# Patient Record
Sex: FEMALE | Race: BLACK OR AFRICAN AMERICAN | Employment: OTHER | URBAN - METROPOLITAN AREA
[De-identification: names, ages, dates, MRNs, and addresses within clinical notes are randomized per-mention and may not be internally consistent; named-entity substitution may affect disease eponyms.]

---

## 2017-01-03 ENCOUNTER — HOSPITAL ENCOUNTER (OUTPATIENT)
Dept: MAMMOGRAPHY | Age: 77
Discharge: HOME | End: 2017-01-03
Attending: INTERNAL MEDICINE | Admitting: INTERNAL MEDICINE

## 2017-01-03 DIAGNOSIS — Z12.31 ENCOUNTER FOR SCREENING MAMMOGRAM FOR BREAST CANCER: ICD-10-CM

## 2017-01-12 ENCOUNTER — OFFICE VISIT (OUTPATIENT)
Dept: FAMILY MEDICINE CLINIC | Age: 77
End: 2017-01-12

## 2017-01-12 VITALS
DIASTOLIC BLOOD PRESSURE: 70 MMHG | BODY MASS INDEX: 25.19 KG/M2 | HEART RATE: 80 BPM | SYSTOLIC BLOOD PRESSURE: 132 MMHG | WEIGHT: 151.2 LBS | HEIGHT: 65 IN | RESPIRATION RATE: 18 BRPM

## 2017-01-12 DIAGNOSIS — E78.00 PURE HYPERCHOLESTEROLEMIA: ICD-10-CM

## 2017-01-12 DIAGNOSIS — I10 ESSENTIAL HYPERTENSION: ICD-10-CM

## 2017-01-12 DIAGNOSIS — J43.2 CENTRILOBULAR EMPHYSEMA (HCC): Primary | ICD-10-CM

## 2017-01-12 PROCEDURE — 99214 OFFICE O/P EST MOD 30 MIN: CPT | Performed by: INTERNAL MEDICINE

## 2017-01-12 ASSESSMENT — ENCOUNTER SYMPTOMS
ALLERGIC/IMMUNOLOGIC NEGATIVE: 1
RESPIRATORY NEGATIVE: 1

## 2017-01-13 ENCOUNTER — TELEPHONE (OUTPATIENT)
Dept: FAMILY MEDICINE CLINIC | Age: 77
End: 2017-01-13

## 2017-01-19 ENCOUNTER — TELEPHONE (OUTPATIENT)
Dept: FAMILY MEDICINE CLINIC | Age: 77
End: 2017-01-19

## 2017-01-19 RX ORDER — GUAIFENESIN 1200 MG/1
1200 TABLET, EXTENDED RELEASE ORAL 2 TIMES DAILY PRN
Qty: 20 TABLET | Refills: 0 | Status: SHIPPED | OUTPATIENT
Start: 2017-01-19 | End: 2017-03-17 | Stop reason: ALTCHOICE

## 2017-01-19 RX ORDER — LOSARTAN POTASSIUM 25 MG/1
TABLET ORAL
Qty: 30 TABLET | Refills: 0 | Status: SHIPPED | OUTPATIENT
Start: 2017-01-19 | End: 2017-02-16 | Stop reason: SDUPTHER

## 2017-02-14 RX ORDER — ATORVASTATIN CALCIUM 10 MG/1
TABLET, FILM COATED ORAL
Qty: 30 TABLET | Refills: 5 | Status: SHIPPED | OUTPATIENT
Start: 2017-02-14 | End: 2017-07-31 | Stop reason: SDUPTHER

## 2017-02-16 RX ORDER — LOSARTAN POTASSIUM 25 MG/1
TABLET ORAL
Qty: 30 TABLET | Refills: 5 | Status: SHIPPED | OUTPATIENT
Start: 2017-02-16 | End: 2017-07-31 | Stop reason: SDUPTHER

## 2017-03-17 ENCOUNTER — OFFICE VISIT (OUTPATIENT)
Dept: FAMILY MEDICINE CLINIC | Age: 77
End: 2017-03-17

## 2017-03-17 VITALS
RESPIRATION RATE: 16 BRPM | HEIGHT: 65 IN | SYSTOLIC BLOOD PRESSURE: 172 MMHG | BODY MASS INDEX: 25.26 KG/M2 | HEART RATE: 80 BPM | WEIGHT: 151.6 LBS | DIASTOLIC BLOOD PRESSURE: 84 MMHG

## 2017-03-17 DIAGNOSIS — R63.4 UNEXPLAINED WEIGHT LOSS: Primary | ICD-10-CM

## 2017-03-17 DIAGNOSIS — M05.79 RHEUMATOID ARTHRITIS INVOLVING MULTIPLE SITES WITH POSITIVE RHEUMATOID FACTOR (HCC): ICD-10-CM

## 2017-03-17 DIAGNOSIS — I10 ESSENTIAL HYPERTENSION: ICD-10-CM

## 2017-03-17 LAB
ALBUMIN SERPL-MCNC: 4.4 G/DL (ref 3.4–5)
ALP BLD-CCNC: 53 U/L (ref 40–129)
ALT SERPL-CCNC: 17 U/L (ref 10–40)
ANION GAP SERPL CALCULATED.3IONS-SCNC: 17 MMOL/L (ref 3–16)
AST SERPL-CCNC: 16 U/L (ref 15–37)
BASOPHILS ABSOLUTE: 0 K/UL (ref 0–0.2)
BASOPHILS RELATIVE PERCENT: 0.4 %
BILIRUB SERPL-MCNC: 0.3 MG/DL (ref 0–1)
BILIRUBIN DIRECT: <0.2 MG/DL (ref 0–0.3)
BILIRUBIN, INDIRECT: NORMAL MG/DL (ref 0–1)
BUN BLDV-MCNC: 20 MG/DL (ref 7–20)
C-REACTIVE PROTEIN: <0.3 MG/L (ref 0–5.1)
CALCIUM SERPL-MCNC: 9.8 MG/DL (ref 8.3–10.6)
CHLORIDE BLD-SCNC: 103 MMOL/L (ref 99–110)
CO2: 23 MMOL/L (ref 21–32)
CREAT SERPL-MCNC: 0.7 MG/DL (ref 0.6–1.2)
EOSINOPHILS ABSOLUTE: 0 K/UL (ref 0–0.6)
EOSINOPHILS RELATIVE PERCENT: 0 %
GFR AFRICAN AMERICAN: >60
GFR NON-AFRICAN AMERICAN: >60
GLUCOSE BLD-MCNC: 93 MG/DL (ref 70–99)
HCT VFR BLD CALC: 32.8 % (ref 36–48)
HEMOGLOBIN: 10.6 G/DL (ref 12–16)
LYMPHOCYTES ABSOLUTE: 1 K/UL (ref 1–5.1)
LYMPHOCYTES RELATIVE PERCENT: 11.6 %
MCH RBC QN AUTO: 27.3 PG (ref 26–34)
MCHC RBC AUTO-ENTMCNC: 32.2 G/DL (ref 31–36)
MCV RBC AUTO: 84.9 FL (ref 80–100)
MONOCYTES ABSOLUTE: 0.4 K/UL (ref 0–1.3)
MONOCYTES RELATIVE PERCENT: 4.9 %
NEUTROPHILS ABSOLUTE: 7 K/UL (ref 1.7–7.7)
NEUTROPHILS RELATIVE PERCENT: 83.1 %
PDW BLD-RTO: 16.4 % (ref 12.4–15.4)
PLATELET # BLD: 283 K/UL (ref 135–450)
PMV BLD AUTO: 7.7 FL (ref 5–10.5)
POTASSIUM SERPL-SCNC: 4.6 MMOL/L (ref 3.5–5.1)
RBC # BLD: 3.87 M/UL (ref 4–5.2)
SEDIMENTATION RATE, ERYTHROCYTE: 23 MM/HR (ref 0–30)
SODIUM BLD-SCNC: 143 MMOL/L (ref 136–145)
TOTAL PROTEIN: 7.2 G/DL (ref 6.4–8.2)
TSH SERPL DL<=0.05 MIU/L-ACNC: 13.31 UIU/ML (ref 0.27–4.2)
WBC # BLD: 8.4 K/UL (ref 4–11)

## 2017-03-17 PROCEDURE — 99213 OFFICE O/P EST LOW 20 MIN: CPT | Performed by: INTERNAL MEDICINE

## 2017-03-17 PROCEDURE — 36415 COLL VENOUS BLD VENIPUNCTURE: CPT | Performed by: INTERNAL MEDICINE

## 2017-03-17 RX ORDER — LABETALOL 100 MG/1
100 TABLET, FILM COATED ORAL 2 TIMES DAILY
Qty: 60 TABLET | Refills: 3 | Status: SHIPPED | OUTPATIENT
Start: 2017-03-17 | End: 2017-07-04 | Stop reason: SDUPTHER

## 2017-03-17 ASSESSMENT — ENCOUNTER SYMPTOMS
ALLERGIC/IMMUNOLOGIC NEGATIVE: 1
RESPIRATORY NEGATIVE: 1

## 2017-03-20 RX ORDER — LEVOTHYROXINE SODIUM 0.05 MG/1
50 TABLET ORAL DAILY
Qty: 30 TABLET | Refills: 1 | Status: SHIPPED | OUTPATIENT
Start: 2017-03-20 | End: 2017-05-15 | Stop reason: SDUPTHER

## 2017-04-05 ENCOUNTER — OFFICE VISIT (OUTPATIENT)
Dept: FAMILY MEDICINE CLINIC | Age: 77
End: 2017-04-05

## 2017-04-05 VITALS
OXYGEN SATURATION: 94 % | DIASTOLIC BLOOD PRESSURE: 68 MMHG | WEIGHT: 151.4 LBS | SYSTOLIC BLOOD PRESSURE: 124 MMHG | BODY MASS INDEX: 25.19 KG/M2 | HEART RATE: 86 BPM

## 2017-04-05 DIAGNOSIS — R63.4 UNEXPLAINED WEIGHT LOSS: ICD-10-CM

## 2017-04-05 DIAGNOSIS — I10 ESSENTIAL HYPERTENSION: Primary | ICD-10-CM

## 2017-04-05 PROCEDURE — 99213 OFFICE O/P EST LOW 20 MIN: CPT | Performed by: INTERNAL MEDICINE

## 2017-04-05 ASSESSMENT — ENCOUNTER SYMPTOMS
RESPIRATORY NEGATIVE: 1
ALLERGIC/IMMUNOLOGIC NEGATIVE: 1

## 2017-05-03 ENCOUNTER — OFFICE VISIT (OUTPATIENT)
Dept: FAMILY MEDICINE CLINIC | Age: 77
End: 2017-05-03

## 2017-05-03 VITALS
OXYGEN SATURATION: 94 % | SYSTOLIC BLOOD PRESSURE: 150 MMHG | TEMPERATURE: 97.4 F | BODY MASS INDEX: 24.83 KG/M2 | WEIGHT: 149 LBS | DIASTOLIC BLOOD PRESSURE: 72 MMHG | HEART RATE: 90 BPM | HEIGHT: 65 IN

## 2017-05-03 DIAGNOSIS — M54.42 ACUTE LEFT-SIDED LOW BACK PAIN WITH LEFT-SIDED SCIATICA: Primary | ICD-10-CM

## 2017-05-03 PROCEDURE — 99213 OFFICE O/P EST LOW 20 MIN: CPT | Performed by: PHYSICIAN ASSISTANT

## 2017-05-03 RX ORDER — METHYLPREDNISOLONE 4 MG/1
TABLET ORAL
Qty: 1 KIT | Refills: 0 | Status: SHIPPED | OUTPATIENT
Start: 2017-05-03 | End: 2017-05-09

## 2017-05-15 RX ORDER — LEVOTHYROXINE SODIUM 0.05 MG/1
TABLET ORAL
Qty: 30 TABLET | Refills: 1 | Status: SHIPPED | OUTPATIENT
Start: 2017-05-15 | End: 2017-07-07 | Stop reason: SDUPTHER

## 2017-05-24 ENCOUNTER — TELEPHONE (OUTPATIENT)
Dept: FAMILY MEDICINE CLINIC | Age: 77
End: 2017-05-24

## 2017-07-05 RX ORDER — LABETALOL 100 MG/1
TABLET, FILM COATED ORAL
Qty: 60 TABLET | Refills: 3 | Status: ON HOLD | OUTPATIENT
Start: 2017-07-05 | End: 2019-01-17 | Stop reason: HOSPADM

## 2017-07-06 ENCOUNTER — HOSPITAL ENCOUNTER (OUTPATIENT)
Dept: OTHER | Age: 77
Discharge: HOME | End: 2017-07-06
Attending: INTERNAL MEDICINE | Admitting: INTERNAL MEDICINE

## 2017-07-06 ENCOUNTER — TELEPHONE (OUTPATIENT)
Dept: FAMILY MEDICINE CLINIC | Age: 77
End: 2017-07-06

## 2017-07-06 ENCOUNTER — OFFICE VISIT (OUTPATIENT)
Dept: FAMILY MEDICINE CLINIC | Age: 77
End: 2017-07-06

## 2017-07-06 VITALS
WEIGHT: 151.4 LBS | BODY MASS INDEX: 25.22 KG/M2 | DIASTOLIC BLOOD PRESSURE: 66 MMHG | HEART RATE: 72 BPM | OXYGEN SATURATION: 93 % | HEIGHT: 65 IN | RESPIRATION RATE: 18 BRPM | SYSTOLIC BLOOD PRESSURE: 128 MMHG

## 2017-07-06 DIAGNOSIS — M05.79 RHEUMATOID ARTHRITIS INVOLVING MULTIPLE SITES WITH POSITIVE RHEUMATOID FACTOR (HCC): ICD-10-CM

## 2017-07-06 DIAGNOSIS — E78.00 PURE HYPERCHOLESTEROLEMIA: ICD-10-CM

## 2017-07-06 DIAGNOSIS — I10 HTN (HYPERTENSION), MALIGNANT: ICD-10-CM

## 2017-07-06 DIAGNOSIS — I10 ESSENTIAL HYPERTENSION: Primary | ICD-10-CM

## 2017-07-06 DIAGNOSIS — J43.2 CENTRILOBULAR EMPHYSEMA (HCC): ICD-10-CM

## 2017-07-06 DIAGNOSIS — E03.9 ACQUIRED HYPOTHYROIDISM: ICD-10-CM

## 2017-07-06 LAB
ANION GAP SERPL CALCULATED.3IONS-SCNC: 16 MMOL/L (ref 3–16)
BASOPHILS ABSOLUTE: 0 K/UL (ref 0–0.2)
BASOPHILS RELATIVE PERCENT: 1 %
BUN BLDV-MCNC: 18 MG/DL (ref 7–20)
CALCIUM SERPL-MCNC: 10.1 MG/DL (ref 8.3–10.6)
CHLORIDE BLD-SCNC: 103 MMOL/L (ref 99–110)
CO2: 24 MMOL/L (ref 21–32)
CREAT SERPL-MCNC: 0.9 MG/DL (ref 0.6–1.2)
EOSINOPHILS ABSOLUTE: 0.1 K/UL (ref 0–0.6)
EOSINOPHILS RELATIVE PERCENT: 2.5 %
GFR AFRICAN AMERICAN: >60
GFR NON-AFRICAN AMERICAN: >60
GLUCOSE BLD-MCNC: 81 MG/DL (ref 70–99)
HCT VFR BLD CALC: 34.2 % (ref 36–48)
HEMOGLOBIN: 11.2 G/DL (ref 12–16)
LYMPHOCYTES ABSOLUTE: 1.3 K/UL (ref 1–5.1)
LYMPHOCYTES RELATIVE PERCENT: 28.7 %
MCH RBC QN AUTO: 29 PG (ref 26–34)
MCHC RBC AUTO-ENTMCNC: 32.6 G/DL (ref 31–36)
MCV RBC AUTO: 88.8 FL (ref 80–100)
MONOCYTES ABSOLUTE: 0.5 K/UL (ref 0–1.3)
MONOCYTES RELATIVE PERCENT: 10 %
NEUTROPHILS ABSOLUTE: 2.7 K/UL (ref 1.7–7.7)
NEUTROPHILS RELATIVE PERCENT: 57.8 %
PDW BLD-RTO: 14.6 % (ref 12.4–15.4)
PLATELET # BLD: 221 K/UL (ref 135–450)
PMV BLD AUTO: 8.2 FL (ref 5–10.5)
POTASSIUM SERPL-SCNC: 4.7 MMOL/L (ref 3.5–5.1)
RBC # BLD: 3.86 M/UL (ref 4–5.2)
RHEUMATOID FACTOR: >650 IU/ML
SEDIMENTATION RATE, ERYTHROCYTE: 28 MM/HR (ref 0–30)
SODIUM BLD-SCNC: 143 MMOL/L (ref 136–145)
TSH SERPL DL<=0.05 MIU/L-ACNC: 20.43 UIU/ML (ref 0.27–4.2)
URIC ACID, SERUM: 4.1 MG/DL (ref 2.6–6)
WBC # BLD: 4.6 K/UL (ref 4–11)

## 2017-07-06 PROCEDURE — 36415 COLL VENOUS BLD VENIPUNCTURE: CPT | Performed by: INTERNAL MEDICINE

## 2017-07-06 PROCEDURE — 99214 OFFICE O/P EST MOD 30 MIN: CPT | Performed by: INTERNAL MEDICINE

## 2017-07-06 ASSESSMENT — PATIENT HEALTH QUESTIONNAIRE - PHQ9
2. FEELING DOWN, DEPRESSED OR HOPELESS: 0
SUM OF ALL RESPONSES TO PHQ9 QUESTIONS 1 & 2: 0
SUM OF ALL RESPONSES TO PHQ QUESTIONS 1-9: 0
1. LITTLE INTEREST OR PLEASURE IN DOING THINGS: 0

## 2017-07-06 ASSESSMENT — ENCOUNTER SYMPTOMS
BACK PAIN: 0
RESPIRATORY NEGATIVE: 1
ALLERGIC/IMMUNOLOGIC NEGATIVE: 1

## 2017-07-06 NOTE — TELEPHONE ENCOUNTER
MARIEI-The patient was returning a call regarding her xray results from today 7/6/17. I read over Dr. Juan Alberto notes with her and let her know that she needed to follow up with a Ortho. I gave her [phone] phone number to contact.

## 2017-07-07 RX ORDER — LEVOTHYROXINE SODIUM 0.1 MG/1
100 TABLET ORAL DAILY
Qty: 30 TABLET | Refills: 5 | Status: SHIPPED | OUTPATIENT
Start: 2017-07-07 | End: 2017-10-03 | Stop reason: SDUPTHER

## 2017-07-07 RX ORDER — PREDNISONE 10 MG/1
10 TABLET ORAL DAILY
Qty: 30 TABLET | Refills: 0 | Status: SHIPPED | OUTPATIENT
Start: 2017-07-07 | End: 2017-08-06 | Stop reason: SDUPTHER

## 2017-07-08 LAB — CCP IGG ANTIBODIES: 207 UNITS (ref 0–19)

## 2017-07-11 ENCOUNTER — TELEPHONE (OUTPATIENT)
Dept: FAMILY MEDICINE CLINIC | Age: 77
End: 2017-07-11

## 2017-07-31 RX ORDER — LOSARTAN POTASSIUM 25 MG/1
TABLET ORAL
Qty: 30 TABLET | Refills: 5 | Status: SHIPPED | OUTPATIENT
Start: 2017-07-31 | End: 2017-10-03 | Stop reason: SDUPTHER

## 2017-07-31 RX ORDER — ATORVASTATIN CALCIUM 10 MG/1
TABLET, FILM COATED ORAL
Qty: 30 TABLET | Refills: 5 | Status: SHIPPED | OUTPATIENT
Start: 2017-07-31 | End: 2017-10-03 | Stop reason: SDUPTHER

## 2017-08-07 RX ORDER — PREDNISONE 10 MG/1
TABLET ORAL
Qty: 30 TABLET | Refills: 5 | Status: SHIPPED | OUTPATIENT
Start: 2017-08-07 | End: 2017-09-26 | Stop reason: SDUPTHER

## 2017-08-28 RX ORDER — LEVOTHYROXINE SODIUM 0.05 MG/1
TABLET ORAL
Qty: 30 TABLET | Refills: 5 | Status: SHIPPED | OUTPATIENT
Start: 2017-08-28 | End: 2018-03-22 | Stop reason: DRUGHIGH

## 2017-09-26 ENCOUNTER — OFFICE VISIT (OUTPATIENT)
Dept: FAMILY MEDICINE CLINIC | Age: 77
End: 2017-09-26

## 2017-09-26 VITALS
HEART RATE: 88 BPM | SYSTOLIC BLOOD PRESSURE: 146 MMHG | RESPIRATION RATE: 20 BRPM | DIASTOLIC BLOOD PRESSURE: 88 MMHG | OXYGEN SATURATION: 90 %

## 2017-09-26 DIAGNOSIS — M50.121 CERVICAL DISC DISORDER AT C4-C5 LEVEL WITH RADICULOPATHY: ICD-10-CM

## 2017-09-26 DIAGNOSIS — M05.79 RHEUMATOID ARTHRITIS INVOLVING MULTIPLE SITES WITH POSITIVE RHEUMATOID FACTOR (HCC): ICD-10-CM

## 2017-09-26 DIAGNOSIS — Z12.11 COLON CANCER SCREENING: ICD-10-CM

## 2017-09-26 DIAGNOSIS — J43.2 CENTRILOBULAR EMPHYSEMA (HCC): Primary | ICD-10-CM

## 2017-09-26 DIAGNOSIS — E78.00 PURE HYPERCHOLESTEROLEMIA: ICD-10-CM

## 2017-09-26 DIAGNOSIS — I10 ESSENTIAL HYPERTENSION: ICD-10-CM

## 2017-09-26 LAB
CONTROL: ABNORMAL
HEMOCCULT STL QL: POSITIVE

## 2017-09-26 PROCEDURE — 82274 ASSAY TEST FOR BLOOD FECAL: CPT | Performed by: INTERNAL MEDICINE

## 2017-09-26 PROCEDURE — 99214 OFFICE O/P EST MOD 30 MIN: CPT | Performed by: INTERNAL MEDICINE

## 2017-09-26 RX ORDER — PREDNISONE 10 MG/1
10 TABLET ORAL DAILY
Qty: 30 TABLET | Refills: 0 | Status: SHIPPED | OUTPATIENT
Start: 2017-09-26 | End: 2017-10-03 | Stop reason: SDUPTHER

## 2017-09-26 ASSESSMENT — ENCOUNTER SYMPTOMS
ALLERGIC/IMMUNOLOGIC NEGATIVE: 1
CHOKING: 0
CHEST TIGHTNESS: 0
WHEEZING: 0
APNEA: 0
SHORTNESS OF BREATH: 1
BACK PAIN: 1
STRIDOR: 0
COUGH: 0

## 2017-09-27 ENCOUNTER — TELEPHONE (OUTPATIENT)
Dept: FAMILY MEDICINE CLINIC | Age: 77
End: 2017-09-27

## 2017-10-03 RX ORDER — SPIRONOLACTONE 25 MG/1
TABLET ORAL
Qty: 180 TABLET | Refills: 1 | Status: SHIPPED | OUTPATIENT
Start: 2017-10-03 | End: 2018-03-21 | Stop reason: SDUPTHER

## 2017-10-03 RX ORDER — LEVOTHYROXINE SODIUM 0.1 MG/1
100 TABLET ORAL DAILY
Qty: 90 TABLET | Refills: 1 | Status: SHIPPED | OUTPATIENT
Start: 2017-10-03 | End: 2018-03-21 | Stop reason: SDUPTHER

## 2017-10-03 RX ORDER — ALBUTEROL SULFATE 90 UG/1
AEROSOL, METERED RESPIRATORY (INHALATION)
Qty: 3 INHALER | Refills: 1 | Status: SHIPPED | OUTPATIENT
Start: 2017-10-03 | End: 2018-03-21 | Stop reason: SDUPTHER

## 2017-10-03 RX ORDER — LOSARTAN POTASSIUM 25 MG/1
25 TABLET ORAL DAILY
Qty: 90 TABLET | Refills: 1 | Status: SHIPPED | OUTPATIENT
Start: 2017-10-03 | End: 2018-11-06 | Stop reason: SDUPTHER

## 2017-10-03 RX ORDER — ATORVASTATIN CALCIUM 10 MG/1
10 TABLET, FILM COATED ORAL DAILY
Qty: 90 TABLET | Refills: 5 | Status: SHIPPED | OUTPATIENT
Start: 2017-10-03 | End: 2018-10-16 | Stop reason: SDUPTHER

## 2017-10-03 RX ORDER — PREDNISONE 10 MG/1
10 TABLET ORAL DAILY
Qty: 90 TABLET | Refills: 1 | Status: SHIPPED | OUTPATIENT
Start: 2017-10-03 | End: 2018-03-21 | Stop reason: SDUPTHER

## 2017-10-06 ENCOUNTER — TELEPHONE (OUTPATIENT)
Dept: FAMILY MEDICINE CLINIC | Age: 77
End: 2017-10-06

## 2017-10-06 NOTE — TELEPHONE ENCOUNTER
Vicenta Napier is calling and following up on the following prescription request that was faxed a few days ago:  Spironolactone, Pro Air Inhaler, Prednisone, Losartan, Levothyroxine, Atorvastatin.

## 2017-10-18 ENCOUNTER — IMMUNIZATION (OUTPATIENT)
Dept: FAMILY MEDICINE CLINIC | Age: 77
End: 2017-10-18

## 2017-10-18 DIAGNOSIS — Z23 FLU VACCINE NEED: Primary | ICD-10-CM

## 2017-10-18 PROCEDURE — 90471 IMMUNIZATION ADMIN: CPT | Performed by: INTERNAL MEDICINE

## 2017-10-18 PROCEDURE — 90662 IIV NO PRSV INCREASED AG IM: CPT | Performed by: INTERNAL MEDICINE

## 2017-10-18 NOTE — PROGRESS NOTES
Vaccine Information Sheet, \"Influenza - Inactivated\"  given to Don Cerna, or parent/legal guardian of  Don Cerna and verbalized understanding. Patient responses:    Have you ever had a reaction to a flu vaccine? No  Are you able to eat eggs without adverse effects? Yes  Do you have any current illness? No  Have you ever had Guillian Vanlue Syndrome? No    Flu vaccine given per order. Please see immunization tab.

## 2017-10-19 ENCOUNTER — OFFICE VISIT (OUTPATIENT)
Dept: FAMILY MEDICINE CLINIC | Age: 77
End: 2017-10-19

## 2017-10-19 VITALS
WEIGHT: 156 LBS | OXYGEN SATURATION: 96 % | DIASTOLIC BLOOD PRESSURE: 72 MMHG | SYSTOLIC BLOOD PRESSURE: 148 MMHG | HEIGHT: 65 IN | HEART RATE: 85 BPM | RESPIRATION RATE: 18 BRPM | BODY MASS INDEX: 25.99 KG/M2

## 2017-10-19 DIAGNOSIS — R10.13 EPIGASTRIC PAIN: Primary | ICD-10-CM

## 2017-10-19 DIAGNOSIS — I10 ESSENTIAL HYPERTENSION: ICD-10-CM

## 2017-10-19 DIAGNOSIS — E03.9 ACQUIRED HYPOTHYROIDISM: ICD-10-CM

## 2017-10-19 LAB
ALBUMIN SERPL-MCNC: 4 G/DL (ref 3.4–5)
ALP BLD-CCNC: 59 U/L (ref 40–129)
ALT SERPL-CCNC: 16 U/L (ref 10–40)
AMYLASE: 89 U/L (ref 25–115)
ANION GAP SERPL CALCULATED.3IONS-SCNC: 17 MMOL/L (ref 3–16)
AST SERPL-CCNC: 25 U/L (ref 15–37)
BILIRUB SERPL-MCNC: 0.4 MG/DL (ref 0–1)
BILIRUBIN DIRECT: <0.2 MG/DL (ref 0–0.3)
BILIRUBIN, INDIRECT: NORMAL MG/DL (ref 0–1)
BUN BLDV-MCNC: 10 MG/DL (ref 7–20)
CALCIUM SERPL-MCNC: 9.2 MG/DL (ref 8.3–10.6)
CHLORIDE BLD-SCNC: 99 MMOL/L (ref 99–110)
CO2: 23 MMOL/L (ref 21–32)
CREAT SERPL-MCNC: 0.8 MG/DL (ref 0.6–1.2)
GFR AFRICAN AMERICAN: >60
GFR NON-AFRICAN AMERICAN: >60
GLUCOSE BLD-MCNC: 97 MG/DL (ref 70–99)
POTASSIUM SERPL-SCNC: 4.2 MMOL/L (ref 3.5–5.1)
SODIUM BLD-SCNC: 139 MMOL/L (ref 136–145)
T4 FREE: 1 NG/DL (ref 0.9–1.8)
TOTAL PROTEIN: 6.5 G/DL (ref 6.4–8.2)
TSH SERPL DL<=0.05 MIU/L-ACNC: 3.71 UIU/ML (ref 0.27–4.2)

## 2017-10-19 PROCEDURE — 1036F TOBACCO NON-USER: CPT | Performed by: INTERNAL MEDICINE

## 2017-10-19 PROCEDURE — G8427 DOCREV CUR MEDS BY ELIG CLIN: HCPCS | Performed by: INTERNAL MEDICINE

## 2017-10-19 PROCEDURE — 99214 OFFICE O/P EST MOD 30 MIN: CPT | Performed by: INTERNAL MEDICINE

## 2017-10-19 PROCEDURE — 4040F PNEUMOC VAC/ADMIN/RCVD: CPT | Performed by: INTERNAL MEDICINE

## 2017-10-19 PROCEDURE — 3017F COLORECTAL CA SCREEN DOC REV: CPT | Performed by: INTERNAL MEDICINE

## 2017-10-19 PROCEDURE — G8417 CALC BMI ABV UP PARAM F/U: HCPCS | Performed by: INTERNAL MEDICINE

## 2017-10-19 PROCEDURE — G8484 FLU IMMUNIZE NO ADMIN: HCPCS | Performed by: INTERNAL MEDICINE

## 2017-10-19 PROCEDURE — G8399 PT W/DXA RESULTS DOCUMENT: HCPCS | Performed by: INTERNAL MEDICINE

## 2017-10-19 PROCEDURE — 1090F PRES/ABSN URINE INCON ASSESS: CPT | Performed by: INTERNAL MEDICINE

## 2017-10-19 PROCEDURE — 1123F ACP DISCUSS/DSCN MKR DOCD: CPT | Performed by: INTERNAL MEDICINE

## 2017-10-19 PROCEDURE — 36415 COLL VENOUS BLD VENIPUNCTURE: CPT | Performed by: INTERNAL MEDICINE

## 2017-10-19 RX ORDER — LEFLUNOMIDE 20 MG/1
20 TABLET ORAL
COMMUNITY
Start: 2017-10-06 | End: 2018-03-22 | Stop reason: SDUPTHER

## 2017-10-19 RX ORDER — IPRATROPIUM BROMIDE AND ALBUTEROL SULFATE 2.5; .5 MG/3ML; MG/3ML
3 SOLUTION RESPIRATORY (INHALATION)
COMMUNITY
Start: 2017-10-06 | End: 2018-03-22 | Stop reason: ALTCHOICE

## 2017-10-19 ASSESSMENT — ENCOUNTER SYMPTOMS
ALLERGIC/IMMUNOLOGIC NEGATIVE: 1
RESPIRATORY NEGATIVE: 1
ABDOMINAL PAIN: 1
NAUSEA: 1

## 2017-10-19 NOTE — PROGRESS NOTES
Subjective:      Patient ID: Ashlee Talley is a 78 y.o. female. HPI  Acquired hypothyroidism  Was very low in 7/2017. Increased dose to 100 mcg. Now c/o of fatigue and cold intolerance. Doesn't feel well. Fatigue. Essential hypertension  No change in meds, no c/o with meds, no chest pain, SOB, palpatations, or syncope. Home bp was good. Epigastric pain  Epigastric pain mostly at night. Feels worse in evening, diffuse discomfort, anorexia, fatigue. Early am waking. Feels ok in AM.     Past Medical History:   Diagnosis Date    Acquired hypothyroidism 7/6/2017    Anemia     Asthma     COPD (chronic obstructive pulmonary disease) ([facility] [address]     HLD (hyperlipidemia)     Hypertension     MI (myocardial infarction)     X 2    Migraine     past hx    Rheumatoid arthritis     Wears glasses      Current Outpatient Prescriptions   Medication Sig Dispense Refill    tiotropium (SPIRIVA) 18 MCG inhalation capsule Inhale 18 mcg into the lungs      ipratropium-albuterol (DUONEB) 0.5-2.5 (3) MG/3ML SOLN nebulizer solution Inhale 3 mLs into the lungs      leflunomide (ARAVA) 20 MG tablet Take 20 mg by mouth      albuterol sulfate HFA (PROAIR HFA) 108 (90 Base) MCG/ACT inhaler INHALE 2 PUFFS INTO THE LUNGS EVERY 6 HOURS AS NEEDED.  3 Inhaler 1    predniSONE (DELTASONE) 10 MG tablet Take 1 tablet by mouth daily 90 tablet 1    levothyroxine (SYNTHROID) 100 MCG tablet Take 1 tablet by mouth daily 90 tablet 1    atorvastatin (LIPITOR) 10 MG tablet Take 1 tablet by mouth daily 90 tablet 5    spironolactone (ALDACTONE) 25 MG tablet TAKE 1 TABLET BY MOUTH EVERY 12 HOURS 180 tablet 1    losartan (COZAAR) 25 MG tablet Take 1 tablet by mouth daily 90 tablet 1    levothyroxine (SYNTHROID) 50 MCG tablet TAKE 1 TABLET BY MOUTH DAILY 30 tablet 5    labetalol (NORMODYNE) 100 MG tablet TAKE 1 TABLET BY MOUTH 2 TIMES DAILY 60 tablet 3    zoledronic acid (RECLAST) 5 MG/100ML SOLN Infuse 100 mLs intravenously once 100 mL 0  ipratropium-albuterol (DUONEB) 0.5-2.5 (3) MG/3ML SOLN nebulizer solution Inhale 3 mLs into the lungs every 4 hours as needed. 360 mL 3    hydrochlorothiazide (HYDRODIURIL) 25 MG tablet Take 25 mg by mouth daily.  fluticasone-salmeterol (ADVAIR DISKUS) 250-50 MCG/DOSE AEPB Inhale 1 puff into the lungs every 12 hours. 60 each 3    nitroGLYCERIN (NITROSTAT) 0.4 MG SL tablet Place 0.4 mg under the tongue every 5 minutes as needed.  Calcium-Vitamin D (CALCIUM + D PO) Take 1 tablet by mouth daily.  leflunomide (ARAVA) 20 MG tablet Take 20 mg by mouth daily. No current facility-administered medications for this visit. Allergies   Allergen Reactions    Alendronate Sodium      Jaw pain      Humira [Adalimumab]      Rash      Penicillins      rash    Simvastatin Other (See Comments)     Made legs ache    Sulfa Antibiotics Swelling     Tongue swelled     Social History   Substance Use Topics    Smoking status: Former Smoker     Quit date: 1/22/2009    Smokeless tobacco: Never Used    Alcohol use No     Family History   Problem Relation Age of Onset    Cancer Mother     Cancer Father     Heart Disease Maternal Aunt     Cancer Sister      Review of Systems   Constitutional: Positive for fatigue. Respiratory: Negative. Cardiovascular: Negative. Gastrointestinal: Positive for abdominal pain and nausea. Endocrine: Negative. Genitourinary: Negative. Musculoskeletal: Negative. Skin: Negative. Allergic/Immunologic: Negative. Neurological: Positive for weakness. Hematological: Negative. Psychiatric/Behavioral: Positive for dysphoric mood and sleep disturbance. The patient is nervous/anxious. Vitals:    10/19/17 1522   BP: (!) 148/72   Pulse: 85   Resp: 18   SpO2: 96%   Weight: 156 lb (70.8 kg)   Height: 5' 5\" (1.651 m)     Objective:   Physical Exam   Constitutional: She is oriented to person, place, and time.  She appears well-developed and well-nourished. Non-toxic appearance. She does not have a sickly appearance. She does not appear ill. No distress. HENT:   Head: Normocephalic and atraumatic. Eyes: Conjunctivae and EOM are normal. Pupils are equal, round, and reactive to light. Neck: Trachea normal, normal range of motion and full passive range of motion without pain. Neck supple. Normal carotid pulses, no hepatojugular reflux and no JVD present. No spinous process tenderness and no muscular tenderness present. Carotid bruit is not present. No tracheal deviation, no edema, no erythema and normal range of motion present. No thyroid mass and no thyromegaly present. Cardiovascular: Normal rate, regular rhythm, S1 normal, S2 normal, normal heart sounds, intact distal pulses and normal pulses. No extrasystoles are present. PMI is not displaced. Exam reveals no gallop, no S3, no S4, no distant heart sounds and no friction rub. No murmur heard. Pulses:       Carotid pulses are 2+ on the right side, and 2+ on the left side. Radial pulses are 2+ on the right side, and 2+ on the left side. Femoral pulses are 2+ on the right side, and 2+ on the left side. Popliteal pulses are 2+ on the right side, and 2+ on the left side. Dorsalis pedis pulses are 2+ on the right side, and 2+ on the left side. Posterior tibial pulses are 2+ on the right side, and 2+ on the left side. Pulmonary/Chest: Effort normal and breath sounds normal. No accessory muscle usage. No apnea, no tachypnea and no bradypnea. No respiratory distress. She has no decreased breath sounds. She has no wheezes. She has no rhonchi. She has no rales. She exhibits no tenderness. Abdominal: Soft. Bowel sounds are normal. She exhibits no distension and no mass. There is no tenderness. There is no rebound and no guarding. Musculoskeletal: Normal range of motion. She exhibits no edema or tenderness. Lymphadenopathy:     She has no cervical adenopathy.

## 2017-10-19 NOTE — ASSESSMENT & PLAN NOTE
No change in meds, no c/o with meds, no chest pain, SOB, palpatations, or syncope. Home bp was good.

## 2017-10-19 NOTE — PATIENT INSTRUCTIONS
All above problems reviewed and the found to be unchanged except for the following :     Epigastric Pain. Will do labs. May be low thyroid. Acquired Hypothyroidism. Will check labs. If ok will need further testing. Essential Hypertension. Continue present meds. Home BP checks. Call if>140/90. Improve diet. Avoid caffeine and salt. Call if new c/o w/ meds.

## 2017-10-20 ENCOUNTER — TELEPHONE (OUTPATIENT)
Dept: FAMILY MEDICINE CLINIC | Age: 77
End: 2017-10-20

## 2017-10-20 RX ORDER — OMEPRAZOLE 40 MG/1
40 CAPSULE, DELAYED RELEASE ORAL DAILY
Qty: 90 CAPSULE | Refills: 1 | Status: SHIPPED | OUTPATIENT
Start: 2017-10-20 | End: 2018-03-21 | Stop reason: SDUPTHER

## 2017-10-20 RX ORDER — RANITIDINE 150 MG/1
150 TABLET ORAL NIGHTLY
Qty: 90 TABLET | Refills: 1 | Status: SHIPPED | OUTPATIENT
Start: 2017-10-20 | End: 2018-03-21 | Stop reason: SDUPTHER

## 2017-11-21 ENCOUNTER — OFFICE VISIT (OUTPATIENT)
Dept: FAMILY MEDICINE CLINIC | Age: 77
End: 2017-11-21

## 2017-11-21 VITALS
BODY MASS INDEX: 25.69 KG/M2 | HEIGHT: 65 IN | OXYGEN SATURATION: 91 % | SYSTOLIC BLOOD PRESSURE: 126 MMHG | RESPIRATION RATE: 18 BRPM | DIASTOLIC BLOOD PRESSURE: 60 MMHG | WEIGHT: 154.2 LBS | HEART RATE: 101 BPM

## 2017-11-21 DIAGNOSIS — M54.32 SCIATICA OF LEFT SIDE: Primary | ICD-10-CM

## 2017-11-21 PROCEDURE — 1123F ACP DISCUSS/DSCN MKR DOCD: CPT | Performed by: INTERNAL MEDICINE

## 2017-11-21 PROCEDURE — G8399 PT W/DXA RESULTS DOCUMENT: HCPCS | Performed by: INTERNAL MEDICINE

## 2017-11-21 PROCEDURE — G8417 CALC BMI ABV UP PARAM F/U: HCPCS | Performed by: INTERNAL MEDICINE

## 2017-11-21 PROCEDURE — 99213 OFFICE O/P EST LOW 20 MIN: CPT | Performed by: INTERNAL MEDICINE

## 2017-11-21 PROCEDURE — 1090F PRES/ABSN URINE INCON ASSESS: CPT | Performed by: INTERNAL MEDICINE

## 2017-11-21 PROCEDURE — G8427 DOCREV CUR MEDS BY ELIG CLIN: HCPCS | Performed by: INTERNAL MEDICINE

## 2017-11-21 PROCEDURE — 4040F PNEUMOC VAC/ADMIN/RCVD: CPT | Performed by: INTERNAL MEDICINE

## 2017-11-21 PROCEDURE — G8484 FLU IMMUNIZE NO ADMIN: HCPCS | Performed by: INTERNAL MEDICINE

## 2017-11-21 PROCEDURE — 1036F TOBACCO NON-USER: CPT | Performed by: INTERNAL MEDICINE

## 2017-11-21 PROCEDURE — 3017F COLORECTAL CA SCREEN DOC REV: CPT | Performed by: INTERNAL MEDICINE

## 2017-11-21 RX ORDER — DICLOFENAC SODIUM 75 MG/1
75 TABLET, DELAYED RELEASE ORAL 2 TIMES DAILY
Qty: 60 TABLET | Refills: 0 | Status: SHIPPED | OUTPATIENT
Start: 2017-11-21 | End: 2018-03-22 | Stop reason: ALTCHOICE

## 2017-11-21 RX ORDER — PREDNISONE 10 MG/1
TABLET ORAL
Qty: 30 TABLET | Refills: 0 | Status: SHIPPED | OUTPATIENT
Start: 2017-11-21 | End: 2018-03-02 | Stop reason: ALTCHOICE

## 2017-11-21 RX ORDER — CYCLOBENZAPRINE HCL 10 MG
10 TABLET ORAL 3 TIMES DAILY PRN
COMMUNITY
End: 2018-08-06 | Stop reason: ALTCHOICE

## 2017-11-21 ASSESSMENT — ENCOUNTER SYMPTOMS
BACK PAIN: 1
RESPIRATORY NEGATIVE: 1

## 2017-11-21 NOTE — ASSESSMENT & PLAN NOTE
Severe pain radiating from L Lower Back, to L Buttock, down L leg. Leg gives out. Pain is sharp and comes and goes w/ minimal movement.

## 2017-11-21 NOTE — PATIENT INSTRUCTIONS
All above problems reviewed and the found to be unchanged except for the following :     Sciatica of Left Side  - Ice 20 min on and off. - Prednisone 40/30/20/10  - Diclofenac 75 mg twice daily(not on blood thinners per med sheet and history)  - Flexeril 10 mg three times daily as needed. - stretching exercise twice daily. - to call if no better by Monday. Patient Education        Low Back Pain: Exercises  Your Care Instructions  Here are some examples of typical rehabilitation exercises for your condition. Start each exercise slowly. Ease off the exercise if you start to have pain. Your doctor or physical therapist will tell you when you can start these exercises and which ones will work best for you. How to do the exercises  Press-up    1. Lie on your stomach, supporting your body with your forearms. 2. Press your elbows down into the floor to raise your upper back. As you do this, relax your stomach muscles and allow your back to arch without using your back muscles. As your press up, do not let your hips or pelvis come off the floor. 3. Hold for 15 to 30 seconds, then relax. 4. Repeat 2 to 4 times. Alternate arm and leg (bird dog) exercise    Note: Do this exercise slowly. Try to keep your body straight at all times, and do not let one hip drop lower than the other. 1. Start on the floor, on your hands and knees. 2. Tighten your belly muscles. 3. Raise one leg off the floor, and hold it straight out behind you. Be careful not to let your hip drop down, because that will twist your trunk. 4. Hold for about 6 seconds, then lower your leg and switch to the other leg. 5. Repeat 8 to 12 times on each leg. 6. Over time, work up to holding for 10 to 30 seconds each time. 7. If you feel stable and secure with your leg raised, try raising the opposite arm straight out in front of you at the same time. Knee-to-chest exercise    1.  Lie on your back with your knees bent and your feet flat on ask your healthcare professional. [place] [id number] any warranty or liability for your use of this information.

## 2017-11-21 NOTE — PROGRESS NOTES
intravenously once 100 mL 0    ipratropium-albuterol (DUONEB) 0.5-2.5 (3) MG/3ML SOLN nebulizer solution Inhale 3 mLs into the lungs every 4 hours as needed. 360 mL 3    hydrochlorothiazide (HYDRODIURIL) 25 MG tablet Take 25 mg by mouth daily.  fluticasone-salmeterol (ADVAIR DISKUS) 250-50 MCG/DOSE AEPB Inhale 1 puff into the lungs every 12 hours. 60 each 3    nitroGLYCERIN (NITROSTAT) 0.4 MG SL tablet Place 0.4 mg under the tongue every 5 minutes as needed.  Calcium-Vitamin D (CALCIUM + D PO) Take 1 tablet by mouth daily.  leflunomide (ARAVA) 20 MG tablet Take 20 mg by mouth daily. No current facility-administered medications for this visit. Allergies   Allergen Reactions    Alendronate Sodium      Jaw pain      Humira [Adalimumab]      Rash      Penicillins      rash    Simvastatin Other (See Comments)     Made legs ache    Sulfa Antibiotics Swelling     Tongue swelled     Social History   Substance Use Topics    Smoking status: Former Smoker     Quit date: 1/22/2009    Smokeless tobacco: Never Used    Alcohol use No     Family History   Problem Relation Age of Onset    Cancer Mother     Cancer Father     Heart Disease Maternal Aunt     Cancer Sister        Review of Systems   Constitutional: Negative. Respiratory: Negative. Cardiovascular: Negative. Genitourinary: Negative. Musculoskeletal: Positive for back pain (L Lower back pain.) and gait problem. Negative for arthralgias, joint swelling, myalgias, neck pain and neck stiffness. Skin: Negative. Vitals:    11/21/17 1357   BP: 126/60   Pulse: 101   Resp: 18   SpO2: 91%   Weight: 154 lb 3.2 oz (69.9 kg)   Height: 5' 5\" (1.651 m)     Objective:   Physical Exam   Constitutional: She is oriented to person, place, and time. She appears well-developed and well-nourished. Non-toxic appearance. She does not have a sickly appearance. She does not appear ill. No distress.    HENT:   Head: Normocephalic and atraumatic. Eyes: Conjunctivae and EOM are normal. Pupils are equal, round, and reactive to light. Neck: Trachea normal, normal range of motion and full passive range of motion without pain. Neck supple. Normal carotid pulses, no hepatojugular reflux and no JVD present. No spinous process tenderness and no muscular tenderness present. Carotid bruit is not present. No tracheal deviation, no edema, no erythema and normal range of motion present. No thyroid mass and no thyromegaly present. Cardiovascular: Normal rate, regular rhythm, S1 normal, S2 normal, normal heart sounds, intact distal pulses and normal pulses. No extrasystoles are present. PMI is not displaced. Exam reveals no gallop, no S3, no S4, no distant heart sounds and no friction rub. No murmur heard. Pulses:       Carotid pulses are 2+ on the right side, and 2+ on the left side. Radial pulses are 2+ on the right side, and 2+ on the left side. Femoral pulses are 2+ on the right side, and 2+ on the left side. Popliteal pulses are 2+ on the right side, and 2+ on the left side. Dorsalis pedis pulses are 2+ on the right side, and 2+ on the left side. Posterior tibial pulses are 2+ on the right side, and 2+ on the left side. Pulmonary/Chest: Effort normal and breath sounds normal. No accessory muscle usage. No apnea, no tachypnea and no bradypnea. No respiratory distress. She has no decreased breath sounds. She has no wheezes. She has no rhonchi. She has no rales. She exhibits no tenderness. Musculoskeletal: Normal range of motion. She exhibits no edema or tenderness (Lower Back pain L side. ). Lymphadenopathy:     She has no cervical adenopathy. She has no axillary adenopathy. Neurological: She is alert and oriented to person, place, and time. She is not disoriented. She displays abnormal reflex. She displays no atrophy and no tremor. No cranial nerve deficit or sensory deficit.  She exhibits normal muscle tone. Coordination and gait abnormal. She displays no Babinski's sign on the right side. She displays no Babinski's sign on the left side. Reflex Scores:       Tricep reflexes are 2+ on the right side and 2+ on the left side. Bicep reflexes are 2+ on the right side and 2+ on the left side. Brachioradialis reflexes are 2+ on the right side and 2+ on the left side. Patellar reflexes are 2+ on the right side and 1+ on the left side. Achilles reflexes are 2+ on the right side and 0 on the left side. Pain radiating down L leg. Pain w/ Straight leg raise L side. Skin: Skin is warm, dry and intact. No abrasion and no rash noted. She is not diaphoretic. No cyanosis or erythema. No pallor. Nails show no clubbing. Psychiatric: She has a normal mood and affect. Her speech is normal and behavior is normal. Judgment and thought content normal. Cognition and memory are normal.       Assessment:      Problem   Sciatica of Left Side. Probably L4-5 disc. Plan:      All above problems reviewed and the found to be unchanged except for the following :     Sciatica of Left Side  - Ice 20 min on and off. - Prednisone 40/30/20/10  - Diclofenac 75 mg twice daily(not on blood thinners per med sheet and history)  - Flexeril 10 mg three times daily as needed. - stretching exercise twice daily. - to call if no better by Monday.

## 2017-11-27 ENCOUNTER — TELEPHONE (OUTPATIENT)
Dept: FAMILY MEDICINE CLINIC | Age: 77
End: 2017-11-27

## 2017-11-27 NOTE — TELEPHONE ENCOUNTER
Patient's daughter Ellis Denton is calling regarding the Prednisone prescription. The prescription was not picked up from pharmacy (11-21-17). She has only been taking one tablet by mouth daily. She would like to know how she should proceed since it was not picked up.

## 2018-01-26 ENCOUNTER — OFFICE VISIT (OUTPATIENT)
Dept: FAMILY MEDICINE CLINIC | Age: 78
End: 2018-01-26

## 2018-01-26 VITALS
HEART RATE: 104 BPM | HEIGHT: 66 IN | RESPIRATION RATE: 18 BRPM | TEMPERATURE: 97.4 F | WEIGHT: 155.6 LBS | SYSTOLIC BLOOD PRESSURE: 136 MMHG | OXYGEN SATURATION: 95 % | BODY MASS INDEX: 25.01 KG/M2 | DIASTOLIC BLOOD PRESSURE: 84 MMHG

## 2018-01-26 DIAGNOSIS — J11.1 INFLUENZA: Primary | ICD-10-CM

## 2018-01-26 PROCEDURE — G8399 PT W/DXA RESULTS DOCUMENT: HCPCS | Performed by: INTERNAL MEDICINE

## 2018-01-26 PROCEDURE — 3017F COLORECTAL CA SCREEN DOC REV: CPT | Performed by: INTERNAL MEDICINE

## 2018-01-26 PROCEDURE — 1036F TOBACCO NON-USER: CPT | Performed by: INTERNAL MEDICINE

## 2018-01-26 PROCEDURE — G8427 DOCREV CUR MEDS BY ELIG CLIN: HCPCS | Performed by: INTERNAL MEDICINE

## 2018-01-26 PROCEDURE — G8417 CALC BMI ABV UP PARAM F/U: HCPCS | Performed by: INTERNAL MEDICINE

## 2018-01-26 PROCEDURE — 4040F PNEUMOC VAC/ADMIN/RCVD: CPT | Performed by: INTERNAL MEDICINE

## 2018-01-26 PROCEDURE — 1090F PRES/ABSN URINE INCON ASSESS: CPT | Performed by: INTERNAL MEDICINE

## 2018-01-26 PROCEDURE — 1123F ACP DISCUSS/DSCN MKR DOCD: CPT | Performed by: INTERNAL MEDICINE

## 2018-01-26 PROCEDURE — 99213 OFFICE O/P EST LOW 20 MIN: CPT | Performed by: INTERNAL MEDICINE

## 2018-01-26 PROCEDURE — G8484 FLU IMMUNIZE NO ADMIN: HCPCS | Performed by: INTERNAL MEDICINE

## 2018-01-26 RX ORDER — METHYLPREDNISOLONE 4 MG/1
TABLET ORAL
Qty: 1 KIT | Refills: 0 | Status: SHIPPED | OUTPATIENT
Start: 2018-01-26 | End: 2018-02-01

## 2018-01-26 RX ORDER — AZITHROMYCIN 250 MG/1
TABLET, FILM COATED ORAL
Qty: 1 PACKET | Refills: 0 | Status: SHIPPED | OUTPATIENT
Start: 2018-01-26 | End: 2018-02-05

## 2018-01-26 ASSESSMENT — ENCOUNTER SYMPTOMS
ALLERGIC/IMMUNOLOGIC NEGATIVE: 1
COUGH: 1
SHORTNESS OF BREATH: 1

## 2018-01-26 NOTE — PROGRESS NOTES
Father     Heart Disease Maternal Aunt     Cancer Sister        Review of Systems   Constitutional: Positive for fatigue. Respiratory: Positive for cough and shortness of breath. Cardiovascular: Negative. Endocrine: Negative. Genitourinary: Negative. Musculoskeletal: Negative. Skin: Negative. Allergic/Immunologic: Negative. Neurological: Positive for weakness. Objective:   Physical Exam   Constitutional: She is oriented to person, place, and time. She appears well-developed and well-nourished. Non-toxic appearance. She does not have a sickly appearance. She does not appear ill. She appears distressed. HENT:   Head: Normocephalic and atraumatic. Eyes: Conjunctivae and EOM are normal. Pupils are equal, round, and reactive to light. Neck: Trachea normal, normal range of motion and full passive range of motion without pain. Neck supple. Normal carotid pulses, no hepatojugular reflux and no JVD present. No spinous process tenderness and no muscular tenderness present. Carotid bruit is not present. No tracheal deviation, no edema, no erythema and normal range of motion present. No thyroid mass and no thyromegaly present. Cardiovascular: Normal rate, regular rhythm, S1 normal, S2 normal, normal heart sounds, intact distal pulses and normal pulses. No extrasystoles are present. PMI is not displaced. Exam reveals no gallop, no S3, no S4, no distant heart sounds and no friction rub. No murmur heard. Pulses:       Carotid pulses are 2+ on the right side, and 2+ on the left side. Radial pulses are 2+ on the right side, and 2+ on the left side. Femoral pulses are 2+ on the right side, and 2+ on the left side. Popliteal pulses are 2+ on the right side, and 2+ on the left side. Dorsalis pedis pulses are 2+ on the right side, and 2+ on the left side. Posterior tibial pulses are 2+ on the right side, and 2+ on the left side.    Pulmonary/Chest: Effort

## 2018-03-02 ENCOUNTER — HOSPITAL ENCOUNTER (OUTPATIENT)
Dept: OTHER | Age: 78
Discharge: HOME | End: 2018-03-03
Attending: INTERNAL MEDICINE | Admitting: INTERNAL MEDICINE

## 2018-03-02 ENCOUNTER — HOSPITAL ENCOUNTER (OUTPATIENT)
Dept: GENERAL RADIOLOGY | Age: 78
Discharge: HOME | End: 2018-03-02

## 2018-03-02 ENCOUNTER — OFFICE VISIT (OUTPATIENT)
Dept: FAMILY MEDICINE CLINIC | Age: 78
End: 2018-03-02

## 2018-03-02 VITALS
SYSTOLIC BLOOD PRESSURE: 142 MMHG | RESPIRATION RATE: 16 BRPM | BODY MASS INDEX: 25.62 KG/M2 | WEIGHT: 153.8 LBS | OXYGEN SATURATION: 97 % | TEMPERATURE: 98.2 F | HEIGHT: 65 IN | HEART RATE: 108 BPM | DIASTOLIC BLOOD PRESSURE: 70 MMHG

## 2018-03-02 DIAGNOSIS — J43.2 CENTRILOBULAR EMPHYSEMA (HCC): Primary | ICD-10-CM

## 2018-03-02 DIAGNOSIS — J43.2 CENTRILOBULAR EMPHYSEMA (HCC): ICD-10-CM

## 2018-03-02 LAB
BASOPHILS ABSOLUTE: 0.2 K/UL (ref 0–0.2)
BASOPHILS RELATIVE PERCENT: 1.2 %
EOSINOPHILS ABSOLUTE: 0 K/UL (ref 0–0.6)
EOSINOPHILS RELATIVE PERCENT: 0.1 %
HCT VFR BLD CALC: 36.3 % (ref 36–48)
HEMOGLOBIN: 11.7 G/DL (ref 12–16)
LYMPHOCYTES ABSOLUTE: 1.7 K/UL (ref 1–5.1)
LYMPHOCYTES RELATIVE PERCENT: 13.4 %
MCH RBC QN AUTO: 28.1 PG (ref 26–34)
MCHC RBC AUTO-ENTMCNC: 32.1 G/DL (ref 31–36)
MCV RBC AUTO: 87.4 FL (ref 80–100)
MONOCYTES ABSOLUTE: 1 K/UL (ref 0–1.3)
MONOCYTES RELATIVE PERCENT: 7.9 %
NEUTROPHILS ABSOLUTE: 10.1 K/UL (ref 1.7–7.7)
NEUTROPHILS RELATIVE PERCENT: 77.4 %
PDW BLD-RTO: 15 % (ref 12.4–15.4)
PLATELET # BLD: 359 K/UL (ref 135–450)
PMV BLD AUTO: 7.9 FL (ref 5–10.5)
RBC # BLD: 4.16 M/UL (ref 4–5.2)
WBC # BLD: 13 K/UL (ref 4–11)

## 2018-03-02 PROCEDURE — 94640 AIRWAY INHALATION TREATMENT: CPT | Performed by: INTERNAL MEDICINE

## 2018-03-02 PROCEDURE — G8399 PT W/DXA RESULTS DOCUMENT: HCPCS | Performed by: INTERNAL MEDICINE

## 2018-03-02 PROCEDURE — 1123F ACP DISCUSS/DSCN MKR DOCD: CPT | Performed by: INTERNAL MEDICINE

## 2018-03-02 PROCEDURE — 99213 OFFICE O/P EST LOW 20 MIN: CPT | Performed by: INTERNAL MEDICINE

## 2018-03-02 PROCEDURE — 1090F PRES/ABSN URINE INCON ASSESS: CPT | Performed by: INTERNAL MEDICINE

## 2018-03-02 PROCEDURE — 36415 COLL VENOUS BLD VENIPUNCTURE: CPT | Performed by: INTERNAL MEDICINE

## 2018-03-02 PROCEDURE — 1036F TOBACCO NON-USER: CPT | Performed by: INTERNAL MEDICINE

## 2018-03-02 PROCEDURE — G8926 SPIRO NO PERF OR DOC: HCPCS | Performed by: INTERNAL MEDICINE

## 2018-03-02 PROCEDURE — 4040F PNEUMOC VAC/ADMIN/RCVD: CPT | Performed by: INTERNAL MEDICINE

## 2018-03-02 PROCEDURE — G8427 DOCREV CUR MEDS BY ELIG CLIN: HCPCS | Performed by: INTERNAL MEDICINE

## 2018-03-02 PROCEDURE — 3017F COLORECTAL CA SCREEN DOC REV: CPT | Performed by: INTERNAL MEDICINE

## 2018-03-02 PROCEDURE — G8484 FLU IMMUNIZE NO ADMIN: HCPCS | Performed by: INTERNAL MEDICINE

## 2018-03-02 PROCEDURE — 3023F SPIROM DOC REV: CPT | Performed by: INTERNAL MEDICINE

## 2018-03-02 PROCEDURE — G8417 CALC BMI ABV UP PARAM F/U: HCPCS | Performed by: INTERNAL MEDICINE

## 2018-03-02 RX ORDER — PREDNISONE 10 MG/1
TABLET ORAL
Qty: 15 TABLET | Refills: 0 | Status: SHIPPED | OUTPATIENT
Start: 2018-03-02 | End: 2018-03-22 | Stop reason: ALTCHOICE

## 2018-03-02 RX ORDER — LEVALBUTEROL INHALATION SOLUTION 1.25 MG/3ML
1.25 SOLUTION RESPIRATORY (INHALATION) ONCE
Status: COMPLETED | OUTPATIENT
Start: 2018-03-02 | End: 2018-03-02

## 2018-03-02 RX ORDER — LEVOFLOXACIN 500 MG/1
500 TABLET, FILM COATED ORAL DAILY
Qty: 7 TABLET | Refills: 0 | Status: SHIPPED | OUTPATIENT
Start: 2018-03-02 | End: 2018-03-09

## 2018-03-02 RX ADMIN — LEVALBUTEROL INHALATION SOLUTION 1.25 MG: 1.25 SOLUTION RESPIRATORY (INHALATION) at 12:52

## 2018-03-02 RX ADMIN — Medication 0.5 MG: at 12:52

## 2018-03-02 ASSESSMENT — ENCOUNTER SYMPTOMS
CHEST TIGHTNESS: 0
WHEEZING: 0
CHOKING: 0
RHINORRHEA: 1
SHORTNESS OF BREATH: 1
APNEA: 0
COUGH: 1
STRIDOR: 0

## 2018-03-02 NOTE — PROGRESS NOTES
Subjective:      Patient ID: Lyn Gorman is a 78 y.o. female. HPI  Centrilobular emphysema ([facility] [address]  Had Flu 8 weeks ago. Now last 1+ weeks, SOB, cough, fatigue, SOB. Feels weak. Past Medical History:   Diagnosis Date    Acquired hypothyroidism 7/6/2017    Anemia     Asthma     COPD (chronic obstructive pulmonary disease) ([facility] [address]     HLD (hyperlipidemia)     Hypertension     Influenza A 01/22/2018    MI (myocardial infarction)     X 2    Migraine     past hx    Rheumatoid arthritis     Wears glasses      Current Outpatient Prescriptions   Medication Sig Dispense Refill    predniSONE (DELTASONE) 10 MG tablet Take 1 tablet by mouth daily 90 tablet 1    guaiFENesin (MUCINEX) 600 MG extended release tablet Take 2 tablets by mouth 2 times daily 30 tablet 0    methotrexate (RHEUMATREX) 2.5 MG chemo tablet TAKE 3 TABLETS BY MOUTH ONCE WEEKLY 32 tablet 11    cyclobenzaprine (FLEXERIL) 10 MG tablet Take 10 mg by mouth 3 times daily as needed for Muscle spasms      diclofenac (VOLTAREN) 75 MG EC tablet Take 1 tablet by mouth 2 times daily 60 tablet 0    omeprazole (PRILOSEC) 40 MG delayed release capsule Take 1 capsule by mouth daily 90 capsule 1    ranitidine (ZANTAC) 150 MG tablet Take 1 tablet by mouth nightly 90 tablet 1    tiotropium (SPIRIVA) 18 MCG inhalation capsule Inhale 18 mcg into the lungs      ipratropium-albuterol (DUONEB) 0.5-2.5 (3) MG/3ML SOLN nebulizer solution Inhale 3 mLs into the lungs      leflunomide (ARAVA) 20 MG tablet Take 20 mg by mouth      albuterol sulfate HFA (PROAIR HFA) 108 (90 Base) MCG/ACT inhaler INHALE 2 PUFFS INTO THE LUNGS EVERY 6 HOURS AS NEEDED.  3 Inhaler 1    levothyroxine (SYNTHROID) 100 MCG tablet Take 1 tablet by mouth daily 90 tablet 1    atorvastatin (LIPITOR) 10 MG tablet Take 1 tablet by mouth daily 90 tablet 5    spironolactone (ALDACTONE) 25 MG tablet TAKE 1 TABLET BY MOUTH EVERY 12 HOURS 180 tablet 1    losartan (COZAAR) 25 MG tablet Take 1 Negative. Neurological: Positive for weakness. Negative for dizziness, tremors, seizures, syncope, facial asymmetry, speech difficulty, light-headedness, numbness and headaches. Objective:   Physical Exam   Constitutional: She is oriented to person, place, and time. She appears well-developed and well-nourished. Non-toxic appearance. She does not have a sickly appearance. She does not appear ill. No distress. HENT:   Head: Normocephalic and atraumatic. Eyes: Conjunctivae and EOM are normal. Pupils are equal, round, and reactive to light. Neck: Trachea normal, normal range of motion and full passive range of motion without pain. Neck supple. Normal carotid pulses, no hepatojugular reflux and no JVD present. No spinous process tenderness and no muscular tenderness present. Carotid bruit is not present. No tracheal deviation, no edema, no erythema and normal range of motion present. No thyroid mass and no thyromegaly present. Cardiovascular: Normal rate, regular rhythm, S1 normal, S2 normal, normal heart sounds, intact distal pulses and normal pulses. No extrasystoles are present. PMI is not displaced. Exam reveals no gallop, no S3, no S4, no distant heart sounds and no friction rub. No murmur heard. Pulses:       Carotid pulses are 2+ on the right side, and 2+ on the left side. Radial pulses are 2+ on the right side, and 2+ on the left side. Femoral pulses are 2+ on the right side, and 2+ on the left side. Popliteal pulses are 2+ on the right side, and 2+ on the left side. Dorsalis pedis pulses are 2+ on the right side, and 2+ on the left side. Posterior tibial pulses are 2+ on the right side, and 2+ on the left side. Pulmonary/Chest: Effort normal. No accessory muscle usage. No apnea, no tachypnea and no bradypnea. No respiratory distress. She has no decreased breath sounds. She has wheezes (vague bilateral). She has no rhonchi. She has no rales.  She

## 2018-03-02 NOTE — PATIENT INSTRUCTIONS
All above problems reviewed and the found to be unchanged except for the following :     Centrilobular Emphysema (Hcc)  -Duoneb w/ Nebulizer. - CBC and CXR. - Levaquin 500 mg daily for 7 days and Prednisone 20 mg for 5 days and 10 mg for 5 days.

## 2018-03-05 RX ORDER — BENZONATATE 200 MG/1
200 CAPSULE ORAL 3 TIMES DAILY PRN
Qty: 21 CAPSULE | Refills: 0 | Status: SHIPPED | OUTPATIENT
Start: 2018-03-05 | End: 2018-03-12

## 2018-03-05 NOTE — PROGRESS NOTES
Pt reports feeling better but still has cough  Cough coming in waves usually after she feels better for a while and starts doing things.  Advised to not do too much at times when she is feeling better

## 2018-03-21 RX ORDER — OMEPRAZOLE 40 MG/1
CAPSULE, DELAYED RELEASE ORAL
Qty: 90 CAPSULE | Refills: 11 | Status: SHIPPED | OUTPATIENT
Start: 2018-03-21 | End: 2019-03-22 | Stop reason: SDUPTHER

## 2018-03-21 RX ORDER — LEVOTHYROXINE SODIUM 0.1 MG/1
100 TABLET ORAL DAILY
Qty: 90 TABLET | Refills: 11 | Status: SHIPPED | OUTPATIENT
Start: 2018-03-21 | End: 2019-03-25 | Stop reason: SDUPTHER

## 2018-03-21 RX ORDER — PREDNISONE 10 MG/1
TABLET ORAL
Qty: 90 TABLET | Refills: 11 | Status: SHIPPED | OUTPATIENT
Start: 2018-03-21 | End: 2019-03-22 | Stop reason: SDUPTHER

## 2018-03-21 RX ORDER — RANITIDINE 150 MG/1
TABLET ORAL
Qty: 90 TABLET | Refills: 11 | Status: SHIPPED | OUTPATIENT
Start: 2018-03-21 | End: 2018-03-22 | Stop reason: ALTCHOICE

## 2018-03-21 RX ORDER — SPIRONOLACTONE 25 MG/1
TABLET ORAL
Qty: 180 TABLET | Refills: 11 | Status: ON HOLD | OUTPATIENT
Start: 2018-03-21 | End: 2019-01-17 | Stop reason: HOSPADM

## 2018-03-22 ENCOUNTER — HOSPITAL ENCOUNTER (OUTPATIENT)
Dept: GENERAL RADIOLOGY | Age: 78
Discharge: HOME | End: 2018-03-22

## 2018-03-22 ENCOUNTER — HOSPITAL ENCOUNTER (OUTPATIENT)
Dept: OTHER | Age: 78
Discharge: HOME | End: 2018-03-23
Attending: INTERNAL MEDICINE | Admitting: INTERNAL MEDICINE

## 2018-03-22 ENCOUNTER — OFFICE VISIT (OUTPATIENT)
Dept: FAMILY MEDICINE CLINIC | Age: 78
End: 2018-03-22

## 2018-03-22 VITALS
BODY MASS INDEX: 25.66 KG/M2 | WEIGHT: 154 LBS | HEIGHT: 65 IN | HEART RATE: 91 BPM | DIASTOLIC BLOOD PRESSURE: 84 MMHG | RESPIRATION RATE: 16 BRPM | OXYGEN SATURATION: 93 % | SYSTOLIC BLOOD PRESSURE: 138 MMHG

## 2018-03-22 DIAGNOSIS — J43.2 CENTRILOBULAR EMPHYSEMA (HCC): ICD-10-CM

## 2018-03-22 DIAGNOSIS — J13 PNEUMONIA OF BOTH LOWER LOBES DUE TO STREPTOCOCCUS PNEUMONIAE (HCC): ICD-10-CM

## 2018-03-22 DIAGNOSIS — J43.2 CENTRILOBULAR EMPHYSEMA (HCC): Primary | ICD-10-CM

## 2018-03-22 PROCEDURE — 1123F ACP DISCUSS/DSCN MKR DOCD: CPT | Performed by: INTERNAL MEDICINE

## 2018-03-22 PROCEDURE — G8399 PT W/DXA RESULTS DOCUMENT: HCPCS | Performed by: INTERNAL MEDICINE

## 2018-03-22 PROCEDURE — G8482 FLU IMMUNIZE ORDER/ADMIN: HCPCS | Performed by: INTERNAL MEDICINE

## 2018-03-22 PROCEDURE — 99213 OFFICE O/P EST LOW 20 MIN: CPT | Performed by: INTERNAL MEDICINE

## 2018-03-22 PROCEDURE — 1090F PRES/ABSN URINE INCON ASSESS: CPT | Performed by: INTERNAL MEDICINE

## 2018-03-22 PROCEDURE — 1036F TOBACCO NON-USER: CPT | Performed by: INTERNAL MEDICINE

## 2018-03-22 PROCEDURE — 3023F SPIROM DOC REV: CPT | Performed by: INTERNAL MEDICINE

## 2018-03-22 PROCEDURE — G8417 CALC BMI ABV UP PARAM F/U: HCPCS | Performed by: INTERNAL MEDICINE

## 2018-03-22 PROCEDURE — 4040F PNEUMOC VAC/ADMIN/RCVD: CPT | Performed by: INTERNAL MEDICINE

## 2018-03-22 PROCEDURE — G8926 SPIRO NO PERF OR DOC: HCPCS | Performed by: INTERNAL MEDICINE

## 2018-03-22 PROCEDURE — G8427 DOCREV CUR MEDS BY ELIG CLIN: HCPCS | Performed by: INTERNAL MEDICINE

## 2018-03-22 ASSESSMENT — ENCOUNTER SYMPTOMS
APNEA: 0
WHEEZING: 0
CHEST TIGHTNESS: 0
CHOKING: 0
COUGH: 0
STRIDOR: 0
SHORTNESS OF BREATH: 1

## 2018-03-22 NOTE — PATIENT INSTRUCTIONS
All above problems reviewed and the found to be unchanged except for the following :     Centrilobular Emphysema (Hcc). Stiolto 2.5 mcg  One puff daily. Will repeat CXR. Continue Albuterol as needed. Call if new c/o. Continue home O2 3L.

## 2018-03-22 NOTE — PROGRESS NOTES
S3, no S4, no distant heart sounds and no friction rub. No murmur heard. Pulses:       Carotid pulses are 2+ on the right side, and 2+ on the left side. Radial pulses are 2+ on the right side, and 2+ on the left side. Femoral pulses are 2+ on the right side, and 2+ on the left side. Popliteal pulses are 2+ on the right side, and 2+ on the left side. Dorsalis pedis pulses are 2+ on the right side, and 2+ on the left side. Posterior tibial pulses are 2+ on the right side, and 2+ on the left side. Pulmonary/Chest: Effort normal and breath sounds normal. No accessory muscle usage. No apnea, no tachypnea and no bradypnea. No respiratory distress. She has no decreased breath sounds. She has no wheezes. She has no rhonchi. She has no rales. She exhibits no tenderness. No wheezing   Musculoskeletal: Normal range of motion. She exhibits no edema or tenderness. Lymphadenopathy:     She has no cervical adenopathy. She has no axillary adenopathy. Neurological: She is alert and oriented to person, place, and time. She has normal strength and normal reflexes. She is not disoriented. She displays no atrophy and no tremor. No cranial nerve deficit or sensory deficit. She exhibits normal muscle tone. Coordination normal.   Skin: Skin is warm, dry and intact. No abrasion and no rash noted. She is not diaphoretic. No cyanosis or erythema. No pallor. Nails show no clubbing. Psychiatric: She has a normal mood and affect. Her speech is normal and behavior is normal. Judgment and thought content normal. Cognition and memory are normal.       Assessment:      Problem   Centrilobular Emphysema (Hcc). Bronchitis/Pneumonia improved. Plan:      All above problems reviewed and the found to be unchanged except for the following :     Centrilobular Emphysema (Hcc). Stiolto 2.5 mcg  One puff daily. Will repeat CXR. Continue Albuterol as needed. Call if new c/o. Continue home O2 3L.

## 2018-03-23 DIAGNOSIS — J43.2 CENTRILOBULAR EMPHYSEMA (HCC): ICD-10-CM

## 2018-03-23 DIAGNOSIS — R63.4 UNEXPLAINED WEIGHT LOSS: ICD-10-CM

## 2018-03-23 DIAGNOSIS — R93.89 ABNORMAL CXR: Primary | ICD-10-CM

## 2018-04-12 ENCOUNTER — TELEPHONE (OUTPATIENT)
Dept: FAMILY MEDICINE CLINIC | Age: 78
End: 2018-04-12

## 2018-04-12 NOTE — TELEPHONE ENCOUNTER
Back pain as soon as she steps down from back down hip into leg, pain x 3 days , has been getting progressively worse.   Scheduled appt for her tomorrow at 940

## 2018-04-12 NOTE — TELEPHONE ENCOUNTER
Patient is experiencing severe lower back pain that goes down the back of her legs to the knees. It has been going on for a few days, but so bad today she has to walk backwards for the pain to not be as bad.

## 2018-04-13 ENCOUNTER — HOSPITAL ENCOUNTER (OUTPATIENT)
Dept: OTHER | Age: 78
Discharge: HOME | End: 2018-04-14
Attending: INTERNAL MEDICINE | Admitting: INTERNAL MEDICINE

## 2018-04-13 ENCOUNTER — HOSPITAL ENCOUNTER (OUTPATIENT)
Dept: GENERAL RADIOLOGY | Age: 78
Discharge: HOME | End: 2018-04-13

## 2018-04-13 ENCOUNTER — OFFICE VISIT (OUTPATIENT)
Dept: FAMILY MEDICINE CLINIC | Age: 78
End: 2018-04-13

## 2018-04-13 VITALS
RESPIRATION RATE: 16 BRPM | HEART RATE: 114 BPM | OXYGEN SATURATION: 95 % | DIASTOLIC BLOOD PRESSURE: 96 MMHG | BODY MASS INDEX: 25.71 KG/M2 | HEIGHT: 66 IN | SYSTOLIC BLOOD PRESSURE: 158 MMHG | WEIGHT: 160 LBS

## 2018-04-13 DIAGNOSIS — M54.5 ACUTE LEFT-SIDED LOW BACK PAIN, WITH SCIATICA PRESENCE UNSPECIFIED: Primary | ICD-10-CM

## 2018-04-13 DIAGNOSIS — M54.32 SCIATICA OF LEFT SIDE: ICD-10-CM

## 2018-04-13 DIAGNOSIS — M54.32 SCIATICA OF LEFT SIDE: Primary | ICD-10-CM

## 2018-04-13 PROCEDURE — 1090F PRES/ABSN URINE INCON ASSESS: CPT | Performed by: INTERNAL MEDICINE

## 2018-04-13 PROCEDURE — G8417 CALC BMI ABV UP PARAM F/U: HCPCS | Performed by: INTERNAL MEDICINE

## 2018-04-13 PROCEDURE — G8399 PT W/DXA RESULTS DOCUMENT: HCPCS | Performed by: INTERNAL MEDICINE

## 2018-04-13 PROCEDURE — 99213 OFFICE O/P EST LOW 20 MIN: CPT | Performed by: INTERNAL MEDICINE

## 2018-04-13 PROCEDURE — 1036F TOBACCO NON-USER: CPT | Performed by: INTERNAL MEDICINE

## 2018-04-13 PROCEDURE — 1123F ACP DISCUSS/DSCN MKR DOCD: CPT | Performed by: INTERNAL MEDICINE

## 2018-04-13 PROCEDURE — 4040F PNEUMOC VAC/ADMIN/RCVD: CPT | Performed by: INTERNAL MEDICINE

## 2018-04-13 PROCEDURE — G8427 DOCREV CUR MEDS BY ELIG CLIN: HCPCS | Performed by: INTERNAL MEDICINE

## 2018-04-13 RX ORDER — TRAMADOL HYDROCHLORIDE 50 MG/1
50 TABLET ORAL EVERY 6 HOURS PRN
Qty: 12 TABLET | Refills: 0 | OUTPATIENT
Start: 2018-04-13 | End: 2018-04-16

## 2018-04-13 RX ORDER — CYCLOBENZAPRINE HCL 10 MG
10 TABLET ORAL 3 TIMES DAILY PRN
Qty: 21 TABLET | Refills: 0 | OUTPATIENT
Start: 2018-04-13 | End: 2018-04-23

## 2018-04-13 ASSESSMENT — ENCOUNTER SYMPTOMS
BACK PAIN: 1
ALLERGIC/IMMUNOLOGIC NEGATIVE: 1
RESPIRATORY NEGATIVE: 1

## 2018-04-23 ENCOUNTER — HOSPITAL ENCOUNTER (OUTPATIENT)
Dept: MRI IMAGING | Age: 78
Discharge: HOME | End: 2018-04-23
Attending: INTERNAL MEDICINE | Admitting: INTERNAL MEDICINE

## 2018-04-23 DIAGNOSIS — M54.50 LOW BACK PAIN: ICD-10-CM

## 2018-04-23 DIAGNOSIS — M54.5 ACUTE LEFT-SIDED LOW BACK PAIN, WITH SCIATICA PRESENCE UNSPECIFIED: ICD-10-CM

## 2018-04-24 ENCOUNTER — TELEPHONE (OUTPATIENT)
Dept: FAMILY MEDICINE CLINIC | Age: 78
End: 2018-04-24

## 2018-04-24 DIAGNOSIS — M71.38 SYNOVIAL CYST OF LUMBAR SPINE: Primary | ICD-10-CM

## 2018-05-04 ENCOUNTER — TELEPHONE (OUTPATIENT)
Dept: FAMILY MEDICINE CLINIC | Age: 78
End: 2018-05-04

## 2018-05-07 ENCOUNTER — OFFICE VISIT (OUTPATIENT)
Dept: FAMILY MEDICINE CLINIC | Age: 78
End: 2018-05-07

## 2018-05-07 VITALS
WEIGHT: 160 LBS | SYSTOLIC BLOOD PRESSURE: 126 MMHG | RESPIRATION RATE: 18 BRPM | HEART RATE: 115 BPM | BODY MASS INDEX: 25.71 KG/M2 | OXYGEN SATURATION: 90 % | DIASTOLIC BLOOD PRESSURE: 84 MMHG | HEIGHT: 66 IN

## 2018-05-07 DIAGNOSIS — E03.9 ACQUIRED HYPOTHYROIDISM: ICD-10-CM

## 2018-05-07 DIAGNOSIS — I10 ESSENTIAL HYPERTENSION: ICD-10-CM

## 2018-05-07 DIAGNOSIS — J43.2 CENTRILOBULAR EMPHYSEMA (HCC): ICD-10-CM

## 2018-05-07 DIAGNOSIS — I27.20 PULMONARY HTN (HCC): ICD-10-CM

## 2018-05-07 DIAGNOSIS — M54.32 SCIATICA OF LEFT SIDE: ICD-10-CM

## 2018-05-07 PROCEDURE — 1090F PRES/ABSN URINE INCON ASSESS: CPT | Performed by: INTERNAL MEDICINE

## 2018-05-07 PROCEDURE — G8926 SPIRO NO PERF OR DOC: HCPCS | Performed by: INTERNAL MEDICINE

## 2018-05-07 PROCEDURE — 3023F SPIROM DOC REV: CPT | Performed by: INTERNAL MEDICINE

## 2018-05-07 PROCEDURE — 1123F ACP DISCUSS/DSCN MKR DOCD: CPT | Performed by: INTERNAL MEDICINE

## 2018-05-07 PROCEDURE — 1036F TOBACCO NON-USER: CPT | Performed by: INTERNAL MEDICINE

## 2018-05-07 PROCEDURE — 99214 OFFICE O/P EST MOD 30 MIN: CPT | Performed by: INTERNAL MEDICINE

## 2018-05-07 PROCEDURE — 4040F PNEUMOC VAC/ADMIN/RCVD: CPT | Performed by: INTERNAL MEDICINE

## 2018-05-07 PROCEDURE — G8399 PT W/DXA RESULTS DOCUMENT: HCPCS | Performed by: INTERNAL MEDICINE

## 2018-05-07 PROCEDURE — G8417 CALC BMI ABV UP PARAM F/U: HCPCS | Performed by: INTERNAL MEDICINE

## 2018-05-07 PROCEDURE — G8427 DOCREV CUR MEDS BY ELIG CLIN: HCPCS | Performed by: INTERNAL MEDICINE

## 2018-05-07 ASSESSMENT — ENCOUNTER SYMPTOMS
ALLERGIC/IMMUNOLOGIC NEGATIVE: 1
GASTROINTESTINAL NEGATIVE: 1
SHORTNESS OF BREATH: 1
BACK PAIN: 1

## 2018-05-08 ENCOUNTER — TELEPHONE (OUTPATIENT)
Dept: FAMILY MEDICINE CLINIC | Age: 78
End: 2018-05-08

## 2018-05-24 ENCOUNTER — TELEPHONE (OUTPATIENT)
Dept: FAMILY MEDICINE CLINIC | Age: 78
End: 2018-05-24

## 2018-05-24 DIAGNOSIS — M54.32 SCIATICA OF LEFT SIDE: Primary | ICD-10-CM

## 2018-07-24 ENCOUNTER — TELEPHONE (OUTPATIENT)
Dept: ORTHOPEDIC SURGERY | Age: 78
End: 2018-07-24

## 2018-07-24 ENCOUNTER — OFFICE VISIT (OUTPATIENT)
Dept: ORTHOPEDIC SURGERY | Age: 78
End: 2018-07-24

## 2018-07-24 VITALS
HEART RATE: 112 BPM | SYSTOLIC BLOOD PRESSURE: 126 MMHG | BODY MASS INDEX: 25.71 KG/M2 | WEIGHT: 160 LBS | DIASTOLIC BLOOD PRESSURE: 84 MMHG | HEIGHT: 66 IN

## 2018-07-24 DIAGNOSIS — M71.38 SYNOVIAL CYST OF LUMBAR FACET JOINT: Primary | ICD-10-CM

## 2018-07-24 DIAGNOSIS — M48.061 LUMBAR FORAMINAL STENOSIS: ICD-10-CM

## 2018-07-24 DIAGNOSIS — M54.16 LUMBAR RADICULOPATHY: ICD-10-CM

## 2018-07-24 PROCEDURE — G8417 CALC BMI ABV UP PARAM F/U: HCPCS | Performed by: PHYSICAL MEDICINE & REHABILITATION

## 2018-07-24 PROCEDURE — G8427 DOCREV CUR MEDS BY ELIG CLIN: HCPCS | Performed by: PHYSICAL MEDICINE & REHABILITATION

## 2018-07-24 PROCEDURE — 1101F PT FALLS ASSESS-DOCD LE1/YR: CPT | Performed by: PHYSICAL MEDICINE & REHABILITATION

## 2018-07-24 PROCEDURE — 1090F PRES/ABSN URINE INCON ASSESS: CPT | Performed by: PHYSICAL MEDICINE & REHABILITATION

## 2018-07-24 PROCEDURE — 99244 OFF/OP CNSLTJ NEW/EST MOD 40: CPT | Performed by: PHYSICAL MEDICINE & REHABILITATION

## 2018-07-24 NOTE — PROGRESS NOTES
New Patient: SPINE    Referring Provider: Jacquie Rizvi MD    CHIEF COMPLAINT:    Chief Complaint   Patient presents with    Lower Back Pain     NP, LSP. Patient notes pain several years but has recently worsened in the past few months. No recent injury. No recent treatment but has had aquatic therapy in the past.  No h/o of lumbar spine surgery.  Leg Pain     Radiating bilateral leg pain. Pain extends into feet. Notes left leg is worse than right leg. HISTORY OF PRESENT ILLNESS:      · The patient is being sent at the request of Jacquie Rizvi MD in consultation as a new spine patient for low back pain and bilateral leg pain. The patient is a 70 y.o. female whom reports She developed low back pain over the past few years. This seems to worsen the past few months. She can identify any inciting event or any injury. She has tried aquatic therapy with no significant relief. She reports she is pain radiating to the both lower extremities but this is worse on the left side. She underwent x-rays of lumbar spine on 4/13/2018 and an MRI of lumbar spine 4/23/2018.     Pain Assessment  Location of Pain: Leg  Location Modifiers: Left, Right  Severity of Pain: 8  Quality of Pain: Sharp, Throbbing, Other (Comment) (Burning)  Duration of Pain: Persistent  Frequency of Pain: Constant  Aggravating Factors: Walking, Standing  Limiting Behavior: Yes  Relieving Factors: Rest  Result of Injury: No  Work-Related Injury: No  Are there other pain locations you wish to document?: No      Associated signs and symptoms:   Neurogenic bowel or bladder symptoms:  no   Perceived weakness:  no   Difficulty walking:  yes    Recent Imaging (within past one year)   Xrays: no   MRI or CT of spine: yes    Current/Past Treatment:   · Physical Therapy:  yes  · Chiropractic:  none  · Injection:  none  · Medications:   NSAIDS:  yes   Muscle relaxer:  yes   Steriods:  none   Neuropathic medications:  none   Opioids:

## 2018-08-06 ENCOUNTER — OFFICE VISIT (OUTPATIENT)
Dept: FAMILY MEDICINE CLINIC | Age: 78
End: 2018-08-06

## 2018-08-06 VITALS
HEART RATE: 113 BPM | SYSTOLIC BLOOD PRESSURE: 156 MMHG | WEIGHT: 158.6 LBS | BODY MASS INDEX: 25.49 KG/M2 | RESPIRATION RATE: 16 BRPM | DIASTOLIC BLOOD PRESSURE: 80 MMHG | OXYGEN SATURATION: 92 % | HEIGHT: 66 IN

## 2018-08-06 DIAGNOSIS — Z23 PNEUMOCOCCAL VACCINATION ADMINISTERED AT CURRENT VISIT: ICD-10-CM

## 2018-08-06 DIAGNOSIS — I10 ESSENTIAL HYPERTENSION: ICD-10-CM

## 2018-08-06 DIAGNOSIS — J43.2 CENTRILOBULAR EMPHYSEMA (HCC): ICD-10-CM

## 2018-08-06 DIAGNOSIS — M54.32 SCIATICA OF LEFT SIDE: ICD-10-CM

## 2018-08-06 PROCEDURE — 1123F ACP DISCUSS/DSCN MKR DOCD: CPT | Performed by: INTERNAL MEDICINE

## 2018-08-06 PROCEDURE — 90732 PPSV23 VACC 2 YRS+ SUBQ/IM: CPT | Performed by: INTERNAL MEDICINE

## 2018-08-06 PROCEDURE — G8427 DOCREV CUR MEDS BY ELIG CLIN: HCPCS | Performed by: INTERNAL MEDICINE

## 2018-08-06 PROCEDURE — 1090F PRES/ABSN URINE INCON ASSESS: CPT | Performed by: INTERNAL MEDICINE

## 2018-08-06 PROCEDURE — 1101F PT FALLS ASSESS-DOCD LE1/YR: CPT | Performed by: INTERNAL MEDICINE

## 2018-08-06 PROCEDURE — G8417 CALC BMI ABV UP PARAM F/U: HCPCS | Performed by: INTERNAL MEDICINE

## 2018-08-06 PROCEDURE — 1036F TOBACCO NON-USER: CPT | Performed by: INTERNAL MEDICINE

## 2018-08-06 PROCEDURE — G8926 SPIRO NO PERF OR DOC: HCPCS | Performed by: INTERNAL MEDICINE

## 2018-08-06 PROCEDURE — 90471 IMMUNIZATION ADMIN: CPT | Performed by: INTERNAL MEDICINE

## 2018-08-06 PROCEDURE — 99214 OFFICE O/P EST MOD 30 MIN: CPT | Performed by: INTERNAL MEDICINE

## 2018-08-06 PROCEDURE — G8399 PT W/DXA RESULTS DOCUMENT: HCPCS | Performed by: INTERNAL MEDICINE

## 2018-08-06 PROCEDURE — 4040F PNEUMOC VAC/ADMIN/RCVD: CPT | Performed by: INTERNAL MEDICINE

## 2018-08-06 PROCEDURE — 3023F SPIROM DOC REV: CPT | Performed by: INTERNAL MEDICINE

## 2018-08-06 RX ORDER — PREDNISONE 10 MG/1
10 TABLET ORAL
Status: ON HOLD | COMMUNITY
End: 2019-01-17 | Stop reason: HOSPADM

## 2018-08-06 ASSESSMENT — PATIENT HEALTH QUESTIONNAIRE - PHQ9
1. LITTLE INTEREST OR PLEASURE IN DOING THINGS: 0
2. FEELING DOWN, DEPRESSED OR HOPELESS: 0
SUM OF ALL RESPONSES TO PHQ9 QUESTIONS 1 & 2: 0
SUM OF ALL RESPONSES TO PHQ QUESTIONS 1-9: 0

## 2018-08-06 ASSESSMENT — ENCOUNTER SYMPTOMS
RESPIRATORY NEGATIVE: 1
ALLERGIC/IMMUNOLOGIC NEGATIVE: 1
GASTROINTESTINAL NEGATIVE: 1

## 2018-08-06 NOTE — ASSESSMENT & PLAN NOTE
Home O2. Continues to have issues w/  HR>100 and O2 sat of 95% when off Port able O2 for >10 min. SOB after off O2 about 10-15 min.

## 2018-08-06 NOTE — PATIENT INSTRUCTIONS
All above problems reviewed and the found to be unchanged except for the following :     Sciatica of Left Side. To Dr Balta Gandhi for aspiration of cyst.      Essential Hypertension. Continue present meds. Home BP checks. Call if>140/90. Improve diet. Avoid caffeine and salt. Call if new c/o w/ meds. Centrilobular Emphysema (Hcc). To Pulmonologist as scheduled. To walk as tolerated. Call if new c/o. contiue home O2 at present setting for now. conitnue meds.

## 2018-08-07 ENCOUNTER — TELEPHONE (OUTPATIENT)
Dept: FAMILY MEDICINE CLINIC | Age: 78
End: 2018-08-07

## 2018-08-07 NOTE — TELEPHONE ENCOUNTER
Patient was seen yesterday and given a prescription for a walker. [address], phone number [phone]. [facility] is asking the prescription be faxed to them. She did not have the fax number. BP is asking for reason of medical necessity. Patient would like a call when the order has been faxed to [facility].

## 2018-08-08 NOTE — TELEPHONE ENCOUNTER
Patient would like a call from [facility] regarding the walker and when the prescription will be faxed to [address] [place] [address].  She said she would like to get out of the house, but unable to without something to hold onto. Marquise Jack and Dr. Jeremy Grajeda are out of the office today.

## 2018-08-13 ENCOUNTER — TELEPHONE (OUTPATIENT)
Dept: ORTHOPEDIC SURGERY | Age: 78
End: 2018-08-13

## 2018-08-13 DIAGNOSIS — M54.16 LUMBAR RADICULOPATHY: ICD-10-CM

## 2018-08-13 DIAGNOSIS — M71.38 SYNOVIAL CYST OF LUMBAR FACET JOINT: Primary | ICD-10-CM

## 2018-08-13 DIAGNOSIS — M48.061 LUMBAR FORAMINAL STENOSIS: ICD-10-CM

## 2018-08-13 NOTE — TELEPHONE ENCOUNTER
Patient spoke to Belinda Murillo. She was told to have the prescription faxed to [facility] [phone] for the walker. She was also told to call Dr. Dayan Frausto office asking why is it necessary to have the Epidural.  She wanted to let you know this was being asked.

## 2018-08-15 ENCOUNTER — TELEPHONE (OUTPATIENT)
Dept: ORTHOPEDIC SURGERY | Age: 78
End: 2018-08-15

## 2018-08-15 DIAGNOSIS — M71.38 SYNOVIAL CYST OF LUMBAR SPINE: Primary | ICD-10-CM

## 2018-08-15 DIAGNOSIS — M48.061 LUMBAR FORAMINAL STENOSIS: ICD-10-CM

## 2018-08-15 DIAGNOSIS — M54.16 LUMBAR RADICULOPATHY: ICD-10-CM

## 2018-08-17 NOTE — TELEPHONE ENCOUNTER
Patient is calling to let you know [address] does not have walkers. She was given [employer] by [employer]. She is asking the prescription for the roller walker with a seat and brakes be faxed to [facility]'s fax # [phone]. Their phone number is [phone]. They are also asking for medical necessity. She says it is to help her walk to she can get out of the house, a seat to rest when too tired, brakes to stop, and the walker would also be used in case she felt down. (she would have something to help her get up)  She would like to get exercise as well.

## 2018-08-20 ENCOUNTER — TELEPHONE (OUTPATIENT)
Dept: ORTHOPEDIC SURGERY | Age: 78
End: 2018-08-20

## 2018-08-21 ENCOUNTER — HOSPITAL ENCOUNTER (OUTPATIENT)
Age: 78
Setting detail: OUTPATIENT SURGERY
Discharge: HOME | End: 2018-08-21
Attending: PHYSICAL MEDICINE & REHABILITATION | Admitting: PHYSICAL MEDICINE & REHABILITATION
Payer: COMMERCIAL

## 2018-08-21 VITALS
OXYGEN SATURATION: 100 % | RESPIRATION RATE: 16 BRPM | HEIGHT: 66 IN | DIASTOLIC BLOOD PRESSURE: 85 MMHG | BODY MASS INDEX: 24.27 KG/M2 | WEIGHT: 151 LBS | SYSTOLIC BLOOD PRESSURE: 151 MMHG | HEART RATE: 84 BPM

## 2018-08-21 PROCEDURE — 2580000003 HC RX 258

## 2018-08-21 PROCEDURE — 3600000002 HC SURGERY LEVEL 2 BASE: Performed by: PHYSICAL MEDICINE & REHABILITATION

## 2018-08-21 PROCEDURE — 2709999900 HC NON-CHARGEABLE SUPPLY: Performed by: PHYSICAL MEDICINE & REHABILITATION

## 2018-08-21 PROCEDURE — 3600000012 HC SURGERY LEVEL 2 ADDTL 15MIN: Performed by: PHYSICAL MEDICINE & REHABILITATION

## 2018-08-21 PROCEDURE — 2500000003 HC RX 250 WO HCPCS

## 2018-08-21 PROCEDURE — 7100000010 HC PHASE II RECOVERY - FIRST 15 MIN: Performed by: PHYSICAL MEDICINE & REHABILITATION

## 2018-08-21 PROCEDURE — 7100000011 HC PHASE II RECOVERY - ADDTL 15 MIN: Performed by: PHYSICAL MEDICINE & REHABILITATION

## 2018-08-21 PROCEDURE — 99152 MOD SED SAME PHYS/QHP 5/>YRS: CPT | Performed by: PHYSICAL MEDICINE & REHABILITATION

## 2018-08-21 RX ORDER — SODIUM CHLORIDE, SODIUM LACTATE, POTASSIUM CHLORIDE, CALCIUM CHLORIDE 600; 310; 30; 20 MG/100ML; MG/100ML; MG/100ML; MG/100ML
INJECTION, SOLUTION INTRAVENOUS
Status: DISCONTINUED
Start: 2018-08-21 | End: 2018-08-21 | Stop reason: HOSPADM

## 2018-08-21 RX ORDER — MIDAZOLAM HYDROCHLORIDE 1 MG/ML
INJECTION INTRAMUSCULAR; INTRAVENOUS
Status: DISCONTINUED
Start: 2018-08-21 | End: 2018-08-21 | Stop reason: HOSPADM

## 2018-08-21 RX ORDER — SODIUM CHLORIDE, SODIUM LACTATE, POTASSIUM CHLORIDE, CALCIUM CHLORIDE 600; 310; 30; 20 MG/100ML; MG/100ML; MG/100ML; MG/100ML
INJECTION, SOLUTION INTRAVENOUS CONTINUOUS
Status: DISCONTINUED | OUTPATIENT
Start: 2018-08-21 | End: 2018-08-21 | Stop reason: HOSPADM

## 2018-08-21 RX ORDER — LIDOCAINE HYDROCHLORIDE 10 MG/ML
INJECTION, SOLUTION EPIDURAL; INFILTRATION; INTRACAUDAL; PERINEURAL
Status: COMPLETED
Start: 2018-08-21 | End: 2018-08-21

## 2018-08-21 RX ORDER — SODIUM CHLORIDE, SODIUM LACTATE, POTASSIUM CHLORIDE, CALCIUM CHLORIDE 600; 310; 30; 20 MG/100ML; MG/100ML; MG/100ML; MG/100ML
INJECTION, SOLUTION INTRAVENOUS
Status: COMPLETED
Start: 2018-08-21 | End: 2018-08-21

## 2018-08-21 RX ORDER — SODIUM CHLORIDE, SODIUM LACTATE, POTASSIUM CHLORIDE, CALCIUM CHLORIDE 600; 310; 30; 20 MG/100ML; MG/100ML; MG/100ML; MG/100ML
INJECTION, SOLUTION INTRAVENOUS CONTINUOUS
Status: DISCONTINUED | OUTPATIENT
Start: 2018-08-21 | End: 2018-08-21

## 2018-08-21 RX ADMIN — LIDOCAINE HYDROCHLORIDE 0.1 ML: 10 INJECTION, SOLUTION EPIDURAL; INFILTRATION; INTRACAUDAL; PERINEURAL at 08:25

## 2018-08-21 RX ADMIN — SODIUM CHLORIDE, SODIUM LACTATE, POTASSIUM CHLORIDE, CALCIUM CHLORIDE: 600; 310; 30; 20 INJECTION, SOLUTION INTRAVENOUS at 08:26

## 2018-08-21 RX ADMIN — SODIUM CHLORIDE, POTASSIUM CHLORIDE, SODIUM LACTATE AND CALCIUM CHLORIDE: 600; 310; 30; 20 INJECTION, SOLUTION INTRAVENOUS at 08:26

## 2018-08-21 ASSESSMENT — ACTIVITIES OF DAILY LIVING (ADL): EFFECT OF PAIN ON DAILY ACTIVITIES: WALKING

## 2018-08-21 ASSESSMENT — PAIN SCALES - GENERAL: PAINLEVEL_OUTOF10: 0

## 2018-08-21 ASSESSMENT — PAIN - FUNCTIONAL ASSESSMENT: PAIN_FUNCTIONAL_ASSESSMENT: 0-10

## 2018-08-21 ASSESSMENT — PAIN DESCRIPTION - DESCRIPTORS: DESCRIPTORS: SHARP;PRESSURE

## 2018-08-21 NOTE — OP NOTE
Patient:  Nereyda Granados  YOB: 1940  Medical Record #:  [id number]   Place:  [address], [place]  Date:  8/21/2018   Physician:  Guille Jameson MD    Procedure:  Left L4/5 Lumbar Facet Cyst aspiration and facet joint injection    Pre-Procedure Diagnosis: Lumbar spondylosis without radiculopathy    Post-Procedure Diagnosis:  Lumbar spondylosis without radiculopathy    Sedation: Local with 1% Lidocaine 3 ml and 2 mg of IV Versed    EBL: None    Complications: None    The patient was seen in the office for complaints of Left sided axial back pain worse with facet loading. Review of the imaging and physical exam of the patient confirmed the pre-procedure diagnosis. After a thorough discussion of risks, benefits and alternatives informed consent was obtained. The patient was brought to the procedure suite and placed in the prone position. The skin overlying the lumbar spine was prepped with chloraprep and draped in the usual sterile fashion. Using fluoroscopic guidance, the left L4/5 facet joint was identified. Through anesthetized skin a 22 gauge 3.5 inch curved tip spinal needle was advanced to the juncture of the inferior articular process of L4 and then walked into the facet joint. .5 ml of Isovue M 300 was instilled showing an intra-articular pattern without evidence of vascular spread. The cyst was aspirated and 1.5 CC of fluid was obtained. A total of 2 ml of a solution mixed with 80 mg of depomedrol and 0.5 % Marcaine was instilled into the left L4/5 facet joint. The needles were removed and a band-aid applied. The patient was transferred to the post-operative area in stable condition.

## 2018-08-21 NOTE — POST SEDATION
Sedation Post Procedure Note    Patient Name: Darlene Thayer   YOB: 1940  Room/Bed: EG OR/NONE  Medical Record Number: [id number]  Date: 8/21/2018   Time: 9:39 AM         Physicians/Assistants:  Jeremy Frazier MD    Procedure Performed:  Lumbar facet cyst aspiration and injection    Post-Sedation Vital Signs:  Vitals:    08/21/18 0926   BP: (!) 169/74   Pulse: 82   Resp: 17   SpO2: 100%      Vital signs were reviewed and were stable after the procedure (see flow sheet for vitals)            Post-Sedation Exam: Lungs: clear           Complications: none    Electronically signed by Jeremy Frazier MD on 8/21/2018 at 9:39 AM

## 2018-08-21 NOTE — H&P
HISTORY AND PHYSICAL/PRE-SEDATION ASSESSMENT    Patient:  Tere Galvez   :  1940  Medical Record No.:  [id number]   Date:  2018  Physician:  Murphy Solis M.D. Facility: [facility]     Nursing History and Physical reviewed and agreed upon. Additional findings:    Allergies:  Alendronate sodium; Humira [adalimumab]; Penicillins; Simvastatin; and Sulfa antibiotics    Home Medications:    Prior to Admission medications    Medication Sig Start Date End Date Taking? Authorizing Provider   ipratropium-albuterol (DUONEB) 0.5-2.5 (3) MG/3ML SOLN nebulizer solution Inhale 3 mLs into the lungs every 4 hours as needed.  2/10/15  Yes Sam Quiroz MD   predniSONE (DELTASONE) 10 MG tablet Take 10 mg by mouth    Historical Provider, MD   Blood Pressure Monitoring (BLOOD PRESSURE MONITOR/M CUFF) MISC 1 Units by Does not apply route daily 18   C Ana Maria Jameson MD   levothyroxine (SYNTHROID) 100 MCG tablet TAKE 1 TABLET BY MOUTH DAILY 3/21/18   C Ana Maria Jameson MD   predniSONE (DELTASONE) 10 MG tablet TAKE ONE (1) TABLET BY MOUTH DAILY 3/21/18   C Ana Maria Jameson MD   spironolactone (ALDACTONE) 25 MG tablet TAKE ONE (1) TABLET BY MOUTH EVERY 12 HOURS 3/21/18   C Ana Maria Jameson MD   VENTOLIN  (90 Base) MCG/ACT inhaler INHALE 2 PUFFS BY MOUTH INTO THE LUNGS EVERY 6 HOURS AS NEEDED 3/21/18   C Ana Maria Jameson MD   omeprazole (PRILOSEC) 40 MG delayed release capsule TAKE 1 CAPSULE BY MOUTH ONCE DAILY 3/21/18   C Ana Maria Jameson MD   atorvastatin (LIPITOR) 10 MG tablet Take 1 tablet by mouth daily 10/3/17   C Ana Maria Jameson MD   losartan (COZAAR) 25 MG tablet Take 1 tablet by mouth daily 10/3/17   C Ana Maria Jameson MD   labetalol (NORMODYNE) 100 MG tablet TAKE 1 TABLET BY MOUTH 2 TIMES DAILY 17   C Ana Maria Jameson MD   zoledronic acid (RECLAST) 5 MG/100ML SOLN Infuse 100 mLs intravenously once 12/9/15   Sam Quiroz MD   hydrochlorothiazide (HYDRODIURIL) 25 MG tablet Take 25 mg

## 2018-08-21 NOTE — PROGRESS NOTES
Discharge instructions given to pts family and verbalized understanding, states pain is at a tolerable level, no numbness or weakness noted, pt wheeled out to car without complications

## 2018-08-24 ENCOUNTER — TELEPHONE (OUTPATIENT)
Dept: FAMILY MEDICINE CLINIC | Age: 78
End: 2018-08-24

## 2018-09-04 ENCOUNTER — TELEPHONE (OUTPATIENT)
Dept: ORTHOPEDIC SURGERY | Age: 78
End: 2018-09-04

## 2018-09-04 ENCOUNTER — OFFICE VISIT (OUTPATIENT)
Dept: ORTHOPEDIC SURGERY | Age: 78
End: 2018-09-04

## 2018-09-04 VITALS
DIASTOLIC BLOOD PRESSURE: 70 MMHG | BODY MASS INDEX: 25.88 KG/M2 | WEIGHT: 161 LBS | HEIGHT: 66 IN | SYSTOLIC BLOOD PRESSURE: 125 MMHG

## 2018-09-04 DIAGNOSIS — M71.38 SYNOVIAL CYST OF LUMBAR FACET JOINT: ICD-10-CM

## 2018-09-04 DIAGNOSIS — M54.16 LUMBAR RADICULOPATHY: ICD-10-CM

## 2018-09-04 DIAGNOSIS — M43.16 SPONDYLOLISTHESIS OF LUMBAR REGION: Primary | ICD-10-CM

## 2018-09-04 PROCEDURE — 4040F PNEUMOC VAC/ADMIN/RCVD: CPT | Performed by: PHYSICAL MEDICINE & REHABILITATION

## 2018-09-04 PROCEDURE — 1101F PT FALLS ASSESS-DOCD LE1/YR: CPT | Performed by: PHYSICAL MEDICINE & REHABILITATION

## 2018-09-04 PROCEDURE — G8399 PT W/DXA RESULTS DOCUMENT: HCPCS | Performed by: PHYSICAL MEDICINE & REHABILITATION

## 2018-09-04 PROCEDURE — G8427 DOCREV CUR MEDS BY ELIG CLIN: HCPCS | Performed by: PHYSICAL MEDICINE & REHABILITATION

## 2018-09-04 PROCEDURE — 1036F TOBACCO NON-USER: CPT | Performed by: PHYSICAL MEDICINE & REHABILITATION

## 2018-09-04 PROCEDURE — 1090F PRES/ABSN URINE INCON ASSESS: CPT | Performed by: PHYSICAL MEDICINE & REHABILITATION

## 2018-09-04 PROCEDURE — 1123F ACP DISCUSS/DSCN MKR DOCD: CPT | Performed by: PHYSICAL MEDICINE & REHABILITATION

## 2018-09-04 PROCEDURE — G8417 CALC BMI ABV UP PARAM F/U: HCPCS | Performed by: PHYSICAL MEDICINE & REHABILITATION

## 2018-09-04 PROCEDURE — 99213 OFFICE O/P EST LOW 20 MIN: CPT | Performed by: PHYSICAL MEDICINE & REHABILITATION

## 2018-09-04 NOTE — PROGRESS NOTES
Diagnosis Date    Acquired hypothyroidism 7/6/2017    Anemia     Asthma     [facility]) [facility]     HLD (hyperlipidemia)     Hypertension     Influenza A 01/22/2018    MI (myocardial infarction) ([facility]     X 2    Migraine     past hx    Rheumatoid arthritis     Wears glasses       Past Surgical History:     Past Surgical History:   Procedure Laterality Date    CATARACT REMOVAL WITH IMPLANT      CATARACT REMOVAL WITH IMPLANT  3/23/2011    FOOT SURGERY      Rt little toe    HYSTERECTOMY      total, fibroids    KNEE SURGERY      right    DC NJX DX/THER SBST INTRLMNR CRV/THRC W/IMG GDN Left 8/21/2018    LEFT LUMBAR FOUR/ LUMBAR FIVE CYST ASPIRATION SITE CONFIRMED BY FLUOROSCOPY performed by Joe Newberry MD at [place] [address]     Current Medications:     Current Outpatient Prescriptions:     predniSONE (DELTASONE) 10 MG tablet, Take 10 mg by mouth, Disp: , Rfl:     Blood Pressure Monitoring (BLOOD PRESSURE MONITOR/M CUFF) MISC, 1 Units by Does not apply route daily, Disp: 1 each, Rfl: 0    levothyroxine (SYNTHROID) 100 MCG tablet, TAKE 1 TABLET BY MOUTH DAILY, Disp: 90 tablet, Rfl: 11    predniSONE (DELTASONE) 10 MG tablet, TAKE ONE (1) TABLET BY MOUTH DAILY, Disp: 90 tablet, Rfl: 11    spironolactone (ALDACTONE) 25 MG tablet, TAKE ONE (1) TABLET BY MOUTH EVERY 12 HOURS, Disp: 180 tablet, Rfl: 11    VENTOLIN  (90 Base) MCG/ACT inhaler, INHALE 2 PUFFS BY MOUTH INTO THE LUNGS EVERY 6 HOURS AS NEEDED, Disp: 54 g, Rfl: 11    omeprazole (PRILOSEC) 40 MG delayed release capsule, TAKE 1 CAPSULE BY MOUTH ONCE DAILY, Disp: 90 capsule, Rfl: 11    atorvastatin (LIPITOR) 10 MG tablet, Take 1 tablet by mouth daily, Disp: 90 tablet, Rfl: 5    losartan (COZAAR) 25 MG tablet, Take 1 tablet by mouth daily, Disp: 90 tablet, Rfl: 1    labetalol (NORMODYNE) 100 MG tablet, TAKE 1 TABLET BY MOUTH 2 TIMES DAILY, Disp: 60 tablet, Rfl: 3  

## 2018-09-04 NOTE — TELEPHONE ENCOUNTER
[facility] LEFT MESSAGE ASKING FOR MORE CLINICALS SO I FAXED DICTATION FROM A YEAR - 2 YEARS AGO. SPOKE WITH CB IN APPEALS AND PATIENT APPEALED EPIDURAL INJECTION SCHEDULED FOR 8/21/2018. APPEAL # [id number]  AND REF #  [place] - [id number]. LEFT L5 EPIDURAL INJECTION STILL DENIED.

## 2018-10-09 ENCOUNTER — OFFICE VISIT (OUTPATIENT)
Dept: ORTHOPEDIC SURGERY | Age: 78
End: 2018-10-09
Payer: COMMERCIAL

## 2018-10-09 VITALS
SYSTOLIC BLOOD PRESSURE: 132 MMHG | HEART RATE: 92 BPM | DIASTOLIC BLOOD PRESSURE: 80 MMHG | HEIGHT: 66 IN | WEIGHT: 161 LBS | BODY MASS INDEX: 25.88 KG/M2

## 2018-10-09 DIAGNOSIS — M71.38 SYNOVIAL CYST OF LUMBAR FACET JOINT: Primary | ICD-10-CM

## 2018-10-09 DIAGNOSIS — M43.16 SPONDYLOLISTHESIS OF LUMBAR REGION: ICD-10-CM

## 2018-10-09 PROCEDURE — 1090F PRES/ABSN URINE INCON ASSESS: CPT | Performed by: PHYSICIAN ASSISTANT

## 2018-10-09 PROCEDURE — G8427 DOCREV CUR MEDS BY ELIG CLIN: HCPCS | Performed by: PHYSICIAN ASSISTANT

## 2018-10-09 PROCEDURE — G8417 CALC BMI ABV UP PARAM F/U: HCPCS | Performed by: PHYSICIAN ASSISTANT

## 2018-10-09 PROCEDURE — 1101F PT FALLS ASSESS-DOCD LE1/YR: CPT | Performed by: PHYSICIAN ASSISTANT

## 2018-10-09 PROCEDURE — G8484 FLU IMMUNIZE NO ADMIN: HCPCS | Performed by: PHYSICIAN ASSISTANT

## 2018-10-09 PROCEDURE — 99243 OFF/OP CNSLTJ NEW/EST LOW 30: CPT | Performed by: PHYSICIAN ASSISTANT

## 2018-10-09 RX ORDER — GABAPENTIN 300 MG/1
300 CAPSULE ORAL 3 TIMES DAILY
Qty: 45 CAPSULE | Refills: 0 | Status: SHIPPED | OUTPATIENT
Start: 2018-10-09 | End: 2018-10-09

## 2018-10-16 RX ORDER — ATORVASTATIN CALCIUM 10 MG/1
TABLET, FILM COATED ORAL
Qty: 90 TABLET | Refills: 5 | Status: SHIPPED | OUTPATIENT
Start: 2018-10-16 | End: 2019-10-17 | Stop reason: SDUPTHER

## 2018-11-06 ENCOUNTER — TELEPHONE (OUTPATIENT)
Dept: ORTHOPEDIC SURGERY | Age: 78
End: 2018-11-06

## 2018-11-06 ENCOUNTER — OFFICE VISIT (OUTPATIENT)
Dept: FAMILY MEDICINE CLINIC | Age: 78
End: 2018-11-06
Payer: COMMERCIAL

## 2018-11-06 ENCOUNTER — OFFICE VISIT (OUTPATIENT)
Dept: ORTHOPEDIC SURGERY | Age: 78
End: 2018-11-06
Payer: COMMERCIAL

## 2018-11-06 VITALS
HEART RATE: 96 BPM | BODY MASS INDEX: 26.36 KG/M2 | RESPIRATION RATE: 16 BRPM | WEIGHT: 164 LBS | DIASTOLIC BLOOD PRESSURE: 84 MMHG | SYSTOLIC BLOOD PRESSURE: 154 MMHG | OXYGEN SATURATION: 98 % | HEIGHT: 66 IN

## 2018-11-06 VITALS
DIASTOLIC BLOOD PRESSURE: 70 MMHG | HEIGHT: 66 IN | WEIGHT: 160.94 LBS | SYSTOLIC BLOOD PRESSURE: 125 MMHG | BODY MASS INDEX: 25.86 KG/M2

## 2018-11-06 DIAGNOSIS — M71.38 SYNOVIAL CYST OF LUMBAR SPINE: ICD-10-CM

## 2018-11-06 DIAGNOSIS — Z23 FLU VACCINE NEED: ICD-10-CM

## 2018-11-06 DIAGNOSIS — M43.16 SPONDYLOLISTHESIS OF LUMBAR REGION: Primary | ICD-10-CM

## 2018-11-06 DIAGNOSIS — M05.79 RHEUMATOID ARTHRITIS INVOLVING MULTIPLE SITES WITH POSITIVE RHEUMATOID FACTOR (HCC): ICD-10-CM

## 2018-11-06 DIAGNOSIS — I27.20 PULMONARY HTN (HCC): ICD-10-CM

## 2018-11-06 DIAGNOSIS — J43.2 CENTRILOBULAR EMPHYSEMA (HCC): Primary | ICD-10-CM

## 2018-11-06 DIAGNOSIS — E78.00 PURE HYPERCHOLESTEROLEMIA: ICD-10-CM

## 2018-11-06 DIAGNOSIS — M48.061 LUMBAR FORAMINAL STENOSIS: ICD-10-CM

## 2018-11-06 DIAGNOSIS — I10 ESSENTIAL HYPERTENSION: ICD-10-CM

## 2018-11-06 DIAGNOSIS — M54.16 LUMBAR RADICULOPATHY: ICD-10-CM

## 2018-11-06 PROCEDURE — G8427 DOCREV CUR MEDS BY ELIG CLIN: HCPCS | Performed by: INTERNAL MEDICINE

## 2018-11-06 PROCEDURE — G8926 SPIRO NO PERF OR DOC: HCPCS | Performed by: INTERNAL MEDICINE

## 2018-11-06 PROCEDURE — G8427 DOCREV CUR MEDS BY ELIG CLIN: HCPCS | Performed by: PHYSICAL MEDICINE & REHABILITATION

## 2018-11-06 PROCEDURE — 1036F TOBACCO NON-USER: CPT | Performed by: PHYSICAL MEDICINE & REHABILITATION

## 2018-11-06 PROCEDURE — G8399 PT W/DXA RESULTS DOCUMENT: HCPCS | Performed by: INTERNAL MEDICINE

## 2018-11-06 PROCEDURE — 99214 OFFICE O/P EST MOD 30 MIN: CPT | Performed by: INTERNAL MEDICINE

## 2018-11-06 PROCEDURE — 4040F PNEUMOC VAC/ADMIN/RCVD: CPT | Performed by: PHYSICAL MEDICINE & REHABILITATION

## 2018-11-06 PROCEDURE — 1036F TOBACCO NON-USER: CPT | Performed by: INTERNAL MEDICINE

## 2018-11-06 PROCEDURE — 1101F PT FALLS ASSESS-DOCD LE1/YR: CPT | Performed by: INTERNAL MEDICINE

## 2018-11-06 PROCEDURE — 4040F PNEUMOC VAC/ADMIN/RCVD: CPT | Performed by: INTERNAL MEDICINE

## 2018-11-06 PROCEDURE — G8417 CALC BMI ABV UP PARAM F/U: HCPCS | Performed by: INTERNAL MEDICINE

## 2018-11-06 PROCEDURE — 1090F PRES/ABSN URINE INCON ASSESS: CPT | Performed by: PHYSICAL MEDICINE & REHABILITATION

## 2018-11-06 PROCEDURE — 3023F SPIROM DOC REV: CPT | Performed by: INTERNAL MEDICINE

## 2018-11-06 PROCEDURE — 90662 IIV NO PRSV INCREASED AG IM: CPT | Performed by: INTERNAL MEDICINE

## 2018-11-06 PROCEDURE — 99214 OFFICE O/P EST MOD 30 MIN: CPT | Performed by: PHYSICAL MEDICINE & REHABILITATION

## 2018-11-06 PROCEDURE — 1123F ACP DISCUSS/DSCN MKR DOCD: CPT | Performed by: PHYSICAL MEDICINE & REHABILITATION

## 2018-11-06 PROCEDURE — 1123F ACP DISCUSS/DSCN MKR DOCD: CPT | Performed by: INTERNAL MEDICINE

## 2018-11-06 PROCEDURE — G8399 PT W/DXA RESULTS DOCUMENT: HCPCS | Performed by: PHYSICAL MEDICINE & REHABILITATION

## 2018-11-06 PROCEDURE — G8484 FLU IMMUNIZE NO ADMIN: HCPCS | Performed by: INTERNAL MEDICINE

## 2018-11-06 PROCEDURE — 1101F PT FALLS ASSESS-DOCD LE1/YR: CPT | Performed by: PHYSICAL MEDICINE & REHABILITATION

## 2018-11-06 PROCEDURE — G8417 CALC BMI ABV UP PARAM F/U: HCPCS | Performed by: PHYSICAL MEDICINE & REHABILITATION

## 2018-11-06 PROCEDURE — 1090F PRES/ABSN URINE INCON ASSESS: CPT | Performed by: INTERNAL MEDICINE

## 2018-11-06 PROCEDURE — G8484 FLU IMMUNIZE NO ADMIN: HCPCS | Performed by: PHYSICAL MEDICINE & REHABILITATION

## 2018-11-06 PROCEDURE — 90471 IMMUNIZATION ADMIN: CPT | Performed by: INTERNAL MEDICINE

## 2018-11-06 RX ORDER — GABAPENTIN 100 MG/1
CAPSULE ORAL
COMMUNITY
Start: 2018-09-14 | End: 2018-12-14

## 2018-11-06 RX ORDER — LOSARTAN POTASSIUM 50 MG/1
25 TABLET ORAL DAILY
Qty: 30 TABLET | Refills: 5 | Status: ON HOLD | OUTPATIENT
Start: 2018-11-06 | End: 2019-01-17 | Stop reason: HOSPADM

## 2018-11-06 ASSESSMENT — ENCOUNTER SYMPTOMS
GASTROINTESTINAL NEGATIVE: 1
RESPIRATORY NEGATIVE: 1
BACK PAIN: 1
ALLERGIC/IMMUNOLOGIC NEGATIVE: 1

## 2018-11-06 NOTE — PROGRESS NOTES
MG delayed release capsule TAKE 1 CAPSULE BY MOUTH ONCE DAILY 90 capsule 11    losartan (COZAAR) 25 MG tablet Take 1 tablet by mouth daily 90 tablet 1    labetalol (NORMODYNE) 100 MG tablet TAKE 1 TABLET BY MOUTH 2 TIMES DAILY 60 tablet 3    zoledronic acid (RECLAST) 5 MG/100ML SOLN Infuse 100 mLs intravenously once 100 mL 0    ipratropium-albuterol (DUONEB) 0.5-2.5 (3) MG/3ML SOLN nebulizer solution Inhale 3 mLs into the lungs every 4 hours as needed. 360 mL 3    hydrochlorothiazide (HYDRODIURIL) 25 MG tablet Take 25 mg by mouth daily.  nitroGLYCERIN (NITROSTAT) 0.4 MG SL tablet Place 0.4 mg under the tongue every 5 minutes as needed.  leflunomide (ARAVA) 20 MG tablet Take 20 mg by mouth daily. No current facility-administered medications for this visit. Allergies   Allergen Reactions    Alendronate Sodium      Jaw pain      Humira [Adalimumab]      Rash      Penicillins      rash    Simvastatin Other (See Comments)     Made legs ache    Sulfa Antibiotics Swelling     Tongue swelled     Social History   Substance Use Topics    Smoking status: Former Smoker     Packs/day: 3.00     Years: 50.00     Quit date: 1/22/2009    Smokeless tobacco: Never Used    Alcohol use No     Family History   Problem Relation Age of Onset    Cancer Mother     Cancer Father     Heart Disease Maternal Aunt     Cancer Sister        Review of Systems   Constitutional: Negative. Respiratory: Negative. Cardiovascular: Negative. Gastrointestinal: Negative. Endocrine: Negative. Genitourinary: Negative. Musculoskeletal: Positive for arthralgias, back pain, gait problem, joint swelling and myalgias. Skin: Negative. Allergic/Immunologic: Negative. Hematological: Negative.       Vitals:    11/06/18 1355 11/06/18 1422   BP: (!) 158/80 (!) 154/84   Pulse: 96    Resp: 16    SpO2: 98%    Weight: 164 lb (74.4 kg)    Height: 5' 6\" (1.676 m)      Objective:   Physical Exam   Constitutional: adenopathy. Neurological: She is alert and oriented to person, place, and time. She has normal strength and normal reflexes. She is not disoriented. She displays no atrophy and no tremor. No cranial nerve deficit or sensory deficit. She exhibits normal muscle tone. Coordination normal.   Skin: Skin is warm, dry and intact. No abrasion and no rash noted. She is not diaphoretic. No cyanosis or erythema. No pallor. Nails show no clubbing. Psychiatric: She has a normal mood and affect. Her speech is normal and behavior is normal. Judgment and thought content normal. Cognition and memory are normal.       Assessment:      Pulmonary Htn (Hcc). No new c/o. Essential Hypertension. High today   Centrilobular Emphysema (Hcc). Stable w/ meds. Pure Hypercholesterolemia. Good w/ meds. Rheumatoid Arthritis Involving Multiple Sites With Positive Rheumatoid Factor (Hcc). Chronic w/ recent injections. Plan:      All above problems reviewed and the found to be unchanged except for the following :     Pulmonary Htn (Hcc). Continue meds. Essential Hypertension. Continue present meds. Home BP checks. Call if>140/90. Improve diet. Avoid caffeine and salt. Call if new c/o w/ meds. Centrilobular Emphysema (Hcc). Continue meds. Pure Hypercholesterolemia. To continue meds. Improve diet. Rheumatoid Arthritis Involving Multiple Sites With Positive Rheumatoid Factor (Hcc). To get back injection. May need to have surgery on back.               Erick Faith MD

## 2018-11-06 NOTE — PROGRESS NOTES
Vaccine Information Sheet, \"Influenza - Inactivated\"  given to Juan Avilez, or parent/legal guardian of  Juan Avilez and verbalized understanding. Patient responses:    Have you ever had a reaction to a flu vaccine? No  Are you able to eat eggs without adverse effects? Yes  Do you have any current illness? No  Have you ever had [place] Syndrome? No    Flu vaccine given per order. Please see immunization tab.

## 2018-11-06 NOTE — ASSESSMENT & PLAN NOTE
Had R heart cath 5/15/2018 at [facility]. Mild Pulm HTN 31/15. SOB much worse than COPD should cause.

## 2018-11-06 NOTE — TELEPHONE ENCOUNTER
Patient needs scheduled for an injection. Pre-Procedure sheet was given to the patient. x1 (Left L4 & L5 TF)    Patient will call to schedule.

## 2018-11-06 NOTE — PROGRESS NOTES
movements are noted. · LEFT UPPER EXTREMITY: Inspection/examination of the left upper extremity does not show any tenderness, deformity or injury. Range of motion is normal and pain-free. There is no gross instability. There are no rashes, ulcerations or lesions. Strength and tone are normal. No atrophy or abnormal movements are noted. Diagnostic Testing:    No new diagnostics  Results for orders placed or performed in visit on 18   CBC Auto Differential   Result Value Ref Range    WBC 13.0 (H) 4.0 - 11.0 K/uL    RBC 4.16 4.00 - 5.20 M/uL    Hemoglobin 11.7 (L) 12.0 - 16.0 g/dL    Hematocrit 36.3 36.0 - 48.0 %    MCV 87.4 80.0 - 100.0 fL    MCH 28.1 26.0 - 34.0 pg    MCHC 32.1 31.0 - 36.0 g/dL    RDW 15.0 12.4 - 15.4 %    Platelets 998 729 - 292 K/uL    MPV 7.9 5.0 - 10.5 fL    Neutrophils % 77.4 %    Lymphocytes % 13.4 %    Monocytes % 7.9 %    Eosinophils % 0.1 %    Basophils % 1.2 %    Neutrophils # 10.1 (H) 1.7 - 7.7 K/uL    Lymphocytes # 1.7 1.0 - 5.1 K/uL    Monocytes # 1.0 0.0 - 1.3 K/uL    Eosinophils # 0.0 0.0 - 0.6 K/uL    Basophils # 0.2 0.0 - 0.2 K/uL     Impression:       1. Spondylolisthesis of lumbar region    2. Lumbar foraminal stenosis    3. Lumbar radiculopathy    4. Synovial cyst of lumbar spine        Plan:  Clinical Course: Above diagnoses are worsening    I discussed the diagnosis and the treatment options with Sumaya Derek today. In Summary:  The various treatment options were outlined and discussed with Sumaya Derek including:  Conservative care options: physical therapy, ice, medications, bracing, and activity modification. The indications for therapeutic injections. The indications for additional imaging/laboratory studies. The indications for (possible future) interventions. After considering the various options discussed, Sumaya Reed elected to pursue a course of treatment that includes the followin.  Medications:  No further recommendations for new medications. 2. PT:  Continue aquatic PT    3. Further studies:  No further studies. 4. Interventional:  We discussed pursuing a Left L4 and L5 TF epidural steroid injection to address the pain. Radiologic imaging and symptoms confirm the pain etiology. Risks, benefits and alternatives of interventional options were discussed. These include and are not limited to bleeding, infection, spinal headache, nerve injury and lack of pain relief. The patient verbalized understanding and would like to proceed. The patient will be scheduled accordingly. 5. Follow up:  4-6 weeks      Susan Gilbert was instructed to call the office if her symptoms worsen or if new symptoms appear prior to the next scheduled visit. She is specifically instructed to contact the office between now & her scheduled appointment if she has concerns related to her condition or if she needs assistance in scheduling the above tests. She is welcome to call for an appointment sooner if she has any additional concerns or questions. Rossy THOMPSON ATC, am scribing for and in the presence of Dr. Tony Gold. 11/06/18 12:13 PM Rossy Shell. I, Dr. Tony Gold, personally performed the services described in this documentation as scribed by Rossy Shah ATC in my presence and it is both accurate and complete. Kashif Diamond. Qing Dawkins MD, DIMITRI, [facility]  Board Certified in [address] [place] [address] Certified and Fellowship Trained in [facility] ([facility])             This dictation was performed with a verbal recognition program [facility]) and it was checked for errors. It is possible that there are still dictated errors within this office note. If so, please bring any errors to my attention for an addendum. All efforts were made to ensure that this office note is accurate.

## 2018-11-06 NOTE — ASSESSMENT & PLAN NOTE
R Shoulder injected recently, R knee swelling and pain some better. No recent meds other than injections.

## 2018-11-21 ENCOUNTER — TELEPHONE (OUTPATIENT)
Dept: ORTHOPEDIC SURGERY | Age: 78
End: 2018-11-21

## 2018-11-27 ENCOUNTER — TELEPHONE (OUTPATIENT)
Dept: ORTHOPEDIC SURGERY | Age: 78
End: 2018-11-27

## 2018-11-27 NOTE — TELEPHONE ENCOUNTER
L/m on v/m that approval was received from [facility] for tf isael.  Waiting for patient to call back to schedule

## 2018-11-29 ENCOUNTER — TELEPHONE (OUTPATIENT)
Dept: ORTHOPEDIC SURGERY | Age: 78
End: 2018-11-29

## 2018-11-29 ENCOUNTER — OFFICE VISIT (OUTPATIENT)
Dept: ORTHOPEDIC SURGERY | Age: 78
End: 2018-11-29
Payer: COMMERCIAL

## 2018-11-29 VITALS
SYSTOLIC BLOOD PRESSURE: 125 MMHG | DIASTOLIC BLOOD PRESSURE: 70 MMHG | BODY MASS INDEX: 26.36 KG/M2 | WEIGHT: 164.02 LBS | HEIGHT: 66 IN

## 2018-11-29 DIAGNOSIS — M71.38 SYNOVIAL CYST OF LUMBAR FACET JOINT: ICD-10-CM

## 2018-11-29 DIAGNOSIS — M47.26 OSTEOARTHRITIS OF SPINE WITH RADICULOPATHY, LUMBAR REGION: Primary | ICD-10-CM

## 2018-11-29 PROCEDURE — G8427 DOCREV CUR MEDS BY ELIG CLIN: HCPCS | Performed by: PHYSICAL MEDICINE & REHABILITATION

## 2018-11-29 PROCEDURE — 4040F PNEUMOC VAC/ADMIN/RCVD: CPT | Performed by: PHYSICAL MEDICINE & REHABILITATION

## 2018-11-29 PROCEDURE — G8399 PT W/DXA RESULTS DOCUMENT: HCPCS | Performed by: PHYSICAL MEDICINE & REHABILITATION

## 2018-11-29 PROCEDURE — 1101F PT FALLS ASSESS-DOCD LE1/YR: CPT | Performed by: PHYSICAL MEDICINE & REHABILITATION

## 2018-11-29 PROCEDURE — 1090F PRES/ABSN URINE INCON ASSESS: CPT | Performed by: PHYSICAL MEDICINE & REHABILITATION

## 2018-11-29 PROCEDURE — G8482 FLU IMMUNIZE ORDER/ADMIN: HCPCS | Performed by: PHYSICAL MEDICINE & REHABILITATION

## 2018-11-29 PROCEDURE — 1036F TOBACCO NON-USER: CPT | Performed by: PHYSICAL MEDICINE & REHABILITATION

## 2018-11-29 PROCEDURE — G8417 CALC BMI ABV UP PARAM F/U: HCPCS | Performed by: PHYSICAL MEDICINE & REHABILITATION

## 2018-11-29 PROCEDURE — 1123F ACP DISCUSS/DSCN MKR DOCD: CPT | Performed by: PHYSICAL MEDICINE & REHABILITATION

## 2018-11-29 PROCEDURE — 99213 OFFICE O/P EST LOW 20 MIN: CPT | Performed by: PHYSICAL MEDICINE & REHABILITATION

## 2018-11-29 NOTE — PROGRESS NOTES
 Acquired hypothyroidism 7/6/2017    Anemia     Asthma     COPD (chronic obstructive pulmonary disease) (HCC)     HLD (hyperlipidemia)     Hypertension     Influenza A 01/22/2018    MI (myocardial infarction) (HCC)     X 2    Migraine     past hx    Rheumatoid arthritis     Wears glasses       Past Surgical History:     Past Surgical History:   Procedure Laterality Date    CATARACT REMOVAL WITH IMPLANT      CATARACT REMOVAL WITH IMPLANT  3/23/2011    FOOT SURGERY      Rt little toe    HYSTERECTOMY      total, fibroids    KNEE SURGERY      right    ID NJX DX/THER SBST INTRLMNR CRV/THRC W/IMG GDN Left 8/21/2018    LEFT LUMBAR FOUR/ LUMBAR FIVE CYST ASPIRATION SITE CONFIRMED BY FLUOROSCOPY performed by Erick Addison MD at [address] [facility]      Lt     Current Medications:     Current Outpatient Prescriptions:     gabapentin (NEURONTIN) 100 MG capsule, Take 1 three times daily, Disp: , Rfl:     losartan (COZAAR) 50 MG tablet, Take 0.5 tablets by mouth daily, Disp: 30 tablet, Rfl: 5    atorvastatin (LIPITOR) 10 MG tablet, TAKE ONE (1) TABLET BY MOUTH DAILY, Disp: 90 tablet, Rfl: 5    predniSONE (DELTASONE) 10 MG tablet, Take 10 mg by mouth, Disp: , Rfl:     Blood Pressure Monitoring (BLOOD PRESSURE MONITOR/M CUFF) MISC, 1 Units by Does not apply route daily, Disp: 1 each, Rfl: 0    levothyroxine (SYNTHROID) 100 MCG tablet, TAKE 1 TABLET BY MOUTH DAILY, Disp: 90 tablet, Rfl: 11    predniSONE (DELTASONE) 10 MG tablet, TAKE ONE (1) TABLET BY MOUTH DAILY, Disp: 90 tablet, Rfl: 11    spironolactone (ALDACTONE) 25 MG tablet, TAKE ONE (1) TABLET BY MOUTH EVERY 12 HOURS, Disp: 180 tablet, Rfl: 11    VENTOLIN  (90 Base) MCG/ACT inhaler, INHALE 2 PUFFS BY MOUTH INTO THE LUNGS EVERY 6 HOURS AS NEEDED, Disp: 54 g, Rfl: 11    omeprazole (PRILOSEC) 40 MG delayed release capsule, TAKE 1 CAPSULE BY MOUTH ONCE DAILY, Disp: 90 capsule, Rfl: 11    labetalol (NORMODYNE) 100 MG tablet, all areas to be without enlargement or induration  · Sensation is intact without deficit in the upper and lower extremities to light touch and pinprick  · Coordination of the upper and lower extremities are normal.      LUMBAR/SACRAL EXAMINATION:  · Inspection: Local inspection shows no step-off or bruising. Lumbar alignment is normal. No instability is noted. · Palpation:   No evidence of tenderness at the midline. Lumbar paraspinal tenderness: Mild L4/5 and L5/S1 tenderness  Bursal tenderness No tenderness bilaterally  There is no paraspinal spasm. · Range of Motion: limited by 25% in all planes due to pain  · Strength:   Strength testing is 5/5 in all muscle groups tested. · Special Tests:   Straight leg raise and crossed SLR negative. Dameon's testing is negative bilaterally. FADIR's testing is negative bilaterally. · Skin: There are no rashes, ulcerations or lesions. · Reflexes: Reflexes are symmetrically 2+ at the patellar and ankle tendons. Clonus absent bilaterally at the feet. · Gait & station: antalgic on the left, in a WC today  · Additional Examinations:  · RIGHT LOWER EXTREMITY: Inspection/examination of the right lower extremity does not show any tenderness, deformity or injury. Range of motion is normal and pain-free. There is no gross instability. There are no rashes, ulcerations or lesions. Strength and tone are normal. No atrophy or abnormal movements are noted. · LEFT LOWER EXTREMITY:  Inspection/examination of the left lower extremity does not show any tenderness, deformity or injury. Range of motion is normal and pain-free. There is no gross instability. There are no rashes, ulcerations or lesions. Strength and tone are normal. No atrophy or abnormal movements are noted.       Diagnostic Testing:    MR Lumbar spine shows 4/23/18  No evidence of an acute fracture.       There is mild spinal canal stenosis at L3-4 and L4-5.       At L4-5, left facet joint synovial cyst results in

## 2018-11-29 NOTE — TELEPHONE ENCOUNTER
Patients TF DEVI was approved and scheduled for 11/28/2108. Patient has had to r/s to another day at another facility. She is now scheduled at  on 12/4/2018.   Please advise for precert

## 2018-12-03 NOTE — H&P
every 4 hours as needed. 2/10/15   Judie Bradford MD   hydrochlorothiazide (HYDRODIURIL) 25 MG tablet Take 25 mg by mouth daily. Historical Provider, MD   nitroGLYCERIN (NITROSTAT) 0.4 MG SL tablet Place 0.4 mg under the tongue every 5 minutes as needed. Historical Provider, MD   leflunomide (ARAVA) 20 MG tablet Take 20 mg by mouth daily. Historical Provider, MD       Vitals: Stable       PHYSICAL EXAM:  HENT: Airway patent and reviewed  Cardiovascular: Normal rate, regular rhythm, normal heart sounds. Pulmonary/Chest: No wheezes. No rhonchi. No rales. Abdominal: Soft. Bowel sounds are normal. No distension. ASA CLASS:         []   I. Normal, healthy adult           [x]   II.  Mild systemic disease            []   III. Severe systemic disease      Mallampati: Mallampati Class II - (soft palate, fauces & uvula are visible)      Sedation plan:   [x]  Local              []  Minimal                  []  General anesthesia    Patient's condition acceptable for planned procedure/sedation. Post Procedure Plan   Return to same level of care   ______________________     The risks and benefits as well as alternatives to the procedure have been discussed with the patient and or family. The patient and or next of kin understands and agrees to proceed.     Melody Peres M.D.

## 2018-12-04 ENCOUNTER — HOSPITAL ENCOUNTER (OUTPATIENT)
Age: 78
Setting detail: OUTPATIENT SURGERY
Discharge: HOME | End: 2018-12-04
Attending: PHYSICAL MEDICINE & REHABILITATION | Admitting: PHYSICAL MEDICINE & REHABILITATION
Payer: COMMERCIAL

## 2018-12-04 VITALS
WEIGHT: 163 LBS | HEART RATE: 85 BPM | TEMPERATURE: 97.8 F | RESPIRATION RATE: 16 BRPM | OXYGEN SATURATION: 100 % | BODY MASS INDEX: 25.58 KG/M2 | DIASTOLIC BLOOD PRESSURE: 92 MMHG | SYSTOLIC BLOOD PRESSURE: 160 MMHG | HEIGHT: 67 IN

## 2018-12-04 PROCEDURE — 2500000003 HC RX 250 WO HCPCS: Performed by: PHYSICAL MEDICINE & REHABILITATION

## 2018-12-04 PROCEDURE — 6360000002 HC RX W HCPCS: Performed by: PHYSICAL MEDICINE & REHABILITATION

## 2018-12-04 PROCEDURE — 7100000010 HC PHASE II RECOVERY - FIRST 15 MIN: Performed by: PHYSICAL MEDICINE & REHABILITATION

## 2018-12-04 PROCEDURE — 7100000011 HC PHASE II RECOVERY - ADDTL 15 MIN: Performed by: PHYSICAL MEDICINE & REHABILITATION

## 2018-12-04 PROCEDURE — 6360000004 HC RX CONTRAST MEDICATION: Performed by: PHYSICAL MEDICINE & REHABILITATION

## 2018-12-04 PROCEDURE — 3600000002 HC SURGERY LEVEL 2 BASE: Performed by: PHYSICAL MEDICINE & REHABILITATION

## 2018-12-04 PROCEDURE — 2709999900 HC NON-CHARGEABLE SUPPLY: Performed by: PHYSICAL MEDICINE & REHABILITATION

## 2018-12-04 RX ORDER — METHYLPREDNISOLONE ACETATE 80 MG/ML
INJECTION, SUSPENSION INTRA-ARTICULAR; INTRALESIONAL; INTRAMUSCULAR; SOFT TISSUE PRN
Status: DISCONTINUED | OUTPATIENT
Start: 2018-12-04 | End: 2018-12-04 | Stop reason: HOSPADM

## 2018-12-04 RX ORDER — BUPIVACAINE HYDROCHLORIDE 5 MG/ML
INJECTION, SOLUTION EPIDURAL; INTRACAUDAL PRN
Status: DISCONTINUED | OUTPATIENT
Start: 2018-12-04 | End: 2018-12-04 | Stop reason: HOSPADM

## 2018-12-04 ASSESSMENT — PAIN DESCRIPTION - DESCRIPTORS: DESCRIPTORS: SHARP

## 2018-12-04 ASSESSMENT — PAIN - FUNCTIONAL ASSESSMENT: PAIN_FUNCTIONAL_ASSESSMENT: 0-10

## 2018-12-04 NOTE — OP NOTE
Patient:  Good Bacon  YOB: 1940  Medical Record #:  [id number]   Place: [address] [place] [address]  Date:  12/4/2018   Physician:  Beto Ellison MD, DIMITRI    Procedure: 1. Transforaminal Lumbar Epidural Steroid Injection -  left L4           2. Transforaminal Lumbar Epidural Steroid Injection -  left L5     Pre-Procedure Diagnosis: Lumbar foraminal stenosis      Post-Procedure Diagnosis: Same    Sedation: Local with 1% Lidocaine 3 ml and no IV sedation    EBL: None    Complications: None    Procedure Summary:    The patient was seen in the office for complaints of low back and left leg pain. Review of the imaging and physical exam of the patient confirmed the pre-procedure diagnosis. After a thorough discussion of risks, benefits and alternatives informed consent was obtained. The patient was brought to the procedure suite and placed in the prone position. The skin overlying the lumbar spine was prepped and draped in the usual sterile fashion. Using fluoroscopic guidance, the left L4 foramen was identified. Through anesthetized skin, a 22 gauge 3.5 inch curved tip spinal needle was advanced into the foramen. Isovue M 300 was instilled showing an epidurogram/nerve root outline pattern without evidence of vascular or intrathecal spread. Following which, 40 mg of depomedrol mixed with 1 ml of 0.5% Marcaine was instilled. The needle was removed. Using fluoroscopic guidance, the left L5 foramen was identified. Through anesthetized skin, a 22 gauge 3.5 inch curved tip spinal needle was advanced into the foramen. Isovue M 300 was instilled showing an epidurogram/nerve root outline pattern without evidence of vascular or intrathecal spread. Following which, 40 mg of depomedrol mixed with 1 ml of 0.5% Marcaine was instilled. The needle was removed and band-aids were applied. The patient was transferred to the post-operative area in stable condition.

## 2018-12-19 ENCOUNTER — OFFICE VISIT (OUTPATIENT)
Dept: ORTHOPEDIC SURGERY | Age: 78
End: 2018-12-19
Payer: COMMERCIAL

## 2018-12-19 VITALS — WEIGHT: 162.92 LBS | BODY MASS INDEX: 26.18 KG/M2 | HEIGHT: 66 IN

## 2018-12-19 DIAGNOSIS — M71.38 SYNOVIAL CYST OF LUMBAR SPINE: Primary | ICD-10-CM

## 2018-12-19 DIAGNOSIS — M54.16 LUMBAR RADICULOPATHY: ICD-10-CM

## 2018-12-19 DIAGNOSIS — M47.816 SPONDYLOSIS OF LUMBAR REGION WITHOUT MYELOPATHY OR RADICULOPATHY: ICD-10-CM

## 2018-12-19 PROCEDURE — 4040F PNEUMOC VAC/ADMIN/RCVD: CPT | Performed by: PHYSICAL MEDICINE & REHABILITATION

## 2018-12-19 PROCEDURE — 1090F PRES/ABSN URINE INCON ASSESS: CPT | Performed by: PHYSICAL MEDICINE & REHABILITATION

## 2018-12-19 PROCEDURE — G8399 PT W/DXA RESULTS DOCUMENT: HCPCS | Performed by: PHYSICAL MEDICINE & REHABILITATION

## 2018-12-19 PROCEDURE — 99214 OFFICE O/P EST MOD 30 MIN: CPT | Performed by: PHYSICAL MEDICINE & REHABILITATION

## 2018-12-19 PROCEDURE — G8427 DOCREV CUR MEDS BY ELIG CLIN: HCPCS | Performed by: PHYSICAL MEDICINE & REHABILITATION

## 2018-12-19 PROCEDURE — 1123F ACP DISCUSS/DSCN MKR DOCD: CPT | Performed by: PHYSICAL MEDICINE & REHABILITATION

## 2018-12-19 PROCEDURE — 1036F TOBACCO NON-USER: CPT | Performed by: PHYSICAL MEDICINE & REHABILITATION

## 2018-12-19 PROCEDURE — G8417 CALC BMI ABV UP PARAM F/U: HCPCS | Performed by: PHYSICAL MEDICINE & REHABILITATION

## 2018-12-19 PROCEDURE — G8482 FLU IMMUNIZE ORDER/ADMIN: HCPCS | Performed by: PHYSICAL MEDICINE & REHABILITATION

## 2018-12-19 PROCEDURE — 1101F PT FALLS ASSESS-DOCD LE1/YR: CPT | Performed by: PHYSICAL MEDICINE & REHABILITATION

## 2018-12-20 ENCOUNTER — TELEPHONE (OUTPATIENT)
Dept: ORTHOPEDIC SURGERY | Age: 78
End: 2018-12-20

## 2018-12-27 ENCOUNTER — TELEPHONE (OUTPATIENT)
Dept: FAMILY MEDICINE CLINIC | Age: 78
End: 2018-12-27

## 2018-12-27 NOTE — TELEPHONE ENCOUNTER
Patient is requesting a pain medication for her lower back and leg pain. She is seeing the surgeon on 1-3-19 to discuss the removal of the cyst from her back that is causing the pain. She cannot stand the pain any longer, not sleeping, irritated. [facility] [facility].

## 2018-12-31 NOTE — TELEPHONE ENCOUNTER
Patient returned call. I read her the physician note, she understood. She has an appointment with the Surgeon tomorrow and will ask him for pain medication.

## 2019-01-03 ENCOUNTER — OFFICE VISIT (OUTPATIENT)
Dept: ORTHOPEDIC SURGERY | Age: 79
End: 2019-01-03
Payer: COMMERCIAL

## 2019-01-03 VITALS
DIASTOLIC BLOOD PRESSURE: 98 MMHG | BODY MASS INDEX: 26.18 KG/M2 | HEIGHT: 66 IN | HEART RATE: 73 BPM | SYSTOLIC BLOOD PRESSURE: 177 MMHG | WEIGHT: 162.92 LBS

## 2019-01-03 DIAGNOSIS — M71.38 SYNOVIAL CYST OF LUMBAR FACET JOINT: Primary | ICD-10-CM

## 2019-01-03 PROCEDURE — G8482 FLU IMMUNIZE ORDER/ADMIN: HCPCS | Performed by: ORTHOPAEDIC SURGERY

## 2019-01-03 PROCEDURE — G8399 PT W/DXA RESULTS DOCUMENT: HCPCS | Performed by: ORTHOPAEDIC SURGERY

## 2019-01-03 PROCEDURE — G8417 CALC BMI ABV UP PARAM F/U: HCPCS | Performed by: ORTHOPAEDIC SURGERY

## 2019-01-03 PROCEDURE — 1101F PT FALLS ASSESS-DOCD LE1/YR: CPT | Performed by: ORTHOPAEDIC SURGERY

## 2019-01-03 PROCEDURE — 1036F TOBACCO NON-USER: CPT | Performed by: ORTHOPAEDIC SURGERY

## 2019-01-03 PROCEDURE — 1123F ACP DISCUSS/DSCN MKR DOCD: CPT | Performed by: ORTHOPAEDIC SURGERY

## 2019-01-03 PROCEDURE — 4040F PNEUMOC VAC/ADMIN/RCVD: CPT | Performed by: ORTHOPAEDIC SURGERY

## 2019-01-03 PROCEDURE — 1090F PRES/ABSN URINE INCON ASSESS: CPT | Performed by: ORTHOPAEDIC SURGERY

## 2019-01-03 PROCEDURE — G8427 DOCREV CUR MEDS BY ELIG CLIN: HCPCS | Performed by: ORTHOPAEDIC SURGERY

## 2019-01-03 PROCEDURE — 99213 OFFICE O/P EST LOW 20 MIN: CPT | Performed by: ORTHOPAEDIC SURGERY

## 2019-01-03 RX ORDER — HYDROCODONE BITARTRATE AND ACETAMINOPHEN 5; 325 MG/1; MG/1
1 TABLET ORAL EVERY 8 HOURS PRN
Qty: 30 TABLET | Refills: 0 | Status: ON HOLD | OUTPATIENT
Start: 2019-01-03 | End: 2019-01-17 | Stop reason: HOSPADM

## 2019-01-04 ENCOUNTER — TELEPHONE (OUTPATIENT)
Dept: ORTHOPEDIC SURGERY | Age: 79
End: 2019-01-04

## 2019-01-14 ENCOUNTER — TELEPHONE (OUTPATIENT)
Dept: ORTHOPEDIC SURGERY | Age: 79
End: 2019-01-14

## 2019-01-15 ENCOUNTER — HOSPITAL ENCOUNTER (OUTPATIENT)
Age: 79
Setting detail: OBSERVATION
Discharge: HOME | End: 2019-01-17
Attending: EMERGENCY MEDICINE | Admitting: INTERNAL MEDICINE
Payer: COMMERCIAL

## 2019-01-15 ENCOUNTER — APPOINTMENT (OUTPATIENT)
Dept: GENERAL RADIOLOGY | Age: 79
End: 2019-01-15
Payer: COMMERCIAL

## 2019-01-15 ENCOUNTER — APPOINTMENT (OUTPATIENT)
Dept: CT IMAGING | Age: 79
End: 2019-01-15
Payer: COMMERCIAL

## 2019-01-15 DIAGNOSIS — R06.02 SHORTNESS OF BREATH: Primary | ICD-10-CM

## 2019-01-15 PROBLEM — I95.9 HYPOTENSION: Status: ACTIVE | Noted: 2019-01-15

## 2019-01-15 LAB
A/G RATIO: 1.7 (ref 1.1–2.2)
ALBUMIN SERPL-MCNC: 4 G/DL (ref 3.4–5)
ALP BLD-CCNC: 35 U/L (ref 40–129)
ALT SERPL-CCNC: 26 U/L (ref 10–40)
ANION GAP SERPL CALCULATED.3IONS-SCNC: 13 MMOL/L (ref 3–16)
AST SERPL-CCNC: 21 U/L (ref 15–37)
BASE EXCESS VENOUS: -3.4 MMOL/L (ref -3–3)
BASOPHILS ABSOLUTE: 0.1 K/UL (ref 0–0.2)
BASOPHILS RELATIVE PERCENT: 0.7 %
BILIRUB SERPL-MCNC: 0.7 MG/DL (ref 0–1)
BUN BLDV-MCNC: 31 MG/DL (ref 7–20)
CALCIUM SERPL-MCNC: 8.8 MG/DL (ref 8.3–10.6)
CARBOXYHEMOGLOBIN: 1.6 % (ref 0–1.5)
CHLORIDE BLD-SCNC: 100 MMOL/L (ref 99–110)
CO2: 23 MMOL/L (ref 21–32)
CREAT SERPL-MCNC: 1.2 MG/DL (ref 0.6–1.2)
EKG ATRIAL RATE: 79 BPM
EKG DIAGNOSIS: NORMAL
EKG P AXIS: 72 DEGREES
EKG P-R INTERVAL: 120 MS
EKG Q-T INTERVAL: 368 MS
EKG QRS DURATION: 72 MS
EKG QTC CALCULATION (BAZETT): 421 MS
EKG R AXIS: 43 DEGREES
EKG T AXIS: 67 DEGREES
EKG VENTRICULAR RATE: 79 BPM
EOSINOPHILS ABSOLUTE: 0 K/UL (ref 0–0.6)
EOSINOPHILS RELATIVE PERCENT: 0.5 %
GFR AFRICAN AMERICAN: 53
GFR NON-AFRICAN AMERICAN: 44
GLOBULIN: 2.3 G/DL
GLUCOSE BLD-MCNC: 105 MG/DL (ref 70–99)
HCO3 VENOUS: 19.9 MMOL/L (ref 23–29)
HCT VFR BLD CALC: 38.4 % (ref 36–48)
HEMOGLOBIN: 12.5 G/DL (ref 12–16)
LYMPHOCYTES ABSOLUTE: 1.1 K/UL (ref 1–5.1)
LYMPHOCYTES RELATIVE PERCENT: 13.7 %
MCH RBC QN AUTO: 28.1 PG (ref 26–34)
MCHC RBC AUTO-ENTMCNC: 32.5 G/DL (ref 31–36)
MCV RBC AUTO: 86.3 FL (ref 80–100)
METHEMOGLOBIN VENOUS: 0.2 %
MONOCYTES ABSOLUTE: 0.7 K/UL (ref 0–1.3)
MONOCYTES RELATIVE PERCENT: 8.7 %
NEUTROPHILS ABSOLUTE: 5.9 K/UL (ref 1.7–7.7)
NEUTROPHILS RELATIVE PERCENT: 76.4 %
O2 CONTENT, VEN: 17 VOL %
O2 SAT, VEN: 98 %
O2 THERAPY: ABNORMAL
PCO2, VEN: 30.5 MMHG (ref 40–50)
PDW BLD-RTO: 17.1 % (ref 12.4–15.4)
PH VENOUS: 7.43 (ref 7.35–7.45)
PLATELET # BLD: 245 K/UL (ref 135–450)
PMV BLD AUTO: 7.1 FL (ref 5–10.5)
PO2, VEN: 113.2 MMHG (ref 25–40)
POTASSIUM SERPL-SCNC: 3.7 MMOL/L (ref 3.5–5.1)
RBC # BLD: 4.45 M/UL (ref 4–5.2)
SODIUM BLD-SCNC: 136 MMOL/L (ref 136–145)
TCO2 CALC VENOUS: 21 MMOL/L
TOTAL PROTEIN: 6.3 G/DL (ref 6.4–8.2)
TROPONIN: <0.01 NG/ML
WBC # BLD: 7.8 K/UL (ref 4–11)

## 2019-01-15 PROCEDURE — 71046 X-RAY EXAM CHEST 2 VIEWS: CPT

## 2019-01-15 PROCEDURE — 82803 BLOOD GASES ANY COMBINATION: CPT

## 2019-01-15 PROCEDURE — 84484 ASSAY OF TROPONIN QUANT: CPT

## 2019-01-15 PROCEDURE — 6360000004 HC RX CONTRAST MEDICATION: Performed by: EMERGENCY MEDICINE

## 2019-01-15 PROCEDURE — 80053 COMPREHEN METABOLIC PANEL: CPT

## 2019-01-15 PROCEDURE — 99285 EMERGENCY DEPT VISIT HI MDM: CPT

## 2019-01-15 PROCEDURE — 85025 COMPLETE CBC W/AUTO DIFF WBC: CPT

## 2019-01-15 PROCEDURE — G0378 HOSPITAL OBSERVATION PER HR: HCPCS

## 2019-01-15 PROCEDURE — 2580000003 HC RX 258: Performed by: EMERGENCY MEDICINE

## 2019-01-15 PROCEDURE — 93005 ELECTROCARDIOGRAM TRACING: CPT | Performed by: EMERGENCY MEDICINE

## 2019-01-15 PROCEDURE — 6370000000 HC RX 637 (ALT 250 FOR IP): Performed by: NURSE PRACTITIONER

## 2019-01-15 PROCEDURE — 71260 CT THORAX DX C+: CPT

## 2019-01-15 PROCEDURE — 93010 ELECTROCARDIOGRAM REPORT: CPT | Performed by: INTERNAL MEDICINE

## 2019-01-15 RX ORDER — LORAZEPAM 1 MG/1
1 TABLET ORAL ONCE
Status: COMPLETED | OUTPATIENT
Start: 2019-01-15 | End: 2019-01-15

## 2019-01-15 RX ORDER — SODIUM CHLORIDE 9 MG/ML
1000 INJECTION, SOLUTION INTRAVENOUS CONTINUOUS
Status: DISCONTINUED | OUTPATIENT
Start: 2019-01-15 | End: 2019-01-17 | Stop reason: HOSPADM

## 2019-01-15 RX ADMIN — IOPAMIDOL 75 ML: 755 INJECTION, SOLUTION INTRAVENOUS at 19:47

## 2019-01-15 RX ADMIN — LORAZEPAM 1 MG: 1 TABLET ORAL at 17:16

## 2019-01-15 RX ADMIN — SODIUM CHLORIDE 1000 ML: 9 INJECTION, SOLUTION INTRAVENOUS at 22:05

## 2019-01-16 LAB
TROPONIN: <0.01 NG/ML

## 2019-01-16 PROCEDURE — 97530 THERAPEUTIC ACTIVITIES: CPT

## 2019-01-16 PROCEDURE — 97165 OT EVAL LOW COMPLEX 30 MIN: CPT

## 2019-01-16 PROCEDURE — 36415 COLL VENOUS BLD VENIPUNCTURE: CPT

## 2019-01-16 PROCEDURE — 96372 THER/PROPH/DIAG INJ SC/IM: CPT

## 2019-01-16 PROCEDURE — 97535 SELF CARE MNGMENT TRAINING: CPT

## 2019-01-16 PROCEDURE — 6360000002 HC RX W HCPCS: Performed by: INTERNAL MEDICINE

## 2019-01-16 PROCEDURE — G0378 HOSPITAL OBSERVATION PER HR: HCPCS

## 2019-01-16 PROCEDURE — 2580000003 HC RX 258: Performed by: INTERNAL MEDICINE

## 2019-01-16 PROCEDURE — 6370000000 HC RX 637 (ALT 250 FOR IP): Performed by: INTERNAL MEDICINE

## 2019-01-16 PROCEDURE — 94761 N-INVAS EAR/PLS OXIMETRY MLT: CPT

## 2019-01-16 PROCEDURE — 2700000000 HC OXYGEN THERAPY PER DAY

## 2019-01-16 PROCEDURE — 84484 ASSAY OF TROPONIN QUANT: CPT

## 2019-01-16 PROCEDURE — 97161 PT EVAL LOW COMPLEX 20 MIN: CPT

## 2019-01-16 PROCEDURE — 99244 OFF/OP CNSLTJ NEW/EST MOD 40: CPT | Performed by: INTERNAL MEDICINE

## 2019-01-16 PROCEDURE — 97116 GAIT TRAINING THERAPY: CPT

## 2019-01-16 RX ORDER — SODIUM CHLORIDE 0.9 % (FLUSH) 0.9 %
10 SYRINGE (ML) INJECTION PRN
Status: DISCONTINUED | OUTPATIENT
Start: 2019-01-16 | End: 2019-01-17 | Stop reason: HOSPADM

## 2019-01-16 RX ORDER — LEFLUNOMIDE 10 MG/1
20 TABLET ORAL DAILY
Status: DISCONTINUED | OUTPATIENT
Start: 2019-01-16 | End: 2019-01-17 | Stop reason: HOSPADM

## 2019-01-16 RX ORDER — ONDANSETRON 2 MG/ML
4 INJECTION INTRAMUSCULAR; INTRAVENOUS EVERY 6 HOURS PRN
Status: DISCONTINUED | OUTPATIENT
Start: 2019-01-16 | End: 2019-01-17 | Stop reason: HOSPADM

## 2019-01-16 RX ORDER — LEVOTHYROXINE SODIUM 0.1 MG/1
100 TABLET ORAL DAILY
Status: DISCONTINUED | OUTPATIENT
Start: 2019-01-16 | End: 2019-01-17 | Stop reason: HOSPADM

## 2019-01-16 RX ORDER — LOSARTAN POTASSIUM 25 MG/1
50 TABLET ORAL DAILY
Status: DISCONTINUED | OUTPATIENT
Start: 2019-01-16 | End: 2019-01-17 | Stop reason: HOSPADM

## 2019-01-16 RX ORDER — PREDNISONE 10 MG/1
10 TABLET ORAL DAILY
Status: DISCONTINUED | OUTPATIENT
Start: 2019-01-16 | End: 2019-01-17 | Stop reason: HOSPADM

## 2019-01-16 RX ORDER — LABETALOL 100 MG/1
100 TABLET, FILM COATED ORAL EVERY 12 HOURS SCHEDULED
Status: DISCONTINUED | OUTPATIENT
Start: 2019-01-17 | End: 2019-01-17 | Stop reason: HOSPADM

## 2019-01-16 RX ORDER — SODIUM CHLORIDE 0.9 % (FLUSH) 0.9 %
10 SYRINGE (ML) INJECTION EVERY 12 HOURS SCHEDULED
Status: DISCONTINUED | OUTPATIENT
Start: 2019-01-16 | End: 2019-01-17 | Stop reason: HOSPADM

## 2019-01-16 RX ADMIN — LEFLUNOMIDE 20 MG: 10 TABLET ORAL at 09:00

## 2019-01-16 RX ADMIN — PREDNISONE 10 MG: 10 TABLET ORAL at 09:00

## 2019-01-16 RX ADMIN — Medication 10 ML: at 23:24

## 2019-01-16 RX ADMIN — ENOXAPARIN SODIUM 40 MG: 40 INJECTION SUBCUTANEOUS at 09:00

## 2019-01-16 RX ADMIN — Medication 10 ML: at 09:02

## 2019-01-16 RX ADMIN — LOSARTAN POTASSIUM 50 MG: 25 TABLET, FILM COATED ORAL at 17:01

## 2019-01-16 RX ADMIN — LEVOTHYROXINE SODIUM 100 MCG: 100 TABLET ORAL at 05:17

## 2019-01-16 ASSESSMENT — PAIN SCALES - GENERAL
PAINLEVEL_OUTOF10: 0

## 2019-01-17 ENCOUNTER — APPOINTMENT (OUTPATIENT)
Dept: NUCLEAR MEDICINE | Age: 79
End: 2019-01-17
Payer: COMMERCIAL

## 2019-01-17 VITALS
HEIGHT: 67 IN | OXYGEN SATURATION: 95 % | WEIGHT: 163.1 LBS | BODY MASS INDEX: 25.6 KG/M2 | SYSTOLIC BLOOD PRESSURE: 145 MMHG | TEMPERATURE: 98.2 F | RESPIRATION RATE: 16 BRPM | HEART RATE: 82 BPM | DIASTOLIC BLOOD PRESSURE: 82 MMHG

## 2019-01-17 LAB
ANION GAP SERPL CALCULATED.3IONS-SCNC: 10 MMOL/L (ref 3–16)
BUN BLDV-MCNC: 27 MG/DL (ref 7–20)
CALCIUM SERPL-MCNC: 8.8 MG/DL (ref 8.3–10.6)
CHLORIDE BLD-SCNC: 108 MMOL/L (ref 99–110)
CO2: 24 MMOL/L (ref 21–32)
CREAT SERPL-MCNC: 0.8 MG/DL (ref 0.6–1.2)
GFR AFRICAN AMERICAN: >60
GFR NON-AFRICAN AMERICAN: >60
GLUCOSE BLD-MCNC: 88 MG/DL (ref 70–99)
LV EF: 67 %
LVEF MODALITY: NORMAL
POTASSIUM SERPL-SCNC: 3.7 MMOL/L (ref 3.5–5.1)
SODIUM BLD-SCNC: 142 MMOL/L (ref 136–145)

## 2019-01-17 PROCEDURE — A9502 TC99M TETROFOSMIN: HCPCS | Performed by: INTERNAL MEDICINE

## 2019-01-17 PROCEDURE — 3430000000 HC RX DIAGNOSTIC RADIOPHARMACEUTICAL: Performed by: INTERNAL MEDICINE

## 2019-01-17 PROCEDURE — 78452 HT MUSCLE IMAGE SPECT MULT: CPT

## 2019-01-17 PROCEDURE — 80048 BASIC METABOLIC PNL TOTAL CA: CPT

## 2019-01-17 PROCEDURE — G0378 HOSPITAL OBSERVATION PER HR: HCPCS

## 2019-01-17 PROCEDURE — 96374 THER/PROPH/DIAG INJ IV PUSH: CPT

## 2019-01-17 PROCEDURE — 36415 COLL VENOUS BLD VENIPUNCTURE: CPT

## 2019-01-17 PROCEDURE — 93017 CV STRESS TEST TRACING ONLY: CPT

## 2019-01-17 PROCEDURE — 6370000000 HC RX 637 (ALT 250 FOR IP): Performed by: INTERNAL MEDICINE

## 2019-01-17 PROCEDURE — 6360000002 HC RX W HCPCS: Performed by: INTERNAL MEDICINE

## 2019-01-17 RX ORDER — LOSARTAN POTASSIUM 50 MG/1
50 TABLET ORAL DAILY
Qty: 30 TABLET | Refills: 3 | Status: SHIPPED | OUTPATIENT
Start: 2019-01-17 | End: 2020-07-31

## 2019-01-17 RX ORDER — LABETALOL 100 MG/1
100 TABLET, FILM COATED ORAL EVERY 12 HOURS SCHEDULED
Qty: 60 TABLET | Refills: 3 | Status: SHIPPED | OUTPATIENT
Start: 2019-01-17 | End: 2021-04-05 | Stop reason: SDUPTHER

## 2019-01-17 RX ORDER — AMINOPHYLLINE DIHYDRATE 25 MG/ML
25 INJECTION, SOLUTION INTRAVENOUS ONCE
Status: COMPLETED | OUTPATIENT
Start: 2019-01-17 | End: 2019-01-17

## 2019-01-17 RX ADMIN — LEVOTHYROXINE SODIUM 100 MCG: 100 TABLET ORAL at 05:59

## 2019-01-17 RX ADMIN — AMINOPHYLLINE 50 MG: 25 INJECTION, SOLUTION INTRAVENOUS at 09:53

## 2019-01-17 RX ADMIN — LOSARTAN POTASSIUM 50 MG: 25 TABLET, FILM COATED ORAL at 12:39

## 2019-01-17 RX ADMIN — LABETALOL HYDROCHLORIDE 100 MG: 100 TABLET, FILM COATED ORAL at 12:39

## 2019-01-17 RX ADMIN — PREDNISONE 10 MG: 10 TABLET ORAL at 12:40

## 2019-01-17 RX ADMIN — TETROFOSMIN 10.1 MILLICURIE: 0.23 INJECTION, POWDER, LYOPHILIZED, FOR SOLUTION INTRAVENOUS at 08:37

## 2019-01-17 RX ADMIN — LEFLUNOMIDE 20 MG: 10 TABLET ORAL at 12:40

## 2019-01-17 RX ADMIN — REGADENOSON 0.4 MG: 0.08 INJECTION, SOLUTION INTRAVENOUS at 10:00

## 2019-01-17 RX ADMIN — TETROFOSMIN 30.3 MILLICURIE: 0.23 INJECTION, POWDER, LYOPHILIZED, FOR SOLUTION INTRAVENOUS at 10:00

## 2019-01-17 ASSESSMENT — PAIN SCALES - GENERAL
PAINLEVEL_OUTOF10: 0
PAINLEVEL_OUTOF10: 0

## 2019-01-18 ENCOUNTER — TELEPHONE (OUTPATIENT)
Dept: ORTHOPEDIC SURGERY | Age: 79
End: 2019-01-18

## 2019-01-24 ENCOUNTER — OFFICE VISIT (OUTPATIENT)
Dept: FAMILY MEDICINE CLINIC | Age: 79
End: 2019-01-24
Payer: COMMERCIAL

## 2019-01-24 VITALS
DIASTOLIC BLOOD PRESSURE: 70 MMHG | TEMPERATURE: 97.2 F | WEIGHT: 173.6 LBS | HEART RATE: 84 BPM | HEIGHT: 67 IN | BODY MASS INDEX: 27.25 KG/M2 | SYSTOLIC BLOOD PRESSURE: 132 MMHG | RESPIRATION RATE: 18 BRPM | OXYGEN SATURATION: 92 %

## 2019-01-24 DIAGNOSIS — Z91.81 AT HIGH RISK FOR FALLS: Primary | ICD-10-CM

## 2019-01-24 DIAGNOSIS — J43.2 CENTRILOBULAR EMPHYSEMA (HCC): ICD-10-CM

## 2019-01-24 DIAGNOSIS — M05.79 RHEUMATOID ARTHRITIS INVOLVING MULTIPLE SITES WITH POSITIVE RHEUMATOID FACTOR (HCC): ICD-10-CM

## 2019-01-24 DIAGNOSIS — I27.20 PULMONARY HTN (HCC): ICD-10-CM

## 2019-01-24 PROCEDURE — 99214 OFFICE O/P EST MOD 30 MIN: CPT | Performed by: PHYSICIAN ASSISTANT

## 2019-02-06 ENCOUNTER — HOSPITAL ENCOUNTER (OUTPATIENT)
Dept: MRI IMAGING | Age: 79
Discharge: HOME | End: 2019-02-06
Payer: COMMERCIAL

## 2019-02-06 DIAGNOSIS — M71.38 SYNOVIAL CYST OF LUMBAR FACET JOINT: ICD-10-CM

## 2019-02-06 PROCEDURE — 72148 MRI LUMBAR SPINE W/O DYE: CPT

## 2019-02-07 ENCOUNTER — OFFICE VISIT (OUTPATIENT)
Dept: FAMILY MEDICINE CLINIC | Age: 79
End: 2019-02-07
Payer: COMMERCIAL

## 2019-02-07 VITALS
HEART RATE: 109 BPM | BODY MASS INDEX: 26.65 KG/M2 | HEIGHT: 67 IN | RESPIRATION RATE: 18 BRPM | OXYGEN SATURATION: 92 % | DIASTOLIC BLOOD PRESSURE: 72 MMHG | SYSTOLIC BLOOD PRESSURE: 116 MMHG | WEIGHT: 169.8 LBS

## 2019-02-07 DIAGNOSIS — J43.2 CENTRILOBULAR EMPHYSEMA (HCC): ICD-10-CM

## 2019-02-07 DIAGNOSIS — E78.00 PURE HYPERCHOLESTEROLEMIA: ICD-10-CM

## 2019-02-07 DIAGNOSIS — I10 ESSENTIAL HYPERTENSION: ICD-10-CM

## 2019-02-07 PROCEDURE — G8399 PT W/DXA RESULTS DOCUMENT: HCPCS | Performed by: INTERNAL MEDICINE

## 2019-02-07 PROCEDURE — 1090F PRES/ABSN URINE INCON ASSESS: CPT | Performed by: INTERNAL MEDICINE

## 2019-02-07 PROCEDURE — 3023F SPIROM DOC REV: CPT | Performed by: INTERNAL MEDICINE

## 2019-02-07 PROCEDURE — 4040F PNEUMOC VAC/ADMIN/RCVD: CPT | Performed by: INTERNAL MEDICINE

## 2019-02-07 PROCEDURE — G8482 FLU IMMUNIZE ORDER/ADMIN: HCPCS | Performed by: INTERNAL MEDICINE

## 2019-02-07 PROCEDURE — 1036F TOBACCO NON-USER: CPT | Performed by: INTERNAL MEDICINE

## 2019-02-07 PROCEDURE — G8926 SPIRO NO PERF OR DOC: HCPCS | Performed by: INTERNAL MEDICINE

## 2019-02-07 PROCEDURE — G8417 CALC BMI ABV UP PARAM F/U: HCPCS | Performed by: INTERNAL MEDICINE

## 2019-02-07 PROCEDURE — G8427 DOCREV CUR MEDS BY ELIG CLIN: HCPCS | Performed by: INTERNAL MEDICINE

## 2019-02-07 PROCEDURE — 1101F PT FALLS ASSESS-DOCD LE1/YR: CPT | Performed by: INTERNAL MEDICINE

## 2019-02-07 PROCEDURE — 99214 OFFICE O/P EST MOD 30 MIN: CPT | Performed by: INTERNAL MEDICINE

## 2019-02-07 PROCEDURE — 1123F ACP DISCUSS/DSCN MKR DOCD: CPT | Performed by: INTERNAL MEDICINE

## 2019-02-07 RX ORDER — HYDROCHLOROTHIAZIDE 25 MG/1
1 TABLET ORAL DAILY
Refills: 3 | COMMUNITY
Start: 2019-01-25 | End: 2021-07-29 | Stop reason: ALTCHOICE

## 2019-02-07 ASSESSMENT — PATIENT HEALTH QUESTIONNAIRE - PHQ9
1. LITTLE INTEREST OR PLEASURE IN DOING THINGS: 0
2. FEELING DOWN, DEPRESSED OR HOPELESS: 0
SUM OF ALL RESPONSES TO PHQ QUESTIONS 1-9: 0
SUM OF ALL RESPONSES TO PHQ9 QUESTIONS 1 & 2: 0
SUM OF ALL RESPONSES TO PHQ QUESTIONS 1-9: 0

## 2019-02-07 ASSESSMENT — ENCOUNTER SYMPTOMS
RHINORRHEA: 1
ALLERGIC/IMMUNOLOGIC NEGATIVE: 1
COUGH: 1
GASTROINTESTINAL NEGATIVE: 1

## 2019-02-12 ENCOUNTER — TELEPHONE (OUTPATIENT)
Dept: ORTHOPEDIC SURGERY | Age: 79
End: 2019-02-12

## 2019-03-22 RX ORDER — PREDNISONE 10 MG/1
TABLET ORAL
Qty: 90 TABLET | Refills: 0 | Status: SHIPPED | OUTPATIENT
Start: 2019-03-22 | End: 2019-09-12 | Stop reason: SDUPTHER

## 2019-03-22 RX ORDER — OMEPRAZOLE 40 MG/1
CAPSULE, DELAYED RELEASE ORAL
Qty: 90 CAPSULE | Refills: 1 | Status: SHIPPED | OUTPATIENT
Start: 2019-03-22 | End: 2019-03-26 | Stop reason: ALTCHOICE

## 2019-03-22 RX ORDER — RANITIDINE 150 MG/1
TABLET ORAL
Qty: 90 TABLET | Refills: 0 | Status: SHIPPED | OUTPATIENT
Start: 2019-03-22 | End: 2019-04-12 | Stop reason: ALTCHOICE

## 2019-03-25 RX ORDER — LEVOTHYROXINE SODIUM 0.1 MG/1
TABLET ORAL
Qty: 90 TABLET | Refills: 10 | Status: SHIPPED | OUTPATIENT
Start: 2019-03-25 | End: 2021-12-23 | Stop reason: SDUPTHER

## 2019-03-25 RX ORDER — SPIRONOLACTONE 25 MG/1
TABLET ORAL
Qty: 180 TABLET | Refills: 10 | Status: SHIPPED | OUTPATIENT
Start: 2019-03-25 | End: 2019-04-12 | Stop reason: ALTCHOICE

## 2019-03-25 RX ORDER — ALBUTEROL SULFATE 90 UG/1
AEROSOL, METERED RESPIRATORY (INHALATION)
Qty: 54 G | Refills: 10 | Status: SHIPPED | OUTPATIENT
Start: 2019-03-25 | End: 2020-07-08

## 2019-03-25 RX ORDER — SPIRONOLACTONE 25 MG/1
TABLET ORAL
Qty: 180 TABLET | Refills: 11 | Status: ON HOLD | OUTPATIENT
Start: 2019-03-25 | End: 2019-04-13

## 2019-03-25 RX ORDER — LEVOTHYROXINE SODIUM 0.1 MG/1
TABLET ORAL
Qty: 90 TABLET | Refills: 11 | Status: CANCELLED | OUTPATIENT
Start: 2019-03-25

## 2019-03-26 ENCOUNTER — APPOINTMENT (OUTPATIENT)
Dept: CT IMAGING | Age: 79
DRG: 121 | End: 2019-03-26
Payer: COMMERCIAL

## 2019-03-26 ENCOUNTER — APPOINTMENT (OUTPATIENT)
Dept: GENERAL RADIOLOGY | Age: 79
DRG: 121 | End: 2019-03-26
Payer: COMMERCIAL

## 2019-03-26 ENCOUNTER — HOSPITAL ENCOUNTER (INPATIENT)
Age: 79
LOS: 7 days | Discharge: HOME | DRG: 121 | End: 2019-04-03
Attending: EMERGENCY MEDICINE | Admitting: INTERNAL MEDICINE
Payer: COMMERCIAL

## 2019-03-26 DIAGNOSIS — J96.00 ACUTE RESPIRATORY FAILURE, UNSPECIFIED WHETHER WITH HYPOXIA OR HYPERCAPNIA (HCC): Primary | ICD-10-CM

## 2019-03-26 DIAGNOSIS — J69.0 ASPIRATION PNEUMONIA, UNSPECIFIED ASPIRATION PNEUMONIA TYPE, UNSPECIFIED LATERALITY, UNSPECIFIED PART OF LUNG (HCC): ICD-10-CM

## 2019-03-26 LAB
A/G RATIO: 1.7 (ref 1.1–2.2)
ALBUMIN SERPL-MCNC: 4.3 G/DL (ref 3.4–5)
ALP BLD-CCNC: 43 U/L (ref 40–129)
ALT SERPL-CCNC: 35 U/L (ref 10–40)
ANION GAP SERPL CALCULATED.3IONS-SCNC: 11 MMOL/L (ref 3–16)
AST SERPL-CCNC: 41 U/L (ref 15–37)
BILIRUB SERPL-MCNC: 0.5 MG/DL (ref 0–1)
BUN BLDV-MCNC: 12 MG/DL (ref 7–20)
CALCIUM SERPL-MCNC: 9.8 MG/DL (ref 8.3–10.6)
CHLORIDE BLD-SCNC: 98 MMOL/L (ref 99–110)
CO2: 25 MMOL/L (ref 21–32)
CREAT SERPL-MCNC: 0.9 MG/DL (ref 0.6–1.2)
GFR AFRICAN AMERICAN: >60
GFR NON-AFRICAN AMERICAN: >60
GLOBULIN: 2.5 G/DL
GLUCOSE BLD-MCNC: 97 MG/DL (ref 70–99)
HCT VFR BLD CALC: 37.4 % (ref 36–48)
HEMOGLOBIN: 12.4 G/DL (ref 12–16)
MCH RBC QN AUTO: 28.9 PG (ref 26–34)
MCHC RBC AUTO-ENTMCNC: 33.2 G/DL (ref 31–36)
MCV RBC AUTO: 87 FL (ref 80–100)
PDW BLD-RTO: 14 % (ref 12.4–15.4)
PLATELET # BLD: 201 K/UL (ref 135–450)
PMV BLD AUTO: 7.7 FL (ref 5–10.5)
POTASSIUM SERPL-SCNC: 3.4 MMOL/L (ref 3.5–5.1)
PRO-BNP: 65 PG/ML (ref 0–449)
RBC # BLD: 4.3 M/UL (ref 4–5.2)
SODIUM BLD-SCNC: 134 MMOL/L (ref 136–145)
TOTAL PROTEIN: 6.8 G/DL (ref 6.4–8.2)
TROPONIN: <0.01 NG/ML
WBC # BLD: 5.3 K/UL (ref 4–11)

## 2019-03-26 PROCEDURE — 80053 COMPREHEN METABOLIC PANEL: CPT

## 2019-03-26 PROCEDURE — 93005 ELECTROCARDIOGRAM TRACING: CPT | Performed by: PHYSICIAN ASSISTANT

## 2019-03-26 PROCEDURE — 71260 CT THORAX DX C+: CPT

## 2019-03-26 PROCEDURE — 4500000026 HC ED CRITICAL CARE PROCEDURE

## 2019-03-26 PROCEDURE — 99292 CRITICAL CARE ADDL 30 MIN: CPT

## 2019-03-26 PROCEDURE — 0DJ08ZZ INSPECTION OF UPPER INTESTINAL TRACT, VIA NATURAL OR ARTIFICIAL OPENING ENDOSCOPIC: ICD-10-PCS | Performed by: INTERNAL MEDICINE

## 2019-03-26 PROCEDURE — 2500000003 HC RX 250 WO HCPCS: Performed by: EMERGENCY MEDICINE

## 2019-03-26 PROCEDURE — 83880 ASSAY OF NATRIURETIC PEPTIDE: CPT

## 2019-03-26 PROCEDURE — 99152 MOD SED SAME PHYS/QHP 5/>YRS: CPT | Performed by: INTERNAL MEDICINE

## 2019-03-26 PROCEDURE — 6360000002 HC RX W HCPCS

## 2019-03-26 PROCEDURE — 96372 THER/PROPH/DIAG INJ SC/IM: CPT

## 2019-03-26 PROCEDURE — 51702 INSERT TEMP BLADDER CATH: CPT

## 2019-03-26 PROCEDURE — 3609012800 HC EGD DIAGNOSTIC ONLY: Performed by: INTERNAL MEDICINE

## 2019-03-26 PROCEDURE — 6360000004 HC RX CONTRAST MEDICATION: Performed by: EMERGENCY MEDICINE

## 2019-03-26 PROCEDURE — 94640 AIRWAY INHALATION TREATMENT: CPT

## 2019-03-26 PROCEDURE — 6360000002 HC RX W HCPCS: Performed by: EMERGENCY MEDICINE

## 2019-03-26 PROCEDURE — 2580000003 HC RX 258: Performed by: PHYSICIAN ASSISTANT

## 2019-03-26 PROCEDURE — 70360 X-RAY EXAM OF NECK: CPT

## 2019-03-26 PROCEDURE — 96375 TX/PRO/DX INJ NEW DRUG ADDON: CPT

## 2019-03-26 PROCEDURE — 6370000000 HC RX 637 (ALT 250 FOR IP): Performed by: PHYSICIAN ASSISTANT

## 2019-03-26 PROCEDURE — 85027 COMPLETE CBC AUTOMATED: CPT

## 2019-03-26 PROCEDURE — 94002 VENT MGMT INPAT INIT DAY: CPT

## 2019-03-26 PROCEDURE — 71045 X-RAY EXAM CHEST 1 VIEW: CPT

## 2019-03-26 PROCEDURE — 5A1945Z RESPIRATORY VENTILATION, 24-96 CONSECUTIVE HOURS: ICD-10-PCS | Performed by: INTERNAL MEDICINE

## 2019-03-26 PROCEDURE — 0BH17EZ INSERTION OF ENDOTRACHEAL AIRWAY INTO TRACHEA, VIA NATURAL OR ARTIFICIAL OPENING: ICD-10-PCS | Performed by: EMERGENCY MEDICINE

## 2019-03-26 PROCEDURE — 6360000002 HC RX W HCPCS: Performed by: PHYSICIAN ASSISTANT

## 2019-03-26 PROCEDURE — 31500 INSERT EMERGENCY AIRWAY: CPT

## 2019-03-26 PROCEDURE — 84484 ASSAY OF TROPONIN QUANT: CPT

## 2019-03-26 PROCEDURE — 2709999900 HC NON-CHARGEABLE SUPPLY: Performed by: INTERNAL MEDICINE

## 2019-03-26 PROCEDURE — 99291 CRITICAL CARE FIRST HOUR: CPT

## 2019-03-26 RX ORDER — PROPOFOL 10 MG/ML
10 INJECTION, EMULSION INTRAVENOUS ONCE
Status: COMPLETED | OUTPATIENT
Start: 2019-03-26 | End: 2019-03-26

## 2019-03-26 RX ORDER — EPINEPHRINE 1 MG/ML
0.3 INJECTION, SOLUTION, CONCENTRATE INTRAVENOUS ONCE
Status: COMPLETED | OUTPATIENT
Start: 2019-03-26 | End: 2019-03-26

## 2019-03-26 RX ORDER — MIDAZOLAM HYDROCHLORIDE 1 MG/ML
5 INJECTION INTRAMUSCULAR; INTRAVENOUS ONCE
Status: COMPLETED | OUTPATIENT
Start: 2019-03-26 | End: 2019-03-26

## 2019-03-26 RX ORDER — 0.9 % SODIUM CHLORIDE 0.9 %
1000 INTRAVENOUS SOLUTION INTRAVENOUS ONCE
Status: COMPLETED | OUTPATIENT
Start: 2019-03-26 | End: 2019-03-26

## 2019-03-26 RX ORDER — FENTANYL CITRATE 50 UG/ML
50 INJECTION, SOLUTION INTRAMUSCULAR; INTRAVENOUS ONCE
Status: COMPLETED | OUTPATIENT
Start: 2019-03-26 | End: 2019-03-26

## 2019-03-26 RX ORDER — IPRATROPIUM BROMIDE AND ALBUTEROL SULFATE 2.5; .5 MG/3ML; MG/3ML
1 SOLUTION RESPIRATORY (INHALATION) ONCE
Status: DISCONTINUED | OUTPATIENT
Start: 2019-03-26 | End: 2019-03-26

## 2019-03-26 RX ORDER — SODIUM CHLORIDE FOR INHALATION 0.9 %
3 VIAL, NEBULIZER (ML) INHALATION EVERY 4 HOURS PRN
Status: DISCONTINUED | OUTPATIENT
Start: 2019-03-26 | End: 2019-03-27

## 2019-03-26 RX ORDER — ETOMIDATE 2 MG/ML
20 INJECTION INTRAVENOUS ONCE
Status: COMPLETED | OUTPATIENT
Start: 2019-03-26 | End: 2019-03-26

## 2019-03-26 RX ORDER — LORAZEPAM 2 MG/ML
2 INJECTION INTRAMUSCULAR ONCE
Status: COMPLETED | OUTPATIENT
Start: 2019-03-26 | End: 2019-03-26

## 2019-03-26 RX ORDER — MIDAZOLAM HYDROCHLORIDE 5 MG/ML
INJECTION INTRAMUSCULAR; INTRAVENOUS
Status: COMPLETED
Start: 2019-03-26 | End: 2019-03-26

## 2019-03-26 RX ORDER — METHYLPREDNISOLONE SODIUM SUCCINATE 125 MG/2ML
125 INJECTION, POWDER, LYOPHILIZED, FOR SOLUTION INTRAMUSCULAR; INTRAVENOUS ONCE
Status: DISCONTINUED | OUTPATIENT
Start: 2019-03-26 | End: 2019-03-27

## 2019-03-26 RX ORDER — LORAZEPAM 2 MG/ML
INJECTION INTRAMUSCULAR
Status: COMPLETED
Start: 2019-03-26 | End: 2019-03-26

## 2019-03-26 RX ORDER — EPINEPHRINE 1 MG/ML
INJECTION, SOLUTION, CONCENTRATE INTRAVENOUS
Status: COMPLETED
Start: 2019-03-26 | End: 2019-03-26

## 2019-03-26 RX ORDER — ONDANSETRON 2 MG/ML
4 INJECTION INTRAMUSCULAR; INTRAVENOUS ONCE
Status: COMPLETED | OUTPATIENT
Start: 2019-03-26 | End: 2019-03-26

## 2019-03-26 RX ORDER — SUCCINYLCHOLINE CHLORIDE 20 MG/ML
100 INJECTION INTRAMUSCULAR; INTRAVENOUS ONCE
Status: COMPLETED | OUTPATIENT
Start: 2019-03-26 | End: 2019-03-26

## 2019-03-26 RX ORDER — MORPHINE SULFATE 4 MG/ML
4 INJECTION, SOLUTION INTRAMUSCULAR; INTRAVENOUS ONCE
Status: COMPLETED | OUTPATIENT
Start: 2019-03-26 | End: 2019-03-26

## 2019-03-26 RX ADMIN — SUCCINYLCHOLINE CHLORIDE 100 MG: 20 INJECTION, SOLUTION INTRAMUSCULAR; INTRAVENOUS at 21:14

## 2019-03-26 RX ADMIN — ETOMIDATE 20 MG: 2 INJECTION INTRAVENOUS at 21:14

## 2019-03-26 RX ADMIN — RACEPINEPHRINE HYDROCHLORIDE 11.25 MG: 11.25 SOLUTION RESPIRATORY (INHALATION) at 20:55

## 2019-03-26 RX ADMIN — IOPAMIDOL 75 ML: 755 INJECTION, SOLUTION INTRAVENOUS at 23:42

## 2019-03-26 RX ADMIN — FENTANYL CITRATE 50 MCG: 50 INJECTION, SOLUTION INTRAMUSCULAR; INTRAVENOUS at 21:31

## 2019-03-26 RX ADMIN — EPINEPHRINE 0.3 MG: 1 INJECTION, SOLUTION, CONCENTRATE INTRAVENOUS at 20:50

## 2019-03-26 RX ADMIN — MIDAZOLAM HYDROCHLORIDE 5 MG/HR: 5 INJECTION, SOLUTION INTRAMUSCULAR; INTRAVENOUS at 23:03

## 2019-03-26 RX ADMIN — MORPHINE SULFATE 4 MG: 4 INJECTION INTRAVENOUS at 19:32

## 2019-03-26 RX ADMIN — ISODIUM CHLORIDE 3 ML: 0.03 SOLUTION RESPIRATORY (INHALATION) at 20:54

## 2019-03-26 RX ADMIN — ONDANSETRON 4 MG: 2 INJECTION INTRAMUSCULAR; INTRAVENOUS at 19:32

## 2019-03-26 RX ADMIN — SODIUM CHLORIDE 1000 ML: 9 INJECTION, SOLUTION INTRAVENOUS at 19:31

## 2019-03-26 RX ADMIN — MIDAZOLAM HYDROCHLORIDE 5 MG: 2 INJECTION, SOLUTION INTRAMUSCULAR; INTRAVENOUS at 21:32

## 2019-03-26 RX ADMIN — PROPOFOL 30 MCG/KG/MIN: 10 INJECTION, EMULSION INTRAVENOUS at 21:32

## 2019-03-26 RX ADMIN — LORAZEPAM 2 MG: 2 INJECTION, SOLUTION INTRAMUSCULAR; INTRAVENOUS at 22:45

## 2019-03-26 RX ADMIN — LORAZEPAM 2 MG: 2 INJECTION INTRAMUSCULAR at 22:45

## 2019-03-26 RX ADMIN — FENTANYL CITRATE 50 MCG: 50 INJECTION, SOLUTION INTRAMUSCULAR; INTRAVENOUS at 20:03

## 2019-03-26 RX ADMIN — NITROGLYCERIN 0.5 INCH: 20 OINTMENT TOPICAL at 20:02

## 2019-03-26 RX ADMIN — FENTANYL CITRATE 25 MCG/HR: 50 INJECTION INTRAVENOUS at 21:40

## 2019-03-26 ASSESSMENT — PULMONARY FUNCTION TESTS
PIF_VALUE: 35
PIF_VALUE: 17

## 2019-03-26 ASSESSMENT — PAIN SCALES - GENERAL
PAINLEVEL_OUTOF10: 6
PAINLEVEL_OUTOF10: 4
PAINLEVEL_OUTOF10: 5

## 2019-03-26 ASSESSMENT — PAIN DESCRIPTION - LOCATION: LOCATION: BACK

## 2019-03-26 NOTE — ED PROVIDER NOTES
[facility] Emergency Department    CHIEF COMPLAINT  Shortness of Breath (since this am .. pt wears 1-3 litters PRN  at home. . pt feels like her throat is closing off. ..is following up with gi to have endoscopy) and Back Pain (since this am)      HISTORY OF PRESENT ILLNESS  Ramesh King is a 70 y.o. female who presents to the ED complaining of several hour history of worsening chest pain shortness of breath. Patient observed sitting on edge of bed. Appears in mild distress. History of COPD. Does have oxygen at home which she is not wearing constantly. Reports that she was fine this morning although several hours ago began to develop some shortness of breath. Reports that she feels as if her airway is being restricted with pain radiating into the neck. As also got pain straight through to the back and substernal chest pain. Aggravated by activity. Has normal read alleviating factors. Does appear to wax and wane. Currently 8 out of 10. She reports chronic cough unchanged. No hemoptysis. Reports no headache, lightheadedness, dizziness or confusion. No fevers chills. States that she has been nauseous and has been belching. No vomiting. No diarrhea or constipation. No urinary symptoms. His is no blood thinners. No leg pain or swelling. No recent travel, trauma, or surgery. No other complaints, modifying factors or associated symptoms. Nursing notes reviewed.    Past Medical History:   Diagnosis Date    Acquired hypothyroidism 7/6/2017    Anemia     Asthma     COPD (chronic obstructive pulmonary disease) ([address]     HLD (hyperlipidemia)     Hypertension     Influenza A 01/22/2018    MI (myocardial infarction) ([address]     X 2    Migraine     past hx    Rheumatoid arthritis     Wears glasses      Past Surgical History:   Procedure Laterality Date    CATARACT REMOVAL WITH IMPLANT      CATARACT REMOVAL WITH IMPLANT  3/23/2011    EPIDURAL STEROID Not on file     Physically abused: Not on file     Forced sexual activity: Not on file   Other Topics Concern    Not on file   Social History Narrative    Not on file     Current Facility-Administered Medications   Medication Dose Route Frequency Provider Last Rate Last Dose    morphine injection 4 mg  4 mg Intravenous Once [place], [place]        [facility]dansetron [facility]) injection 4 mg  4 mg Intravenous Once [place], [place]        0.9 % sodium chloride bolus  1,000 mL Intravenous Once [place], [place]         Current Outpatient Medications   Medication Sig Dispense Refill    spironolactone (ALDACTONE) 25 MG tablet TAKE 1 TABLET BY MOUTH EVERY 12 HOURS 180 tablet 10    levothyroxine (SYNTHROID) 100 MCG tablet TAKE 1 TABLET BY MOUTH ONCE DAILY 90 tablet 10    albuterol sulfate  (90 Base) MCG/ACT inhaler INHALE TWO (2) PUFFS BY MOUTH EVERY 6 HOURS AS NEEDED 54 g 10    spironolactone (ALDACTONE) 25 MG tablet TAKE 1 TABLET BY MOUTH EVERY 12 HOURS 180 tablet 11    predniSONE (DELTASONE) 10 MG tablet TAKE1/2 TABLET BY MOUTH DAILY 90 tablet 0    ranitidine (ZANTAC) 150 MG tablet TAKE (1) TABLET BY MOUTH NIGHTLY 90 tablet 0    hydrochlorothiazide (HYDRODIURIL) 25 MG tablet Take 1 tablet by mouth daily  3    losartan (COZAAR) 50 MG tablet Take 1 tablet by mouth daily 30 tablet 3    labetalol (NORMODYNE) 100 MG tablet Take 1 tablet by mouth every 12 hours 60 tablet 3    atorvastatin (LIPITOR) 10 MG tablet TAKE ONE (1) TABLET BY MOUTH DAILY 90 tablet 5    Blood Pressure Monitoring (BLOOD PRESSURE MONITOR/M CUFF) MISC 1 Units by Does not apply route daily 1 each 0    ipratropium-albuterol (DUONEB) 0.5-2.5 (3) MG/3ML SOLN nebulizer solution Inhale 3 mLs into the lungs every 4 hours as needed. 360 mL 3    nitroGLYCERIN (NITROSTAT) 0.4 MG SL tablet Place 0.4 mg under the tongue every 5 minutes as needed.  leflunomide (ARAVA) 20 MG tablet Take 20 mg by mouth daily.        Allergies   Allergen Reactions    Alendronate Sodium      Jaw pain      Humira [Adalimumab]      Rash      Penicillins      rash    Simvastatin Other (See Comments)     Made legs ache    Sulfa Antibiotics Swelling     Tongue swelled       REVIEW OF SYSTEMS  10 systems reviewed, pertinent positives per HPI otherwise noted to be negative    PHYSICAL EXAM  BP (!) 137/103   Pulse 97   Temp 98 °F (36.7 °C) (Oral)   Resp 16   Ht 5' 6\" (1.676 m)   Wt 157 lb (71.2 kg)   SpO2 98%   BMI 25.34 kg/m²   GENERAL APPEARANCE: Awake and alert. Cooperative. Mild distress. HEAD: Normocephalic. Atraumatic. EYES: PERRL. EOM's grossly intact. ENT: Mucous membranes are moist.   NECK: Supple. No JVD. No tracheal tenderness or deviation. No crepitus. HEART: RRR. No murmurs. Oropharynx patent. Patient controlling secretions. No lip or tongue swelling. LUNGS: Respirations unlabored. CTAB. Good air exchange. Speaking comfortably in full sentences. No obvious wheezing, rhonchi, rales. ABDOMEN: Soft. Non-distended. Non-tender. No guarding or rebound. No midline pulsatile mass. There is no unilateral calf pain, redness, or swelling. EXTREMITIES: No peripheral edema. Moves all extremities equally. All extremities neurovascularly intact. SKIN: Warm and dry. No acute rashes. NEUROLOGICAL: Alert and oriented. CN's 2-12 intact. No gross facial drooping. Strength 5/5, sensation intact. PSYCHIATRIC: Normal mood and affect.     RADIOLOGY  Xr Neck Soft Tissue    Result Date: 3/26/2019  EXAMINATION: TWO XRAY VIEWS OF THE NECK SOFT TISSUES 3/26/2019 9:02 pm COMPARISON: Chest x-ray performed today, 01/22/2018 soft tissue neck radiographs HISTORY: ORDERING SYSTEM PROVIDED HISTORY: cannot swallow TECHNOLOGIST PROVIDED HISTORY: Reason for exam:->cannot swallow Ordering Physician Provided Reason for Exam: cannot swallow FINDINGS: Bibasilar airspace disease, greater on the left, is again identified, better evaluated by chest radiograph performed today.  The epiglottis and aryepiglottic folds are within normal limits. The visualized airways patent. No prevertebral soft tissue swelling is identified. Degenerative changes of the visualized bones are evident. Negative study. Ct Chest W Contrast    Result Date: 3/27/2019  EXAMINATION: CT OF THE CHEST WITH CONTRAST 3/26/2019 11:24 pm TECHNIQUE: CT of the chest was performed with the administration of intravenous contrast. Multiplanar reformatted images are provided for review. Dose modulation, iterative reconstruction, and/or weight based adjustment of the mA/kV was utilized to reduce the radiation dose to as low as reasonably achievable. COMPARISON: 01/15/2019 HISTORY: ORDERING SYSTEM PROVIDED HISTORY: cp/sob TECHNOLOGIST PROVIDED HISTORY: Ordering Physician Provided Reason for Exam: chest pain; SOB; since this am .. pt wears 1-3 litters PRN at home. . pt feels like her throat is closing off. ..is following up with gi to have endoscopy; back pain since this am; possible FB/aspiration Acuity: Acute Type of Exam: Initial Relevant Medical/Surgical History: SEE EPIC FINDINGS: Mediastinum: The heart size is mildly enlarged. There is no significant pericardial effusion or mediastinal hematoma. Coronary artery calcifications are again seen. There is no aortic aneurysm or dissection. The main pulmonary artery is dilated. An enteric tube traverses the esophagus. A tracheostomy tube terminates approximately 3 cm above the mariola. Lungs/pleura: There is no pneumothorax or pleural effusion. Scattered airway secretions are identified bilaterally and in the trachea. Severe emphysema is again identified with bilateral scarring. There is bilateral lower lobe consolidation, left greater than right. There is chronic middle lobe atelectasis. Upper Abdomen: Limited imaging of the upper abdomen discloses no significant abnormality. Soft Tissues/Bones: No acute findings.      1. Bilateral lower lobe atelectasis and/or pneumonia. 2. Severe emphysema. 3. Coronary artery disease. 4. Pulmonary hypertension. Xr Chest Portable    Result Date: 3/26/2019  EXAMINATION: SINGLE XRAY VIEW OF THE CHEST 3/26/2019 9:19 pm COMPARISON: March 26, 2019. HISTORY: ORDERING SYSTEM PROVIDED HISTORY: ET placement TECHNOLOGIST PROVIDED HISTORY: Reason for exam:->ET placement Ordering Physician Provided Reason for Exam: ett placement Acuity: Acute Type of Exam: Initial FINDINGS: Tip of the endotracheal tube is 1.8 cm above the mariola. This should be retracted by about 3 cm. Cardiac and mediastinal contours are unchanged. Interval improvement in bibasilar pulmonary opacities. There is no evidence of new pulmonary opacity. No evidence of pneumothorax. No evidence of enlarging pleural effusions. No evidence of new osseous abnormalities. Tip of the endotracheal tube is 1.8 cm above the mariola. Consider retraction by about 3 cm and repeat chest radiograph. Interval improvement in bibasilar pulmonary opacities with residual pulmonary opacity within left lung base. Recommend radiographic follow-up to complete resolution. Xr Chest Portable    Result Date: 3/26/2019  EXAMINATION: SINGLE XRAY VIEW OF THE CHEST 3/26/2019 9:01 pm COMPARISON: None. HISTORY: ORDERING SYSTEM PROVIDED HISTORY: cp/sob TECHNOLOGIST PROVIDED HISTORY: Reason for exam:->cp/sob Ordering Physician Provided Reason for Exam: cp/sob Prior studies including most recent 01/15/2019 FINDINGS: No acute bony abnormality. The heart size and visualized mediastinal contours are stable. There is patchy bilateral lower lobe airspace disease, greater on the left. No large pleural effusion is evident. Bilateral lower lobe airspace disease, greater on left, likely representing pneumonia. Follow-up to resolution is recommended.      Procedure Note:  Orotracheal Intubation  Indication: respiratory failure  Consent: Mark Marino or their surrogate had an opportunity to ask questions, and the risks, benefits, and alternatives were discussed. Procedure: The patient was preoxygenated with 100% oxygen and had O2 saturations what were in the % range. Following preoxygenation, the patient was sedated with Dr. Margaret Ken performing sedation. A  glidescope blade was inserted into the patients mouth fasciculations were noted. An 7.3 ET tube was then visualized passing between the cords and was secured at 24 cm at the lip. The patient had condensation in the ET tube, CO2 capnogroaphic color change and had bilateral breath sounds appreciated. Post intubation CXR ordered and interpreted by myself showed radiology showed successful intubation with tip of the ET tube 1.8 cm proximal to the mariola recommending retraction by approximately 3 cm. ED COURSE   I have evaluated this patient in collaboration with Dr. Margaret Ken. Patient received morphine and Zofran IV for pain and nausea, with good relief. Triage vital stable. Initiated on a 1 L IV fluid bolus. CBC  Is without leukocytosis or anemia. CMP unremarkable outside of mild dehydration with decreased sodium and chloride levels. BNP less than 100 and troponin is less than 0.01. Lactic acid negative. Blood cultures ×2 pending. Urinalysis without evidence for significant dehydration or infectious process. While pending CT of chest and abdomen  Patient started having some episodes of what appeared to be in pending doom and panic associated with respiratory arrest and subsequent hypoxia. Would last several minutes before slowly resolving. Becoming more frequent. Upon further interview uveal symptoms appear to occur while patient was eating chipotle. States that she has  Not really been able to keep anything down and reports vomiting even just water. No longer have concern for aortic dissection and concern at this time is for impacted esophageal foreign body. Soft tissue neck negative. initial chest x-ray bilateral lower lobe airspace disease Calcium 9.8 8.3 - 10.6 mg/dL    Total Protein 6.8 6.4 - 8.2 g/dL    Alb 4.3 3.4 - 5.0 g/dL    Albumin/Globulin Ratio 1.7 1.1 - 2.2    Total Bilirubin 0.5 0.0 - 1.0 mg/dL    Alkaline Phosphatase 43 40 - 129 U/L    ALT 35 10 - 40 U/L    AST 41 (H) 15 - 37 U/L    Globulin 2.5 g/dL   Troponin   Result Value Ref Range    Troponin <0.01 <0.01 ng/mL   Brain Natriuretic Peptide   Result Value Ref Range    Pro-BNP 65 0 - 449 pg/mL   Lactic acid, plasma   Result Value Ref Range    Lactic Acid 1.1 0.4 - 2.0 mmol/L   Blood gas, venous   Result Value Ref Range    pH, [place] 7.257 (L) 7.350 - 7.450    pCO2, [place] 55.6 (H) 40.0 - 50.0 mmHg    pO2, [place] 66.3 (H) 25.0 - 40.0 mmHg    HCO3, Venous 24.2 23.0 - 29.0 mmol/L    Base Excess, [place] -3.3 (L) -3.0 - 3.0 mmol/L    O2 Sat, [place] 88 Not Established %    Carboxyhemoglobin 1.4 0.0 - 1.5 %    MetHgb, [place] 0.1 <1.5 %    TC02 (Calc), [place] 26 Not Established mmol/L    O2 Content, [place] 13 Not Established VOL %    O2 Therapy Unknown    Urinalysis   Result Value Ref Range    Color, UA Yellow Straw/Yellow    Clarity, UA Clear Clear    Glucose, Ur Negative Negative mg/dL    Bilirubin Urine Negative Negative    Ketones, Urine Negative Negative mg/dL    Specific Gravity, UA >=1.030 1.005 - 1.030    Blood, Urine Negative Negative    pH, UA 5.5 5.0 - 8.0    Protein, UA Negative Negative mg/dL    Urobilinogen, Urine 0.2 <2.0 E.U./dL    Nitrite, Urine Negative Negative    Leukocyte Esterase, Urine Negative Negative    Microscopic Examination Not Indicated     Urine Type Not Specified    EKG 12 Lead   Result Value Ref Range    Ventricular Rate 76 BPM    Atrial Rate 76 BPM    P-R Interval 126 ms    QRS Duration 84 ms    Q-T Interval 396 ms    QTc Calculation (Bazett) 445 ms    P Axis 58 degrees    R Axis 42 degrees    T Axis 60 degrees    Diagnosis       Normal sinus rhythmNormal ECGNo previous ECGs available     I spoke with Bebe Gill, and Brittney.  We thoroughly discussed the history, physical exam, laboratory and imaging studies, as well as, emergency department course. Based upon that discussion, we've decided to admit Joby Walker to the hospital for further observation, evaluation, and treatment. Final Impression  1. Acute respiratory failure, unspecified whether with hypoxia or hypercapnia ([address]    2. Aspiration pneumonia, unspecified aspiration pneumonia type, unspecified laterality, unspecified part of lung (HCC)      Blood pressure (!) 87/55, pulse 75, temperature 98 °F (36.7 °C), temperature source Oral, resp. rate 12, height 5' 6\" (1.676 m), weight 157 lb (71.2 kg), SpO2 95 %, not currently breastfeeding. DISPOSITION  Patient was admitted to the ICU in fair condition.           Ac Valladares  03/27/19 [id number]

## 2019-03-26 NOTE — ED PROVIDER NOTES
I independently performed a history and physical on Jennifer Costa. All diagnostic, treatment, and disposition decisions were made by myself in conjunction with the advanced practice provider. I have participated in the medical decision making and directed the treatment plan and disposition of the patient. For further details of [facility]ne[facility] emergency department encounter, please see the advanced practice provider's documentation. CHIEF COMPLAINT  Chief Complaint   Patient presents with    Shortness of Breath     since this am .. pt wears 1-3 litters PRN  at home. . pt feels like her throat is closing off. ..is following up with gi to have endoscopy    Back Pain     since this am     Briefly, Jennifer Costa is a 70 y.o. female  who presents to the ED complaining of feeling a foreign body sensation in the middle of her chest going up and down through the throat and down to the upper abdomen. Later we found out that she had [place] meat from [employer] for lunch and has worsened since then. She does feel short of breath. Multiple times were called into the room because of gagging, belching and feeling of her airway was compromised and closing off. FOCUSED PHYSICAL EXAMINATION  /73   Pulse 98   Temp 98 °F (36.7 °C) (Oral)   Resp 20   Ht 5' 6\" (1.676 m)   Wt 157 lb (71.2 kg)   SpO2 93%   BMI 25.34 kg/m²    Focused physical examination notable for no acute distress, well-appearing, well-nourished, normal speech and mentation without obvious facial droop, no obvious rash. No obvious cranial nerve deficits on my initial exam.  Time she had significant gagging and choking episodes where she required oxygen. She felt like something was choking her in the chest and throat. There was no stridor and the trachea was midline. The oropharynx is clear. Regular rate and rhythm. Clear to auscultation bilaterally.     The 12 lead EKG was interpreted by me as follows:  Rate: normal with a rate of 76  Rhythm: sinus  Axis: normal  Intervals: normal TN, narrow QRS, normal QTc  ST segments: no ST elevations or depressions  T waves: no abnormal inversions  Non-specific T wave changes: not present  Prior EKG comparison: EKG dated 1/15/19 is not significantly different    MDM:  ED course was notable for initial concern for possible aortopathy/dissection however as she began to have choking episodes it became more clear that she was having what appeared to be a food impaction near the level of the glottis likely causing her to have persistent episodes of potential airway compromise. She was initially also given an EpiPen and racemic epinephrine nebulizer but however she did not tolerate this well or show any improvement. GI was emergently consulted and came to the bedside for endoscopy. Her airway was tenuous however and therefore the decision was made to intubate her preprocedure. I was at bedside throughout the procedure done by ERMA for continuous procedural supervision. Intubation went well, see his note for details of procedure. Soft tissue neck XR showed no findings. CXR showed aspiration pneumonia. Merrem ordered. I was at bedside throughout EGD with Dr. Andrew Hernandez doing the procedure. No food impaction noted and no food in the stomach either. Concern for aspiration of the impaction given that it appeared clinically to be in the glottis. Will keep her intubated overnight and may need bronchoscopy as inpatient. Retracting ETT per CXR radiology recommendations and will order CT chest, admit to ICU.     During the patient's ED course, the patient was given:  Medications   methylPREDNISolone sodium (SOLU-MEDROL) injection 125 mg (has no administration in time range)   racepinephrine HCl (VAPONEFPRIN) 2.25 % nebulizer solution NEBU 11.25 mg (11.25 mg Nebulization Given 3/26/19 2055)   sodium chloride nebulizer 0.9 % solution 3 mL (3 mLs Nebulization Given 3/26/19 2054)   fentaNYL (SUBLIMAZE) 1,000 mcg in sodium chloride 0.9 % 100 mL infusion (has no administration in time range)   meropenem (MERREM) 1 g in sodium chloride 0.9 % 100 mL IVPB (mini-bag) (has no administration in time range)   morphine injection 4 mg (4 mg Intravenous Given 3/26/19 1932)   ondansetron (ZOFRAN) injection 4 mg (4 mg Intravenous Given 3/26/19 1932)   0.9 % sodium chloride bolus (0 mLs Intravenous Stopped 3/26/19 2051)   fentaNYL (SUBLIMAZE) injection 50 mcg (50 mcg Intravenous Given 3/26/19 2003)   nitroglycerin (NITRO-BID) 2 % ointment 0.5 inch (0.5 inches Topical Given 3/26/19 2002)   EPINEPHrine PF 1 MG/ML injection 0.3 mg (0.3 mg Intramuscular Given 3/26/19 2050)   etomidate (AMIDATE) injection 20 mg (20 mg Intravenous Given 3/26/19 2114)   succinylcholine (ANECTINE) injection 100 mg (100 mg Intravenous Given by Other 3/26/19 2114)   propofol 1000 MG/100ML injection (30 mcg/kg/min × 71.2 kg Intravenous New Bag 3/26/19 2132)   midazolam (VERSED) injection 5 mg (5 mg Intravenous Given 3/26/19 2132)   fentaNYL (SUBLIMAZE) injection 50 mcg (50 mcg Intravenous Given 3/26/19 2131)   midazolam (VERSED) 5 MG/ML injection (  Return to [facility] 3/26/19 2132)        CLINICAL IMPRESSION  1. Acute respiratory failure, unspecified whether with hypoxia or hypercapnia ([address]    2. Aspiration pneumonia, unspecified aspiration pneumonia type, unspecified laterality, unspecified part of lung ([address]        Marta Zelaya was admitted in fair condition. I have discussed the findings of today's workup with the patient and addressed the patient's questions and concerns. The plan is to admit to the hospital at this time under the ICU service. The advanced practice provider spoke with hospitalist who accepted the patient and will take over the patient's care. The patient is agreeable with this plan. The total critical care time spent while evaluating and treating this patient was at least 90 minutes.   This excludes time spent doing separately billable procedures. This includes time at the bedside, data interpretation, medication management, obtaining critical history from collateral sources if the patient is unable to provide it directly, and physician consultation. Specifics of interventions taken and potentially life-threatening diagnostic considerations are listed above in the medical decision making. This chart was created using Dragon dictation software. Efforts were made by me to ensure accuracy, however some errors may be present due to limitations of this technology.             Rohan Kat MD  03/26/19 7705

## 2019-03-27 PROBLEM — G47.33 OSA ON CPAP: Status: ACTIVE | Noted: 2019-03-27

## 2019-03-27 PROBLEM — J96.01 ACUTE RESPIRATORY FAILURE WITH HYPOXIA (HCC): Status: ACTIVE | Noted: 2019-03-27

## 2019-03-27 PROBLEM — R91.8 PULMONARY INFILTRATES: Status: ACTIVE | Noted: 2019-03-27

## 2019-03-27 PROBLEM — Z99.89 OSA ON CPAP: Status: ACTIVE | Noted: 2019-03-27

## 2019-03-27 PROBLEM — D64.9 ANEMIA: Status: ACTIVE | Noted: 2019-03-27

## 2019-03-27 PROBLEM — J96.00 ACUTE RESPIRATORY FAILURE (HCC): Status: ACTIVE | Noted: 2019-03-27

## 2019-03-27 PROBLEM — Q21.10 ASD (ATRIAL SEPTAL DEFECT): Status: ACTIVE | Noted: 2019-03-27

## 2019-03-27 PROBLEM — E87.20 NORMAL ANION GAP METABOLIC ACIDOSIS: Status: ACTIVE | Noted: 2019-03-27

## 2019-03-27 PROBLEM — J69.0 ASPIRATION PNEUMONIA (HCC): Status: ACTIVE | Noted: 2019-03-27

## 2019-03-27 PROBLEM — J98.11 ATELECTASIS: Status: ACTIVE | Noted: 2019-03-27

## 2019-03-27 PROBLEM — E87.6 HYPOKALEMIA: Status: ACTIVE | Noted: 2019-03-27

## 2019-03-27 LAB
ANION GAP SERPL CALCULATED.3IONS-SCNC: 11 MMOL/L (ref 3–16)
BASE EXCESS ARTERIAL: -3.8 MMOL/L (ref -3–3)
BASE EXCESS VENOUS: -3.3 MMOL/L (ref -3–3)
BILIRUBIN URINE: NEGATIVE
BLOOD, URINE: NEGATIVE
BUN BLDV-MCNC: 11 MG/DL (ref 7–20)
CALCIUM SERPL-MCNC: 8.2 MG/DL (ref 8.3–10.6)
CARBOXYHEMOGLOBIN ARTERIAL: 0.9 % (ref 0–1.5)
CARBOXYHEMOGLOBIN: 1.4 % (ref 0–1.5)
CHLORIDE BLD-SCNC: 107 MMOL/L (ref 99–110)
CLARITY: CLEAR
CO2: 23 MMOL/L (ref 21–32)
COLOR: YELLOW
CREAT SERPL-MCNC: 0.9 MG/DL (ref 0.6–1.2)
EKG ATRIAL RATE: 76 BPM
EKG DIAGNOSIS: NORMAL
EKG P AXIS: 58 DEGREES
EKG P-R INTERVAL: 126 MS
EKG Q-T INTERVAL: 396 MS
EKG QRS DURATION: 84 MS
EKG QTC CALCULATION (BAZETT): 445 MS
EKG R AXIS: 42 DEGREES
EKG T AXIS: 60 DEGREES
EKG VENTRICULAR RATE: 76 BPM
GFR AFRICAN AMERICAN: >60
GFR NON-AFRICAN AMERICAN: >60
GLUCOSE BLD-MCNC: 93 MG/DL (ref 70–99)
GLUCOSE URINE: NEGATIVE MG/DL
HCO3 ARTERIAL: 23.9 MMOL/L (ref 21–29)
HCO3 VENOUS: 24.2 MMOL/L (ref 23–29)
HCT VFR BLD CALC: 38.1 % (ref 36–48)
HEMOGLOBIN, ART, EXTENDED: 11.2 G/DL (ref 12–16)
HEMOGLOBIN: 11.9 G/DL (ref 12–16)
KETONES, URINE: NEGATIVE MG/DL
LACTIC ACID: 1.1 MMOL/L (ref 0.4–2)
LEUKOCYTE ESTERASE, URINE: NEGATIVE
MAGNESIUM: 1.6 MG/DL (ref 1.8–2.4)
MCH RBC QN AUTO: 28.6 PG (ref 26–34)
MCHC RBC AUTO-ENTMCNC: 31.2 G/DL (ref 31–36)
MCV RBC AUTO: 91.8 FL (ref 80–100)
METHEMOGLOBIN ARTERIAL: 0.2 %
METHEMOGLOBIN VENOUS: 0.1 %
MICROSCOPIC EXAMINATION: NORMAL
NITRITE, URINE: NEGATIVE
O2 CONTENT ARTERIAL: 15 ML/DL
O2 CONTENT, VEN: 13 VOL %
O2 SAT, ARTERIAL: 95.2 %
O2 SAT, VEN: 88 %
O2 THERAPY: ABNORMAL
O2 THERAPY: ABNORMAL
PCO2 ARTERIAL: 55.4 MMHG (ref 35–45)
PCO2, VEN: 55.6 MMHG (ref 40–50)
PDW BLD-RTO: 15.1 % (ref 12.4–15.4)
PH ARTERIAL: 7.25 (ref 7.35–7.45)
PH UA: 5.5 (ref 5–8)
PH VENOUS: 7.26 (ref 7.35–7.45)
PLATELET # BLD: 166 K/UL (ref 135–450)
PMV BLD AUTO: 8.6 FL (ref 5–10.5)
PO2 ARTERIAL: 90.7 MMHG (ref 75–108)
PO2, VEN: 66.3 MMHG (ref 25–40)
POTASSIUM REFLEX MAGNESIUM: 3.4 MMOL/L (ref 3.5–5.1)
PROTEIN UA: NEGATIVE MG/DL
RBC # BLD: 4.15 M/UL (ref 4–5.2)
SODIUM BLD-SCNC: 141 MMOL/L (ref 136–145)
SPECIFIC GRAVITY UA: >=1.03 (ref 1–1.03)
TCO2 ARTERIAL: 25.6 MMOL/L
TCO2 CALC VENOUS: 26 MMOL/L
URINE TYPE: NORMAL
UROBILINOGEN, URINE: 0.2 E.U./DL
WBC # BLD: 4.9 K/UL (ref 4–11)

## 2019-03-27 PROCEDURE — 6370000000 HC RX 637 (ALT 250 FOR IP): Performed by: INTERNAL MEDICINE

## 2019-03-27 PROCEDURE — 96365 THER/PROPH/DIAG IV INF INIT: CPT

## 2019-03-27 PROCEDURE — 2709999900 HC NON-CHARGEABLE SUPPLY: Performed by: INTERNAL MEDICINE

## 2019-03-27 PROCEDURE — 0BCF8ZZ EXTIRPATION OF MATTER FROM RIGHT LOWER LUNG LOBE, VIA NATURAL OR ARTIFICIAL OPENING ENDOSCOPIC: ICD-10-PCS | Performed by: INTERNAL MEDICINE

## 2019-03-27 PROCEDURE — 31624 DX BRONCHOSCOPE/LAVAGE: CPT | Performed by: INTERNAL MEDICINE

## 2019-03-27 PROCEDURE — 94761 N-INVAS EAR/PLS OXIMETRY MLT: CPT

## 2019-03-27 PROCEDURE — 2700000000 HC OXYGEN THERAPY PER DAY

## 2019-03-27 PROCEDURE — 2580000003 HC RX 258: Performed by: INTERNAL MEDICINE

## 2019-03-27 PROCEDURE — 81003 URINALYSIS AUTO W/O SCOPE: CPT

## 2019-03-27 PROCEDURE — 82803 BLOOD GASES ANY COMBINATION: CPT

## 2019-03-27 PROCEDURE — 99291 CRITICAL CARE FIRST HOUR: CPT | Performed by: INTERNAL MEDICINE

## 2019-03-27 PROCEDURE — 94640 AIRWAY INHALATION TREATMENT: CPT

## 2019-03-27 PROCEDURE — 2000000000 HC ICU R&B

## 2019-03-27 PROCEDURE — 6360000002 HC RX W HCPCS: Performed by: INTERNAL MEDICINE

## 2019-03-27 PROCEDURE — 94003 VENT MGMT INPAT SUBQ DAY: CPT

## 2019-03-27 PROCEDURE — C9113 INJ PANTOPRAZOLE SODIUM, VIA: HCPCS | Performed by: INTERNAL MEDICINE

## 2019-03-27 PROCEDURE — 0B9F8ZX DRAINAGE OF RIGHT LOWER LUNG LOBE, VIA NATURAL OR ARTIFICIAL OPENING ENDOSCOPIC, DIAGNOSTIC: ICD-10-PCS | Performed by: INTERNAL MEDICINE

## 2019-03-27 PROCEDURE — 3609010800 HC BRONCHOSCOPY ALVEOLAR LAVAGE: Performed by: INTERNAL MEDICINE

## 2019-03-27 PROCEDURE — 0BCJ8ZZ EXTIRPATION OF MATTER FROM LEFT LOWER LUNG LOBE, VIA NATURAL OR ARTIFICIAL OPENING ENDOSCOPIC: ICD-10-PCS | Performed by: INTERNAL MEDICINE

## 2019-03-27 PROCEDURE — 93010 ELECTROCARDIOGRAM REPORT: CPT | Performed by: INTERNAL MEDICINE

## 2019-03-27 PROCEDURE — 80048 BASIC METABOLIC PNL TOTAL CA: CPT

## 2019-03-27 PROCEDURE — 2500000003 HC RX 250 WO HCPCS: Performed by: INTERNAL MEDICINE

## 2019-03-27 PROCEDURE — 83605 ASSAY OF LACTIC ACID: CPT

## 2019-03-27 PROCEDURE — 85027 COMPLETE CBC AUTOMATED: CPT

## 2019-03-27 PROCEDURE — 6360000002 HC RX W HCPCS: Performed by: EMERGENCY MEDICINE

## 2019-03-27 PROCEDURE — 87205 SMEAR GRAM STAIN: CPT

## 2019-03-27 PROCEDURE — 3609010900 HC BRONCHOSCOPY THERAPUTIC ASPIRATION INITIAL: Performed by: INTERNAL MEDICINE

## 2019-03-27 PROCEDURE — 36415 COLL VENOUS BLD VENIPUNCTURE: CPT

## 2019-03-27 PROCEDURE — 99152 MOD SED SAME PHYS/QHP 5/>YRS: CPT | Performed by: INTERNAL MEDICINE

## 2019-03-27 PROCEDURE — 94770 HC ETCO2 MONITOR DAILY: CPT

## 2019-03-27 PROCEDURE — 87040 BLOOD CULTURE FOR BACTERIA: CPT

## 2019-03-27 PROCEDURE — 2580000003 HC RX 258

## 2019-03-27 PROCEDURE — 2580000003 HC RX 258: Performed by: EMERGENCY MEDICINE

## 2019-03-27 PROCEDURE — 83735 ASSAY OF MAGNESIUM: CPT

## 2019-03-27 PROCEDURE — 94750 HC PULMONARY COMPLIANCE STUDY: CPT

## 2019-03-27 PROCEDURE — 87070 CULTURE OTHR SPECIMN AEROBIC: CPT

## 2019-03-27 RX ORDER — PREDNISONE 20 MG/1
40 TABLET ORAL DAILY
Status: DISCONTINUED | OUTPATIENT
Start: 2019-03-29 | End: 2019-04-02

## 2019-03-27 RX ORDER — IPRATROPIUM BROMIDE AND ALBUTEROL SULFATE 2.5; .5 MG/3ML; MG/3ML
1 SOLUTION RESPIRATORY (INHALATION) EVERY 4 HOURS
Status: DISCONTINUED | OUTPATIENT
Start: 2019-03-27 | End: 2019-03-30

## 2019-03-27 RX ORDER — CHLORHEXIDINE GLUCONATE 0.12 MG/ML
15 RINSE ORAL 2 TIMES DAILY
Status: DISCONTINUED | OUTPATIENT
Start: 2019-03-27 | End: 2019-03-28

## 2019-03-27 RX ORDER — MAGNESIUM HYDROXIDE 1200 MG/15ML
LIQUID ORAL CONTINUOUS PRN
Status: COMPLETED | OUTPATIENT
Start: 2019-03-27 | End: 2019-03-27

## 2019-03-27 RX ORDER — PROPOFOL 10 MG/ML
10 INJECTION, EMULSION INTRAVENOUS
Status: DISCONTINUED | OUTPATIENT
Start: 2019-03-27 | End: 2019-03-28

## 2019-03-27 RX ORDER — ATORVASTATIN CALCIUM 10 MG/1
10 TABLET, FILM COATED ORAL DAILY
Status: DISCONTINUED | OUTPATIENT
Start: 2019-03-27 | End: 2019-04-03 | Stop reason: HOSPADM

## 2019-03-27 RX ORDER — ACETAMINOPHEN 325 MG/1
650 TABLET ORAL EVERY 4 HOURS PRN
Status: DISCONTINUED | OUTPATIENT
Start: 2019-03-27 | End: 2019-04-03 | Stop reason: HOSPADM

## 2019-03-27 RX ORDER — CARBOXYMETHYLCELLULOSE SODIUM 10 MG/ML
1 GEL OPHTHALMIC 3 TIMES DAILY
Status: DISCONTINUED | OUTPATIENT
Start: 2019-03-27 | End: 2019-03-28

## 2019-03-27 RX ORDER — LOSARTAN POTASSIUM 25 MG/1
50 TABLET ORAL DAILY
Status: DISCONTINUED | OUTPATIENT
Start: 2019-03-27 | End: 2019-04-03 | Stop reason: HOSPADM

## 2019-03-27 RX ORDER — IPRATROPIUM BROMIDE AND ALBUTEROL SULFATE 2.5; .5 MG/3ML; MG/3ML
1 SOLUTION RESPIRATORY (INHALATION) EVERY 4 HOURS
Status: DISCONTINUED | OUTPATIENT
Start: 2019-03-27 | End: 2019-03-27

## 2019-03-27 RX ORDER — LEVOFLOXACIN 5 MG/ML
750 INJECTION, SOLUTION INTRAVENOUS EVERY 24 HOURS
Status: COMPLETED | OUTPATIENT
Start: 2019-03-27 | End: 2019-03-31

## 2019-03-27 RX ORDER — SODIUM CHLORIDE 0.9 % (FLUSH) 0.9 %
10 SYRINGE (ML) INJECTION PRN
Status: DISCONTINUED | OUTPATIENT
Start: 2019-03-27 | End: 2019-04-03 | Stop reason: HOSPADM

## 2019-03-27 RX ORDER — ONDANSETRON 2 MG/ML
4 INJECTION INTRAMUSCULAR; INTRAVENOUS EVERY 6 HOURS PRN
Status: DISCONTINUED | OUTPATIENT
Start: 2019-03-27 | End: 2019-04-03 | Stop reason: HOSPADM

## 2019-03-27 RX ORDER — LIDOCAINE HYDROCHLORIDE AND EPINEPHRINE BITARTRATE 20; .01 MG/ML; MG/ML
INJECTION, SOLUTION SUBCUTANEOUS PRN
Status: DISCONTINUED | OUTPATIENT
Start: 2019-03-27 | End: 2019-03-27 | Stop reason: ALTCHOICE

## 2019-03-27 RX ORDER — SPIRONOLACTONE 25 MG/1
25 TABLET ORAL 2 TIMES DAILY
Status: DISCONTINUED | OUTPATIENT
Start: 2019-03-27 | End: 2019-03-29

## 2019-03-27 RX ORDER — SODIUM CHLORIDE 9 MG/ML
INJECTION, SOLUTION INTRAVENOUS CONTINUOUS
Status: DISCONTINUED | OUTPATIENT
Start: 2019-03-27 | End: 2019-03-27

## 2019-03-27 RX ORDER — METHYLPREDNISOLONE SODIUM SUCCINATE 40 MG/ML
40 INJECTION, POWDER, LYOPHILIZED, FOR SOLUTION INTRAMUSCULAR; INTRAVENOUS EVERY 6 HOURS
Status: COMPLETED | OUTPATIENT
Start: 2019-03-27 | End: 2019-03-29

## 2019-03-27 RX ORDER — PANTOPRAZOLE SODIUM 40 MG/10ML
40 INJECTION, POWDER, LYOPHILIZED, FOR SOLUTION INTRAVENOUS DAILY
Status: DISCONTINUED | OUTPATIENT
Start: 2019-03-27 | End: 2019-04-03 | Stop reason: HOSPADM

## 2019-03-27 RX ORDER — SODIUM CHLORIDE 0.9 % (FLUSH) 0.9 %
10 SYRINGE (ML) INJECTION EVERY 12 HOURS SCHEDULED
Status: DISCONTINUED | OUTPATIENT
Start: 2019-03-27 | End: 2019-04-03 | Stop reason: HOSPADM

## 2019-03-27 RX ORDER — LEVOTHYROXINE SODIUM 0.1 MG/1
100 TABLET ORAL DAILY
Status: DISCONTINUED | OUTPATIENT
Start: 2019-03-27 | End: 2019-04-03 | Stop reason: HOSPADM

## 2019-03-27 RX ORDER — SODIUM CHLORIDE 9 MG/ML
INJECTION, SOLUTION INTRAVENOUS
Status: COMPLETED
Start: 2019-03-27 | End: 2019-03-27

## 2019-03-27 RX ORDER — LEFLUNOMIDE 10 MG/1
20 TABLET ORAL DAILY
Status: DISCONTINUED | OUTPATIENT
Start: 2019-03-27 | End: 2019-03-27

## 2019-03-27 RX ORDER — LABETALOL 100 MG/1
100 TABLET, FILM COATED ORAL EVERY 12 HOURS SCHEDULED
Status: DISCONTINUED | OUTPATIENT
Start: 2019-03-27 | End: 2019-04-03 | Stop reason: HOSPADM

## 2019-03-27 RX ADMIN — ATORVASTATIN CALCIUM 10 MG: 10 TABLET, FILM COATED ORAL at 08:38

## 2019-03-27 RX ADMIN — ENOXAPARIN SODIUM 40 MG: 40 INJECTION SUBCUTANEOUS at 08:38

## 2019-03-27 RX ADMIN — SODIUM CHLORIDE: 9 INJECTION, SOLUTION INTRAVENOUS at 01:00

## 2019-03-27 RX ADMIN — LEVOFLOXACIN 750 MG: 5 INJECTION, SOLUTION INTRAVENOUS at 09:32

## 2019-03-27 RX ADMIN — MUPIROCIN: 20 OINTMENT TOPICAL at 09:06

## 2019-03-27 RX ADMIN — PROPOFOL 25 MCG/KG/MIN: 10 INJECTION, EMULSION INTRAVENOUS at 21:02

## 2019-03-27 RX ADMIN — IPRATROPIUM BROMIDE AND ALBUTEROL SULFATE 1 AMPULE: .5; 3 SOLUTION RESPIRATORY (INHALATION) at 20:14

## 2019-03-27 RX ADMIN — FENTANYL CITRATE 125 MCG/HR: 50 INJECTION INTRAVENOUS at 09:20

## 2019-03-27 RX ADMIN — SODIUM CHLORIDE: 9 INJECTION, SOLUTION INTRAVENOUS at 03:10

## 2019-03-27 RX ADMIN — PROPOFOL 10 MCG/KG/MIN: 10 INJECTION, EMULSION INTRAVENOUS at 07:06

## 2019-03-27 RX ADMIN — Medication 10 ML: at 20:10

## 2019-03-27 RX ADMIN — SODIUM CHLORIDE: 9 INJECTION, SOLUTION INTRAVENOUS at 17:18

## 2019-03-27 RX ADMIN — Medication 15 ML: at 08:40

## 2019-03-27 RX ADMIN — IPRATROPIUM BROMIDE AND ALBUTEROL SULFATE 1 AMPULE: .5; 3 SOLUTION RESPIRATORY (INHALATION) at 12:03

## 2019-03-27 RX ADMIN — CARBOXYMETHYLCELLULOSE SODIUM 1 DROP: 10 GEL OPHTHALMIC at 20:10

## 2019-03-27 RX ADMIN — Medication 10 ML: at 08:39

## 2019-03-27 RX ADMIN — IPRATROPIUM BROMIDE AND ALBUTEROL SULFATE 1 AMPULE: .5; 3 SOLUTION RESPIRATORY (INHALATION) at 09:05

## 2019-03-27 RX ADMIN — IPRATROPIUM BROMIDE AND ALBUTEROL SULFATE 1 AMPULE: .5; 3 SOLUTION RESPIRATORY (INHALATION) at 03:59

## 2019-03-27 RX ADMIN — MUPIROCIN: 20 OINTMENT TOPICAL at 20:10

## 2019-03-27 RX ADMIN — CARBOXYMETHYLCELLULOSE SODIUM 1 DROP: 10 GEL OPHTHALMIC at 13:43

## 2019-03-27 RX ADMIN — LEVOTHYROXINE SODIUM 100 MCG: 100 TABLET ORAL at 07:07

## 2019-03-27 RX ADMIN — Medication 15 ML: at 20:09

## 2019-03-27 RX ADMIN — FENTANYL CITRATE 200 MCG/HR: 50 INJECTION INTRAVENOUS at 02:57

## 2019-03-27 RX ADMIN — METHYLPREDNISOLONE SODIUM SUCCINATE 40 MG: 40 INJECTION, POWDER, FOR SOLUTION INTRAMUSCULAR; INTRAVENOUS at 09:06

## 2019-03-27 RX ADMIN — FENTANYL CITRATE 100 MCG/HR: 50 INJECTION INTRAVENOUS at 22:12

## 2019-03-27 RX ADMIN — LOSARTAN POTASSIUM 50 MG: 25 TABLET, FILM COATED ORAL at 10:15

## 2019-03-27 RX ADMIN — METHYLPREDNISOLONE SODIUM SUCCINATE 40 MG: 40 INJECTION, POWDER, FOR SOLUTION INTRAMUSCULAR; INTRAVENOUS at 20:10

## 2019-03-27 RX ADMIN — SPIRONOLACTONE 25 MG: 25 TABLET ORAL at 09:18

## 2019-03-27 RX ADMIN — LEFLUNOMIDE 20 MG: 10 TABLET ORAL at 09:06

## 2019-03-27 RX ADMIN — PROPOFOL 10 MCG/KG/MIN: 10 INJECTION, EMULSION INTRAVENOUS at 03:07

## 2019-03-27 RX ADMIN — MEROPENEM 1 G: 1 INJECTION, POWDER, FOR SOLUTION INTRAVENOUS at 00:01

## 2019-03-27 RX ADMIN — METHYLPREDNISOLONE SODIUM SUCCINATE 40 MG: 40 INJECTION, POWDER, FOR SOLUTION INTRAMUSCULAR; INTRAVENOUS at 16:13

## 2019-03-27 RX ADMIN — CARBOXYMETHYLCELLULOSE SODIUM 1 DROP: 10 GEL OPHTHALMIC at 09:06

## 2019-03-27 RX ADMIN — PANTOPRAZOLE SODIUM 40 MG: 40 INJECTION, POWDER, FOR SOLUTION INTRAVENOUS at 08:38

## 2019-03-27 RX ADMIN — IPRATROPIUM BROMIDE AND ALBUTEROL SULFATE 1 AMPULE: .5; 3 SOLUTION RESPIRATORY (INHALATION) at 16:07

## 2019-03-27 RX ADMIN — SPIRONOLACTONE 25 MG: 25 TABLET ORAL at 17:19

## 2019-03-27 ASSESSMENT — PAIN SCALES - GENERAL
PAINLEVEL_OUTOF10: 0

## 2019-03-27 ASSESSMENT — PULMONARY FUNCTION TESTS
PIF_VALUE: 23
PIF_VALUE: 15
PIF_VALUE: 24
PIF_VALUE: 23
PIF_VALUE: 23
PIF_VALUE: 17

## 2019-03-27 NOTE — PROGRESS NOTES
03/27/19 0405   Vent Information   Vent Type 840   Vent Mode AC/VC   Rate Set 12 bmp   Peak Flow 40 L/min   FiO2  50 %   Sensitivity 3   PEEP/CPAP 5   Cuff Pressure (cm H2O) 30 cm H2O   Humidification Source HME   Vent Patient Data   Peak Inspiratory Pressure 23 cmH2O   Mean Airway Pressure 11 cmH20   Rate Measured 12 br/min   Vt Exhaled 475 mL   Minute Volume 6.2 Liters   I:E Ratio 1:3.5   Spontaneous Breathing Trial (SBT) RT Doc   Pulse 73   SpO2 100 %   Breath Sounds   Right Upper Lobe Diminished   Right Middle Lobe Diminished   Right Lower Lobe Diminished   Left Upper Lobe Diminished   Left Lower Lobe Diminished   Additional Respiratory  Assessments   Resp 12   End Tidal CO2 36 (%)   Alarm Settings   High Pressure Alarm 45 cmH2O   Low Minute Volume Alarm 2 L/min   High Respiratory Rate 45 br/min   Low Exhaled Vt  200 mL   Non-Surgical Airway Endo Tracheal Tube   Placement Date/Time: 03/26/19 2113   Timeout: Patient;Procedure;Site/Side  Mask Ventilation: Difficult mask ventilation (3)  Placed By: In ED  Inserted by: Koki Mckee   Insertion attempts: 1  Airway Device: Endo Tracheal Tube  Brand: LMA  Size: 7  Placeme. ..    Secured at 22 cm   Measured From Lips   Secured Location Left   Secured By Commercial tube villasenor   Site Condition Dry

## 2019-03-27 NOTE — PROGRESS NOTES
03/27/19 1607   Vent Information   Vent Type 840   Vent Mode CPAP   Pressure Support 10 cmH20   FiO2  40 %   Sensitivity 3   PEEP/CPAP 5   Cuff Pressure (cm H2O) 30 cm H2O   Humidification Source HME   Vent Patient Data   Peak Inspiratory Pressure 15 cmH2O   Mean Airway Pressure 8.5 cmH20   Rate Measured 16 br/min   Vt Exhaled 519 mL   Minute Volume 9.53 Liters   Plateau Pressure 17 EGR27   Static Compliance 43.2 mL/cmH2O   Dynamic Compliance 51.9 mL/cmH2O   Cough/Sputum   Sputum How Obtained Endotracheal;Oral   Cough Productive;Strong   Sputum Amount Small   Sputum Color Clear; Tan   Tenacity Thin   Spontaneous Breathing Trial (SBT) RT Doc   Pulse 81   SpO2 100 %   Additional Respiratory  Assessments   Resp 11   End Tidal CO2 30 (%)   Alarm Settings   High Pressure Alarm 40 cmH2O   Low Minute Volume Alarm 3 L/min   Apnea (secs) 20 secs   High Respiratory Rate 40 br/min   Low Exhaled Vt  200 mL   Non-Surgical Airway Endo Tracheal Tube   Placement Date/Time: 03/26/19 2115   Timeout: Patient;Procedure;Site/Side  Mask Ventilation: Difficult mask ventilation (3)  Placed By: In ED  Inserted by: Anju Bravo   Insertion attempts: 1  Airway Device: Endo Tracheal Tube  Brand: LMA  Size: 7  Placeme. ..    Secured at 23 cm   Measured From Lips   Secured Location Right   Secured By Commercial tube villasenor   Cuff Pressure 30 cm H2O     ambu  Bag/mask @ bedside

## 2019-03-27 NOTE — CONSULTS
 Influenza 01/26/2018    Sciatica of left side 11/21/2017    Epigastric pain 10/19/2017    Cervical disc disorder at C4-C5 level with radiculopathy 09/26/2017    Acquired hypothyroidism 07/06/2017    Unexplained weight loss 03/17/2017    Osteoporosis 12/09/2015    Dizziness 08/14/2013    Essential hypertension 07/17/2012    Centrilobular emphysema ([facility] 10/18/2011    Pure hypercholesterolemia 10/18/2011    Rheumatoid arthritis involving multiple sites with positive rheumatoid factor ([facility] 10/18/2011       Past Medical History:   Diagnosis Date    Acquired hypothyroidism 7/6/2017    Anemia     Asthma     COPD (chronic obstructive pulmonary disease) (HCC)     HLD (hyperlipidemia)     Hypertension     Influenza A 01/22/2018    MI (myocardial infarction) ([facility]     X 2    Migraine     past hx    Rheumatoid arthritis     Wears glasses         Past Surgical History:   Procedure Laterality Date    CATARACT REMOVAL WITH IMPLANT      CATARACT REMOVAL WITH IMPLANT  3/23/2011    EPIDURAL STEROID INJECTION Left 12/4/2018    LEFT LUMBAR FOUR, LUMBAR FIVE TRANSFORAMINAL EPIDURAL STEROID INJECTION SITE CONFIRMED BY FLUOROSCOPY performed by Amanda Springer MD at [address] [place] [address]    HYSTERECTOMY      total, fibroids    KNEE SURGERY      right    [place] NJX DX/THER SBST INTRLMNR CRV/THRC W/IMG GDN Left 8/21/2018    LEFT LUMBAR FOUR/ LUMBAR FIVE CYST ASPIRATION SITE CONFIRMED BY FLUOROSCOPY performed by Amanda Springer MD at [place] [address]        Family History   Problem Relation Age of Onset    Cancer Mother     Cancer Father     Heart Disease Maternal Aunt     Cancer Sister         Social History     Tobacco Use    Smoking status: Former Smoker     Packs/day: 3.00     Years: 50.00     Pack years: 150.00     Last attempt to quit: 1/22/2009     Years since quitting: 10.1    Smokeless tobacco: Never Used   Substance Use Topics    Alcohol use: No        Allergies   Allergen Reactions    Alendronate Sodium      Jaw pain      Humira [Adalimumab]      Rash      Penicillins      rash    Simvastatin Other (See Comments)     Made legs ache    Sulfa Antibiotics Swelling     Tongue swelled         Vital Signs:  Blood pressure 108/67, pulse 75, temperature 97 °F (36.1 °C), temperature source Core, resp. rate 8, height 5' 6\" (1.676 m), weight 163 lb 9.3 oz (74.2 kg), SpO2 100 %, not currently breastfeeding.' on RA  Body mass index is 26.4 kg/m². CVP:      Intake/Output Summary (Last 24 hours) at 3/27/2019 1821  Last data filed at 3/27/2019 1800  Gross per 24 hour   Intake 2641 ml   Output 760 ml   Net 1881 ml         PHYSICAL EXAM:  General:  Well nourished, well developed, sedated/intubated. Eyes:  No scleral icterus or periorbital edema. Head: Atraumatic, normocephalic. ENMT: #   ETT, 22 cm at incisor level, OG/NG intact. No bleeding of lips or gums. Mucosa pink and moist.   Neck: No JVD. No tracheal deviation. No S/Q emphysema. No stiff neck. CV: S1, S2, no murmurs, gallops, clicks or rubs. Pulses palpable and symmetrical.  Respiratory: CTAB in anterior auscultation fields. Chest: Equal rise and fall of chest.   GI: Abdomen soft. No organomegaly. Bowel sounds present. : Aragon intact. Skin: Color, texture, turgor normal.  Musculoskeletal: Supple, no contractures or muscle atrophy. No clubbing or cyanosis of nailbeds. No joint swelling, redness, or edema. Neuro: Sedated, intubated, not performed.     Results:  CBC:   Recent Labs     03/26/19 1920 03/27/19 0429   WBC 5.3 4.9   HGB 12.4 11.9*   HCT 37.4 38.1   MCV 87.0 91.8    166     BMP:   Recent Labs     03/26/19 1920 03/27/19 0429   * 141   K 3.4* 3.4*   CL 98* 107   CO2 25 23   BUN 12 11   CREATININE 0.9 0.9     LIVER PROFILE:   Recent Labs     03/26/19 1920   AST 41*   ALT 35   BILITOT 0.5   ALKPHOS 43     PT/INR: No results for input(s): PROTIME, INR in the last 72 hours.  APTT: No results for input(s): APTT in the last 72 hours. UA:  Recent Labs     03/27/19  0018   COLORU Yellow   PHUR 5.5   CLARITYU Clear   SPECGRAV >=1.030   LEUKOCYTESUR Negative   UROBILINOGEN 0.2   BILIRUBINUR Negative   BLOODU Negative   GLUCOSEU Negative       Imaging:  I have reviewed radiology images personally. CT CHEST W CONTRAST   Final Result   1. Bilateral lower lobe atelectasis and/or pneumonia. 2. Severe emphysema. 3. Coronary artery disease. 4. Pulmonary hypertension. XR CHEST PORTABLE   Final Result   1. Tip of the endotracheal tube is 1.8 cm above the mariola. Consider   retraction by about 3 cm and repeat chest radiograph. 2. Interval improvement in bibasilar pulmonary opacities with residual   pulmonary opacity within left lung base. RECOMMENDATION:   Recommend radiographic follow-up to complete resolution. XR CHEST PORTABLE   Final Result   Bilateral lower lobe airspace disease, greater on left, likely representing   pneumonia. Follow-up to resolution is recommended. XR Neck Soft Tissue   Final Result   Negative study. Xr Neck Soft Tissue    Result Date: 3/26/2019  EXAMINATION: TWO XRAY VIEWS OF THE NECK SOFT TISSUES 3/26/2019 9:02 pm COMPARISON: Chest x-ray performed today, 01/22/2018 soft tissue neck radiographs HISTORY: ORDERING SYSTEM PROVIDED HISTORY: cannot swallow TECHNOLOGIST PROVIDED HISTORY: Reason for exam:->cannot swallow Ordering Physician Provided Reason for Exam: cannot swallow FINDINGS: Bibasilar airspace disease, greater on the left, is again identified, better evaluated by chest radiograph performed today. The epiglottis and aryepiglottic folds are within normal limits. The visualized airways patent. No prevertebral soft tissue swelling is identified. Degenerative changes of the visualized bones are evident. Negative study.      Ct Chest W Contrast    Result Date: 3/27/2019  EXAMINATION: CT OF THE CHEST WITH on CPAP    ASD (atrial septal defect)  Resolved Problems:    * No resolved hospital problems. *    Acute Respiratory Failure with hypercapnea and hypoxia:  2/2 COPD exacerbation vs aspiration pneumonia, but afebrile and no leukocytosis. Concerns patient is aspirating.    -Mechanical ventilation to keep sats between 90-94% only. -Duonebs   -Protonix for GI prophylaxis while intubated. -Versed, propofol, Fentanyl with RASS -1 to -2 while sedated   -Daily SBT with extubation when RSBI favorable. -Diagnostic and therapeutic bronchoscopy with cultures. -Abx pending respiratory cultures. Hypokalemia:   -Monitor and replace PRN    Hyponatremia:   -Resolved    Anemia: likely 2/2 hemodilution   -Monitor    Non Anion Gap Metabolic Acidosis: Patient intubated and sedated, unsure if she had diarrhea prior to admission.   -Monitor    Electronically signed by:  Ashley Aponte DO    PULMONARY/CCM ATTENDING NOTE    Patient seen and evaluated with resident physician  Plan of care discussed in detail  Agree with above assessment and plan     In addition -         Presenting HPI:Patient is a 70 y.o. female, with a h/o COPD only requiring PRN supplemental O2 at home, who was admitted to the hospital d/t dysphagia, shortness of breath, and chest pain that radiated to her back. She was worked up for aortic dissection which was negative. GI was consulted for emergent EGD as patient was suspected of having food caught near the level of the glottis. She had been given epinephrine and nebulized racemic epi d/t suspected anaphylaxis with deteriorating lung function and apparent choking, but patient did not show improvement. Patient developed acute respiratory failure with hypoxia, requiring increasing supplemental O2 and eventually intubation while waiting for EGD. EGD found there was no impaction of food at the glottis and the stomach was empty. CT showed a bilateral lower lobe pneumonia.   Patient was started on Meropenem 3/23/2011    EPIDURAL STEROID INJECTION Left 12/4/2018    LEFT LUMBAR FOUR, LUMBAR FIVE TRANSFORAMINAL EPIDURAL STEROID INJECTION SITE CONFIRMED BY FLUOROSCOPY performed by Allyson Lawson MD at [address] [place] [address] little toe    HYSTERECTOMY      total, fibroids    KNEE SURGERY      right    [place] NJX DX/THER SBST INTRLMNR CRV/THRC W/IMG GDN Left 8/21/2018    LEFT LUMBAR FOUR/ LUMBAR FIVE CYST ASPIRATION SITE CONFIRMED BY FLUOROSCOPY performed by Allyson Lawson MD at [place] [address]        Family History   Problem Relation Age of Onset    Cancer Mother     Cancer Father     Heart Disease Maternal Aunt     Cancer Sister         Social History     Tobacco Use    Smoking status: Former Smoker     Packs/day: 3.00     Years: 50.00     Pack years: 150.00     Last attempt to quit: 1/22/2009     Years since quitting: 10.1    Smokeless tobacco: Never Used   Substance Use Topics    Alcohol use: No        Allergies   Allergen Reactions    Alendronate Sodium      Jaw pain      Humira [Adalimumab]      Rash      Penicillins      rash    Simvastatin Other (See Comments)     Made legs ache    Sulfa Antibiotics Swelling     Tongue swelled               Physical Exam:  Blood pressure 108/67, pulse 75, temperature 97 °F (36.1 °C), temperature source Core, resp. rate 8, height 5' 6\" (1.676 m), weight 163 lb 9.3 oz (74.2 kg), SpO2 100 %, not currently breastfeeding.'   Constitutional:  No acute distress. on mechanical ventilatory support  HENT:  Oropharynx is clear and moist. No thyromegaly. Eyes:  Conjunctivae are normal. Pupils equal, round, and reactive to light. No scleral icterus. Neck: . No tracheal deviation present. No obvious thyroid mass. Cardiovascular: Normal rate, regular rhythm, normal heart sounds. No right ventricular heave. No lower extremity edema. Pulmonary/Chest: No wheezes. No rales. Chest wall is not dull to percussion.   No accessory muscle the trachea. Severe emphysema is again identified with bilateral scarring. There is bilateral lower lobe consolidation, left greater than right. There is chronic middle lobe atelectasis. Upper Abdomen: Limited imaging of the upper abdomen discloses no significant abnormality. Soft Tissues/Bones: No acute findings. 1. Bilateral lower lobe atelectasis and/or pneumonia. 2. Severe emphysema. 3. Coronary artery disease. 4. Pulmonary hypertension. Xr Chest Portable    Result Date: 3/27/2019  EXAMINATION: SINGLE XRAY VIEW OF THE CHEST 3/26/2019 9:19 pm COMPARISON: March 26, 2019. HISTORY: ORDERING SYSTEM PROVIDED HISTORY: ET placement TECHNOLOGIST PROVIDED HISTORY: Reason for exam:->ET placement Ordering Physician Provided Reason for Exam: ett placement Acuity: Acute Type of Exam: Initial FINDINGS: Tip of the endotracheal tube is 1.8 cm above the mariola. This should be retracted by about 3 cm. Cardiac and mediastinal contours are unchanged. Interval improvement in bibasilar pulmonary opacities. There is no evidence of new pulmonary opacity. No evidence of pneumothorax. No evidence of enlarging pleural effusions. No evidence of new osseous abnormalities. 1. Tip of the endotracheal tube is 1.8 cm above the mariola. Consider retraction by about 3 cm and repeat chest radiograph. 2. Interval improvement in bibasilar pulmonary opacities with residual pulmonary opacity within left lung base. RECOMMENDATION: Recommend radiographic follow-up to complete resolution. Xr Chest Portable    Result Date: 3/26/2019  EXAMINATION: SINGLE XRAY VIEW OF THE CHEST 3/26/2019 9:01 pm COMPARISON: None. HISTORY: ORDERING SYSTEM PROVIDED HISTORY: cp/sob TECHNOLOGIST PROVIDED HISTORY: Reason for exam:->cp/sob Ordering Physician Provided Reason for Exam: cp/sob Prior studies including most recent 01/15/2019 FINDINGS: No acute bony abnormality. The heart size and visualized mediastinal contours are stable.  There is patchy bilateral lower lobe airspace disease, greater on the left. No large pleural effusion is evident. Bilateral lower lobe airspace disease, greater on left, likely representing pneumonia. Follow-up to resolution is recommended. Results for Marietta Meyers" (MRN [id number]) as of 3/27/2019 15:58   Ref. Range 1/15/2019 16:40 1/17/2019 05:48 3/26/2019 19:20 3/27/2019 00:06 3/27/2019 04:29   Sodium Latest Ref Range: 136 - 145 mmol/L 136 142 134 (L)  141   Potassium Latest Ref Range: 3.5 - 5.1 mmol/L 3.7 3.7 3.4 (L)  3.4 (L)   Chloride Latest Ref Range: 99 - 110 mmol/L 100 108 98 (L)  107   CO2 Latest Ref Range: 21 - 32 mmol/L 23 24 25  23   BUN Latest Ref Range: 7 - 20 mg/dL 31 (H) 27 (H) 12  11   Creatinine Latest Ref Range: 0.6 - 1.2 mg/dL 1.2 0.8 0.9  0.9   Anion Gap Latest Ref Range: 3 - 16  13 10 11  11   GFR Non- Latest Ref Range: >60  44 (A) >60 >60  >60   GFR  Latest Ref Range: >60  53 (A) >60 >60  >60   Magnesium Latest Ref Range: 1.80 - 2.40 mg/dL     1.60 (L)   Lactic Acid Latest Ref Range: 0.4 - 2.0 mmol/L    1.1    Glucose Latest Ref Range: 70 - 99 mg/dL 105 (H) 88 97  93   Calcium Latest Ref Range: 8.3 - 10.6 mg/dL 8.8 8.8 9.8  8.2 (L)   Total Protein Latest Ref Range: 6.4 - 8.2 g/dL 6.3 (L)  6.8       Results for Marietta Meyers" (MRN [id number]) as of 3/27/2019 15:58   Ref.  Range 1/15/2019 16:40 3/26/2019 19:20 3/27/2019 04:29   WBC Latest Ref Range: 4.0 - 11.0 K/uL 7.8 5.3 4.9   RBC Latest Ref Range: 4.00 - 5.20 M/uL 4.45 4.30 4.15   Hemoglobin Quant Latest Ref Range: 12.0 - 16.0 g/dL 12.5 12.4 11.9 (L)   Hematocrit Latest Ref Range: 36.0 - 48.0 % 38.4 37.4 38.1   MCV Latest Ref Range: 80.0 - 100.0 fL 86.3 87.0 91.8   MCH Latest Ref Range: 26.0 - 34.0 pg 28.1 28.9 28.6   MCHC Latest Ref Range: 31.0 - 36.0 g/dL 32.5 33.2 31.2   MPV Latest Ref Range: 5.0 - 10.5 fL 7.1 7.7 8.6   RDW Latest Ref Range: 12.4 - 15.4 % 17.1 (H) 14.0 15.1   Platelet Count Latest Ref Range: <1.5 %   0.2   Carboxyhgb, Arterial Latest Ref Range: 0.0 - 1.5 %   0.9   pH, [place] Latest Ref Range: 7.350 - 7.450  7.432 7.257 (L)    pCO2, [place] Latest Ref Range: 40.0 - 50.0 mmHg 30.5 (L) 55.6 (H)    pO2, [place] Latest Ref Range: 25.0 - 40.0 mmHg 113.2 (H) 66.3 (H)    HCO3, Venous Latest Ref Range: 23.0 - 29.0 mmol/L 19.9 (L) 24.2    TC02 (Calc), [place] Latest Ref Range: Not Established mmol/L 21 26    Base Excess, [place] Latest Ref Range: -3.0 - 3.0 mmol/L -3.4 (L) -3.3 (L)    O2 Content, [place] Latest Ref Range: Not Established VOL % 17 13    MetHgb, [place] Latest Ref Range: <1.5 % 0.2 0.1      CT chest -  CT OF THE CHEST WITH CONTRAST 3/26/2019 11:24 pm       TECHNIQUE:   CT of the chest was performed with the administration of intravenous   contrast. Multiplanar reformatted images are provided for review. Dose   modulation, iterative reconstruction, and/or weight based adjustment of the   mA/kV was utilized to reduce the radiation dose to as low as reasonably   achievable.       COMPARISON:   01/15/2019       HISTORY:   ORDERING SYSTEM PROVIDED HISTORY: cp/sob   TECHNOLOGIST PROVIDED HISTORY:   Ordering Physician Provided Reason for Exam: chest pain; SOB; since this am   .. pt wears 1-3 litters PRN at home. . pt feels like her throat is closing   off. ..is following up with gi to have endoscopy; back pain since this am;   possible FB/aspiration   Acuity: Acute   Type of Exam: Initial   Relevant Medical/Surgical History: SEE EPIC       FINDINGS:   Mediastinum: The heart size is mildly enlarged.  There is no significant   pericardial effusion or mediastinal hematoma.  Coronary artery calcifications   are again seen. Reta Danilo is no aortic aneurysm or dissection.  The main   pulmonary artery is dilated.  An enteric tube traverses the esophagus.  A   tracheostomy tube terminates approximately 3 cm above the mariola.       Lungs/pleura:  There is no pneumothorax or pleural effusion.  Scattered airway   secretions are identified bilaterally and in the trachea.  Severe emphysema   is again identified with bilateral scarring.  There is bilateral lower lobe   consolidation, left greater than right. Orelia Kurt is chronic middle lobe   atelectasis.       Upper Abdomen: Limited imaging of the upper abdomen discloses no significant   abnormality.       Soft Tissues/Bones: No acute findings.           Impression   1. Bilateral lower lobe atelectasis and/or pneumonia. 2. Severe emphysema. 3. Coronary artery disease. 4. Pulmonary hypertension.         Sleep test last year-  Impression:   1. Obstructive sleep apnea - mild  2. Nocturnal Hypoxemia - present  3. Snoring - present   4. Periodic Limb movement - insignificant    Procedures performed:  1. Right Cardiac Catheterization    Impression[de-identified]   1. Normal filling pressures as evidenced by a right atrial   pressure of 5 and a mean pulmonary capillary wedge pressure of   10.   2. Mild pulmonary hypertension with a PA pressure of 31/15   (mean:20) and a PVR of 2.7 (thermodilution)-3.2 (chris) Woods   Units. 3. Borderline normal cardiac output and index: Chris: 3 and 1.8. Thermodilution: 3.6 and 2.3.   4. No oxygenation step up between the right atrium and the   pulmonary artery: RA: 54.4% and PA: 54.6%    Echocardiogram: In 2017-Study Conclusions    - Left ventricle: Systolic function was normal. The calculated    ejection fraction was in the range of 59% to 63%. - Atrial septum: Agitated saline contrast study showed a    right-to-left atrial level shunt, following an increase in RA    pressure induced by the Valsalva maneuver. PFT:In 2017-Spirometry  Spirometry shows moderate obstructive defect. .    Bronchodilator  There is no response to bronchodilator demonstrated. Flow-Volume Loop  The flow-volume loop is compatible with obstructive lung defect. Lung Volumes  Lung volumes are normal except for elevated RV. Lung Volume Comments  There is air trapping present.     Diffusing Capacity  Single breath diffusing capacity is severely decreased. A reduced DLCO can be seen in diseases causing destruction of   alveolar-capillary interface (ILD, CHF, COPD, Pneumonia, etc.)   obliteration of pulmonary vascular bed (PE, PAH), tobacco usage,   carbon monoxide poisoning. Airway Resistance  Airway resistance is moderately elevated. Comparison  In comparison to the test of 8/18/16 there is mild deterioration   in spirometry and DLCO    PFT is compatible with moderate Obstructive Lung Disease  -   Possible etiologies include: asthma, COPD, bronchiectasis,   bronchitis. Correlate clinically. Assessment:  Principal Problem:    Acute respiratory failure with hypoxia (HCC)  Active Problems:    Centrilobular emphysema (HCC)    Rheumatoid arthritis involving multiple sites with positive rheumatoid factor (HCC)    Essential hypertension    Acquired hypothyroidism    Pulmonary HTN (HCC)    Acute respiratory failure (HCC)    Hypokalemia    Normal anion gap metabolic acidosis    Anemia    Pulmonary infiltrates    Atelectasis    HEBERT on CPAP    ASD (atrial septal defect)  Resolved Problems:    * No resolved hospital problems.  *          Plan:   · Ventilatory support to keep Sao2 between 90-94% ONLY  · Ventilator waveforms and settings reviewed  · Ventilator settings changed  · IV sedation to maintain patient ventilator syncrony -IV benzodiapine infusion being d/haseeb -tiitration as per RASS  · Pulmonary toilet  · Will benefit from bronchoscopy for diagnostic and therapeutic purposes;pros and cons discussed -family agreeable -will arrange for it today   · Patient got one dose of Meropenem and now of IV levaquin -needs to be titrated as per clinical status and c/s  · Bronchodilators  · IV solumedrol to continue  · Will hold Arava for now  · Will d/c IV fluids  · Monitor I/O and BMP  · Correct electrolytes on PRN basis  · Enteral feeds as per metabolic support  · PUD and DVT prophylaxis     Case d/w family and ICU team    Critical Care time spent -40 minutes(exclusive of any procedures)        Electronically signed by:  Fern Bauer MD    3/27/2019    6:21 PM.

## 2019-03-27 NOTE — PROGRESS NOTES
03/26/19 2344   Vent Information   Vent Type 840   Vent Mode AC/VC   Rate Set 12 bmp   Peak Flow 55 L/min   FiO2  50 %   Sensitivity 3   PEEP/CPAP 5   Cuff Pressure (cm H2O) 30 cm H2O   Humidification Source HME   Vent Patient Data   Peak Inspiratory Pressure 17 cmH2O   Mean Airway Pressure 7.5 cmH20   Vt Exhaled 470 mL   Minute Volume 5.49 Liters   I:E Ratio 1:2.8   Cough/Sputum   Sputum How Obtained Endotracheal;Suctioned   Cough Productive   Sputum Amount Small   Sputum Color Cloudy   Tenacity Thick   Spontaneous Breathing Trial (SBT) RT Doc   Pulse 78   SpO2 99 %   Breath Sounds   Right Upper Lobe Diminished   Right Middle Lobe Diminished   Right Lower Lobe Diminished   Left Upper Lobe Diminished   Left Lower Lobe Diminished   Additional Respiratory  Assessments   Position Semi-Fuller's   Alarm Settings   High Pressure Alarm 45 cmH2O   Low Minute Volume Alarm 2 L/min   High Respiratory Rate 45 br/min   Low Exhaled Vt  200 mL   Non-Surgical Airway Endo Tracheal Tube   Placement Date/Time: 03/26/19 2115   Timeout: Patient;Procedure;Site/Side  Mask Ventilation: Difficult mask ventilation (3)  Placed By: In ED  Inserted by: Anju Bravo   Insertion attempts: 1  Airway Device: Endo Tracheal Tube  Brand: LMA  Size: 7  Placeme. ..    Secured at 21 cm   Measured From Lips   Secured Location Right   Secured By Commercial tube villasenor   Site Condition Dry

## 2019-03-27 NOTE — ED NOTES
Patient daughter to bedside. Updated on plan of care.  Patient to CT via stretcher with RN and RT.     Yanna Ly RN  03/26/19 1601

## 2019-03-27 NOTE — H&P
Hospital Medicine History & Physical      PCP: Bj Henry MD    Date of Admission: 3/26/2019    Date of Service: Pt seen/examined on 03/27/19 and Admitted to Inpatient with expected LOS greater than two midnights due to medical therapy. Chief Complaint   Patient presents with    Shortness of Breath     since this am .. pt wears 1-3 litters PRN  at home. . pt feels like her throat is closing off. ..is following up with gi to have endoscopy    Back Pain     since this am     History Of Present Illness: The patient currently intubated and under sedation and unable to provide any history. No family at bedside . Entire HPI obtained based on chart review and ED documentation. 70 y.o. female with PMH of Advanced COPD ( Does have oxygen at home BUT  not wearing constantly) and multiple other medical problems as detailed below presented to [facility] ED overnight with complaints of shortness of breath started since yesterday morning. Patient reported to the ED that she felt like her throat is closing off. She also reported that she is supposed to follow up with GI to have endoscopy . She also reported substernal chest/back pain, which was waxing and waning. Reported chronic cough which was unchanged. But denied any hemoptysis. ED course : Patient was noted to be hypoxic upon arrival to ED and was requiring 3 L/m oxygen by nasal cannula. Her ED course, complicated with worsening respiratory status requiring  intubation and mechanical ventilation . She subsequently admitted to ICU for further evaluation and management. Her chest x-ray was suggestive of bilateral pneumonia left more than right . CT chest suggestive of terminal emphysematous changes . Patient underwent EGD in ED and noted no FB . Upon evaluation by me this morning, patient on ventilator and on sedation with propofol/Versed/Fentanyl . She is hypotensive.   Reviewed labs- mostly unremarkable except for hypokalemia K3.4 , which is being replaced. Past Medical History:      Diagnosis Date    Acquired hypothyroidism 7/6/2017    Anemia     Asthma     [facility]     HLD (hyperlipidemia)     Hypertension     Influenza A 01/22/2018    MI (myocardial infarction) ([facility] [facility]     X 2    Migraine     past hx    Rheumatoid arthritis     Wears glasses        Past Surgical History:        Procedure Laterality Date    CATARACT REMOVAL WITH IMPLANT      CATARACT REMOVAL WITH IMPLANT  3/23/2011    EPIDURAL STEROID INJECTION Left 12/4/2018    LEFT LUMBAR FOUR, LUMBAR FIVE TRANSFORAMINAL EPIDURAL STEROID INJECTION SITE CONFIRMED BY FLUOROSCOPY performed by Adis Devries MD at [address] [place] [address] little toe    HYSTERECTOMY      total, fibroids    KNEE SURGERY      right    TX NJX DX/THER SBST INTRLMNR CRV/THRC W/IMG GDN Left 8/21/2018    LEFT LUMBAR FOUR/ LUMBAR FIVE CYST ASPIRATION SITE CONFIRMED BY FLUOROSCOPY performed by Adis Devries MD at [place] [address]       Medications Prior to Admission:    Prior to Admission medications    Medication Sig Start Date End Date Taking?  Authorizing Provider   spironolactone (ALDACTONE) 25 MG tablet TAKE 1 TABLET BY MOUTH EVERY 12 HOURS 3/25/19   C Jm Mccurdy MD   levothyroxine (SYNTHROID) 100 MCG tablet TAKE 1 TABLET BY MOUTH ONCE DAILY 3/25/19   C Jm Mccurdy MD   albuterol sulfate  (90 Base) MCG/ACT inhaler INHALE TWO (2) PUFFS BY MOUTH EVERY 6 HOURS AS NEEDED 3/25/19   C Jm Mccurdy MD   spironolactone (ALDACTONE) 25 MG tablet TAKE 1 TABLET BY MOUTH EVERY 12 HOURS 3/25/19   C Jm Mccurdy MD   predniSONE (DELTASONE) 10 MG tablet TAKE1/2 TABLET BY MOUTH DAILY 3/22/19   C Jm Mccurdy MD   ranitidine (ZANTAC) 150 MG tablet TAKE (1) TABLET BY MOUTH NIGHTLY 3/22/19   C Jm Mccurdy MD   hydrochlorothiazide (HYDRODIURIL) 25 MG tablet Take 1 tablet by mouth daily 1/25/19   Historical Provider, MD   losartan (COZAAR) 50 MG tablet Take 1 tablet by mouth daily 1/17/19   Roscoe Corrigan MD   labetalol (NORMODYNE) 100 MG tablet Take 1 tablet by mouth every 12 hours 1/17/19   Roscoe Corrigan MD   atorvastatin (LIPITOR) 10 MG tablet TAKE ONE (1) TABLET BY MOUTH DAILY 10/16/18   Bj Henry MD   Blood Pressure Monitoring (BLOOD PRESSURE MONITOR/M CUFF) MISC 1 Units by Does not apply route daily 8/6/18   Bj Henry MD   ipratropium-albuterol (DUONEB) 0.5-2.5 (3) MG/3ML SOLN nebulizer solution Inhale 3 mLs into the lungs every 4 hours as needed. 2/10/15   Brock Ibarra MD   nitroGLYCERIN (NITROSTAT) 0.4 MG SL tablet Place 0.4 mg under the tongue every 5 minutes as needed. Historical Provider, MD   leflunomide (ARAVA) 20 MG tablet Take 20 mg by mouth daily. Historical Provider, MD       Allergies:  Alendronate sodium; Humira [adalimumab]; Penicillins; Simvastatin; and Sulfa antibiotics    Social History:    The patient currently lives at home and is independent of IADLs (as per RN Report)     TOBACCO:   reports that she quit smoking about 10 years ago. She has a 150.00 pack-year smoking history. She has never used smokeless tobacco.  ETOH:   reports that she does not drink alcohol. Family History:     Reviewed in detail and negative for DM, CAD, Cancer, CVA. Positive as follows:        Problem Relation Age of Onset    Cancer Mother     Cancer Father     Heart Disease Maternal Aunt     Cancer Sister        REVIEW OF SYSTEMS:   Pertinent positives as noted in the HPI. All other systems reviewed and negative. PHYSICAL EXAM PERFORMED:  BP 93/63   Pulse 74   Temp 97.7 °F (36.5 °C) (Core)   Resp 12   Ht 5' 6\" (1.676 m)   Wt 163 lb 9.3 oz (74.2 kg)   SpO2 100%   BMI 26.40 kg/m²     General appearance:  Currently Intubated and Sedated   HEENT:  Normal cephalic, atraumatic without obvious deformity. Pupils equal, round, and reactive to light. Extra ocular muscles intact. Conjunctivae/corneas clear. Neck: Supple, with full range of motion. No jugular venous distention. Trachea midline. Respiratory:  Normal respiratory effort. Clear to auscultation, bilaterally without Rales/Wheezes/Rhonchi. Diminished breath sounds at bases  Cardiovascular:  Regular rate and rhythm with normal S1/S2 without murmurs, rubs or gallops. Abdomen: Soft, non-tender, non-distended with normal bowel sounds. Musculoskeletal:  No clubbing, cyanosis or edema bilaterally. Full range of motion without deformity. Skin: Skin color, texture, turgor normal.  No rashes or lesions. Neurologic: Currently Intubated and Sedated   Psychiatric:  Currently Intubated and Sedated   Capillary Refill: Brisk,< 3 seconds   Peripheral Pulses: +2 palpable, equal bilaterally       Labs:   Recent Labs     03/26/19 1920   WBC 5.3   HGB 12.4   HCT 37.4        Recent Labs     03/26/19 1920 03/27/19  0429   * 141   K 3.4* 3.4*   CL 98* 107   CO2 25 23   BUN 12 11   CREATININE 0.9 0.9   CALCIUM 9.8 8.2*     Recent Labs     03/26/19 1920   AST 41*   ALT 35   BILITOT 0.5   ALKPHOS 43     No results for input(s): INR in the last 72 hours. Recent Labs     03/26/19 1920   TROPONINI <0.01       Urinalysis:    Lab Results   Component Value Date    NITRU Negative 03/27/2019    BLOODU Negative 03/27/2019    SPECGRAV >=1.030 03/27/2019    GLUCOSEU Negative 03/27/2019       EKG:  I have reviewed the EKG with the following interpretation: Normal sinus rhythm; no significant ST-T wave changes; No previous ECGs available     Radiology:    I have reviewed the Imaging  with the following interpretation:   CT CHEST W CONTRAST   Final Result   1. Bilateral lower lobe atelectasis and/or pneumonia. 2. Severe emphysema. 3. Coronary artery disease. 4. Pulmonary hypertension. XR CHEST PORTABLE   Final Result   1. Tip of the endotracheal tube is 1.8 cm above the mariola.   Consider   retraction by about 3 cm and repeat chest radiograph. 2. Interval improvement in bibasilar pulmonary opacities with residual   pulmonary opacity within left lung base. RECOMMENDATION:   Recommend radiographic follow-up to complete resolution. XR CHEST PORTABLE   Final Result   Bilateral lower lobe airspace disease, greater on left, likely representing   pneumonia. Follow-up to resolution is recommended. XR Neck Soft Tissue   Final Result   Negative study. Active Hospital Problems    Diagnosis Date Noted    Acute respiratory failure with hypoxia ([address] [J96.01] 03/27/2019    Acute respiratory failure ([address] [J96.00] 03/27/2019    Pulmonary HTN ([address] [I27.20] 05/07/2018    Acquired hypothyroidism [E03.9] 07/06/2017    Essential hypertension [I10] 07/17/2012    Centrilobular emphysema ([address] [J43.2] 10/18/2011     ASSESSMENT/PLAN:  1. Acute respiratory failure with combined hypoxemia/hypercapnia in the setting of COPD exacerbation requiring intubation in ED  - Pulmonology consulted to assist with management management; continue ICU care  - Continue IV Solu-Medrol 40 every 6 hours; HHN   - Defer possible need for Bronchoscopy to Pulm/CC     2. LLL Pneumonia - Possible bacterial but Aspiration could not be entirely ruled Out   - Patient empirically started on Levaquin in ED blood and respiratory cultures   Will follow; continue current IV antibiotics. 3. HTN - hold home blood pressure medications as patient is currently hypotensive. Parameters placed     4. Dysphagia  (POA) S/p EGD in ED - No FB noted     5. HypoThyroidism - restarted synthroid     6. GERD - Continue PPI     7. Chronic RA - On Leflunomide + Prednisone - Continue     DVT Prophylaxis: Lovenox   Diet: Diet NPO Effective Now  Code Status: Full Code    PT/OT Eval Status:  Will consult once acute issues     Dispo - Anticipate > 2 MN stay in the hospital      Fady Richmond MD    The note was completed using Dragon -speech recognition software &

## 2019-03-27 NOTE — FLOWSHEET NOTE
Attempted to meet with Pt for Advance Directives consult. Pt intubated and unable to complete ADs at this time. Please re-consult Spiritual Care should Pt be extubated, became alert and oriented, and wish to go over Advance Directives again.     Era Lee   Associate       03/27/19 7200   Encounter Summary   Services provided to:   (RN)   Support System Family members   Continue Visiting   (3/27 Pt intubated and unable to do ADs)   Advance Directives (For Healthcare)   Healthcare Directive No, patient does not have an advance directive for healthcare treatment   Advance Directives Unable to complete

## 2019-03-27 NOTE — PROCEDURES
Bronchoscopy Note     Patient with acute respiratory failure, pulmonary infiltrates, atelectasis, with history of COPD pulmonary hypertension and HEBERT and given the patient's clinical status and radiology it was decided to do a bronchoscopy for diagnostic and therapeutic purposes and for that reason after informed consent, the endoscopy team was requested to come to the bedside, patient was not given any extra sedation for the procedure, the bronchoscopy was introduced through the endotracheal tube using a T piece adapter after timeout, patient was found to have normal mariola, patient's endotracheal tube was in place, patient was found to have thick mucous plugs in both the lung bases, the bronchoscope was wedged into the right lower lobe bronchus and BAL was done from that area which was sent for culture, patient tolerated the procedure well and did not have any apparent complications; patient was not found to have any food particles in the airways or any endobronchial lesions  Further management depending on clinical status and the bronchoscopy results    Gregor Chan MD

## 2019-03-27 NOTE — H&P
History and Physical / Pre-Sedation Assessment    Patient:  Valetta Mcburney   :   1940     Intended Procedure: EGD     HPI: FB esophagus    Nurses notes reviewed and agreed.   Current Facility-Administered Medications   Medication Dose Route Frequency Provider Last Rate Last Dose    methylPREDNISolone sodium (SOLU-MEDROL) injection 125 mg  125 mg Intravenous Once Rachael Peabody, [place]        racepinephrine HCl (VAPONEFPRIN) 2.25 % nebulizer solution NEBU 11.25 mg  11.25 mg Nebulization Q4H PRN Kailee Peabody, PA   11.25 mg at 19    sodium chloride nebulizer 0.9 % solution 3 mL  3 mL Nebulization Q4H PRN Kailee Peabody, PA   3 mL at 19    fentaNYL (SUBLIMAZE) 1,000 mcg in sodium chloride 0.9 % 100 mL infusion  25 mcg/hr Intravenous Continuous Kailee Peabody, PA        meropenem (MERREM) 1 g in sodium chloride 0.9 % 100 mL IVPB (mini-bag)  1 g Intravenous Once Shane Pelletier MD         Current Outpatient Medications   Medication Sig Dispense Refill    spironolactone (ALDACTONE) 25 MG tablet TAKE 1 TABLET BY MOUTH EVERY 12 HOURS 180 tablet 10    levothyroxine (SYNTHROID) 100 MCG tablet TAKE 1 TABLET BY MOUTH ONCE DAILY 90 tablet 10    albuterol sulfate  (90 Base) MCG/ACT inhaler INHALE TWO (2) PUFFS BY MOUTH EVERY 6 HOURS AS NEEDED 54 g 10    spironolactone (ALDACTONE) 25 MG tablet TAKE 1 TABLET BY MOUTH EVERY 12 HOURS 180 tablet 11    predniSONE (DELTASONE) 10 MG tablet TAKE1/2 TABLET BY MOUTH DAILY 90 tablet 0    ranitidine (ZANTAC) 150 MG tablet TAKE (1) TABLET BY MOUTH NIGHTLY 90 tablet 0    hydrochlorothiazide (HYDRODIURIL) 25 MG tablet Take 1 tablet by mouth daily  3    losartan (COZAAR) 50 MG tablet Take 1 tablet by mouth daily 30 tablet 3    labetalol (NORMODYNE) 100 MG tablet Take 1 tablet by mouth every 12 hours 60 tablet 3    atorvastatin (LIPITOR) 10 MG tablet TAKE ONE (1) TABLET BY MOUTH DAILY 90 tablet 5    Blood Pressure Monitoring (BLOOD PRESSURE MONITOR/M CUFF) MISC 1 Units by Does not apply route daily 1 each 0    ipratropium-albuterol (DUONEB) 0.5-2.5 (3) MG/3ML SOLN nebulizer solution Inhale 3 mLs into the lungs every 4 hours as needed. 360 mL 3    nitroGLYCERIN (NITROSTAT) 0.4 MG SL tablet Place 0.4 mg under the tongue every 5 minutes as needed.  leflunomide (ARAVA) 20 MG tablet Take 20 mg by mouth daily. Past Medical History:   Diagnosis Date    Acquired hypothyroidism 7/6/2017    Anemia     Asthma     [facility]) [facility]     HLD (hyperlipidemia)     Hypertension     Influenza A 01/22/2018    MI (myocardial infarction) ([facility] [facility]     X 2    Migraine     past hx    Rheumatoid arthritis     Wears glasses        Allergies: Allergies   Allergen Reactions    Alendronate Sodium      Jaw pain      Humira [Adalimumab]      Rash      Penicillins      rash    Simvastatin Other (See Comments)     Made legs ache    Sulfa Antibiotics Swelling     Tongue swelled       Physical Exam:  Vital Signs: BP (!) 160/99   Pulse 108   Temp 98 °F (36.7 °C) (Oral)   Resp 21   Ht 5' 6\" (1.676 m)   Wt 157 lb (71.2 kg)   SpO2 99%   BMI 25.34 kg/m²  Body mass index is 25.34 kg/m². Airway:intubated  Pulmonary:Normal  Cardiac:Normal  Abdomen:Normal    Pre-Procedure Assessment / Plan:  ASA 2 - Patient with mild systemic disease with no functional limitations  Level of Sedation Plan: Moderate  sedation   Mallampati 2  Post Procedure plan: Return to same level of care    I assessed the patient and find that the patient is in satisfactory condition to proceed with the planned procedure and sedation plan. I have explained the risk, benefits, and alternatives to the procedure. The patient understands and agrees to proceed.   Yes    Edmund Brewer  9:41 PM 3/26/2019

## 2019-03-27 NOTE — PROGRESS NOTES
03/27/19 1212   Vent Information   Vent Type 840   Vent Mode AC/VC   Vt Ordered 450 mL   Rate Set 15 bmp   Peak Flow 40 L/min   FiO2  40 %   Sensitivity 3   PEEP/CPAP 5   Cuff Pressure (cm H2O) 30 cm H2O   Humidification Source HME   Vent Patient Data   Peak Inspiratory Pressure 24 cmH2O   Mean Airway Pressure 11 cmH20   Rate Measured 15 br/min   Vt Exhaled 959 mL   Minute Volume 8.22 Liters   I:E Ratio 1:2.3   Cough/Sputum   Sputum How Obtained Endotracheal   Cough Non-productive;None   Sputum Amount None   Sputum Color None   Tenacity None   Spontaneous Breathing Trial (SBT) RT Doc   Pulse 103   SpO2 98 %   Breath Sounds   Right Upper Lobe Diminished   Right Middle Lobe Diminished   Right Lower Lobe Diminished   Left Upper Lobe Diminished   Left Lower Lobe Diminished   Additional Respiratory  Assessments   Resp 16   End Tidal CO2 33 (%)   Alarm Settings   High Pressure Alarm 45 cmH2O   Low Minute Volume Alarm 2 L/min   High Respiratory Rate 45 br/min   Low Exhaled Vt  200 mL   Patient Observation   Observations   (ambu @ bedside)   Non-Surgical Airway Endo Tracheal Tube   Placement Date/Time: 03/26/19 2115   Timeout: Patient;Procedure;Site/Side  Mask Ventilation: Difficult mask ventilation (3)  Placed By: In ED  Inserted by: Nayana Tineo   Insertion attempts: 1  Airway Device: Endo Tracheal Tube  Brand: LMA  Size: 7  Placeme. ..    Secured at 22 cm   Measured From [address] By [employer] tube villasenor   Site Condition Dry   Cuff Pressure 30 cm H2O

## 2019-03-27 NOTE — ED PROVIDER NOTES
I personally interviewed and examined this patient, discussed the findings, diagnostic studies, interventions and treatment plan with ERMA. My exam on the patient concurs with the ERMA exam. I reviewed the clinical notes and test results. I personally supervised all pertinent procedures performed on the patient by the ERMA throughout the course and was available to manage complications as they arose, if applicable. I agree with the assessment, management and disposition as presented by the ERMA with exceptions/corrections as documented. For further details of this emergency department encounter, please see the ERMA's documentation.       ED Triage Vitals [03/26/19 1913]   BP Temp Temp Source Pulse Resp SpO2 Height Weight   (!) 137/103 98 °F (36.7 °C) Oral 97 16 98 % 5' 6\" (1.676 m) 157 lb (71.2 kg)        Results for orders placed or performed during the hospital encounter of 03/26/19   CBC   Result Value Ref Range    WBC 5.3 4.0 - 11.0 K/uL    RBC 4.30 4.00 - 5.20 M/uL    Hemoglobin 12.4 12.0 - 16.0 g/dL    Hematocrit 37.4 36.0 - 48.0 %    MCV 87.0 80.0 - 100.0 fL    MCH 28.9 26.0 - 34.0 pg    MCHC 33.2 31.0 - 36.0 g/dL    RDW 14.0 12.4 - 15.4 %    Platelets 488 810 - 006 K/uL    MPV 7.7 5.0 - 10.5 fL   Comprehensive metabolic panel   Result Value Ref Range    Sodium 134 (L) 136 - 145 mmol/L    Potassium 3.4 (L) 3.5 - 5.1 mmol/L    Chloride 98 (L) 99 - 110 mmol/L    CO2 25 21 - 32 mmol/L    Anion Gap 11 3 - 16    Glucose 97 70 - 99 mg/dL    BUN 12 7 - 20 mg/dL    CREATININE 0.9 0.6 - 1.2 mg/dL    GFR Non-African American >60 >60    GFR African American >60 >60    Calcium 9.8 8.3 - 10.6 mg/dL    Total Protein 6.8 6.4 - 8.2 g/dL    Alb 4.3 3.4 - 5.0 g/dL    Albumin/Globulin Ratio 1.7 1.1 - 2.2    Total Bilirubin 0.5 0.0 - 1.0 mg/dL    Alkaline Phosphatase 43 40 - 129 U/L    ALT 35 10 - 40 U/L    AST 41 (H) 15 - 37 U/L    Globulin 2.5 g/dL   Troponin   Result Value Ref Range    Troponin <0.01 <0.01 ng/mL   Brain Natriuretic Peptide   Result Value Ref Range    Pro-BNP 65 0 - 449 pg/mL   Blood gas, venous   Result Value Ref Range    O2 Therapy Unknown    Urinalysis   Result Value Ref Range    Color, UA Yellow Straw/Yellow    Clarity, UA Clear Clear    Glucose, Ur Negative Negative mg/dL    Bilirubin Urine Negative Negative    Ketones, Urine Negative Negative mg/dL    Specific Gravity, UA >=1.030 1.005 - 1.030    Blood, Urine Negative Negative    pH, UA 5.5 5.0 - 8.0    Protein, UA Negative Negative mg/dL    Urobilinogen, Urine 0.2 <2.0 E.U./dL    Nitrite, Urine Negative Negative    Leukocyte Esterase, Urine Negative Negative    Microscopic Examination Not Indicated     Urine Type Not Specified    EKG 12 Lead   Result Value Ref Range    Ventricular Rate 76 BPM    Atrial Rate 76 BPM    P-R Interval 126 ms    QRS Duration 84 ms    Q-T Interval 396 ms    QTc Calculation (Bazett) 445 ms    P Axis 58 degrees    R Axis 42 degrees    T Axis 60 degrees    Diagnosis       Normal sinus rhythmNormal ECGNo previous ECGs available       CT CHEST W CONTRAST   Final Result   1. Bilateral lower lobe atelectasis and/or pneumonia. 2. Severe emphysema. 3. Coronary artery disease. 4. Pulmonary hypertension. XR CHEST PORTABLE   Preliminary Result   Tip of the endotracheal tube is 1.8 cm above the mariola. Consider retraction   by about 3 cm and repeat chest radiograph. Interval improvement in bibasilar pulmonary opacities with residual pulmonary   opacity within left lung base. Recommend radiographic follow-up to complete   resolution. XR CHEST PORTABLE   Final Result   Bilateral lower lobe airspace disease, greater on left, likely representing   pneumonia. Follow-up to resolution is recommended. XR Neck Soft Tissue   Final Result   Negative study. CLINICAL IMPRESSION  1. Acute respiratory failure, unspecified whether with hypoxia or hypercapnia ([address]    2.  Aspiration pneumonia, unspecified aspiration pneumonia type, unspecified laterality, unspecified part of lung (HCC)        Blood pressure (!) 90/53, pulse 79, temperature 98 °F (36.7 °C), temperature source Oral, resp. rate 14, height 5' 6\" (1.676 m), weight 157 lb (71.2 kg), SpO2 95 %, not currently breastfeeding. Marely Clemens was admitted in stable condition. This chart was generated in part by using Dragon Dictation system and may contain errors related to that system including errors in grammar, punctuation, and spelling, as well as words and phrases that may be inappropriate. If there are any questions or concerns please feel free to contact the dictating provider for clarification.      Farhat Deluca DO  ATTENDING, [address] [place] [address],   03/27/19 3130

## 2019-03-27 NOTE — ED NOTES
Patient started to have same episode. Additional orders placed and medication administered. See MAR Will continue to monitor.       Bhupendra Watt RN  03/26/19 5464

## 2019-03-27 NOTE — PROGRESS NOTES
RESPIRATORY THERAPY ASSESSMENT    Name:  Micah Zacarias  Record Number:  [id number]  Age: 70 y.o. Gender: female  : 1940  Today's Date:  3/27/2019  Room:  [facility]0233-01    Assessment     Is the patient being admitted for a COPD or Asthma exacerbation? No   (If yes the patient will be seen every 4 hours for the first 24 hours and then reassessed)    Patient Admission Diagnosis      Allergies  Allergies   Allergen Reactions    Alendronate Sodium      Jaw pain      Humira [Adalimumab]      Rash      Penicillins      rash    Simvastatin Other (See Comments)     Made legs ache    Sulfa Antibiotics Swelling     Tongue swelled       Minimum Predicted Vital Capacity:     NA          Actual Vital Capacity:      NA- on VENT              Pulmonary History:COPD  Home Oxygen Therapy:  1-3 liters/min via nasal cannula  Home Respiratory Therapy:Albuterol/Ipratropium Bromide HHN PRN  Current Respiratory Therapy:  Duoneb Q4WA  Treatment Type: HHN  Medications: Racemic Epinephrine, Sodium Chloride    Respiratory Severity Index(RSI)   Patients with orders for inhalation medications, oxygen, or any therapeutic treatment modality will be placed on Respiratory Protocol. They will be assessed with the first treatment and at least every 72 hours thereafter. The following severity scale will be used to determine frequency of treatment intervention.     Smoking History: Mild Exacerbation = 3    Social History  Social History     Tobacco Use    Smoking status: Former Smoker     Packs/day: 3.00     Years: 50.00     Pack years: 150.00     Last attempt to quit: 2009     Years since quitting: 10.1    Smokeless tobacco: Never Used   Substance Use Topics    Alcohol use: No    Drug use: No       Recent Surgical History: None = 0  Past Surgical History  Past Surgical History:   Procedure Laterality Date    CATARACT REMOVAL WITH IMPLANT      CATARACT REMOVAL WITH IMPLANT  3/23/2011    EPIDURAL STEROID INJECTION Guidelines     1. Assessment and treatment by Respiratory Therapy will be initiated for medication and therapeutic interventions upon initiation of aerosolized medication. 2. Physician will be contacted for respiratory rate (RR) greater than 35 breaths per minute. Therapy will be held for heart rate (HR) greater than 140 beats per minute, pending direction from physician. 3. Bronchodilators will be administered via Metered Dose Inhaler (MDI) with spacer when the following criteria are met:  a. Alert and cooperative     b. HR < 140 bpm  c. RR < 30 bpm                d. Can demonstrate a 2-3 second inspiratory hold  4. Bronchodilators will be administered via Hand Held Nebulizer [facility]) to patients when ANY of the following criteria are met  a. Incognizant or uncooperative          b. Patients treated with HHN at Home        c. Unable to demonstrate proper use of MDI with spacer     d. RR > 30 bpm   5. Bronchodilators will be delivered via Metered Dose Inhaler (MDI), HHN, Aerogen to intubated patients on mechanical ventilation. 6. Inhalation medication orders will be delivered and/or substituted as outlined below. Aerosolized Medications Ordering and Administration Guidelines:    1. All Medications will be ordered by a physician, and their frequency and/or modality will be adjusted as defined by the patients Respiratory Severity Index (RSI) score. 2. If the patient does not have documented COPD, consider discontinuing anticholinergics when RSI is less than 9.  3. If the bronchospasm worsens (increased RSI), then the bronchodilator frequency can be increased to a maximum of every 4 hours. If greater than every 4 hours is required, the physician will be contacted. 4. If the bronchospasm improves, the frequency of the bronchodilator can be decreased, based on the patient's RSI, but not less than home treatment regimen frequency.   5. Bronchodilator(s) will be discontinued if patient has a RSI less than 9 and has received no scheduled or as needed treatment for 72  Hrs. Patients Ordered on a Mucolytic Agent:    1. Must always be administered with a bronchodilator. 2. Discontinue if patient experiences worsened bronchospasm, or secretions have lessened to the point that the patient is able to clear them with a cough. Anti-inflammatory and Combination Medications:    1. If the patient lacks prior history of lung disease, is not using inhaled anti-inflammatory medication at home, and lacks wheezing by examination or by history for at least 24 hours, contact physician for possible discontinuation.

## 2019-03-27 NOTE — CARE COORDINATION
Chart review completed. Patient a 70year old female, admitted for acute respiratory failure. Currently in ICU level of care on Vent. Per chart review patient appears independent and came in for increasing SOB. Intubation and EGD done in ED prior to transition to ICU care. CM following and will assess patient when more appropriate.   Vipin Valera RN

## 2019-03-27 NOTE — PROGRESS NOTES
Call to THE [facility] GI @ 2100  Re: esophageal fb per CHRISSY Stephens @ [phone]

## 2019-03-27 NOTE — PROGRESS NOTES
4 Eyes Skin Assessment     The patient is being assess for   Shift Handoff    I agree that 2 RN's have performed a thorough Head to Toe Skin Assessment on the patient. ALL assessment sites listed below have been assessed. Areas assessed by both nurses:   [x]   Head, Face, and Ears   [x]   Shoulders, Back, and Chest, Abdomen  [x]   Arms, Elbows, and Hands   [x]   Coccyx, Sacrum, and Ischium  [x]   Legs, Feet, and Heels          **SHARE this note so that the co-signing nurse is able to place an eSignature**    Co-signer eSignature: Electronically signed by Flaco Morgan RN on 3/28/19 at 2:56 AM    Does the Patient have Skin Breakdown?   Yes LDA WOUND CARE was Initiated documentation include the Emma-wound, Wound Assessment, Measurements, Dressing Treatment, Drainage, and Color\",          Tashi Prevention initiated:  Yes   Wound Care Orders initiated:  NA      WOC nurse consulted for Pressure Injury (Stage 3,4, Unstageable, DTI, NWPT, Complex wounds)and New or Established Ostomies:  NA      Primary Nurse eSignature: Electronically signed by Audrey James RN on 3/27/19 at 7:39 PM

## 2019-03-27 NOTE — PROGRESS NOTES
03/26/19 2130   Vent Information   $Ventilation $Initial Day   Ventilator Started Yes   Vent Type 840   Vent Mode AC/VC   Rate Set 12 bmp   Peak Flow 55 L/min   FiO2  50 %   Sensitivity 3   PEEP/CPAP 5   Cuff Pressure (cm H2O) 30 cm H2O   Humidification Source HME   Vent Patient Data   Peak Inspiratory Pressure 35 cmH2O   Mean Airway Pressure 8.8 cmH20   Rate Measured 17 br/min   Vt Exhaled 418 mL   Minute Volume 7.46 Liters   I:E Ratio 1:2.3   Cough/Sputum   Sputum How Obtained Endotracheal;Suctioned   Cough Productive   Sputum Amount Small   Sputum Color Cloudy;Clear   Tenacity Thick   Spontaneous Breathing Trial (SBT) RT Doc   Pulse 103   SpO2 100 %   Breath Sounds   Right Upper Lobe Diminished   Right Middle Lobe Diminished   Right Lower Lobe Diminished   Left Upper Lobe Diminished   Left Lower Lobe Diminished   Additional Respiratory  Assessments   Resp 25   End Tidal CO2 48 (%)   Alarm Settings   High Pressure Alarm 45 cmH2O   Low Minute Volume Alarm 2 L/min   High Respiratory Rate 50 br/min   Low Exhaled Vt  200 mL   Non-Surgical Airway Endo Tracheal Tube   Placement Date/Time: 03/26/19 2115   Timeout: Patient;Procedure;Site/Side  Mask Ventilation: Difficult mask ventilation (3)  Placed By: In ED  Inserted by: Jesús Khan   Insertion attempts: 1  Airway Device: Endo Tracheal Tube  Brand: LMA  Size: 7  Placeme. ..   $ Intubation emergent  $ Yes   Secured at 24 cm   Measured From [address] By [employer] tube villasenor   Site Condition Dry   Cuff Pressure 30 cm H2O

## 2019-03-27 NOTE — PROGRESS NOTES
03/27/19 0927   Vent Information   Vent Type 840   Vent Mode AC/VC   Vt Ordered 450 mL   Rate Set 15 bmp   FiO2  40 %   Sensitivity 3   PEEP/CPAP 5   Cuff Pressure (cm H2O) 30 cm H2O   Spontaneous Breathing Trial (SBT) RT Doc   Pulse 76   SpO2 99 %   Additional Respiratory  Assessments   Resp 16   End Tidal CO2 31 (%)

## 2019-03-27 NOTE — PROGRESS NOTES
03/27/19 0916   Vent Information   $Ventilation $Subsequent Day   Vent Type 840   Vent Mode AC/VC   Vt Ordered 400 mL   Rate Set 12 bmp   FiO2  40 %   Sensitivity 3   PEEP/CPAP 5   Cuff Pressure (cm H2O) 30 cm H2O   Humidification Source HME   Vent Patient Data   Peak Inspiratory Pressure 23 cmH2O   Mean Airway Pressure 9 cmH20   Rate Measured 17 br/min   Vt Exhaled 847 mL   Minute Volume 9 Liters   I:E Ratio 1:2.8   Plateau Pressure 19 KWT75   Static Compliance 60 mL/cmH2O   Dynamic Compliance 45 mL/cmH2O   Cough/Sputum   Sputum How Obtained Endotracheal   Cough Non-productive;None;Dry   Sputum Amount None   Sputum Color None   Tenacity None   Spontaneous Breathing Trial (SBT) RT Doc   Pulse 82   SpO2 100 %   Breath Sounds   Right Upper Lobe Diminished   Right Middle Lobe Diminished   Right Lower Lobe Diminished   Left Upper Lobe Diminished   Left Lower Lobe Diminished   Additional Respiratory  Assessments   Resp 14   End Tidal CO2 35 (%)   Non-Surgical Airway Endo Tracheal Tube   Placement Date/Time: 03/26/19 2115   Timeout: Patient;Procedure;Site/Side  Mask Ventilation: Difficult mask ventilation (3)  Placed By: In ED  Inserted by: Beryl Zepeda   Insertion attempts: 1  Airway Device: Endo Tracheal Tube  Brand: LMA  Size: 7  Placeme. ..    Secured at 22 cm   Measured From [address] [facility] By [employer] tube villasenor   Site Condition Dry   Cuff Pressure 30 cm H2O

## 2019-03-27 NOTE — ED NOTES
Patient requested to use restroom, refused bed pan at this time. Bedside commode brought to bedside. Upon sitting patient up she began to have a \"choking-like\" episode. MD and additional staff to bedside. Orders for racemic epi nebulizer placed. Episode lasted approx 5 minutes. Patient resting comfortably at this time. Vitals within stable limits. Will continue to monitor.       Claude Garcia RN  03/26/19 5712

## 2019-03-27 NOTE — PROGRESS NOTES
PROGRESS NOTE  S:79 yrs Patient  admitted on 3/26/2019 with Acute respiratory failure ([facility] [address] [J96.00]  Acute respiratory failure ([facility] [address] [J96.00] .   She is intubated and sedated    Exam:   Vitals:    03/27/19 1700   BP: 114/69   Pulse: 81   Resp: 10   Temp:    SpO2: 100%      General appearance: Sedated  HEENT: NG tube and ET tube in place  Neck: no adenopathy, no carotid bruit, no JVD, supple, symmetrical, trachea midline and thyroid not enlarged, symmetric, no tenderness/mass/nodules  Lungs:Decrease breath sounds with scattered rhonchi  Heart: regular rate and rhythm, S1, S2 normal, no murmur, click, rub or gallop  Abdomen: soft, non-tender; bowel sounds normal; no masses,  no organomegaly  Extremities: extremities normal, atraumatic, no cyanosis or edema     Medications:   Current Facility-Administered Medications   Medication Dose Route Frequency Provider Last Rate Last Dose    atorvastatin (LIPITOR) tablet 10 mg  10 mg Oral Daily Ezio Ambriz MD   10 mg at 03/27/19 1126    labetalol (NORMODYNE) tablet 100 mg  100 mg Oral 2 times per day Melinda Morrison MD        leflunomide (ARAVA) tablet 20 mg  20 mg Oral Daily Ezio Ambriz MD   20 mg at 03/27/19 5947    levothyroxine (SYNTHROID) tablet 100 mcg  100 mcg Oral Daily Ezio Ambriz MD   100 mcg at 03/27/19 0707    losartan (COZAAR) tablet 50 mg  50 mg Oral Daily Melinda Morrison MD   50 mg at 03/27/19 1015    spironolactone (ALDACTONE) tablet 25 mg  25 mg Oral BID Ezio Ambriz MD   25 mg at 03/27/19 1719    pantoprazole (PROTONIX) injection 40 mg  40 mg Intravenous Daily Ezio Ambriz MD   40 mg at 03/27/19 4952    sodium chloride flush 0.9 % injection 10 mL  10 mL Intravenous 2 times per day Ezio Ambriz MD   10 mL at 03/27/19 0839    sodium chloride flush 0.9 % injection 10 mL  10 mL Intravenous PRN Ezio Ambriz MD        magnesium hydroxide (MILK OF MAGNESIA) 400 MG/5ML suspension 30 mL  30 mL Oral Daily PRN Tiara Vargas MD        ondansetron [facility]) injection 4 mg  4 mg Intravenous Q6H PRN Tiara MD Alicia        enoxaparin (LOVENOX) injection 40 mg  40 mg Subcutaneous Daily Tiara Vargas MD   40 mg at 03/27/19 0838    0.9 % sodium chloride infusion   Intravenous Continuous Tiara MD Alicia 75 mL/hr at 03/27/19 1718      methylPREDNISolone sodium (SOLU-MEDROL) injection 40 mg  40 mg Intravenous Q6H Tiara Vargas MD   40 mg at 03/27/19 1613    Followed by   Radha Chairez ON 3/29/2019] predniSONE (DELTASONE) tablet 40 mg  40 mg Oral Daily Tiara MD Alicia        acetaminophen (TYLENOL) tablet 650 mg  650 mg Oral Q4H PRN Tiara MD Alicia        levofloxacin (LEVAQUIN) 750 MG/150ML infusion 750 mg  750 mg Intravenous Q24H Tiara Vargas MD   Stopped at 03/27/19 1102    propofol 1000 MG/100ML injection  10 mcg/kg/min Intravenous Titrated Tiara Vargas MD 4.3 mL/hr at 03/27/19 0706 10 mcg/kg/min at 03/27/19 0706    ipratropium-albuterol (DUONEB) nebulizer solution 1 ampule  1 ampule Inhalation Q4H Walter Reveles MD   1 ampule at 03/27/19 1607    mupirocin (BACTROBAN) 2 % ointment   Nasal BID Humberto Sosa MD        carboxymethylcellulose PF (THERATEARS) 1 % ophthalmic gel 1 drop  1 drop Both Eyes TID Humberto Sosa MD   1 drop at 03/27/19 1343    chlorhexidine (PERIDEX) 0.12 % solution 15 mL  15 mL Mouth/Throat BID Humberto Sosa MD   15 mL at 03/27/19 0840    fentaNYL (SUBLIMAZE) 1,000 mcg in sodium chloride 0.9 % 100 mL infusion  25 mcg/hr Intravenous Continuous Tiara Vargas MD 7.5 mL/hr at 03/27/19 1400 75 mcg/hr at 03/27/19 1400    midazolam (VERSED) 100 mg in dextrose 5 % 100 mL infusion  5 mg/hr Intravenous Continuous Tiara Vargas MD   Stopped at 03/27/19 0900           Labs:  CBC:   Recent Labs     03/26/19  1920 03/27/19  0429   WBC 5.3 4.9   HGB 12.4 11.9*   HCT 37.4 38.1   MCV 87.0 91.8    166     BMP:   Recent Labs     03/26/19 1920 03/27/19  0429   * 141   K 3.4* 3.4*   CL 98* 107   CO2 25 23   BUN 12 11   CREATININE 0.9 0.9     LIVER PROFILE:   Recent Labs     03/26/19 1920   AST 41*   ALT 35   PROT 6.8   BILITOT 0.5   ALKPHOS 43     PT/INR: No results for input(s): INR in the last 72 hours. Invalid input(s): PT    IMAGING:      Impression:70year-old female with history of rheumatoid arthritis, hypertension, COPD, asthma presented to the emergency room with apparent foreign body impaction and respiratory distress. Of note she was seen in Dr. Mckenzie Hernandze office yesterday for dysphagia symptoms and was scheduled for outpatient EGD. She was intubated in the ER. Emergent EGD did not show a foreign body in the esophagus or any definite stricture. The esophagus did appear somewhat dilated suggesting possible Motility disturbance    Recommendation:  1. Continue supportive care  2.  Once stabilized schedule barium swallow and formal swallow evaluation      Noel Bhagat MD  6:00 PM 3/27/2019

## 2019-03-28 LAB
ANION GAP SERPL CALCULATED.3IONS-SCNC: 13 MMOL/L (ref 3–16)
BASE EXCESS ARTERIAL: -0.4 MMOL/L (ref -3–3)
BASE EXCESS ARTERIAL: -2.5 MMOL/L (ref -3–3)
BUN BLDV-MCNC: 13 MG/DL (ref 7–20)
CALCIUM SERPL-MCNC: 8.5 MG/DL (ref 8.3–10.6)
CARBOXYHEMOGLOBIN ARTERIAL: 0.1 % (ref 0–1.5)
CARBOXYHEMOGLOBIN ARTERIAL: 0.3 % (ref 0–1.5)
CHLORIDE BLD-SCNC: 104 MMOL/L (ref 99–110)
CO2: 23 MMOL/L (ref 21–32)
CREAT SERPL-MCNC: 0.8 MG/DL (ref 0.6–1.2)
GFR AFRICAN AMERICAN: >60
GFR NON-AFRICAN AMERICAN: >60
GLUCOSE BLD-MCNC: 136 MG/DL (ref 70–99)
HCO3 ARTERIAL: 21.6 MMOL/L (ref 21–29)
HCO3 ARTERIAL: 22.6 MMOL/L (ref 21–29)
HCT VFR BLD CALC: 38.7 % (ref 36–48)
HEMOGLOBIN, ART, EXTENDED: 11 G/DL (ref 12–16)
HEMOGLOBIN, ART, EXTENDED: 11.9 G/DL (ref 12–16)
HEMOGLOBIN: 12.3 G/DL (ref 12–16)
MAGNESIUM: 1.6 MG/DL (ref 1.8–2.4)
MCH RBC QN AUTO: 28.6 PG (ref 26–34)
MCHC RBC AUTO-ENTMCNC: 31.9 G/DL (ref 31–36)
MCV RBC AUTO: 89.7 FL (ref 80–100)
METHEMOGLOBIN ARTERIAL: 0.1 %
METHEMOGLOBIN ARTERIAL: 0.1 %
O2 CONTENT ARTERIAL: 15 ML/DL
O2 CONTENT ARTERIAL: 16 ML/DL
O2 SAT, ARTERIAL: 96.1 %
O2 SAT, ARTERIAL: 97.4 %
O2 THERAPY: ABNORMAL
O2 THERAPY: ABNORMAL
PCO2 ARTERIAL: 31.8 MMHG (ref 35–45)
PCO2 ARTERIAL: 34.8 MMHG (ref 35–45)
PDW BLD-RTO: 14.9 % (ref 12.4–15.4)
PH ARTERIAL: 7.41 (ref 7.35–7.45)
PH ARTERIAL: 7.47 (ref 7.35–7.45)
PLATELET # BLD: 168 K/UL (ref 135–450)
PMV BLD AUTO: 7.5 FL (ref 5–10.5)
PO2 ARTERIAL: 106.7 MMHG (ref 75–108)
PO2 ARTERIAL: 82.7 MMHG (ref 75–108)
POTASSIUM SERPL-SCNC: 4.3 MMOL/L (ref 3.5–5.1)
RBC # BLD: 4.31 M/UL (ref 4–5.2)
SODIUM BLD-SCNC: 140 MMOL/L (ref 136–145)
TCO2 ARTERIAL: 22.6 MMOL/L
TCO2 ARTERIAL: 23.6 MMOL/L
WBC # BLD: 6.5 K/UL (ref 4–11)

## 2019-03-28 PROCEDURE — 82803 BLOOD GASES ANY COMBINATION: CPT

## 2019-03-28 PROCEDURE — 92526 ORAL FUNCTION THERAPY: CPT

## 2019-03-28 PROCEDURE — 6370000000 HC RX 637 (ALT 250 FOR IP): Performed by: INTERNAL MEDICINE

## 2019-03-28 PROCEDURE — 85027 COMPLETE CBC AUTOMATED: CPT

## 2019-03-28 PROCEDURE — 92610 EVALUATE SWALLOWING FUNCTION: CPT

## 2019-03-28 PROCEDURE — 36415 COLL VENOUS BLD VENIPUNCTURE: CPT

## 2019-03-28 PROCEDURE — 2000000000 HC ICU R&B

## 2019-03-28 PROCEDURE — 2700000000 HC OXYGEN THERAPY PER DAY

## 2019-03-28 PROCEDURE — 6360000002 HC RX W HCPCS: Performed by: INTERNAL MEDICINE

## 2019-03-28 PROCEDURE — 94770 HC ETCO2 MONITOR DAILY: CPT

## 2019-03-28 PROCEDURE — 94761 N-INVAS EAR/PLS OXIMETRY MLT: CPT

## 2019-03-28 PROCEDURE — 2580000003 HC RX 258: Performed by: INTERNAL MEDICINE

## 2019-03-28 PROCEDURE — 6370000000 HC RX 637 (ALT 250 FOR IP)

## 2019-03-28 PROCEDURE — 80048 BASIC METABOLIC PNL TOTAL CA: CPT

## 2019-03-28 PROCEDURE — 99291 CRITICAL CARE FIRST HOUR: CPT | Performed by: INTERNAL MEDICINE

## 2019-03-28 PROCEDURE — 6360000002 HC RX W HCPCS: Performed by: NURSE PRACTITIONER

## 2019-03-28 PROCEDURE — 94003 VENT MGMT INPAT SUBQ DAY: CPT

## 2019-03-28 PROCEDURE — C9113 INJ PANTOPRAZOLE SODIUM, VIA: HCPCS | Performed by: INTERNAL MEDICINE

## 2019-03-28 PROCEDURE — 83735 ASSAY OF MAGNESIUM: CPT

## 2019-03-28 PROCEDURE — 94640 AIRWAY INHALATION TREATMENT: CPT

## 2019-03-28 RX ORDER — MAGNESIUM SULFATE IN WATER 40 MG/ML
2 INJECTION, SOLUTION INTRAVENOUS ONCE
Status: COMPLETED | OUTPATIENT
Start: 2019-03-28 | End: 2019-03-28

## 2019-03-28 RX ORDER — FUROSEMIDE 10 MG/ML
40 INJECTION INTRAMUSCULAR; INTRAVENOUS ONCE
Status: COMPLETED | OUTPATIENT
Start: 2019-03-28 | End: 2019-03-28

## 2019-03-28 RX ADMIN — PANTOPRAZOLE SODIUM 40 MG: 40 INJECTION, POWDER, FOR SOLUTION INTRAVENOUS at 10:08

## 2019-03-28 RX ADMIN — IPRATROPIUM BROMIDE AND ALBUTEROL SULFATE 1 AMPULE: .5; 3 SOLUTION RESPIRATORY (INHALATION) at 08:34

## 2019-03-28 RX ADMIN — MUPIROCIN: 20 OINTMENT TOPICAL at 10:00

## 2019-03-28 RX ADMIN — PROPOFOL 25 MCG/KG/MIN: 10 INJECTION, EMULSION INTRAVENOUS at 05:51

## 2019-03-28 RX ADMIN — Medication 15 ML: at 10:00

## 2019-03-28 RX ADMIN — METHYLPREDNISOLONE SODIUM SUCCINATE 40 MG: 40 INJECTION, POWDER, FOR SOLUTION INTRAMUSCULAR; INTRAVENOUS at 15:55

## 2019-03-28 RX ADMIN — METHYLPREDNISOLONE SODIUM SUCCINATE 40 MG: 40 INJECTION, POWDER, FOR SOLUTION INTRAMUSCULAR; INTRAVENOUS at 10:09

## 2019-03-28 RX ADMIN — SPIRONOLACTONE 25 MG: 25 TABLET ORAL at 10:07

## 2019-03-28 RX ADMIN — LABETALOL HYDROCHLORIDE 100 MG: 200 TABLET, FILM COATED ORAL at 10:07

## 2019-03-28 RX ADMIN — Medication 10 ML: at 10:08

## 2019-03-28 RX ADMIN — IPRATROPIUM BROMIDE AND ALBUTEROL SULFATE 1 AMPULE: .5; 3 SOLUTION RESPIRATORY (INHALATION) at 23:53

## 2019-03-28 RX ADMIN — FUROSEMIDE 40 MG: 10 INJECTION, SOLUTION INTRAMUSCULAR; INTRAVENOUS at 11:51

## 2019-03-28 RX ADMIN — ATORVASTATIN CALCIUM 10 MG: 10 TABLET, FILM COATED ORAL at 10:08

## 2019-03-28 RX ADMIN — MAGNESIUM SULFATE HEPTAHYDRATE 2 G: 40 INJECTION, SOLUTION INTRAVENOUS at 11:51

## 2019-03-28 RX ADMIN — LEVOTHYROXINE SODIUM 100 MCG: 100 TABLET ORAL at 05:51

## 2019-03-28 RX ADMIN — IPRATROPIUM BROMIDE AND ALBUTEROL SULFATE 1 AMPULE: .5; 3 SOLUTION RESPIRATORY (INHALATION) at 12:27

## 2019-03-28 RX ADMIN — IPRATROPIUM BROMIDE AND ALBUTEROL SULFATE 1 AMPULE: .5; 3 SOLUTION RESPIRATORY (INHALATION) at 00:24

## 2019-03-28 RX ADMIN — ENOXAPARIN SODIUM 40 MG: 40 INJECTION SUBCUTANEOUS at 10:07

## 2019-03-28 RX ADMIN — Medication: at 15:55

## 2019-03-28 RX ADMIN — IPRATROPIUM BROMIDE AND ALBUTEROL SULFATE 1 AMPULE: .5; 3 SOLUTION RESPIRATORY (INHALATION) at 04:02

## 2019-03-28 RX ADMIN — IPRATROPIUM BROMIDE AND ALBUTEROL SULFATE 1 AMPULE: .5; 3 SOLUTION RESPIRATORY (INHALATION) at 20:26

## 2019-03-28 RX ADMIN — METHYLPREDNISOLONE SODIUM SUCCINATE 40 MG: 40 INJECTION, POWDER, FOR SOLUTION INTRAMUSCULAR; INTRAVENOUS at 20:59

## 2019-03-28 RX ADMIN — LOSARTAN POTASSIUM 50 MG: 25 TABLET, FILM COATED ORAL at 10:07

## 2019-03-28 RX ADMIN — IPRATROPIUM BROMIDE AND ALBUTEROL SULFATE 1 AMPULE: .5; 3 SOLUTION RESPIRATORY (INHALATION) at 15:57

## 2019-03-28 RX ADMIN — METHYLPREDNISOLONE SODIUM SUCCINATE 40 MG: 40 INJECTION, POWDER, FOR SOLUTION INTRAMUSCULAR; INTRAVENOUS at 03:50

## 2019-03-28 RX ADMIN — MUPIROCIN: 20 OINTMENT TOPICAL at 21:00

## 2019-03-28 RX ADMIN — LEVOFLOXACIN 750 MG: 5 INJECTION, SOLUTION INTRAVENOUS at 10:09

## 2019-03-28 RX ADMIN — Medication 10 ML: at 21:00

## 2019-03-28 ASSESSMENT — PULMONARY FUNCTION TESTS
PIF_VALUE: 18
PIF_VALUE: 19
PIF_VALUE: 13
PIF_VALUE: 17

## 2019-03-28 NOTE — PROGRESS NOTES
03/28/19 0405   Vent Information   Vent Type 840   Vent Mode AC/VC   Vt Ordered 450 mL   Rate Set 14 bmp   Peak Flow 40 L/min   FiO2  40 %   Sensitivity 3   PEEP/CPAP 5   Cuff Pressure (cm H2O) 30 cm H2O   Humidification Source HME   Vent Patient Data   Peak Inspiratory Pressure 17 cmH2O   Mean Airway Pressure 6 cmH20   Rate Measured 19 br/min   Vt Exhaled 590 mL   Minute Volume 8.2 Liters   I:E Ratio 1:2.4   Spontaneous Breathing Trial (SBT) RT Doc   Pulse 106   SpO2 100 %   Breath Sounds   Right Upper Lobe Diminished   Right Middle Lobe Diminished   Right Lower Lobe Diminished   Left Upper Lobe Diminished   Left Lower Lobe Diminished   Additional Respiratory  Assessments   Resp 16   End Tidal CO2 30 (%)   Alarm Settings   High Pressure Alarm 40 cmH2O   Low Minute Volume Alarm 3 L/min   Apnea (secs) 20 secs   High Respiratory Rate 40 br/min   Low Exhaled Vt  200 mL   Non-Surgical Airway Endo Tracheal Tube   Placement Date/Time: 03/26/19 4526   Timeout: Patient;Procedure;Site/Side  Mask Ventilation: Difficult mask ventilation (3)  Placed By: In ED  Inserted by: Ria Maier   Insertion attempts: 1  Airway Device: Endo Tracheal Tube  Brand: LMA  Size: 7  Placeme. ..    Secured at 23 cm   Measured From [address] [facility] By [employer] tube villasenor   Site Condition Dry

## 2019-03-28 NOTE — PROGRESS NOTES
gallops. Abdomen: Soft, non-tender, non-distended with normal bowel sounds. Musculoskeletal:  No clubbing, cyanosis or edema bilaterally. Full range of motion without deformity. Skin: Skin color, texture, turgor normal.  No rashes or lesions. Neurologic: Currently Intubated and Sedation being weaned off    Psychiatric:  Currently Intubated and Sedation being weaned off    Capillary Refill: Brisk,< 3 seconds   Peripheral Pulses: +2 palpable, equal bilaterally           Labs:   Recent Labs     03/26/19 1920 03/27/19 0429 03/28/19 0423   WBC 5.3 4.9 6.5   HGB 12.4 11.9* 12.3   HCT 37.4 38.1 38.7    166 168     Recent Labs     03/26/19 1920 03/27/19 0429 03/28/19 0422   * 141 140   K 3.4* 3.4* 4.3   CL 98* 107 104   CO2 25 23 23   BUN 12 11 13   CREATININE 0.9 0.9 0.8   CALCIUM 9.8 8.2* 8.5     Recent Labs     03/26/19 1920   AST 41*   ALT 35   BILITOT 0.5   ALKPHOS 43     No results for input(s): INR in the last 72 hours. Recent Labs     03/26/19 1920   TROPONINI <0.01       Urinalysis:      Lab Results   Component Value Date    NITRU Negative 03/27/2019    BLOODU Negative 03/27/2019    SPECGRAV >=1.030 03/27/2019    GLUCOSEU Negative 03/27/2019       Radiology:  CT CHEST W CONTRAST   Final Result   1. Bilateral lower lobe atelectasis and/or pneumonia. 2. Severe emphysema. 3. Coronary artery disease. 4. Pulmonary hypertension. XR CHEST PORTABLE   Final Result   1. Tip of the endotracheal tube is 1.8 cm above the mariola. Consider   retraction by about 3 cm and repeat chest radiograph. 2. Interval improvement in bibasilar pulmonary opacities with residual   pulmonary opacity within left lung base. RECOMMENDATION:   Recommend radiographic follow-up to complete resolution. XR CHEST PORTABLE   Final Result   Bilateral lower lobe airspace disease, greater on left, likely representing   pneumonia. Follow-up to resolution is recommended.          XR Neck Soft Tissue next 2 days  pending clinical improvement       The note was completed using Dragon -speech recognition software & EMR  . Every effort was made to ensure accuracy; however, inadvertent computerized transcription errors may be present.     Farrah Leyva MD

## 2019-03-28 NOTE — PROGRESS NOTES
03/28/19 0026   Vent Information   Vent Type 840   Vent Mode AC/VC   Vt Ordered 450 mL   Rate Set 14 bmp   Peak Flow 40 L/min   FiO2  40 %   Sensitivity 3   PEEP/CPAP 5   Cuff Pressure (cm H2O) 28 cm H2O   Humidification Source HME   Vent Patient Data   Peak Inspiratory Pressure 19 cmH2O   Mean Airway Pressure 5.8 cmH20   Rate Measured 15 br/min   Vt Exhaled 443 mL   Minute Volume 7.22 Liters   I:E Ratio 1:2.3   Spontaneous Breathing Trial (SBT) RT Doc   Pulse 77   SpO2 99 %   Breath Sounds   Right Upper Lobe Clear   Right Middle Lobe Diminished   Right Lower Lobe Diminished   Left Upper Lobe Clear   Left Lower Lobe Diminished   Additional Respiratory  Assessments   Resp 14   End Tidal CO2 30 (%)   Alarm Settings   High Pressure Alarm 40 cmH2O   Low Minute Volume Alarm 3 L/min   Apnea (secs) 20 secs   High Respiratory Rate 40 br/min   Low Exhaled Vt  200 mL   Non-Surgical Airway Endo Tracheal Tube   Placement Date/Time: 03/26/19 2118   Timeout: Patient;Procedure;Site/Side  Mask Ventilation: Difficult mask ventilation (3)  Placed By: In ED  Inserted by: Anju Bravo   Insertion attempts: 1  Airway Device: Endo Tracheal Tube  Brand: LMA  Size: 7  Placeme. ..    Secured at 23 cm   Measured From Lips   Secured Location Right   Secured By Commercial tube villasenor   Site Condition Dry

## 2019-03-28 NOTE — PROGRESS NOTES
03/27/19 2014   Vent Information   Vent Type 840   Vent Mode AC/VC   Rate Set 14 bmp   Peak Flow 40 L/min   Sensitivity 3   PEEP/CPAP 5   Cuff Pressure (cm H2O) 30 cm H2O   Humidification Source HME   Vent Patient Data   Peak Inspiratory Pressure 17 cmH2O   Mean Airway Pressure 9.4 cmH20   Rate Measured 14 br/min   Vt Exhaled 476 mL   Minute Volume 6.6 Liters   Plateau Pressure 15 DAQ47   Static Compliance 47.6 mL/cmH2O   Dynamic Compliance 39.6 mL/cmH2O   Cough/Sputum   Cough Non-productive;Weak   Sputum Amount Small   Sputum Color Clear;Cloudy   Tenacity Thin   Spontaneous Breathing Trial (SBT) RT Doc   Pulse 73   SpO2 100 %   Breath Sounds   Right Upper Lobe Clear;Diminished   Right Middle Lobe Clear;Diminished   Right Lower Lobe Diminished   Left Upper Lobe Clear   Left Lower Lobe Clear   Additional Respiratory  Assessments   Resp 14   End Tidal CO2 31 (%)   Alarm Settings   High Pressure Alarm 40 cmH2O   Low Minute Volume Alarm 3 L/min   High Respiratory Rate 40 br/min   Low Exhaled Vt  200 mL   Non-Surgical Airway Endo Tracheal Tube   Placement Date/Time: 03/26/19 2115   Timeout: Patient;Procedure;Site/Side  Mask Ventilation: Difficult mask ventilation (3)  Placed By: In ED  Inserted by: Carlos Galo   Insertion attempts: 1  Airway Device: Endo Tracheal Tube  Brand: LMA  Size: 7  Placeme. ..    Secured at 23 cm   Measured From [address] By [employer] tube villasenor   Cuff Pressure 30 cm H2O   ambu bag at bedside

## 2019-03-28 NOTE — PROGRESS NOTES
symptoms:  as evidenced by NPO status due to medical condition    Objective Information:  · Nutrition-Focused Physical Findings: No edema per flowsheets. Intubated/sedated. · Wound Type: None  · Current Nutrition Therapies:  · Oral Diet Orders: NPO   · Oral Diet intake: NPO  · Oral Nutrition Supplement (ONS) Orders: None  · ONS intake: NPO  · Tube Feeding (TF) Orders:   · Feeding Route: Nasogastric  · Formula: Semi-elemental  · Duration: Continuous  · Goal TF & Flush Orders Provides: Recommend Vital AF semi-elemental formula at 65 mL/hr x 20 hrs to provide 1300 mL TV, 1560 kcals, 98 gm protein, and 1054 mL free fluid. Recommend free water flushes per MD.   · Additional Calories: propofol calories as noted above  · Anthropometric Measures:  · Ht: 5' 6\" (167.6 cm)   · Current Body Wt: 163 lb (73.9 kg)  · Admission Body Wt: 157 lb (71.2 kg)  · Usual Body Wt: 160 lb (72.6 kg)  · Ideal Body Wt: 130 lb (59 kg)   · BMI Classification: BMI 25.0 - 29.9 Overweight    Nutrition Interventions:   (Initiate diet after MBS if extubated, VS. Start nutrition support if remains intubated. )  Continued Inpatient Monitoring, Swallow Evaluation    Nutrition Evaluation:   · Evaluation: Goals set   · Goals: Tolerate most appropriate form of nutrition therapy this admission   · Monitoring: Nutrition Progression, Meal Intake, Supplement Intake, Diet Tolerance, Weight, Pertinent Labs      Electronically signed by Zenaida Ott.  Chai Greer RD, LD on 3/28/19 at 9:45 AM    Contact Number: [phone]

## 2019-03-28 NOTE — PROGRESS NOTES
Results for Shahid King" (MRN [id number]) as of 3/28/2019 11:03   Ref.  Range 3/28/2019 10:46   pH, Arterial Latest Ref Range: 7.350 - 7.450  7.410   pCO2, Arterial Latest Ref Range: 35.0 - 45.0 mmHg 34.8 (L)   pO2, Arterial Latest Ref Range: 75.0 - 108.0 mmHg 106.7   HCO3, Arterial Latest Ref Range: 21.0 - 29.0 mmol/L 21.6

## 2019-03-28 NOTE — PROGRESS NOTES
INPATIENT PULMONARY CRITICAL CARE PROGRESS NOTE      Reason for visit     acute respiratory failure with hypoxia 2/2 pneumonia. SUBJECTIVE:  Patient continues to be critically ill on mechanical vent support, patient underwent a bronchoscopy and some mucous plugs were removed yesterday, patient does not have any increasing respiratory secretions in the endotracheal tube, patient has a T-max of 100°F, patient has sinus Tachycardia  on the monitor, patient was on 40 was oxygen when seen, patient was on IV sedation to maintain patient ventilator synchrony, patient had borderline urine output with cumulative fluid balance of +1.7 L, patient's glycemic control was acceptable , no other pertinent review of system couldn't be obtained          Physical Exam:  Blood pressure (!) 146/89, pulse 115, temperature 100 °F (37.8 °C), temperature source Axillary, resp. rate 26, height 5' 6\" (1.676 m), weight 163 lb 9.3 oz (74.2 kg), SpO2 100 %, not currently breastfeeding.'     Constitutional:  No acute distress. on mechanical ventilatory support  HENT:  Oropharynx is clear and moist. No thyromegaly. Eyes:  Conjunctivae are normal. Pupils equal, round, and reactive to light. No scleral icterus. Neck: . No tracheal deviation present. No obvious thyroid mass. Cardiovascular: Normal rate, regular rhythm, normal heart sounds. No right ventricular heave. No lower extremity edema. Pulmonary/Chest: No wheezes. No rales. Chest wall is not dull to percussion. No accessory muscle usage or stridor. decreased BSI   Abdominal: Soft. Bowel sounds present. No distension or hernia. No tenderness. Musculoskeletal: No cyanosis. No clubbing. No obvious joint deformity. Lymphadenopathy: No cervical or supraclavicular adenopathy. Skin: Skin is warm and dry. No rash or nodules on the exposed extremities.   Neurologic: Intubated and sedated             Results:  CBC:   Recent Labs     03/26/19  1920 03/27/19  0429 03/28/19  0423 WBC 5.3 4.9 6.5   HGB 12.4 11.9* 12.3   HCT 37.4 38.1 38.7   MCV 87.0 91.8 89.7    166 168     BMP:   Recent Labs     03/26/19 1920 03/27/19 0429 03/28/19 0422   * 141 140   K 3.4* 3.4* 4.3   CL 98* 107 104   CO2 25 23 23   BUN 12 11 13   CREATININE 0.9 0.9 0.8     LIVER PROFILE:   Recent Labs     03/26/19 1920   AST 41*   ALT 35   BILITOT 0.5   ALKPHOS 43     PT/INR: No results for input(s): PROTIME, INR in the last 72 hours. APTT: No results for input(s): APTT in the last 72 hours. UA:  Recent Labs     03/27/19  0018   COLORU Yellow   PHUR 5.5   CLARITYU Clear   SPECGRAV >=1.030   LEUKOCYTESUR Negative   UROBILINOGEN 0.2   BILIRUBINUR Negative   BLOODU Negative   GLUCOSEU Negative       Imaging:  I have reviewed radiology images personally. CT CHEST W CONTRAST   Final Result   1. Bilateral lower lobe atelectasis and/or pneumonia. 2. Severe emphysema. 3. Coronary artery disease. 4. Pulmonary hypertension. XR CHEST PORTABLE   Final Result   1. Tip of the endotracheal tube is 1.8 cm above the mariola. Consider   retraction by about 3 cm and repeat chest radiograph. 2. Interval improvement in bibasilar pulmonary opacities with residual   pulmonary opacity within left lung base. RECOMMENDATION:   Recommend radiographic follow-up to complete resolution. XR CHEST PORTABLE   Final Result   Bilateral lower lobe airspace disease, greater on left, likely representing   pneumonia. Follow-up to resolution is recommended. XR Neck Soft Tissue   Final Result   Negative study.            Xr Neck Soft Tissue    Result Date: 3/26/2019  EXAMINATION: TWO XRAY VIEWS OF THE NECK SOFT TISSUES 3/26/2019 9:02 pm COMPARISON: Chest x-ray performed today, 01/22/2018 soft tissue neck radiographs HISTORY: ORDERING SYSTEM PROVIDED HISTORY: cannot swallow TECHNOLOGIST PROVIDED HISTORY: Reason for exam:->cannot swallow Ordering Physician Provided Reason for Exam: cannot swallow FINDINGS: Bibasilar airspace disease, greater on the left, is again identified, better evaluated by chest radiograph performed today. The epiglottis and aryepiglottic folds are within normal limits. The visualized airways patent. No prevertebral soft tissue swelling is identified. Degenerative changes of the visualized bones are evident. Negative study. Ct Chest W Contrast    Result Date: 3/27/2019  EXAMINATION: CT OF THE CHEST WITH CONTRAST 3/26/2019 11:24 pm TECHNIQUE: CT of the chest was performed with the administration of intravenous contrast. Multiplanar reformatted images are provided for review. Dose modulation, iterative reconstruction, and/or weight based adjustment of the mA/kV was utilized to reduce the radiation dose to as low as reasonably achievable. COMPARISON: 01/15/2019 HISTORY: ORDERING SYSTEM PROVIDED HISTORY: cp/sob TECHNOLOGIST PROVIDED HISTORY: Ordering Physician Provided Reason for Exam: chest pain; SOB; since this am .. pt wears 1-3 litters PRN at home. . pt feels like her throat is closing off. ..is following up with gi to have endoscopy; back pain since this am; possible FB/aspiration Acuity: Acute Type of Exam: Initial Relevant Medical/Surgical History: SEE EPIC FINDINGS: Mediastinum: The heart size is mildly enlarged. There is no significant pericardial effusion or mediastinal hematoma. Coronary artery calcifications are again seen. There is no aortic aneurysm or dissection. The main pulmonary artery is dilated. An enteric tube traverses the esophagus. A tracheostomy tube terminates approximately 3 cm above the mariola. Lungs/pleura: There is no pneumothorax or pleural effusion. Scattered airway secretions are identified bilaterally and in the trachea. Severe emphysema is again identified with bilateral scarring. There is bilateral lower lobe consolidation, left greater than right. There is chronic middle lobe atelectasis.  Upper Abdomen: Limited imaging of the upper Results for Marcela Salomon" (MRN [id number]) as of 3/28/2019 11:18   Ref. Range 3/27/2019 04:29 3/27/2019 13:35 3/28/2019 04:22 3/28/2019 04:23   Sodium Latest Ref Range: 136 - 145 mmol/L 141  140    Potassium Latest Ref Range: 3.5 - 5.1 mmol/L 3.4 (L)  4.3    Chloride Latest Ref Range: 99 - 110 mmol/L 107  104    CO2 Latest Ref Range: 21 - 32 mmol/L 23  23    BUN Latest Ref Range: 7 - 20 mg/dL 11  13    Creatinine Latest Ref Range: 0.6 - 1.2 mg/dL 0.9  0.8    Anion Gap Latest Ref Range: 3 - 16  11  13    GFR Non- Latest Ref Range: >60  >60  >60    GFR  Latest Ref Range: >60  >60  >60    Magnesium Latest Ref Range: 1.80 - 2.40 mg/dL 1.60 (L)  1.60 (L)    Glucose Latest Ref Range: 70 - 99 mg/dL 93  136 (H)    Calcium Latest Ref Range: 8.3 - 10.6 mg/dL 8.2 (L)  8.5    WBC Latest Ref Range: 4.0 - 11.0 K/uL 4.9   6.5   RBC Latest Ref Range: 4.00 - 5.20 M/uL 4.15   4.31   Hemoglobin Quant Latest Ref Range: 12.0 - 16.0 g/dL 11.9 (L)   12.3   Hematocrit Latest Ref Range: 36.0 - 48.0 % 38.1   38.7   MCV Latest Ref Range: 80.0 - 100.0 fL 91.8   89.7   MCH Latest Ref Range: 26.0 - 34.0 pg 28.6   28.6   MCHC Latest Ref Range: 31.0 - 36.0 g/dL 31.2   31.9   MPV Latest Ref Range: 5.0 - 10.5 fL 8.6   7.5   RDW Latest Ref Range: 12.4 - 15.4 % 15.1   14.9   Platelet Count Latest Ref Range: 135 - 450 K/uL 166   168     Results for Brook Peoples \"BARON\" (MRN [id number]) as of 3/28/2019 11:18   Ref. Range 1/22/2018 11:07 3/27/2019 00:08 3/27/2019 13:35   Culture, Blood 2 Unknown  No Growth to date. .. Gram Stain Result Unknown   3+ WBC's (Polymor. .. CULTURE, RESPIRATORY Unknown   Further report to. ..    Rapid Influenza A Ag Latest Ref Range: Negative  POSITIVE (A)     Rapid Influenza B Ag Latest Ref Range: Negative  Negative     RAPID INFLUENZA A/B ANTIGENS Unknown Rpt (A)           Assessment:  Principal Problem:    Acute respiratory failure with hypoxia (HCC)  Active Problems: Centrilobular emphysema (HCC)    Rheumatoid arthritis involving multiple sites with positive rheumatoid factor (HCC)    Essential hypertension    Acquired hypothyroidism    Pulmonary HTN (HCC)    Acute respiratory failure (HCC)    Hypokalemia    Normal anion gap metabolic acidosis    Anemia    Pulmonary infiltrates    Atelectasis    HEBERT on CPAP    ASD (atrial septal defect)    Aspiration pneumonia (HCC)  Resolved Problems:    * No resolved hospital problems.  *          Plan:   · Ventilatory support to keep Sao2 between 90-94% ONLY  · Ventilator waveforms and settings reviewed  · Ventilator settings changed  · IV sedation to maintain patient ventilator syncrony   · Pulmonary toilet  · Status post bronchoscopy  · Patient got one dose of Meropenem and now of IV levaquin -needs to be titrated as per clinical status and c/s  · Bronchodilators  · IV solumedrol to continue  · Will hold Arava for now  ·  IV fluids were d/haseeb  · Will give one dose of lasix and reassess  · Monitor I/O and BMP  · Correct electrolytes on PRN basis  · Enteral feeds as per metabolic support  · PUD and DVT prophylaxis      Case d/w family and ICU team        Electronically signed by:  Kt Mott MD    3/28/2019    11:17 AM.

## 2019-03-28 NOTE — DISCHARGE INSTR - COC
Immunization History:   Immunization History   Administered Date(s) Administered    Influenza, High Dose (Fluzone 65 yrs and older) 10/18/2011, 08/29/2012, 11/22/2013, 08/27/2014, 11/19/2015, 10/18/2017, 11/06/2018    PPD Test 07/20/2016    Pneumococcal 13-valent Conjugate ([id number]) 11/19/2015    Pneumococcal Polysaccharide ([id number]) 08/06/2018       Active Problems:  Patient Active Problem List   Diagnosis Code    Centrilobular emphysema ([facility] J43.2    Pure hypercholesterolemia E78.00    Rheumatoid arthritis involving multiple sites with positive rheumatoid factor ([facility] M05.79    Essential hypertension I10    Dizziness R42    Osteoporosis M81.0    Unexplained weight loss R63.4    Acquired hypothyroidism E03.9    Cervical disc disorder at C4-C5 level with radiculopathy M50.121    Epigastric pain R10.13    Sciatica of left side M54.32    Influenza J11.1    Pulmonary HTN (HCC) I27.20    Hypotension I95.9    Acute respiratory failure with hypoxia (HCC) J96.01    Acute respiratory failure (HCC) J96.00    Hypokalemia E87.6    Normal anion gap metabolic acidosis H38.3    Anemia D64.9    Pulmonary infiltrates R91.8    Atelectasis J98.11    HEBERT on CPAP G47.33, Z99.89    ASD (atrial septal defect) Q21.1    Aspiration pneumonia (HCC) J69.0       Isolation/Infection:   Isolation          No Isolation            Nurse Assessment:  Last Vital Signs: BP (!) 146/89   Pulse 112   Temp 100 °F (37.8 °C) (Axillary)   Resp 13   Ht 5' 6\" (1.676 m)   Wt 163 lb 9.3 oz (74.2 kg)   SpO2 100%   BMI 26.40 kg/m²     Last documented pain score (0-10 scale): Pain Level: 0  Last Weight:   Wt Readings from Last 1 Encounters:   03/27/19 163 lb 9.3 oz (74.2 kg)     Mental Status:  {IP PT MENTAL STATUS:20030}    IV Access:  {[facility] IV ACCESS:572824474}    Nursing Mobility/ADLs:  Walking   {[facility] DME RNNX:492700340}  Transfer  {[facility] DME XFKR:346565231}  Bathing  {[facility] DME QOFA:951671963}  Dressing  {LAUREN WALSH CBWN:084865957}  Toileting  {[facility] DME ZFPM:637983202}  Feeding  {[facility] DME YVJN:401393818}  Med Admin  {[facility] DME UMWL:764772523}  Med Delivery   { [place] MED Delivery:994559465}    Wound Care Documentation and Therapy:        Elimination:  Continence:   · Bowel: {YES / JANELLE:51956}  · Bladder: {YES / ZU:96634}  Urinary Catheter: {Urinary Catheter:891428139}   Colostomy/Ileostomy/Ileal Conduit: {YES / ZQ:24068}       Date of Last BM: ***    Intake/Output Summary (Last 24 hours) at 3/28/2019 1509  Last data filed at 3/28/2019 1109  Gross per 24 hour   Intake 1469 ml   Output 935 ml   Net 534 ml     I/O last 3 completed shifts:   In: 1469 [I.V.:1469]  Out: 935 [Urine:535; Emesis/NG output:400]    Safety Concerns:     [address] [employer] Safety Concerns:241423892}    Impairments/Disabilities:      [address] [employer] Impairments/Disabilities:827118963}    Nutrition Therapy:  Current Nutrition Therapy:   [address] [employer] Diet List:329442184}    Routes of Feeding: {[facility] DME Other Feedings:343019908}  Liquids: {Slp liquid thickness:05684}  Daily Fluid Restriction: {[facility] DME Yes amt example:599456153}  Last Modified Barium Swallow with Video (Video Swallowing Test): {Done Not Done SEAB:184364209}    Treatments at the Time of Hospital Discharge:   Respiratory Treatments: ***  Oxygen Therapy:  {Therapy; copd oxygen:96454}  Ventilator:    { CC Vent GHEW:544131938}    Rehab Therapies: {THERAPEUTIC INTERVENTION:7748889157}  Weight Bearing Status/Restrictions: [employer]  Weight Bearin}  Other Medical Equipment (for information only, NOT a DME order):  {EQUIPMENT:509667742}  Other Treatments: ***    Patient's personal belongings (please select all that are sent with patient):  {[facility] DME Belongings:941366900}    RN SIGNATURE:  {Esignature:803542445}    CASE MANAGEMENT/SOCIAL WORK SECTION    Inpatient Status Date: IPA 3/27/19    Readmission Risk Assessment Score:  Readmission Risk              Risk of Unplanned Readmission:        17           Discharging to Facility/ Agency   · Name:   · Address:  · Phone:  · Fax:    Dialysis Facility (if applicable)   · Name:  · Address:  · Dialysis Schedule:  · Phone:  · Fax:    / signature: {Esignature:186066551}    PHYSICIAN SECTION    Prognosis: Good    Condition at Discharge: Stable    Rehab Potential (if transferring to Rehab): Good    · Recommended Labs or Other Treatments After Discharge: Tube feed: Jevity 1.5 (1.5 calorie with fiber formula) at  goal of 60 mL/hr via PEG. Recommend 100 mL H20 q 3 hours      Physician Certification: I certify the above information and transfer of Michael White  is necessary for the continuing treatment of the diagnosis listed and that she requires Home Care for greater 30 days.      Update Admission H&P: No change in H&P    PHYSICIAN SIGNATURE:  Electronically signed by Cinthia Duong MD on 4/3/19 at 12:08 PM

## 2019-03-28 NOTE — PROGRESS NOTES
Swallow evaluation placed with speech; nurse attempted bedside swallow with followed coughing; burping; and patient complaints of feeling something was caught in throat.       Katya Poole RN

## 2019-03-28 NOTE — PROGRESS NOTES
Results for Surekha Garrido" (MRN [id number]) as of 3/28/2019 18:49   Ref.  Range 3/28/2019 10:46 3/28/2019 16:09   pH, Arterial Latest Ref Range: 7.350 - 7.450  7.410 7.470 (H)   pCO2, Arterial Latest Ref Range: 35.0 - 45.0 mmHg 34.8 (L) 31.8 (L)   pO2, Arterial Latest Ref Range: 75.0 - 108.0 mmHg 106.7 82.7   HCO3, Arterial Latest Ref Range: 21.0 - 29.0 mmol/L 21.6 22.6       Alin Salmeron RN

## 2019-03-28 NOTE — PROGRESS NOTES
Speech Language Pathology  Facility/Department: [facility] C2 CARD TELEMETRY   BEDSIDE SWALLOW EVALUATION    NAME: Vivian Duvall  : 1940  MRN: [id number]    ADMISSION DATE: 3/26/2019  ADMITTING DIAGNOSIS: has Centrilobular emphysema ([address] Pure hypercholesterolemia; Rheumatoid arthritis involving multiple sites with positive rheumatoid factor ([address] Essential hypertension; Dizziness; Osteoporosis; Unexplained weight loss; Acquired hypothyroidism; Cervical disc disorder at C4-C5 level with radiculopathy; Epigastric pain; Sciatica of left side; Influenza; Pulmonary HTN ([address] Hypotension; Acute respiratory failure with hypoxia ([address] Acute respiratory failure ([address] Hypokalemia; Normal anion gap metabolic acidosis; Anemia; Pulmonary infiltrates; Atelectasis; HEBERT on CPAP; ASD (atrial septal defect); and Aspiration pneumonia ([address] on their problem list.  ONSET DATE: admit date 19    Recent CT of Chest: (Date 19 )   1. Bilateral lower lobe atelectasis and/or pneumonia. 2. Severe emphysema. 3. Coronary artery disease. 4. Pulmonary hypertension. Date of Eval: 3/28/2019  Evaluating Therapist: Curt Goncalves    Current Diet level:  Current Diet : Regular  Current Liquid Diet : Thin      Primary Complaint  Patient Complaint: \"They're trying to starve me. \"    Pain:  Patient Currently in Pain: Denies    Reason for Referral  Vivian Duvall was referred for a bedside swallow evaluation to assess the efficiency of her swallow function, identify signs and symptoms of aspiration and make recommendations regarding safe dietary consistencies, effective compensatory strategies, and safe eating environment. Impression  Dysphagia Diagnosis: Swallow function appears grossly intact  Dysphagia Outcome Severity Scale: Level 1: Severe dysphagia- NPO.  Unable to tolerate any PO safely     Treatment Plan  Requires SLP Intervention: Yes  Duration/Frequency of Treatment: 3x/wk, 1 week following Barium swallow study     Referral To: GI(Barium swallow study, as per Dr. Carolyn Reno recommendation)    Recommended Diet and Intervention   NPO (until traditional Barium swallow study can be completed)      Once pt is cleared for po intake, would recommend soft food textures with thin liquids       Compensatory Swallowing Strategies   n/a    Treatment/Goals  Short-term Goals  Timeframe for Short-term Goals: 1 week, 3x/wk  1. The patient will tolerate recommended diet without observed clinical signs of aspiration    Long-term Goals  Timeframe for Long-term Goals: 1-2 weeks  1. Pt will tolerate recommended diet without observed s/s of aspiration. General  Chart Reviewed: Yes  Behavior/Cognition: Alert; Cooperative;Pleasant mood  Respiratory Status: O2 via nasual cannula  O2 Device: Nasal cannula  Liters of Oxygen: 1 L  Communication Observation: Functional  Follows Directions: Simple  Dentition: Edentulous  Patient Positioning: Upright in bed  Baseline Vocal Quality: Normal  Volitional Cough: Strong  Prior Dysphagia History: Pt has known Hx of esophageal dysphagia. Pt was at [address] on 03/26/19, and had sudden severe esophageal globus sensation. Pt states that she felt like she was choking, and developed respiratory distress. Pt was subsequently brought to ER. Pt was intubated due to respiratory distress on 03/26/19, and extubated today. Pt had EGD on 03/26/19 revealing no foreign body, but dilation to esophagus indicating possible dysmotility. Bronchoscopy on   Consistencies Administered: Puree; Thin - teaspoon; Thin - cup; Ice Chips    Vision/Hearing  Vision: Within Functional Limits  Hearing: Within functional limits    Oral Motor Deficits  Within functional limits    Oral Phase Dysfunction  Oral phase of swallow grossly appears WNL. No oral phase deficits noted during evaluation. Indicators of Pharyngeal Phase Dysfunction  Pharyngeal phase of swallow appears grossly WNL.  No pharyngeal deficits noted during evaluation. Laryngeal elevation appears WFL based upon palpation of anterior neck during swallow. Vocal quality dry before and after po trials. RR 24/min prior to and following po trials. Pt on 1lpm throughout assessment. O2 sat 96% before and after po trials. Of note, pt develops extreme belching and retching post swallow. Belching is continuous. Pt does not appear to be regurgitating po trials, but suctions sputum from oral cavity x2 during retching episode. Prognosis  Prognosis for safe diet advancement: fair  Consulted and agree with results and recommendations: Patient; Family member;RN  Family member consulted: granddaughter    Education  Patient Education: Dysphagia Tx: Reviewed results of BSE, aspiration precautions, and recommendation for Barium swallow study with pt and granddaughter in room. RN present. Patient Education Response: Verbalizes understanding;Needs reinforcement         Therapy Time  SLP Individual Minutes  Time In: 2107  Time Out: [address]  Minutes: [facility].  [address]  Speech-Language Pathologist  (desk #): [phone]    3/28/2019 5:20 PM

## 2019-03-28 NOTE — OP NOTE
[address], [place] [address]                                OPERATIVE REPORT    PATIENT NAME: Gilbert Lozano                 :        1940  MED REC NO:   [id number]                          ROOM:       8035  ACCOUNT NO:   [de-identified]                           ADMIT DATE: 2019  PROVIDER:     Erick Key MD    DATE OF PROCEDURE:  2019    PREPROCEDURE DIAGNOSIS:  Possible foreign body of the esophagus. POSTPROCEDURE DIAGNOSIS:  Normal EGD. SURGEON:  Erick Key MD    INDICATIONS:  The patient is a 70-year-old female presented to the ER  while eating [employer]_____. Follows that she had a foreign body impaction, did  have some respiratory distress and was intubated by the ER. Of note,  she was seen earlier today in our office by Dr. Nabil Parker nurse  practitioner for similar symptoms of dysphagia and was scheduled to have  an outpatient EGD performed. EGD is being done emergently in the ER. The patient is on a Propofol drip. Consent was obtained from her  granddaughter. DETAILS OF THE PROCEDURE:  The Olympus video endoscope was passed into  the esophagus. Esophagus was a little dilated, somewhat tortuous, but I  did not appreciate any foreign body impaction or any definite stricture. Stomach was visualized both on antegrade and retrograde views, was  normal.  Pylorus was patent. Duodenal bulb was normal.  She tolerated  the procedure well. IMPRESSION:  No evidence of foreign body. It is possible this could  have passed or she may have even aspirated the foreign body. Somewhat  dilated esophagus suggests this might be a motility disturbance. Hopefully, when she is recovered, I would recommend a Barium swallow and  a formal swallow evaluation.   If not extubated would begin tube feeds        Sang Nixon MD    D: 2019 18:45:32       T: 2019 3:45:37     CHRISTIAN/JUAN [email or web address]  Job#: [id number]     Doc#: [id number]    CC:  MD Chino Mata MD

## 2019-03-28 NOTE — PROGRESS NOTES
Extubation completed. Patient tolerated well. Placed on 3 L NC.  NG removed. Patient speaking coherently.     Corinne Urbano RN

## 2019-03-28 NOTE — PROGRESS NOTES
03/28/19 0841   Vent Information   Vent Type 840   Vent Mode CPAP   Pressure Support 8 cmH20   FiO2  40 %   Sensitivity 3   PEEP/CPAP 5   Humidification Source HME   Vent Patient Data   Peak Inspiratory Pressure 13 cmH2O   Mean Airway Pressure 9 cmH20   Rate Measured 29 br/min   Vt Exhaled 552 mL   Minute Volume 16 Liters   Spontaneous Breathing Trial (SBT) RT Doc   Pulse 114   SpO2 99 %   Additional Respiratory  Assessments   Resp 27   End Tidal CO2 20 (%)   Position Semi-Fuller's   Patient on SBT

## 2019-03-28 NOTE — PROGRESS NOTES
03/28/19 0742   Vent Information   $Ventilation $Subsequent Day   Vent Type 840   Vent Mode AC/VC   Vt Ordered 450 mL   Rate Set 14 bmp   Peak Flow 40 L/min   FiO2  40 %   Sensitivity 3   PEEP/CPAP 5   Cuff Pressure (cm H2O) 30 cm H2O   Humidification Source HME   Vent Patient Data   Peak Inspiratory Pressure 18 cmH2O   Mean Airway Pressure 6 cmH20   Rate Measured 25 br/min   Vt Exhaled 580 mL   Minute Volume 10 Liters   I:E Ratio 1;2   Plateau Pressure 17 MKF50   Static Compliance 40 mL/cmH2O   Spontaneous Breathing Trial (SBT) RT Doc   Contraindications to SBT? None   Pulse 101   SpO2 100 %   Breath Sounds   Right Upper Lobe Clear   Right Middle Lobe Diminished   Right Lower Lobe Diminished   Left Upper Lobe Clear   Left Lower Lobe Diminished   Additional Respiratory  Assessments   Resp 24   End Tidal CO2 24 (%)   Position Semi-Fuller's   Alarm Settings   Low Minute Volume Alarm 3 L/min   Apnea (secs) 20 secs   High Respiratory Rate 40 br/min   Low Exhaled Vt  200 mL   Non-Surgical Airway Endo Tracheal Tube   Placement Date/Time: 03/26/19 2115   Timeout: Patient;Procedure;Site/Side  Mask Ventilation: Difficult mask ventilation (3)  Placed By: In ED  Inserted by: Alfredo Fine   Insertion attempts: 1  Airway Device: Endo Tracheal Tube  Brand: LMA  Size: 7  Placeme. ..    Secured at 23 cm   Measured From [address] [employer] tube vill[address]   Site Condition Dry   ambu/sx hob

## 2019-03-28 NOTE — OP NOTE
[address] [address]                                OPERATIVE REPORT    PATIENT NAME: Lizzy Carlos                 :        1940  MED REC NO:   [id number]                          ROOM:       4510  ACCOUNT NO:   [de-identified]                           ADMIT DATE: 2019  PROVIDER:     Chan Massey MD    DATE OF PROCEDURE:  2019    PREPROCEDURE DIAGNOSIS:  Possible foreign body of the esophagus. POSTPROCEDURE DIAGNOSIS:  Normal EGD. SURGEON:  Chan Massey MD    INDICATIONS:  The patient is a 72-year-old female presented to the ER  while eating _____. Follows that she had a foreign body impaction, did  have some respiratory distress and was intubated by the ER. Of note,  she was seen earlier today in our office by Dr. Rosalinda La nurse  practitioner for similar symptoms of dysphagia and was scheduled to have  an outpatient EGD performed. EGD is being done emergently in the ER. The patient is on a Propofol drip. Consent was obtained from her  granddaughter. DETAILS OF THE PROCEDURE:  The Olympus video endoscope was passed into  the esophagus. Esophagus was a little dilated, somewhat tortuous, but I  did not appreciate any foreign body impaction or any definite stricture. Stomach was visualized both on antegrade and retrograde views, was  normal.  Pylorus was patent. Duodenal bulb was normal.  She tolerated  the procedure well. IMPRESSION:  No evidence of foreign body. It is possible this could  have passed or she may have even aspirated the foreign body. Somewhat  dilated esophagus suggests this might be a motility disturbance. Hopefully, when she is recovered, I would recommend a Barium swallow and  a formal swallow evaluation.         April Vickers MD    D: 2019 18:45:32       T: 2019 3:45:37     [id number]  Job#: [id number]     Doc#: [id number]    CC:  Jacoby sEcobar MD

## 2019-03-28 NOTE — CARE COORDINATION
CASE MANAGEMENT INITIAL ASSESSMENT      Reviewed chart and met with patient today, re: d/c needs  Explained Case Management role/services. Yes    Family present: No  Primary contact information: Francesco Daniel, ph:  [phone]    Admit date/status: 3/27/19  Diagnosis: Acute Resp Failure  Extubated @ 11:38 this morning    Insurance: [address] required for SNF - Yes        3 night stay required - No    Living arrangements, Adls, care needs, prior to admission: Pt lives with her granddauKimberly. Pt states she is normally able to manage most Adls, uses 4[employer] to ambulate. Pt states she only drives occasionally and most likely no longer. Pt states her granddtr works some evenings. Transportation: States her granddau can provide transport    [facility] at home: 4WWalker_x_Cane__RTS_x_ BSC__Shower Bench_x_  02_x  thru [address]  ? CPAP__  BiPap__  Hospital Bed__ W/C___ Other__________    Services in the home and/or outpatient, prior to admission: [facility] for home 02. No home care active     Dialysis Facility (if applicable)   N/A  · Name:  · Address:  · Dialysis Schedule:  · Phone:  · Fax:    PT/OT recs: Not ordered as yet, left sticky note for MD    Hospital Exemption Notification (HEN): Would need for SNF, pt plans to return home    Barriers to discharge: None    Plan/comments: Pt states she plans to return home when stable. She states she is reluctant to agree to home care because she isn't sure she wants strangers in her home. She states she will \"think it over\". CM will cont to follow pt's progress to determine needs.     ECOC on chart for MD signature - yes

## 2019-03-28 NOTE — PROGRESS NOTES
[phone] Hospital or Facility: [facility] From: [facility] RE: Cristino Daksha 1940 RM: 233 extubation completed; patient tolerated well; speaking; swallow evaluation ordered; patient asking to eat Need Callback: NEEDS CALLBACK CCU      Marquis Gallegos RN

## 2019-03-29 ENCOUNTER — APPOINTMENT (OUTPATIENT)
Dept: GENERAL RADIOLOGY | Age: 79
DRG: 121 | End: 2019-03-29
Payer: COMMERCIAL

## 2019-03-29 PROBLEM — N17.9 AKI (ACUTE KIDNEY INJURY) (HCC): Status: ACTIVE | Noted: 2019-03-29

## 2019-03-29 LAB
ANION GAP SERPL CALCULATED.3IONS-SCNC: 11 MMOL/L (ref 3–16)
BUN BLDV-MCNC: 31 MG/DL (ref 7–20)
CALCIUM SERPL-MCNC: 8.7 MG/DL (ref 8.3–10.6)
CHLORIDE BLD-SCNC: 102 MMOL/L (ref 99–110)
CO2: 26 MMOL/L (ref 21–32)
CREAT SERPL-MCNC: 1.3 MG/DL (ref 0.6–1.2)
CULTURE, RESPIRATORY: NORMAL
GFR AFRICAN AMERICAN: 48
GFR NON-AFRICAN AMERICAN: 40
GLUCOSE BLD-MCNC: 108 MG/DL (ref 70–99)
GLUCOSE BLD-MCNC: 130 MG/DL (ref 70–99)
GRAM STAIN RESULT: NORMAL
HCT VFR BLD CALC: 35.2 % (ref 36–48)
HEMOGLOBIN: 11.7 G/DL (ref 12–16)
MAGNESIUM: 2.2 MG/DL (ref 1.8–2.4)
MCH RBC QN AUTO: 28.6 PG (ref 26–34)
MCHC RBC AUTO-ENTMCNC: 33.1 G/DL (ref 31–36)
MCV RBC AUTO: 86.4 FL (ref 80–100)
PDW BLD-RTO: 13.9 % (ref 12.4–15.4)
PERFORMED ON: ABNORMAL
PLATELET # BLD: 178 K/UL (ref 135–450)
PMV BLD AUTO: 7.7 FL (ref 5–10.5)
POTASSIUM REFLEX MAGNESIUM: 3.3 MMOL/L (ref 3.5–5.1)
RBC # BLD: 4.08 M/UL (ref 4–5.2)
SODIUM BLD-SCNC: 139 MMOL/L (ref 136–145)
WBC # BLD: 13.2 K/UL (ref 4–11)

## 2019-03-29 PROCEDURE — 74018 RADEX ABDOMEN 1 VIEW: CPT

## 2019-03-29 PROCEDURE — 97166 OT EVAL MOD COMPLEX 45 MIN: CPT

## 2019-03-29 PROCEDURE — 6360000002 HC RX W HCPCS: Performed by: INTERNAL MEDICINE

## 2019-03-29 PROCEDURE — 2060000000 HC ICU INTERMEDIATE R&B

## 2019-03-29 PROCEDURE — 6370000000 HC RX 637 (ALT 250 FOR IP)

## 2019-03-29 PROCEDURE — 2580000003 HC RX 258: Performed by: INTERNAL MEDICINE

## 2019-03-29 PROCEDURE — 2580000003 HC RX 258

## 2019-03-29 PROCEDURE — C9113 INJ PANTOPRAZOLE SODIUM, VIA: HCPCS | Performed by: INTERNAL MEDICINE

## 2019-03-29 PROCEDURE — 97530 THERAPEUTIC ACTIVITIES: CPT

## 2019-03-29 PROCEDURE — 99233 SBSQ HOSP IP/OBS HIGH 50: CPT | Performed by: INTERNAL MEDICINE

## 2019-03-29 PROCEDURE — 94640 AIRWAY INHALATION TREATMENT: CPT

## 2019-03-29 PROCEDURE — 6370000000 HC RX 637 (ALT 250 FOR IP): Performed by: INTERNAL MEDICINE

## 2019-03-29 PROCEDURE — 83735 ASSAY OF MAGNESIUM: CPT

## 2019-03-29 PROCEDURE — 80048 BASIC METABOLIC PNL TOTAL CA: CPT

## 2019-03-29 PROCEDURE — 36415 COLL VENOUS BLD VENIPUNCTURE: CPT

## 2019-03-29 PROCEDURE — 74220 X-RAY XM ESOPHAGUS 1CNTRST: CPT

## 2019-03-29 PROCEDURE — 97116 GAIT TRAINING THERAPY: CPT

## 2019-03-29 PROCEDURE — 94150 VITAL CAPACITY TEST: CPT

## 2019-03-29 PROCEDURE — 85027 COMPLETE CBC AUTOMATED: CPT

## 2019-03-29 PROCEDURE — 92526 ORAL FUNCTION THERAPY: CPT

## 2019-03-29 PROCEDURE — 2700000000 HC OXYGEN THERAPY PER DAY

## 2019-03-29 PROCEDURE — 74230 X-RAY XM SWLNG FUNCJ C+: CPT

## 2019-03-29 PROCEDURE — 94761 N-INVAS EAR/PLS OXIMETRY MLT: CPT

## 2019-03-29 PROCEDURE — 97162 PT EVAL MOD COMPLEX 30 MIN: CPT

## 2019-03-29 PROCEDURE — 92611 MOTION FLUOROSCOPY/SWALLOW: CPT

## 2019-03-29 RX ORDER — POTASSIUM CHLORIDE 7.45 MG/ML
10 INJECTION INTRAVENOUS
Status: COMPLETED | OUTPATIENT
Start: 2019-03-29 | End: 2019-03-29

## 2019-03-29 RX ORDER — SODIUM CHLORIDE 9 MG/ML
INJECTION, SOLUTION INTRAVENOUS
Status: COMPLETED
Start: 2019-03-29 | End: 2019-03-29

## 2019-03-29 RX ADMIN — METHYLPREDNISOLONE SODIUM SUCCINATE 40 MG: 40 INJECTION, POWDER, FOR SOLUTION INTRAMUSCULAR; INTRAVENOUS at 03:34

## 2019-03-29 RX ADMIN — IPRATROPIUM BROMIDE AND ALBUTEROL SULFATE 1 AMPULE: .5; 3 SOLUTION RESPIRATORY (INHALATION) at 03:49

## 2019-03-29 RX ADMIN — ONDANSETRON 4 MG: 2 INJECTION INTRAMUSCULAR; INTRAVENOUS at 13:16

## 2019-03-29 RX ADMIN — IPRATROPIUM BROMIDE AND ALBUTEROL SULFATE 1 AMPULE: .5; 3 SOLUTION RESPIRATORY (INHALATION) at 19:25

## 2019-03-29 RX ADMIN — Medication: at 16:05

## 2019-03-29 RX ADMIN — LABETALOL HYDROCHLORIDE 100 MG: 200 TABLET, FILM COATED ORAL at 21:51

## 2019-03-29 RX ADMIN — IPRATROPIUM BROMIDE AND ALBUTEROL SULFATE 1 AMPULE: .5; 3 SOLUTION RESPIRATORY (INHALATION) at 15:30

## 2019-03-29 RX ADMIN — SODIUM CHLORIDE 250 ML: 9 INJECTION, SOLUTION INTRAVENOUS at 14:05

## 2019-03-29 RX ADMIN — SPIRONOLACTONE 25 MG: 25 TABLET ORAL at 18:12

## 2019-03-29 RX ADMIN — Medication 10 ML: at 21:51

## 2019-03-29 RX ADMIN — POTASSIUM CHLORIDE 10 MEQ: 7.46 INJECTION, SOLUTION INTRAVENOUS at 14:04

## 2019-03-29 RX ADMIN — Medication 10 ML: at 09:50

## 2019-03-29 RX ADMIN — ENOXAPARIN SODIUM 40 MG: 40 INJECTION SUBCUTANEOUS at 09:49

## 2019-03-29 RX ADMIN — LEVOFLOXACIN 750 MG: 5 INJECTION, SOLUTION INTRAVENOUS at 09:49

## 2019-03-29 RX ADMIN — POTASSIUM CHLORIDE 10 MEQ: 7.46 INJECTION, SOLUTION INTRAVENOUS at 17:10

## 2019-03-29 RX ADMIN — IPRATROPIUM BROMIDE AND ALBUTEROL SULFATE 1 AMPULE: .5; 3 SOLUTION RESPIRATORY (INHALATION) at 08:31

## 2019-03-29 RX ADMIN — PANTOPRAZOLE SODIUM 40 MG: 40 INJECTION, POWDER, FOR SOLUTION INTRAVENOUS at 09:49

## 2019-03-29 RX ADMIN — MUPIROCIN: 20 OINTMENT TOPICAL at 09:49

## 2019-03-29 ASSESSMENT — PAIN SCALES - GENERAL: PAINLEVEL_OUTOF10: 0

## 2019-03-29 NOTE — PROGRESS NOTES
GI Progress Note      SUBJECTIVE:  Denies nausea, emesis. Breathing easier.     OBJECTIVE      Medications    Current Facility-Administered Medications: potassium chloride 10 mEq/100 mL IVPB (Peripheral Line), 10 mEq, Intravenous, Q2H  LIP BALM ointment, , Topical, PRN  atorvastatin (LIPITOR) tablet 10 mg, 10 mg, Oral, Daily  labetalol (NORMODYNE) tablet 100 mg, 100 mg, Oral, 2 times per day  levothyroxine (SYNTHROID) tablet 100 mcg, 100 mcg, Oral, Daily  losartan (COZAAR) tablet 50 mg, 50 mg, Oral, Daily  spironolactone (ALDACTONE) tablet 25 mg, 25 mg, Oral, BID  pantoprazole (PROTONIX) injection 40 mg, 40 mg, Intravenous, Daily  sodium chloride flush 0.9 % injection 10 mL, 10 mL, Intravenous, 2 times per day  sodium chloride flush 0.9 % injection 10 mL, 10 mL, Intravenous, PRN  magnesium hydroxide (MILK OF MAGNESIA) 400 MG/5ML suspension 30 mL, 30 mL, Oral, Daily PRN  ondansetron (ZOFRAN) injection 4 mg, 4 mg, Intravenous, Q6H PRN  enoxaparin (LOVENOX) injection 40 mg, 40 mg, Subcutaneous, Daily  [COMPLETED] methylPREDNISolone sodium (SOLU-MEDROL) injection 40 mg, 40 mg, Intravenous, Q6H **FOLLOWED BY** predniSONE (DELTASONE) tablet 40 mg, 40 mg, Oral, Daily  acetaminophen (TYLENOL) tablet 650 mg, 650 mg, Oral, Q4H PRN  levofloxacin (LEVAQUIN) 750 MG/150ML infusion 750 mg, 750 mg, Intravenous, Q24H  ipratropium-albuterol (DUONEB) nebulizer solution 1 ampule, 1 ampule, Inhalation, Q4H  mupirocin (BACTROBAN) 2 % ointment, , Nasal, BID  Physical    VITALS:  BP (!) 155/81   Pulse 98   Temp 98.3 °F (36.8 °C) (Oral)   Resp 13   Ht 5' 6\" (1.676 m)   Wt 163 lb 2.3 oz (74 kg)   SpO2 92%   BMI 26.33 kg/m²   ABD: soft, NT, ND, NABs  Data    CBC with Differential:    Lab Results   Component Value Date    WBC 13.2 03/29/2019    RBC 4.08 03/29/2019    HGB 11.7 03/29/2019    HCT 35.2 03/29/2019     03/29/2019    MCV 86.4 03/29/2019    MCH 28.6 03/29/2019    MCHC 33.1 03/29/2019    RDW 13.9 03/29/2019    SEGSPCT

## 2019-03-29 NOTE — PROGRESS NOTES
Received bedside report from off going RN. Pts skin was assessed. Turned and repositioned. Aragon in place. Orders verified. Pt A&O able to follow commands. Call light in reach, bed in lowest position, will continue to monitor.

## 2019-03-29 NOTE — PROCEDURES
Wears glasses at all times  Hearing: Within functional limits    Impressions:  Treatment Dx and ICD 10: Dysphagia   Patient Position: Lateral and Patient Degrees: Upright in chair  Consistencies Administered: Reg solid;Puree; Thin cup; Thin teaspoon     Oral Phase: Pt's oral phase of swallow is grossly functional.     Pharyngeal: Pt presents with a grossly functional pharyngeal phase of swallow with noted self-clearing, flash penetration after the swallow with consecutive straw sips of thin liquids from residuals left over in the pyriform sinuses. Upper Esophageal Screen: Unremarkable- Esophagram to be completed for motility of mid-lower esophagus. Dysphagia Outcome Severity Scale: Level 6: Within functional limits/Modified independence(Oropharyngeal mechanism is grossly functional for PO intake. Defer to GI for esophageal dysphagia dx and diet recommendations. )  Penetration-Aspiration Scale (PAS): 2 - Material enters the airway, remains above the vocal folds, and is ejected from the airway    Recommended Diet:  Solid consistency: Regular  Liquid consistency: Thin  Liquid administration via: Straw;Cup    Medication administration: PO    Safe Swallow Protocol:  Supervision: Independent  Compensatory Swallowing Strategies: Upright as possible for all oral intake    Recommendations/Treatment  Requires SLP Intervention: No        D/C Recommendations: To be determined  Postural Changes and/or Swallow Maneuvers: Upright      Recommended Exercises:    Therapeutic Interventions: Patient/Family education    Referral To: GI    Education: Images and recommendations were reviewed with pt and RN following this exam.   Patient Education: Pt educated on results of MBS and recommendations for Regular textures and Thin liquids. No further speech therapy indicated. Pt's swallow is grossly functional. Esophagram showing marked dysmotility and poor peristalsis. Defer to GI for diet recommendations.     Patient Education Response: Verbalizes understanding    Prognosis  Prognosis for safe diet advancement: fair  Barriers to reach goals: other (comment)(Esophageal dysphagia)  Duration/Frequency of Treatment  Duration/Frequency of Treatment: No further treatment indicated. Oroapharyngeal phase is grossly functional.   Safety Devices  Safety Devices in place: Yes  Type of devices: Other (comment)(Left with transport)      Goals:    Long Term:   Timeframe for Long-term Goals: 1-2 weeks  Goal 1: Pt will tolerate recommended diet without observed s/s of aspiration. (GOAL MET; No aspiration noted throughout study)      Oral Preparation / Oral Phase  Oral Phase: WNL  Impressions: Labial seal was functional with no anterior bolus loss. Velopharyngeal closure was adequate with no reflux noted into the nose. Mastication and bolus formation are timely and affective. Mild premature bolus loss to the valleculae prior to swallow initiation is noted. There is a trace column of barium noted on the base of tongue post swallow. Pharyngeal Phase  Mildly Impaired  Major Contributing Deficits  Delayed Swallow Initiation  Premature Spillage to Valleculae  Shallow Penetration After- Self Clearing  Impressions: Pt's swallow is mildly delayed which is likely age-appropriate. Swallow was triggered when thin liquid bolus made contact with the valleculae. Laryngeal elevation/excursion are functional. Pharyngeal squeeze appears affective with good pharyngeal peristalsis. Trace residuals are noted in the pyriform sinuses post swallow. After the swallow and between consecutive swallows of thin liquids, these trace residuals from the pyriform sinuses shallowly penetrate the laryngeal vestibule and are cleared on all occassions. No aspiration was noted. Puree and cracker trials revealed no penetration or aspiration. Pt with consistent coughing prior to and throughout exam which did not appear to be related to any penetration or aspiration.      Esophageal Phase  Esophageal Screen: WNL- Esophagram completed following MBS with the following results:  Impression   Marked esophageal dysmotility with poor primary peristalsis and visualization   of tertiary contractions as described above.         SLP recommends Regular /Thin from an oropharyngeal standpoint, but SLP will defer to GI for diet recommendations due to esophageal dysphagia.      Pain   Patient Currently in Pain: Denies  Pain Level: 0  Pain Location: Back      Therapy Time:   Individual Concurrent Group Co-treatment   Time In 1202         Time Out 1225         Minutes [address] [facility]-SLP #[id number]  Speech Language Pathologist     3/29/2019, 2:16 PM

## 2019-03-29 NOTE — PROGRESS NOTES
4 Eyes Skin Assessment     The patient is being assess for  Shift Handoff    I agree that 2 RN's have performed a thorough Head to Toe Skin Assessment on the patient. ALL assessment sites listed below have been assessed. Areas assessed by both nurses: Sacral heart in place  [x]   Head, Face, and Ears   [x]   Shoulders, Back, and Chest  [x]   Arms, Elbows, and Hands   [x]   Coccyx, Sacrum, and Ischum  [x]   Legs, Feet, and Heels        Does the Patient have Skin Breakdown?   No         Tashi Prevention initiated:  Yes   Wound Care Orders initiated:  NA      [facility] nurse consulted for Pressure Injury (Stage 3,4, Unstageable, DTI, NWPT, and Complex wounds):  NA      Nurse 1 eSignature: Electronically signed by Lilly Ortiz RN on 3/29/19 at 7:22 AM    **SHARE this note so that the co-signing nurse is able to place an eSignature**    Nurse 2 eSignature: Electronically signed by Rina Londono RN on 3/29/19 at 10:20 AM

## 2019-03-29 NOTE — PROGRESS NOTES
Physical Therapy    Facility/Department: [facility] C2 CARD TELEMETRY  Initial Assessment    NAME: Harshil Morales  : 1940  MRN: [id number]    Date of Service: 3/29/2019    Discharge Recommendations:  Subacute/Skilled Nursing Facility   PT Equipment Recommendations  Equipment Needed: No    Assessment   Body structures, Functions, Activity limitations: Decreased functional mobility ; Decreased endurance;Decreased strength;Decreased balance;Decreased safe awareness  Assessment: Pt is 69 yo male who presents with diagnosis of acute respiratory failure. Pt grossly min A for mobility. States recent functional decline due to SOB and difficulty negotiating stairs to bedroom at home. Pt also noted to have tremors with mobility and seated at rest which pt states started recently but prior to hospitalization. Pt would benefit from continued skilled therapy to address mentioned deficits. Recommend SNF at d/c. Treatment Diagnosis: decreased (I) with mobility  Specific instructions for Next Treatment: progress mobility as tolerated  Prognosis: Good  Decision Making: Medium Complexity  Patient Education: Role of PT, safety with mobility, POC, d/c recs; pt  verbalized understanding  Barriers to Learning: none  REQUIRES PT FOLLOW UP: Yes  Activity Tolerance  Activity Tolerance: Patient Tolerated treatment well;Patient limited by endurance  Activity Tolerance: SpO2 94% on 3L post gait.  with ambulation. Increased to 140 with coughing seated in chair. HR decreased to 115 with rest.       Patient Diagnosis(es): The primary encounter diagnosis was Acute respiratory failure, unspecified whether with hypoxia or hypercapnia ([address] A diagnosis of Aspiration pneumonia, unspecified aspiration pneumonia type, unspecified laterality, unspecified part of lung ([address] was also pertinent to this visit.      has a past medical history of Acquired hypothyroidism, Anemia, Asthma, COPD (chronic obstructive pulmonary disease) ([address] HLD chair  Home Equipment: 4 wheeled walker, Oxygen(CPAP at night; 2-3L prn during the day at home. Wears all the time when leaves home)  ADL Assistance: Independent  Homemaking Responsibilities: Yes(granddaughter mainly completes but pt does help)  Ambulation Assistance: Independent(with 2QD)  Transfer Assistance: Independent  Active : Yes    Objective    RLE AROM: WFL  LLE AROM : WFL  Strength RLE  Comment: Grossly 4/5 throughout  Strength LLE  Comment: Grossly 4/5 throughout        Bed mobility  Supine to Sit: Stand by assistance(HOB elevated, use of bed rails, cues)  Sit to Supine: Unable to assess(pt up in chair at end of session)  Scooting: Stand by assistance(forward to EOB)     Transfers  Sit to Stand: Minimal Assistance(From EOB to RW with cues for hand placement)  Stand to sit: Minimal Assistance     Ambulation  Ambulation?: Yes  Ambulation 1  Surface: level tile  Device: Rolling Walker  Assistance: Minimal assistance  Quality of Gait: Tremors noted with mobility, decreased lenore and bilateral step length. Cues for sequencing  Distance: 4 ft     Balance  Sitting - Static: Good  Sitting - Dynamic: Good  Standing - Static: Fair;+  [facility] - [facility]: [facility]  Times per week: 3-5x/wk  Times per day: Daily  Specific instructions for Next Treatment: progress mobility as tolerated  Current Treatment Recommendations: Strengthening, Gait Training, Stair training, Balance Training, Neuromuscular Re-education, Functional Mobility Training, Endurance Training, Transfer Training, Safety Education & Training  Safety Devices  Type of devices:  All fall risk precautions in place, Call light within reach, Gait belt, Patient at risk for falls, Nurse notified, Left in chair              AM-PAC Score  AM-PAC Inpatient Mobility Raw Score : 17  AM-PAC Inpatient T-Scale Score : 42.13  Mobility Inpatient CMS 0-100% Score: 50.57  Mobility Inpatient CMS G-Code Modifier : CK          Goals  Short term goals  Time Frame for Short term goals: 1 week (4/5)  Short term goal 1: Pt will be supervision for bed mobility. Short term goal 2: Pt will be SBA for bed<>chair transfers. Short term goal 3: Pt will ambulate 100 ft with SBA and RW. Short term goal 4: 4/1: Pt will participate in 12-15 reps of BLE exercises to promote strength and activity tolerance.   Patient Goals   Patient goals : \"to go home\"       Therapy Time   Individual Concurrent Group Co-treatment   Time In 1028         Time Out 1052         Minutes 24         Timed Code Treatment Minutes: [address] [place] [address], [facility], DPT #[id number]

## 2019-03-29 NOTE — PROGRESS NOTES
rubs or gallops. Abdomen: Soft, non-tender, non-distended with normal bowel sounds. Musculoskeletal:  No clubbing, cyanosis or edema bilaterally. Full range of motion without deformity. Skin: Skin color, texture, turgor normal.  No rashes or lesions. Neurologic: AAO ×3; no focal neurological deficits   Capillary Refill: Brisk,< 3 seconds   Peripheral Pulses: +2 palpable, equal bilaterally     Labs:   Recent Labs     03/27/19 0429 03/28/19 0423 03/29/19  0852   WBC 4.9 6.5 13.2*   HGB 11.9* 12.3 11.7*   HCT 38.1 38.7 35.2*    168 178     Recent Labs     03/27/19 0429 03/28/19  0422 03/29/19  0852    140 139   K 3.4* 4.3 3.3*    104 102   CO2 23 23 26   BUN 11 13 31*   CREATININE 0.9 0.8 1.3*   CALCIUM 8.2* 8.5 8.7     Recent Labs     03/26/19 1920   AST 41*   ALT 35   BILITOT 0.5   ALKPHOS 43     Recent Labs     03/26/19 1920   TROPONINI <0.01     Urinalysis:    Lab Results   Component Value Date    NITRU Negative 03/27/2019    BLOODU Negative 03/27/2019    SPECGRAV >=1.030 03/27/2019    GLUCOSEU Negative 03/27/2019       Radiology:  CT CHEST W CONTRAST   Final Result   1. Bilateral lower lobe atelectasis and/or pneumonia. 2. Severe emphysema. 3. Coronary artery disease. 4. Pulmonary hypertension. XR CHEST PORTABLE   Final Result   1. Tip of the endotracheal tube is 1.8 cm above the mariola. Consider   retraction by about 3 cm and repeat chest radiograph. 2. Interval improvement in bibasilar pulmonary opacities with residual   pulmonary opacity within left lung base. RECOMMENDATION:   Recommend radiographic follow-up to complete resolution. XR CHEST PORTABLE   Final Result   Bilateral lower lobe airspace disease, greater on left, likely representing   pneumonia. Follow-up to resolution is recommended. XR Neck Soft Tissue   Final Result   Negative study.          FL MODIFIED BARIUM SWALLOW W VIDEO    (Results Pending)   FL ESOPHAGRAM    (Results Pending)     Active Hospital Problems    Diagnosis Date Noted    Acute respiratory failure with hypoxia (HCC) [J96.01] 03/27/2019    Acute respiratory failure (HCC) [J96.00] 03/27/2019    Hypokalemia [E87.6] 03/27/2019    Normal anion gap metabolic acidosis [Q79.9] 03/27/2019    Anemia [D64.9] 03/27/2019    Pulmonary infiltrates [R91.8] 03/27/2019    Atelectasis [J98.11] 03/27/2019    HEBERT on CPAP [G47.33, Z99.89] 03/27/2019    ASD (atrial septal defect) [Q21.1] 03/27/2019    Aspiration pneumonia ([facility] [J69.0]     Pulmonary HTN ([facility] [I27.20] 05/07/2018    Acquired hypothyroidism [E03.9] 07/06/2017    Essential hypertension [I10] 07/17/2012    Centrilobular emphysema ([facility] [J43.2] 10/18/2011    Rheumatoid arthritis involving multiple sites with positive rheumatoid factor ([facility] [M05.79] 10/18/2011     Assessment/Plan:  1. Acute respiratory failure with combined hypoxemia/hypercapnia in the setting of COPD exacerbation requiring intubation in ED - Clinically improving   - Pulmonology consulted and assisted with management ; Extubated on 3/28/19    -Transitioned  IV Solu-Medrol 40 every 6 hours to PO steroids ; HHN   - S/p  Bronchoscopy on 3/27/19 - patient was not found to have any food particles in the airways or any endobronchial lesions     2. LLL Pneumonia - Possible bacterial but Aspiration could not be entirely ruled Out   - Patient empirically started on Levaquin in ED blood and respiratory cultures /BAL - NGTD -   Will follow final results ; continue current IV antibiotics.      3. HTN - hold home blood pressure medications as patient is currently hypotensive. Parameters placed      4. Dysphagia  (POA) S/p EGD in ED - No FB noted   - Currently being followed by SLP and was recommended for MBS      5. HypoThyroidism - restarted synthroid      6. GERD - Continue PPI      7.  Chronic RA - On Leflunomide + Prednisone - Continue     DVT Prophylaxis: Lovenox   Diet: DIET CARDIAC;  Code Status: Full Code    PT/OT Eval Status: Consulted    Dispo - okay to transfer out of ICU ; likely d/c  in the next 2 days  pending clinical improvement       The note was completed using Dragon -speech recognition software & EMR  . Every effort was made to ensure accuracy; however, inadvertent computerized transcription errors may be present.     Pacheco Durham MD

## 2019-03-29 NOTE — PROGRESS NOTES
Pt in bed alert and able to voice needs. Pt noted complaints of SOB and cant stop burping and coughing. Pt granddaughter at bedside. Oxygen sats 90% and  breath sounds diminished throughout. Oxygen increased to 2.5 liters for comfort. HOB elevated. Call light in reach.

## 2019-03-29 NOTE — PROGRESS NOTES
Occupational Therapy   Occupational Therapy Initial Assessment and Treatment Note  Date: 3/29/2019   Patient Name: Yen Jefferson  MRN: [id number]     : 1940    Date of Service: 3/29/2019    Discharge Recommendations:  [address]  OT Equipment Recommendations  Equipment Needed: No  Other: defer to next level of care    Assessment   Performance deficits / Impairments: Decreased functional mobility ; Decreased safe awareness;Decreased balance;Decreased ADL status; Decreased endurance;Decreased high-level IADLs;Decreased strength  Assessment: Pt reports typically independent with ADL/IADL, functional mobility/transfers, presenting grossly at Min-Max A in these areas at time of eval. Believe pt would benefit from further therapy at d/c to increase endurance, safety/independence in these areas prior to returning home. Continue OT tx. Prognosis: Good  Decision Making: Medium Complexity  Patient Education: role of OT, safety  REQUIRES OT FOLLOW UP: Yes  Activity Tolerance  Activity Tolerance: Patient limited by fatigue  Activity Tolerance: pt vitals stable throughout session  Safety Devices  Safety Devices in place: Yes  Type of devices: Left in chair;Chair alarm in place;Call light within reach;Gait belt;Nurse notified           Patient Diagnosis(es): The primary encounter diagnosis was Acute respiratory failure, unspecified whether with hypoxia or hypercapnia ([address] A diagnosis of Aspiration pneumonia, unspecified aspiration pneumonia type, unspecified laterality, unspecified part of lung ([address] was also pertinent to this visit. has a past medical history of Acquired hypothyroidism, Anemia, Asthma, COPD (chronic obstructive pulmonary disease) ([address] HLD (hyperlipidemia), Hypertension, Influenza A, MI (myocardial infarction) ([address] Migraine, Rheumatoid arthritis, and Wears glasses. has a past surgical history that includes Hysterectomy; knee surgery; Foot surgery;  Wrist surgery; Cataract removal with implant; Cataract removal with implant (3/23/2011); pr njx dx/ther sbst intrlmnr crv/thrc w/img gdn (Left, 8/21/2018); epidural steroid injection (Left, 12/4/2018); bronchoscopy (N/A, 3/27/2019); and bronchoscopy (3/27/2019). Restrictions  Restrictions/Precautions  Restrictions/Precautions: General Precautions, Fall Risk    Subjective   General  Chart Reviewed: Yes  Patient assessed for rehabilitation services?: Yes  Family / Caregiver Present: No  Referring Practitioner: JOSE Saenz CNP  Diagnosis: Acute respiratory failure with combined hypoxemia/hypercapnia, LLL Pneumonia, extubated 3/28  Subjective  Subjective: Pt in bed on arrival, agreeable to eval and treat, requesting to get OOB  General Comment  Comments: Per RN okay to treat and for OOB activity   Pain Assessment  Pain Assessment: 0-10  Pain Level: 0    Social/Functional History  Social/Functional History  Lives With: Family(lives with granddaughter who works in evenings)  Type of Home: House  Home Layout: Two level(states full bath is on first floor but bedroom is on second floor. States recently has been staying on first floor sleeping on air mattress due to difficulty negotiating stairs due to arthritis and SOB)  Home Access: Stairs to enter with rails  Entrance Stairs - Number of Steps: 6-8 stairs; front door has bilateral rails but pt states rotten; states back door has bilateral rails that are sturdy  Bathroom Shower/Tub: Tub/Shower unit  Bathroom Toilet: Standard  Bathroom Equipment: Shower chair  Home Equipment: 4 wheeled walker, Oxygen(CPAP at night; 2-3L prn during the day at home.  Wears all the time when leaves home)  ADL Assistance: Independent  Homemaking Responsibilities: Yes(granddaughter mainly completes but pt does help)  Ambulation Assistance: Independent(with 7BS)  Transfer Assistance: Independent  Active : Yes       Objective        Orientation  Overall Orientation Status: Within Functional Limits     Balance  Sitting Balance: Supervision  Standing Balance: Minimal assistance  Standing Balance  Time: 2-3 minutes  Activity: mobility   Sit to stand: Minimal assistance  Stand to sit: Minimal assistance  Functional Mobility  Functional - Mobility Device: Rolling Walker  Activity: Other(bed <> chair)  Assist Level: Minimal assistance  ADL  LE Dressing: Maximum assistance(to don socks seated EOB)  Toileting: Dependent/Total(vincent)  Tone RUE  RUE Tone: Normotonic  Tone LUE  LUE Tone: Normotonic     Bed mobility  Supine to Sit: Stand by assistance(HOB elevated, use of bedrail)  Sit to Supine: Unable to assess(pt left up in chair)  Scooting: Stand by assistance(to EOB)  Transfers  Sit to stand: Minimal assistance  Stand to sit: Minimal assistance     Cognition  Overall Cognitive Status: Exceptions  Arousal/Alertness: Delayed responses to stimuli  Following Commands: Follows one step commands with increased time; Follows one step commands with repetition  Attention Span: Attends with cues to redirect  Safety Judgement: Decreased awareness of need for assistance  Problem Solving: Assistance required to generate solutions;Assistance required to implement solutions  Initiation: Requires cues for some  Sequencing: Requires cues for some      LUE AROM (degrees)  LUE AROM : WFL  Left Hand AROM (degrees)  Left Hand AROM: WFL  RUE AROM (degrees)  RUE AROM : WFL  Right Hand AROM (degrees)  Right Hand AROM: WFL  LUE Strength  Gross LUE Strength: WFL  RUE Strength  Gross RUE Strength: WFL        Plan   Plan  Times per week: 3-5x/week  Current Treatment Recommendations: Strengthening, Balance Training, Functional Mobility Training, Endurance Training, Patient/Caregiver Education & Training, Equipment Evaluation, Education, & procurement, Self-Care / ADL, Home Management Training, Safety Education & Training      AM-PAC Score  AM-PAC Inpatient Daily Activity Raw Score: 14  AM-PAC Inpatient ADL T-Scale Score : 33.39  ADL Inpatient CMS 0-100% Score: 59.67  ADL Inpatient CMS G-Code Modifier : CK    Goals  Short term goals  Time Frame for Short term goals: By 4/5/19  Short term goal 1: Pt will complete functional transfers with SBA   Short term goal 2: Pt will complete LE dressing with Min A  Short term goal 3: Pt will perform 3-5 minutes dynamic standing activity with CGA by 4/2  Patient Goals   Patient goals : \"to go home\"       Therapy Time   Individual Concurrent Group Co-treatment   Time In 1029(10 minutes for eval)         Time Out 1052         Minutes 23         Timed Code Treatment Minutes: 13 Minutes     This note to serve as d/c summary should pt d/c prior to next session.     Rafal Solorio OTR/L

## 2019-03-29 NOTE — PROGRESS NOTES
INPATIENT PULMONARY CRITICAL CARE PROGRESS NOTE      Reason for visit     acute respiratory failure with hypoxia 2/2 pneumonia. SUBJECTIVE:  Patient's sedation was tapered to d/c and patient was given trial of CPAP with PSV which he tolerated well and patient was generating good tidal volume and had maintained acid-base balance and hence was extubated and put on nasal cannula oxygenation ;patient when seen this morning was tolerating extubation well with no increased shortness of breath/increased work of breathing ;, patient is afebrile this morning there was a question of dysphagia and MBS and esophagogram ordered as per speech recommendations , patient has sinus rhythm on the monitor, patient was on 2 lts/min of nasal cannula oxygenation with Sao2 of 92%; patient had borderline urine output with cumulative fluid balance of +1.3 L, patient's glycemic control was acceptable , no other pertinent review of system couldn't be obtained          Physical Exam:  Blood pressure 114/72, pulse 90, temperature 97.6 °F (36.4 °C), temperature source Oral, resp. rate 16, height 5' 6\" (1.676 m), weight 163 lb 2.3 oz (74 kg), SpO2 94 %, not currently breastfeeding.'     Constitutional:  No acute distress. On nasal cannula oxygenation   HENT:  Oropharynx is clear and moist. No thyromegaly. Eyes:  Conjunctivae are normal. Pupils equal, round, and reactive to light. No scleral icterus. Neck: . No tracheal deviation present. No obvious thyroid mass. Cardiovascular: Normal rate, regular rhythm, normal heart sounds. No right ventricular heave. No lower extremity edema. Pulmonary/Chest: No wheezes. No rales. Chest wall is not dull to percussion. No accessory muscle usage or stridor. decreased BSI   Abdominal: Soft. Bowel sounds present. No distension or hernia. No tenderness. Musculoskeletal: No cyanosis. No clubbing. No obvious joint deformity. Lymphadenopathy: No cervical or supraclavicular adenopathy.    Skin: Skin is warm and dry. No rash or nodules on the exposed extremities. Neurologic: alert and communicative when seen with no focal cranial N deficits             Results:  CBC:   Recent Labs     03/27/19 0429 03/28/19 0423 03/29/19  0852   WBC 4.9 6.5 13.2*   HGB 11.9* 12.3 11.7*   HCT 38.1 38.7 35.2*   MCV 91.8 89.7 86.4    168 178     BMP:   Recent Labs     03/27/19 0429 03/28/19  0422 03/29/19  0852    140 139   K 3.4* 4.3 3.3*    104 102   CO2 23 23 26   BUN 11 13 31*   CREATININE 0.9 0.8 1.3*     LIVER PROFILE:   Recent Labs     03/26/19 1920   AST 41*   ALT 35   BILITOT 0.5   ALKPHOS 43     PT/INR: No results for input(s): PROTIME, INR in the last 72 hours. APTT: No results for input(s): APTT in the last 72 hours. UA:  Recent Labs     03/27/19  0018   COLORU Yellow   PHUR 5.5   CLARITYU Clear   SPECGRAV >=1.030   LEUKOCYTESUR Negative   UROBILINOGEN 0.2   BILIRUBINUR Negative   BLOODU Negative   GLUCOSEU Negative       Imaging:  I have reviewed radiology images personally. XR ABDOMEN (KUB) (SINGLE AP VIEW)   Final Result   Nasogastric tube tip overlies the proximal stomach with side hole just beyond   the GE junction. FL ESOPHAGRAM   Preliminary Result   Marked esophageal dysmotility with poor primary peristalsis and visualization   of tertiary contractions as described above. FL MODIFIED BARIUM SWALLOW W VIDEO   Preliminary Result   With exception of a single episode of very minimal penetration of the larynx   with ingestion of thin liquid barium via a straw, the examination is   otherwise unremarkable with no evidence of significant penetration of the   larynx or aspiration as described above. Please see separate speech pathology report for full discussion of findings   and recommendations. CT CHEST W CONTRAST   Final Result   1. Bilateral lower lobe atelectasis and/or pneumonia. 2. Severe emphysema. 3. Coronary artery disease.    4. Pulmonary hypertension. XR CHEST PORTABLE   Final Result   1. Tip of the endotracheal tube is 1.8 cm above the mariola. Consider   retraction by about 3 cm and repeat chest radiograph. 2. Interval improvement in bibasilar pulmonary opacities with residual   pulmonary opacity within left lung base. RECOMMENDATION:   Recommend radiographic follow-up to complete resolution. XR CHEST PORTABLE   Final Result   Bilateral lower lobe airspace disease, greater on left, likely representing   pneumonia. Follow-up to resolution is recommended. XR Neck Soft Tissue   Final Result   Negative study. Results for Joshua Perez" (MRN [id number]) as of 3/29/2019 18:58   Ref. Range 3/27/2019 04:29 3/28/2019 04:22 3/29/2019 08:52   Sodium Latest Ref Range: 136 - 145 mmol/L 141 140 139   Potassium Latest Ref Range: 3.5 - 5.1 mmol/L 3.4 (L) 4.3 3.3 (L)   Chloride Latest Ref Range: 99 - 110 mmol/L 107 104 102   CO2 Latest Ref Range: 21 - 32 mmol/L 23 23 26   BUN Latest Ref Range: 7 - 20 mg/dL 11 13 31 (H)   Creatinine Latest Ref Range: 0.6 - 1.2 mg/dL 0.9 0.8 1.3 (H)   Anion Gap Latest Ref Range: 3 - 16  11 13 11   GFR Non- Latest Ref Range: >60  >60 >60 40 (A)   GFR  Latest Ref Range: >60  >60 >60 48 (A)   Magnesium Latest Ref Range: 1.80 - 2.40 mg/dL 1.60 (L) 1.60 (L) 2.20   Glucose Latest Ref Range: 70 - 99 mg/dL 93 136 (H) 130 (H)   Calcium Latest Ref Range: 8.3 - 10.6 mg/dL 8.2 (L) 8.5 8.7       Results for Joshua Perez" (MRN [id number]) as of 3/29/2019 18:58   Ref.  Range 3/26/2019 19:20 3/27/2019 04:29 3/28/2019 04:23 3/29/2019 08:52   WBC Latest Ref Range: 4.0 - 11.0 K/uL 5.3 4.9 6.5 13.2 (H)   RBC Latest Ref Range: 4.00 - 5.20 M/uL 4.30 4.15 4.31 4.08   Hemoglobin Quant Latest Ref Range: 12.0 - 16.0 g/dL 12.4 11.9 (L) 12.3 11.7 (L)   Hematocrit Latest Ref Range: 36.0 - 48.0 % 37.4 38.1 38.7 35.2 (L)   MCV Latest Ref Range: 80.0 - 100.0 fL 87.0 91.8 89.7 86.4   MCH Latest Ref Range: 26.0 - 34.0 pg 28.9 28.6 28.6 28.6   MCHC Latest Ref Range: 31.0 - 36.0 g/dL 33.2 31.2 31.9 33.1   MPV Latest Ref Range: 5.0 - 10.5 fL 7.7 8.6 7.5 7.7   RDW Latest Ref Range: 12.4 - 15.4 % 14.0 15.1 14.9 13.9   Platelet Count Latest Ref Range: 135 - 450 K/uL 201 166 168 178     Results for Titi Cool" (MRN [id number]) as of 3/29/2019 18:58   Ref. Range 3/27/2019 00:08 3/27/2019 13:35   CULTURE BLOOD #2 Unknown Rpt    RESPIRATORY CULTURE Unknown  Rpt   Culture, Blood 2 Unknown No Growth to date. .. Gram Stain Result Unknown  3+ WBC's (Polymor. .. CULTURE, RESPIRATORY Unknown  No growth 36-48 h. .. Results for Titi Cool" (MRN [id number]) as of 3/29/2019 18:58   Ref.  Range 3/27/2019 00:18 3/27/2019 03:18 3/28/2019 10:46 3/28/2019 16:09   Hemoglobin, Art, Extended Latest Ref Range: 12.0 - 16.0 g/dL  11.2 (L) 11.0 (L) 11.9 (L)   pH, Arterial Latest Ref Range: 7.350 - 7.450   7.252 (L) 7.410 7.470 (H)   pCO2, Arterial Latest Ref Range: 35.0 - 45.0 mmHg  55.4 (H) 34.8 (L) 31.8 (L)   pO2, Arterial Latest Ref Range: 75.0 - 108.0 mmHg  90.7 106.7 82.7   HCO3, Arterial Latest Ref Range: 21.0 - 29.0 mmol/L  23.9 21.6 22.6   TCO2 (calc), Art Latest Ref Range: Not Established mmol/L  25.6 22.6 23.6   Base Excess, Arterial Latest Ref Range: -3.0 - 3.0 mmol/L  -3.8 (L) -2.5 -0.4   O2 Sat, Arterial Latest Ref Range: >92 %  95.2 97.4 96.1   O2 Content, Arterial Latest Ref Range: Not Established mL/dL  15 15 16   Methemoglobin, Arterial Latest Ref Range: <1.5 %  0.2 0.1 0.1   Carboxyhgb, Arterial Latest Ref Range: 0.0 - 1.5 %  0.9 0.3 0.1   pH, [place] Latest Ref Range: 7.350 - 7.450  7.257 (L)          Assessment:  Principal Problem:    Acute respiratory failure with hypoxia (HCC)  Active Problems:    Centrilobular emphysema (HCC)    Rheumatoid arthritis involving multiple sites with positive rheumatoid factor (HCC)    Essential hypertension    Acquired hypothyroidism    Pulmonary HTN (HCC)    Acute respiratory failure (HCC)    Hypokalemia    Normal anion gap metabolic acidosis    Anemia    Pulmonary infiltrates    Atelectasis    HEBERT on CPAP    ASD (atrial septal defect)    Aspiration pneumonia (HCC)  Resolved Problems:    * No resolved hospital problems.  *          Plan:   · O2 supplementation  keep Sao2 between 90-94% ONLY  · Pulmonary toilet  · Status post bronchoscopy-results are pending   · Patient got one dose of Meropenem and now of IV levaquin -needs to be titrated as per clinical status and c/s  · Family told to get patient's home BIPAP  · Bronchodilators  · IV solumedrol being changed to PO prednsione   · MBS and esophagogram today  · Will hold Arava for now  · Aldactone being d/haseeb for now as the renal function is worsening   · IM to decide upon continuation of cozaar  · Monitor I/O and BMP  · Correct electrolytes on PRN basis  · PUD and DVT prophylaxis  · PT/OT consults requested     Case d/w family and ICU team and family    Patient can be transferred from the ICU to progressive care unit from [facility] stand point        Electronically signed by:  Swapna Ruiz MD    3/29/2019    6:51 PM.

## 2019-03-30 PROBLEM — K22.4 ESOPHAGEAL MOTILITY DISORDER: Status: ACTIVE | Noted: 2019-03-30

## 2019-03-30 LAB
ANION GAP SERPL CALCULATED.3IONS-SCNC: 10 MMOL/L (ref 3–16)
BUN BLDV-MCNC: 34 MG/DL (ref 7–20)
CALCIUM SERPL-MCNC: 8.5 MG/DL (ref 8.3–10.6)
CHLORIDE BLD-SCNC: 106 MMOL/L (ref 99–110)
CO2: 25 MMOL/L (ref 21–32)
CREAT SERPL-MCNC: 1.1 MG/DL (ref 0.6–1.2)
GFR AFRICAN AMERICAN: 58
GFR NON-AFRICAN AMERICAN: 48
GLUCOSE BLD-MCNC: 102 MG/DL (ref 70–99)
GLUCOSE BLD-MCNC: 102 MG/DL (ref 70–99)
GLUCOSE BLD-MCNC: 106 MG/DL (ref 70–99)
GLUCOSE BLD-MCNC: 109 MG/DL (ref 70–99)
GLUCOSE BLD-MCNC: 116 MG/DL (ref 70–99)
GLUCOSE BLD-MCNC: 116 MG/DL (ref 70–99)
GLUCOSE BLD-MCNC: 134 MG/DL (ref 70–99)
HCT VFR BLD CALC: 34.3 % (ref 36–48)
HEMOGLOBIN: 11.2 G/DL (ref 12–16)
MCH RBC QN AUTO: 28.5 PG (ref 26–34)
MCHC RBC AUTO-ENTMCNC: 32.6 G/DL (ref 31–36)
MCV RBC AUTO: 87.5 FL (ref 80–100)
PDW BLD-RTO: 14.2 % (ref 12.4–15.4)
PERFORMED ON: ABNORMAL
PLATELET # BLD: 162 K/UL (ref 135–450)
PMV BLD AUTO: 8.7 FL (ref 5–10.5)
POTASSIUM SERPL-SCNC: 3.9 MMOL/L (ref 3.5–5.1)
RBC # BLD: 3.92 M/UL (ref 4–5.2)
SODIUM BLD-SCNC: 141 MMOL/L (ref 136–145)
WBC # BLD: 12.8 K/UL (ref 4–11)

## 2019-03-30 PROCEDURE — 2060000000 HC ICU INTERMEDIATE R&B

## 2019-03-30 PROCEDURE — 80048 BASIC METABOLIC PNL TOTAL CA: CPT

## 2019-03-30 PROCEDURE — 2700000000 HC OXYGEN THERAPY PER DAY

## 2019-03-30 PROCEDURE — 6370000000 HC RX 637 (ALT 250 FOR IP): Performed by: INTERNAL MEDICINE

## 2019-03-30 PROCEDURE — 36415 COLL VENOUS BLD VENIPUNCTURE: CPT

## 2019-03-30 PROCEDURE — 6360000002 HC RX W HCPCS: Performed by: INTERNAL MEDICINE

## 2019-03-30 PROCEDURE — 85027 COMPLETE CBC AUTOMATED: CPT

## 2019-03-30 PROCEDURE — 99233 SBSQ HOSP IP/OBS HIGH 50: CPT | Performed by: INTERNAL MEDICINE

## 2019-03-30 PROCEDURE — C9113 INJ PANTOPRAZOLE SODIUM, VIA: HCPCS | Performed by: INTERNAL MEDICINE

## 2019-03-30 PROCEDURE — 94640 AIRWAY INHALATION TREATMENT: CPT

## 2019-03-30 PROCEDURE — 2580000003 HC RX 258: Performed by: INTERNAL MEDICINE

## 2019-03-30 PROCEDURE — 94761 N-INVAS EAR/PLS OXIMETRY MLT: CPT

## 2019-03-30 RX ORDER — IPRATROPIUM BROMIDE AND ALBUTEROL SULFATE 2.5; .5 MG/3ML; MG/3ML
1 SOLUTION RESPIRATORY (INHALATION)
Status: DISCONTINUED | OUTPATIENT
Start: 2019-03-30 | End: 2019-04-03 | Stop reason: HOSPADM

## 2019-03-30 RX ADMIN — IPRATROPIUM BROMIDE AND ALBUTEROL SULFATE 1 AMPULE: .5; 3 SOLUTION RESPIRATORY (INHALATION) at 08:27

## 2019-03-30 RX ADMIN — LABETALOL HYDROCHLORIDE 100 MG: 200 TABLET, FILM COATED ORAL at 09:24

## 2019-03-30 RX ADMIN — Medication 10 ML: at 21:09

## 2019-03-30 RX ADMIN — Medication 10 ML: at 21:08

## 2019-03-30 RX ADMIN — LABETALOL HYDROCHLORIDE 100 MG: 200 TABLET, FILM COATED ORAL at 21:03

## 2019-03-30 RX ADMIN — LEVOFLOXACIN 750 MG: 5 INJECTION, SOLUTION INTRAVENOUS at 09:25

## 2019-03-30 RX ADMIN — IPRATROPIUM BROMIDE AND ALBUTEROL SULFATE 1 AMPULE: .5; 3 SOLUTION RESPIRATORY (INHALATION) at 15:22

## 2019-03-30 RX ADMIN — ENOXAPARIN SODIUM 40 MG: 40 INJECTION SUBCUTANEOUS at 09:25

## 2019-03-30 RX ADMIN — LOSARTAN POTASSIUM 50 MG: 25 TABLET, FILM COATED ORAL at 09:24

## 2019-03-30 RX ADMIN — PANTOPRAZOLE SODIUM 40 MG: 40 INJECTION, POWDER, FOR SOLUTION INTRAVENOUS at 09:24

## 2019-03-30 RX ADMIN — Medication 10 ML: at 09:25

## 2019-03-30 RX ADMIN — LEVOTHYROXINE SODIUM 100 MCG: 100 TABLET ORAL at 05:26

## 2019-03-30 RX ADMIN — IPRATROPIUM BROMIDE AND ALBUTEROL SULFATE 1 AMPULE: .5; 3 SOLUTION RESPIRATORY (INHALATION) at 19:48

## 2019-03-30 RX ADMIN — ATORVASTATIN CALCIUM 10 MG: 10 TABLET, FILM COATED ORAL at 09:24

## 2019-03-30 RX ADMIN — PREDNISONE 40 MG: 20 TABLET ORAL at 09:24

## 2019-03-30 NOTE — FLOWSHEET NOTE
03/30/19 0825   Vital Signs   Temp 97.2 °F (36.2 °C)   Temp Source Oral   Pulse 81   Heart Rate Source Monitor   Resp 18   BP (!) 152/83   BP Location Right upper arm   MAP (mmHg) 106   Patient Position Semi fowlers   Level of Consciousness 0   MEWS Score 1   Patient Currently in Pain Denies   Oxygen Therapy   SpO2 98 %   O2 Device Nasal cannula   O2 Flow Rate (L/min) 2 L/min   Patient resting in bed. Tolerating NG tube feed well. No s/s of distress, VSS, denies chest pain and SOB.  WCM

## 2019-03-30 NOTE — PROGRESS NOTES
Pt Alert and oriented x 4, No c/o voiced. Cont. On TF now @ 30 ml/hr. Tolerating well. Pt is on 2 L per NC. No c/o SOB. Will continue to monitor.  MARY

## 2019-03-30 NOTE — PROGRESS NOTES
Pt resting quietly in bed, TF started by RN Oralejandra Robert) per MD order, Pt kept comfortable . Requested for Ice chips, and was given by RN, No choking/coughing episode noted. Kept comfortable. Safety/fall prevention maintained. Bed locked, alarm on, Personal belongings and call light within reach. Will continue to monitor.  MARY

## 2019-03-30 NOTE — PROGRESS NOTES
GI Progress Note      SUBJECTIVE:  Denies nausea, emesis. Breathing easier.     OBJECTIVE      Medications    Current Facility-Administered Medications: ipratropium-albuterol (DUONEB) nebulizer solution 1 ampule, 1 ampule, Inhalation, Q4H WA  LIP BALM ointment, , Topical, PRN  atorvastatin (LIPITOR) tablet 10 mg, 10 mg, Oral, Daily  labetalol (NORMODYNE) tablet 100 mg, 100 mg, Oral, 2 times per day  levothyroxine (SYNTHROID) tablet 100 mcg, 100 mcg, Oral, Daily  losartan (COZAAR) tablet 50 mg, 50 mg, Oral, Daily  pantoprazole (PROTONIX) injection 40 mg, 40 mg, Intravenous, Daily  sodium chloride flush 0.9 % injection 10 mL, 10 mL, Intravenous, 2 times per day  sodium chloride flush 0.9 % injection 10 mL, 10 mL, Intravenous, PRN  magnesium hydroxide (MILK OF MAGNESIA) 400 MG/5ML suspension 30 mL, 30 mL, Oral, Daily PRN  ondansetron (ZOFRAN) injection 4 mg, 4 mg, Intravenous, Q6H PRN  enoxaparin (LOVENOX) injection 40 mg, 40 mg, Subcutaneous, Daily  [COMPLETED] methylPREDNISolone sodium (SOLU-MEDROL) injection 40 mg, 40 mg, Intravenous, Q6H **FOLLOWED BY** predniSONE (DELTASONE) tablet 40 mg, 40 mg, Oral, Daily  acetaminophen (TYLENOL) tablet 650 mg, 650 mg, Oral, Q4H PRN  levofloxacin (LEVAQUIN) 750 MG/150ML infusion 750 mg, 750 mg, Intravenous, Q24H  Physical    VITALS:  BP (!) 152/83   Pulse 81   Temp 97.2 °F (36.2 °C) (Oral)   Resp 18   Ht 5' 6\" (1.676 m)   Wt 164 lb 3.2 oz (74.5 kg)   SpO2 96%   BMI 26.50 kg/m²   ABD: soft, NT, ND, NABs  Data    CBC with Differential:    Lab Results   Component Value Date    WBC 12.8 03/30/2019    RBC 3.92 03/30/2019    HGB 11.2 03/30/2019    HCT 34.3 03/30/2019     03/30/2019    MCV 87.5 03/30/2019    MCH 28.5 03/30/2019    MCHC 32.6 03/30/2019    RDW 14.2 03/30/2019    SEGSPCT 45.4 04/05/2012    LYMPHOPCT 13.7 01/15/2019    MONOPCT 8.7 01/15/2019    EOSPCT 0.8 04/05/2012    BASOPCT 0.7 01/15/2019    MONOSABS 0.7 01/15/2019    LYMPHSABS 1.1 01/15/2019    EOSABS 0.0

## 2019-03-30 NOTE — PROGRESS NOTES
INPATIENT PULMONARY CRITICAL CARE PROGRESS NOTE      Reason for visit     acute respiratory failure with hypoxia 2/2 pneumonia. SUBJECTIVE:  Patient when seen was  tolerating extubation well with no increased shortness of breath/increased work of breathing ;, patient is afebrile this morning and is hemodynamically maintained;patient underwent MBS and esophagogram and was thought to have esophageal motility issues andwith significant delayed emptying from the esophagus. Presbyesophagus ;patient has NGT in place now;  , patient has sinus rhythm on the monitor, patient was on 2 lts/min of nasal cannula oxygenation with Sao2 of 93%; patient had borderline urine output with cumulative fluid balance of +985 ml , patient's glycemic control was acceptable , no other pertinent review of system couldn't be obtained          Physical Exam:  Blood pressure (!) 143/73, pulse 84, temperature 97.8 °F (36.6 °C), temperature source Oral, resp. rate 18, height 5' 6\" (1.676 m), weight 164 lb 3.2 oz (74.5 kg), SpO2 93 %, not currently breastfeeding.'     Constitutional:  No acute distress. On nasal cannula oxygenation   HENT:  Oropharynx is clear and moist. No thyromegaly. Eyes:  Conjunctivae are normal. Pupils equal, round, and reactive to light. No scleral icterus. Neck: . No tracheal deviation present. No obvious thyroid mass. Cardiovascular: Normal rate, regular rhythm, normal heart sounds. No right ventricular heave. No lower extremity edema. Pulmonary/Chest: No wheezes. No rales. Chest wall is not dull to percussion. No accessory muscle usage or stridor. decreased BSI   Abdominal: Soft. Bowel sounds present. No distension or hernia. No tenderness. Musculoskeletal: No cyanosis. No clubbing. No obvious joint deformity. Lymphadenopathy: No cervical or supraclavicular adenopathy. Skin: Skin is warm and dry. No rash or nodules on the exposed extremities.   Neurologic: sleeping when seen Results:  CBC:   Recent Labs     03/28/19  0423 03/29/19  0852 03/30/19  0514   WBC 6.5 13.2* 12.8*   HGB 12.3 11.7* 11.2*   HCT 38.7 35.2* 34.3*   MCV 89.7 86.4 87.5    178 162     BMP:   Recent Labs     03/28/19  0422 03/29/19  0852 03/30/19  0514    139 141   K 4.3 3.3* 3.9    102 106   CO2 23 26 25   BUN 13 31* 34*   CREATININE 0.8 1.3* 1.1       Imaging:  I have reviewed radiology images personally. XR ABDOMEN (KUB) (SINGLE AP VIEW)   Final Result   Nasogastric tube tip overlies the proximal stomach with side hole just beyond   the GE junction. FL ESOPHAGRAM   Preliminary Result   Marked esophageal dysmotility with poor primary peristalsis and visualization   of tertiary contractions as described above. FL MODIFIED BARIUM SWALLOW W VIDEO   Preliminary Result   With exception of a single episode of very minimal penetration of the larynx   with ingestion of thin liquid barium via a straw, the examination is   otherwise unremarkable with no evidence of significant penetration of the   larynx or aspiration as described above. Please see separate speech pathology report for full discussion of findings   and recommendations. CT CHEST W CONTRAST   Final Result   1. Bilateral lower lobe atelectasis and/or pneumonia. 2. Severe emphysema. 3. Coronary artery disease. 4. Pulmonary hypertension. XR CHEST PORTABLE   Final Result   1. Tip of the endotracheal tube is 1.8 cm above the mariola. Consider   retraction by about 3 cm and repeat chest radiograph. 2. Interval improvement in bibasilar pulmonary opacities with residual   pulmonary opacity within left lung base. RECOMMENDATION:   Recommend radiographic follow-up to complete resolution. XR CHEST PORTABLE   Final Result   Bilateral lower lobe airspace disease, greater on left, likely representing   pneumonia. Follow-up to resolution is recommended.          XR Neck Soft Tissue Final Result   Negative study. Results for Shahid King" (MRN [id number]) as of 3/30/2019 16:26   Ref. Range 3/28/2019 04:22 3/29/2019 08:52 3/29/2019 20:22 3/30/2019 01:17 3/30/2019 05:14   Sodium Latest Ref Range: 136 - 145 mmol/L 140 139   141   Potassium Latest Ref Range: 3.5 - 5.1 mmol/L 4.3 3.3 (L)   3.9   Chloride Latest Ref Range: 99 - 110 mmol/L 104 102   106   CO2 Latest Ref Range: 21 - 32 mmol/L 23 26   25   BUN Latest Ref Range: 7 - 20 mg/dL 13 31 (H)   34 (H)   Creatinine Latest Ref Range: 0.6 - 1.2 mg/dL 0.8 1.3 (H)   1.1   Anion Gap Latest Ref Range: 3 - 16  13 11   10   GFR Non- Latest Ref Range: >60  >60 40 (A)   48 (A)   GFR  Latest Ref Range: >60  >60 48 (A)   58 (A)   Magnesium Latest Ref Range: 1.80 - 2.40 mg/dL 1.60 (L) 2.20      Glucose Latest Ref Range: 70 - 99 mg/dL 136 (H) 130 (H)   106 (H)   POC Glucose Latest Ref Range: 70 - 99 mg/dl   108 (H) 116 (H)    Calcium Latest Ref Range: 8.3 - 10.6 mg/dL 8.5 8.7   8.5     Results for Shahid King" (MRN [id number]) as of 3/30/2019 16:26   Ref.  Range 1/15/2019 16:40 3/26/2019 19:20 3/27/2019 04:29 3/28/2019 04:23 3/29/2019 08:52 3/30/2019 05:14   WBC Latest Ref Range: 4.0 - 11.0 K/uL 7.8 5.3 4.9 6.5 13.2 (H) 12.8 (H)   RBC Latest Ref Range: 4.00 - 5.20 M/uL 4.45 4.30 4.15 4.31 4.08 3.92 (L)   Hemoglobin Quant Latest Ref Range: 12.0 - 16.0 g/dL 12.5 12.4 11.9 (L) 12.3 11.7 (L) 11.2 (L)   Hematocrit Latest Ref Range: 36.0 - 48.0 % 38.4 37.4 38.1 38.7 35.2 (L) 34.3 (L)   MCV Latest Ref Range: 80.0 - 100.0 fL 86.3 87.0 91.8 89.7 86.4 87.5   MCH Latest Ref Range: 26.0 - 34.0 pg 28.1 28.9 28.6 28.6 28.6 28.5   MCHC Latest Ref Range: 31.0 - 36.0 g/dL 32.5 33.2 31.2 31.9 33.1 32.6   MPV Latest Ref Range: 5.0 - 10.5 fL 7.1 7.7 8.6 7.5 7.7 8.7   RDW Latest Ref Range: 12.4 - 15.4 % 17.1 (H) 14.0 15.1 14.9 13.9 14.2   Platelet Count Latest Ref Range: 135 - 450 K/uL 245 201 166 168 178 162

## 2019-03-30 NOTE — PROGRESS NOTES
Pt assisted to [facility] with 2 staff @ bedside, per her  Request,attempted to have BM with no success, assisted back in  Bed had coughing spells and coughed out thick clear phlegm( Mod. Amount). Oral care rendered. Pt kept comfortable, verbalized relief after. Will continue to monitor.  MARY

## 2019-03-30 NOTE — PROGRESS NOTES
Hospitalist Progress Note      PCP: Fabian Burgess MD    Date of Admission: 3/26/2019    Chief Complaint: SOB     Subjective: Patient seen and examined this AM . Grand daughter at beside . Had esophagogram on 3/29/19 suggestive of esophageal dysmotility. GI evaluated discussed options family to decide to proceed with feeding tube-scheduled for Monday, 4/1/19. Currently on NG tube feeds started yesterday and working towards goal rate . - Patient feels better and offers no new complaints. RN advised to wean oxygen as able   - Patient Offers no new complaints at this time. Medications:  Reviewed  Scheduled Medications    ipratropium-albuterol  1 ampule Inhalation Q4H WA    atorvastatin  10 mg Oral Daily    labetalol  100 mg Oral 2 times per day    levothyroxine  100 mcg Oral Daily    losartan  50 mg Oral Daily    pantoprazole  40 mg Intravenous Daily    sodium chloride flush  10 mL Intravenous 2 times per day    enoxaparin  40 mg Subcutaneous Daily    predniSONE  40 mg Oral Daily    levofloxacin  750 mg Intravenous Q24H     PRN Meds: LIP BALM, sodium chloride flush, magnesium hydroxide, ondansetron, acetaminophen      Intake/Output Summary (Last 24 hours) at 3/30/2019 1507  Last data filed at 3/30/2019 0925  Gross per 24 hour   Intake 418 ml   Output 825 ml   Net -407 ml       Physical Exam Performed:  /71   Pulse 79   Temp 98 °F (36.7 °C) (Oral)   Resp 18   Ht 5' 6\" (1.676 m)   Wt 164 lb 3.2 oz (74.5 kg)   SpO2 93%   BMI 26.50 kg/m²     General appearance:   in no apparent distress; on oxygen by nasal cannula 2 L/m  HEENT:  Normal cephalic, atraumatic without obvious deformity. Pupils equal, round, and reactive to light. Extra ocular muscles intact. Conjunctivae/corneas clear. NG tube in situ  Neck: Supple, with full range of motion. No jugular venous distention. Trachea midline. Respiratory:  Normal respiratory effort.  Clear to auscultation, bilaterally without emphysema. 3. Coronary artery disease. 4. Pulmonary hypertension. XR CHEST PORTABLE   Final Result   1. Tip of the endotracheal tube is 1.8 cm above the mariola. Consider   retraction by about 3 cm and repeat chest radiograph. 2. Interval improvement in bibasilar pulmonary opacities with residual   pulmonary opacity within left lung base. RECOMMENDATION:   Recommend radiographic follow-up to complete resolution. XR CHEST PORTABLE   Final Result   Bilateral lower lobe airspace disease, greater on left, likely representing   pneumonia. Follow-up to resolution is recommended. XR Neck Soft Tissue   Final Result   Negative study. Active Hospital Problems    Diagnosis Date Noted    DANY (acute kidney injury) ([facility] [N17.9] 03/29/2019    Acute respiratory failure with hypoxia (HCC) [J96.01] 03/27/2019    Acute respiratory failure (HCC) [J96.00] 03/27/2019    Hypokalemia [E87.6] 03/27/2019    Normal anion gap metabolic acidosis [S71.3] 03/27/2019    Anemia [D64.9] 03/27/2019    Pulmonary infiltrates [R91.8] 03/27/2019    Atelectasis [J98.11] 03/27/2019    HEBERT on CPAP [G47.33, Z99.89] 03/27/2019    ASD (atrial septal defect) [Q21.1] 03/27/2019    Aspiration pneumonia ([address] [J69.0]     Pulmonary HTN ([address] [I27.20] 05/07/2018    Acquired hypothyroidism [E03.9] 07/06/2017    Essential hypertension [I10] 07/17/2012    Centrilobular emphysema ([address] [J43.2] 10/18/2011    Rheumatoid arthritis involving multiple sites with positive rheumatoid factor ([facility] [address] [M05.79] 10/18/2011     Assessment/Plan:  1. Acute respiratory failure with combined hypoxemia/hypercapnia in the setting of COPD exacerbation requiring intubation in ED - Clinically improved  - Pulmonology consulted and assisted with management ;  Extubated on 3/28/19    -Transitioned  IV Solu-Medrol to PO steroids ; HHN   - S/p  Bronchoscopy on 3/27/19 - patient was not found to have any food particles in the airways or any endobronchial lesions     2. LLL Pneumonia - Possible bacterial but Aspiration could not be entirely ruled Out   - Patient empirically started on Levaquin in ED blood and respiratory cultures /BAL - NGTD -   Will follow final results ; continue current IV antibiotics.      3. HTN - held home blood pressure medications since admission as BP optimal .  Resume BP meds with  Parameters       4. Dysphagia  secondary to  esophageal dysmotility disorder (POA) S/p EGD in ED - No FB noted   - Evaluated  by SLP and was recommended for MBS - Showed esophageal dysmotility  - GI recommended alternative feeds through PEG tube. Family to decide. Currently on NG tube feeds   - Currently patient and family agreeable to proceed. Tentatively scheduled for PEG tube placement on Monday, 4/1/19.     5. HypoThyroidism - restarted synthroid      6. GERD - Continue PPI      7. Chronic RA - On Leflunomide + Prednisone - Continue     DVT Prophylaxis: Lovenox   Diet: DIET TUBE FEED CONTINUOUS/CYCLIC NPO; Semi-elemental (Vital ); Nasogastric; Continuous; 10; 65  Code Status: Full Code    PT/OT Eval Status: Consulted    Dispo -  likely d/c  in the next 2 -3 days  pending to placement on Monday, 4/1/19. The note was completed using Dragon -speech recognition software & EMR  . Every effort was made to ensure accuracy; however, inadvertent computerized transcription errors may be present.     Melissa Mendez MD

## 2019-03-30 NOTE — PROGRESS NOTES
3/27/2019    BRONCHOSCOPY THERAPUTIC ASPIRATION INITIAL performed by Sudhir Padlila MD at [address] IMPLANT      CATARACT REMOVAL WITH IMPLANT  3/23/2011    EPIDURAL STEROID INJECTION Left 12/4/2018    LEFT LUMBAR FOUR, LUMBAR FIVE TRANSFORAMINAL EPIDURAL STEROID INJECTION SITE CONFIRMED BY FLUOROSCOPY performed by Adis Devries MD at [address] [place] [address] little toe    HYSTERECTOMY      total, fibroids    KNEE SURGERY      right    LA NJX DX/THER SBST INTRLMNR CRV/THRC W/IMG GDN Left 8/21/2018    LEFT LUMBAR FOUR/ LUMBAR FIVE CYST ASPIRATION SITE CONFIRMED BY FLUOROSCOPY performed by Adis Devries MD at [place] [address]       Level of Consciousness: Alert, Oriented, and Cooperative = 0    Level of Activity: Walking with assistance = 1    Respiratory Pattern: Regular Pattern; RR 8-20 = 0    Breath Sounds: Diminshed bilaterally and/or crackles = 2    Sputum  Sputum Color: None, Tenacity: None, Sputum How Obtained: Cough on request  Cough: Strong, productive = 1    Vital Signs   /80   Pulse 83   Temp 97.7 °F (36.5 °C) (Oral)   Resp 19   Ht 5' 6\" (1.676 m)   Wt 163 lb 2.3 oz (74 kg)   SpO2 94%   BMI 26.33 kg/m²   SPO2 (COPD values may differ): 90-91% on room air or greater than 92% on FiO2 24- 28% = 1    Peak Flow (asthma only): not applicable = 0    RSI: 7-8 = BID and Q4HPRN (every four hours as needed) for dyspnea        Plan       Goals: medication delivery, mobilize retained secretions, volume expansion and improve oxygenation    Patient/caregiver was educated on the proper method of use for Respiratory Care Devices:  Yes      Level of patient/caregiver understanding able to:   ? Verbalize understanding   ? Demonstrate understanding       ? Teach back        ? Needs reinforcement       ? No available caregiver               ?   Other:     Response to education:  Good     Is patient being placed on Home Treatment Regimen? Yes     Does the patient have everything they need prior to discharge? NA     Comments: pt seen on rounds, re eval at bedside and charts reviewed    Plan of Care: Cory Dials Q4 while awake     Electronically signed by Alexey Gomez RCP on 3/30/2019 at 12:46 AM    Respiratory Protocol Guidelines     1. Assessment and treatment by Respiratory Therapy will be initiated for medication and therapeutic interventions upon initiation of aerosolized medication. 2. Physician will be contacted for respiratory rate (RR) greater than 35 breaths per minute. Therapy will be held for heart rate (HR) greater than 140 beats per minute, pending direction from physician. 3. Bronchodilators will be administered via Metered Dose Inhaler (MDI) with spacer when the following criteria are met:  a. Alert and cooperative     b. HR < 140 bpm  c. RR < 30 bpm                d. Can demonstrate a 2-3 second inspiratory hold  4. Bronchodilators will be administered via Hand Held Nebulizer [facility]3]) to patients when ANY of the following criteria are met  a. Incognizant or uncooperative          b. Patients treated with HHN at Home        c. Unable to demonstrate proper use of MDI with spacer     d. RR > 30 bpm   5. Bronchodilators will be delivered via Metered Dose Inhaler (MDI), HHN, Aerogen to intubated patients on mechanical ventilation. 6. Inhalation medication orders will be delivered and/or substituted as outlined below. Aerosolized Medications Ordering and Administration Guidelines:    1. All Medications will be ordered by a physician, and their frequency and/or modality will be adjusted as defined by the patients Respiratory Severity Index (RSI) score. 2. If the patient does not have documented COPD, consider discontinuing anticholinergics when RSI is less than 9.  3. If the bronchospasm worsens (increased RSI), then the bronchodilator frequency can be increased to a maximum of every 4 hours.   If greater than every 4 hours is required, the physician will be contacted. 4. If the bronchospasm improves, the frequency of the bronchodilator can be decreased, based on the patient's RSI, but not less than home treatment regimen frequency. 5. Bronchodilator(s) will be discontinued if patient has a RSI less than 9 and has received no scheduled or as needed treatment for 72  Hrs. Patients Ordered on a Mucolytic Agent:    1. Must always be administered with a bronchodilator. 2. Discontinue if patient experiences worsened bronchospasm, or secretions have lessened to the point that the patient is able to clear them with a cough. Anti-inflammatory and Combination Medications:    1. If the patient lacks prior history of lung disease, is not using inhaled anti-inflammatory medication at home, and lacks wheezing by examination or by history for at least 24 hours, contact physician for possible discontinuation.

## 2019-03-31 LAB
ANION GAP SERPL CALCULATED.3IONS-SCNC: 9 MMOL/L (ref 3–16)
BUN BLDV-MCNC: 33 MG/DL (ref 7–20)
CALCIUM SERPL-MCNC: 9.3 MG/DL (ref 8.3–10.6)
CHLORIDE BLD-SCNC: 106 MMOL/L (ref 99–110)
CO2: 27 MMOL/L (ref 21–32)
CREAT SERPL-MCNC: 0.9 MG/DL (ref 0.6–1.2)
GFR AFRICAN AMERICAN: >60
GFR NON-AFRICAN AMERICAN: >60
GLUCOSE BLD-MCNC: 100 MG/DL (ref 70–99)
GLUCOSE BLD-MCNC: 112 MG/DL (ref 70–99)
GLUCOSE BLD-MCNC: 120 MG/DL (ref 70–99)
GLUCOSE BLD-MCNC: 129 MG/DL (ref 70–99)
GLUCOSE BLD-MCNC: 131 MG/DL (ref 70–99)
GLUCOSE BLD-MCNC: 131 MG/DL (ref 70–99)
GLUCOSE BLD-MCNC: 98 MG/DL (ref 70–99)
PERFORMED ON: ABNORMAL
PERFORMED ON: NORMAL
POTASSIUM SERPL-SCNC: 3.8 MMOL/L (ref 3.5–5.1)
SODIUM BLD-SCNC: 142 MMOL/L (ref 136–145)

## 2019-03-31 PROCEDURE — 36415 COLL VENOUS BLD VENIPUNCTURE: CPT

## 2019-03-31 PROCEDURE — 2700000000 HC OXYGEN THERAPY PER DAY

## 2019-03-31 PROCEDURE — 6370000000 HC RX 637 (ALT 250 FOR IP): Performed by: INTERNAL MEDICINE

## 2019-03-31 PROCEDURE — 2580000003 HC RX 258: Performed by: INTERNAL MEDICINE

## 2019-03-31 PROCEDURE — 99232 SBSQ HOSP IP/OBS MODERATE 35: CPT | Performed by: INTERNAL MEDICINE

## 2019-03-31 PROCEDURE — 2060000000 HC ICU INTERMEDIATE R&B

## 2019-03-31 PROCEDURE — 94640 AIRWAY INHALATION TREATMENT: CPT

## 2019-03-31 PROCEDURE — 6360000002 HC RX W HCPCS: Performed by: INTERNAL MEDICINE

## 2019-03-31 PROCEDURE — 80048 BASIC METABOLIC PNL TOTAL CA: CPT

## 2019-03-31 PROCEDURE — C9113 INJ PANTOPRAZOLE SODIUM, VIA: HCPCS | Performed by: INTERNAL MEDICINE

## 2019-03-31 PROCEDURE — 94761 N-INVAS EAR/PLS OXIMETRY MLT: CPT

## 2019-03-31 RX ORDER — CALCIUM CARBONATE 200(500)MG
500 TABLET,CHEWABLE ORAL 3 TIMES DAILY PRN
Status: DISCONTINUED | OUTPATIENT
Start: 2019-03-31 | End: 2019-04-03 | Stop reason: HOSPADM

## 2019-03-31 RX ORDER — SODIUM CHLORIDE 9 MG/ML
INJECTION, SOLUTION INTRAVENOUS CONTINUOUS
Status: DISCONTINUED | OUTPATIENT
Start: 2019-03-31 | End: 2019-04-03 | Stop reason: HOSPADM

## 2019-03-31 RX ORDER — SODIUM CHLORIDE 9 MG/ML
INJECTION, SOLUTION INTRAVENOUS CONTINUOUS
Status: DISCONTINUED | OUTPATIENT
Start: 2019-03-31 | End: 2019-03-31

## 2019-03-31 RX ADMIN — PANTOPRAZOLE SODIUM 40 MG: 40 INJECTION, POWDER, FOR SOLUTION INTRAVENOUS at 09:36

## 2019-03-31 RX ADMIN — ATORVASTATIN CALCIUM 10 MG: 10 TABLET, FILM COATED ORAL at 09:35

## 2019-03-31 RX ADMIN — PREDNISONE 40 MG: 20 TABLET ORAL at 09:36

## 2019-03-31 RX ADMIN — Medication 10 ML: at 09:36

## 2019-03-31 RX ADMIN — Medication 10 ML: at 00:50

## 2019-03-31 RX ADMIN — LABETALOL HYDROCHLORIDE 100 MG: 200 TABLET, FILM COATED ORAL at 09:36

## 2019-03-31 RX ADMIN — LEVOTHYROXINE SODIUM 100 MCG: 100 TABLET ORAL at 06:28

## 2019-03-31 RX ADMIN — ONDANSETRON 4 MG: 2 INJECTION INTRAMUSCULAR; INTRAVENOUS at 00:50

## 2019-03-31 RX ADMIN — LOSARTAN POTASSIUM 50 MG: 25 TABLET, FILM COATED ORAL at 09:35

## 2019-03-31 RX ADMIN — Medication 10 ML: at 20:49

## 2019-03-31 RX ADMIN — ENOXAPARIN SODIUM 40 MG: 40 INJECTION SUBCUTANEOUS at 09:35

## 2019-03-31 RX ADMIN — IPRATROPIUM BROMIDE AND ALBUTEROL SULFATE 1 AMPULE: .5; 3 SOLUTION RESPIRATORY (INHALATION) at 20:25

## 2019-03-31 RX ADMIN — SODIUM CHLORIDE: 9 INJECTION, SOLUTION INTRAVENOUS at 18:37

## 2019-03-31 RX ADMIN — IPRATROPIUM BROMIDE AND ALBUTEROL SULFATE 1 AMPULE: .5; 3 SOLUTION RESPIRATORY (INHALATION) at 16:14

## 2019-03-31 RX ADMIN — IPRATROPIUM BROMIDE AND ALBUTEROL SULFATE 1 AMPULE: .5; 3 SOLUTION RESPIRATORY (INHALATION) at 12:06

## 2019-03-31 RX ADMIN — IPRATROPIUM BROMIDE AND ALBUTEROL SULFATE 1 AMPULE: .5; 3 SOLUTION RESPIRATORY (INHALATION) at 07:58

## 2019-03-31 RX ADMIN — LEVOFLOXACIN 750 MG: 5 INJECTION, SOLUTION INTRAVENOUS at 09:36

## 2019-03-31 NOTE — PROGRESS NOTES
Patient was coughing and sneezing in the bathroom while washing her face. Patients NG tube came out. Val Carlson MD. Dr. Paula Muñoz stated that it is ok not to place back d/t patient getting a PEG tube place tomorrow and ok to start IV fluids and to notify GI. Writer left message with susie FIELDS on call. Waiting on call back. 1800 Dr. Amanda Galloway called back and stated that NG tube does not need to be replaced and to leave it out. ..

## 2019-03-31 NOTE — PROGRESS NOTES
Hospitalist Progress Note      PCP: Domingo Edmond MD    Date of Admission: 3/26/2019    Chief Complaint: SOB     Brief Hospital Course : 70 y.o. female with PMH of Advanced COPD ( Does have oxygen at home BUT  not wearing constantly) and multiple other medical problems as detailed below presented to [facility] ED overnight with complaints of shortness of breath started since yesterday morning. Patient reported to the ED that she felt like her throat is closing off. She also reported that she is supposed to follow up with GI to have endoscopy . She also reported substernal chest/back pain, which was waxing and waning. Reported chronic cough which was unchanged. But denied any hemoptysis. ED course : Patient was noted to be hypoxic upon arrival to ED and was requiring 3 L/m oxygen by nasal cannula. Her ED course, complicated with worsening respiratory status requiring  intubation and mechanical ventilation . She subsequently admitted to ICU for further evaluation and management. Her chest x-ray was suggestive of bilateral pneumonia left more than right . CT chest suggestive of terminal emphysematous changes . Patient underwent EGD in ED and noted no FB . Subjective: Patient seen and examined this AM . Grand daughter at beside . Had esophagogram on 3/29/19 suggestive of esophageal dysmotility. GI evaluated and recommended feeding tube . patient agreeable and scheduled for Monday, 4/1/19. Currently on NG tube feeds started yesterday and working towards goal rate . - Patient feels better and offers no new complaints.   RN advised to wean oxygen as able     Medications:  Reviewed  Scheduled Medications    ipratropium-albuterol  1 ampule Inhalation Q4H WA    atorvastatin  10 mg Oral Daily    labetalol  100 mg Oral 2 times per day    levothyroxine  100 mcg Oral Daily    losartan  50 mg Oral Daily    pantoprazole  40 mg Intravenous Daily    sodium chloride flush  10 mL Intravenous 2 times per day    enoxaparin  40 mg Subcutaneous Daily    predniSONE  40 mg Oral Daily    levofloxacin  750 mg Intravenous Q24H     PRN Meds: calcium carbonate, LIP BALM, sodium chloride flush, magnesium hydroxide, ondansetron, acetaminophen      Intake/Output Summary (Last 24 hours) at 3/31/2019 1033  Last data filed at 3/31/2019 8927  Gross per 24 hour   Intake 1096 ml   Output 200 ml   Net 896 ml       Physical Exam Performed:  BP (!) 142/81   Pulse 87   Temp 97.8 °F (36.6 °C) (Oral)   Resp 18   Ht 5' 6\" (1.676 m)   Wt 163 lb 3.2 oz (74 kg)   SpO2 94%   BMI 26.34 kg/m²     General appearance:   in no apparent distress; on oxygen by nasal cannula 2 L/m  HEENT:  Normal cephalic, atraumatic without obvious deformity. Pupils equal, round, and reactive to light. Extra ocular muscles intact. Conjunctivae/corneas clear. NG tube in situ  Neck: Supple, with full range of motion. No jugular venous distention. Trachea midline. Respiratory:  Normal respiratory effort. Clear to auscultation, bilaterally without Rales/Wheezes/Rhonchi. Diminished breath sounds at bases  Cardiovascular:  Regular rate and rhythm with normal S1/S2 without murmurs, rubs or gallops. Abdomen: Soft, non-tender, non-distended with normal bowel sounds. Musculoskeletal:  No clubbing, cyanosis or edema bilaterally. Full range of motion without deformity. Skin: Skin color, texture, turgor normal.  No rashes or lesions.    Neurologic: AAO ×3; no focal neurological deficits   Capillary Refill: Brisk,< 3 seconds   Peripheral Pulses: +2 palpable, equal bilaterally     Labs:   Recent Labs     03/29/19  0852 03/30/19 0514   WBC 13.2* 12.8*   HGB 11.7* 11.2*   HCT 35.2* 34.3*    162     Recent Labs     03/29/19  0852 03/30/19  0514 03/31/19  0451    141 142   K 3.3* 3.9 3.8    106 106   CO2 26 25 27   BUN 31* 34* 33*   CREATININE 1.3* 1.1 0.9   CALCIUM 8.7 8.5 9.3     Urinalysis:    Lab Results   Component Value Date    NITRU  Hypokalemia [E87.6] 03/27/2019    Normal anion gap metabolic acidosis [Z38.2] 03/27/2019    Anemia [D64.9] 03/27/2019    Pulmonary infiltrates [R91.8] 03/27/2019    Atelectasis [J98.11] 03/27/2019    HEBERT on CPAP [G47.33, Z99.89] 03/27/2019    ASD (atrial septal defect) [Q21.1] 03/27/2019    Aspiration pneumonia ([address] [J69.0]     Pulmonary HTN ([address] [I27.20] 05/07/2018    Acquired hypothyroidism [E03.9] 07/06/2017    Essential hypertension [I10] 07/17/2012    Centrilobular emphysema ([address] [J43.2] 10/18/2011    Rheumatoid arthritis involving multiple sites with positive rheumatoid factor ([facility] [address] [M05.79] 10/18/2011     Assessment/Plan:  1. Acute respiratory failure with combined hypoxemia/hypercapnia in the setting of COPD exacerbation requiring intubation in ED - Clinically improved  - Pulmonology consulted and assisted with management ; Extubated on 3/28/19    -Transitioned  IV Solu-Medrol to PO steroids ; HHN   - S/p  Bronchoscopy on 3/27/19 - patient was not found to have any food particles in the airways or any endobronchial lesions     2. LLL Pneumonia - Possible bacterial but Aspiration could not be entirely ruled Out   - Patient empirically started on Levaquin in ED blood and respiratory cultures /BAL - NGTD -   Will follow final results ; continue current IV antibiotics.      3. HTN - held home blood pressure medications since admission as BP optimal .  Resume BP meds with  Parameters       4. Dysphagia  secondary to  esophageal dysmotility disorder (POA) S/p EGD in ED - No FB noted   - Evaluated  by SLP and was recommended for MBS - Showed esophageal dysmotility  - GI recommended alternative feeds through PEG tube. Family to decide. Currently on NG tube feeds   - Currently patient and family agreeable to proceed. Tentatively scheduled for PEG tube placement on Monday, 4/1/19.     5. HypoThyroidism - restarted synthroid      6. GERD - Continue PPI      7.  Chronic RA - On Leflunomide + Prednisone - Continue     DVT Prophylaxis: Lovenox   Diet: DIET TUBE FEED CONTINUOUS/CYCLIC NPO; Semi-elemental (Vital ); Nasogastric; Continuous; 10; 65  Code Status: Full Code    PT/OT Eval Status: Consulted    Dispo -  likely d/c  in the next 2 -3 days  pending to placement on Monday, 4/1/19. The note was completed using Dragon -speech recognition software & EMR  . Every effort was made to ensure accuracy; however, inadvertent computerized transcription errors may be present.     Umer Brar MD

## 2019-03-31 NOTE — PROGRESS NOTES
Pt moaning pointing @ Epigastric area  \" hurting and burning\" rated 10/10 TF stopped Pt has no PRN med. CNP Paged. - Pt admitted for Ac. Resp. Failure, Pt c/o \" Epigastric pain\" 10/10 burning Pt is on TF @ 65 ml/hr now held. requested for Medication. Please advise. Thanks-BILLIE Hamilton

## 2019-03-31 NOTE — PROGRESS NOTES
Pt stated \" feeling a lot better after passing Gas\" No further c/o pain/discomfort. Will continue to monitor.  MKRN

## 2019-03-31 NOTE — PROGRESS NOTES
GI Progress Note      SUBJECTIVE:  Denies nausea, emesis. Breathing easier.     OBJECTIVE      Medications    Current Facility-Administered Medications: calcium carbonate (TUMS) chewable tablet 500 mg, 500 mg, Oral, TID PRN  0.9 % sodium chloride infusion, , Intravenous, Continuous  ipratropium-albuterol (DUONEB) nebulizer solution 1 ampule, 1 ampule, Inhalation, Q4H WA  LIP BALM ointment, , Topical, PRN  atorvastatin (LIPITOR) tablet 10 mg, 10 mg, Oral, Daily  labetalol (NORMODYNE) tablet 100 mg, 100 mg, Oral, 2 times per day  levothyroxine (SYNTHROID) tablet 100 mcg, 100 mcg, Oral, Daily  losartan (COZAAR) tablet 50 mg, 50 mg, Oral, Daily  pantoprazole (PROTONIX) injection 40 mg, 40 mg, Intravenous, Daily  sodium chloride flush 0.9 % injection 10 mL, 10 mL, Intravenous, 2 times per day  sodium chloride flush 0.9 % injection 10 mL, 10 mL, Intravenous, PRN  magnesium hydroxide (MILK OF MAGNESIA) 400 MG/5ML suspension 30 mL, 30 mL, Oral, Daily PRN  ondansetron (ZOFRAN) injection 4 mg, 4 mg, Intravenous, Q6H PRN  enoxaparin (LOVENOX) injection 40 mg, 40 mg, Subcutaneous, Daily  [COMPLETED] methylPREDNISolone sodium (SOLU-MEDROL) injection 40 mg, 40 mg, Intravenous, Q6H **FOLLOWED BY** predniSONE (DELTASONE) tablet 40 mg, 40 mg, Oral, Daily  acetaminophen (TYLENOL) tablet 650 mg, 650 mg, Oral, Q4H PRN  Physical    VITALS:  /66   Pulse 82   Temp 98 °F (36.7 °C) (Oral)   Resp 18   Ht 5' 6\" (1.676 m)   Wt 163 lb 3.2 oz (74 kg)   SpO2 94%   BMI 26.34 kg/m²   ABD: soft, NT, ND, NABs  Data    CBC with Differential:    Lab Results   Component Value Date    WBC 12.8 03/30/2019    RBC 3.92 03/30/2019    HGB 11.2 03/30/2019    HCT 34.3 03/30/2019     03/30/2019    MCV 87.5 03/30/2019    MCH 28.5 03/30/2019    MCHC 32.6 03/30/2019    RDW 14.2 03/30/2019    SEGSPCT 45.4 04/05/2012    LYMPHOPCT 13.7 01/15/2019    MONOPCT 8.7 01/15/2019    EOSPCT 0.8 04/05/2012    BASOPCT 0.7 01/15/2019    MONOSABS 0.7 01/15/2019 LYMPHSABS 1.1 01/15/2019    EOSABS 0.0 01/15/2019    BASOSABS 0.1 01/15/2019    DIFFTYPE Auto-K 04/05/2012     CMP:    Lab Results   Component Value Date     03/31/2019    K 3.8 03/31/2019    K 3.3 03/29/2019     03/31/2019    CO2 27 03/31/2019    BUN 33 03/31/2019    CREATININE 0.9 03/31/2019    GFRAA >60 03/31/2019    GFRAA >60 04/05/2012    AGRATIO 1.7 03/26/2019    LABGLOM >60 03/31/2019    GLUCOSE 100 03/31/2019    PROT 6.8 03/26/2019    PROT 7.6 03/14/2012    LABALBU 4.3 03/26/2019    CALCIUM 9.3 03/31/2019    BILITOT 0.5 03/26/2019    ALKPHOS 43 03/26/2019    AST 41 03/26/2019    ALT 35 03/26/2019       ASSESSMENT AND PLAN  68yo F admitted with resp distress in the setting of aspiration. EGD in the ED did not demonstrate an esophageal foreign body. She has bee extubated. MBS did not demonstrate OP deficits. Ba Swa indicates marked esophageal dysmotility with significant delayed emptying from the esophagus. Presbyesophagus is suspected. Given the severity of dysphagia with resultant aspiration I do not think it safe to resume oral intake. I discussed further with the family and they would like to proceed with PEG tube placement.   Schedule PEG for tomorrow with Dr. Gabrielle Enriquez

## 2019-03-31 NOTE — FLOWSHEET NOTE
03/31/19 0830   Vital Signs   Temp 97.8 °F (36.6 °C)   Temp Source Oral   Pulse 87   Heart Rate Source Monitor   Resp 18   BP (!) 142/81   BP Location Right upper arm   Patient Position Semi fowlers   Level of Consciousness 0   MEWS Score 1   Patient Currently in Pain No   Oxygen Therapy   SpO2 94 %   O2 Device Nasal cannula   O2 Flow Rate (L/min) 2 L/min   No s/s of distress, VSS, denies chest pain and SOB.  WCM

## 2019-03-31 NOTE — PROGRESS NOTES
Pt back in bed on high Fowlers position, Pleasant. No c/o pain/discomfort. Resumed TF @ 65 ml/hr. Will continue to monitor.  ROWDYRN

## 2019-03-31 NOTE — PROGRESS NOTES
Pt pleasant, TF @ 60 ml/hr, Pt tolerating well,No nausea/vomiting. Rate adjusted @ 65 ml/hr. Will continue to monitor.  ROWDYRN

## 2019-03-31 NOTE — PROGRESS NOTES
Pt was given Zofran  ( refer to [facility]) \" feeling nauseated\" NG residual checked 5 ml aspirated. Will hold TF for now untill Pt's symptoms improved.  SHAUNAN

## 2019-03-31 NOTE — FLOWSHEET NOTE
03/30/19 2005   Assessment   Charting Type Shift assessment   Neurological   Neuro (WDL) WDL   Level of Consciousness 0   Orientation Level Oriented X4   Cognition Appropriate judgement; Appropriate safety awareness; Appropriate attention/concentration; Appropriate for developmental age   Language Clear; Appropriate for developmental age   Size R Pupil (mm) 3   R Pupil Shape Round   R Pupil Reaction Brisk   Size L Pupil (mm) 3   L Pupil Shape Round   L Pupil Reaction Brisk   R Hand  Moderate   L Hand  Moderate   R Foot Dorsiflexion Moderate   L Foot Dorsiflexion Moderate   R Foot Plantar Flexion Moderate   L Foot Plantar Flexion Moderate   RUE Motor Response Responds to command;Normal extension;Normal flexion   Sensation RUE Full sensation   LUE Motor Response Responds to command;Normal extension;Normal flexion   Sensation LUE Full sensation   RLE Motor Response Responds to command;Normal extension;Normal flexion   Sensation RLE Full sensation   LLE Motor Response Responds to command;Normal extension;Normal flexion   Sensation LLE Full sensation   Gag Present   Tongue Deviation None   Viktoria Coma Scale   Eye Opening 4   Best Verbal Response 5   Best Motor Response 6   [place] Coma Scale Score 15   Sensory   Sensation Generalized Normal   HEENT   Right Eye Intact   Left Eye Intact   Nose Intact  (NGT @ Rt nostril)   Teeth Missing teeth;Edentulous   Respiratory   Respiratory Pattern Regular   Respiratory Depth Normal   Respiratory Quality/Effort Unlabored   Chest Assessment Chest expansion symmetrical   Breath Sounds   Right Upper Lobe Clear   Right Middle Lobe Clear   Right Lower Lobe Diminished   Left Upper Lobe Clear   Left Lower Lobe Clear   Cough/Sputum   Cough Moist;Productive   Frequency Infrequent   Cough Description   Sputum Amount Small   Sputum Color Cloudy   Tenacity Thick   Sputum How Obtained Spontaneous cough   Cardiac   Cardiac Regularity Regular   Heart Sounds S1, S2   Cardiac Rhythm NSR  (On tele monitor)   Cardiac Monitor   Telemetry Monitor On Yes   Telemetry Box Number [id number]   Gastrointestinal   Abdomen Inspection Rounded; Soft   Peripheral Vascular   Peripheral Vascular (WDL) WDL   Edema None   Skin Color/Condition   Skin Color Appropriate for ethnicity   Skin Condition/Temp Dry; Warm   Skin Integrity   Skin Integrity (WDL) WDL   Skin Integrity Intact   Preventative Dressing Yes   Musculoskeletal   RUE Full movement   LUE Full movement   RL Extremity Full movement   LL Extremity Full movement   Genitourinary   Genitourinary (WDL) WDL   Flank Tenderness No   Suprapubic Tenderness No   Dysuria No   Anus/Rectum   Anus/Rectum (WDL) WDL   Psychosocial   Patient Behaviors Calm; Cooperative

## 2019-03-31 NOTE — PROGRESS NOTES
INPATIENT PULMONARY CRITICAL CARE PROGRESS NOTE      Reason for visit     acute respiratory failure with hypoxia 2/2 pneumonia. SUBJECTIVE:  Patient when seen was having  no increased shortness of breath/increased work of breathing ;, patient is afebrile this morning and is hemodynamically maintained; ;patient has NGT in place now and going for PEG placement in AM ;  , patient has sinus rhythm on the monitor, patient was on 2 lts/min of nasal cannula oxygenation with Sao2 of 94%;  patient's glycemic control was acceptable ,patient having increased heartburn  no other pertinent review of system of concern           Physical Exam:  Blood pressure (!) 142/81, pulse 87, temperature 97.8 °F (36.6 °C), temperature source Oral, resp. rate 18, height 5' 6\" (1.676 m), weight 163 lb 3.2 oz (74 kg), SpO2 94 %, not currently breastfeeding.'     Constitutional:  No acute distress. On nasal cannula oxygenation   HENT:  Oropharynx is clear and moist. No thyromegaly. Eyes:  Conjunctivae are normal. Pupils equal, round, and reactive to light. No scleral icterus. Neck: . No tracheal deviation present. No obvious thyroid mass. Cardiovascular: Normal rate, regular rhythm, normal heart sounds. No right ventricular heave. No lower extremity edema. Pulmonary/Chest: No wheezes. No rales. Chest wall is not dull to percussion. No accessory muscle usage or stridor. decreased BSI   Abdominal: Soft. Bowel sounds present. No distension or hernia. No tenderness. Musculoskeletal: No cyanosis. No clubbing. No obvious joint deformity. Lymphadenopathy: No cervical or supraclavicular adenopathy. Skin: Skin is warm and dry. No rash or nodules on the exposed extremities.   Neurologic: alert and communicative with no focal deficits  when seen              Results:  CBC:   Recent Labs     03/29/19  0852 03/30/19  0514   WBC 13.2* 12.8*   HGB 11.7* 11.2*   HCT 35.2* 34.3*   MCV 86.4 87.5    162     BMP:   Recent Labs 03/29/19  0852 03/30/19  0514 03/31/19  0451    141 142   K 3.3* 3.9 3.8    106 106   CO2 26 25 27   BUN 31* 34* 33*   CREATININE 1.3* 1.1 0.9       Imaging:  I have reviewed radiology images personally. XR ABDOMEN (KUB) (SINGLE AP VIEW)   Final Result   Nasogastric tube tip overlies the proximal stomach with side hole just beyond   the GE junction. FL ESOPHAGRAM   Final Result   Marked esophageal dysmotility with poor primary peristalsis and visualization   of tertiary contractions as described above. FL MODIFIED BARIUM SWALLOW W VIDEO   Final Result   With exception of a single episode of very minimal penetration of the larynx   with ingestion of thin liquid barium via a straw, the examination is   otherwise unremarkable with no evidence of significant penetration of the   larynx or aspiration as described above. Please see separate speech pathology report for full discussion of findings   and recommendations. CT CHEST W CONTRAST   Final Result   1. Bilateral lower lobe atelectasis and/or pneumonia. 2. Severe emphysema. 3. Coronary artery disease. 4. Pulmonary hypertension. XR CHEST PORTABLE   Final Result   1. Tip of the endotracheal tube is 1.8 cm above the mariola. Consider   retraction by about 3 cm and repeat chest radiograph. 2. Interval improvement in bibasilar pulmonary opacities with residual   pulmonary opacity within left lung base. RECOMMENDATION:   Recommend radiographic follow-up to complete resolution. XR CHEST PORTABLE   Final Result   Bilateral lower lobe airspace disease, greater on left, likely representing   pneumonia. Follow-up to resolution is recommended. XR Neck Soft Tissue   Final Result   Negative study. Results for Governor Jacques" (MRN [id number]) as of 3/31/2019 10:10   Ref. Range 3/27/2019 00:08 3/27/2019 13:35   Culture, Blood 2 Unknown No Growth to date. ..     Gram Stain Result Unknown  3+ WBC's (Polymor. .. CULTURE, RESPIRATORY Unknown  No growth 36-48 h. .. Assessment:  Principal Problem:    Acute respiratory failure with hypoxia (HCC)  Active Problems:    Centrilobular emphysema (HCC)    Rheumatoid arthritis involving multiple sites with positive rheumatoid factor (HCC)    Essential hypertension    Acquired hypothyroidism    Pulmonary HTN (HCC)    Acute respiratory failure (HCC)    Hypokalemia    Normal anion gap metabolic acidosis    Anemia    Pulmonary infiltrates    Atelectasis    HEBERT on CPAP    ASD (atrial septal defect)    Aspiration pneumonia (HCC)    DANY (acute kidney injury) ([facility] [address]    Esophageal motility disorder  Resolved Problems:    * No resolved hospital problems.  *          Plan:   · O2 supplementation  keep Sao2 between 90-94% ONLY  · Pulmonary toilet  · Status post bronchoscopy-results are pending   · Patient got one dose of Meropenem and now of IV levaquin -needs to be titrated as per clinical status and c/s-consider de-escalation   · Home BIPAP can be used -patient is not compliant about it   · Bronchodilators  · Enteral prednsione   · PEG placement on Monday   · Will hold [address] for now-can be restarted at the time of discharge  · Monitor renal function and IM to decide when to restart the aldactone  · IM to decide upon continuation of cozaar  · Monitor I/O and BMP  · Correct electrolytes on PRN basis  · PUD and DVT prophylaxis  · PT/OT consults     Case d/w patient and family    No further recommendations from Pulm/CCM stand point -will sign off -please call on PRN basis      Electronically signed by:  Cornelio Brower MD    3/31/2019    10:08 AM.

## 2019-04-01 LAB
ANION GAP SERPL CALCULATED.3IONS-SCNC: 9 MMOL/L (ref 3–16)
BLOOD CULTURE, ROUTINE: NORMAL
BUN BLDV-MCNC: 26 MG/DL (ref 7–20)
CALCIUM SERPL-MCNC: 8.6 MG/DL (ref 8.3–10.6)
CHLORIDE BLD-SCNC: 107 MMOL/L (ref 99–110)
CO2: 26 MMOL/L (ref 21–32)
CREAT SERPL-MCNC: 0.7 MG/DL (ref 0.6–1.2)
CULTURE, BLOOD 2: NORMAL
GFR AFRICAN AMERICAN: >60
GFR NON-AFRICAN AMERICAN: >60
GLUCOSE BLD-MCNC: 101 MG/DL (ref 70–99)
GLUCOSE BLD-MCNC: 109 MG/DL (ref 70–99)
GLUCOSE BLD-MCNC: 80 MG/DL (ref 70–99)
GLUCOSE BLD-MCNC: 82 MG/DL (ref 70–99)
GLUCOSE BLD-MCNC: 91 MG/DL (ref 70–99)
GLUCOSE BLD-MCNC: 94 MG/DL (ref 70–99)
HCT VFR BLD CALC: 33.1 % (ref 36–48)
HEMOGLOBIN: 11.1 G/DL (ref 12–16)
MAGNESIUM: 1.9 MG/DL (ref 1.8–2.4)
MCH RBC QN AUTO: 29 PG (ref 26–34)
MCHC RBC AUTO-ENTMCNC: 33.4 G/DL (ref 31–36)
MCV RBC AUTO: 86.8 FL (ref 80–100)
PDW BLD-RTO: 14.2 % (ref 12.4–15.4)
PERFORMED ON: ABNORMAL
PERFORMED ON: ABNORMAL
PERFORMED ON: NORMAL
PLATELET # BLD: 148 K/UL (ref 135–450)
PMV BLD AUTO: 7.7 FL (ref 5–10.5)
POTASSIUM REFLEX MAGNESIUM: 3.3 MMOL/L (ref 3.5–5.1)
RBC # BLD: 3.81 M/UL (ref 4–5.2)
SODIUM BLD-SCNC: 142 MMOL/L (ref 136–145)
WBC # BLD: 6.1 K/UL (ref 4–11)

## 2019-04-01 PROCEDURE — 85027 COMPLETE CBC AUTOMATED: CPT

## 2019-04-01 PROCEDURE — 6370000000 HC RX 637 (ALT 250 FOR IP): Performed by: INTERNAL MEDICINE

## 2019-04-01 PROCEDURE — 3E0G76Z INTRODUCTION OF NUTRITIONAL SUBSTANCE INTO UPPER GI, VIA NATURAL OR ARTIFICIAL OPENING: ICD-10-PCS | Performed by: INTERNAL MEDICINE

## 2019-04-01 PROCEDURE — 7100000010 HC PHASE II RECOVERY - FIRST 15 MIN: Performed by: INTERNAL MEDICINE

## 2019-04-01 PROCEDURE — 94761 N-INVAS EAR/PLS OXIMETRY MLT: CPT

## 2019-04-01 PROCEDURE — 0DB28ZX EXCISION OF MIDDLE ESOPHAGUS, VIA NATURAL OR ARTIFICIAL OPENING ENDOSCOPIC, DIAGNOSTIC: ICD-10-PCS | Performed by: INTERNAL MEDICINE

## 2019-04-01 PROCEDURE — 7100000011 HC PHASE II RECOVERY - ADDTL 15 MIN: Performed by: INTERNAL MEDICINE

## 2019-04-01 PROCEDURE — 2709999900 HC NON-CHARGEABLE SUPPLY: Performed by: INTERNAL MEDICINE

## 2019-04-01 PROCEDURE — 2580000003 HC RX 258: Performed by: INTERNAL MEDICINE

## 2019-04-01 PROCEDURE — 2060000000 HC ICU INTERMEDIATE R&B

## 2019-04-01 PROCEDURE — 80048 BASIC METABOLIC PNL TOTAL CA: CPT

## 2019-04-01 PROCEDURE — 94640 AIRWAY INHALATION TREATMENT: CPT

## 2019-04-01 PROCEDURE — 3609013300 HC EGD TUBE PLACEMENT: Performed by: INTERNAL MEDICINE

## 2019-04-01 PROCEDURE — 36415 COLL VENOUS BLD VENIPUNCTURE: CPT

## 2019-04-01 PROCEDURE — 99153 MOD SED SAME PHYS/QHP EA: CPT | Performed by: INTERNAL MEDICINE

## 2019-04-01 PROCEDURE — 6360000002 HC RX W HCPCS: Performed by: INTERNAL MEDICINE

## 2019-04-01 PROCEDURE — 2700000000 HC OXYGEN THERAPY PER DAY

## 2019-04-01 PROCEDURE — 2500000003 HC RX 250 WO HCPCS: Performed by: INTERNAL MEDICINE

## 2019-04-01 PROCEDURE — 97535 SELF CARE MNGMENT TRAINING: CPT

## 2019-04-01 PROCEDURE — 83735 ASSAY OF MAGNESIUM: CPT

## 2019-04-01 PROCEDURE — 0DH63UZ INSERTION OF FEEDING DEVICE INTO STOMACH, PERCUTANEOUS APPROACH: ICD-10-PCS | Performed by: INTERNAL MEDICINE

## 2019-04-01 PROCEDURE — 88305 TISSUE EXAM BY PATHOLOGIST: CPT

## 2019-04-01 PROCEDURE — 6370000000 HC RX 637 (ALT 250 FOR IP): Performed by: NURSE PRACTITIONER

## 2019-04-01 PROCEDURE — 88312 SPECIAL STAINS GROUP 1: CPT

## 2019-04-01 PROCEDURE — 99152 MOD SED SAME PHYS/QHP 5/>YRS: CPT | Performed by: INTERNAL MEDICINE

## 2019-04-01 PROCEDURE — C9113 INJ PANTOPRAZOLE SODIUM, VIA: HCPCS | Performed by: INTERNAL MEDICINE

## 2019-04-01 RX ORDER — ONDANSETRON 2 MG/ML
4 INJECTION INTRAMUSCULAR; INTRAVENOUS EVERY 6 HOURS PRN
Status: DISCONTINUED | OUTPATIENT
Start: 2019-04-01 | End: 2019-04-03 | Stop reason: HOSPADM

## 2019-04-01 RX ORDER — MIDAZOLAM HYDROCHLORIDE 5 MG/ML
INJECTION INTRAMUSCULAR; INTRAVENOUS PRN
Status: DISCONTINUED | OUTPATIENT
Start: 2019-04-01 | End: 2019-04-01 | Stop reason: ALTCHOICE

## 2019-04-01 RX ORDER — SODIUM CHLORIDE 0.9 % (FLUSH) 0.9 %
10 SYRINGE (ML) INJECTION PRN
Status: DISCONTINUED | OUTPATIENT
Start: 2019-04-01 | End: 2019-04-03 | Stop reason: HOSPADM

## 2019-04-01 RX ORDER — SODIUM CHLORIDE 0.9 % (FLUSH) 0.9 %
10 SYRINGE (ML) INJECTION EVERY 12 HOURS SCHEDULED
Status: DISCONTINUED | OUTPATIENT
Start: 2019-04-01 | End: 2019-04-03 | Stop reason: HOSPADM

## 2019-04-01 RX ORDER — FENTANYL CITRATE 50 UG/ML
INJECTION, SOLUTION INTRAMUSCULAR; INTRAVENOUS PRN
Status: DISCONTINUED | OUTPATIENT
Start: 2019-04-01 | End: 2019-04-01 | Stop reason: ALTCHOICE

## 2019-04-01 RX ORDER — SPIRONOLACTONE 25 MG/1
25 TABLET ORAL DAILY
Status: DISCONTINUED | OUTPATIENT
Start: 2019-04-01 | End: 2019-04-03 | Stop reason: HOSPADM

## 2019-04-01 RX ADMIN — PREDNISONE 40 MG: 20 TABLET ORAL at 11:03

## 2019-04-01 RX ADMIN — SPIRONOLACTONE 25 MG: 25 TABLET ORAL at 17:54

## 2019-04-01 RX ADMIN — ATORVASTATIN CALCIUM 10 MG: 10 TABLET, FILM COATED ORAL at 11:03

## 2019-04-01 RX ADMIN — ANTACID TABLETS 500 MG: 500 TABLET, CHEWABLE ORAL at 17:54

## 2019-04-01 RX ADMIN — LEVOTHYROXINE SODIUM 100 MCG: 100 TABLET ORAL at 11:03

## 2019-04-01 RX ADMIN — LABETALOL HYDROCHLORIDE 100 MG: 200 TABLET, FILM COATED ORAL at 11:03

## 2019-04-01 RX ADMIN — LOSARTAN POTASSIUM 50 MG: 25 TABLET, FILM COATED ORAL at 11:03

## 2019-04-01 RX ADMIN — SODIUM CHLORIDE: 9 INJECTION, SOLUTION INTRAVENOUS at 20:30

## 2019-04-01 RX ADMIN — IPRATROPIUM BROMIDE AND ALBUTEROL SULFATE 1 AMPULE: .5; 3 SOLUTION RESPIRATORY (INHALATION) at 16:44

## 2019-04-01 RX ADMIN — IPRATROPIUM BROMIDE AND ALBUTEROL SULFATE 1 AMPULE: .5; 3 SOLUTION RESPIRATORY (INHALATION) at 12:35

## 2019-04-01 RX ADMIN — LABETALOL HYDROCHLORIDE 100 MG: 200 TABLET, FILM COATED ORAL at 20:31

## 2019-04-01 RX ADMIN — ACETAMINOPHEN 650 MG: 325 TABLET ORAL at 17:54

## 2019-04-01 RX ADMIN — Medication 900 MG: at 07:35

## 2019-04-01 RX ADMIN — IPRATROPIUM BROMIDE AND ALBUTEROL SULFATE 1 AMPULE: .5; 3 SOLUTION RESPIRATORY (INHALATION) at 20:42

## 2019-04-01 RX ADMIN — SODIUM CHLORIDE, PRESERVATIVE FREE 10 ML: 5 INJECTION INTRAVENOUS at 15:06

## 2019-04-01 RX ADMIN — PANTOPRAZOLE SODIUM 40 MG: 40 INJECTION, POWDER, FOR SOLUTION INTRAVENOUS at 11:04

## 2019-04-01 RX ADMIN — ENOXAPARIN SODIUM 40 MG: 40 INJECTION SUBCUTANEOUS at 11:04

## 2019-04-01 RX ADMIN — SODIUM CHLORIDE: 9 INJECTION, SOLUTION INTRAVENOUS at 06:34

## 2019-04-01 ASSESSMENT — PAIN DESCRIPTION - LOCATION: LOCATION: WRIST

## 2019-04-01 ASSESSMENT — PAIN SCALES - GENERAL
PAINLEVEL_OUTOF10: 0
PAINLEVEL_OUTOF10: 6
PAINLEVEL_OUTOF10: 7

## 2019-04-01 ASSESSMENT — PAIN - FUNCTIONAL ASSESSMENT: PAIN_FUNCTIONAL_ASSESSMENT: 0-10

## 2019-04-01 ASSESSMENT — PAIN SCALES - WONG BAKER: WONGBAKER_NUMERICALRESPONSE: 4

## 2019-04-01 ASSESSMENT — PAIN DESCRIPTION - DESCRIPTORS: DESCRIPTORS: ACHING

## 2019-04-01 NOTE — PROGRESS NOTES
Nutrition Assessment (Enteral Nutrition)    Type and Reason for Visit: Reassess    Nutrition Recommendations:   · Continue NPO and initiate TF via PEG when medically feasible, per MD  Recommend initiate Jevity 1.5 (1.5 calorie with fiber formula) at 25 mL/hr and as tolerated, increase by 10 mL/hr q 4 hours until goal of 60 mL/hr is met via PEG  Recommend 100 mL H20 q 3 hours. Recommend reevaluate IV fluids at this time. Increase flush if Na increases greater than 145 mEq/L. Monitor closely and correct lytes (K, Phos, Mg) before progressing TF to goal   Monitor nutrition adequacy, weights, pertinent labs, BMs and clinical progress    Nutrition Assessment: Follow up: Pt nutrition status continues to be at risk. Pt extubated on 3/28, was receiving Vital TF at 60 mL/hr via NG. MBS showed severe dysphagia, PEG to be placed this AM and per GI note, plan to start nutrition tomorrow. Will provide TF recommendations. Will continue to monitor nutrition status. Malnutrition Assessment:  · Malnutrition Status: No malnutrition    Nutrition Risk Level: High    Nutrition Needs:  · Estimated Daily Total Kcal: 0697-5842  · Estimated Daily Protein (g): 71-89  · Estimated Daily Fluid (ml/day): 1 mL/kcal    Nutrition Diagnosis:   · Problem: Inadequate oral intake  · Etiology: related to Insufficient energy/nutrient consumption     Signs and symptoms:  as evidenced by NPO status due to medical condition    Objective Information:  · Nutrition-Focused Physical Findings: No edema. No BM noted. New PEG. · Wound Type: None  · Current Nutrition Therapies:  · Oral Diet Orders: NPO   · Oral Diet intake: NPO  · Oral Nutrition Supplement (ONS) Orders: None  · Tube Feeding (TF) Orders:   · Goal TF & Flush Orders Provides: Recommend Jevity 1.5 ( 1.5 with fiber formula) at 25 mL/hr and increase as tolerated by 10 mL/hr q 4 hr to goal of 60 mL/hr x 20 hours. Provides 1800 kcal, 1200 mL TV, 77 gm protein, and 912 mL free water. Recommend 100 mL free H20 flush q 3 hours if NO IVF and Na WNL. ,  · Anthropometric Measures:  · Ht: 5' 6\" (167.6 cm)   · Current Body Wt: 169 lb (76.7 kg)  · Admission Body Wt: 157 lb (71.2 kg)  · Usual Body Wt: 160 lb (72.6 kg)  · Ideal Body Wt: 130 lb (59 kg)   · BMI Classification: BMI 25.0 - 29.9 Overweight    Nutrition Interventions:   Start Tube Feeding, Continue NPO  Continued Inpatient Monitoring    Nutrition Evaluation:   · Evaluation: Progressing toward goals   · Goals:  Tolerate most appropriate form of nutrition therapy this admission   · Monitoring: TF Intake, TF Tolerance, Weight, Pertinent Labs, Chewing/Swallowing      Electronically signed by Jason Bahena RD, LD on 4/1/19 at 11:28 AM    Contact Number: Office: [phone]; [address]: [id number]

## 2019-04-01 NOTE — PROGRESS NOTES
Hospitalist Progress Note      PCP: Eileen Baez MD    Date of Admission: 3/26/2019    Chief Complaint: SOB     Brief Hospital Course : 70 y.o. female with PMH of Advanced COPD ( Does have oxygen at home BUT  not wearing constantly) and multiple other medical problems as detailed below presented to [facility] ED overnight with complaints of shortness of breath started since yesterday morning. Patient reported to the ED that she felt like her throat is closing off. She also reported that she is supposed to follow up with GI to have endoscopy . She also reported substernal chest/back pain, which was waxing and waning. Reported chronic cough which was unchanged. But denied any hemoptysis. ED course : Patient was noted to be hypoxic upon arrival to ED and was requiring 3 L/m oxygen by nasal cannula. Her ED course, complicated with worsening respiratory status requiring  intubation and mechanical ventilation . She subsequently admitted to ICU for further evaluation and management. Her chest x-ray was suggestive of bilateral pneumonia left more than right . CT chest suggestive of terminal emphysematous changes . Patient underwent EGD in ED and noted no FB . Subjective: Patient seen and examined this AM . Grand daughter at beside . PEG placed today. - Patient feels better and offers no new complaints.   RN advised to wean oxygen as able     Medications:  Reviewed  Scheduled Medications    sodium chloride flush  10 mL Intravenous 2 times per day    enoxaparin  40 mg Subcutaneous Daily    spironolactone  25 mg Oral Daily    ipratropium-albuterol  1 ampule Inhalation Q4H WA    atorvastatin  10 mg Oral Daily    labetalol  100 mg Oral 2 times per day    levothyroxine  100 mcg Oral Daily    losartan  50 mg Oral Daily    pantoprazole  40 mg Intravenous Daily    sodium chloride flush  10 mL Intravenous 2 times per day    predniSONE  40 mg Oral Daily     PRN Meds: sodium chloride flush, ondansetron, calcium carbonate, LIP BALM, sodium chloride flush, magnesium hydroxide, ondansetron, acetaminophen      Intake/Output Summary (Last 24 hours) at 4/1/2019 1616  Last data filed at 4/1/2019 0618  Gross per 24 hour   Intake 895.25 ml   Output 100 ml   Net 795.25 ml       Physical Exam Performed:  BP (!) 163/82   Pulse 74   Temp 97.4 °F (36.3 °C) (Oral)   Resp 18   Ht 5' 6\" (1.676 m)   Wt 169 lb (76.7 kg)   SpO2 91%   BMI 27.28 kg/m²     General appearance:   in no apparent distress; on oxygen by nasal cannula 2 L/m  HEENT:  Normal cephalic, atraumatic without obvious deformity. Pupils equal, round, and reactive to light. Extra ocular muscles intact. Conjunctivae/corneas clear. NG tube in situ  Neck: Supple, with full range of motion. No jugular venous distention. Trachea midline. Respiratory:  Normal respiratory effort. Clear to auscultation, bilaterally without Rales/Wheezes/Rhonchi. Diminished breath sounds at bases  Cardiovascular:  Regular rate and rhythm with normal S1/S2 without murmurs, rubs or gallops. Abdomen: Soft, non-tender, non-distended with normal bowel sounds. Musculoskeletal:  No clubbing, cyanosis or edema bilaterally. Full range of motion without deformity. Skin: Skin color, texture, turgor normal.  No rashes or lesions.    Neurologic: AAO ×3; no focal neurological deficits   Capillary Refill: Brisk,< 3 seconds   Peripheral Pulses: +2 palpable, equal bilaterally     Labs:   Recent Labs     03/30/19  0514 04/01/19  1142   WBC 12.8* 6.1   HGB 11.2* 11.1*   HCT 34.3* 33.1*    148     Recent Labs     03/30/19  0514 03/31/19  0451 04/01/19  1142    142 142   K 3.9 3.8 3.3*    106 107   CO2 25 27 26   BUN 34* 33* 26*   CREATININE 1.1 0.9 0.7   CALCIUM 8.5 9.3 8.6     Urinalysis:    Lab Results   Component Value Date    NITRU Negative 03/27/2019    BLOODU Negative 03/27/2019    SPECGRAV >=1.030 03/27/2019    GLUCOSEU Negative 03/27/2019       Radiology:  XR ABDOMEN (KUB) (SINGLE AP VIEW)   Final Result   Nasogastric tube tip overlies the proximal stomach with side hole just beyond   the GE junction. FL ESOPHAGRAM   Final Result   Marked esophageal dysmotility with poor primary peristalsis and visualization   of tertiary contractions as described above. FL MODIFIED BARIUM SWALLOW W VIDEO   Final Result   With exception of a single episode of very minimal penetration of the larynx   with ingestion of thin liquid barium via a straw, the examination is   otherwise unremarkable with no evidence of significant penetration of the   larynx or aspiration as described above. Please see separate speech pathology report for full discussion of findings   and recommendations. CT CHEST W CONTRAST   Final Result   1. Bilateral lower lobe atelectasis and/or pneumonia. 2. Severe emphysema. 3. Coronary artery disease. 4. Pulmonary hypertension. XR CHEST PORTABLE   Final Result   1. Tip of the endotracheal tube is 1.8 cm above the mariola. Consider   retraction by about 3 cm and repeat chest radiograph. 2. Interval improvement in bibasilar pulmonary opacities with residual   pulmonary opacity within left lung base. RECOMMENDATION:   Recommend radiographic follow-up to complete resolution. XR CHEST PORTABLE   Final Result   Bilateral lower lobe airspace disease, greater on left, likely representing   pneumonia. Follow-up to resolution is recommended. XR Neck Soft Tissue   Final Result   Negative study.            Active Hospital Problems    Diagnosis Date Noted    Esophageal motility disorder [K22.4] 03/30/2019    DANY (acute kidney injury) ([facility] [address] [N17.9] 03/29/2019    Acute respiratory failure with hypoxia (HCC) [J96.01] 03/27/2019    Acute respiratory failure (HCC) [J96.00] 03/27/2019    Hypokalemia [E87.6] 03/27/2019    Normal anion gap metabolic acidosis [T06.7] 03/27/2019    Anemia [D64.9] 03/27/2019    able to use PEG per GI. Patient refusing SNF and HHC.     Isa Mercedes MD

## 2019-04-01 NOTE — FLOWSHEET NOTE
03/31/19 2047   Assessment   Charting Type Shift assessment   Neurological   Neuro (WDL) WDL   Level of Consciousness 0   Orientation Level Oriented X4   Cognition Appropriate judgement; Appropriate safety awareness; Appropriate attention/concentration; Appropriate for developmental age   Language Clear; Appropriate for developmental age   Size R Pupil (mm) 3   R Pupil Shape Round   R Pupil Reaction Brisk   Size L Pupil (mm) 3   L Pupil Shape Round   L Pupil Reaction Brisk   R Hand  Moderate   L Hand  Moderate   R Foot Dorsiflexion Moderate   L Foot Dorsiflexion Moderate   R Foot Plantar Flexion Moderate   L Foot Plantar Flexion Moderate   RUE Motor Response Responds to command;Normal extension;Normal flexion   Sensation RUE Full sensation   LUE Motor Response Responds to command;Normal extension;Normal flexion   Sensation LUE Full sensation   RLE Motor Response Responds to command;Normal extension;Normal flexion   Sensation RLE Full sensation   LLE Motor Response Responds to command;Normal extension;Normal flexion   Sensation LLE Full sensation   Gag Present   Tongue Deviation None   Viktoria Coma Scale   Eye Opening 4   Best Verbal Response 5   Best Motor Response 6   [place] Coma Scale Score 15   Sensory   Sensation Generalized Normal   HEENT   Right Eye Intact   Left Eye Intact   Nose Intact   Teeth Missing teeth   Respiratory   Respiratory Pattern Regular   Respiratory Depth Normal   Respiratory Quality/Effort Unlabored   Chest Assessment Chest expansion symmetrical   Breath Sounds   Right Upper Lobe Clear   Right Middle Lobe Diminished   Right Lower Lobe Diminished   Left Upper Lobe Clear   Left Lower Lobe Diminished   Cough/Sputum   Cough Moist;Productive   Frequency Infrequent   Cough Description   Sputum Amount Small   Sputum Color Cloudy   Tenacity Thick   Sputum How Obtained Spontaneous cough   Cardiac   Cardiac Regularity Regular   Heart Sounds S1, S2   Cardiac Rhythm NSR   Rhythm Interpretation Pulse 82   Cardiac Monitor   Telemetry Monitor On Yes   Telemetry Box Number 112   Gastrointestinal   Abdomen Inspection Rounded; Soft   Peripheral Vascular   Peripheral Vascular (WDL) WDL   Edema None   Skin Color/Condition   Skin Color Appropriate for ethnicity   Skin Condition/Temp Dry; Warm   Skin Integrity   Skin Integrity Intact   Preventative Dressing Yes   Dressing Site Sacrum   Date Applied 03/29/19   Assessed this shift? Yes   Musculoskeletal   RUE Full movement   LUE Full movement   RL Extremity Full movement   LL Extremity Full movement   Genitourinary   Genitourinary (WDL) WDL   Flank Tenderness No   Suprapubic Tenderness No   Dysuria No   Anus/Rectum   Anus/Rectum (WDL) WDL   Psychosocial   Patient Behaviors Calm; Cooperative

## 2019-04-01 NOTE — H&P
Pre-sedation Assessment    History and Physical / Pre-Sedation Assessment  Patient:  Curtis Hinton   :   1940     Intended Procedure: EGD w PEG      HPI: dysphagia  Nurses notes reviewed and agreed. Past Medical History:   Diagnosis Date    Acquired hypothyroidism 2017    Anemia     Asthma     [facility]) [facility] [address] [facility]     HLD (hyperlipidemia)     Hypertension     Influenza A 2018    MI (myocardial infarction) ([facility] [address]     X 2    Migraine     past hx    Rheumatoid arthritis     Wears glasses      No current facility-administered medications on file prior to encounter. Current Outpatient Medications on File Prior to Encounter   Medication Sig Dispense Refill    spironolactone (ALDACTONE) 25 MG tablet TAKE 1 TABLET BY MOUTH EVERY 12 HOURS 180 tablet 10    levothyroxine (SYNTHROID) 100 MCG tablet TAKE 1 TABLET BY MOUTH ONCE DAILY 90 tablet 10    albuterol sulfate  (90 Base) MCG/ACT inhaler INHALE TWO (2) PUFFS BY MOUTH EVERY 6 HOURS AS NEEDED 54 g 10    spironolactone (ALDACTONE) 25 MG tablet TAKE 1 TABLET BY MOUTH EVERY 12 HOURS 180 tablet 11    predniSONE (DELTASONE) 10 MG tablet TAKE1/2 TABLET BY MOUTH DAILY 90 tablet 0    ranitidine (ZANTAC) 150 MG tablet TAKE (1) TABLET BY MOUTH NIGHTLY 90 tablet 0    hydrochlorothiazide (HYDRODIURIL) 25 MG tablet Take 1 tablet by mouth daily  3    losartan (COZAAR) 50 MG tablet Take 1 tablet by mouth daily 30 tablet 3    labetalol (NORMODYNE) 100 MG tablet Take 1 tablet by mouth every 12 hours 60 tablet 3    atorvastatin (LIPITOR) 10 MG tablet TAKE ONE (1) TABLET BY MOUTH DAILY 90 tablet 5    Blood Pressure Monitoring (BLOOD PRESSURE MONITOR/M CUFF) MISC 1 Units by Does not apply route daily 1 each 0    ipratropium-albuterol (DUONEB) 0.5-2.5 (3) MG/3ML SOLN nebulizer solution Inhale 3 mLs into the lungs every 4 hours as needed.  360 mL 3    nitroGLYCERIN (NITROSTAT) 0.4 MG SL tablet Place 0.4 mg under the tongue every 5 minutes as needed.  leflunomide (ARAVA) 20 MG tablet Take 20 mg by mouth daily. Medications reviewed  Allergies: Allergies   Allergen Reactions    Alendronate Sodium      Jaw pain      Humira [Adalimumab]      Rash      Penicillins      rash    Simvastatin Other (See Comments)     Made legs ache    Sulfa Antibiotics Swelling     Tongue swelled           Physical Exam:  Vital Signs: BP (!) 162/84   Pulse 83   Temp 97.9 °F (36.6 °C) (Oral)   Resp 19   Ht 5' 6\" (1.676 m)   Wt 169 lb 8.5 oz (76.9 kg)   SpO2 96%   BMI 27.36 kg/m²  Body mass index is 27.36 kg/m². Airway:Normal  Cardiac:Normal  Pulmonary:Normal  Abdomen:Normal  Specific to procedure: Mallampati 3      Pre-Procedure Assessment/Plan:  ASA 3 - Patient with moderate systemic disease with functional limitations    Level of Sedation Plan: Moderate sedation    Post Procedure plan: Return to same level of care    I assessed the patient and find that the patient is in satisfactory condition to proceed with the planned procedure and sedation plan. I have explained the risk, benefits, and alternatives to the procedure. The patient understands and agrees to proceed.   Yes    Sanaz Caller  8:00 AM 4/1/2019

## 2019-04-01 NOTE — PROGRESS NOTES
Arrived from inpatient hospital room via stretcher accompanied by transport staff  Pt alert and oriented x4  Calm/appropriate  VSS  Iv site assessed without evidence of infiltration on arrival  Respiration  easy unlabored - auscultated -clear bilaterally anterior and posterior fields

## 2019-04-01 NOTE — PROGRESS NOTES
Physical Therapy  Attempted to see patient in AM for therapy, pt off the floor for PEG tube placement. Attempted tx in PM, pt working with OT. Will follow up tomorrow.   Nrada Raymond,   [facility]

## 2019-04-01 NOTE — CARE COORDINATION
4/1/19 f/u with the pt and her granddaughter about PT/OT recs for SNF. Pt and her granddaughter both refuse SNF despite recs. I offered them home care and they also refused that stating they don't like strangers. Pt's granddaughter states she will provide all needed care. Pt has new PEG tube referral made to [address] for tube feeds and education at home.

## 2019-04-01 NOTE — PROGRESS NOTES
Occupational Therapy  Facility/Department: [facility] C4 PCU  Daily Treatment Note    This note to serve as d/c summary should pt d/c prior to next session. NAME: Rukhsana Dawson  : 1940  MRN: [id number]    Date of Service: 2019    Discharge Recommendations:  Subacute/Skilled Nursing Facility  OT Equipment Recommendations  Equipment Needed: No  Other: defer to next level of care    Assessment   Performance deficits / Impairments: Decreased functional mobility ; Decreased safe awareness;Decreased balance;Decreased ADL status; Decreased endurance;Decreased high-level IADLs;Decreased strength  Assessment: Pt tolerated session well. Pt completes functional mobility and transfers with CGA and use of RW. Pt completes LB dressing with Min A and SBA/CGA in stance. Pt completes grooming at sink with SBA. Pt continues to make good progress towards set goals. Continue with POC. Prognosis: Good  Patient Education: role of OT, safety  REQUIRES OT FOLLOW UP: Yes  Activity Tolerance  Activity Tolerance: Patient Tolerated treatment well  Activity Tolerance: Pt on RA during session; after to/from bathroom pt SpO2 87% and >90% with ~3 minutes  Safety Devices  Safety Devices in place: Yes  Type of devices: Left in chair;Chair alarm in place;Call light within reach;Gait belt;Nurse notified         Patient Diagnosis(es): The primary encounter diagnosis was Acute respiratory failure, unspecified whether with hypoxia or hypercapnia ([address] A diagnosis of Aspiration pneumonia, unspecified aspiration pneumonia type, unspecified laterality, unspecified part of lung ([address] was also pertinent to this visit. has a past medical history of Acquired hypothyroidism, Anemia, Asthma, COPD (chronic obstructive pulmonary disease) ([address] HLD (hyperlipidemia), Hypertension, Influenza A, MI (myocardial infarction) ([address] Migraine, Rheumatoid arthritis, and Wears glasses.    has a past surgical history that includes Hysterectomy; knee surgery; Foot surgery; Wrist surgery; Cataract removal with implant; Cataract removal with implant (3/23/2011); pr njx dx/ther sbst intrlmnr crv/thrc w/img gdn (Left, 8/21/2018); epidural steroid injection (Left, 12/4/2018); bronchoscopy (N/A, 3/27/2019); bronchoscopy (3/27/2019); and Upper gastrointestinal endoscopy (N/A, 3/27/2019). Restrictions  Restrictions/Precautions  Restrictions/Precautions: General Precautions, Fall Risk  Subjective   General  Chart Reviewed: Yes  Patient assessed for rehabilitation services?: Yes  Family / Caregiver Present: Yes(granddaughter)  Referring Practitioner: JOSE Marie CNP  Diagnosis: Acute respiratory failure with combined hypoxemia/hypercapnia, LLL Pneumonia, extubated 3/28  Subjective  Subjective: Pt supine in bed and agreeable to OT services. General Comment  Comments: okay for therapy per RN. Pain Assessment  Pain Assessment: Faces  Fernandez-Baker Pain Rating: Hurts little more  Pain Location: Wrist  Pain Descriptors: Aching  Non-Pharmaceutical Pain Intervention(s): Cold applied; Emotional support  Response to Pain Intervention: Patient Satisfied  Vital Signs  Patient Currently in Pain: Yes   Orientation  Orientation  Overall Orientation Status: Within Functional Limits  Objective    ADL  Grooming: Stand by assistance(in stance at sink)  LE Dressing: Minimal assistance(to don hospital pants; SBA for balance; assist to tie pants in stance)        Balance  Sitting Balance: Supervision  Standing Balance: Contact guard assistance  Standing Balance  Sit to stand: Contact guard assistance  Stand to sit: Contact guard assistance  Functional Mobility  Functional - Mobility Device: Rolling Walker  Activity: To/from bathroom  Assist Level: Contact guard assistance  Functional Mobility Comments: no LOB(on RA)  Toilet Transfers  Toilet - Technique: Ambulating  Equipment Used: Grab bars  Toilet Transfer: Contact guard assistance  Bed mobility  Supine to Sit: Contact guard assistance(HOB eleated; handrail)  Sit to Supine: Unable to assess  Scooting: Stand by assistance  Transfers  Sit to stand: Contact guard assistance  Stand to sit: Contact guard assistance                       Cognition  Overall Cognitive Status: Exceptions  Arousal/Alertness: Delayed responses to stimuli  Following Commands: Follows one step commands with increased time; Follows one step commands with repetition  Attention Span: Attends with cues to redirect  Safety Judgement: Decreased awareness of need for assistance  Problem Solving: Assistance required to generate solutions;Assistance required to implement solutions  Initiation: Requires cues for some  Sequencing: Requires cues for some           Plan   Plan  Times per week: 3-5x/week  Current Treatment Recommendations: Strengthening, Balance Training, Functional Mobility Training, Endurance Training, Patient/Caregiver Education & Training, Equipment Evaluation, Education, & procurement, Self-Care / ADL, Home Management Training, Safety Education & Training    AM-PAC Score        AM-PAC Inpatient Daily Activity Raw Score: 18  AM-PAC Inpatient ADL T-Scale Score : 38.66  ADL Inpatient CMS 0-100% Score: 46.65  ADL Inpatient CMS G-Code Modifier : CK    Goals  Short term goals  Time Frame for Short term goals: By 4/5/19  Short term goal 1: Pt will complete functional transfers with SBA   Short term goal 2: Pt will complete LE dressing with Min A- goal met 4/01  Short term goal 3: Pt will perform 3-5 minutes dynamic standing activity with CGA by 4/2  Patient Goals   Patient goals : \"to go home\"       Therapy Time   Individual Concurrent Group Co-treatment   Time In 6288         Time Out 1430         Minutes 31         Timed Code Treatment Minutes: [address], OTR/L

## 2019-04-02 ENCOUNTER — APPOINTMENT (OUTPATIENT)
Dept: GENERAL RADIOLOGY | Age: 79
DRG: 121 | End: 2019-04-02
Payer: COMMERCIAL

## 2019-04-02 LAB
GLUCOSE BLD-MCNC: 101 MG/DL (ref 70–99)
GLUCOSE BLD-MCNC: 75 MG/DL (ref 70–99)
GLUCOSE BLD-MCNC: 76 MG/DL (ref 70–99)
GLUCOSE BLD-MCNC: 81 MG/DL (ref 70–99)
GLUCOSE BLD-MCNC: 84 MG/DL (ref 70–99)
GLUCOSE BLD-MCNC: 93 MG/DL (ref 70–99)
PERFORMED ON: ABNORMAL
PERFORMED ON: NORMAL

## 2019-04-02 PROCEDURE — 6370000000 HC RX 637 (ALT 250 FOR IP): Performed by: INTERNAL MEDICINE

## 2019-04-02 PROCEDURE — 6360000002 HC RX W HCPCS: Performed by: INTERNAL MEDICINE

## 2019-04-02 PROCEDURE — 97116 GAIT TRAINING THERAPY: CPT

## 2019-04-02 PROCEDURE — 6370000000 HC RX 637 (ALT 250 FOR IP): Performed by: NURSE PRACTITIONER

## 2019-04-02 PROCEDURE — 74022 RADEX COMPL AQT ABD SERIES: CPT

## 2019-04-02 PROCEDURE — 97530 THERAPEUTIC ACTIVITIES: CPT

## 2019-04-02 PROCEDURE — 2580000003 HC RX 258: Performed by: INTERNAL MEDICINE

## 2019-04-02 PROCEDURE — 2060000000 HC ICU INTERMEDIATE R&B

## 2019-04-02 PROCEDURE — C9113 INJ PANTOPRAZOLE SODIUM, VIA: HCPCS | Performed by: INTERNAL MEDICINE

## 2019-04-02 PROCEDURE — 94640 AIRWAY INHALATION TREATMENT: CPT

## 2019-04-02 RX ORDER — DEXTROSE MONOHYDRATE 25 G/50ML
12.5 INJECTION, SOLUTION INTRAVENOUS PRN
Status: DISCONTINUED | OUTPATIENT
Start: 2019-04-02 | End: 2019-04-03 | Stop reason: HOSPADM

## 2019-04-02 RX ORDER — DEXTROSE MONOHYDRATE 50 MG/ML
100 INJECTION, SOLUTION INTRAVENOUS PRN
Status: DISCONTINUED | OUTPATIENT
Start: 2019-04-02 | End: 2019-04-03 | Stop reason: HOSPADM

## 2019-04-02 RX ORDER — NICOTINE POLACRILEX 4 MG
15 LOZENGE BUCCAL PRN
Status: DISCONTINUED | OUTPATIENT
Start: 2019-04-02 | End: 2019-04-03 | Stop reason: HOSPADM

## 2019-04-02 RX ADMIN — SODIUM CHLORIDE: 9 INJECTION, SOLUTION INTRAVENOUS at 10:33

## 2019-04-02 RX ADMIN — ACETAMINOPHEN 650 MG: 325 TABLET ORAL at 12:48

## 2019-04-02 RX ADMIN — PANTOPRAZOLE SODIUM 40 MG: 40 INJECTION, POWDER, FOR SOLUTION INTRAVENOUS at 10:00

## 2019-04-02 RX ADMIN — SODIUM CHLORIDE: 9 INJECTION, SOLUTION INTRAVENOUS at 22:38

## 2019-04-02 RX ADMIN — SODIUM CHLORIDE, PRESERVATIVE FREE 10 ML: 5 INJECTION INTRAVENOUS at 10:01

## 2019-04-02 RX ADMIN — LEVOTHYROXINE SODIUM 100 MCG: 100 TABLET ORAL at 10:04

## 2019-04-02 RX ADMIN — IPRATROPIUM BROMIDE AND ALBUTEROL SULFATE 1 AMPULE: .5; 3 SOLUTION RESPIRATORY (INHALATION) at 17:06

## 2019-04-02 RX ADMIN — LABETALOL HYDROCHLORIDE 100 MG: 200 TABLET, FILM COATED ORAL at 10:00

## 2019-04-02 RX ADMIN — IPRATROPIUM BROMIDE AND ALBUTEROL SULFATE 1 AMPULE: .5; 3 SOLUTION RESPIRATORY (INHALATION) at 08:38

## 2019-04-02 RX ADMIN — ONDANSETRON 4 MG: 2 INJECTION INTRAMUSCULAR; INTRAVENOUS at 12:48

## 2019-04-02 RX ADMIN — ENOXAPARIN SODIUM 40 MG: 40 INJECTION SUBCUTANEOUS at 10:01

## 2019-04-02 RX ADMIN — IPRATROPIUM BROMIDE AND ALBUTEROL SULFATE 1 AMPULE: .5; 3 SOLUTION RESPIRATORY (INHALATION) at 20:06

## 2019-04-02 RX ADMIN — LABETALOL HYDROCHLORIDE 100 MG: 200 TABLET, FILM COATED ORAL at 21:23

## 2019-04-02 RX ADMIN — Medication 10 ML: at 10:01

## 2019-04-02 RX ADMIN — SPIRONOLACTONE 25 MG: 25 TABLET ORAL at 10:00

## 2019-04-02 RX ADMIN — PREDNISONE 40 MG: 20 TABLET ORAL at 10:00

## 2019-04-02 RX ADMIN — ANTACID TABLETS 500 MG: 500 TABLET, CHEWABLE ORAL at 12:48

## 2019-04-02 RX ADMIN — ATORVASTATIN CALCIUM 10 MG: 10 TABLET, FILM COATED ORAL at 10:00

## 2019-04-02 RX ADMIN — LOSARTAN POTASSIUM 50 MG: 25 TABLET, FILM COATED ORAL at 10:00

## 2019-04-02 ASSESSMENT — PAIN SCALES - GENERAL: PAINLEVEL_OUTOF10: 5

## 2019-04-02 NOTE — FLOWSHEET NOTE
04/02/19 0139   Assessment   Charting Type Shift assessment   Neurological   Neuro (WDL) WDL   Level of Consciousness 0   Orientation Level Oriented X4   Cognition Appropriate judgement; Appropriate safety awareness; Appropriate attention/concentration   Language Clear; Appropriate for developmental age   Size R Pupil (mm) 3   R Pupil Shape Round   R Pupil Reaction Brisk   Size L Pupil (mm) 3   L Pupil Shape Round   L Pupil Reaction Brisk   R Hand  Moderate   L Hand  Moderate   R Foot Dorsiflexion Moderate   L Foot Dorsiflexion Moderate   R Foot Plantar Flexion Moderate   L Foot Plantar Flexion Moderate   RUE Motor Response Responds to command;Normal extension;Normal flexion   Sensation RUE Full sensation   LUE Motor Response Responds to command;Normal extension;Normal flexion   Sensation LUE Full sensation   RLE Motor Response Responds to command;Normal extension;Normal flexion   Sensation RLE Full sensation   LLE Motor Response Responds to command;Normal extension;Normal flexion   Sensation LLE Full sensation   [place] Coma Scale   Eye Opening 4   Best Verbal Response 5   Best Motor Response 6   [place] Coma Scale Score 15   HEENT   HEENT (WDL) X   Right Eye Intact   Left Eye Intact   Teeth Missing teeth   Respiratory   Respiratory (WDL) X   Respiratory Pattern Regular   Respiratory Depth Normal   Respiratory Quality/Effort Unlabored   Chest Assessment Chest expansion symmetrical   L Breath Sounds Diminished;Clear   R Breath Sounds Diminished;Clear   Breath Sounds   Right Upper Lobe Clear   Right Middle Lobe Diminished   Right Lower Lobe Diminished   Left Upper Lobe Clear   Left Lower Lobe Diminished   Cardiac   Cardiac (WDL) WDL   Cardiac Regularity Regular   Heart Sounds S1, S2   Cardiac Rhythm NSR   Cardiac Monitor   Telemetry Monitor On Yes   Telemetry Audible Yes   Telemetry Alarms Set Yes   Gastrointestinal   Abdominal (WDL) X  (new g-tube with binder)   Abdomen Inspection Ostomy tube;Rounded; Soft

## 2019-04-02 NOTE — PROGRESS NOTES
Hospitalist Progress Note      PCP: Eileen Baez MD    Date of Admission: 3/26/2019    Chief Complaint: SOB     Brief Hospital Course : 70 y.o. female with PMH of Advanced COPD ( Does have oxygen at home BUT  not wearing constantly) and multiple other medical problems as detailed below presented to [facility] ED overnight with complaints of shortness of breath started since yesterday morning. Patient reported to the ED that she felt like her throat is closing off. She also reported that she is supposed to follow up with GI to have endoscopy . She also reported substernal chest/back pain, which was waxing and waning. Reported chronic cough which was unchanged. But denied any hemoptysis. ED course : Patient was noted to be hypoxic upon arrival to ED and was requiring 3 L/m oxygen by nasal cannula. Her ED course, complicated with worsening respiratory status requiring  intubation and mechanical ventilation . She subsequently admitted to ICU for further evaluation and management. Her chest x-ray was suggestive of bilateral pneumonia left more than right . CT chest suggestive of terminal emphysematous changes . Patient underwent EGD in ED and noted no FB . Subjective: Patient seen and examined this AM . Grand daughter at beside . PEG placed today. - Patient feels better and offers no new complaints.   RN advised to wean oxygen as able     Medications:  Reviewed  Scheduled Medications    sodium chloride flush  10 mL Intravenous 2 times per day    enoxaparin  40 mg Subcutaneous Daily    spironolactone  25 mg Oral Daily    ipratropium-albuterol  1 ampule Inhalation Q4H WA    atorvastatin  10 mg Oral Daily    labetalol  100 mg Oral 2 times per day    levothyroxine  100 mcg Oral Daily    losartan  50 mg Oral Daily    pantoprazole  40 mg Intravenous Daily    sodium chloride flush  10 mL Intravenous 2 times per day    predniSONE  40 mg Oral Daily     PRN Meds: sodium chloride flush, ondansetron, calcium carbonate, LIP BALM, sodium chloride flush, magnesium hydroxide, ondansetron, acetaminophen      Intake/Output Summary (Last 24 hours) at 4/2/2019 1608  Last data filed at 4/2/2019 0546  Gross per 24 hour   Intake 1744.5 ml   Output 400 ml   Net 1344.5 ml       Physical Exam Performed:  BP (!) 164/83   Pulse 75   Temp 97.5 °F (36.4 °C)   Resp 16   Ht 5' 6\" (1.676 m)   Wt 169 lb 15.6 oz (77.1 kg)   SpO2 91%   BMI 27.43 kg/m²     General appearance:   in no apparent distress; on oxygen by nasal cannula 2 L/m  HEENT:  Normal cephalic, atraumatic without obvious deformity. Pupils equal, round, and reactive to light. Extra ocular muscles intact. Conjunctivae/corneas clear. NG tube in situ  Neck: Supple, with full range of motion. No jugular venous distention. Trachea midline. Respiratory:  Normal respiratory effort. Clear to auscultation, bilaterally without Rales/Wheezes/Rhonchi. Diminished breath sounds at bases  Cardiovascular:  Regular rate and rhythm with normal S1/S2 without murmurs, rubs or gallops. Abdomen: Soft, non-tender, non-distended with normal bowel sounds. Musculoskeletal:  No clubbing, cyanosis or edema bilaterally. Full range of motion without deformity. Skin: Skin color, texture, turgor normal.  No rashes or lesions.    Neurologic: AAO ×3; no focal neurological deficits   Capillary Refill: Brisk,< 3 seconds   Peripheral Pulses: +2 palpable, equal bilaterally     Labs:   Recent Labs     04/01/19  1142   WBC 6.1   HGB 11.1*   HCT 33.1*        Recent Labs     03/31/19  0451 04/01/19  1142    142   K 3.8 3.3*    107   CO2 27 26   BUN 33* 26*   CREATININE 0.9 0.7   CALCIUM 9.3 8.6     Urinalysis:    Lab Results   Component Value Date    NITRU Negative 03/27/2019    BLOODU Negative 03/27/2019    SPECGRAV >=1.030 03/27/2019    GLUCOSEU Negative 03/27/2019       Radiology:  XR Acute Abd Series Chest 1 VW   Final Result   Peg tube noted within the gastric body.      Left basilar airspace disease retrocardiac, most likely atelectasis. Pneumonia also considered. XR ABDOMEN (KUB) (SINGLE AP VIEW)   Final Result   Nasogastric tube tip overlies the proximal stomach with side hole just beyond   the GE junction. FL ESOPHAGRAM   Final Result   Marked esophageal dysmotility with poor primary peristalsis and visualization   of tertiary contractions as described above. FL MODIFIED BARIUM SWALLOW W VIDEO   Final Result   With exception of a single episode of very minimal penetration of the larynx   with ingestion of thin liquid barium via a straw, the examination is   otherwise unremarkable with no evidence of significant penetration of the   larynx or aspiration as described above. Please see separate speech pathology report for full discussion of findings   and recommendations. CT CHEST W CONTRAST   Final Result   1. Bilateral lower lobe atelectasis and/or pneumonia. 2. Severe emphysema. 3. Coronary artery disease. 4. Pulmonary hypertension. XR CHEST PORTABLE   Final Result   1. Tip of the endotracheal tube is 1.8 cm above the mariola. Consider   retraction by about 3 cm and repeat chest radiograph. 2. Interval improvement in bibasilar pulmonary opacities with residual   pulmonary opacity within left lung base. RECOMMENDATION:   Recommend radiographic follow-up to complete resolution. XR CHEST PORTABLE   Final Result   Bilateral lower lobe airspace disease, greater on left, likely representing   pneumonia. Follow-up to resolution is recommended. XR Neck Soft Tissue   Final Result   Negative study.            Active Hospital Problems    Diagnosis Date Noted    Esophageal motility disorder [K22.4] 03/30/2019    DANY (acute kidney injury) ([facility] [address] [address] [N17.9] 03/29/2019    Acute respiratory failure with hypoxia ([address] [J96.01] 03/27/2019    Acute respiratory failure (HCC) [J96.00] 03/27/2019    Hypokalemia [E87.6] 03/27/2019    Normal anion gap metabolic acidosis [X06.6] 03/27/2019    Anemia [D64.9] 03/27/2019    Pulmonary infiltrates [R91.8] 03/27/2019    Atelectasis [J98.11] 03/27/2019    HEBERT on CPAP [G47.33, Z99.89] 03/27/2019    ASD (atrial septal defect) [Q21.1] 03/27/2019    Aspiration pneumonia (HCC) [J69.0]     Pulmonary HTN ([facility] [address] [I27.20] 05/07/2018    Acquired hypothyroidism [E03.9] 07/06/2017    Essential hypertension [I10] 07/17/2012    Centrilobular emphysema ([address] [J43.2] 10/18/2011    Rheumatoid arthritis involving multiple sites with positive rheumatoid factor ([facility] [address] [M05.79] 10/18/2011     Assessment/Plan:  Acute respiratory failure with combined hypoxemia/hypercapnia in the setting of COPD exacerbation requiring intubation in ED   - Clinically improved  - Pulmonology consulted and assisted with management ; Extubated on 3/28/19    -Transitioned  IV Solu-Medrol to PO steroids ; HHN. Completed PO steroid course  - S/p  Bronchoscopy on 3/27/19 - patient was not found to have any food particles in the airways or any endobronchial lesions     LLL Pneumonia   - Possible bacterial but Aspiration could not be entirely ruled Out   - Patient empirically started on Levaquin in ED blood and respiratory cultures /BAL - NGTD  - completed course of abx.     HTN   - held home blood pressure medications since admission as BP optimal  - Resumed BP meds with  Parameters       Dysphagia  secondary to  esophageal dysmotility disorder (POA) S/p EGD in ED   - No FB noted   - Evaluated  by SLP and was recommended for MBS - Showed esophageal dysmotility  - GI recommended alternative feeds through PEG tube. Family to decide. Currently on NG tube feeds   - S/P PEG placement 4/1. Started on TF on 4/2, advancing towards goal.     5. HypoThyroidism - restarted synthroid      6. GERD - Continue PPI      7.  Chronic RA - On Leflunomide + Prednisone - Continue     DVT Prophylaxis: Lovenox   Diet: DIET TUBE FEED CONTINUOUS/CYCLIC NPO; 1.5 Calorie with Fiber (Vital ); Nasogastric; Continuous; 25; 65  Code Status: Full Code    PT/OT Eval Status: SNF    Dispo - Likely DC home tomorrow. Patient refusing SNF and HHC.     Kelli Meigs, MD

## 2019-04-02 NOTE — PROGRESS NOTES
GI Progress Note      SUBJECTIVE:  Denies abdominal pain, vomiting, melena or fevers.     OBJECTIVE      Medications    Current Facility-Administered Medications: sodium chloride flush 0.9 % injection 10 mL, 10 mL, Intravenous, 2 times per day  sodium chloride flush 0.9 % injection 10 mL, 10 mL, Intravenous, PRN  ondansetron (ZOFRAN) injection 4 mg, 4 mg, Intravenous, Q6H PRN  enoxaparin (LOVENOX) injection 40 mg, 40 mg, Subcutaneous, Daily  spironolactone (ALDACTONE) tablet 25 mg, 25 mg, Oral, Daily  calcium carbonate (TUMS) chewable tablet 500 mg, 500 mg, Oral, TID PRN  0.9 % sodium chloride infusion, , Intravenous, Continuous  ipratropium-albuterol (DUONEB) nebulizer solution 1 ampule, 1 ampule, Inhalation, Q4H WA  LIP BALM ointment, , Topical, PRN  atorvastatin (LIPITOR) tablet 10 mg, 10 mg, Oral, Daily  labetalol (NORMODYNE) tablet 100 mg, 100 mg, Oral, 2 times per day  levothyroxine (SYNTHROID) tablet 100 mcg, 100 mcg, Oral, Daily  losartan (COZAAR) tablet 50 mg, 50 mg, Oral, Daily  pantoprazole (PROTONIX) injection 40 mg, 40 mg, Intravenous, Daily  sodium chloride flush 0.9 % injection 10 mL, 10 mL, Intravenous, 2 times per day  sodium chloride flush 0.9 % injection 10 mL, 10 mL, Intravenous, PRN  magnesium hydroxide (MILK OF MAGNESIA) 400 MG/5ML suspension 30 mL, 30 mL, Oral, Daily PRN  ondansetron (ZOFRAN) injection 4 mg, 4 mg, Intravenous, Q6H PRN  [COMPLETED] methylPREDNISolone sodium (SOLU-MEDROL) injection 40 mg, 40 mg, Intravenous, Q6H **FOLLOWED BY** predniSONE (DELTASONE) tablet 40 mg, 40 mg, Oral, Daily  acetaminophen (TYLENOL) tablet 650 mg, 650 mg, Oral, Q4H PRN  Physical    VITALS:  BP (!) 161/82   Pulse 75   Temp 98 °F (36.7 °C) (Oral)   Resp 16   Ht 5' 6\" (1.676 m)   Wt 169 lb 15.6 oz (77.1 kg)   SpO2 93%   BMI 27.43 kg/m²   ABD: soft, ND, NT, NABs, PEG site C/D/I  Data    pending    ASSESSMENT AND PLAN  S/p PEG placement w/o signs of complications  - OK for full use of the PEG including fluids, meds and nutrition  - She should remain NPO  - I will arrange for office follow up with my NP in 1-2 weeks at which time we will assess safety of oral liquids  - Signing off

## 2019-04-02 NOTE — PROGRESS NOTES
assistance(performed from EOB x2 reps with cues for hand placement)  Stand to sit: Contact guard assistance  Ambulation  Ambulation?: Yes  Ambulation 1  Surface: level tile  Device: Rolling Walker  Assistance: Contact guard assistance  Quality of Gait: partial step through gait pattern, decreased lenore, steady with turns  Distance: [de-identified]'  Comments: SpO2 91% after ambulation  Exercises  Knee Long Arc Quad: x15 BLE  Ankle Pumps: x15 BLE      Assessment   Body structures, Functions, Activity limitations: Decreased functional mobility ; Decreased endurance;Decreased strength;Decreased balance;Decreased safe awareness  Assessment: Pt demonstrating improvement with gait distance and transfers. Pt fatigues quickly and requires rest breaks. Anticipate patient will be safe to return home with 24hr supervision and home PT. Treatment Diagnosis: decreased (I) with mobility  Prognosis: Good  Patient Education: Educated on safety with transfers and ambulation  REQUIRES PT FOLLOW UP: Yes  Activity Tolerance  Activity Tolerance: Patient Tolerated treatment well     AM-PAC Score  AM-PAC Inpatient Mobility Raw Score : 20  AM-PAC Inpatient T-Scale Score : 47.67  Mobility Inpatient CMS 0-100% Score: 35.83  Mobility Inpatient CMS G-Code Modifier : CJ        Goals  Short term goals  Time Frame for Short term goals: 1 week (4/5)  Short term goal 1: Pt will be supervision for bed mobility. Short term goal 2: Pt will be SBA for bed<>chair transfers. Short term goal 3: Pt will ambulate 100 ft with SBA and RW. Short term goal 4: 4/1: Pt will participate in 12-15 reps of BLE exercises to promote strength and activity tolerance.   Patient Goals   Patient goals : \"to go home\"    Plan    Plan  Times per week: 3-5x/wk  Times per day: Daily  Specific instructions for Next Treatment: progress mobility as tolerated  Current Treatment Recommendations: Strengthening, Gait Training, Stair training, Balance Training, Neuromuscular Re-education, Functional Mobility Training, Endurance Training, Transfer Training, Safety Education & Training  Safety Devices  Type of devices:  All fall risk precautions in place, Call light within reach, Gait belt, Patient at risk for falls, Nurse notified, Left in chair, Chair alarm in place     Therapy Time   Individual Concurrent Group Co-treatment   Time In 1033         Time Out 1057         Minutes 24         Timed Code Treatment Minutes: [address] [place], [id number]  [facility]

## 2019-04-02 NOTE — CARE COORDINATION
Writer spoke to [place] at [facility]. She is getting with their Clinician to see when they will be available for education. CM updated her that patient will be ready for discharge tomorrow and prefer to have this done prior to discharge. [place] to call writer back with this information.   Bhupendra Jacobs RN

## 2019-04-02 NOTE — FLOWSHEET NOTE
04/02/19 0804   Assessment   Charting Type Shift assessment   Neurological   Neuro (WDL) WDL   Level of Consciousness 0   Orientation Level Oriented X4   Cognition Appropriate judgement; Appropriate safety awareness; Appropriate attention/concentration   Language Clear; Appropriate for developmental age   Size R Pupil (mm) 3   R Pupil Shape Round   R Pupil Reaction Brisk   Size L Pupil (mm) 3   L Pupil Shape Round   L Pupil Reaction Brisk   R Hand  Moderate   L Hand  Moderate   R Foot Dorsiflexion Moderate   L Foot Dorsiflexion Moderate   R Foot Plantar Flexion Moderate   L Foot Plantar Flexion Moderate   RUE Motor Response Responds to command;Normal extension;Normal flexion   Sensation RUE Full sensation   LUE Motor Response Responds to command;Normal extension;Normal flexion   Sensation LUE Full sensation   RLE Motor Response Responds to command;Normal extension;Normal flexion   Sensation RLE Full sensation   LLE Motor Response Responds to command;Normal extension;Normal flexion   Sensation LLE Full sensation   [place] Coma Scale   Eye Opening 4   Best Verbal Response 5   Best Motor Response 6   [place] Coma Scale Score 15   HEENT   HEENT (WDL) X   Right Eye Intact   Left Eye Intact   Teeth Missing teeth   Respiratory   Respiratory (WDL) X   Respiratory Pattern Regular   Respiratory Depth Normal   Respiratory Quality/Effort Unlabored   Chest Assessment Chest expansion symmetrical   L Breath Sounds Diminished;Clear   R Breath Sounds Diminished;Clear   Breath Sounds   Right Upper Lobe Clear   Right Middle Lobe Diminished   Right Lower Lobe Diminished   Left Upper Lobe Clear   Left Lower Lobe Diminished   Cardiac   Cardiac (WDL) WDL   Cardiac Regularity Regular   Heart Sounds S1, S2   Cardiac Rhythm NSR   Rhythm Interpretation   Pulse 79   Cardiac Monitor   Telemetry Monitor On Yes   Telemetry Audible Yes   Telemetry Alarms Set Yes   Gastrointestinal   Abdominal (WDL) X  (new g-tube with binder)   Abdomen

## 2019-04-03 VITALS
BODY MASS INDEX: 26.97 KG/M2 | DIASTOLIC BLOOD PRESSURE: 73 MMHG | HEIGHT: 66 IN | WEIGHT: 167.8 LBS | OXYGEN SATURATION: 96 % | TEMPERATURE: 98.4 F | HEART RATE: 82 BPM | RESPIRATION RATE: 18 BRPM | SYSTOLIC BLOOD PRESSURE: 128 MMHG

## 2019-04-03 LAB
ANION GAP SERPL CALCULATED.3IONS-SCNC: 10 MMOL/L (ref 3–16)
BUN BLDV-MCNC: 25 MG/DL (ref 7–20)
CALCIUM SERPL-MCNC: 8.4 MG/DL (ref 8.3–10.6)
CHLORIDE BLD-SCNC: 111 MMOL/L (ref 99–110)
CO2: 24 MMOL/L (ref 21–32)
CREAT SERPL-MCNC: 0.8 MG/DL (ref 0.6–1.2)
GFR AFRICAN AMERICAN: >60
GFR NON-AFRICAN AMERICAN: >60
GLUCOSE BLD-MCNC: 106 MG/DL (ref 70–99)
GLUCOSE BLD-MCNC: 110 MG/DL (ref 70–99)
GLUCOSE BLD-MCNC: 118 MG/DL (ref 70–99)
GLUCOSE BLD-MCNC: 118 MG/DL (ref 70–99)
GLUCOSE BLD-MCNC: 133 MG/DL (ref 70–99)
HCT VFR BLD CALC: 33.2 % (ref 36–48)
HEMOGLOBIN: 10.8 G/DL (ref 12–16)
MAGNESIUM: 1.9 MG/DL (ref 1.8–2.4)
MCH RBC QN AUTO: 28.6 PG (ref 26–34)
MCHC RBC AUTO-ENTMCNC: 32.6 G/DL (ref 31–36)
MCV RBC AUTO: 87.7 FL (ref 80–100)
PDW BLD-RTO: 14.3 % (ref 12.4–15.4)
PERFORMED ON: ABNORMAL
PLATELET # BLD: 149 K/UL (ref 135–450)
PMV BLD AUTO: 7.4 FL (ref 5–10.5)
POTASSIUM REFLEX MAGNESIUM: 3.5 MMOL/L (ref 3.5–5.1)
RBC # BLD: 3.78 M/UL (ref 4–5.2)
SODIUM BLD-SCNC: 145 MMOL/L (ref 136–145)
WBC # BLD: 7.4 K/UL (ref 4–11)

## 2019-04-03 PROCEDURE — 80048 BASIC METABOLIC PNL TOTAL CA: CPT

## 2019-04-03 PROCEDURE — 83036 HEMOGLOBIN GLYCOSYLATED A1C: CPT

## 2019-04-03 PROCEDURE — 6370000000 HC RX 637 (ALT 250 FOR IP): Performed by: INTERNAL MEDICINE

## 2019-04-03 PROCEDURE — 2580000003 HC RX 258: Performed by: INTERNAL MEDICINE

## 2019-04-03 PROCEDURE — 94640 AIRWAY INHALATION TREATMENT: CPT

## 2019-04-03 PROCEDURE — C9113 INJ PANTOPRAZOLE SODIUM, VIA: HCPCS | Performed by: INTERNAL MEDICINE

## 2019-04-03 PROCEDURE — 6360000002 HC RX W HCPCS: Performed by: INTERNAL MEDICINE

## 2019-04-03 PROCEDURE — 83735 ASSAY OF MAGNESIUM: CPT

## 2019-04-03 PROCEDURE — 85027 COMPLETE CBC AUTOMATED: CPT

## 2019-04-03 PROCEDURE — 36415 COLL VENOUS BLD VENIPUNCTURE: CPT

## 2019-04-03 RX ORDER — POLYETHYLENE GLYCOL 3350 17 G/17G
17 POWDER, FOR SOLUTION ORAL DAILY
Status: DISCONTINUED | OUTPATIENT
Start: 2019-04-03 | End: 2019-04-03 | Stop reason: HOSPADM

## 2019-04-03 RX ORDER — POLYETHYLENE GLYCOL 3350 17 G/17G
17 POWDER, FOR SOLUTION ORAL DAILY
Qty: 527 G | Refills: 1 | Status: SHIPPED | OUTPATIENT
Start: 2019-04-04 | End: 2019-05-04

## 2019-04-03 RX ADMIN — SODIUM CHLORIDE, PRESERVATIVE FREE 10 ML: 5 INJECTION INTRAVENOUS at 09:57

## 2019-04-03 RX ADMIN — SODIUM CHLORIDE: 9 INJECTION, SOLUTION INTRAVENOUS at 11:08

## 2019-04-03 RX ADMIN — ATORVASTATIN CALCIUM 10 MG: 10 TABLET, FILM COATED ORAL at 09:56

## 2019-04-03 RX ADMIN — SPIRONOLACTONE 25 MG: 25 TABLET ORAL at 09:57

## 2019-04-03 RX ADMIN — LABETALOL HYDROCHLORIDE 100 MG: 200 TABLET, FILM COATED ORAL at 09:56

## 2019-04-03 RX ADMIN — LEVOTHYROXINE SODIUM 100 MCG: 100 TABLET ORAL at 05:05

## 2019-04-03 RX ADMIN — LOSARTAN POTASSIUM 50 MG: 25 TABLET, FILM COATED ORAL at 09:57

## 2019-04-03 RX ADMIN — IPRATROPIUM BROMIDE AND ALBUTEROL SULFATE 1 AMPULE: .5; 3 SOLUTION RESPIRATORY (INHALATION) at 12:12

## 2019-04-03 RX ADMIN — POLYETHYLENE GLYCOL 3350 17 G: 17 POWDER, FOR SOLUTION ORAL at 11:12

## 2019-04-03 RX ADMIN — Medication 10 ML: at 09:58

## 2019-04-03 RX ADMIN — IPRATROPIUM BROMIDE AND ALBUTEROL SULFATE 1 AMPULE: .5; 3 SOLUTION RESPIRATORY (INHALATION) at 08:28

## 2019-04-03 RX ADMIN — ENOXAPARIN SODIUM 40 MG: 40 INJECTION SUBCUTANEOUS at 09:57

## 2019-04-03 RX ADMIN — PANTOPRAZOLE SODIUM 40 MG: 40 INJECTION, POWDER, FOR SOLUTION INTRAVENOUS at 09:56

## 2019-04-03 ASSESSMENT — PAIN SCALES - GENERAL
PAINLEVEL_OUTOF10: 0
PAINLEVEL_OUTOF10: 0

## 2019-04-03 NOTE — DISCHARGE INSTR - DIET
 Good nutrition is important when healing from an illness, injury, or surgery. Follow any nutrition recommendations given to you during your hospital stay.  If you were given an oral nutrition supplement while in the hospital, continue to take this supplement at home. You can take it with meals, in-between meals, and/or before bedtime. These supplements can be purchased at most local grocery stores, pharmacies, and chain super-stores.  If you have any questions about your diet or nutrition, call the hospital and ask for the dietitian. Nothing by Mouth. Follow up with GI in 2 weeks to discuss safety of oral liquids.

## 2019-04-03 NOTE — PROGRESS NOTES
Patient discharge completed. Discharge information included information on diagnosis including signs and symptoms, complications and when to seek medical attention. Information on new medications also provided included use for the medication, side effects and when to call the doctor. Patient verbalized understanding of all discharge information. Patient escorted out by staff with all documented belongings. Home with family. Discharge instructions provided by Pete Pizarro RN to pt.

## 2019-04-03 NOTE — DISCHARGE SUMMARY
Hospital Medicine Discharge Summary    Patient ID: Tarun Snyder      Patient's PCP: Rosie Jean-Baptiste MD    Admit Date: 3/26/2019     Discharge Date: 4/3/2019      Admitting Physician: Bhupendra Encarnacion MD     Discharge Physician: Yi Castillo MD     Discharge Diagnoses: Active Hospital Problems    Diagnosis Date Noted    Esophageal motility disorder [K22.4] 03/30/2019    DANY (acute kidney injury) ([address] [N17.9] 03/29/2019    Acute respiratory failure with hypoxia (HCC) [J96.01] 03/27/2019    Acute respiratory failure (HCC) [J96.00] 03/27/2019    Hypokalemia [E87.6] 03/27/2019    Normal anion gap metabolic acidosis [Z72.0] 03/27/2019    Anemia [D64.9] 03/27/2019    Pulmonary infiltrates [R91.8] 03/27/2019    Atelectasis [J98.11] 03/27/2019    HEBERT on CPAP [G47.33, Z99.89] 03/27/2019    ASD (atrial septal defect) [Q21.1] 03/27/2019    Aspiration pneumonia ([address] [J69.0]     Pulmonary HTN ([address] [I27.20] 05/07/2018    Acquired hypothyroidism [E03.9] 07/06/2017    Essential hypertension [I10] 07/17/2012    Centrilobular emphysema ([address] [J43.2] 10/18/2011    Rheumatoid arthritis involving multiple sites with positive rheumatoid factor ([address] [M05.79] 10/18/2011       The patient was seen and examined on day of discharge and this discharge summary is in conjunction with any daily progress note from day of discharge. Hospital Course:   70 y. o. female with PMH of Advanced COPD ( Does have oxygen at home BUT  not wearing constantly) and multiple other medical problems as detailed below presented to [employer] of shortness of breath started since yesterday morning.  Patient reported to the ED that she felt like her throat is closing off.  She also reported that she is supposed to follow up with GI to have endoscopy .  She also reported substernal chest/back pain, which was waxing and waning.  Reported chronic cough which was unchanged.  But denied any hemoptysis.      ED course : Patient was noted to be hypoxic upon arrival to ED and was requiring 3 L/m oxygen by nasal cannula.  Her ED course, complicated with worsening respiratory status requiring  intubation and mechanical ventilation .  She subsequently admitted to ICU for further evaluation and management. Cyndie Tirado chest x-ray was suggestive of bilateral pneumonia left more than right .  CT chest suggestive of terminal emphysematous changes .  Patient underwent EGD in ED and noted no FB . Acute respiratory failure with combined hypoxemia/hypercapnia in the setting of COPD exacerbation requiring intubation in ED   - Clinically improved  - Pulmonology consulted and assisted with management ; Extubated on 3/28/19    -Transitioned  IV Solu-Medrol to PO steroids ; HHN. Completed PO steroid course  - S/p  Bronchoscopy on 3/27/19 - patient was not found to have any food particles in the airways or any endobronchial lesions     LLL Pneumonia   - Possible bacterial but Aspiration could not be entirely ruled Out   - Patient empirically started on Levaquin in ED blood and respiratory cultures /BAL - NGTD  - completed course of abx.     HTN   - held home blood pressure medications since admission as BP optimal  - Resumed BP meds with  Parameters       Dysphagia  secondary to  esophageal dysmotility disorder (POA) S/p EGD in ED   - No FB noted   - Evaluated  by SLP and was recommended for MBS - Showed esophageal dysmotility  - GI recommended alternative feeds through PEG tube. Family to decide. Currently on NG tube feeds   - S/P PEG placement 4/1. Started on TF on 4/2, advanced to goals  - outpatient TF set up  - Will need to follow up with GI for further evaluation.  To remain NPO until that eval completed.     Hypothyroidism  - restarted synthroid      GERD   - Continue PPI      Chronic RA   - On Leflunomide + Prednisone - Continued    Physical Exam Performed:     /73   Pulse 82   Temp 98.4 °F (36.9 °C) (Oral) Resp 18   Ht 5' 6\" (1.676 m)   Wt 167 lb 12.8 oz (76.1 kg)   SpO2 96%   BMI 27.08 kg/m²       General appearance:   in no apparent distress; on oxygen by nasal cannula 2 L/m  HEENT:  Normal cephalic, atraumatic without obvious deformity. Pupils equal, round, and reactive to light.  Extra ocular muscles intact. Conjunctivae/corneas clear. NG tube in situ  Neck: Supple, with full range of motion. No jugular venous distention. Trachea midline. Respiratory:  Normal respiratory effort. Clear to auscultation, bilaterally without Rales/Wheezes/Rhonchi.  Diminished breath sounds at bases  Cardiovascular:  Regular rate and rhythm with normal S1/S2 without murmurs, rubs or gallops. Abdomen: Soft, non-tender, non-distended with normal bowel sounds. PEG in place. Musculoskeletal:  No clubbing, cyanosis or edema bilaterally.  Full range of motion without deformity. Skin: Skin color, texture, turgor normal.  No rashes or lesions. Neurologic: AAO ×3; no focal neurological deficits   Capillary Refill: Brisk,< 3 seconds   Peripheral Pulses: +2 palpable, equal bilaterally       Labs: For convenience and continuity at follow-up the following most recent labs are provided:      CBC:    Lab Results   Component Value Date    WBC 7.4 04/03/2019    HGB 10.8 04/03/2019    HCT 33.2 04/03/2019     04/03/2019       Renal:    Lab Results   Component Value Date     04/03/2019    K 3.5 04/03/2019     04/03/2019    CO2 24 04/03/2019    BUN 25 04/03/2019    CREATININE 0.8 04/03/2019    CALCIUM 8.4 04/03/2019         Significant Diagnostic Studies    Radiology:   XR Acute Abd Series Chest 1 VW   Final Result   Peg tube noted within the gastric body. Left basilar airspace disease retrocardiac, most likely atelectasis. Pneumonia also considered. XR ABDOMEN (KUB) (SINGLE AP VIEW)   Final Result   Nasogastric tube tip overlies the proximal stomach with side hole just beyond   the GE junction.          FL ESOPHAGRAM   Final Result   Marked esophageal dysmotility with poor primary peristalsis and visualization   of tertiary contractions as described above. FL MODIFIED BARIUM SWALLOW W VIDEO   Final Result   With exception of a single episode of very minimal penetration of the larynx   with ingestion of thin liquid barium via a straw, the examination is   otherwise unremarkable with no evidence of significant penetration of the   larynx or aspiration as described above. Please see separate speech pathology report for full discussion of findings   and recommendations. CT CHEST W CONTRAST   Final Result   1. Bilateral lower lobe atelectasis and/or pneumonia. 2. Severe emphysema. 3. Coronary artery disease. 4. Pulmonary hypertension. XR CHEST PORTABLE   Final Result   1. Tip of the endotracheal tube is 1.8 cm above the mariola. Consider   retraction by about 3 cm and repeat chest radiograph. 2. Interval improvement in bibasilar pulmonary opacities with residual   pulmonary opacity within left lung base. RECOMMENDATION:   Recommend radiographic follow-up to complete resolution. XR CHEST PORTABLE   Final Result   Bilateral lower lobe airspace disease, greater on left, likely representing   pneumonia. Follow-up to resolution is recommended. XR Neck Soft Tissue   Final Result   Negative study.                 Consults:     IP CONSULT TO HOSPITALIST  IP CONSULT TO SPIRITUAL SERVICES  IP CONSULT TO PULMONOLOGY    Disposition:  Home (declined SNF and home health)     Condition at Discharge: Stable    Discharge Instructions/Follow-up:  Follow up with PCP, GI within 1-2 weeks    Code Status:  Full Code     Activity: activity as tolerated    Diet: NPO      Discharge Medications:     Discharge Medication List as of 4/3/2019  2:34 PM           Details   polyethylene glycol (GLYCOLAX) packet Take 17 g by mouth daily, Disp-527 g, R-1Normal              Details   !! spironolactone (ALDACTONE) 25 MG tablet TAKE 1 TABLET BY MOUTH EVERY 12 HOURS, Disp-180 tablet, R-10Normal      levothyroxine (SYNTHROID) 100 MCG tablet TAKE 1 TABLET BY MOUTH ONCE DAILY, Disp-90 tablet, R-10Normal      albuterol sulfate  (90 Base) MCG/ACT inhaler INHALE TWO (2) PUFFS BY MOUTH EVERY 6 HOURS AS NEEDED, Disp-54 g, R-10Normal      !! spironolactone (ALDACTONE) 25 MG tablet TAKE 1 TABLET BY MOUTH EVERY 12 HOURS, Disp-180 tablet, R-11Normal      predniSONE (DELTASONE) 10 MG tablet TAKE1/2 TABLET BY MOUTH DAILY, Disp-90 tablet, R-0Normal      ranitidine (ZANTAC) 150 MG tablet TAKE (1) TABLET BY MOUTH NIGHTLY, Disp-90 tablet, R-0Normal      hydrochlorothiazide (HYDRODIURIL) 25 MG tablet Take 1 tablet by mouth daily, R-3Historical Med      losartan (COZAAR) 50 MG tablet Take 1 tablet by mouth daily, Disp-30 tablet, R-3Normal      labetalol (NORMODYNE) 100 MG tablet Take 1 tablet by mouth every 12 hours, Disp-60 tablet, R-3Normal      atorvastatin (LIPITOR) 10 MG tablet TAKE ONE (1) TABLET BY MOUTH DAILY, Disp-90 tablet, R-5Normal      Blood Pressure Monitoring (BLOOD PRESSURE MONITOR/M CUFF) MISC DAILY Starting Mon 8/6/2018, Disp-1 each, R-0, Print      ipratropium-albuterol (DUONEB) 0.5-2.5 (3) MG/3ML SOLN nebulizer solution Inhale 3 mLs into the lungs every 4 hours as needed. , Disp-360 mL, R-3      nitroGLYCERIN (NITROSTAT) 0.4 MG SL tablet Place 0.4 mg under the tongue every 5 minutes as needed. leflunomide (ARAVA) 20 MG tablet Take 20 mg by mouth daily. !! - Potential duplicate medications found. Please discuss with provider. Time Spent on discharge is more than 30 minutes in the examination, evaluation, counseling and review of medications and discharge plan. Signed:    Zay Torres MD   4/3/2019      Thank you Corrie Galdamez MD for the opportunity to be involved in this patient's care.  If you have any questions or concerns please feel free to contact me at [phone].

## 2019-04-03 NOTE — PROGRESS NOTES
Discharge instructions and paperwork provided to pt by [facility] DOE RN C4.

## 2019-04-03 NOTE — PLAN OF CARE
Nutrition Problem: Inadequate oral intake  Intervention: Food and/or Nutrient Delivery: Start Tube Feeding, Continue NPO  Nutritional Goals:  Tolerate most appropriate form of nutrition therapy this admission
Problem: Falls - Risk of:  Goal: Will remain free from falls  Description  Will remain free from falls  Outcome: Met This Shift  Goal: Absence of physical injury  Description  Absence of physical injury  Outcome: Met This Shift     Problem: Risk for Impaired Skin Integrity  Goal: Tissue integrity - skin and mucous membranes  Description  Structural intactness and normal physiological function of skin and  mucous membranes. Outcome: Met This Shift     Problem: Discharge Planning:  Goal: Ability to perform activities of daily living will improve  Description  Ability to perform activities of daily living will improve  Outcome: Met This Shift  Goal: Participates in care planning  Description  Participates in care planning  Outcome: Met This Shift     Problem: Injury - Risk of, Physical Injury:  Goal: Will remain free from falls  Description  Will remain free from falls  Outcome: Met This Shift  Goal: Absence of physical injury  Description  Absence of physical injury  Outcome: Met This Shift     Problem: Mood - Altered:  Goal: Mood stable  Description  Mood stable  Outcome: Met This Shift     Problem: Psychomotor Activity - Altered:  Goal: Absence of psychomotor disturbance signs and symptoms  Description  Absence of psychomotor disturbance signs and symptoms  Outcome: Met This Shift     Problem: Sensory Perception - Impaired:  Goal: Demonstrations of improved sensory functioning will increase  Description  Demonstrations of improved sensory functioning will increase  Outcome: Met This Shift  Goal: Decrease in sensory misperception frequency  Description  Decrease in sensory misperception frequency  Outcome: Met This Shift  Goal: Able to refrain from responding to false sensory perceptions  Description  Able to refrain from responding to false sensory perceptions  Outcome: Met This Shift  Goal: Demonstrates accurate environmental perceptions  Description  Demonstrates accurate environmental perceptions  Outcome:  Met
Problem: Falls - Risk of:  Goal: Will remain free from falls  Description  Will remain free from falls  Outcome: Met This Shift  Note:   Safety/fall prevention maintained. Bed locked on lowest position, side rails up 2 x 4, bed alarm on,attended to needs promptly. Personal belongings and call light within reach. Pt uses call light for assistance. Pt free from falls. Will continue to monitor. MKRN     Problem: Risk for Impaired Skin Integrity  Goal: Tissue integrity - skin and mucous membranes  Description  Structural intactness and normal physiological function of skin and  mucous membranes. Outcome: Met This Shift  Note:   Pt assisted in turning while in bed, skin kept clean and dry. Mepilex dressing CDI to sacral area applied for preventative measure. Skin integrity maintained. No new open/pressure areas noted to skin. Will continue to monitor. MKRN     Problem: Injury - Risk of, Physical Injury:  Goal: Will remain free from falls  Description  Will remain free from falls  Outcome: Met This Shift  Note:   Safety/fall prevention maintained. Bed locked on lowest position, side rails up 2 x 4, bed alarm on,attended to needs promptly. Personal belongings and call light within reach. Pt uses call light for assistance. Pt free from falls. Will continue to monitor.  MKRN
Problem: Falls - Risk of:  Goal: Will remain free from falls  Description  Will remain free from falls  Outcome: Met This Shift  Note:   Safety/fall prevention maintained. Bed locked on lowest position, side rails up 2 x 4, non skid socks placed when OOB, bed alarm on, attended to needs promptly. Personal belongings and call light within reach. Pt free from falls. Will continue to monitor. MKRN     Problem: Risk for Impaired Skin Integrity  Goal: Tissue integrity - skin and mucous membranes  Description  Structural intactness and normal physiological function of skin and  mucous membranes. Outcome: Met This Shift  Note:   Pt turned self from side to sides while in bed with pillow support. Mepilex dressing intact to sacral area for preventative measure. Skin integrity maintained. No new open areas noted. Will continue to monitor. MKRN     Problem: Injury - Risk of, Physical Injury:  Goal: Will remain free from falls  Description  Will remain free from falls  Outcome: Met This Shift  Note:   Safety/fall prevention maintained. Bed locked on lowest position, side rails up 2 x 4, non skid socks placed when OOB, bed alarm on, attended to needs promptly. Personal belongings and call light within reach. Pt free from falls. Will continue to monitor.  MKRN
Problem: Falls - Risk of:  Goal: Will remain free from falls  Description  Will remain free from falls  Outcome: Ongoing     Problem: Restraint Use - Nonviolent/Non-Self-Destructive Behavior:  Goal: Absence of restraint indications  Description  Absence of restraint indications  Outcome: Ongoing     Problem: Injury - Risk of, Physical Injury:  Goal: Will remain free from falls  Description  Will remain free from falls  Outcome: Ongoing     Problem: Urinary Elimination:  Goal: Complications related to the disease process, condition or treatment will be avoided or minimized  Description  Complications related to the disease process, condition or treatment will be avoided or minimized  Outcome: Ongoing
Problem: Falls - Risk of:  Goal: Will remain free from falls  Description  Will remain free from falls  Outcome: Ongoing     Problem: Risk for Impaired Skin Integrity  Goal: Tissue integrity - skin and mucous membranes  Description  Structural intactness and normal physiological function of skin and  mucous membranes.   Outcome: Ongoing     Problem: Confusion - Acute:  Goal: Absence of continued neurological deterioration signs and symptoms  Description  Absence of continued neurological deterioration signs and symptoms  Outcome: Ongoing     Problem: Discharge Planning:  Goal: Ability to perform activities of daily living will improve  Description  Ability to perform activities of daily living will improve  Outcome: Ongoing     Problem: Injury - Risk of, Physical Injury:  Goal: Will remain free from falls  Description  Will remain free from falls  Outcome: Ongoing     Problem: Injury - Risk of, Physical Injury:  Goal: Absence of physical injury  Description  Absence of physical injury  Outcome: Ongoing     Problem: Mood - Altered:  Goal: Mood stable  Description  Mood stable  Outcome: Ongoing
Problem: Falls - Risk of:  Goal: Will remain free from falls  Description  Will remain free from falls  Outcome: Ongoing  Note:   Safety/fall prevention maintained. Bed locked on lowest position, side rails up 2 x 4, alarm on Pt verbalized understanding on indication, Personal belongings and call light within reach. Attended to needs promptly. Pt has no attempts to get OOB unassisted. Pt free from falls. MKRN     Problem: Risk for Impaired Skin Integrity  Goal: Tissue integrity - skin and mucous membranes  Description  Structural intactness and normal physiological function of skin and  mucous membranes. Outcome: Ongoing  Intervention: SKIN ASSESSMENT  Note:   Pt assisted in turning to sides while in bed with wedge pillow, Skin kept clean and dry. Mepilex dressing CDI to sacral area for preventative measure. Skin integrity maintained. No new open/pressure areas noted to skin. Will continue to monitor. MKRN     Problem: Discharge Planning:  Goal: Ability to perform activities of daily living will improve  Description  Ability to perform activities of daily living will improve  Outcome: Ongoing     Problem: Injury - Risk of, Physical Injury:  Goal: Will remain free from falls  Description  Will remain free from falls  Outcome: Ongoing  Note:   Safety/fall prevention maintained. Bed locked on lowest position, side rails up 2 x 4, alarm on Pt verbalized understanding on indication, Personal belongings and call light within reach. Attended to needs promptly. Pt has no attempts to get OOB unassisted. Pt free from falls.  MKRN
Problem: Nutrition  Goal: Optimal nutrition therapy  Outcome: Ongoing   Nutrition Problem: Inadequate oral intake  Intervention: Food and/or Nutrient Delivery: (Initiate diet after MBS if extubated, VS. Start nutrition support if remains intubated. )  Nutritional Goals:  Tolerate most appropriate form of nutrition therapy this admission
get OOB unassisted. Pt free from falls.  MKRN  Goal: Absence of physical injury  Description  Absence of physical injury  Outcome: Ongoing

## 2019-04-04 LAB
ESTIMATED AVERAGE GLUCOSE: 134.1 MG/DL
HBA1C MFR BLD: 6.3 %

## 2019-04-12 ENCOUNTER — APPOINTMENT (OUTPATIENT)
Dept: GENERAL RADIOLOGY | Age: 79
End: 2019-04-12
Payer: COMMERCIAL

## 2019-04-12 ENCOUNTER — HOSPITAL ENCOUNTER (OUTPATIENT)
Age: 79
Setting detail: OBSERVATION
Discharge: HOME HEALTH | End: 2019-04-14
Attending: EMERGENCY MEDICINE | Admitting: INTERNAL MEDICINE
Payer: COMMERCIAL

## 2019-04-12 DIAGNOSIS — R07.9 CHEST PAIN, UNSPECIFIED TYPE: ICD-10-CM

## 2019-04-12 DIAGNOSIS — R06.02 SHORTNESS OF BREATH: Primary | ICD-10-CM

## 2019-04-12 LAB
A/G RATIO: 2 (ref 1.1–2.2)
ALBUMIN SERPL-MCNC: 4.3 G/DL (ref 3.4–5)
ALP BLD-CCNC: 35 U/L (ref 40–129)
ALT SERPL-CCNC: 25 U/L (ref 10–40)
ANION GAP SERPL CALCULATED.3IONS-SCNC: 14 MMOL/L (ref 3–16)
AST SERPL-CCNC: 26 U/L (ref 15–37)
BASOPHILS ABSOLUTE: 0.1 K/UL (ref 0–0.2)
BASOPHILS RELATIVE PERCENT: 1.1 %
BILIRUB SERPL-MCNC: 1.5 MG/DL (ref 0–1)
BUN BLDV-MCNC: 18 MG/DL (ref 7–20)
CALCIUM SERPL-MCNC: 9.5 MG/DL (ref 8.3–10.6)
CHLORIDE BLD-SCNC: 103 MMOL/L (ref 99–110)
CO2: 24 MMOL/L (ref 21–32)
CREAT SERPL-MCNC: 0.9 MG/DL (ref 0.6–1.2)
EKG ATRIAL RATE: 73 BPM
EKG DIAGNOSIS: NORMAL
EKG P AXIS: 84 DEGREES
EKG P-R INTERVAL: 132 MS
EKG Q-T INTERVAL: 390 MS
EKG QRS DURATION: 84 MS
EKG QTC CALCULATION (BAZETT): 429 MS
EKG R AXIS: 48 DEGREES
EKG T AXIS: 63 DEGREES
EKG VENTRICULAR RATE: 73 BPM
EOSINOPHILS ABSOLUTE: 0.3 K/UL (ref 0–0.6)
EOSINOPHILS RELATIVE PERCENT: 5.4 %
GFR AFRICAN AMERICAN: >60
GFR NON-AFRICAN AMERICAN: >60
GLOBULIN: 2.2 G/DL
GLUCOSE BLD-MCNC: 124 MG/DL (ref 70–99)
HCT VFR BLD CALC: 34.6 % (ref 36–48)
HEMOGLOBIN: 11.5 G/DL (ref 12–16)
LACTIC ACID: 1.6 MMOL/L (ref 0.4–2)
LYMPHOCYTES ABSOLUTE: 0.9 K/UL (ref 1–5.1)
LYMPHOCYTES RELATIVE PERCENT: 17.1 %
MCH RBC QN AUTO: 29.2 PG (ref 26–34)
MCHC RBC AUTO-ENTMCNC: 33.2 G/DL (ref 31–36)
MCV RBC AUTO: 88.1 FL (ref 80–100)
MONOCYTES ABSOLUTE: 0.5 K/UL (ref 0–1.3)
MONOCYTES RELATIVE PERCENT: 8.8 %
NEUTROPHILS ABSOLUTE: 3.5 K/UL (ref 1.7–7.7)
NEUTROPHILS RELATIVE PERCENT: 67.6 %
PDW BLD-RTO: 14.3 % (ref 12.4–15.4)
PLATELET # BLD: 183 K/UL (ref 135–450)
PMV BLD AUTO: 7.5 FL (ref 5–10.5)
POTASSIUM REFLEX MAGNESIUM: 3.6 MMOL/L (ref 3.5–5.1)
PRO-BNP: 46 PG/ML (ref 0–449)
RBC # BLD: 3.93 M/UL (ref 4–5.2)
SODIUM BLD-SCNC: 141 MMOL/L (ref 136–145)
TOTAL PROTEIN: 6.5 G/DL (ref 6.4–8.2)
TROPONIN: 0.02 NG/ML
TROPONIN: <0.01 NG/ML
TROPONIN: <0.01 NG/ML
WBC # BLD: 5.2 K/UL (ref 4–11)

## 2019-04-12 PROCEDURE — G0378 HOSPITAL OBSERVATION PER HR: HCPCS

## 2019-04-12 PROCEDURE — 94640 AIRWAY INHALATION TREATMENT: CPT

## 2019-04-12 PROCEDURE — 93005 ELECTROCARDIOGRAM TRACING: CPT | Performed by: PHYSICIAN ASSISTANT

## 2019-04-12 PROCEDURE — 2580000003 HC RX 258: Performed by: INTERNAL MEDICINE

## 2019-04-12 PROCEDURE — 80053 COMPREHEN METABOLIC PANEL: CPT

## 2019-04-12 PROCEDURE — 84484 ASSAY OF TROPONIN QUANT: CPT

## 2019-04-12 PROCEDURE — 94761 N-INVAS EAR/PLS OXIMETRY MLT: CPT

## 2019-04-12 PROCEDURE — 83880 ASSAY OF NATRIURETIC PEPTIDE: CPT

## 2019-04-12 PROCEDURE — 71046 X-RAY EXAM CHEST 2 VIEWS: CPT

## 2019-04-12 PROCEDURE — 6370000000 HC RX 637 (ALT 250 FOR IP): Performed by: INTERNAL MEDICINE

## 2019-04-12 PROCEDURE — 99291 CRITICAL CARE FIRST HOUR: CPT

## 2019-04-12 PROCEDURE — 93010 ELECTROCARDIOGRAM REPORT: CPT | Performed by: INTERNAL MEDICINE

## 2019-04-12 PROCEDURE — 83605 ASSAY OF LACTIC ACID: CPT

## 2019-04-12 PROCEDURE — 36415 COLL VENOUS BLD VENIPUNCTURE: CPT

## 2019-04-12 PROCEDURE — 85025 COMPLETE CBC W/AUTO DIFF WBC: CPT

## 2019-04-12 PROCEDURE — 2700000000 HC OXYGEN THERAPY PER DAY

## 2019-04-12 RX ORDER — LOSARTAN POTASSIUM 25 MG/1
50 TABLET ORAL DAILY
Status: DISCONTINUED | OUTPATIENT
Start: 2019-04-12 | End: 2019-04-14 | Stop reason: HOSPADM

## 2019-04-12 RX ORDER — SPIRONOLACTONE 25 MG/1
25 TABLET ORAL DAILY
Status: DISCONTINUED | OUTPATIENT
Start: 2019-04-12 | End: 2019-04-14 | Stop reason: HOSPADM

## 2019-04-12 RX ORDER — NITROGLYCERIN 0.4 MG/1
0.4 TABLET SUBLINGUAL EVERY 5 MIN PRN
Status: DISCONTINUED | OUTPATIENT
Start: 2019-04-12 | End: 2019-04-14 | Stop reason: HOSPADM

## 2019-04-12 RX ORDER — ATORVASTATIN CALCIUM 40 MG/1
40 TABLET, FILM COATED ORAL NIGHTLY
Status: DISCONTINUED | OUTPATIENT
Start: 2019-04-12 | End: 2019-04-14 | Stop reason: HOSPADM

## 2019-04-12 RX ORDER — PANTOPRAZOLE SODIUM 40 MG/1
40 TABLET, DELAYED RELEASE ORAL
Status: DISCONTINUED | OUTPATIENT
Start: 2019-04-13 | End: 2019-04-14 | Stop reason: HOSPADM

## 2019-04-12 RX ORDER — PREDNISONE 1 MG/1
5 TABLET ORAL DAILY
Status: DISCONTINUED | OUTPATIENT
Start: 2019-04-12 | End: 2019-04-14 | Stop reason: HOSPADM

## 2019-04-12 RX ORDER — IPRATROPIUM BROMIDE AND ALBUTEROL SULFATE 2.5; .5 MG/3ML; MG/3ML
3 SOLUTION RESPIRATORY (INHALATION)
Status: DISCONTINUED | OUTPATIENT
Start: 2019-04-12 | End: 2019-04-14 | Stop reason: HOSPADM

## 2019-04-12 RX ORDER — LEVOTHYROXINE SODIUM 0.1 MG/1
100 TABLET ORAL DAILY
Status: DISCONTINUED | OUTPATIENT
Start: 2019-04-12 | End: 2019-04-14 | Stop reason: HOSPADM

## 2019-04-12 RX ORDER — ONDANSETRON 2 MG/ML
4 INJECTION INTRAMUSCULAR; INTRAVENOUS EVERY 6 HOURS PRN
Status: DISCONTINUED | OUTPATIENT
Start: 2019-04-12 | End: 2019-04-14 | Stop reason: HOSPADM

## 2019-04-12 RX ORDER — HYDROCHLOROTHIAZIDE 25 MG/1
25 TABLET ORAL DAILY
Status: DISCONTINUED | OUTPATIENT
Start: 2019-04-12 | End: 2019-04-14 | Stop reason: HOSPADM

## 2019-04-12 RX ORDER — SODIUM CHLORIDE 0.9 % (FLUSH) 0.9 %
10 SYRINGE (ML) INJECTION EVERY 12 HOURS SCHEDULED
Status: DISCONTINUED | OUTPATIENT
Start: 2019-04-12 | End: 2019-04-14 | Stop reason: HOSPADM

## 2019-04-12 RX ORDER — LEFLUNOMIDE 10 MG/1
20 TABLET ORAL DAILY
Status: DISCONTINUED | OUTPATIENT
Start: 2019-04-12 | End: 2019-04-14 | Stop reason: HOSPADM

## 2019-04-12 RX ORDER — OMEPRAZOLE 40 MG/1
40 CAPSULE, DELAYED RELEASE ORAL DAILY
Status: ON HOLD | COMMUNITY
End: 2019-04-14 | Stop reason: HOSPADM

## 2019-04-12 RX ORDER — POLYETHYLENE GLYCOL 3350 17 G/17G
17 POWDER, FOR SOLUTION ORAL DAILY
Status: DISCONTINUED | OUTPATIENT
Start: 2019-04-12 | End: 2019-04-14 | Stop reason: HOSPADM

## 2019-04-12 RX ORDER — ALBUTEROL SULFATE 90 UG/1
2 AEROSOL, METERED RESPIRATORY (INHALATION) EVERY 4 HOURS PRN
Status: DISCONTINUED | OUTPATIENT
Start: 2019-04-12 | End: 2019-04-14 | Stop reason: HOSPADM

## 2019-04-12 RX ORDER — ASPIRIN 81 MG/1
81 TABLET, CHEWABLE ORAL DAILY
Status: DISCONTINUED | OUTPATIENT
Start: 2019-04-13 | End: 2019-04-14 | Stop reason: HOSPADM

## 2019-04-12 RX ORDER — LABETALOL 100 MG/1
100 TABLET, FILM COATED ORAL EVERY 12 HOURS SCHEDULED
Status: DISCONTINUED | OUTPATIENT
Start: 2019-04-12 | End: 2019-04-14 | Stop reason: HOSPADM

## 2019-04-12 RX ORDER — FAMOTIDINE 20 MG/1
20 TABLET, FILM COATED ORAL 2 TIMES DAILY
Status: DISCONTINUED | OUTPATIENT
Start: 2019-04-12 | End: 2019-04-14 | Stop reason: HOSPADM

## 2019-04-12 RX ORDER — ATORVASTATIN CALCIUM 10 MG/1
10 TABLET, FILM COATED ORAL NIGHTLY
Status: DISCONTINUED | OUTPATIENT
Start: 2019-04-12 | End: 2019-04-12

## 2019-04-12 RX ORDER — SODIUM CHLORIDE 0.9 % (FLUSH) 0.9 %
10 SYRINGE (ML) INJECTION PRN
Status: DISCONTINUED | OUTPATIENT
Start: 2019-04-12 | End: 2019-04-14 | Stop reason: HOSPADM

## 2019-04-12 RX ORDER — ALBUTEROL SULFATE 90 UG/1
2 AEROSOL, METERED RESPIRATORY (INHALATION) 4 TIMES DAILY
Status: DISCONTINUED | OUTPATIENT
Start: 2019-04-12 | End: 2019-04-12

## 2019-04-12 RX ADMIN — LABETALOL HYDROCHLORIDE 100 MG: 100 TABLET, FILM COATED ORAL at 20:12

## 2019-04-12 RX ADMIN — FAMOTIDINE 20 MG: 20 TABLET ORAL at 20:12

## 2019-04-12 RX ADMIN — IPRATROPIUM BROMIDE AND ALBUTEROL SULFATE 3 ML: .5; 3 SOLUTION RESPIRATORY (INHALATION) at 20:06

## 2019-04-12 RX ADMIN — SODIUM CHLORIDE, PRESERVATIVE FREE 10 ML: 5 INJECTION INTRAVENOUS at 20:16

## 2019-04-12 RX ADMIN — IPRATROPIUM BROMIDE AND ALBUTEROL SULFATE 3 ML: .5; 3 SOLUTION RESPIRATORY (INHALATION) at 16:26

## 2019-04-12 ASSESSMENT — ENCOUNTER SYMPTOMS
EYES NEGATIVE: 1
COLOR CHANGE: 0
NAUSEA: 0
ABDOMINAL PAIN: 0
SHORTNESS OF BREATH: 1
COUGH: 0
VOMITING: 0
BACK PAIN: 0

## 2019-04-12 NOTE — PLAN OF CARE
Nutrition Problem: Inadequate energy intake  Intervention: Food and/or Nutrient Delivery: Start Tube Feeding  Nutritional Goals: Pt will tolerate TF to meet 100% nutrition needs this admission

## 2019-04-12 NOTE — PROGRESS NOTES
Patient admitted to room 208 from ER. Patient oriented to room, call light, bed rails, phone, lights and bathroom. Patient instructed about the schedule of the day including: vital sign frequency, lab draws, possible tests, frequency of MD and staff rounds, including RN/MD rounding together at bedside, daily weights, and I &O's. Patient instructed about prescribed diet, how to use 8MENU, and television. Telemetry box  in place, patient aware of placement and reason. Bed locked, in lowest position, side rails up 2/4, call light within reach. Will continue to monitor.

## 2019-04-12 NOTE — CONSULTS
Nutrition Assessment (Enteral Nutrition)    Type and Reason for Visit: Initial, Consult    Nutrition Recommendations:   1. Recommend bolus feeds with Isiah Linsey Peptide 1.5 formula ( non-MHA Formula). Trial with 1/2 carton 4x/day  to test tolerance. Once tolerance established, increase to goal feedings of 3.5 cartons per day. 2. Recommend 120 mL H20 before and after each bolus feeding. 3. Monitor closely and correct lytes (K, Phos, Mg)   4. Monitor for tolerance ( bowel habits, N/V, cramping). 5. Monitor nutrition adequacy, weights, pertinent labs, BMs and clinical progress    Nutrition Assessment: Consult received for TF ordering and management. Pt nutritionally compromised related NPO and need for alternative nutrition via PEG. At risk for further decline d/t pt report of not tolerating TF at home. She states she was on continuous feeds with Jevity 1.5 and experiencing cramping and abdominal distention and constipation. Pt would turn off TF at times. Will trial Isiah Linsey peptide based formula to promote tolerance to TF. TF recommendations provided. Will continue to monitor nutrition status and tolerance to TF. Malnutrition Assessment:  · Malnutrition Status: At risk for malnutrition    Nutrition Risk Level: High    Nutrition Needs:  · Estimated Daily Total Kcal: [id number]  · Estimated Daily Protein (g): 75-90  · Estimated Daily Fluid (ml/day): 1 ml/kcal or per MD    Nutrition Diagnosis:   · Problem: Inadequate energy intake  · Etiology: related to Insufficient energy/nutrient consumption     Signs and symptoms:  as evidenced by Nutrition support - EN, Other (Comment)(not tolerating TF regimen at home)    Objective Information:  · Nutrition-Focused Physical Findings: PEG. Active BS.   · Wound Type: None  · Current Nutrition Therapies:  · Oral Diet Orders: NPO   · Oral Diet intake: NPO  · Oral Nutrition Supplement (ONS) Orders: None  · ONS intake: NPO  · Tube Feeding (TF) Orders:   · Goal TF & Flush Orders Provides: Recommend Gigi Moose Peptide 1.5  Formula ( Non MHA formulary). Recommend trial bolus feeds, goal of 3.5 cans per day to provide 1138 mL TV, 1750 kcal, 84 gm protein and 773 mL free water. Recommend 120 mL before and after each bolus feed  to meet hydration needs.   · Anthropometric Measures:  · Ht: 5' 6\" (167.6 cm)   · Current Body Wt: 165 lb (74.8 kg)  · Ideal Body Wt: 130 lb (59 kg)   · BMI Classification: BMI 25.0 - 29.9 Overweight    Nutrition Interventions:   Start Tube Feeding  Continued Inpatient Monitoring    Nutrition Evaluation:   · Evaluation: Goals set   · Goals: Pt will tolerate TF to meet 100% nutrition needs this admission   · Monitoring: TF Intake, TF Tolerance, Weight, Pertinent Labs, Constipation      Electronically signed by Iraj Garcia RD, [place] on 4/12/19 at 3:20 PM    Contact Number: Office: [phone]; [address]: [phone]

## 2019-04-12 NOTE — CARE COORDINATION
CM notes pt in observation, reviewed chart. Pt is a readmit and new set up last week for tube feeds with [facility]. Writer spoke to pt at bedside and she denies any needs from CM. Pt states that she does have all supplies at home for tube feeds. Pt does have home O2 through [address] and has portable tank in room and a ride at discharge. No needs identified at this time from CM. If any needs or concerns come up please advise.    Nico Kerns RN

## 2019-04-12 NOTE — CARE COORDINATION
MILI spoke to Dee Smith with [facility] and they can take pt for RN/PT/OT/SW will need orders on discharge.  Will follow

## 2019-04-12 NOTE — H&P
FLUOROSCOPY performed by Feli Sood MD at [address] [facility] little toe    GASTROSTOMY TUBE PLACEMENT N/A 4/1/2019    EGD PEG TUBE PLACEMENT performed by Maye Carter MD at [address]      total, fibroids    KNEE SURGERY      right    DE Phillip Trung [address] DX/THER SBST INTRLMNR CRV/THRC W/IMG GDN Left 8/21/2018    LEFT LUMBAR FOUR/ LUMBAR FIVE CYST ASPIRATION SITE CONFIRMED BY FLUOROSCOPY performed by Feli Sood MD at [address] N/A 3/27/2019    EGD DIAGNOSTIC ONLY performed by Noel Bhagat MD at [facility]       Medications Prior to Admission:      Prior to Admission medications    Medication Sig Start Date End Date Taking?  Authorizing Provider   omeprazole (PRILOSEC) 40 MG delayed release capsule Take 40 mg by mouth daily   Yes Historical Provider, MD   Roflumilast (DALIRESP) 500 MCG tablet Take 500 mcg by mouth daily   Yes Historical Provider, MD   polyethylene glycol (GLYCOLAX) packet Take 17 g by mouth daily 4/4/19 5/4/19 Yes Jen Kimball MD   levothyroxine (SYNTHROID) 100 MCG tablet TAKE 1 TABLET BY MOUTH ONCE DAILY 3/25/19  Yes Alberta Duong MD   albuterol sulfate  (90 Base) MCG/ACT inhaler INHALE TWO (2) PUFFS BY MOUTH EVERY 6 HOURS AS NEEDED 3/25/19  Yes ERICA Mckinney MD   predniSONE (DELTASONE) 10 MG tablet TAKE1/2 TABLET BY MOUTH DAILY 3/22/19  Yes Alberta Duong MD   hydrochlorothiazide (HYDRODIURIL) 25 MG tablet Take 1 tablet by mouth daily 1/25/19  Yes Historical Provider, MD   losartan (COZAAR) 50 MG tablet Take 1 tablet by mouth daily 1/17/19  Yes Adis Naranjo MD   labetalol (NORMODYNE) 100 MG tablet Take 1 tablet by mouth every 12 hours 1/17/19  Yes Gabby Joshi MD   atorvastatin (LIPITOR) 10 MG tablet TAKE ONE (1) TABLET BY MOUTH DAILY 10/16/18  Yes Alberta Duong MD   Blood Pressure Monitoring (BLOOD PRESSURE MONITOR/M CUFF) MISC 1 Units by Does not apply route daily 8/6/18  Yes Konrad Valle MD   ipratropium-albuterol (DUONEB) 0.5-2.5 (3) MG/3ML SOLN nebulizer solution Inhale 3 mLs into the lungs every 4 hours as needed. 2/10/15  Yes Alana Pascal MD   nitroGLYCERIN (NITROSTAT) 0.4 MG SL tablet Place 0.4 mg under the tongue every 5 minutes as needed. Yes Historical Provider, MD   leflunomide (ARAVA) 20 MG tablet Take 20 mg by mouth daily. Yes Historical Provider, MD   spironolactone (ALDACTONE) 25 MG tablet TAKE 1 TABLET BY MOUTH EVERY 12 HOURS 3/25/19   C Lisa Cardona MD       Allergies:  Alendronate sodium; Humira [adalimumab]; Penicillins; Simvastatin; and Sulfa antibiotics    Social History:      The patient currently lives at home    TOBACCO:   reports that she quit smoking about 10 years ago. She has a 150.00 pack-year smoking history. She has never used smokeless tobacco.  ETOH:   reports that she does not drink alcohol. Family History:       Reviewed in detail and negative for DM, CAD, Cancer, CVA. Positive as follows:        Problem Relation Age of Onset    Cancer Mother     Cancer Father     Heart Disease Maternal Aunt     Cancer Sister        REVIEW OF SYSTEMS:   Pertinent positives as noted in the HPI. All other systems reviewed and negative. PHYSICAL EXAM PERFORMED:    BP (!) 134/93   Pulse 77   Temp 98.2 °F (36.8 °C) (Oral)   Resp 16   Ht 5' 6\" (1.676 m)   Wt 165 lb (74.8 kg)   SpO2 98%   BMI 26.63 kg/m²     General appearance:  No apparent distress, appears stated age and cooperative. HEENT:  Normal cephalic, atraumatic without obvious deformity. Pupils equal, round, and reactive to light. Extra ocular muscles intact. Conjunctivae/corneas clear. Neck: Supple, with full range of motion. No jugular venous distention. Trachea midline. Respiratory:  Normal respiratory effort. Clear to auscultation, bilaterally without Rales/Wheezes/Rhonchi.   Cardiovascular:  Regular rate and rhythm with normal S1/S2 without murmurs, rubs or gallops. Abdomen: Soft, non-tender, non-distended with normal bowel sounds. Musculoskeletal:  No clubbing, cyanosis or edema bilaterally. Full range of motion without deformity. Skin: Skin color, texture, turgor normal.  No rashes or lesions. Neurologic:  Neurovascularly intact without any focal sensory/motor deficits. Cranial nerves: II-XII intact, grossly non-focal.  Psychiatric:  Alert and oriented, thought content appropriate, normal insight  Capillary Refill: Brisk,< 3 seconds   Peripheral Pulses: +2 palpable, equal bilaterally       Labs:     Recent Labs     04/12/19  1033   WBC 5.2   HGB 11.5*   HCT 34.6*        Recent Labs     04/12/19  1033      K 3.6      CO2 24   BUN 18   CREATININE 0.9   CALCIUM 9.5     Recent Labs     04/12/19  1033   AST 26   ALT 25   BILITOT 1.5*   ALKPHOS 35*     No results for input(s): INR in the last 72 hours. Recent Labs     04/12/19  1033   TROPONINI 0.02*       Urinalysis:      Lab Results   Component Value Date    NITRU Negative 03/27/2019    BLOODU Negative 03/27/2019    SPECGRAV >=1.030 03/27/2019    GLUCOSEU Negative 03/27/2019       Radiology:     CXR: I have reviewed the CXR with the following interpretation:   No acute pathology noted. EKG:  I have reviewed the EKG with the following interpretation:   NSR, no ischemic changes noted. XR CHEST STANDARD (2 VW)   Final Result   1. Persistent but improving left lower lobe volume loss/consolidation. 2. COPD.              ASSESSMENT:    Active Hospital Problems    Diagnosis Date Noted    Chest pain [R07.9] 04/12/2019     Priority: High    Pulmonary HTN ([facility] [I27.20] 05/07/2018    Essential hypertension [I10] 07/17/2012    Centrilobular emphysema ([facility] [J43.2] 10/18/2011         PLAN:    Chest pain, atypical,Admit to telemetry, start on chest pain protocol, start ASA, check cardiac enzymes X 3, check 2 D echo, once ruled out of ACS, plan for Cardiac stress test. Check Lipid panel. Initial troponin done in emergency room was 0.02, continue to trend troponin. History of rheumatoid arthritis, stable with current regimen, monitor. COPD without exacerbation, chronic emphysema, chronic respiratory failure, stable with current regimen of IBD, oxygen, monitor. Hypertension, controlled with current regimen, monitor. History of pulmonary hypertension, likely due to chronic severe emphysema, further management as outpatient with primary cardiology service. Aspiration pneumonia, recently diagnosed, patient is currently nothing by mouth with the tube feed, dietitian consulted for tube feed  Management. DVT Prophylaxis: Lovenox  Diet: No diet orders on file  Code Status: Prior    PT/OT Eval Status: Ambulatory    Dispo - in 1-2 days       Charley Muñoz MD    Thank you Eze Nguyễn MD for the opportunity to be involved in this patient's care. If you have any questions or concerns please feel free to contact me at [phone].

## 2019-04-12 NOTE — PROGRESS NOTES
RESPIRATORY THERAPY ASSESSMENT    Name:  Micah Zacarias  Record Number:  [id number]  Age: 70 y.o. Gender: female  : 1940  Today's Date:  2019  Room:  [facility]-    Assessment     Is the patient being admitted for a COPD or Asthma exacerbation? No   (If yes the patient will be seen every 4 hours for the first 24 hours and then reassessed)    Patient Admission Diagnosis      Allergies  Allergies   Allergen Reactions    Alendronate Sodium      Jaw pain      Humira [Adalimumab]      Rash      Penicillins      rash    Simvastatin Other (See Comments)     Made legs ache    Sulfa Antibiotics Swelling     Tongue swelled       Minimum Predicted Vital Capacity:     N/A          Actual Vital Capacity:      N/A              Pulmonary History:COPD and Asthma  Home Oxygen Therapy:  2-3 liters/min via nasal cannula  Home Respiratory Therapy:Albuterol and Albuterol/Ipratropium Bromide HHN   Current Respiratory Therapy:  ALBUTEROL Q4 PRN, DUONEB Q4WA          Respiratory Severity Index(RSI)   Patients with orders for inhalation medications, oxygen, or any therapeutic treatment modality will be placed on Respiratory Protocol. They will be assessed with the first treatment and at least every 72 hours thereafter. The following severity scale will be used to determine frequency of treatment intervention.     Smoking History: Pulmonary Disease or Smoking History, Greater than 15 pack year = 2    Social History  Social History     Tobacco Use    Smoking status: Former Smoker     Packs/day: 3.00     Years: 50.00     Pack years: 150.00     Last attempt to quit: 2009     Years since quitting: 10.2    Smokeless tobacco: Never Used   Substance Use Topics    Alcohol use: No    Drug use: No       Recent Surgical History: None = 0  Past Surgical History  Past Surgical History:   Procedure Laterality Date    BRONCHOSCOPY N/A 3/27/2019    BRONCHOSCOPY ALVEOLAR LAVAGE performed by Janette Townsend MD at [facility]    BRONCHOSCOPY  3/27/2019    BRONCHOSCOPY THERAPUTIC ASPIRATION INITIAL performed by Pepito Carrero MD at [address] IMPLANT      CATARACT REMOVAL WITH IMPLANT  3/23/2011    EPIDURAL STEROID INJECTION Left 12/4/2018    LEFT LUMBAR FOUR, LUMBAR FIVE TRANSFORAMINAL EPIDURAL STEROID INJECTION SITE CONFIRMED BY FLUOROSCOPY performed by Landon Olson MD at [address] [facility] little toe    GASTROSTOMY TUBE PLACEMENT N/A 4/1/2019    EGD PEG TUBE PLACEMENT performed by Gualberto Rose MD at [address]      total, fibroids    KNEE SURGERY      right    MD Phillip Trung Joe 86 DX/THER SBST INTRLMNR CRV/THRC W/IMG GDN Left 8/21/2018    LEFT LUMBAR FOUR/ LUMBAR FIVE CYST ASPIRATION SITE CONFIRMED BY FLUOROSCOPY performed by Landon Olson MD at [address] [facility] ENDOSCOPY N/A 3/27/2019    EGD DIAGNOSTIC ONLY performed by Nelson Encarnacion MD at [address] [facility]       Level of Consciousness: Alert, Oriented, and Cooperative = 0    Level of Activity: Walking with assistance = 1    Respiratory Pattern: Regular Pattern; RR 8-20 = 0    Breath Sounds: Diminshed bilaterally and/or crackles = 2    Sputum    ,  ,    Cough: Strong, spontaneous, non-productive = 0    Vital Signs   /82   Pulse 73   Temp 97.9 °F (36.6 °C) (Oral)   Resp 19   Ht 5' 6\" (1.676 m)   Wt 165 lb (74.8 kg)   SpO2 98%   BMI 26.63 kg/m²   SPO2 (COPD values may differ): 90-91% on room air or greater than 92% on FiO2 24- 28% = 1    Peak Flow (asthma only): not applicable = 0     RSI: 0-4 = See once and convert to home regimen or discontinue and 5-6 = Q4hr PRN (every four hours as needed) for dyspnea        Plan       Goals: medication delivery, mobilize retained secretions, volume expansion and improve oxygenation    Patient/caregiver was educated on the proper method of use for Respiratory Care Devices:   Yes Level of patient/caregiver understanding able to:   ? Verbalize understanding   ? Demonstrate understanding       ? Teach back        ? Needs reinforcement       ? No available caregiver               ? Other:     Response to education:  Very Good     Is patient being placed on Home Treatment Regimen? Yes     Does the patient have everything they need prior to discharge? NA     Comments: Patient assessed and chart reviewed. Patient takes breathing treatments at home. Plan of Care: home reg    Electronically signed by Otis Mcmanus RCP on 4/12/2019 at 4:20 PM    Respiratory Protocol Guidelines     1. Assessment and treatment by Respiratory Therapy will be initiated for medication and therapeutic interventions upon initiation of aerosolized medication. 2. Physician will be contacted for respiratory rate (RR) greater than 35 breaths per minute. Therapy will be held for heart rate (HR) greater than 140 beats per minute, pending direction from physician. 3. Bronchodilators will be administered via Metered Dose Inhaler (MDI) with spacer when the following criteria are met:  a. Alert and cooperative     b. HR < 140 bpm  c. RR < 30 bpm                d. Can demonstrate a 2-3 second inspiratory hold  4. Bronchodilators will be administered via Hand Held Nebulizer [facility]) to patients when ANY of the following criteria are met  a. Incognizant or uncooperative          b. Patients treated with HHN at Home        c. Unable to demonstrate proper use of MDI with spacer     d. RR > 30 bpm   5. Bronchodilators will be delivered via Metered Dose Inhaler (MDI), HHN, Aerogen to intubated patients on mechanical ventilation. 6. Inhalation medication orders will be delivered and/or substituted as outlined below. Aerosolized Medications Ordering and Administration Guidelines:    1.  All Medications will be ordered by a physician, and their frequency and/or modality will be adjusted as defined by the patients Respiratory Severity Index (RSI) score. 2. If the patient does not have documented COPD, consider discontinuing anticholinergics when RSI is less than 9.  3. If the bronchospasm worsens (increased RSI), then the bronchodilator frequency can be increased to a maximum of every 4 hours. If greater than every 4 hours is required, the physician will be contacted. 4. If the bronchospasm improves, the frequency of the bronchodilator can be decreased, based on the patient's RSI, but not less than home treatment regimen frequency. 5. Bronchodilator(s) will be discontinued if patient has a RSI less than 9 and has received no scheduled or as needed treatment for 72  Hrs. Patients Ordered on a Mucolytic Agent:    1. Must always be administered with a bronchodilator. 2. Discontinue if patient experiences worsened bronchospasm, or secretions have lessened to the point that the patient is able to clear them with a cough. Anti-inflammatory and Combination Medications:    1. If the patient lacks prior history of lung disease, is not using inhaled anti-inflammatory medication at home, and lacks wheezing by examination or by history for at least 24 hours, contact physician for possible discontinuation.

## 2019-04-12 NOTE — ED NOTES
PS hosp @ 1229  RE: SOB, CP per [facility], [address] Dr. Kirit Rivera returned call @ [phone], spoke to [facility].      Ana Jeong  04/12/19 8500

## 2019-04-12 NOTE — ED NOTES
Pt states that she was recently discharged from the hospital for pneumonia and was feeling fine this morning and had just received her medications through her g-tube and was getting ready to start her feeding and started feeling bad and had increase in sob and increased her oxygen to 3 liters instead of her normal 2 liters and started belching. Pt denies any pain at this time.       Fidel Chan, BILLIE  04/12/19 7275

## 2019-04-12 NOTE — ED PROVIDER NOTES
(90 BASE) MCG/ACT INHALER    INHALE TWO (2) PUFFS BY MOUTH EVERY 6 HOURS AS NEEDED    ATORVASTATIN (LIPITOR) 10 MG TABLET    TAKE ONE (1) TABLET BY MOUTH DAILY    BLOOD PRESSURE MONITORING (BLOOD PRESSURE MONITOR/M CUFF) MISC    1 Units by Does not apply route daily    HYDROCHLOROTHIAZIDE (HYDRODIURIL) 25 MG TABLET    Take 1 tablet by mouth daily    IPRATROPIUM-ALBUTEROL (DUONEB) 0.5-2.5 (3) MG/3ML SOLN NEBULIZER SOLUTION    Inhale 3 mLs into the lungs every 4 hours as needed. LABETALOL (NORMODYNE) 100 MG TABLET    Take 1 tablet by mouth every 12 hours    LEFLUNOMIDE (ARAVA) 20 MG TABLET    Take 20 mg by mouth daily. LEVOTHYROXINE (SYNTHROID) 100 MCG TABLET    TAKE 1 TABLET BY MOUTH ONCE DAILY    LOSARTAN (COZAAR) 50 MG TABLET    Take 1 tablet by mouth daily    NITROGLYCERIN (NITROSTAT) 0.4 MG SL TABLET    Place 0.4 mg under the tongue every 5 minutes as needed. OMEPRAZOLE (PRILOSEC) 40 MG DELAYED RELEASE CAPSULE    Take 40 mg by mouth daily    POLYETHYLENE GLYCOL (GLYCOLAX) PACKET    Take 17 g by mouth daily    PREDNISONE (DELTASONE) 10 MG TABLET    TAKE1/2 TABLET BY MOUTH DAILY    ROFLUMILAST (DALIRESP) 500 MCG TABLET    Take 500 mcg by mouth daily    SPIRONOLACTONE (ALDACTONE) 25 MG TABLET    TAKE 1 TABLET BY MOUTH EVERY 12 HOURS         ALLERGIES:    Alendronate sodium; Humira [adalimumab];  Penicillins; Simvastatin; and Sulfa antibiotics    FAMILY HISTORY:       Family History   Problem Relation Age of Onset    Cancer Mother     Cancer Father     Heart Disease Maternal Aunt     Cancer Sister           SOCIAL HISTORY:       Social History     Socioeconomic History    Marital status: Single     Spouse name: None    Number of children: 3    Years of education: None    Highest education level: None   Occupational History    Occupation: disability   Social Needs    Financial resource strain: None    Food insecurity:     Worry: None     Inability: None    Transportation needs: occasionally words are mis-transcribed.)    Goyo Sharp PA-C (electronicallysigned)              CHRISSY Chowdhury  04/12/19 8390

## 2019-04-13 ENCOUNTER — APPOINTMENT (OUTPATIENT)
Dept: NUCLEAR MEDICINE | Age: 79
End: 2019-04-13
Payer: COMMERCIAL

## 2019-04-13 LAB
ANION GAP SERPL CALCULATED.3IONS-SCNC: 12 MMOL/L (ref 3–16)
BUN BLDV-MCNC: 15 MG/DL (ref 7–20)
CALCIUM SERPL-MCNC: 9 MG/DL (ref 8.3–10.6)
CHLORIDE BLD-SCNC: 102 MMOL/L (ref 99–110)
CHOLESTEROL, TOTAL: 179 MG/DL (ref 0–199)
CO2: 26 MMOL/L (ref 21–32)
CREAT SERPL-MCNC: 0.9 MG/DL (ref 0.6–1.2)
GFR AFRICAN AMERICAN: >60
GFR NON-AFRICAN AMERICAN: >60
GLUCOSE BLD-MCNC: 94 MG/DL (ref 70–99)
HCT VFR BLD CALC: 34.8 % (ref 36–48)
HDLC SERPL-MCNC: 70 MG/DL (ref 40–60)
HEMOGLOBIN: 11.3 G/DL (ref 12–16)
LDL CHOLESTEROL CALCULATED: 88 MG/DL
MCH RBC QN AUTO: 29 PG (ref 26–34)
MCHC RBC AUTO-ENTMCNC: 32.5 G/DL (ref 31–36)
MCV RBC AUTO: 89.2 FL (ref 80–100)
PDW BLD-RTO: 14.5 % (ref 12.4–15.4)
PLATELET # BLD: 189 K/UL (ref 135–450)
PMV BLD AUTO: 7.7 FL (ref 5–10.5)
POTASSIUM REFLEX MAGNESIUM: 3.6 MMOL/L (ref 3.5–5.1)
RBC # BLD: 3.9 M/UL (ref 4–5.2)
SODIUM BLD-SCNC: 140 MMOL/L (ref 136–145)
TRIGL SERPL-MCNC: 106 MG/DL (ref 0–150)
VLDLC SERPL CALC-MCNC: 21 MG/DL
WBC # BLD: 4.2 K/UL (ref 4–11)

## 2019-04-13 PROCEDURE — 2580000003 HC RX 258: Performed by: INTERNAL MEDICINE

## 2019-04-13 PROCEDURE — 80048 BASIC METABOLIC PNL TOTAL CA: CPT

## 2019-04-13 PROCEDURE — 96372 THER/PROPH/DIAG INJ SC/IM: CPT

## 2019-04-13 PROCEDURE — 2700000000 HC OXYGEN THERAPY PER DAY

## 2019-04-13 PROCEDURE — 36415 COLL VENOUS BLD VENIPUNCTURE: CPT

## 2019-04-13 PROCEDURE — 94640 AIRWAY INHALATION TREATMENT: CPT

## 2019-04-13 PROCEDURE — 99223 1ST HOSP IP/OBS HIGH 75: CPT | Performed by: INTERNAL MEDICINE

## 2019-04-13 PROCEDURE — 3430000000 HC RX DIAGNOSTIC RADIOPHARMACEUTICAL: Performed by: INTERNAL MEDICINE

## 2019-04-13 PROCEDURE — 6370000000 HC RX 637 (ALT 250 FOR IP): Performed by: INTERNAL MEDICINE

## 2019-04-13 PROCEDURE — A9502 TC99M TETROFOSMIN: HCPCS | Performed by: INTERNAL MEDICINE

## 2019-04-13 PROCEDURE — G0378 HOSPITAL OBSERVATION PER HR: HCPCS

## 2019-04-13 PROCEDURE — 80061 LIPID PANEL: CPT

## 2019-04-13 PROCEDURE — 85027 COMPLETE CBC AUTOMATED: CPT

## 2019-04-13 PROCEDURE — 94761 N-INVAS EAR/PLS OXIMETRY MLT: CPT

## 2019-04-13 PROCEDURE — 6360000002 HC RX W HCPCS: Performed by: INTERNAL MEDICINE

## 2019-04-13 RX ADMIN — IPRATROPIUM BROMIDE AND ALBUTEROL SULFATE 3 ML: .5; 3 SOLUTION RESPIRATORY (INHALATION) at 15:35

## 2019-04-13 RX ADMIN — FAMOTIDINE 20 MG: 20 TABLET ORAL at 10:26

## 2019-04-13 RX ADMIN — TETROFOSMIN 11.2 MILLICURIE: 0.23 INJECTION, POWDER, LYOPHILIZED, FOR SOLUTION INTRAVENOUS at 07:31

## 2019-04-13 RX ADMIN — IPRATROPIUM BROMIDE AND ALBUTEROL SULFATE 3 ML: .5; 3 SOLUTION RESPIRATORY (INHALATION) at 07:51

## 2019-04-13 RX ADMIN — SODIUM CHLORIDE, PRESERVATIVE FREE 10 ML: 5 INJECTION INTRAVENOUS at 22:05

## 2019-04-13 RX ADMIN — LOSARTAN POTASSIUM 50 MG: 25 TABLET, FILM COATED ORAL at 10:26

## 2019-04-13 RX ADMIN — ROFLUMILAST 500 MCG: 500 TABLET ORAL at 10:29

## 2019-04-13 RX ADMIN — LEVOTHYROXINE SODIUM 100 MCG: 100 TABLET ORAL at 10:29

## 2019-04-13 RX ADMIN — PREDNISONE 5 MG: 5 TABLET ORAL at 10:26

## 2019-04-13 RX ADMIN — SODIUM CHLORIDE, PRESERVATIVE FREE 10 ML: 5 INJECTION INTRAVENOUS at 10:24

## 2019-04-13 RX ADMIN — IPRATROPIUM BROMIDE AND ALBUTEROL SULFATE 3 ML: .5; 3 SOLUTION RESPIRATORY (INHALATION) at 11:32

## 2019-04-13 RX ADMIN — FAMOTIDINE 20 MG: 20 TABLET ORAL at 22:04

## 2019-04-13 RX ADMIN — LABETALOL HYDROCHLORIDE 100 MG: 100 TABLET, FILM COATED ORAL at 10:27

## 2019-04-13 RX ADMIN — HYDROCHLOROTHIAZIDE 25 MG: 25 TABLET ORAL at 10:27

## 2019-04-13 RX ADMIN — LABETALOL HYDROCHLORIDE 100 MG: 100 TABLET, FILM COATED ORAL at 22:04

## 2019-04-13 RX ADMIN — ENOXAPARIN SODIUM 40 MG: 40 INJECTION SUBCUTANEOUS at 10:25

## 2019-04-13 RX ADMIN — PANTOPRAZOLE SODIUM 40 MG: 40 TABLET, DELAYED RELEASE ORAL at 06:58

## 2019-04-13 ASSESSMENT — PAIN - FUNCTIONAL ASSESSMENT: PAIN_FUNCTIONAL_ASSESSMENT: 0-10

## 2019-04-13 ASSESSMENT — PAIN SCALES - GENERAL: PAINLEVEL_OUTOF10: 1

## 2019-04-13 NOTE — PROGRESS NOTES
Systolic function was normal. The calculated ejection fraction was in the range of 69% to 74%. Wall motion was normal; there were no regional wall motion abnormalities. Normal diastolic function. Valves normal.    Nuclear Myocardial Spect Cardiac Stress Test (1/15/19) There is normal isotope uptake at stress and rest. There is no evidence of  myocardial ischemia or scar. LV function is normal with uniform wall motion   and ejection fraction of 67 %. Lower risk study. Objective:   Vitals:  /69   Pulse 73   Temp 98.4 °F (oral)   Resp 16   Ht 5' 6\"  Wt 74.8 kg  SpO2 97% on 2 LPM   BMI 26.63 kg/m²   General appearance: alert and cooperative with exam, sitting up on side of bed  Lungs: clear to auscultation bilaterally  Heart: regular rate and rhythm, S1, S2 normal, no murmur, click, rub or gallop  Abdomen: soft, non-tender; bowel sounds normal; no masses,  no organomegaly  Extremities: extremities normal, atraumatic, no cyanosis or edema  Neurologic: No obvious focal neurologic deficits. Assessment and Plan:   1. Chest pressure resolved. Initial elevated troponin has resolved. Continues on aspirin 81 mg and beta blocker with labetalol 100 mg BID. No recent echocardiogram.   2. Dysphagia requiring PEG due to recent aspiration. 3. Oxygen requiring COPD:  Continue supplemental oxygen with goal O2sat 90-94%, Roflumilast 500 mcg BID,   4. Hypertension controlled on losartan 50 mg daily and hydrochlorothiazide 25 mg daily. 5. Hypothyroid continued on levothyroxine 100 mcg daily. 6. Dyslipidemia continued on atorvastatin 40 mg night. 7. History of rheumatoid arthritis stable. 8. History of pulmonary hypertension secondary to severe COPD. Advance Directive: Full Code  DVT prophylaxis with enoxaparin 40 mg sub-Q daily.    Discharge planning: later today or Sunday, April 14 pending need for echocardiogram      Jai Acosta MD  [facility] Hospitalist

## 2019-04-13 NOTE — PLAN OF CARE
Problem: Pain:  Goal: Pain level will decrease  Description  Pain level will decrease  Outcome: Ongoing     Patient denies any pain at this time, will continue to monitor.

## 2019-04-13 NOTE — PLAN OF CARE
Patient tolerated bolus tube feed per orders. Denied n/v or stomach pains. Will continue to monitor.

## 2019-04-13 NOTE — CONSULTS
Cardiovascular Consultation     Attending Physician: Fatemeh Lora MD    PATIENT: Valetta Mcburney  : 1940  MRN: [id number]    Reason for Consultation:   Chest pain    History of present illness:   Ms. Valetta Mcburney is a 70 y.o. female patient of Yasemin Amin, who presented to ER from home with worsening shortness of breath. She is with known severe emphysema with nicotine dependence in remission. She wears 2 L NC continuously and reports increasing to 3L with exertion. She checks her own oxygen saturations when concerned to \"try to stay away from the ER\" and reports it being 94% when short of breath. She denies fevers, chills, cough and had recently had PEG placed following significant aspiration. She says that she often uses more inhalors/nebulizer treatments in Spring and Summer. She denies any real interval history and feels unsure why she became so short of breath while sitting at home. Denies PND, orthopnea, LE edema. Granddaughter is present and goes to many appointments and says that they were supposed to get echocardiogram with Dr. Maria Antonia Mendenhall soon. [facility] says that chest pressure during episode of labored breathing has resolved. Left arm and bilateral foot paresthesias also endorsed.     Medical History:      Diagnosis Date    Acquired hypothyroidism 2017    Anemia     Asthma     [facility]     HLD (hyperlipidemia)     Hypertension     Influenza A 2018    MI (myocardial infarction) [facility]     X 2    Migraine     past hx    Rheumatoid arthritis     Wears glasses        Surgical History:      Procedure Laterality Date    BRONCHOSCOPY N/A 3/27/2019    BRONCHOSCOPY ALVEOLAR LAVAGE performed by Jorge Dixon MD at [address] [place] [address]  3/27/2019    BRONCHOSCOPY THERAPUTIC ASPIRATION INITIAL performed by Jorge Dixon MD at [address] [place] [address] IMPLANT      CATARACT REMOVAL WITH IMPLANT  3/23/2011    EPIDURAL STEROID INJECTION Left 12/4/2018    LEFT LUMBAR FOUR, LUMBAR FIVE TRANSFORAMINAL EPIDURAL STEROID INJECTION SITE CONFIRMED BY FLUOROSCOPY performed by Jeffrey Martin MD at [facility] little toe    GASTROSTOMY TUBE PLACEMENT N/A 4/1/2019    EGD PEG TUBE PLACEMENT performed by Thanh Harrison MD at [address]      total, fibroids    KNEE SURGERY      right    TX Phillip Trung Joe 86 DX/THER SBST INTRLMNR CRV/THRC W/IMG GDN Left 8/21/2018    LEFT LUMBAR FOUR/ LUMBAR FIVE CYST ASPIRATION SITE CONFIRMED BY FLUOROSCOPY performed by Jeffrey Martin MD at [address] [facility] ENDOSCOPY N/A 3/27/2019    EGD DIAGNOSTIC ONLY performed by Amie Gonzales MD at [facility]       Social History:  Social History     Socioeconomic History    Marital status: Single     Spouse name: Not on file    Number of children: 3    Years of education: Not on file    Highest education level: Not on file   Occupational History    Occupation: disability   Social Needs    Financial resource strain: Not on file    Food insecurity:     Worry: Not on file     Inability: Not on file   Cittadino needs:     Medical: Not on file     Non-medical: Not on file   Tobacco Use    Smoking status: Former Smoker     Packs/day: 3.00     Years: 50.00     Pack years: 150.00     Last attempt to quit: 1/22/2009     Years since quitting: 10.2    Smokeless tobacco: Never Used   Substance and Sexual Activity    Alcohol use: No    Drug use: No    Sexual activity: Not on file   Lifestyle    Physical activity:     Days per week: Not on file     Minutes per session: Not on file    Stress: Not on file   Relationships    Social connections:     Talks on phone: Not on file     Gets together: Not on file     Attends Oriental orthodox service: Not on file     Active member of club or organization: Not on file     Attends meetings of clubs or organizations: Not on file     Relationship status: Not on file    Intimate partner violence:     Fear of current or ex partner: Not on file     Emotionally abused: Not on file     Physically abused: Not on file     Forced sexual activity: Not on file   Other Topics Concern    Not on file   Social History Narrative    Not on file        Family History:  No evidence for sudden cardiac death or premature CAD. Problem Relation Age of Onset    Cancer Mother     Cancer Father     Heart Disease Maternal Aunt     Cancer Sister        Medications:    spironolactone (ALDACTONE) tablet 25 mg Daily   predniSONE (DELTASONE) tablet 5 mg Daily   Roflumilast (DALIRESP) tablet 500 mcg Daily   pantoprazole (PROTONIX) tablet 40 mg QAM AC   polyethylene glycol (GLYCOLAX) packet 17 g Daily   losartan (COZAAR) tablet 50 mg Daily   levothyroxine (SYNTHROID) tablet 100 mcg Daily   leflunomide (ARAVA) tablet 20 mg Daily   labetalol (NORMODYNE) tablet 100 mg 2 times per day   ipratropium-albuterol (DUONEB) nebulizer solution 3 mL Q4H WA   hydrochlorothiazide (HYDRODIURIL) tablet 25 mg Daily   sodium chloride flush 0.9 % injection 10 mL 2 times per day   sodium chloride flush 0.9 % injection 10 mL PRN   magnesium hydroxide (MILK OF MAGNESIA) 400 MG/5ML suspension 30 mL Daily PRN   ondansetron (ZOFRAN) injection 4 mg Q6H PRN   famotidine (PEPCID) tablet 20 mg BID   atorvastatin (LIPITOR) tablet 40 mg Nightly   aspirin chewable tablet 81 mg Daily   regadenoson (LEXISCAN) injection 0.4 mg ONCE PRN   enoxaparin (LOVENOX) injection 40 mg Daily   nitroGLYCERIN (NITROSTAT) SL tablet 0.4 mg Q5 Min PRN   albuterol sulfate  (90 Base) MCG/ACT inhaler 2 puff Q4H PRN       Allergies:  Alendronate sodium; Humira [adalimumab];  Penicillins; Simvastatin; and Sulfa antibiotics     Review of Systems:   [x]Full ROS obtained and negative except as mentioned in HPI    Physical Examination:    BP 120/77   Pulse 73   Temp 98.4 °F (36.9 °C) (Oral)   Resp 16   Ht 5' 6\" (1.676 m)   Wt 165 lb (74.8 kg)   SpO2 97%   BMI 26.63 kg/m²   Wt Readings from Last 3 Encounters:   19 165 lb (74.8 kg)   19 167 lb 12.8 oz (76.1 kg)   19 169 lb 12.8 oz (77 kg)       GENERAL: Well developed, well nourished, no acute distress  NEUROLOGICAL: Alert and oriented x3  PSYCH: Normal mood and affect   SKIN: Warm and dry, without lesions  HEENT: Normocephalic, atraumatic, Sclera non-icteric, mucous membranes moist  NECK: supple, JVP normal, thyroid not enlarged   CAROTID: Normal upstroke, no bruits  CARDIAC: Normal PMI, regular rate and rhythm, normal S1S2, no murmur, rub  RESPIRATORY: Normal respiratory effort, clear to auscultation bilaterally  EXTREMITIES: No cyanosis, clubbing or edema, palpable pulses bilaterally   MUSCULOSKELETAL: No joint swelling or tenderness, no chest wall tenderness  GASTROINTESTINAL:  soft, non-tender, no bruit    Labs:  Lab Review   Lab Results   Component Value Date     2019    K 3.6 2019     2019    CO2 26 2019    BUN 15 2019    CREATININE 0.9 2019    GLUCOSE 94 2019    CALCIUM 9.0 2019     Lab Results   Component Value Date    TROPONINI <0.01 2019     Lab Results   Component Value Date    WBC 4.2 2019    HGB 11.3 2019    HCT 34.8 2019    MCV 89.2 2019     2019     Lab Results   Component Value Date    CHOL 203 2014    TRIG 118 2014    HDL 54 2014    HDL 61 2012     Imaging:  I have reviewed the below testing personally:  EK/12/19  Normal sinus rhythm  Possible Left atrial enlargement    ECHO:   1/10/14  Study Conclusions    - Left ventricle: The cavity size was normal. Wall thickness    was mildly increased. Systolic function was normal. The    estimated ejection fraction was in the range of 55% to    60%.  Wall motion was normal; there were no regional wall    motion abnormalities. Findings consistent with left    ventricular diastolic dysfunction. STRESS TEST:  1/17/19       Summary   Randi Dine is normal isotope uptake at stress and rest. There is no evidence of    myocardial ischemia or scar. LV function is normal with uniform wall motion    and ejection fraction of 67 %. Lower risk study. CAD by previous noncardiac CT     4/12/19 CXR  1. Persistent but improving left lower lobe volume loss/consolidation. 2. COPD. Pro-BNP 46   Troponin negative x 2  Tele SR Rare PVCs    Impression/Recommendations    Ms. Ramesh King is a 70 y.o. female patient of Collis Diener Dr. Lindi Severs:     Chest pressure, atypical, resolved   Chronic hypoxic respiratory failure   Severe COPD/Nicotine dependence in remission   PH  Dysphagia Hx. Recent Aspiration pneumonia, with PEG   Hypertension, well controlled on BB, ARB, and [place]  Hyperlipidemia  Hypothyroidism   HEBERT  RA  Chronic steroid therapy       January 2019 SPECT MPI was lower risk and objective data since that time have remained without any suggestion of myocardial ischemia. Continue ASA, statin therapies. Blood pressure had been more labile earlier this year. Since ER arrival, she has ranged from 116/75 to 134/93. Losartan decreased to 50 mg and Labetalol decreased to 100 mg bid with HCTZ 25 mg added most recently in February 2019. Would recommend clarifying the Spironolactone that is listed as she is not noted to be on two diuretics by her primary cardiologist and would not recommend this. Further recs pending updating 2014 TTE and clinical course. Thank you for allowing me to participate in the care of your patient. Please do not hesitate to call. Augustus Encinas D.O., [facility]  Interventional Cardiology     o: [phone]  [address] [place] [address] [address], [address]        NOTE:  This report was transcribed using voice recognition software.   Every effort was made to ensure accuracy; however, inadvertent computerized transcription errors may be present.

## 2019-04-13 NOTE — PROGRESS NOTES
Paged Dr. Fiorella Chavez, \"cardiac stress lab needs a consult for cardiology for stress test. patient was suppose to get one this morning, but they realized she had one 01/17/19. Can you please put a cardio consult in? Thank you. oncoming nurse is Vincent. \" waiting for response.

## 2019-04-14 VITALS
WEIGHT: 165 LBS | HEIGHT: 66 IN | RESPIRATION RATE: 16 BRPM | OXYGEN SATURATION: 97 % | BODY MASS INDEX: 26.52 KG/M2 | DIASTOLIC BLOOD PRESSURE: 74 MMHG | SYSTOLIC BLOOD PRESSURE: 110 MMHG | HEART RATE: 78 BPM | TEMPERATURE: 98.4 F

## 2019-04-14 PROBLEM — R07.9 CHEST PAIN: Status: RESOLVED | Noted: 2019-04-12 | Resolved: 2019-04-14

## 2019-04-14 LAB
ANION GAP SERPL CALCULATED.3IONS-SCNC: 12 MMOL/L (ref 3–16)
BUN BLDV-MCNC: 14 MG/DL (ref 7–20)
CALCIUM SERPL-MCNC: 8.9 MG/DL (ref 8.3–10.6)
CHLORIDE BLD-SCNC: 100 MMOL/L (ref 99–110)
CO2: 26 MMOL/L (ref 21–32)
CREAT SERPL-MCNC: 0.9 MG/DL (ref 0.6–1.2)
GFR AFRICAN AMERICAN: >60
GFR NON-AFRICAN AMERICAN: >60
GLUCOSE BLD-MCNC: 97 MG/DL (ref 70–99)
MAGNESIUM: 1.9 MG/DL (ref 1.8–2.4)
POTASSIUM REFLEX MAGNESIUM: 3.2 MMOL/L (ref 3.5–5.1)
SODIUM BLD-SCNC: 138 MMOL/L (ref 136–145)

## 2019-04-14 PROCEDURE — 6360000002 HC RX W HCPCS: Performed by: INTERNAL MEDICINE

## 2019-04-14 PROCEDURE — 2580000003 HC RX 258: Performed by: INTERNAL MEDICINE

## 2019-04-14 PROCEDURE — 6370000000 HC RX 637 (ALT 250 FOR IP): Performed by: INTERNAL MEDICINE

## 2019-04-14 PROCEDURE — 96372 THER/PROPH/DIAG INJ SC/IM: CPT

## 2019-04-14 PROCEDURE — 80048 BASIC METABOLIC PNL TOTAL CA: CPT

## 2019-04-14 PROCEDURE — G0378 HOSPITAL OBSERVATION PER HR: HCPCS

## 2019-04-14 PROCEDURE — 94761 N-INVAS EAR/PLS OXIMETRY MLT: CPT

## 2019-04-14 PROCEDURE — 36415 COLL VENOUS BLD VENIPUNCTURE: CPT

## 2019-04-14 PROCEDURE — 2700000000 HC OXYGEN THERAPY PER DAY

## 2019-04-14 PROCEDURE — 96374 THER/PROPH/DIAG INJ IV PUSH: CPT

## 2019-04-14 PROCEDURE — 94640 AIRWAY INHALATION TREATMENT: CPT

## 2019-04-14 PROCEDURE — 83735 ASSAY OF MAGNESIUM: CPT

## 2019-04-14 RX ORDER — PANTOPRAZOLE SODIUM 40 MG/1
40 TABLET, DELAYED RELEASE ORAL
Qty: 30 TABLET | Refills: 3 | Status: SHIPPED | OUTPATIENT
Start: 2019-04-15 | End: 2019-06-11

## 2019-04-14 RX ORDER — LEFLUNOMIDE 10 MG/1
10 TABLET ORAL DAILY
Qty: 30 TABLET | Refills: 0
Start: 2019-04-14 | End: 2019-09-26 | Stop reason: ALTCHOICE

## 2019-04-14 RX ADMIN — LABETALOL HYDROCHLORIDE 100 MG: 100 TABLET, FILM COATED ORAL at 10:17

## 2019-04-14 RX ADMIN — ENOXAPARIN SODIUM 40 MG: 40 INJECTION SUBCUTANEOUS at 09:00

## 2019-04-14 RX ADMIN — SODIUM CHLORIDE, PRESERVATIVE FREE 10 ML: 5 INJECTION INTRAVENOUS at 09:00

## 2019-04-14 RX ADMIN — PREDNISONE 5 MG: 5 TABLET ORAL at 10:16

## 2019-04-14 RX ADMIN — ONDANSETRON 4 MG: 2 INJECTION INTRAMUSCULAR; INTRAVENOUS at 06:08

## 2019-04-14 RX ADMIN — FAMOTIDINE 20 MG: 20 TABLET ORAL at 10:12

## 2019-04-14 RX ADMIN — LEVOTHYROXINE SODIUM 100 MCG: 100 TABLET ORAL at 09:00

## 2019-04-14 RX ADMIN — IPRATROPIUM BROMIDE AND ALBUTEROL SULFATE 3 ML: .5; 3 SOLUTION RESPIRATORY (INHALATION) at 07:34

## 2019-04-14 ASSESSMENT — PAIN SCALES - GENERAL
PAINLEVEL_OUTOF10: 0

## 2019-04-14 NOTE — PROGRESS NOTES
Pt discharged post follow up with case management regarding G tube feeds. Educated pt and granddaughter on need to follow up with [address] tomorrow regarding coverage and supplies. Pt and granddaughter verbalized understanding of teaching.

## 2019-04-14 NOTE — DISCHARGE INSTR - COC
Continuity of Care Form     Patient Name: Joby Walker   :  1940                       MRN:  [id number]     Admit date:  2019                      Discharge date:  ***     Code Status Order: Full Code   Advance Directives:              Advance Care Flowsheet Documentation      Date/Time Healthcare Directive Type of Healthcare Directive Copy in [address] Agent's Name Healthcare Agent's Phone Number     19 1423  No, patient does not have an advance directive for healthcare treatment -- -- -- -- --             Admitting Physician:  Flory Piedra MD  PCP: Christina Rodarte MD     Discharging Nurse: [facility] Unit/Room#: 0208/0208-01  Discharging Unit Phone Number: ***     Emergency Contact:   Extended Emergency Contact Information  Primary Emergency Contact: [address] Phone: [phone]  Mobile Phone: [phone]  Relation: Grandchild     Past Surgical History:        Past Surgical History:   Procedure Laterality Date    BRONCHOSCOPY N/A 3/27/2019     BRONCHOSCOPY ALVEOLAR LAVAGE performed by Ibrahima Brizuela MD at [address]   3/27/2019     BRONCHOSCOPY THERAPUTIC ASPIRATION INITIAL performed by Ibrahima Brizuela MD at [address] [facility]   3/23/2011    EPIDURAL STEROID INJECTION Left 2018     LEFT LUMBAR FOUR, LUMBAR FIVE TRANSFORAMINAL EPIDURAL STEROID INJECTION SITE CONFIRMED BY FLUOROSCOPY performed by Leanna Hinton MD at [facility]    GASTROSTOMY TUBE PLACEMENT N/A 2019     EGD PEG TUBE PLACEMENT performed by Vicky Bass MD at [address]         total, fibroids    KNEE SURGERY         right    WI NJX DX/THER SBST INTRLMNR CRV/THRC W/IMG GDN Left 2018     LEFT LUMBAR FOUR/ LUMBAR FIVE CYST ASPIRATION SITE CONFIRMED BY FLUOROSCOPY performed by Ramana Wilson MD at [address] N/A 3/27/2019     EGD DIAGNOSTIC ONLY performed by Fredy Grant MD at [address] [facility]         Immunization History:        Immunization History   Administered Date(s) Administered    Influenza, High Dose (Fluzone 65 yrs and older) 10/18/2011, 08/29/2012, 11/22/2013, 08/27/2014, 11/19/2015, 10/18/2017, 11/06/2018    PPD Test 07/20/2016    Pneumococcal 13-valent Conjugate (Wxyosmb55) 11/19/2015    Pneumococcal Polysaccharide (Uegdzgoqe71) 08/06/2018         Active Problems:       Patient Active Problem List   Diagnosis Code    Centrilobular emphysema ([facility] J43.2    Pure hypercholesterolemia E78.00    Rheumatoid arthritis involving multiple sites with positive rheumatoid factor ([facility] M05.79    Essential hypertension I10    Dizziness R42    Osteoporosis M81.0    Unexplained weight loss R63.4    Acquired hypothyroidism E03.9    Cervical disc disorder at C4-C5 level with radiculopathy M50.121    Epigastric pain R10.13    Sciatica of left side M54.32    Influenza J11.1    Pulmonary HTN (HCC) I27.20    Hypotension I95.9    Acute respiratory failure with hypoxia (HCC) J96.01    Acute respiratory failure (HCC) J96.00    Hypokalemia E87.6    Normal anion gap metabolic acidosis K33.3    Anemia D64.9    Pulmonary infiltrates R91.8    Atelectasis J98.11    HEBERT on CPAP G47.33, Z99.89    ASD (atrial septal defect) Q21.1    Aspiration pneumonia (HCC) J69.0    DANY (acute kidney injury) ([facility] N17.9    Esophageal motility disorder K22. 4         Isolation/Infection:       Isolation            No Isolation             Nurse Assessment:  Last Vital Signs: /74   Pulse 78   Temp 98.4 °F (36.9 °C) (Oral)   Resp 16   Ht 5' 6\" (1.676 m)   Wt 165 lb (74.8 kg)   SpO2 97%   BMI 26.63 kg/m²     Last documented pain score (0-10 scale): Pain Level: 0  Last Weight:       Wt Readings from Last 1 Encounters:   19 165 lb (74.8 kg)      Mental Status:  {IP PT MENTAL STATUS:}     IV Access:  { [place] IV ACCESS:272782678}     Nursing Mobility/ADLs:  Walking            {CHP DME GXCZ:811837708}  Transfer            {CHP DME RXMU:270965167}  Bathing             {CHP DME WEHC:623277756}  Dressing           {CHP DME LIWK:790932402}  Toileting           {CHP DME QJZU:300802108}  Feeding            {CHP DME DPPP:373933810}  Med Admin       {CHP DME QGJF:359665431}  Med Delivery   { [place] MED Delivery:577636936}     Wound Care Documentation and Therapy:     Elimination:  Continence:   · Bowel: {YES / MU:70910}  · Bladder: {YES / AS:80971}  Urinary Catheter: {Urinary Catheter:439982195}   Colostomy/Ileostomy/Ileal Conduit: {YES / ZQ:05994}     Date of Last BM: ***     Intake/Output Summary (Last 24 hours) at 2019 1154  Last data filed at 2019 1022      Gross per 24 hour   Intake 702.5 ml   Output 200 ml   Net 502.5 ml      I/O last 3 completed shifts:   In: 852.5 [I.V.:10; NG/GT:842.5]  Out: 200 [Urine:200]     Safety Concerns:     [employer] Safety Concerns:016271241}     Impairments/Disabilities:      [employer] Impairments/Disabilities:461299381}     Nutrition Therapy:  Current Nutrition Therapy:   [employer] Diet List:096413889}     Routes of Feeding: {CHP DME Other Feedings:681070124}  Liquids: {Slp liquid thickness:78782}  Daily Fluid Restriction: {CHP DME Yes amt example:957268448}  Last Modified Barium Swallow with Video (Video Swallowing Test): {Done Not Done FWVJ:847757979}     Treatments at the Time of Hospital Discharge:   Respiratory Treatments: ***  Oxygen Therapy:  {Therapy; copd oxygen:83354}  Ventilator:    { CC Vent SQEE:679216743}     Rehab Therapies: {THERAPEUTIC INTERVENTION:6037243294}  Weight Bearing Status/Restrictions: [address] [employer] PONCE Weight Bearin}  Other Medical Equipment (for information only, NOT a DME order):  {EQUIPMENT:462869762}  Other

## 2019-04-14 NOTE — DISCHARGE SUMMARY
Hospital Medicine Discharge Summary    Michael White  :  1940  MRN:  [id number]    Admit date:  2019  Discharge date:  2019    Admitting Physician:  Jasen Mendes MD  Primary Care Physician:  Virgie Ponce MD  Code Status:   Full Code  Status: Observation [104]  Discharged Condition: Stable  Activity: activity as tolerated  Diet:  DIET TUBE FEEDING BOLUS NPO; Other Tube Feeding (must specify product in comment) [employer] Peptide 1.5); Gastrostomy      Discharge Diagnoses:      Chest pain (resolved)    Oxygen requiring COPD     Dysphagia requiring PEG     Essential hypertension    Pulmonary Hypertension secondary to COPD    Hypothyroid    Dyslipidemia      Hospital Course:   70 y.o. female admitted with chest pain with elevated troponin and progressive dyspnea and hypoxia in the setting of severe oxygen requiring COPD and nicotine dependence in remission. Usual 2 LPM oxygen increased to 3 LPM.  Recent PEG placed three weeks prior to admission following significant aspiration. History of HEBERT using CPAP 7 cm but difficult to use mask. She gets medications through PEG, changed omeprazole to pantoprazole at discharge to facilitate crushing the medication. Initial elevated troponin resolved with two subsequent negative values. Oxygen requirement unchanged from home oxygen of 2-3 LPM.  She wears 2 L NC continuously and reports increasing to 3L with exertion    1. Chest pressure resolved. Initial elevated troponin has resolved. Continues on aspirin 81 mg and beta blocker with labetalol 100 mg BID. No recent echocardiogram.   2. Dysphagia requiring PEG due to recent aspiration. 3. Oxygen requiring COPD:  Continue supplemental oxygen with goal O2sat 90-94%, Roflumilast 500 mcg BID,   4. Hypertension controlled on losartan 50 mg daily and hydrochlorothiazide 25 mg daily. 5. Hypothyroid continued on levothyroxine 100 mcg daily. 6. Dyslipidemia continued on atorvastatin 40 mg night. 7. History of rheumatoid arthritis stable. 8. History of pulmonary hypertension secondary to severe COPD. Discharge Medications:  New script in [place]  albuterol sulfate  (90 Base) MCG/ACT inhaler  INHALE TWO (2) PUFFS BY MOUTH EVERY 6 HOURS AS NEEDED     atorvastatin (LIPITOR) 10 MG tablet  TAKE ONE (1) TABLET BY MOUTH DAILY     hydrochlorothiazide (HYDRODIURIL) 25 MG tablet  Take 1 tablet by mouth daily     ipratropium-albuterol (DUONEB) 0.5-2.5 (3) MG/3ML SOLN nebulizer solution  Inhale 3 mLs into the lungs every 4 hours as needed. labetalol (NORMODYNE) 100 MG tablet  Take 1 tablet by mouth every 12 hours     leflunomide (ARAVA) 10 MG tablet  Take 1 tablet by mouth daily     levothyroxine (SYNTHROID) 100 MCG tablet  TAKE 1 TABLET BY MOUTH ONCE DAILY     losartan (COZAAR) 50 MG tablet  Take 1 tablet by mouth daily     nitroGLYCERIN (NITROSTAT) 0.4 MG SL tablet  Place 0.4 mg under the tongue every 5 minutes as needed. pantoprazole (PROTONIX) 40 MG tablet  Take 1 tablet by mouth every morning (new script, #30, RF-3) [facility]     polyethylene glycol (GLYCOLAX) packet  Take 17 g by mouth daily     predniSONE (DELTASONE) 10 MG tablet  TAKE1/2 TABLET BY MOUTH DAILY     Roflumilast (DALIRESP) 500 MCG tablet  Take 500 mcg by mouth daily         Consults:   Cardiology (Dr. Jareth Perdomo)    Significant Diagnostic Studies:    Pro-BNP:  46 pg/mL  Hemoglobin A1c 6.3%     Polysomnogram (9/21/18) Obstructive sleep apnea corrected with CPAP at 7 cm of H20. Nocturnal Hypoxemia corrected with CPAP without supplemental oxygen. Periodic Limb movement - insignificant     Echocardiogram (11/21/17) Left ventricle: The cavity size was normal. Wall thickness was normal. Systolic function was normal. The calculated ejection fraction was in the range of 69% to 74%. Wall motion was normal; there were no regional wall motion abnormalities. Normal diastolic function.   Valves normal.     Nuclear Myocardial Spect Cardiac

## 2019-04-14 NOTE — PROGRESS NOTES
Morning medications spread out throughout the morning per patient and granddaughter's request. Will continue to monitor.

## 2019-04-14 NOTE — FLOWSHEET NOTE
04/14/19 1327   Encounter Summary   Services provided to: Patient and family together   Referral/Consult From: Nurse;Patient; Family   Support System Family members  ([address] daughter -  [place])   Continue Visiting   (4/14 Initial visit/prayer. Gave/explained/completed POA.)   Complexity of Encounter High   Length of Encounter 1 hour   Spiritual/Quaker   Type Spiritual support   Assessment Calm; Approachable; Hopeful;Coping   Intervention Active listening;Explored feelings, thoughts, concerns;Explored coping resources;Nurtured hope;Prayer;Discussed relationship with God;Discussed belief system/Orthodoxy practices/evita;Discussed illness/injury and it's impact   Outcome Comfort;Expressed gratitude;Engaged in conversation; Shared life review;Expressed feelings/needs/concerns;Coping;Encouraged; Hopeful;Receptive   Advance Directives (For Healthcare)   Advance Directives Documents given;Documents explained; Healthcare power of attornery completed; Copy in paper lite chart
Patient/Caregiver provided printed discharge information.

## 2019-04-14 NOTE — CARE COORDINATION
[employer] rep returned call, reviewed details of case. She will have local [facility] rep call writer for best discharge plan.

## 2019-04-14 NOTE — CARE COORDINATION
Odette, local liason for [employer], returned call. She states that new formula is specialty and likely will need to order for pt, but if hospital can send pt home with 2-3 days supply, she believes they can deliver by then. [facility] has [employer] and will pull clinicals for new orders tomorrow from Epic. CASE MANAGEMENT DISCHARGE SUMMARY      Discharge to: Home with [facility] and [facility] for tube feeds    Transportation:    Family/car: yes       Note: Discharging nurse to complete [place], reconcile AVS, and place final copy with patient's discharge packet. RN to ensure that written prescriptions for  Level II medications are sent with patient to the facility as per protocol.

## 2019-04-14 NOTE — PROGRESS NOTES
VSS - afebrile. Pt is alert and oriented x 4 with history of falls. Assessment completed as charted. Bed is in lowest position with 2/4 bed rails raised, wheels locked and call light within reach - patient wearing non-skid socks and verbalizes understanding to call out for assistance. Granddaughter at bedside overnight. No further requests at this time. Will continue to monitor.      Vitals:    04/13/19 1844   BP: 105/69   Pulse: 82   Resp: 15   Temp: 98 °F (36.7 °C)   SpO2: 97%

## 2019-06-07 ENCOUNTER — OFFICE VISIT (OUTPATIENT)
Dept: FAMILY MEDICINE CLINIC | Age: 79
End: 2019-06-07
Payer: COMMERCIAL

## 2019-06-07 VITALS
HEIGHT: 66 IN | BODY MASS INDEX: 22.88 KG/M2 | RESPIRATION RATE: 16 BRPM | DIASTOLIC BLOOD PRESSURE: 60 MMHG | SYSTOLIC BLOOD PRESSURE: 122 MMHG | HEART RATE: 88 BPM | OXYGEN SATURATION: 98 % | WEIGHT: 142.4 LBS

## 2019-06-07 DIAGNOSIS — J69.0: ICD-10-CM

## 2019-06-07 DIAGNOSIS — E78.00 PURE HYPERCHOLESTEROLEMIA: ICD-10-CM

## 2019-06-07 DIAGNOSIS — I10 ESSENTIAL HYPERTENSION: ICD-10-CM

## 2019-06-07 DIAGNOSIS — M05.79 RHEUMATOID ARTHRITIS INVOLVING MULTIPLE SITES WITH POSITIVE RHEUMATOID FACTOR (HCC): ICD-10-CM

## 2019-06-07 DIAGNOSIS — J43.2 CENTRILOBULAR EMPHYSEMA (HCC): ICD-10-CM

## 2019-06-07 LAB
BASOPHILS ABSOLUTE: 0 K/UL (ref 0–0.2)
BASOPHILS RELATIVE PERCENT: 0.5 %
EOSINOPHILS ABSOLUTE: 0.1 K/UL (ref 0–0.6)
EOSINOPHILS RELATIVE PERCENT: 0.8 %
HCT VFR BLD CALC: 34.4 % (ref 36–48)
HEMOGLOBIN: 11.4 G/DL (ref 12–16)
LYMPHOCYTES ABSOLUTE: 1.4 K/UL (ref 1–5.1)
LYMPHOCYTES RELATIVE PERCENT: 21 %
MCH RBC QN AUTO: 29.2 PG (ref 26–34)
MCHC RBC AUTO-ENTMCNC: 33.1 G/DL (ref 31–36)
MCV RBC AUTO: 88.3 FL (ref 80–100)
MONOCYTES ABSOLUTE: 0.7 K/UL (ref 0–1.3)
MONOCYTES RELATIVE PERCENT: 10.8 %
NEUTROPHILS ABSOLUTE: 4.6 K/UL (ref 1.7–7.7)
NEUTROPHILS RELATIVE PERCENT: 66.9 %
PDW BLD-RTO: 14.4 % (ref 12.4–15.4)
PLATELET # BLD: 200 K/UL (ref 135–450)
PMV BLD AUTO: 8.6 FL (ref 5–10.5)
RBC # BLD: 3.89 M/UL (ref 4–5.2)
WBC # BLD: 6.9 K/UL (ref 4–11)

## 2019-06-07 PROCEDURE — G8399 PT W/DXA RESULTS DOCUMENT: HCPCS | Performed by: INTERNAL MEDICINE

## 2019-06-07 PROCEDURE — 1090F PRES/ABSN URINE INCON ASSESS: CPT | Performed by: INTERNAL MEDICINE

## 2019-06-07 PROCEDURE — 3023F SPIROM DOC REV: CPT | Performed by: INTERNAL MEDICINE

## 2019-06-07 PROCEDURE — 4040F PNEUMOC VAC/ADMIN/RCVD: CPT | Performed by: INTERNAL MEDICINE

## 2019-06-07 PROCEDURE — 1123F ACP DISCUSS/DSCN MKR DOCD: CPT | Performed by: INTERNAL MEDICINE

## 2019-06-07 PROCEDURE — G8926 SPIRO NO PERF OR DOC: HCPCS | Performed by: INTERNAL MEDICINE

## 2019-06-07 PROCEDURE — 1036F TOBACCO NON-USER: CPT | Performed by: INTERNAL MEDICINE

## 2019-06-07 PROCEDURE — G8420 CALC BMI NORM PARAMETERS: HCPCS | Performed by: INTERNAL MEDICINE

## 2019-06-07 PROCEDURE — G8427 DOCREV CUR MEDS BY ELIG CLIN: HCPCS | Performed by: INTERNAL MEDICINE

## 2019-06-07 PROCEDURE — 99214 OFFICE O/P EST MOD 30 MIN: CPT | Performed by: INTERNAL MEDICINE

## 2019-06-07 PROCEDURE — 36415 COLL VENOUS BLD VENIPUNCTURE: CPT | Performed by: INTERNAL MEDICINE

## 2019-06-07 ASSESSMENT — ENCOUNTER SYMPTOMS
GASTROINTESTINAL NEGATIVE: 1
ALLERGIC/IMMUNOLOGIC NEGATIVE: 1
RESPIRATORY NEGATIVE: 1

## 2019-06-07 NOTE — ASSESSMENT & PLAN NOTE
Home O2 at 2 L. stable since Hospitalization  and O2 sat of 95% when off Port able O2 for >10 min. SOB after off O2 about 10-15 min. Now sleeping w/ Bi-pap but only using 1-2 hrs/night. .  Some c/o of URI. Trelegy inhaler not much help. Uses Albuterol on/off.

## 2019-06-07 NOTE — PATIENT INSTRUCTIONS
All above problems reviewed and the found to be unchanged except for the following :     Aspiration Pneumonia of Both Upper Lobes (Hcc). Continue to use G-tube. F/u w/ Pulmonary and GI. Essential Hypertension. Continue present meds. Home BP checks. Call if>140/90. Improve diet. Avoid caffeine and salt. Call if new c/o w/ meds. Centrilobular Emphysema (Hcc). Continue to improve diet and meds. F/u w/ Pulmonary as scheduled. Pure Hypercholesterolemia. To continue meds for now. Has lost a lot of wt since began Tube feeding. Rheumatoid Arthritis Involving Multiple Sites With Positive Rheumatoid Factor (Hcc). Continue meds. F/u w/ Rheumatology.

## 2019-06-07 NOTE — PROGRESS NOTES
Subjective:      Patient ID: Karan Larios is a 70 y.o. female. HPI  Aspiration pneumonia of both upper lobes [facility])  Hospitalized, now has G-tube. Not eating at all. Lost 20+ lbs since started tube feeding only. Saw Pulmonary yesterday. Centrilobular emphysema ([facility]  Home O2 at 2 L. stable since Hospitalization  and O2 sat of 95% when off Port able O2 for >10 min. SOB after off O2 about 10-15 min. Now sleeping w/ Bi-pap but only using 1-2 hrs/night. .  Some c/o of URI. Trelegy inhaler not much help. Uses Albuterol on/off. Rheumatoid arthritis involving multiple sites with positive rheumatoid factor (HCC)  R Shoulder injected recently, R knee swelling and pain some better. No recent meds other than injections. Essential hypertension  No change in meds, no c/o with meds, no chest pain, SOB, palpatations, or syncope. Home bp was 110-120s/70-80s. Pure hypercholesterolemia  Stable diet and wt. No c/o w/ meds.      Past Medical History:   Diagnosis Date    Acquired hypothyroidism 7/6/2017    Anemia     Asthma     COPD (chronic obstructive pulmonary disease) ([facility]     HLD (hyperlipidemia)     Hypertension     Influenza A 01/22/2018    MI (myocardial infarction) (HCC)     X 2    Migraine     past hx    Rheumatoid arthritis     Wears glasses      Current Outpatient Medications   Medication Sig Dispense Refill    levothyroxine (SYNTHROID) 100 MCG tablet TAKE 1 TABLET BY MOUTH ONCE DAILY 90 tablet 10    hydrochlorothiazide (HYDRODIURIL) 25 MG tablet Take 1 tablet by mouth daily  3    losartan (COZAAR) 50 MG tablet Take 1 tablet by mouth daily 30 tablet 3    labetalol (NORMODYNE) 100 MG tablet Take 1 tablet by mouth every 12 hours 60 tablet 3    atorvastatin (LIPITOR) 10 MG tablet TAKE ONE (1) TABLET BY MOUTH DAILY 90 tablet 5    leflunomide (ARAVA) 10 MG tablet Take 1 tablet by mouth daily 30 tablet 0    pantoprazole (PROTONIX) 40 MG tablet Take 1 tablet by mouth every morning (before breakfast) 30 tablet 3    Roflumilast (DALIRESP) 500 MCG tablet Take 500 mcg by mouth daily      albuterol sulfate  (90 Base) MCG/ACT inhaler INHALE TWO (2) PUFFS BY MOUTH EVERY 6 HOURS AS NEEDED 54 g 10    predniSONE (DELTASONE) 10 MG tablet TAKE1/2 TABLET BY MOUTH DAILY 90 tablet 0    Blood Pressure Monitoring (BLOOD PRESSURE MONITOR/M CUFF) MISC 1 Units by Does not apply route daily 1 each 0    ipratropium-albuterol (DUONEB) 0.5-2.5 (3) MG/3ML SOLN nebulizer solution Inhale 3 mLs into the lungs every 4 hours as needed. 360 mL 3    nitroGLYCERIN (NITROSTAT) 0.4 MG SL tablet Place 0.4 mg under the tongue every 5 minutes as needed. No current facility-administered medications for this visit. Allergies   Allergen Reactions    Alendronate Sodium      Jaw pain      Humira [Adalimumab]      Rash      Penicillins      rash    Simvastatin Other (See Comments)     Made legs ache    Sulfa Antibiotics Swelling     Tongue swelled     Social History     Tobacco Use    Smoking status: Former Smoker     Packs/day: 3.00     Years: 50.00     Pack years: 150.00     Last attempt to quit: 1/22/2009     Years since quitting: 10.3    Smokeless tobacco: Never Used   Substance Use Topics    Alcohol use: No     Family History   Problem Relation Age of Onset    Cancer Mother     Cancer Father     Heart Disease Maternal Aunt     Cancer Sister        Review of Systems   Constitutional: Negative. Respiratory: Negative. Cardiovascular: Negative. Gastrointestinal: Negative. Endocrine: Negative. Genitourinary: Negative. Musculoskeletal: Negative. Skin: Negative. Allergic/Immunologic: Negative. Neurological: Negative. Hematological: Negative. Objective:   Physical Exam   Constitutional: She is oriented to person, place, and time. She appears well-developed and well-nourished. Non-toxic appearance. She does not have a sickly appearance. She does not appear ill.  No distress. HENT:   Head: Normocephalic and atraumatic. Eyes: Pupils are equal, round, and reactive to light. Conjunctivae and EOM are normal.   Neck: Trachea normal, normal range of motion and full passive range of motion without pain. Neck supple. Normal carotid pulses, no hepatojugular reflux and no JVD present. No spinous process tenderness and no muscular tenderness present. Carotid bruit is not present. No tracheal deviation, no edema, no erythema and normal range of motion present. No thyroid mass and no thyromegaly present. Cardiovascular: Normal rate, regular rhythm, S1 normal, S2 normal, normal heart sounds, intact distal pulses and normal pulses. No extrasystoles are present. PMI is not displaced. Exam reveals no gallop, no S3, no S4, no distant heart sounds, no friction rub and no decreased pulses. No murmur heard. Pulses:       Carotid pulses are 2+ on the right side, and 2+ on the left side. Radial pulses are 2+ on the right side, and 2+ on the left side. Femoral pulses are 2+ on the right side, and 2+ on the left side. Popliteal pulses are 2+ on the right side, and 2+ on the left side. Dorsalis pedis pulses are 2+ on the right side, and 2+ on the left side. Posterior tibial pulses are 2+ on the right side, and 2+ on the left side. Pulmonary/Chest: Effort normal and breath sounds normal. No accessory muscle usage. No apnea, no tachypnea and no bradypnea. No respiratory distress. She has no decreased breath sounds. She has no wheezes. She has no rhonchi. She has no rales. She exhibits no tenderness. Abdominal: Soft. Bowel sounds are normal. She exhibits no distension and no mass. There is no tenderness. There is no rebound and no guarding. No hernia. G- tube in place. Musculoskeletal: Normal range of motion. She exhibits no edema or tenderness. Lymphadenopathy:     She has no cervical adenopathy. She has no axillary adenopathy.    Neurological: She is alert and oriented to person, place, and time. She has normal strength and normal reflexes. She is not disoriented. She displays no atrophy and no tremor. No cranial nerve deficit or sensory deficit. She exhibits normal muscle tone. Coordination normal.   Skin: Skin is warm, dry and intact. No abrasion and no rash noted. She is not diaphoretic. No cyanosis or erythema. No pallor. Nails show no clubbing. Psychiatric: She has a normal mood and affect. Her speech is normal and behavior is normal. Judgment and thought content normal. Cognition and memory are normal.       Assessment:      Problem   Aspiration Pneumonia of Both Upper Lobes (Hcc). No new c/o. Essential Hypertension. Good w/ meds. Centrilobular Emphysema (Hcc). Stable w/ meds. Pure Hypercholesterolemia. Good w/ med.s   Rheumatoid Arthritis Involving Multiple Sites With Positive Rheumatoid Factor (Hcc). Chronic. Prednisone down to 5 mg         Plan:      All above problems reviewed and the found to be unchanged except for the following :     Aspiration Pneumonia of Both Upper Lobes (Hcc). Continue to use G-tube. F/u w/ Pulmonary and GI. Essential Hypertension. Continue present meds. Home BP checks. Call if>140/90. Improve diet. Avoid caffeine and salt. Call if new c/o w/ meds. Centrilobular Emphysema (Hcc). Continue to improve diet and meds. F/u w/ Pulmonary as scheduled. Pure Hypercholesterolemia. To continue meds for now. Has lost a lot of wt since began Tube feeding. Rheumatoid Arthritis Involving Multiple Sites With Positive Rheumatoid Factor (Hcc). Continue meds. F/u w/ Rheumatology.               Diomedes Leslie MD

## 2019-06-07 NOTE — ASSESSMENT & PLAN NOTE
Hospitalized, now has G-tube. Not eating at all. Lost 20+ lbs since started tube feeding only. Saw Pulmonary yesterday.

## 2019-06-07 NOTE — ASSESSMENT & PLAN NOTE
No change in meds, no c/o with meds, no chest pain, SOB, palpatations, or syncope. Home bp was 110-120s/70-80s.

## 2019-06-08 LAB
ALBUMIN SERPL-MCNC: 4.4 G/DL (ref 3.4–5)
ALP BLD-CCNC: 39 U/L (ref 40–129)
ALT SERPL-CCNC: 14 U/L (ref 10–40)
ANION GAP SERPL CALCULATED.3IONS-SCNC: 13 MMOL/L (ref 3–16)
AST SERPL-CCNC: 18 U/L (ref 15–37)
BILIRUB SERPL-MCNC: 0.6 MG/DL (ref 0–1)
BILIRUBIN DIRECT: <0.2 MG/DL (ref 0–0.3)
BILIRUBIN, INDIRECT: ABNORMAL MG/DL (ref 0–1)
BUN BLDV-MCNC: 37 MG/DL (ref 7–20)
CALCIUM SERPL-MCNC: 10.8 MG/DL (ref 8.3–10.6)
CHLORIDE BLD-SCNC: 101 MMOL/L (ref 99–110)
CHOLESTEROL, TOTAL: 156 MG/DL (ref 0–199)
CO2: 29 MMOL/L (ref 21–32)
CREAT SERPL-MCNC: 1.1 MG/DL (ref 0.6–1.2)
GFR AFRICAN AMERICAN: 58
GFR NON-AFRICAN AMERICAN: 48
GLUCOSE BLD-MCNC: 95 MG/DL (ref 70–99)
HDLC SERPL-MCNC: 58 MG/DL (ref 40–60)
LDL CHOLESTEROL CALCULATED: 66 MG/DL
POTASSIUM SERPL-SCNC: 3.5 MMOL/L (ref 3.5–5.1)
SODIUM BLD-SCNC: 143 MMOL/L (ref 136–145)
TOTAL PROTEIN: 6.9 G/DL (ref 6.4–8.2)
TRIGL SERPL-MCNC: 160 MG/DL (ref 0–150)
VLDLC SERPL CALC-MCNC: 32 MG/DL

## 2019-06-10 ENCOUNTER — TELEPHONE (OUTPATIENT)
Dept: FAMILY MEDICINE CLINIC | Age: 79
End: 2019-06-10

## 2019-06-10 DIAGNOSIS — E83.52 HYPERCALCEMIA: Primary | ICD-10-CM

## 2019-06-11 ENCOUNTER — ANESTHESIA EVENT (OUTPATIENT)
Dept: ENDOSCOPY | Age: 79
End: 2019-06-11
Payer: COMMERCIAL

## 2019-06-11 RX ORDER — LANSOPRAZOLE 30 MG/1
30 CAPSULE, DELAYED RELEASE ORAL DAILY
COMMUNITY
End: 2019-09-26 | Stop reason: ALTCHOICE

## 2019-06-12 ENCOUNTER — ANESTHESIA (OUTPATIENT)
Dept: ENDOSCOPY | Age: 79
End: 2019-06-12
Payer: COMMERCIAL

## 2019-06-12 ENCOUNTER — HOSPITAL ENCOUNTER (OUTPATIENT)
Age: 79
Setting detail: OUTPATIENT SURGERY
Discharge: HOME | End: 2019-06-12
Attending: INTERNAL MEDICINE | Admitting: INTERNAL MEDICINE
Payer: COMMERCIAL

## 2019-06-12 VITALS
BODY MASS INDEX: 22.34 KG/M2 | HEART RATE: 80 BPM | DIASTOLIC BLOOD PRESSURE: 85 MMHG | RESPIRATION RATE: 18 BRPM | SYSTOLIC BLOOD PRESSURE: 126 MMHG | TEMPERATURE: 99.1 F | OXYGEN SATURATION: 99 % | WEIGHT: 139 LBS | HEIGHT: 66 IN

## 2019-06-12 VITALS — OXYGEN SATURATION: 100 % | DIASTOLIC BLOOD PRESSURE: 45 MMHG | SYSTOLIC BLOOD PRESSURE: 85 MMHG

## 2019-06-12 PROCEDURE — 3700000000 HC ANESTHESIA ATTENDED CARE: Performed by: INTERNAL MEDICINE

## 2019-06-12 PROCEDURE — 2500000003 HC RX 250 WO HCPCS: Performed by: ANESTHESIOLOGY

## 2019-06-12 PROCEDURE — 7100000010 HC PHASE II RECOVERY - FIRST 15 MIN: Performed by: INTERNAL MEDICINE

## 2019-06-12 PROCEDURE — 2500000003 HC RX 250 WO HCPCS: Performed by: NURSE ANESTHETIST, CERTIFIED REGISTERED

## 2019-06-12 PROCEDURE — 2580000003 HC RX 258: Performed by: INTERNAL MEDICINE

## 2019-06-12 PROCEDURE — 7100000011 HC PHASE II RECOVERY - ADDTL 15 MIN: Performed by: INTERNAL MEDICINE

## 2019-06-12 PROCEDURE — 3700000001 HC ADD 15 MINUTES (ANESTHESIA): Performed by: INTERNAL MEDICINE

## 2019-06-12 PROCEDURE — 3609027000 HC COLONOSCOPY: Performed by: INTERNAL MEDICINE

## 2019-06-12 PROCEDURE — 6360000002 HC RX W HCPCS: Performed by: NURSE ANESTHETIST, CERTIFIED REGISTERED

## 2019-06-12 PROCEDURE — 2709999900 HC NON-CHARGEABLE SUPPLY: Performed by: INTERNAL MEDICINE

## 2019-06-12 PROCEDURE — 88305 TISSUE EXAM BY PATHOLOGIST: CPT

## 2019-06-12 PROCEDURE — 2580000003 HC RX 258: Performed by: NURSE ANESTHETIST, CERTIFIED REGISTERED

## 2019-06-12 RX ORDER — FENTANYL CITRATE 50 UG/ML
25 INJECTION, SOLUTION INTRAMUSCULAR; INTRAVENOUS EVERY 5 MIN PRN
Status: DISCONTINUED | OUTPATIENT
Start: 2019-06-12 | End: 2019-06-12 | Stop reason: HOSPADM

## 2019-06-12 RX ORDER — MORPHINE SULFATE 2 MG/ML
1 INJECTION, SOLUTION INTRAMUSCULAR; INTRAVENOUS EVERY 5 MIN PRN
Status: DISCONTINUED | OUTPATIENT
Start: 2019-06-12 | End: 2019-06-12 | Stop reason: HOSPADM

## 2019-06-12 RX ORDER — SODIUM CHLORIDE 9 MG/ML
INJECTION, SOLUTION INTRAVENOUS CONTINUOUS
Status: DISCONTINUED | OUTPATIENT
Start: 2019-06-12 | End: 2019-06-12 | Stop reason: HOSPADM

## 2019-06-12 RX ORDER — ONDANSETRON 2 MG/ML
4 INJECTION INTRAMUSCULAR; INTRAVENOUS
Status: DISCONTINUED | OUTPATIENT
Start: 2019-06-12 | End: 2019-06-12 | Stop reason: HOSPADM

## 2019-06-12 RX ORDER — MORPHINE SULFATE 2 MG/ML
2 INJECTION, SOLUTION INTRAMUSCULAR; INTRAVENOUS EVERY 5 MIN PRN
Status: DISCONTINUED | OUTPATIENT
Start: 2019-06-12 | End: 2019-06-12 | Stop reason: HOSPADM

## 2019-06-12 RX ORDER — PROPOFOL 10 MG/ML
INJECTION, EMULSION INTRAVENOUS PRN
Status: DISCONTINUED | OUTPATIENT
Start: 2019-06-12 | End: 2019-06-12 | Stop reason: SDUPTHER

## 2019-06-12 RX ORDER — SODIUM CHLORIDE, SODIUM LACTATE, POTASSIUM CHLORIDE, CALCIUM CHLORIDE 600; 310; 30; 20 MG/100ML; MG/100ML; MG/100ML; MG/100ML
INJECTION, SOLUTION INTRAVENOUS CONTINUOUS PRN
Status: DISCONTINUED | OUTPATIENT
Start: 2019-06-12 | End: 2019-06-12 | Stop reason: SDUPTHER

## 2019-06-12 RX ORDER — OXYCODONE HYDROCHLORIDE AND ACETAMINOPHEN 5; 325 MG/1; MG/1
2 TABLET ORAL PRN
Status: DISCONTINUED | OUTPATIENT
Start: 2019-06-12 | End: 2019-06-12 | Stop reason: HOSPADM

## 2019-06-12 RX ORDER — SODIUM CHLORIDE 0.9 % (FLUSH) 0.9 %
10 SYRINGE (ML) INJECTION EVERY 12 HOURS SCHEDULED
Status: DISCONTINUED | OUTPATIENT
Start: 2019-06-12 | End: 2019-06-12 | Stop reason: HOSPADM

## 2019-06-12 RX ORDER — LIDOCAINE HYDROCHLORIDE 10 MG/ML
0.1 INJECTION, SOLUTION EPIDURAL; INFILTRATION; INTRACAUDAL; PERINEURAL
Status: DISCONTINUED | OUTPATIENT
Start: 2019-06-12 | End: 2019-06-12 | Stop reason: HOSPADM

## 2019-06-12 RX ORDER — SODIUM CHLORIDE, SODIUM LACTATE, POTASSIUM CHLORIDE, CALCIUM CHLORIDE 600; 310; 30; 20 MG/100ML; MG/100ML; MG/100ML; MG/100ML
INJECTION, SOLUTION INTRAVENOUS ONCE
Status: COMPLETED | OUTPATIENT
Start: 2019-06-12 | End: 2019-06-12

## 2019-06-12 RX ORDER — SODIUM CHLORIDE 0.9 % (FLUSH) 0.9 %
10 SYRINGE (ML) INJECTION PRN
Status: DISCONTINUED | OUTPATIENT
Start: 2019-06-12 | End: 2019-06-12 | Stop reason: HOSPADM

## 2019-06-12 RX ORDER — OXYCODONE HYDROCHLORIDE AND ACETAMINOPHEN 5; 325 MG/1; MG/1
1 TABLET ORAL PRN
Status: DISCONTINUED | OUTPATIENT
Start: 2019-06-12 | End: 2019-06-12 | Stop reason: HOSPADM

## 2019-06-12 RX ORDER — MEPERIDINE HYDROCHLORIDE 50 MG/ML
12.5 INJECTION INTRAMUSCULAR; INTRAVENOUS; SUBCUTANEOUS EVERY 5 MIN PRN
Status: DISCONTINUED | OUTPATIENT
Start: 2019-06-12 | End: 2019-06-12 | Stop reason: HOSPADM

## 2019-06-12 RX ORDER — LIDOCAINE HYDROCHLORIDE 20 MG/ML
INJECTION, SOLUTION INFILTRATION; PERINEURAL PRN
Status: DISCONTINUED | OUTPATIENT
Start: 2019-06-12 | End: 2019-06-12 | Stop reason: SDUPTHER

## 2019-06-12 RX ORDER — FENTANYL CITRATE 50 UG/ML
50 INJECTION, SOLUTION INTRAMUSCULAR; INTRAVENOUS EVERY 5 MIN PRN
Status: DISCONTINUED | OUTPATIENT
Start: 2019-06-12 | End: 2019-06-12 | Stop reason: HOSPADM

## 2019-06-12 RX ADMIN — PROPOFOL 300 MG: 10 INJECTION, EMULSION INTRAVENOUS at 14:15

## 2019-06-12 RX ADMIN — LIDOCAINE HYDROCHLORIDE 40 MG: 20 INJECTION, SOLUTION INFILTRATION; PERINEURAL at 14:15

## 2019-06-12 RX ADMIN — SODIUM CHLORIDE, POTASSIUM CHLORIDE, SODIUM LACTATE AND CALCIUM CHLORIDE: 600; 310; 30; 20 INJECTION, SOLUTION INTRAVENOUS at 14:10

## 2019-06-12 RX ADMIN — FAMOTIDINE 20 MG: 10 INJECTION, SOLUTION INTRAVENOUS at 13:43

## 2019-06-12 RX ADMIN — SODIUM CHLORIDE, POTASSIUM CHLORIDE, SODIUM LACTATE AND CALCIUM CHLORIDE: 600; 310; 30; 20 INJECTION, SOLUTION INTRAVENOUS at 13:40

## 2019-06-12 ASSESSMENT — PAIN SCALES - GENERAL
PAINLEVEL_OUTOF10: 0

## 2019-06-12 ASSESSMENT — PAIN - FUNCTIONAL ASSESSMENT: PAIN_FUNCTIONAL_ASSESSMENT: 0-10

## 2019-06-12 ASSESSMENT — ENCOUNTER SYMPTOMS: SHORTNESS OF BREATH: 1

## 2019-06-12 ASSESSMENT — LIFESTYLE VARIABLES: SMOKING_STATUS: 0

## 2019-06-12 NOTE — H&P
Pre-sedation Assessment    History and Physical / Pre-Sedation Assessment  Patient:  Michael White   :   1940     Intended Procedure: CLS      HPI: 68yo F with anemia, hemoccult positive stools. She has never had a colonoscopy. Nurses notes reviewed and agreed. Past Medical History:   Diagnosis Date    Acid reflux     Acquired hypothyroidism 2017    Anemia     Asthma     CHF (congestive heart failure) (HCC)     [facility]) [facility])     HLD (hyperlipidemia)     Hypertension     Influenza A 2018    MI (myocardial infarction) ([facility] [address]     X 2    Migraine     past hx    Rheumatoid arthritis     Wears glasses      No current facility-administered medications on file prior to encounter. Current Outpatient Medications on File Prior to Encounter   Medication Sig Dispense Refill    OXYGEN Inhale 2 L/min into the lungs daily as needed      lansoprazole (PREVACID) 30 MG delayed release capsule Take 30 mg by mouth daily      levothyroxine (SYNTHROID) 100 MCG tablet TAKE 1 TABLET BY MOUTH ONCE DAILY 90 tablet 10    albuterol sulfate  (90 Base) MCG/ACT inhaler INHALE TWO (2) PUFFS BY MOUTH EVERY 6 HOURS AS NEEDED 54 g 10    ipratropium-albuterol (DUONEB) 0.5-2.5 (3) MG/3ML SOLN nebulizer solution Inhale 3 mLs into the lungs every 4 hours as needed.  360 mL 3    leflunomide (ARAVA) 10 MG tablet Take 1 tablet by mouth daily 30 tablet 0    Roflumilast (DALIRESP) 500 MCG tablet Take 500 mcg by mouth daily      predniSONE (DELTASONE) 10 MG tablet TAKE1/2 TABLET BY MOUTH DAILY 90 tablet 0    hydrochlorothiazide (HYDRODIURIL) 25 MG tablet Take 1 tablet by mouth daily  3    losartan (COZAAR) 50 MG tablet Take 1 tablet by mouth daily 30 tablet 3    labetalol (NORMODYNE) 100 MG tablet Take 1 tablet by mouth every 12 hours 60 tablet 3    atorvastatin (LIPITOR) 10 MG tablet TAKE ONE (1) TABLET BY MOUTH DAILY 90 tablet 5    Blood Pressure Monitoring (BLOOD PRESSURE MONITOR/M CUFF) MISC 1 Units by Does not apply route daily 1 each 0    nitroGLYCERIN (NITROSTAT) 0.4 MG SL tablet Place 0.4 mg under the tongue every 5 minutes as needed. Medications reviewed  Allergies: Allergies   Allergen Reactions    Alendronate Sodium      Jaw pain      Humira [Adalimumab]      Rash      Penicillins      rash    Simvastatin Other (See Comments)     Made legs ache    Sulfa Antibiotics Swelling     Tongue swelled           Physical Exam:  Vital Signs: BP (!) 143/90   Pulse 92   Temp 99.1 °F (37.3 °C) (Temporal)   Resp 18   Ht 5' 6\" (1.676 m)   Wt 139 lb (63 kg)   SpO2 93%   BMI 22.44 kg/m²  Body mass index is 22.44 kg/m². Airway:Normal  Cardiac:Normal  Pulmonary:Normal  Abdomen:Normal  Specific to procedure: mallampati 3      Pre-Procedure Assessment/Plan:  ASA 3 - Patient with moderate systemic disease with functional limitations    Level of Sedation Plan:Deep sedation    Post Procedure plan: Return to same level of care    I assessed the patient and find that the patient is in satisfactory condition to proceed with the planned procedure and sedation plan. I have explained the risk, benefits, and alternatives to the procedure. The patient understands and agrees to proceed.   Yes    [place]  2:07 PM 6/12/2019

## 2019-06-12 NOTE — ANESTHESIA POSTPROCEDURE EVALUATION
Department of Anesthesiology  Postprocedure Note    Patient: Veronica Stephen  MRN: [id number]  YOB: 1940  Date of evaluation: 6/12/2019  Time:  3:19 PM     Procedure Summary     Date:  06/12/19 Room / Location:  [address] [place] [address] ENDOSCOPY    Anesthesia Start:  7202 Anesthesia Stop:  3648    Procedure:  COLONOSCOPY WITH ANESTHESIA -SLEEP APNEA- (N/A ) Diagnosis:       Anemia, unspecified type      (ANEMIA)    Surgeon:  Giulia Sauceda MD Responsible Provider:  Lesly Templeton MD    Anesthesia Type:  TIVA ASA Status:  3          Anesthesia Type: TIVA    Minh Phase I: Minh Score: 10    Minh Phase II: Minh Score: 10    Last vitals: Reviewed and per EMR flowsheets.    Vitals:    06/12/19 1337 06/12/19 1442 06/12/19 1445 06/12/19 1500   BP: (!) 143/90 (!) 108/54 117/70 126/85   Pulse: 92 85 82 80   Resp: 18 18 18 18   Temp: 99.1 °F (37.3 °C)      TempSrc: Temporal      SpO2: 93% 98% 98% 99%   Weight: 139 lb (63 kg)      Height: 5' 6\" (1.676 m)          Anesthesia Post Evaluation    Patient location during evaluation: bedside  Patient participation: complete - patient participated  Level of consciousness: awake and alert  Airway patency: patent  Nausea & Vomiting: no nausea  Complications: no  Cardiovascular status: hemodynamically stable  Respiratory status: acceptable  Hydration status: euvolemic

## 2019-06-12 NOTE — ANESTHESIA PRE PROCEDURE
Department of Anesthesiology  Preprocedure Note       Name:  Michael White   Age:  70 y.o.  :  1940                                          MRN:  [id number]         Date:  2019      Surgeon: Senait Dueñas):  Thanh Harrison MD    Procedure: COLONOSCOPY WITH ANESTHESIA -SLEEP APNEA- (N/A )    Medications prior to admission:   Prior to Admission medications    Medication Sig Start Date End Date Taking? Authorizing Provider   lansoprazole (PREVACID) 30 MG delayed release capsule Take 30 mg by mouth daily   Yes Historical Provider, MD   leflunomide (ARAVA) 10 MG tablet Take 1 tablet by mouth daily 19   Kal Nunes MD   Roflumilast (DALIRESP) 500 MCG tablet Take 500 mcg by mouth daily    Historical Provider, MD   levothyroxine (SYNTHROID) 100 MCG tablet TAKE 1 TABLET BY MOUTH ONCE DAILY 3/25/19   C iGlbert Chairez MD   albuterol sulfate  (90 Base) MCG/ACT inhaler INHALE TWO (2) PUFFS BY MOUTH EVERY 6 HOURS AS NEEDED 3/25/19   C Gilbert Chairez MD   predniSONE (DELTASONE) 10 MG tablet TAKE1/2 TABLET BY MOUTH DAILY 3/22/19   C Gilbert Chairez MD   hydrochlorothiazide (HYDRODIURIL) 25 MG tablet Take 1 tablet by mouth daily 19   Historical Provider, MD   losartan (COZAAR) 50 MG tablet Take 1 tablet by mouth daily 19   Jeancarlos Diallo MD   labetalol (NORMODYNE) 100 MG tablet Take 1 tablet by mouth every 12 hours 19   Jeancarlos Diallo MD   atorvastatin (LIPITOR) 10 MG tablet TAKE ONE (1) TABLET BY MOUTH DAILY 10/16/18   Virgie Ponce MD   Blood Pressure Monitoring (BLOOD PRESSURE MONITOR/M CUFF) MISC 1 Units by Does not apply route daily 18   Virgie Ponce MD   ipratropium-albuterol (DUONEB) 0.5-2.5 (3) MG/3ML SOLN nebulizer solution Inhale 3 mLs into the lungs every 4 hours as needed. 2/10/15   Norma Segal MD   nitroGLYCERIN (NITROSTAT) 0.4 MG SL tablet Place 0.4 mg under the tongue every 5 minutes as needed.       Historical Provider, MD       Current medications: Current Facility-Administered Medications   Medication Dose Route Frequency Provider Last Rate Last Dose    lactated ringers infusion   Intravenous Once Franklin Menard MD        lidocaine PF 1 % injection 0.1 mL  0.1 mL Intradermal Once PRN Franklin Menard MD        0.9 % sodium chloride infusion   Intravenous Continuous Wendyda MD Pilo        sodium chloride flush 0.9 % injection 10 mL  10 mL Intravenous 2 times per day Emily Daigle MD        sodium chloride flush 0.9 % injection 10 mL  10 mL Intravenous PRN Oneda MD Pilo        famotidine (PEPCID) injection 20 mg  20 mg Intravenous Once Oneda MD Pilo           Allergies:     Allergies   Allergen Reactions    Alendronate Sodium      Jaw pain      Humira [Adalimumab]      Rash      Penicillins      rash    Simvastatin Other (See Comments)     Made legs ache    Sulfa Antibiotics Swelling     Tongue swelled       Problem List:    Patient Active Problem List   Diagnosis Code    Centrilobular emphysema ([facility] [address] J43.2    Pure hypercholesterolemia E78.00    Rheumatoid arthritis involving multiple sites with positive rheumatoid factor (HCC) M05.79    Essential hypertension I10    Dizziness R42    Osteoporosis M81.0    Unexplained weight loss R63.4    Acquired hypothyroidism E03.9    Cervical disc disorder at C4-C5 level with radiculopathy M50.121    Epigastric pain R10.13    Sciatica of left side M54.32    Influenza J11.1    Pulmonary HTN (HCC) I27.20    Hypotension I95.9    Acute respiratory failure with hypoxia (HCC) J96.01    Acute respiratory failure (HCC) J96.00    Hypokalemia E87.6    Normal anion gap metabolic acidosis O22.3    Anemia D64.9    Pulmonary infiltrates R91.8    Atelectasis J98.11    HEBERT on CPAP G47.33, Z99.89    ASD (atrial septal defect) Q21.1    Aspiration pneumonia of both upper lobes ([facility] J69.0    DANY (acute kidney injury) ([facility] [address] N17.9    Esophageal FB   Neck ROM: limited  Mouth opening: > = 3 FB Dental:    (+) edentulous      Pulmonary: breath sounds clear to auscultation  (+) COPD (2-3L O2 cont.):  shortness of breath:  sleep apnea: on CPAP,  asthma:     (-) not a current smoker          Patient did not smoke on day of surgery. Cardiovascular:    (+) hypertension:, past MI: > 6 months, CHF:, ARZATE:,     (-) pacemaker    ECG reviewed  Rhythm: regular  Rate: normal    Stress test reviewed       Beta Blocker:  Dose within 24 Hrs         Neuro/Psych:   (+) neuromuscular disease (L sciatica):, headaches: migraine headaches,    (-) seizures, TIA and CVA           GI/Hepatic/Renal:   (+) GERD (dysmotility):, bowel prep,      (-) no morbid obesity       Endo/Other:    (+) hypothyroidism, blood dyscrasia: anemia, arthritis: rheumatoid. , no malignancy/cancer. (-) diabetes mellitus, no malignancy/cancer               Abdominal:           Vascular: negative vascular ROS. Anesthesia Plan      TIVA     ASA 3       Induction: intravenous. MIPS: Prophylactic antiemetics administered. Anesthetic plan and risks discussed with patient. Plan discussed with CRNA. This pre-anesthesia assessment may be used as a history and physical.    DOS STAFF ADDENDUM:    Pt seen and examined, chart reviewed (including anesthesia, drug and allergy history). No interval changes to history and physical examination. Anesthetic plan, risks, benefits, alternatives, and personnel involved discussed with patient. Patient verbalized an understanding and agrees to proceed.       Karen Bush MD  June 12, 2019  1:36 PM      Karen Bush MD   6/12/2019

## 2019-06-12 NOTE — OP NOTE
Colonoscopy Note    Patient:   Margoth Rucker    YOB: 1940    Facility:   [address] [place] [address] [Outpatient]  Referring/PCP: Agustina Zamora MD  Procedure:   Colonoscopy with polypectomy (cold snare)  Date:     6/12/2019  Endoscopist:  Monserrat Colón MD    Preoperative Diagnosis: 68yo F with anemia, hemoccult positive stools. She has never had a colonoscopy. Postoperative Diagnosis:  Diverticulosis, polyps    Anesthesia: per anesthesia    Estimated blood loss: < 5 ml    Complications:  None    Description of Procedure:  Informed consent was obtained from the patient after explanation of the procedure including indications, description of the procedure,  benefits and possible risks and complications of the procedure, and alternatives. Questions were answered. The patient's history was reviewed and a directed physical examination was performed prior to the procedure. Patient was monitored throughout the procedure with pulse oximetry and periodic assessment of vital signs. Patient was sedated as noted above. The Nursing staff and I performed a time out. With the patient initially in the left lateral decubitus position, a digital rectal examination was performed and revealed negative without mass, lesions or tenderness, internal hemorrhoids noted. The Olympus video colonoscope was placed in the patient's rectum and advanced without difficulty  to the cecum, which was identified by the ileocecal valve and appendiceal orifice as well the terminal ileum was visualized. Photographs were taken. The prep was adequate. Examination of the mucosa was performed during both introduction and withdrawal of the colonoscope. Retroflexed view of the rectum was performed. Withdrawal time was 6 minutes. Findings:     1.  Diverticula were noted in the ascending, descending and sigmoid  2. 2 5mm sessile polyps were removed with cold snare from the transverse and sigmoid Recommendations:  - await pathology results  - resume outpatient medications as well tube feeds  - follow up in the office in a few weeks    Pepito Cook MD

## 2019-06-14 ENCOUNTER — HOSPITAL ENCOUNTER (OUTPATIENT)
Dept: SPEECH THERAPY | Age: 79
Setting detail: THERAPIES SERIES
Discharge: HOME | End: 2019-06-14
Payer: COMMERCIAL

## 2019-06-14 DIAGNOSIS — R13.10 DYSPHAGIA, UNSPECIFIED TYPE: Primary | ICD-10-CM

## 2019-06-14 PROCEDURE — 92526 ORAL FUNCTION THERAPY: CPT

## 2019-06-14 PROCEDURE — 92610 EVALUATE SWALLOWING FUNCTION: CPT

## 2019-06-14 NOTE — PLAN OF CARE
Outpatient Speech Therapy  Phone: [phone] Fax: [phone]     To: Delroy Cash CNP      Patient: Debra Doran  : 1940  MRN: [id number]  Evaluation Date: 2019      Diagnosis Information:  Visit Diagnoses        Codes     Dysphagia, unspecified type    -  Primary R13.10        Speech Therapy Certification Form    Plan of Care/Treatment to date:  [] Speech-Language Evaluation/Treatment    [x] Dysphagia Evaluation/Treatment        [x] Dysphagia Treatment via Neuromuscular Electrical Stimulation (NMES)   [x] Modified Barium Swallowing Study   [] Fiberoptic Endoscopic Evaluation of Swallowing (FEES)  [] Cognitive-Linguistic Skills Development  [] Voice evaluation and Treatment      [] Evaluation, modification, and Training of Voice Prosthetic     [] Evaluation for Speech-Generating Augmentative and Alternative Communication Device   [] Therapeutic Services for the use of Speech-Generating Device. [] Other:          Frequency/Duration:  # Days per week: [] 1 day # Weeks: [] 1 week [] 5 weeks      [x] 2 days? [] 2 weeks [] 6 weeks     [x] 3 days   [] 3 weeks [] 7 weeks     [] 4 days   [x] 4 weeks [] 8 weeks  Pending MBS and Esophagram results      Rehab Potential: [] Excellent [x] Gaurded [] Fair  [] Poor       Electronically signed by: FRANC Dyer  Phone: [phone]  Fax: [phone]    If you have any questions or concerns, please don't hesitate to call.   Thank you for your referral.      Physician Signature:________________________________Date:__________________  By signing above, therapists plan is approved by physician      Irwin Goldstein, [address] [place] [id number]  Speech-Language Pathologist  Phone #: [phone]  Fax #: [phone]

## 2019-06-14 NOTE — PROGRESS NOTES
[facility]      Speech Language Pathology  Facility/Department: [facility] EG SPEECH THERAPY   CLINICAL BEDSIDE SWALLOW EVALUATION    NAME: Toya Thomas  : 1940  MRN: [id number]    ADMISSION DATE: 2019    Visit Diagnoses       Codes    Dysphagia, unspecified type    -  Primary R13.10          Onset Date: 19    Past Medical History:  has a past medical history of Acid reflux, Acquired hypothyroidism, Anemia, Asthma, CHF (congestive heart failure) ([facility] [address] COPD (chronic obstructive pulmonary disease) ([facility] [address] HLD (hyperlipidemia), Hypertension, Influenza A, MI (myocardial infarction) ([facility] [address] Migraine, Rheumatoid arthritis, and Wears glasses. Past Surgical History:  has a past surgical history that includes Hysterectomy; knee surgery; Foot surgery; Wrist surgery; Cataract removal with implant (Bilateral, 2011); pr njx dx/ther sbst intrlmnr crv/thrc w/img gdn (Left, 2018); epidural steroid injection (Left, 2018); bronchoscopy (N/A, 3/27/2019); bronchoscopy (3/27/2019); Upper gastrointestinal endoscopy (N/A, 3/27/2019); Gastrostomy tube placement (N/A, 2019); and Colonoscopy (N/A, 2019). Recent Chest Xray/CT of Chest: (19): Impression   1. Persistent but improving left lower lobe volume loss/consolidation. 2. COPD. Plan of Care Submitted: (y/n): Faxed    Date of Eval: 2019  Evaluating Therapist: Jerri Pope    Current Diet level:  Current Diet : NPO  Current Liquid Diet : NPO    Primary Complaint:   Patient Complaint: She reports PO sticking in her throat and thin later coming back up from her esophagus.      Pain:   Denied    Reason for Referral  Toya Thomas was referred for a bedside swallow evaluation to assess the efficiency of her swallow function, identify signs and symptoms of aspiration, and make recommendations regarding safe dietary consistencies, effective compensatory strategies, and safe eating environment. Impression  Dysphagia Diagnosis: Suspected needs further assessment  The pt was seen today for a clinical bedside swallowing evaluation. Per chart review the pt has a history of esophageal dysphagia including \"Marked esophageal dysmotility with poor primary peristalsis and visualization of tertiary contractions\"   reported per  3/29/19 Esophagram report. Per chart review that was also reported concern for aspiration pneumonia per MD notes. A PEG tube was reportedly placed. The pt had an MBS on 3/29/19 with only flash penetration after the swallow seen with consecutive straw sips of thins liquids from residuals left from the pyriforms. A regular diet and thin liquids was recommended at that time. Currently there is an MBS and Esophagram scheduled for 6/18/19. Today's assessment revealed significant dysphagia with only x 2 limited sips of thin water given via spoon. Noted immediate throat clearing followed both trials with noted changes in vocal quality. After the 2nd sip the pt demonstrated significant effortful esophageal belching with some retching. No actual emesis occurred but a basin was given to the pt 2/2 concern for emesis. This belching and retching lasted several minutes post-swallow. No further PO trials were given. Laryngeal elevation appeared to be partially reduced but functional. Swallow initiation was grossly timely. At this time the pt is at an elevated risk for aspiration. At this time any dysphagia treatment is on hold pending MBS and Esophagram results. The pt will also need to follow-up with GI. Will continue to follow as indicated.        Treatment Plan  Requires SLP Intervention: Yes(Pending MBS and Esophagram results)  Referral To: GI  Duration/Frequency of Treatment: 2-3 x week for 4 weeks pending results of MBS and Esophagram that is scheduled for 6/18/19  D/C Recommendations: Outpatient    Recommended Diet and Intervention  Solid: Diet Solids Recommendation: NPO  Liquid: Liquid Consistency Recommendation: NPO  Medication:Recommended Form of Meds: Via alternative means of nutrition     Compensatory Swallowing Strategies Attempted  Compensatory Swallowing Strategies: Other (comments)(Good oral care at least 3 x day; Keep head of bed elevated)    Treatment/Goals  Short-term Goals  Goal 1: Further Goals pending MBS and Esophagram results  Long-term Goals  Goal 1: Further Goals pending MBS and Esophagram results    General  Chart Reviewed: Yes  Visit Information  Onset Date: 03/26/19  SLP Insurance Information: [employer]  Subjective: The pt arrived 17 minutes late to the appointment, stating that she had difficulty finding the location. The pt stated that she wants to know why she needs a feeding tube and cannot eat. Later her granddaughter agreed to come into the room during the evaluaiton. She stated that the pt has been confused at times. Respiratory Status: Room air  O2 Device: None (Room air)  Communication Observation: Functional  Follows Directions: Simple  Dentition: Edentulous  Patient Positioning: Upright in chair  Baseline Vocal Quality: Normal  Prior Dysphagia History: Yes; See the impression section of this report for more details. Consistencies Administered:  Thin - teaspoon(x 2 small sips via spoon)    Vision/Hearing  Vision  Vision: Impaired  Vision Exceptions: Wears glasses at all times  Hearing  Hearing: Exceptions to [facility]  Hearing Exceptions: Hard of hearing/hearing concerns    Oral Motor Deficits  Oral/Motor  Oral Motor: Exceptions to [facility]  Labial ROM: Reduced left(Minimally reduced)  Labial Symmetry: Abnormal symmetry left(Minimally reduced on the left)  Lingual ROM: Reduced left(Minimally reduced)    Oral Phase Dysfunction  Oral Phase  Oral Phase: Exceptions  Oral Phase  Oral Phase - Comment: No overt dysphagia with the only PO trials given (x 2 small sips of thin water via spoon)     Indicators of Pharyngeal Phase Dysfunction   Pharyngeal Phase  Pharyngeal Phase: Exceptions  Indicators of Pharyngeal Phase Dysfunction  Throat Clearing - Immediate: Thin - teaspoon  Cough - Delayed: Thin - teaspoon  Pharyngeal Phase   Pharyngeal: Extended significant period of esophageal belching post-swallow    Prognosis  Prognosis  Prognosis for safe diet advancement: guarded  Barriers to reach goals: severity of dysphagia;cognitive deficits  Individuals consulted  Consulted and agree with results and recommendations: Patient; Family member  Family member consulted: pt's granddaughter    Education  Patient Education: Education was given re: results and recommendations. The following exercises were trained and completed 5-10 reps of each: Jutting jaw forward and holidng for count of 5; Pressing tongue to roof of mouth for count of 5;  Falsetto \"EE\"  Patient Education Response: Demonstrated understanding    Therapy Time  SLP Individual Minutes  Time In: 9774  Time Out: 7462  Minutes: Maribell Alva[place]ga [place]  DB#[id number]  Speech-Language Pathologist  Phone: [phone]  Fax: [phone]   6/14/2019 12:59 PM

## 2019-06-17 RX ORDER — RANITIDINE 150 MG/1
TABLET ORAL
Qty: 90 TABLET | Refills: 10 | Status: SHIPPED | OUTPATIENT
Start: 2019-06-17 | End: 2019-09-26 | Stop reason: ALTCHOICE

## 2019-06-18 ENCOUNTER — HOSPITAL ENCOUNTER (OUTPATIENT)
Dept: GENERAL RADIOLOGY | Age: 79
Discharge: HOME | End: 2019-06-18
Payer: COMMERCIAL

## 2019-06-18 DIAGNOSIS — R13.10 DYSPHAGIA, UNSPECIFIED TYPE: ICD-10-CM

## 2019-06-18 PROCEDURE — 74230 X-RAY XM SWLNG FUNCJ C+: CPT

## 2019-06-18 PROCEDURE — 74220 X-RAY XM ESOPHAGUS 1CNTRST: CPT

## 2019-07-23 ENCOUNTER — HOSPITAL ENCOUNTER (OUTPATIENT)
Age: 79
Setting detail: OUTPATIENT SURGERY
Discharge: HOME | End: 2019-07-23
Attending: INTERNAL MEDICINE | Admitting: INTERNAL MEDICINE
Payer: COMMERCIAL

## 2019-07-23 VITALS
TEMPERATURE: 97.6 F | SYSTOLIC BLOOD PRESSURE: 151 MMHG | DIASTOLIC BLOOD PRESSURE: 86 MMHG | HEIGHT: 66 IN | WEIGHT: 132.06 LBS | OXYGEN SATURATION: 96 % | HEART RATE: 93 BPM | RESPIRATION RATE: 19 BRPM | BODY MASS INDEX: 21.22 KG/M2

## 2019-07-23 PROCEDURE — 2709999900 HC NON-CHARGEABLE SUPPLY: Performed by: INTERNAL MEDICINE

## 2019-07-23 PROCEDURE — 3609015500 HC GASTRIC/DUODENAL MOTILITY &/OR MANOMETRY STUDY: Performed by: INTERNAL MEDICINE

## 2019-07-23 PROCEDURE — 6370000000 HC RX 637 (ALT 250 FOR IP): Performed by: INTERNAL MEDICINE

## 2019-07-23 RX ORDER — LIDOCAINE HYDROCHLORIDE 20 MG/ML
JELLY TOPICAL
Status: COMPLETED | OUTPATIENT
Start: 2019-07-23 | End: 2019-07-23

## 2019-07-23 ASSESSMENT — PAIN - FUNCTIONAL ASSESSMENT: PAIN_FUNCTIONAL_ASSESSMENT: 0-10

## 2019-09-13 RX ORDER — PREDNISONE 10 MG/1
TABLET ORAL
Qty: 90 TABLET | Refills: 10 | Status: SHIPPED | OUTPATIENT
Start: 2019-09-13 | End: 2020-10-05

## 2019-09-26 ENCOUNTER — OFFICE VISIT (OUTPATIENT)
Dept: FAMILY MEDICINE CLINIC | Age: 79
End: 2019-09-26
Payer: COMMERCIAL

## 2019-09-26 VITALS
WEIGHT: 147.8 LBS | SYSTOLIC BLOOD PRESSURE: 124 MMHG | DIASTOLIC BLOOD PRESSURE: 72 MMHG | BODY MASS INDEX: 23.75 KG/M2 | HEART RATE: 73 BPM | RESPIRATION RATE: 16 BRPM | OXYGEN SATURATION: 99 % | HEIGHT: 66 IN

## 2019-09-26 DIAGNOSIS — E03.9 ACQUIRED HYPOTHYROIDISM: ICD-10-CM

## 2019-09-26 DIAGNOSIS — M05.79 RHEUMATOID ARTHRITIS INVOLVING MULTIPLE SITES WITH POSITIVE RHEUMATOID FACTOR (HCC): ICD-10-CM

## 2019-09-26 DIAGNOSIS — E78.00 PURE HYPERCHOLESTEROLEMIA: ICD-10-CM

## 2019-09-26 DIAGNOSIS — I10 ESSENTIAL HYPERTENSION: ICD-10-CM

## 2019-09-26 DIAGNOSIS — J43.2 CENTRILOBULAR EMPHYSEMA (HCC): ICD-10-CM

## 2019-09-26 PROCEDURE — 1036F TOBACCO NON-USER: CPT | Performed by: INTERNAL MEDICINE

## 2019-09-26 PROCEDURE — 1123F ACP DISCUSS/DSCN MKR DOCD: CPT | Performed by: INTERNAL MEDICINE

## 2019-09-26 PROCEDURE — 4040F PNEUMOC VAC/ADMIN/RCVD: CPT | Performed by: INTERNAL MEDICINE

## 2019-09-26 PROCEDURE — G8926 SPIRO NO PERF OR DOC: HCPCS | Performed by: INTERNAL MEDICINE

## 2019-09-26 PROCEDURE — G8399 PT W/DXA RESULTS DOCUMENT: HCPCS | Performed by: INTERNAL MEDICINE

## 2019-09-26 PROCEDURE — G8427 DOCREV CUR MEDS BY ELIG CLIN: HCPCS | Performed by: INTERNAL MEDICINE

## 2019-09-26 PROCEDURE — 3023F SPIROM DOC REV: CPT | Performed by: INTERNAL MEDICINE

## 2019-09-26 PROCEDURE — 1090F PRES/ABSN URINE INCON ASSESS: CPT | Performed by: INTERNAL MEDICINE

## 2019-09-26 PROCEDURE — G8420 CALC BMI NORM PARAMETERS: HCPCS | Performed by: INTERNAL MEDICINE

## 2019-09-26 PROCEDURE — 99214 OFFICE O/P EST MOD 30 MIN: CPT | Performed by: INTERNAL MEDICINE

## 2019-09-26 RX ORDER — SUCRALFATE ORAL 1 G/10ML
1 SUSPENSION ORAL 4 TIMES DAILY
COMMUNITY
End: 2020-07-31 | Stop reason: ALTCHOICE

## 2019-09-26 ASSESSMENT — ENCOUNTER SYMPTOMS
BACK PAIN: 1
SHORTNESS OF BREATH: 1
GASTROINTESTINAL NEGATIVE: 1
WHEEZING: 1

## 2019-09-26 NOTE — PATIENT INSTRUCTIONS
ll above problems reviewed and the found to be unchanged except for the following :     Acquired Hypothyroidism. Continue meds and call if new c/o. Essential Hypertension. Continue present meds. Home BP checks. Call if>140/90. Improve diet. Avoid caffeine and salt. Call if new c/o w/ meds. Centrilobular Emphysema (Hcc). Continue meds. Call if new c/o. Swallow test soon. Pure Hypercholesterolemia. Continue to improve diet and meds . Rheumatoid Arthritis Involving Multiple Sites With Positive Rheumatoid Factor (Hcc). To Rheumatology as scheduled. Call if new c/o.

## 2019-09-26 NOTE — PROGRESS NOTES
HOURS AS NEEDED 54 g 10    hydrochlorothiazide (HYDRODIURIL) 25 MG tablet Take 1 tablet by mouth daily  3    losartan (COZAAR) 50 MG tablet Take 1 tablet by mouth daily 30 tablet 3    labetalol (NORMODYNE) 100 MG tablet Take 1 tablet by mouth every 12 hours 60 tablet 3    atorvastatin (LIPITOR) 10 MG tablet TAKE ONE (1) TABLET BY MOUTH DAILY 90 tablet 5    ipratropium-albuterol (DUONEB) 0.5-2.5 (3) MG/3ML SOLN nebulizer solution Inhale 3 mLs into the lungs every 4 hours as needed. 360 mL 3    OXYGEN Inhale 2 L/min into the lungs daily as needed      Blood Pressure Monitoring (BLOOD PRESSURE MONITOR/M CUFF) MISC 1 Units by Does not apply route daily 1 each 0    nitroGLYCERIN (NITROSTAT) 0.4 MG SL tablet Place 0.4 mg under the tongue every 5 minutes as needed. No current facility-administered medications for this visit. Allergies   Allergen Reactions    Alendronate Sodium      Jaw pain      Humira [Adalimumab]      Rash      Penicillins      rash    Simvastatin Other (See Comments)     Made legs ache    Sulfa Antibiotics Swelling     Tongue swelled     Social History     Tobacco Use    Smoking status: Former Smoker     Packs/day: 3.00     Years: 50.00     Pack years: 150.00     Last attempt to quit: 1/22/2009     Years since quitting: 10.6    Smokeless tobacco: Never Used   Substance Use Topics    Alcohol use: No     Family History   Problem Relation Age of Onset    Cancer Mother     Cancer Father     Heart Disease Maternal Aunt     Cancer Sister        Review of Systems   Constitutional: Positive for fatigue. Respiratory: Positive for shortness of breath and wheezing. Cardiovascular: Negative. Gastrointestinal: Negative. Genitourinary: Negative. Musculoskeletal: Positive for arthralgias, back pain, gait problem, joint swelling and myalgias. Skin: Negative. Allergic/Immunologic: Positive for environmental allergies. Neurological: Positive for weakness. Hematological: Negative. Vitals:    09/26/19 0814 09/26/19 0851   BP: 122/60 124/72   Pulse: 73    Resp: 16    SpO2: 99%    Weight: 147 lb 12.8 oz (67 kg)    Height: 5' 6\" (1.676 m)      Objective:   Physical Exam   Constitutional: She is oriented to person, place, and time. She appears well-developed and well-nourished. Non-toxic appearance. She does not have a sickly appearance. She does not appear ill. No distress. HENT:   Head: Normocephalic and atraumatic. Eyes: Pupils are equal, round, and reactive to light. Conjunctivae and EOM are normal.   Neck: Trachea normal, normal range of motion and full passive range of motion without pain. Neck supple. Normal carotid pulses, no hepatojugular reflux and no JVD present. No spinous process tenderness and no muscular tenderness present. Carotid bruit is not present. No tracheal deviation, no edema, no erythema and normal range of motion present. No thyroid mass and no thyromegaly present. Cardiovascular: Normal rate, regular rhythm, S1 normal, S2 normal, normal heart sounds, intact distal pulses and normal pulses. No extrasystoles are present. PMI is not displaced. Exam reveals no gallop, no S3, no S4, no distant heart sounds, no friction rub and no decreased pulses. No murmur heard. Pulses:       Carotid pulses are 2+ on the right side, and 2+ on the left side. Radial pulses are 2+ on the right side, and 2+ on the left side. Femoral pulses are 2+ on the right side, and 2+ on the left side. Popliteal pulses are 2+ on the right side, and 2+ on the left side. Dorsalis pedis pulses are 2+ on the right side, and 2+ on the left side. Posterior tibial pulses are 2+ on the right side, and 2+ on the left side. Pulmonary/Chest: Effort normal and breath sounds normal. No accessory muscle usage. No apnea, no tachypnea and no bradypnea. No respiratory distress. She has no decreased breath sounds. She has no wheezes.  She has no

## 2019-10-17 RX ORDER — ATORVASTATIN CALCIUM 10 MG/1
TABLET, FILM COATED ORAL
Qty: 90 TABLET | Refills: 1 | Status: SHIPPED | OUTPATIENT
Start: 2019-10-17 | End: 2020-07-08

## 2019-10-28 ENCOUNTER — TELEPHONE (OUTPATIENT)
Dept: FAMILY MEDICINE CLINIC | Age: 79
End: 2019-10-28

## 2019-10-28 DIAGNOSIS — E03.9 ACQUIRED HYPOTHYROIDISM: Primary | ICD-10-CM

## 2019-11-06 ENCOUNTER — NURSE ONLY (OUTPATIENT)
Dept: FAMILY MEDICINE CLINIC | Age: 79
End: 2019-11-06
Payer: COMMERCIAL

## 2019-11-06 ENCOUNTER — HOSPITAL ENCOUNTER (OUTPATIENT)
Dept: WOMENS IMAGING | Age: 79
Discharge: HOME | End: 2019-11-06
Payer: COMMERCIAL

## 2019-11-06 DIAGNOSIS — E83.52 HYPERCALCEMIA: ICD-10-CM

## 2019-11-06 DIAGNOSIS — Z12.31 ENCOUNTER FOR SCREENING MAMMOGRAM FOR BREAST CANCER: ICD-10-CM

## 2019-11-06 DIAGNOSIS — E03.9 ACQUIRED HYPOTHYROIDISM: ICD-10-CM

## 2019-11-06 LAB
CALCIUM SERPL-MCNC: 9.9 MG/DL (ref 8.3–10.6)
TSH SERPL DL<=0.05 MIU/L-ACNC: 3.22 UIU/ML (ref 0.27–4.2)

## 2019-11-06 PROCEDURE — 77067 SCR MAMMO BI INCL CAD: CPT

## 2019-11-06 PROCEDURE — 36415 COLL VENOUS BLD VENIPUNCTURE: CPT | Performed by: INTERNAL MEDICINE

## 2019-12-13 ENCOUNTER — HOSPITAL ENCOUNTER (EMERGENCY)
Age: 79
Discharge: HOME | End: 2019-12-13
Attending: EMERGENCY MEDICINE
Payer: COMMERCIAL

## 2019-12-13 VITALS
BODY MASS INDEX: 23.63 KG/M2 | TEMPERATURE: 98 F | OXYGEN SATURATION: 100 % | DIASTOLIC BLOOD PRESSURE: 88 MMHG | SYSTOLIC BLOOD PRESSURE: 179 MMHG | RESPIRATION RATE: 16 BRPM | WEIGHT: 147 LBS | HEIGHT: 66 IN | HEART RATE: 68 BPM

## 2019-12-13 DIAGNOSIS — H65.00 ACUTE SEROUS OTITIS MEDIA, RECURRENCE NOT SPECIFIED, UNSPECIFIED LATERALITY: Primary | ICD-10-CM

## 2019-12-13 LAB
ANION GAP SERPL CALCULATED.3IONS-SCNC: 9 MMOL/L (ref 3–16)
APTT: 28.8 SEC (ref 24.2–36.2)
BASOPHILS ABSOLUTE: 0 K/UL (ref 0–0.2)
BASOPHILS RELATIVE PERCENT: 0.9 %
BUN BLDV-MCNC: 11 MG/DL (ref 7–20)
CALCIUM SERPL-MCNC: 9.7 MG/DL (ref 8.3–10.6)
CHLORIDE BLD-SCNC: 103 MMOL/L (ref 99–110)
CO2: 29 MMOL/L (ref 21–32)
CREAT SERPL-MCNC: 0.9 MG/DL (ref 0.6–1.2)
EOSINOPHILS ABSOLUTE: 0.1 K/UL (ref 0–0.6)
EOSINOPHILS RELATIVE PERCENT: 2.8 %
GFR AFRICAN AMERICAN: >60
GFR NON-AFRICAN AMERICAN: >60
GLUCOSE BLD-MCNC: 89 MG/DL (ref 70–99)
HCT VFR BLD CALC: 36.6 % (ref 36–48)
HEMOGLOBIN: 12.1 G/DL (ref 12–16)
INR BLD: 1 (ref 0.86–1.14)
LYMPHOCYTES ABSOLUTE: 1.8 K/UL (ref 1–5.1)
LYMPHOCYTES RELATIVE PERCENT: 34.5 %
MCH RBC QN AUTO: 29.2 PG (ref 26–34)
MCHC RBC AUTO-ENTMCNC: 33.1 G/DL (ref 31–36)
MCV RBC AUTO: 88.1 FL (ref 80–100)
MONOCYTES ABSOLUTE: 0.6 K/UL (ref 0–1.3)
MONOCYTES RELATIVE PERCENT: 11.4 %
NEUTROPHILS ABSOLUTE: 2.6 K/UL (ref 1.7–7.7)
NEUTROPHILS RELATIVE PERCENT: 50.4 %
PDW BLD-RTO: 14.5 % (ref 12.4–15.4)
PLATELET # BLD: 192 K/UL (ref 135–450)
PMV BLD AUTO: 7.4 FL (ref 5–10.5)
POTASSIUM REFLEX MAGNESIUM: 3.8 MMOL/L (ref 3.5–5.1)
PROTHROMBIN TIME: 11.6 SEC (ref 10–13.2)
RBC # BLD: 4.15 M/UL (ref 4–5.2)
SODIUM BLD-SCNC: 141 MMOL/L (ref 136–145)
WBC # BLD: 5.1 K/UL (ref 4–11)

## 2019-12-13 PROCEDURE — 80048 BASIC METABOLIC PNL TOTAL CA: CPT

## 2019-12-13 PROCEDURE — 85730 THROMBOPLASTIN TIME PARTIAL: CPT

## 2019-12-13 PROCEDURE — 99283 EMERGENCY DEPT VISIT LOW MDM: CPT

## 2019-12-13 PROCEDURE — 85610 PROTHROMBIN TIME: CPT

## 2019-12-13 PROCEDURE — 6370000000 HC RX 637 (ALT 250 FOR IP): Performed by: EMERGENCY MEDICINE

## 2019-12-13 PROCEDURE — 85025 COMPLETE CBC W/AUTO DIFF WBC: CPT

## 2019-12-13 RX ORDER — CEFDINIR 300 MG/1
300 CAPSULE ORAL 3 TIMES DAILY
Qty: 30 CAPSULE | Refills: 0 | Status: SHIPPED | OUTPATIENT
Start: 2019-12-13 | End: 2019-12-23

## 2019-12-13 RX ORDER — CEFDINIR 300 MG/1
600 CAPSULE ORAL ONCE
Status: COMPLETED | OUTPATIENT
Start: 2019-12-13 | End: 2019-12-13

## 2019-12-13 RX ADMIN — CEFDINIR 600 MG: 300 CAPSULE ORAL at 15:05

## 2019-12-13 ASSESSMENT — ENCOUNTER SYMPTOMS
VOMITING: 0
SHORTNESS OF BREATH: 0
RHINORRHEA: 0
FACIAL SWELLING: 0
ABDOMINAL DISTENTION: 0
NAUSEA: 0
PHOTOPHOBIA: 0
BACK PAIN: 0
DIARRHEA: 0
COUGH: 0
WHEEZING: 0
SORE THROAT: 0

## 2019-12-13 ASSESSMENT — PAIN SCALES - GENERAL: PAINLEVEL_OUTOF10: 3

## 2019-12-13 ASSESSMENT — PAIN DESCRIPTION - PAIN TYPE: TYPE: ACUTE PAIN

## 2020-05-11 RX ORDER — ALBUTEROL SULFATE 90 UG/1
AEROSOL, METERED RESPIRATORY (INHALATION)
Qty: 54 G | Refills: 10 | OUTPATIENT
Start: 2020-05-11

## 2020-05-11 RX ORDER — ATORVASTATIN CALCIUM 10 MG/1
TABLET, FILM COATED ORAL
Qty: 90 TABLET | Refills: 10 | OUTPATIENT
Start: 2020-05-11

## 2020-06-01 RX ORDER — ATORVASTATIN CALCIUM 10 MG/1
TABLET, FILM COATED ORAL
Qty: 90 TABLET | Refills: 1 | Status: CANCELLED | OUTPATIENT
Start: 2020-06-01

## 2020-06-08 RX ORDER — ATORVASTATIN CALCIUM 10 MG/1
TABLET, FILM COATED ORAL
Qty: 90 TABLET | Refills: 10 | OUTPATIENT
Start: 2020-06-08

## 2020-06-08 RX ORDER — ALBUTEROL SULFATE 90 UG/1
AEROSOL, METERED RESPIRATORY (INHALATION)
Qty: 54 G | Refills: 10 | OUTPATIENT
Start: 2020-06-08

## 2020-07-08 RX ORDER — ATORVASTATIN CALCIUM 10 MG/1
TABLET, FILM COATED ORAL
Qty: 30 TABLET | Refills: 0 | Status: SHIPPED | OUTPATIENT
Start: 2020-07-08 | End: 2020-08-05

## 2020-07-08 RX ORDER — ALBUTEROL SULFATE 90 UG/1
AEROSOL, METERED RESPIRATORY (INHALATION)
Qty: 54 G | Refills: 10 | Status: SHIPPED | OUTPATIENT
Start: 2020-07-08 | End: 2021-06-24

## 2020-07-08 NOTE — TELEPHONE ENCOUNTER
Review of last visit show I spent more than 30 min w/ patient and explained each of problems discussed. The pt acknowledged understanding and complete summary of visit was given. I'm not really sure what else she wants. I can't spend an hr w/ each pt or other pt get mad because we are so late. She may be better off finding another MD if she doesn't think I explain things to her or don't want to talk to her which is obviously not true.

## 2020-07-08 NOTE — TELEPHONE ENCOUNTER
Refill Request ATORVASTATIN 10 MG TABLET 10 TAB    Last Seen: 9/26/2019    Last Written: 10/17/19 90 tablets with 1 refill    Next Appointment:   Future Appointments   Date Time Provider Bossman Nevarez   7/31/2020 11:40 AM ERICA Hilliard MD [facility] KIKE MORENO             Requested Prescriptions     Pending Prescriptions Disp Refills    atorvastatin (LIPITOR) 10 MG tablet [Pharmacy Med Name: ATORVASTATIN 10 MG TABLET 10 TAB] 30 tablet 0     Sig: TAKE 1 TABLET BY MOUTH EVERY DAY

## 2020-07-31 ENCOUNTER — OFFICE VISIT (OUTPATIENT)
Dept: FAMILY MEDICINE CLINIC | Age: 80
End: 2020-07-31
Payer: COMMERCIAL

## 2020-07-31 ENCOUNTER — TELEPHONE (OUTPATIENT)
Dept: FAMILY MEDICINE CLINIC | Age: 80
End: 2020-07-31

## 2020-07-31 VITALS
SYSTOLIC BLOOD PRESSURE: 142 MMHG | RESPIRATION RATE: 18 BRPM | HEIGHT: 66 IN | WEIGHT: 184.8 LBS | HEART RATE: 76 BPM | OXYGEN SATURATION: 96 % | DIASTOLIC BLOOD PRESSURE: 78 MMHG | BODY MASS INDEX: 29.7 KG/M2

## 2020-07-31 PROCEDURE — 99214 OFFICE O/P EST MOD 30 MIN: CPT | Performed by: INTERNAL MEDICINE

## 2020-07-31 PROCEDURE — G8428 CUR MEDS NOT DOCUMENT: HCPCS | Performed by: INTERNAL MEDICINE

## 2020-07-31 PROCEDURE — G8417 CALC BMI ABV UP PARAM F/U: HCPCS | Performed by: INTERNAL MEDICINE

## 2020-07-31 PROCEDURE — 3023F SPIROM DOC REV: CPT | Performed by: INTERNAL MEDICINE

## 2020-07-31 PROCEDURE — G8926 SPIRO NO PERF OR DOC: HCPCS | Performed by: INTERNAL MEDICINE

## 2020-07-31 PROCEDURE — 1090F PRES/ABSN URINE INCON ASSESS: CPT | Performed by: INTERNAL MEDICINE

## 2020-07-31 PROCEDURE — 1123F ACP DISCUSS/DSCN MKR DOCD: CPT | Performed by: INTERNAL MEDICINE

## 2020-07-31 PROCEDURE — G8399 PT W/DXA RESULTS DOCUMENT: HCPCS | Performed by: INTERNAL MEDICINE

## 2020-07-31 PROCEDURE — 1036F TOBACCO NON-USER: CPT | Performed by: INTERNAL MEDICINE

## 2020-07-31 PROCEDURE — 4040F PNEUMOC VAC/ADMIN/RCVD: CPT | Performed by: INTERNAL MEDICINE

## 2020-07-31 RX ORDER — LOSARTAN POTASSIUM 100 MG/1
100 TABLET ORAL DAILY
Qty: 30 TABLET | Refills: 11 | Status: SHIPPED | OUTPATIENT
Start: 2020-07-31 | End: 2021-05-12

## 2020-07-31 ASSESSMENT — ENCOUNTER SYMPTOMS
BACK PAIN: 1
ALLERGIC/IMMUNOLOGIC NEGATIVE: 1
GASTROINTESTINAL NEGATIVE: 1
RESPIRATORY NEGATIVE: 1

## 2020-07-31 ASSESSMENT — PATIENT HEALTH QUESTIONNAIRE - PHQ9
SUM OF ALL RESPONSES TO PHQ QUESTIONS 1-9: 0
SUM OF ALL RESPONSES TO PHQ QUESTIONS 1-9: 0
1. LITTLE INTEREST OR PLEASURE IN DOING THINGS: 0
2. FEELING DOWN, DEPRESSED OR HOPELESS: 0
SUM OF ALL RESPONSES TO PHQ9 QUESTIONS 1 & 2: 0

## 2020-07-31 NOTE — ASSESSMENT & PLAN NOTE
No change in meds, no c/o with meds, no chest pain, SOB, palpatations, or syncope. Home bp was 120-140s/70-80s.

## 2020-07-31 NOTE — ASSESSMENT & PLAN NOTE
Home O2 at 2 L. stable since Hospitalization  and O2 sat of 95% when off Port able O2 for >10 min. SOB after off O2 about 10-15 min. Now sleeping w/ Bi-pap but only using 2-4 hrs/night. .  Some c/o of URI. Trelegy inhaler not much help. Uses Albuterol on/off.

## 2020-07-31 NOTE — ASSESSMENT & PLAN NOTE
Was very low in 7/2017. Increased dose to 100 mcg. Now c/o of much less fatigue and cold intolerance. Is feeling better w/ less Fatigue.

## 2020-07-31 NOTE — ASSESSMENT & PLAN NOTE
R Shoulder injected recently, R knee swelling and pain some better. No recent meds other than injections and Prednisone 5 mg. Report called to recovery RN.

## 2020-07-31 NOTE — PROGRESS NOTES
(DELTASONE) 10 MG tablet TAKE 1/2 TABLET BY MOUTH EVERY DAY 90 tablet 10    Roflumilast (DALIRESP) 500 MCG tablet Take 500 mcg by mouth daily      levothyroxine (SYNTHROID) 100 MCG tablet TAKE 1 TABLET BY MOUTH ONCE DAILY 90 tablet 10    hydrochlorothiazide (HYDRODIURIL) 25 MG tablet Take 1 tablet by mouth daily  3    labetalol (NORMODYNE) 100 MG tablet Take 1 tablet by mouth every 12 hours 60 tablet 3    ipratropium-albuterol (DUONEB) 0.5-2.5 (3) MG/3ML SOLN nebulizer solution Inhale 3 mLs into the lungs every 4 hours as needed. 360 mL 3    nitroGLYCERIN (NITROSTAT) 0.4 MG SL tablet Place 0.4 mg under the tongue every 5 minutes as needed.  OXYGEN Inhale 2 L/min into the lungs daily as needed      Blood Pressure Monitoring (BLOOD PRESSURE MONITOR/M CUFF) MISC 1 Units by Does not apply route daily 1 each 0     No current facility-administered medications for this visit. Allergies   Allergen Reactions    Alendronate Sodium      Jaw pain      Humira [Adalimumab]      Rash      Penicillins      rash    Simvastatin Other (See Comments)     Made legs ache    Sulfa Antibiotics Swelling     Tongue swelled     Social History     Tobacco Use    Smoking status: Former Smoker     Packs/day: 3.00     Years: 50.00     Pack years: 150.00     Last attempt to quit: 2009     Years since quittin.5    Smokeless tobacco: Never Used   Substance Use Topics    Alcohol use: No     Family History   Problem Relation Age of Onset    Cancer Mother     Cancer Father     Heart Disease Maternal Aunt     Cancer Sister        Review of Systems   Constitutional: Negative. Respiratory: Negative. Cardiovascular: Negative. Gastrointestinal: Negative. Endocrine: Negative. Genitourinary: Negative. Musculoskeletal: Positive for arthralgias, back pain and myalgias. Skin: Negative. Allergic/Immunologic: Negative. Neurological: Negative. Hematological: Negative.     Psychiatric/Behavioral: Continue med. Watch diet and be as active as tolerated. Rheumatoid Arthritis Involving Multiple Sites With Positive Rheumatoid Factor (Hcc). To Rheumatology as scheduled. Call if new c/o.               Eloisa Lee MD

## 2020-07-31 NOTE — ASSESSMENT & PLAN NOTE
Hospitalized 3/2019 had G-tube. Was not eating at all. Lost 20+ lbs. Has since gained back. G-tube out 6/2019. Still on Home O2 but seems to be doing better at present.

## 2020-08-05 RX ORDER — ATORVASTATIN CALCIUM 10 MG/1
TABLET, FILM COATED ORAL
Qty: 30 TABLET | Refills: 10 | Status: SHIPPED | OUTPATIENT
Start: 2020-08-05 | End: 2021-06-24

## 2020-08-06 RX ORDER — RANITIDINE 150 MG/1
TABLET ORAL
Qty: 90 TABLET | Refills: 1 | Status: SHIPPED | OUTPATIENT
Start: 2020-08-06 | End: 2020-09-10 | Stop reason: ALTCHOICE

## 2020-08-13 ENCOUNTER — TELEPHONE (OUTPATIENT)
Dept: FAMILY MEDICINE CLINIC | Age: 80
End: 2020-08-13

## 2020-08-13 NOTE — TELEPHONE ENCOUNTER
Brittanie Ortega with [address] [phone] calling to say that the medication for Ranitidine is on back order indefinitely and need Dr Shama Phillips to prescribe a substitution for it.

## 2020-08-14 RX ORDER — FAMOTIDINE 20 MG/1
20 TABLET, FILM COATED ORAL 2 TIMES DAILY
Qty: 60 TABLET | Refills: 3 | Status: SHIPPED | OUTPATIENT
Start: 2020-08-14 | End: 2021-03-01

## 2020-08-31 ENCOUNTER — TELEPHONE (OUTPATIENT)
Dept: FAMILY MEDICINE CLINIC | Age: 80
End: 2020-08-31

## 2020-08-31 NOTE — TELEPHONE ENCOUNTER
Patient spoke to her insurance company regarding getting a wheel chair. She was advised to call PCP and request a prescription and they will cover it. Please send prescription to [address]. Please let patient know when prescription has been sent.

## 2020-09-03 RX ORDER — WHEELCHAIR
1 EACH MISCELLANEOUS DAILY PRN
Qty: 1 EACH | Refills: 0 | Status: SHIPPED | OUTPATIENT
Start: 2020-09-03 | End: 2022-01-03 | Stop reason: SDUPTHER

## 2020-09-06 ENCOUNTER — APPOINTMENT (OUTPATIENT)
Dept: GENERAL RADIOLOGY | Age: 80
End: 2020-09-06
Payer: COMMERCIAL

## 2020-09-06 ENCOUNTER — APPOINTMENT (OUTPATIENT)
Dept: CT IMAGING | Age: 80
End: 2020-09-06
Payer: COMMERCIAL

## 2020-09-06 ENCOUNTER — HOSPITAL ENCOUNTER (EMERGENCY)
Age: 80
Discharge: HOME | End: 2020-09-06
Attending: EMERGENCY MEDICINE
Payer: COMMERCIAL

## 2020-09-06 VITALS
HEART RATE: 78 BPM | DIASTOLIC BLOOD PRESSURE: 80 MMHG | RESPIRATION RATE: 18 BRPM | SYSTOLIC BLOOD PRESSURE: 123 MMHG | BODY MASS INDEX: 29.7 KG/M2 | TEMPERATURE: 98.6 F | WEIGHT: 184 LBS | OXYGEN SATURATION: 98 %

## 2020-09-06 LAB
A/G RATIO: 1.8 (ref 1.1–2.2)
ALBUMIN SERPL-MCNC: 4.4 G/DL (ref 3.4–5)
ALP BLD-CCNC: 30 U/L (ref 40–129)
ALT SERPL-CCNC: 17 U/L (ref 10–40)
ANION GAP SERPL CALCULATED.3IONS-SCNC: 13 MMOL/L (ref 3–16)
AST SERPL-CCNC: 22 U/L (ref 15–37)
BASOPHILS ABSOLUTE: 0.1 K/UL (ref 0–0.2)
BASOPHILS RELATIVE PERCENT: 0.9 %
BILIRUB SERPL-MCNC: 1 MG/DL (ref 0–1)
BUN BLDV-MCNC: 15 MG/DL (ref 7–20)
CALCIUM SERPL-MCNC: 9.8 MG/DL (ref 8.3–10.6)
CHLORIDE BLD-SCNC: 102 MMOL/L (ref 99–110)
CO2: 26 MMOL/L (ref 21–32)
CREAT SERPL-MCNC: 1.1 MG/DL (ref 0.6–1.2)
EOSINOPHILS ABSOLUTE: 0.2 K/UL (ref 0–0.6)
EOSINOPHILS RELATIVE PERCENT: 2.6 %
GFR AFRICAN AMERICAN: 58
GFR NON-AFRICAN AMERICAN: 48
GLOBULIN: 2.4 G/DL
GLUCOSE BLD-MCNC: 96 MG/DL (ref 70–99)
HCT VFR BLD CALC: 37.2 % (ref 36–48)
HEMOGLOBIN: 12.2 G/DL (ref 12–16)
LYMPHOCYTES ABSOLUTE: 2.1 K/UL (ref 1–5.1)
LYMPHOCYTES RELATIVE PERCENT: 32.4 %
MCH RBC QN AUTO: 29.4 PG (ref 26–34)
MCHC RBC AUTO-ENTMCNC: 32.9 G/DL (ref 31–36)
MCV RBC AUTO: 89.5 FL (ref 80–100)
MONOCYTES ABSOLUTE: 0.8 K/UL (ref 0–1.3)
MONOCYTES RELATIVE PERCENT: 12 %
NEUTROPHILS ABSOLUTE: 3.4 K/UL (ref 1.7–7.7)
NEUTROPHILS RELATIVE PERCENT: 52.1 %
PDW BLD-RTO: 14 % (ref 12.4–15.4)
PLATELET # BLD: 201 K/UL (ref 135–450)
PMV BLD AUTO: 7.4 FL (ref 5–10.5)
POTASSIUM REFLEX MAGNESIUM: 4.1 MMOL/L (ref 3.5–5.1)
RBC # BLD: 4.15 M/UL (ref 4–5.2)
SODIUM BLD-SCNC: 141 MMOL/L (ref 136–145)
TOTAL PROTEIN: 6.8 G/DL (ref 6.4–8.2)
WBC # BLD: 6.6 K/UL (ref 4–11)

## 2020-09-06 PROCEDURE — 96374 THER/PROPH/DIAG INJ IV PUSH: CPT

## 2020-09-06 PROCEDURE — 2709999900 HC NON-CHARGEABLE SUPPLY: Performed by: INTERNAL MEDICINE

## 2020-09-06 PROCEDURE — 70491 CT SOFT TISSUE NECK W/DYE: CPT

## 2020-09-06 PROCEDURE — 6360000004 HC RX CONTRAST MEDICATION: Performed by: NURSE PRACTITIONER

## 2020-09-06 PROCEDURE — 85025 COMPLETE CBC W/AUTO DIFF WBC: CPT

## 2020-09-06 PROCEDURE — 71045 X-RAY EXAM CHEST 1 VIEW: CPT

## 2020-09-06 PROCEDURE — 99284 EMERGENCY DEPT VISIT MOD MDM: CPT

## 2020-09-06 PROCEDURE — 2580000003 HC RX 258: Performed by: NURSE PRACTITIONER

## 2020-09-06 PROCEDURE — 80053 COMPREHEN METABOLIC PANEL: CPT

## 2020-09-06 PROCEDURE — 6360000002 HC RX W HCPCS: Performed by: INTERNAL MEDICINE

## 2020-09-06 PROCEDURE — 71250 CT THORAX DX C-: CPT

## 2020-09-06 PROCEDURE — 2500000003 HC RX 250 WO HCPCS: Performed by: NURSE PRACTITIONER

## 2020-09-06 PROCEDURE — 70360 X-RAY EXAM OF NECK: CPT

## 2020-09-06 PROCEDURE — 3609017100 HC EGD: Performed by: INTERNAL MEDICINE

## 2020-09-06 PROCEDURE — 99152 MOD SED SAME PHYS/QHP 5/>YRS: CPT | Performed by: INTERNAL MEDICINE

## 2020-09-06 RX ORDER — MIDAZOLAM HYDROCHLORIDE 5 MG/ML
INJECTION INTRAMUSCULAR; INTRAVENOUS PRN
Status: DISCONTINUED | OUTPATIENT
Start: 2020-09-06 | End: 2020-09-06 | Stop reason: ALTCHOICE

## 2020-09-06 RX ORDER — FENTANYL CITRATE 50 UG/ML
INJECTION, SOLUTION INTRAMUSCULAR; INTRAVENOUS PRN
Status: DISCONTINUED | OUTPATIENT
Start: 2020-09-06 | End: 2020-09-06 | Stop reason: ALTCHOICE

## 2020-09-06 RX ORDER — 0.9 % SODIUM CHLORIDE 0.9 %
1000 INTRAVENOUS SOLUTION INTRAVENOUS ONCE
Status: COMPLETED | OUTPATIENT
Start: 2020-09-06 | End: 2020-09-06

## 2020-09-06 RX ADMIN — IOPAMIDOL 75 ML: 755 INJECTION, SOLUTION INTRAVENOUS at 17:53

## 2020-09-06 RX ADMIN — GLUCAGON HYDROCHLORIDE 1 MG: KIT at 16:32

## 2020-09-06 RX ADMIN — SODIUM CHLORIDE 1000 ML: 9 INJECTION, SOLUTION INTRAVENOUS at 17:28

## 2020-09-06 NOTE — ED NOTES

## 2020-09-06 NOTE — ED NOTES
[facility]  Per Lucero GARCIA-CNP  RE  possible esophageal food bolus     @[id number]       Izzy Archer  09/06/20 0087

## 2020-09-06 NOTE — ED PROVIDER NOTES
Evaluated by Advanced Practice Provider    [facility]  ED    CHIEF COMPLAINT  Foreign Body (feels like chicken stuck in throat)    HISTORY OF PRESENT ILLNESS  Hossein Fortune is a 68 y.o. female who presents to the ED complaining of feeling like there is something stuck in her throat. Was eating chicken nuggets and started coughing. Now feels like there is something stuck in the left side of her throat, this is causing difficulty breathing and trouble swallowing. She has not tried to eat or drink anything since this happened. She has a history of COPD and SOB but reports this SOB is different. She also has a history of esophageal strictures and stretching in the past, follows with Dr. Radha Ng. The patient is currently rating their pain as 0/10. Treatments tried prior to arrival in the ED include: none. The patient denies other complaints, modifying factors or associated symptoms. The patient arrived to the ED via private car.     PAST MEDICAL HISTORY    Past Medical History:   Diagnosis Date    Acid reflux     Acquired hypothyroidism 7/6/2017    Anemia     Asthma     CHF (congestive heart failure) (HCC)     COPD (chronic obstructive pulmonary disease) ([address]     HLD (hyperlipidemia)     Hypertension     Influenza A 01/22/2018    MI (myocardial infarction) ([facility] [address] [facility]     X 2    Migraine     past hx    Rheumatoid arthritis     Wears glasses        SURGICAL HISTORY    Past Surgical History:   Procedure Laterality Date    BRONCHOSCOPY N/A 3/27/2019    BRONCHOSCOPY ALVEOLAR LAVAGE performed by Gabbie King MD [facility] [address] [facility]  3/27/2019    BRONCHOSCOPY THERAPUTIC ASPIRATION INITIAL performed by Gabbie King MD at [address] [facility] Bilateral 03/23/2011    COLONOSCOPY N/A 6/12/2019    COLONOSCOPY WITH ANESTHESIA -SLEEP APNEA- performed by Donya Bates MD at [facility] 12/4/2018    LEFT LUMBAR FOUR, LUMBAR FIVE TRANSFORAMINAL EPIDURAL STEROID INJECTION SITE CONFIRMED BY FLUOROSCOPY performed by Kem Balderrama MD at [facility] N/A 7/23/2019    ESOPHAGEAL MOTILITY/MANOMETRY STUDY performed by Oneida Cosme MD at [address] little toe    GASTROSTOMY TUBE PLACEMENT N/A 4/1/2019    EGD PEG TUBE PLACEMENT performed by Umu Sprague MD at [address]      total, fibroids    KNEE SURGERY      right    [place] [address] DX/THER SBST INTRLMNR CRV/THRC W/IMG GDN Left 8/21/2018    LEFT LUMBAR FOUR/ LUMBAR FIVE CYST ASPIRATION SITE CONFIRMED BY FLUOROSCOPY performed by Kem Balderrama MD at [address] [place] N/A 3/27/2019    EGD DIAGNOSTIC ONLY performed by Liam Lopez MD at [facility]    Current Outpatient Rx   Medication Sig Dispense Refill   Geno 1960.  Devices [facility]) MISC 1 each by Does not apply route daily as needed (weakness) 1 each 0    famotidine (PEPCID) 20 MG tablet Take 1 tablet by mouth 2 times daily 60 tablet 3    raNITIdine (ZANTAC) 150 MG tablet TAKE 1 TABLET BY MOUTH NIGHTLY 90 tablet 1    atorvastatin (LIPITOR) 10 MG tablet TAKE 1 TABLET BY MOUTH ONCE DAILY *EMERGENCY REFILL* 30 tablet 10    losartan (COZAAR) 100 MG tablet Take 1 tablet by mouth daily 30 tablet 11    albuterol sulfate  (90 Base) MCG/ACT inhaler INHALE TWO (2) PUFFS BY MOUTH EVERY 6 HOURS AS NEEDED 54 g 10    predniSONE (DELTASONE) 10 MG tablet TAKE 1/2 TABLET BY MOUTH EVERY DAY 90 tablet 10    OXYGEN Inhale 2 L/min into the lungs daily as needed      Roflumilast (DALIRESP) 500 MCG tablet Take 500 mcg by mouth daily      levothyroxine (SYNTHROID) 100 MCG tablet TAKE 1 TABLET BY MOUTH ONCE DAILY 90 tablet 10    hydrochlorothiazide (HYDRODIURIL) 25 MG tablet Take 1 tablet by mouth daily  3    labetalol (NORMODYNE) 100 MG tablet Take 1 tablet by mouth every 12 hours 60 tablet 3    Blood Pressure Monitoring (BLOOD PRESSURE MONITOR/M CUFF) MISC 1 Units by Does not apply route daily 1 each 0    ipratropium-albuterol (DUONEB) 0.5-2.5 (3) MG/3ML SOLN nebulizer solution Inhale 3 mLs into the lungs every 4 hours as needed. 360 mL 3    nitroGLYCERIN (NITROSTAT) 0.4 MG SL tablet Place 0.4 mg under the tongue every 5 minutes as needed.            ALLERGIES    Allergies   Allergen Reactions    Alendronate Sodium      Jaw pain      Humira [Adalimumab]      Rash      Penicillins      rash    Simvastatin Other (See Comments)     Made legs ache    Sulfa Antibiotics Swelling     Tongue swelled       FAMILY HISTORY    Family History   Problem Relation Age of Onset    Cancer Mother     Cancer Father     Heart Disease Maternal Aunt     Cancer Sister        SOCIAL HISTORY    Social History     Socioeconomic History    Marital status: Single     Spouse name: Not on file    Number of children: 3    Years of education: Not on file    Highest education level: Not on file   Occupational History    Occupation: disability   Social Needs    Financial resource strain: Not on file    Food insecurity     Worry: Not on file     Inability: Not on file   [employer] needs     Medical: Not on file     Non-medical: Not on file   Tobacco Use    Smoking status: Former Smoker     Packs/day: 3.00     Years: 50.00     Pack years: 150.00     Last attempt to quit: 2009     Years since quittin.6    Smokeless tobacco: Never Used   Substance and Sexual Activity    Alcohol use: No    Drug use: No    Sexual activity: Not on file   Lifestyle    Physical activity     Days per week: Not on file     Minutes per session: Not on file    Stress: Not on file   Relationships    Social connections     Talks on phone: Not on file     Gets together: Not on file     Attends Yazidism service: Not on file     Active member of club or organization: Not on file     Attends meetings of clubs or organizations: Not on file     Relationship status: Not on file    Intimate partner violence     Fear of current or ex partner: Not on file     Emotionally abused: Not on file     Physically abused: Not on file     Forced sexual activity: Not on file   Other Topics Concern    Not on file   Social History Narrative    Not on file       REVIEW OF SYSTEMS    10 systems reviewed, pertinent positives per HPI otherwise noted to be negative    PHYSICAL EXAM  Vitals:    09/06/20 1725   BP: (!) 120/57   Pulse: 70   Resp: 21   Temp:    SpO2: 100%       GENERAL: Patient is well-developed, well-nourished. Awake and alert. Cooperative. Resting in bed. Mildly distressed due to her cough and SOB. Not toxic in appearance. HEENT:  Normocephalic, atraumatic. Conjunctiva appear normal. Sclera is non-icteric. External ears are normal.    NECK: Supple with normal ROM. Trachea midline. LUNGS: Equal and symmetric chest rise. Breathing is unlabored. Speaking comfortably in full sentences. Lungs are clear bilaterally to auscultation. Without wheezing, rales, or rhonchi. CADIOVASCULAR:  Regular rate and rhythm. Normal S1-S2 sounds. No murmurs, rubs, or gallops. Capillary refill is brisk in all 4 extremities. Bilateral lower extremities are equal in size, there is no swelling observed. There is no tenderness to palpation. There is no erythema observed or warmth palpated. GI: Soft, nontender, nondistended with positive bowel sounds. No rebound tenderness, guarding or any peritoneal signs. No masses or hepatosplenomegaly    MUSCULOSKELETAL:  No gross deformities or trauma noted. Moving all extremities equally and appropriately. Normal ROM. SKIN: Warm/dry. Skin is intact. No rashes/lesions noted. PSYCHIATRIC: Mood and affect appropriate. Speech is clear and articulate. NEUROLOGIC: Alert and oriented. No focal motor or sensory deficits.     LABS  I have reviewed all labs for this visit.    Results for orders placed or performed during the hospital encounter of 09/06/20   CBC Auto Differential   Result Value Ref Range    WBC 6.6 4.0 - 11.0 K/uL    RBC 4.15 4.00 - 5.20 M/uL    Hemoglobin 12.2 12.0 - 16.0 g/dL    Hematocrit 37.2 36.0 - 48.0 %    MCV 89.5 80.0 - 100.0 fL    MCH 29.4 26.0 - 34.0 pg    MCHC 32.9 31.0 - 36.0 g/dL    RDW 14.0 12.4 - 15.4 %    Platelets 333 711 - 687 K/uL    MPV 7.4 5.0 - 10.5 fL    Neutrophils % 52.1 %    Lymphocytes % 32.4 %    Monocytes % 12.0 %    Eosinophils % 2.6 %    Basophils % 0.9 %    Neutrophils Absolute 3.4 1.7 - 7.7 K/uL    Lymphocytes Absolute 2.1 1.0 - 5.1 K/uL    Monocytes Absolute 0.8 0.0 - 1.3 K/uL    Eosinophils Absolute 0.2 0.0 - 0.6 K/uL    Basophils Absolute 0.1 0.0 - 0.2 K/uL   Comprehensive Metabolic Panel w/ Reflex to MG   Result Value Ref Range    Sodium 141 136 - 145 mmol/L    Potassium reflex Magnesium 4.1 3.5 - 5.1 mmol/L    Chloride 102 99 - 110 mmol/L    CO2 26 21 - 32 mmol/L    Anion Gap 13 3 - 16    Glucose 96 70 - 99 mg/dL    BUN 15 7 - 20 mg/dL    CREATININE 1.1 0.6 - 1.2 mg/dL    GFR Non- 48 (A) >60    GFR  58 (A) >60    Calcium 9.8 8.3 - 10.6 mg/dL    Total Protein 6.8 6.4 - 8.2 g/dL    Alb 4.4 3.4 - 5.0 g/dL    Albumin/Globulin Ratio 1.8 1.1 - 2.2    Total Bilirubin 1.0 0.0 - 1.0 mg/dL    Alkaline Phosphatase 30 (L) 40 - 129 U/L    ALT 17 10 - 40 U/L    AST 22 15 - 37 U/L    Globulin 2.4 g/dL       RADIOLOGY    Xr Neck Soft Tissue    Result Date: 9/6/2020  EXAMINATION: TWO XRAY VIEWS OF THE NECK SOFT TISSUES 9/6/2020 4:35 pm COMPARISON: 03/26/2019 HISTORY: ORDERING SYSTEM PROVIDED HISTORY: feels like something is in her throat, cuting off her breathing TECHNOLOGIST PROVIDED HISTORY: Reason for exam:->feels like something is in her throat, cuting off her breathing Reason for Exam: feels like something is in her throat, cutting off her breathing Acuity: Acute Type of Exam: Initial focal consolidation is seen within the visualized lung apices. No superior mediastinal lymphadenopathy or mass. The visualized portion of the trachea appears unremarkable. Centrilobular paraseptal emphysema. Calcified subcarinal lymph node. BONES:  No aggressive appearing lytic or blastic bony lesion. Possible GE reflux or distal esophageal obstruction. Clinical correlation required. CT of the chest with IV and oral contrast may be helpful if clinically warranted. Otherwise no acute abnormality of the soft tissue structures of the neck. Xr Chest Portable    Result Date: 9/6/2020  EXAMINATION: ONE XRAY VIEW OF THE CHEST 9/6/2020 4:35 pm COMPARISON: Prior studies including most recent chest x-ray 04/12/2019, CT 03/26/2019 HISTORY: ORDERING SYSTEM PROVIDED HISTORY: trouble breathing TECHNOLOGIST PROVIDED HISTORY: Reason for exam:->trouble breathing Reason for Exam: trouble breathing Acuity: Acute Type of Exam: Initial History of myocardial infarction, asthma, obstructive lung disease, rheumatoid arthritis, hypertension, hyperlipidemia, congestive heart failure, acid reflux FINDINGS: No acute bony abnormality is identified. The heart size and mediastinal contours are stable. There is hyperlucency and attenuation of pulmonary vasculature in the upper lungs compatible with moderate to severe emphysema, also seen on CT. Mild bibasilar airspace disease is present. No effusion or overt edema. Mild bibasilar airspace disease, atelectasis and/or pneumonia. ED COURSE/MDM  Patient seen and evaluated. Old records reviewed. Diagnostic testing reviewed and results discussed. I have seen and evaluated this patient with supervising physician: Nino Mckenzie MD. We thoroughly discussed the history, physical exam, diagnostic testing, emergency department course, plan and disposition. Charito Viera presented to the ED today with above noted complaints.  Physical exam does not reveal adventitious breath sounds. She is currently afebrile and hemodynamically stable and well saturated on her usual home oxygen. Patient is coughing, she is managing her own secretions. She was given a dose of glucagon as well as soda, she has been able to take some sips of the soda and is not vomiting or spitting it back up. I am concerned that she could have either a food bolus in her esophagus or possible foreign body in her airway so I did obtain a chest x-ray and an x-ray of the soft tissue of her neck. Chest x-ray showed mild bibasilar airspace disease, atelectasis versus pneumonia. I feel this is most likely atelectasis. X-ray of the soft tissue of the neck showed increased soft tissue density in the region of the larynx. A CT of the neck or direct visualization would be helpful. CT of the soft tissue of the neck was obtained and shows possible GE reflux or distal esophageal obstruction. Clinical correlation is required. I did consult GI and spoke with Dr. Uvaldo Baltazar, he was advised of the above and stated that they will be endoscoped the patient. Patient currently has IV fluids running. Blood work is without evidence of systemic infection. No anemia. There is no electrolyte abnormality. No evidence of acute kidney injury or transaminitis. Pt was given the following medications or treatments in the ED:   Medications   glucagon (rDNA) injection 1 mg (1 mg Intravenous Given 9/6/20 1632)   0.9 % sodium chloride bolus (0 mLs Intravenous Stopped 9/6/20 2250)   iopamidol (ISOVUE-370) 76 % injection 75 mL (75 mLs Intravenous Given 9/6/20 8353)     Dr. Uvaldo Baltazar to ED for bedside scope. This did not find any food bolus. He did visualize the larynx and advised there was no foreign body. He was concerned about the patient's cough and sonorous breath sounds however this is apparently patient's normal. He did raise concern for possible foreign body in the lung.  We did get a CT of the chest which showed no radiopaque foreign body, emphysema, pulmonary hypertension. Upon further reevaluation the patient reported she was feeling much better, the sensation of something being in her throat had resolved and her shortness of breath had also resolved. At this point I do feel discharge is reasonable and she was given strict ED return precautions and advised to follow-up with primary care provider. At this point I do not feel the patient requires further work up and it is reasonable to discharge the patient. Please refer to AVS for further details regarding discharge instructions. A discussion was had with the patient regarding diagnosis, diagnostic testing results, treatment/ plan of care, and follow up. All questions were answered. Patient will follow up as directed for further evaluation/treatment. The patient was given strict return precautions as we discussed symptoms that would necessitate return to the ED. Patient will return to ED for new/worsening symptoms. The patient verbalized their understanding and agreement with the above plan. I estimate there is LOW risk for ACUTE CORONARY SYNDROME, INTRACRANIAL HEMORRHAGE, MALIGNANT DYSRHYTHMIA or HYPERTENSION, PULMONARY EMBOLISM, SEPSIS, SUBARACHNOID HEMORRHAGE, SUBDURAL HEMATOMA, STROKE, or THORACIC AORTIC DISSECTION, thus I consider the discharge disposition reasonable. Aidee Gao and I have discussed the diagnosis and risks, and we agree with discharging home to follow-up with their primary doctor. We also discussed returning to the Emergency Department immediately if new or worsening symptoms occur. We have discussed the symptoms which are most concerning (e.g., bloody sputum, fever, worsening pain or shortness of breath, vomiting, weakness) that necessitate immediate return. Patient was sent home with a prescription for below medication/s. I did [place] patient on appropriate use of these medication.   Discharge Medication List as of 9/6/2020 10:10 PM CLINICAL IMPRESSION    1. Globus sensation    2. Shortness of breath           Discharge Vitals:  Blood pressure 123/80, pulse 78, temperature 98.6 °F (37 °C), temperature source Oral, resp. rate 18, weight 184 lb (83.5 kg), SpO2 98 %, not currently breastfeeding. [facility], [place]  [address]  [phone]    Call in 1 day      [address] [place] [address]  [phone]      As needed      DISPOSITION  Patient was discharged to home in good condition. Comment: Please note this report has been produced using speech recognition software and may contain errors related to that system including errors in grammar, punctuation, and spelling, as well as words and phrases that may be inappropriate. If there are any questions or concerns please feel free to contact the dictating provider for clarification.        JOSE Sellers - CNP  09/07/20 7485

## 2020-09-06 NOTE — PROGRESS NOTES
Pt tolerated EGD fair with moderate amt of agitation and gagging. Oral suction and oxygen maintained throughout. No food impaction was seen. Report to RN.

## 2020-09-06 NOTE — H&P
Gastroenterology Preop Assessment    Patient:   Aidee Gao   :    1940   Facility:   [facility]  Referring/PCP: Yamileth Townsend MD  Date:     2020    Subjective:   Procedure:egd    HPI/Reason for procedure:  69 yo female with possible food impaction. Has history of esophageal dysmotility on barium swallow. States was eating chicken nuggets and then felt it take her breath away. Chest xray and soft tissue neck obtained. Saturating well.     Past Medical History:   Diagnosis Date    Acid reflux     Acquired hypothyroidism 2017    Anemia     Asthma     CHF (congestive heart failure) (HCC)     [facility] ([facility]) [facility] [address]     HLD (hyperlipidemia)     Hypertension     Influenza A 2018    MI (myocardial infarction) ([facility] [address]     X 2    Migraine     past hx    Rheumatoid arthritis     Wears glasses      Past Surgical History:   Procedure Laterality Date    BRONCHOSCOPY N/A 3/27/2019    BRONCHOSCOPY ALVEOLAR LAVAGE performed by Ajay Severino MD at [facility]  3/27/2019    BRONCHOSCOPY THERAPUTIC ASPIRATION INITIAL performed by Ajay Severino MD at [address] [facility] Bilateral 2011    COLONOSCOPY N/A 2019    COLONOSCOPY WITH ANESTHESIA -SLEEP APNEA- performed by Adriana Mcneil MD at [facility] Left 2018    LEFT LUMBAR FOUR, LUMBAR FIVE TRANSFORAMINAL EPIDURAL STEROID INJECTION SITE CONFIRMED BY FLUOROSCOPY performed by Evita Duran MD at [facility] N/A 2019    ESOPHAGEAL MOTILITY/MANOMETRY STUDY performed by Liss Cazares MD at [address] little toe    GASTROSTOMY TUBE PLACEMENT N/A 2019    EGD PEG TUBE PLACEMENT performed by Adriana Mcneil MD at [address]      total, fibroids    KNEE SURGERY      right    [place] [address] DX/THER SBST INTRLMNR CRV/THRC W/IMG GDN Left 2018    LEFT LUMBAR FOUR/ LUMBAR FIVE CYST ASPIRATION SITE CONFIRMED BY FLUOROSCOPY performed by Mario Cardenas MD at [facility] ENDOSCOPY N/A 3/27/2019    EGD DIAGNOSTIC ONLY performed by L[facility] Canales [facility] [address] [facility]       Social:   Social History     Tobacco Use    Smoking status: Former Smoker     Packs/day: 3.00     Years: 50.00     Pack years: 150.00     Last attempt to quit: 2009     Years since quittin.6    Smokeless tobacco: Never Used   Substance Use Topics    Alcohol use: No     Family:   Family History   Problem Relation Age of Onset    Cancer Mother     Cancer Father     Heart Disease Maternal Aunt     Cancer Sister        Scheduled Medications:     Current Medications:    Prior to Admission medications    Medication Sig Start Date End Date Taking? Authorizing Provider   Misc.  Devices [facility]) MISC 1 each by Does not apply route daily as needed (weakness) 9/3/20   C Alma Rush MD   famotidine (PEPCID) 20 MG tablet Take 1 tablet by mouth 2 times daily 20   C Alma Rush MD   raNITIdine (ZANTAC) 150 MG tablet TAKE 1 TABLET BY MOUTH NIGHTLY 20   C Alma Rush MD   atorvastatin (LIPITOR) 10 MG tablet TAKE 1 TABLET BY MOUTH ONCE DAILY *EMERGENCY REFILL* 20   C Alma Rush MD   losartan (COZAAR) 100 MG tablet Take 1 tablet by mouth daily 20   Eloisa Lee MD   albuterol sulfate  (90 Base) MCG/ACT inhaler INHALE TWO (2) PUFFS BY MOUTH EVERY 6 HOURS AS NEEDED 20   C Alma Rush MD   predniSONE (DELTASONE) 10 MG tablet TAKE 1/2 TABLET BY MOUTH EVERY DAY 19   C Alma Rush MD   OXYGEN Inhale 2 L/min into the lungs daily as needed    Historical Provider, MD   Roflumilast (DALIRESP) 500 MCG tablet Take 500 mcg by mouth daily    Historical Provider, MD   levothyroxine (SYNTHROID) 100 MCG tablet TAKE 1 TABLET BY MOUTH ONCE DAILY 3/25/19   C Carine Garcia MD   hydrochlorothiazide (HYDRODIURIL) 25 MG tablet Take 1 tablet by mouth daily 1/25/19   Historical Provider, MD   labetalol (NORMODYNE) 100 MG tablet Take 1 tablet by mouth every 12 hours 1/17/19   Yu Farris MD   Blood Pressure Monitoring (BLOOD PRESSURE MONITOR/M CUFF) MISC 1 Units by Does not apply route daily 8/6/18   C Carine Garcia MD   ipratropium-albuterol (DUONEB) 0.5-2.5 (3) MG/3ML SOLN nebulizer solution Inhale 3 mLs into the lungs every 4 hours as needed. 2/10/15   Namrata Puckett MD   nitroGLYCERIN (NITROSTAT) 0.4 MG SL tablet Place 0.4 mg under the tongue every 5 minutes as needed. Historical Provider, MD       No current facility-administered medications for this encounter. Current Outpatient Medications:     Misc.  Devices [facility]) MISC, 1 each by Does not apply route daily as needed (weakness), Disp: 1 each, Rfl: 0    famotidine (PEPCID) 20 MG tablet, Take 1 tablet by mouth 2 times daily, Disp: 60 tablet, Rfl: 3    raNITIdine (ZANTAC) 150 MG tablet, TAKE 1 TABLET BY MOUTH NIGHTLY, Disp: 90 tablet, Rfl: 1    atorvastatin (LIPITOR) 10 MG tablet, TAKE 1 TABLET BY MOUTH ONCE DAILY *EMERGENCY REFILL*, Disp: 30 tablet, Rfl: 10    losartan (COZAAR) 100 MG tablet, Take 1 tablet by mouth daily, Disp: 30 tablet, Rfl: 11    albuterol sulfate  (90 Base) MCG/ACT inhaler, INHALE TWO (2) PUFFS BY MOUTH EVERY 6 HOURS AS NEEDED, Disp: 54 g, Rfl: 10    predniSONE (DELTASONE) 10 MG tablet, TAKE 1/2 TABLET BY MOUTH EVERY DAY, Disp: 90 tablet, Rfl: 10    OXYGEN, Inhale 2 L/min into the lungs daily as needed, Disp: , Rfl:     Roflumilast (DALIRESP) 500 MCG tablet, Take 500 mcg by mouth daily, Disp: , Rfl:     levothyroxine (SYNTHROID) 100 MCG tablet, TAKE 1 TABLET BY MOUTH ONCE DAILY, Disp: 90 tablet, Rfl: 10    hydrochlorothiazide (HYDRODIURIL) 25 MG tablet, Take 1 tablet by mouth daily, Disp: , Rfl: 3    labetalol (NORMODYNE) 100 MG tablet, Take

## 2020-09-07 NOTE — OP NOTE
Esophagogastroduodenoscopy Note    Patient:   Derrick Tavares    YOB: 1940    Facility:     [address] [address]   [Inpatient]   Referring/PCP: Susannah Hunt MD    Procedure:   Esophagogastroduodenoscopy  Date:     9/6/2020   Endoscopist:  Melva Vidal     Preoperative Diagnosis:   Removal of foreign body    Postoperative Diagnosis:  normal    Anesthesia:  Versed 2 mg IV, fentanyl 25 mcg IV    Estimated blood loss: Minimal    Complications: None    Description of Procedure:  Informed consent was obtained from the patient after explanation of the procedure including indications, description of the procedure,  benefits and possible risks and complications of the procedure, and alternatives. Questions were answered. The patient's history was reviewed and a directed physical examination was performed prior to the procedure. Patient was monitored throughout the procedure with pulse oximetry and periodic assessment of vital signs. Patient was sedated as noted above. The Nursing staff and I performed a time out. With the patient in the left lateral decubitus position, the Olympus videoendoscope was placed in the patient's mouth and under direct visualization passed into the esophagus. The scope was ultimately passed to the second portion of the duodenum. Visualization was performed during both introduction and withdrawal of the endoscope and retroflexed view of the proximal stomach was obtained. Findings[de-identified]   Esophagus: normal. The findings do not support a diagnosis of Webber's Esophagus.   Stomach: normal  Duodenum: normal    Recommendations:  No evidence of foreign body in visualized oropharynx or esophagus  Consider CT scan chest for further evaluation given respiratory status  Follow up with Dr. Nel Adhikari, DO    [address] [facility]     Phone: [phone]     Fax: [phone]

## 2020-09-07 NOTE — ED PROVIDER NOTES
I independently performed a history and physical on Jennifer Costa. All diagnostic, treatment, and disposition decisions were made by myself in conjunction with the advanced practice provider. -Jennifer Costa is a 68 y.o. female with history of hyperlipidemia, hypertension who presents to ED for sensation to her throat. Dates that she was eating a chicken when she felt to get stuck and was choking, with shortness of breath.    -On arrival patient was tolerating oral secretions, was able to take small sips of water became full sentences. -PE: well appearing, nontoxic, not in acute distress. RRR, CTAB/l, no w/r/r, abdomen soft, ND, NTTP, + BS x 4, no rigidity, rebound, guarding  -lab workup significant for: Unremarkable  -X-ray of soft tissue neck shows increased soft tissue density in the region of the larynx. A CT of the neck or direct visualization would be helpful for further evaluation  -C x-ray shows mild bibasilar airspace disease, atelectasis and pneumonia  -CT tissue neck shows possible GE reflux or distal esophageal obstruction. Clinical correlation required. CT of the chest with IV and oral contrast may be helpful if clinically warranted. Otherwise no acute abnormality of the soft tissue structures of the neck  -Insulted GI, spoke with Dr. Oneil Ennis who states that he will come in to scope patient. Upon update, no food bolus was seen.  -Upon reassessment patient reports she is feeling much improved, but sensation has resolved and states that her shortness of breath has also resolved. -CT chest: No radiopaque foreign body. Emphysema. Pulmonary hypertension  -No acute pathology was noted and plan for discharge home with close follow up with PCP was discussed with patient and family. Strict ED return precautions given for new/worsending symptoms. Patient and family in agreement with plan, verbally confirm understanding and have no further questions/concerns.        For further details of Miya Cardona emergency department encounter, please see MICK Payan documentation.         Andreas Medrano MD  09/06/20 [id number]

## 2020-09-10 ENCOUNTER — OFFICE VISIT (OUTPATIENT)
Dept: FAMILY MEDICINE CLINIC | Age: 80
End: 2020-09-10
Payer: COMMERCIAL

## 2020-09-10 VITALS
RESPIRATION RATE: 16 BRPM | OXYGEN SATURATION: 99 % | BODY MASS INDEX: 30.63 KG/M2 | TEMPERATURE: 98 F | WEIGHT: 190.6 LBS | DIASTOLIC BLOOD PRESSURE: 80 MMHG | HEIGHT: 66 IN | HEART RATE: 78 BPM | SYSTOLIC BLOOD PRESSURE: 142 MMHG

## 2020-09-10 PROCEDURE — 4040F PNEUMOC VAC/ADMIN/RCVD: CPT | Performed by: INTERNAL MEDICINE

## 2020-09-10 PROCEDURE — 1090F PRES/ABSN URINE INCON ASSESS: CPT | Performed by: INTERNAL MEDICINE

## 2020-09-10 PROCEDURE — G8926 SPIRO NO PERF OR DOC: HCPCS | Performed by: INTERNAL MEDICINE

## 2020-09-10 PROCEDURE — 99214 OFFICE O/P EST MOD 30 MIN: CPT | Performed by: INTERNAL MEDICINE

## 2020-09-10 PROCEDURE — G8417 CALC BMI ABV UP PARAM F/U: HCPCS | Performed by: INTERNAL MEDICINE

## 2020-09-10 PROCEDURE — 1036F TOBACCO NON-USER: CPT | Performed by: INTERNAL MEDICINE

## 2020-09-10 PROCEDURE — G8399 PT W/DXA RESULTS DOCUMENT: HCPCS | Performed by: INTERNAL MEDICINE

## 2020-09-10 PROCEDURE — 1123F ACP DISCUSS/DSCN MKR DOCD: CPT | Performed by: INTERNAL MEDICINE

## 2020-09-10 PROCEDURE — G8427 DOCREV CUR MEDS BY ELIG CLIN: HCPCS | Performed by: INTERNAL MEDICINE

## 2020-09-10 PROCEDURE — 3023F SPIROM DOC REV: CPT | Performed by: INTERNAL MEDICINE

## 2020-09-10 ASSESSMENT — ENCOUNTER SYMPTOMS
RESPIRATORY NEGATIVE: 1
ALLERGIC/IMMUNOLOGIC NEGATIVE: 1
GASTROINTESTINAL NEGATIVE: 1

## 2020-09-10 NOTE — PROGRESS NOTES
9/10/2020    Bridgette Rhoades (:  1940) is a 68 y.o. female, here for evaluation of the following medical concerns:    HPI  Pulmonary HTN ([address]  Seen on CT scan recently. Still present. No recent change in meds. Saw Dr Dann Lu in 2020. Aspiration pneumonia of both upper lobes ([address]  Severe in past. Had G tube for a while. Recently went to ER for chicken getting stuck. No reoccurrence. W/u showed reflux. On Pepcid. Centrilobular emphysema (HCC)  Seen on CTA. No recent change in meds. Review of Systems   Constitutional: Negative. Respiratory: Negative. Cardiovascular: Negative. Gastrointestinal: Negative. Endocrine: Negative. Genitourinary: Negative. Musculoskeletal: Negative. Skin: Negative. Allergic/Immunologic: Negative. Neurological: Negative. Hematological: Negative. Prior to Visit Medications    Medication Sig Taking? Authorizing Provider   Misc.  Devices [facility]) MISC 1 each by Does not apply route daily as needed (weakness)  Darcie Lucia MD   famotidine (PEPCID) 20 MG tablet Take 1 tablet by mouth 2 times daily  Darcie Lucia MD   atorvastatin (LIPITOR) 10 MG tablet TAKE 1 TABLET BY MOUTH ONCE DAILY *EMERGENCY REFILL*  ERICA Dawson MD   losartan (COZAAR) 100 MG tablet Take 1 tablet by mouth daily  ERICA Dawson MD   albuterol sulfate  (90 Base) MCG/ACT inhaler INHALE TWO (2) PUFFS BY MOUTH EVERY 6 HOURS AS NEEDED  ERICA Dawsno MD   predniSONE (DELTASONE) 10 MG tablet TAKE 1/2 TABLET BY MOUTH EVERY DAY  ERICA Dawson MD   OXYGEN Inhale 2 L/min into the lungs daily as needed  Historical Provider, MD   Roflumilast (DALIRESP) 500 MCG tablet Take 500 mcg by mouth daily  Historical Provider, MD   levothyroxine (SYNTHROID) 100 MCG tablet TAKE 1 TABLET BY MOUTH ONCE DAILY  ERICA Dawson MD   hydrochlorothiazide (HYDRODIURIL) 25 MG tablet Take 1 tablet by mouth daily  Historical Provider, MD   labetalol (NORMODYNE) 100 MG tablet Take 1 tablet by mouth every 12 hours  Hiwot Archer MD   Blood Pressure Monitoring (BLOOD PRESSURE MONITOR/M CUFF) MISC 1 Units by Does not apply route daily  ERICA Montejo MD   ipratropium-albuterol (DUONEB) 0.5-2.5 (3) MG/3ML SOLN nebulizer solution Inhale 3 mLs into the lungs every 4 hours as needed. Shun Mayberry MD   nitroGLYCERIN (NITROSTAT) 0.4 MG SL tablet Place 0.4 mg under the tongue every 5 minutes as needed.     Historical Provider, MD        Allergies   Allergen Reactions    Alendronate Sodium      Jaw pain      Humira [Adalimumab]      Rash      Penicillins      rash    Simvastatin Other (See Comments)     Made legs ache    Sulfa Antibiotics Swelling     Tongue swelled    Tape Lisette Ends Tape]        Past Medical History:   Diagnosis Date    Acid reflux     Acquired hypothyroidism 7/6/2017    Anemia     Asthma     CHF (congestive heart failure) (HCC)     [facility]     HLD (hyperlipidemia)     Hypertension     Influenza A 01/22/2018    MI (myocardial infarction) ([facility]     X 2    Migraine     past hx    Rheumatoid arthritis     Wears glasses        Past Surgical History:   Procedure Laterality Date    BRONCHOSCOPY N/A 3/27/2019    BRONCHOSCOPY ALVEOLAR LAVAGE performed by Ajay Severino MD at [facility]  3/27/2019    BRONCHOSCOPY THERAPUTIC ASPIRATION INITIAL performed by Ajay Severino MD at [facility] Bilateral 03/23/2011    COLONOSCOPY N/A 6/12/2019    COLONOSCOPY WITH ANESTHESIA -SLEEP APNEA- performed by Adriana Mcneil MD at [facility] Left 12/4/2018    LEFT LUMBAR FOUR, LUMBAR FIVE TRANSFORAMINAL EPIDURAL STEROID INJECTION SITE CONFIRMED BY FLUOROSCOPY performed by Evita Duran MD at [address] [facility] N/A 7/23/2019    ESOPHAGEAL MOTILITY/MANOMETRY STUDY performed by Liss Cazares MD at [facility]    FOOT SURGERY      Rt little toe    GASTROSTOMY TUBE PLACEMENT N/A 2019    EGD PEG TUBE PLACEMENT performed by Ewelina Gonzalez MD at [address]      total, fibroids    KNEE SURGERY      right    ME Phillip Trung [address] DX/THER SBST INTRLMNR CRV/THRC W/IMG GDN Left 2018    LEFT LUMBAR FOUR/ LUMBAR FIVE CYST ASPIRATION SITE CONFIRMED BY FLUOROSCOPY performed by Dwain Rey MD at [facility] ENDOSCOPY N/A 3/27/2019    EGD DIAGNOSTIC ONLY performed by Jacoby Muro MD at [facility] [facility] ENDOSCOPY N/A 2020    EGD DIAGNOSTIC ONLY performed by Martha Ervin DO at [address] [facility]       Social History     Socioeconomic History    Marital status: Single     Spouse name: Not on file    Number of children: 3    Years of education: Not on file    Highest education level: Not on file   Occupational History    Occupation: disability   Social Needs    Financial resource strain: Not on file    Food insecurity     Worry: Not on file     Inability: Not on file   [employer] needs     Medical: Not on file     Non-medical: Not on file   Tobacco Use    Smoking status: Former Smoker     Packs/day: 3.00     Years: 50.00     Pack years: 150.00     Last attempt to quit: 2009     Years since quittin.6    Smokeless tobacco: Never Used   Substance and Sexual Activity    Alcohol use: No    Drug use: No    Sexual activity: Not on file   Lifestyle    Physical activity     Days per week: Not on file     Minutes per session: Not on file    Stress: Not on file   Relationships    Social connections     Talks on phone: Not on file     Gets together: Not on file     Attends Judaism service: Not on file     Active member of club or organization: Not on file     Attends meetings of clubs or organizations: Not on file     Relationship status: Not on file    Intimate partner violence     Fear of current or ex partner: Not on file     Emotionally abused: Not on file     Physically abused: Not on file     Forced sexual activity: Not on file   Other Topics Concern    Not on file   Social History Narrative    Not on file        Family History   Problem Relation Age of Onset    Cancer Mother     Cancer Father     Heart Disease Maternal Aunt     Cancer Sister        Vitals:    09/10/20 1357   BP: (!) 142/80   Pulse: 78   Resp: 16   Temp: 98 °F (36.7 °C)   SpO2: 99%   Weight: 190 lb 9.6 oz (86.5 kg)   Height: 5' 6\" (1.676 m)     Estimated body mass index is 30.76 kg/m² as calculated from the following:    Height as of this encounter: 5' 6\" (1.676 m). Weight as of this encounter: 190 lb 9.6 oz (86.5 kg). Physical Exam  Constitutional:       General: She is not in acute distress. Appearance: She is well-developed. She is not ill-appearing, toxic-appearing or diaphoretic. HENT:      Head: Normocephalic and atraumatic. Eyes:      Conjunctiva/sclera: Conjunctivae normal.      Pupils: Pupils are equal, round, and reactive to light. Neck:      Musculoskeletal: Full passive range of motion without pain, normal range of motion and neck supple. Normal range of motion. No edema, erythema, spinous process tenderness or muscular tenderness. Thyroid: No thyroid mass or thyromegaly. Vascular: Normal carotid pulses. No carotid bruit, hepatojugular reflux or JVD. Trachea: Trachea normal. No tracheal deviation. Cardiovascular:      Rate and Rhythm: Normal rate and regular rhythm. No extrasystoles are present. Chest Wall: PMI is not displaced. Pulses: Normal pulses. No decreased pulses. Carotid pulses are 2+ on the right side and 2+ on the left side. Radial pulses are 2+ on the right side and 2+ on the left side. Femoral pulses are 2+ on the right side and 2+ on the left side.        Popliteal pulses are 2+ on the right side and 2+ on the left side. Dorsalis pedis pulses are 2+ on the right side and 2+ on the left side. Posterior tibial pulses are 2+ on the right side and 2+ on the left side. Heart sounds: Normal heart sounds, S1 normal and S2 normal. Heart sounds not distant. No murmur. No friction rub. No gallop. No S3 or S4 sounds. Pulmonary:      Effort: Pulmonary effort is normal. No tachypnea, bradypnea, accessory muscle usage or respiratory distress. Breath sounds: Normal breath sounds. No decreased breath sounds, wheezing, rhonchi or rales. Comments: On Home O2. Chest:      Chest wall: No tenderness. Abdominal:      General: There is no distension. Palpations: There is no mass. Tenderness: There is abdominal tenderness (pain where G tube was before being removed. ). There is no right CVA tenderness, left CVA tenderness, guarding or rebound. Hernia: No hernia is present. Musculoskeletal: Normal range of motion. General: Tenderness (OA knees and back.) present. Lymphadenopathy:      Cervical: No cervical adenopathy. Skin:     General: Skin is warm and dry. Coloration: Skin is not jaundiced or pale. Findings: No abrasion, bruising, erythema, lesion or rash. Nails: There is no clubbing. Neurological:      Mental Status: She is alert and oriented to person, place, and time. She is not disoriented. Cranial Nerves: No cranial nerve deficit. Sensory: No sensory deficit. Motor: Weakness present. No tremor, atrophy or abnormal muscle tone. Coordination: Coordination normal.      Gait: Gait abnormal (poor ambulation w/ walker. has to stop multiple times. ). Deep Tendon Reflexes: Reflexes are normal and symmetric. Psychiatric:         Speech: Speech normal.         Behavior: Behavior normal.         Thought Content: Thought content normal.         Judgment: Judgment normal.         ASSESSMENT/PLAN:  1. Pulmonary HTN (HCC)  Stable w/ present meds. F/u / Dr Frankie Austin as scheduled. 2. Aspiration pneumonia of both upper lobes due to regurgitated food ([address]  No reoccurrence w/ last ER for dysphagia from GERD. To GI soon. Discussed Hospital bed for head elevation. 3. Centrilobular emphysema ([address]  Continue home O2 and meds. Call if new c/o. Discussed Wheel chair for mobility. Severe OA and COPD. Unable to get around house well enough at present. Had to get out to office visits. RTSM in 4 mo. An  electronic signature was used to authenticate this note.     --Dorinda Betancourt MD on 9/10/2020 at 2:22 PM

## 2020-09-14 ENCOUNTER — TELEPHONE (OUTPATIENT)
Dept: FAMILY MEDICINE CLINIC | Age: 80
End: 2020-09-14

## 2020-09-17 ENCOUNTER — HOSPITAL ENCOUNTER (OUTPATIENT)
Dept: GENERAL RADIOLOGY | Age: 80
Discharge: HOME | End: 2020-09-17
Payer: COMMERCIAL

## 2020-09-17 PROCEDURE — 74220 X-RAY XM ESOPHAGUS 1CNTRST: CPT

## 2020-09-18 ENCOUNTER — TELEPHONE (OUTPATIENT)
Dept: FAMILY MEDICINE CLINIC | Age: 80
End: 2020-09-18

## 2020-09-18 NOTE — TELEPHONE ENCOUNTER
----- Message from Eryn Frost MD sent at 9/17/2020  5:13 PM EDT -----  Abnormal esophageal motility. To GI for further evaluation.

## 2020-09-25 ENCOUNTER — HOSPITAL ENCOUNTER (OUTPATIENT)
Dept: SPEECH THERAPY | Age: 80
Setting detail: THERAPIES SERIES
Discharge: HOME | End: 2020-09-25
Payer: COMMERCIAL

## 2020-09-25 PROCEDURE — 92610 EVALUATE SWALLOWING FUNCTION: CPT

## 2020-09-25 PROCEDURE — 92526 ORAL FUNCTION THERAPY: CPT

## 2020-09-25 NOTE — PLAN OF CARE
Outpatient Speech Therapy  Phone: [phone] Fax: [phone]     To: Thais Sousa MD      Patient: Serena Jeffery  : 1940  MRN: [id number]  Evaluation Date: 2020      Diagnosis Information: Dysphagia (R13.10)      Speech Therapy Certification Form    Plan of Care/Treatment to date:  [] Speech-Language Evaluation/Treatment    [x] Dysphagia Evaluation/Treatment        [x] Dysphagia Treatment via Neuromuscular Electrical Stimulation (NMES) if later indicate d  [x] Modified Barium Swallowing Study if later indicated  [] Fiberoptic Endoscopic Evaluation of Swallowing (FEES)  [] Cognitive-Linguistic Skills Development  [] Voice evaluation and Treatment      [] Evaluation, modification, and Training of Voice Prosthetic     [] Evaluation for Speech-Generating Augmentative and Alternative Communication Device   [] Therapeutic Services for the use of Speech-Generating Device. [] Other:          Frequency/Duration:  # Days per week: [x] 1 day # Weeks: [] 1 week [] 5 weeks      [] 2 days   [] 2 weeks [x] 6 weeks     [] 3 days   [] 3 weeks [] 7 weeks     [] 4 days   [] 4 weeks [] 8 weeks    Rehab Potential: [] Excellent [x] Good [] Fair  [] Poor       Electronically signed by: FRANC Chaidez  Phone: [phone]  Fax: [phone]    If you have any questions or concerns, please don't hesitate to call.   Thank you for your referral.      Physician Signature:________________________________Date:__________________  By signing above, therapists plan is approved by physician

## 2020-09-25 NOTE — PROGRESS NOTES
Speech Language Pathology  Facility/Department: [facility] SPEECH THERAPY   CLINICAL BEDSIDE SWALLOW EVALUATION    NAME: Pauly Black  : 1940  MRN: [id number]    ADMISSION DATE: 2020    Visit Diagnoses       Codes    Dysphagia, unspecified type    -  Primary R13.10        Onset Date: 20    Past Medical History:  has a past medical history of Acid reflux, Acquired hypothyroidism, Anemia, Asthma, CHF (congestive heart failure) ([facility] COPD (chronic obstructive pulmonary disease) ([facility] HLD (hyperlipidemia), Hypertension, Influenza A, MI (myocardial infarction) ([facility] Migraine, Rheumatoid arthritis, and Wears glasses. Past Surgical History:  has a past surgical history that includes Hysterectomy; knee surgery; Foot surgery; Wrist surgery; Cataract removal with implant (Bilateral, 2011); pr njx dx/ther sbst intrlmnr crv/thrc w/img gdn (Left, 2018); epidural steroid injection (Left, 2018); bronchoscopy (N/A, 3/27/2019); bronchoscopy (3/27/2019); Upper gastrointestinal endoscopy (N/A, 3/27/2019); Gastrostomy tube placement (N/A, 2019); Colonoscopy (N/A, 2019); esophageal motility study (N/A, 2019); and Upper gastrointestinal endoscopy (N/A, 2020). Recent Chest Xray/CT of Chest: (20): Impression    1. No radiopaque foreign body. 2. Emphysema. 3. Pulmonary hypertension. Plan of Care Submitted: (y/n): Yes    Date of Eval: 2020  Evaluating Therapist: Dennise Lee    Current Diet level:  Current Diet : Dysphagia Minced and Moist (Dysphagia II)  Current Liquid Diet : Thin    Primary Complaint:   Patient Complaint: Sensation of food at times \"sticking\" in the lower pharynx and sternum area. Recent instances of choking with meats.     Pain: The pt denies pain     Reason for Referral  Pauly Black was referred for a bedside swallow evaluation to assess the efficiency of her swallow function, identify signs and symptoms of aspiration, and make recommendations regarding safe dietary consistencies, effective compensatory strategies, and safe eating environment. Impression  Dysphagia Diagnosis  Dysphagia Diagnosis: Mild to moderate oral stage dysphagia;Mild pharyngeal stage dysphagia  Dysphagia Outcome Severity Scale: Level 4: Mild moderate dysphagia- Intermittent supervision/cueing. One - two diet consistencies restricted  The pt is known by Speech Therapy, seen previously on 6/14/19 for a clinical bedside swallowing evaluation and 6/18/20 for an MBS. At that time a minced and moist diet with thin liquids was recommended. Due to the significant of her esophageal dysphagia, GI follow-up was recommended. At that time the pt had a PEG in place. The pt has had her PEG removed and is tolerance soft solids and thin liquids. However, she has had sporadic recent choking episodes, including need to go the ER due to choking on a piece of chicken. The pt was seen for an EGD earlier this month as well as an esophagram with moderate esophageal dysmotility reported. Today the pt demonstrated reduced bolus mastication and prep due to being edentulous. She was encouraged to enquire about obtaining dentures. The pt tolerated all PO trials without s/s of aspiration but presented with frequent belching symptoms intermittent after PO trials. Vocal quality remained dry and consistent. At this time it is recommended that the pt strictly follow a minced and moist diet. It is suspected that her choking episodes are from swallowing solids that she cannot fully masticate partially whole. Education was given at length re: the minced and moist diet and need to comply with this diet to prevent further choking. The pt expressed understanding and agreement. Dysphagia treatment is recommended 1 x week for up to 6 weeks to further train swallowing strategies and to improved swallowing exercises to improve bolus control and the strength of the pharyngeal swallow.  See the rest of this report for more details. Treatment Plan  Requires SLP Intervention: Yes     Duration/Frequency of Treatment: 1 x week for 6 weeks  D/C Recommendations: Outpatient    Recommended Diet and Intervention  Solid: Diet Solids Recommendation: Dysphagia Minced and Moist (Dysphagia II)  Liquid: Liquid Consistency Recommendation: Thin  Medication:Recommended Form of Meds: Whole with water  Therapeutic Interventions: Bolus control exercises, Diet tolerance monitoring, Therapeutic PO trials with SLP, Pharyngeal exercises, Laryngeal exercises, Patient/Family education    Compensatory Swallowing Strategies Attempted  Compensatory Swallowing Strategies: Alternate solids and liquids;Eat/Feed slowly;Upright as possible for all oral intake;Remain upright for 30-45 minutes after meals;Small bites/sips;Swallow 2 times per bite/sip; Check for pocketing of food on the Left; Check for pocketing of food on the Right    Treatment/Goals  Short-term Goals  Goal 1: The pt will tolerate soft solids without dysphagia or s/s of aspiration 10/10  Goal 2: The pt will tolerate thin liquids without dysphagia or s/s of aspiration 10/10  Goal 3: The pt will verbalize and demonstrate correct use of swallowing precautions with min cues  Long-term Goals  Goal 1: The pt will consistently tolerate her saftest and least restrcitive diet    General  Chart Reviewed: Yes  Visit Information  Onset Date: 09/06/20  SLP Insurance Information: [facility] - B; No Precent; 30 visists per calendar year  Subjective: The pt was in overall good spirits. She reports having a recent choking episode one chicken which placed her at the ER. See the impressions section for more details. Behavior/Cognition  Behavior/Cognition: Alert; Cooperative;Pleasant mood  Respiratory Status: O2 via nasual cannula  O2 Device: Nasal cannula  Liters of Oxygen: 2 L  Communication Observation: Functional  Follows Directions: Simple  Dentition: Edentulous(pt reports she has tried exercises. The following swallowing exercises were trained: Jaw Jut, Press tongue to roof of mouth, lower jaw agains resistance, Falsetto EE, hard glottal attack, vowel prolongations and bolus control exercises.   Patient Education Response: Verbalizes understanding       Therapy Time  SLP Individual Minutes  Time In: 4536  Time Out: 3514  Minutes: [facility], [address] [place] [address]#[id number]  Speech-Language Pathologist  Phone #: [phone]  Fax #: [phone]    9/25/2020 4:18 PM

## 2020-10-02 ENCOUNTER — APPOINTMENT (OUTPATIENT)
Dept: SPEECH THERAPY | Age: 80
End: 2020-10-02
Payer: COMMERCIAL

## 2020-10-05 RX ORDER — PREDNISONE 10 MG/1
TABLET ORAL
Qty: 90 TABLET | Refills: 10 | Status: SHIPPED | OUTPATIENT
Start: 2020-10-05 | End: 2021-01-20

## 2020-10-09 ENCOUNTER — HOSPITAL ENCOUNTER (OUTPATIENT)
Dept: SPEECH THERAPY | Age: 80
Setting detail: THERAPIES SERIES
Discharge: HOME | End: 2020-10-09
Payer: COMMERCIAL

## 2020-10-09 PROCEDURE — 92526 ORAL FUNCTION THERAPY: CPT

## 2020-10-09 NOTE — FLOWSHEET NOTE
Speech-Language Pathology  Daily Treatment Note    Date:  10/9/2020    Patient Name:  Yolanda Luther    :  1940  MRN: [id number]  Restrictions/Precautions:    Diagnosis:    Treatment Diagnosis:    Insurance/Certification information:    Referring Physician:  Mercedes Martínez MD     Plan of care signed (Y/N): Y   Visit# / total visits:    Pain level: 0/10 current; Reports up to 6-7/10 headache pain when doing certain swallowing exercises (Falsetto EE, lowering jaw, jutting jaw) at home. The pt was told to discontinue the exercises that cause her the headache. Progress Note: []  Yes  [x]  No  Next due by: Visit #10: 2/10     Subjective:      Objective:   Treatment/Goals  Short-term Goals  Goal 1: The pt will tolerate soft solids without dysphagia or s/s of aspiration 10/10:  - Diced Peaches: 10/10; No s/s of aspiration; Mastication was prolonged but adequate for this consistency. D/C GOAL AS MET IF ABLE TO MAINTAIN ACCURACY    Goal 2: The pt will tolerate thin liquids without dysphagia or s/s of aspiration 10/10:  - Tolerated 10/10 sips of thin liquid via cup; no s/s of aspiration; Swallows were grossly timely  D/C GOAL AS MET IF ABLE TO MAINTAIN ACCURACY    Goal 3: The pt will verbalize and demonstrate correct use of swallowing precautions with min cues:  - The pt was able to verbalize and demonstrate appropriate use of swallowing strategies. D/C GOAL AS MET IF ABLE TO MAINTAIN ACCURACY    Other Treatments:   - Bolus control exercises: 6 of 6 exercises completed, 10-20 reps each exercise. The pt reported having some gagging when performing this exercise at home with toothettes. She was told to use lemon swabs instead with lemon swabs given to her to complete at home. The pt did not demonstrate or report any gagging sensation when using the lemon swabs. - Jutting jaw forward and hold for count of 5: x 10 reps    Assessment:   The pt is currently tolerating her recommended diet.  The pt denied any recent oropharyngeal dysphagia symptoms. She is to continue dysphagia therapy exercise but only bolus control exercises with lemon swabs and pressing her tongue to the roof of her mouth due to reported headache pain when completing the other exercises. Plan:   Continued Dysphagia treatment 1 x week for 1-2 more weeks. Timed Code Treatment: 0 minutes    Total Treatment Time: 35 minutes (8594-3267); Dysphagia treatment    Signature:     Mk Black MA, [id number] [facility]  [place][id number]  Speech-Language Pathologist  Phone #: [phone]  Fax #: [phone]

## 2020-10-13 ENCOUNTER — TELEPHONE (OUTPATIENT)
Dept: FAMILY MEDICINE CLINIC | Age: 80
End: 2020-10-13

## 2020-10-13 NOTE — TELEPHONE ENCOUNTER
Pt called and would like to know if we received an order from [facility] for a wheel chair.  [facility] [address] [place] [address] keeps sending and we aren't receiving

## 2020-10-16 ENCOUNTER — HOSPITAL ENCOUNTER (OUTPATIENT)
Dept: SPEECH THERAPY | Age: 80
Setting detail: THERAPIES SERIES
Discharge: HOME | End: 2020-10-16
Payer: COMMERCIAL

## 2020-10-16 PROCEDURE — 92526 ORAL FUNCTION THERAPY: CPT

## 2020-10-16 NOTE — PROGRESS NOTES
Outpatient Speech Therapy   Phone: [phone] Fax: [phone]    Speech Therapy Progress Note         The following patient has been evaluated for therapy services. Please review the attached summary of the patient's plan of care, and verify that you agree with plan for additional therapy services at this time.      Thank you for the referral of this patient. Please sign the attached certification form.      Physician signature_______________________ Date________________  Alexey Hoang to: [facility] [phone]         Date: 10/16/2020        Patient Name:  Marla Gongora    :  1940  MRN: [id number]  Restrictions/Precautions:  Aspiration risk with solids of increased texture  Treatment Diagnosis:    Dysphagia, unspecified type    -  Primary R13.10       Insurance/Certification information:    Referring Physician:  Nikkie Hyde MD     Plan of care signed (Y/N): Y   Visit# / total visits:  3/7  Pain level:      0/10    Time Period for Report:  20 to 10/16/20 (x 3 total sessions)  Cancels/No-shows to date:  X 1 cancellation    Plan of Care/Treatment to date:  [] Speech-Language Evaluation/Treatment    [x] Dysphagia Evaluation/Treatment        [] Dysphagia Treatment via Neuromuscular Electrical Stimulation (NMES)   [] Modified Barium Swallowing Study  [] Fiberoptic Endoscopic Evaluation of Swallowing (FEES)    [] Cognitive-Linguistic Skills Development  [] Voice evaluation and Treatment      [] Evaluation, modification, and Training of Voice Prosthetic     [] Evaluation for Speech-Generating Augmentative and Alternative Communication Device   [] Therapeutic Services for the use of Speech-Generating Device. [] Other:     Significant Findings At Last Visit/Comments:    Subjective: The pt arrived late to today's appointment. The pt reports continuing to be compliant with her minced and moist diet.  However, she reported that she feels like she is limited on the foods that she can eat and is getting tired of the food choices. Various minced and moist recipes were printed off and given to the pt to try at home. The pt denied any dysphagia symptoms since last session. Objective:  Observation: Care and caution when eating and drinking. The pt consistently uses her swallowing strategies to prevent any further choking episodes that have happened in the past.    Assessment:  Summary: The pt is tolerating her recommended minced and moist diet without significant dysphagia symptoms. She is to continue this diet due to risk for choking and aspiration with solids of increased texture due to reduced chewing due to being edentulous. The pt is in the process of seeing a dentist to have dentures made. Hold therapy at this time due to goals met. If the pt gets dentures then she will likely be able to tolerate solids of increased texture. She is in the process of trying to get set up with a dentist to get dentures made. If she gets dentures in the near future then the pt is to contact the therapist to return to therapy to re-assess solids to see if she could tolerate a solid texture diet upgrade at that time.     Patient's response to treatment: Pleasant and cooperative; Hardworking and motivated to improved her swallowing function. Progress towards goals:    Short-term Goals  Goal 1: The pt will tolerate soft solids without dysphagia or s/s of aspiration 10/10:  - GOAL MET     Goal 2: The pt will tolerate thin liquids without dysphagia or s/s of aspiration 10/10:  - Tolerated 10/10 sips of thin liquid via cup; no s/s of aspiration; Swallows were grossly timely  GOAL MET     Goal 3: The pt will verbalize and demonstrate correct use of swallowing precautions with min cues:  - The pt was able to verbalize and demonstrate appropriate use of swallowing strategies. GOAL MET       Current Frequency/Duration:  # Days per week: [x] 1 day # Weeks: [] 1 week [] 4 weeks      [] 2 days?    [] 2 weeks [] 5 weeks      [] 3 days   [x] 3 weeks [] 6 weeks     Rehab Potential: [] Excellent [x] Good [] Fair  [] Poor     Goal Status:  [x] Achieved [] Partially Achieved  [] Not Achieved     Patient Status: [] Continue per initial plan of Care     [] Patient now discharged     [x] Additional visits requested, Please re-certify for additional visits:      Further Therapy in the future if/when she receives dentures to re-assess tolerance of solids once she is no longer edentulous. Electronically signed by:  FRANC Norton  Phone: [phone]  Fax: [phone]    If you have any questions or concerns, please don't hesitate to call.   Thank you for your referral.

## 2020-10-16 NOTE — FLOWSHEET NOTE
Speech-Language Pathology  Daily Treatment Note    Date:  10/16/2020    Patient Name:  Mason Martin    :  1940  MRN: [id number]  Restrictions/Precautions:  Aspiration risk with solids of increased texture  Treatment Diagnosis:    Dysphagia, unspecified type    -  Primary R13.10       Insurance/Certification information:    Referring Physician:  Shila Tamayo MD     Plan of care signed (Y/N): Y   Visit# / total visits:  3  Pain level: 0/10    Progress Note: [x]  Yes  []  No      Subjective: The pt arrived late to today's appointment. The pt reports continuing to be compliant with her minced and moist diet. However, she reported that she feels like she is limited on the foods that she can eat and is getting tired of the food choices. Various minced and moist recipes were printed off and given to the pt to try at home. The pt denied any dysphagia symptoms since last session. Objective:   Treatment/Goals  Short-term Goals  Goal 1: The pt will tolerate soft solids without dysphagia or s/s of aspiration 1010:  - Mixed Fruit: 10/10; No s/s of aspiration; Mastication was prolonged but adequate for this consistency; The pt did remove some of the skin from the peaches and cherries into a napkin stating that the skin was difficult to chew and swallow. GOAL MET    Goal 2: The pt will tolerate thin liquids without dysphagia or s/s of aspiration 1010:  - Tolerated 10/10 sips of thin liquid via cup; no s/s of aspiration; Swallows were grossly timely  GOAL MET    Goal 3: The pt will verbalize and demonstrate correct use of swallowing precautions with min cues:  - The pt was able to verbalize and demonstrate appropriate use of swallowing strategies. GOAL MET    Other Treatments:       Assessment:   The pt is tolerating her recommended minced and moist diet without significant dysphagia symptoms.  She is to continue this diet due to risk for choking and aspiration with solids of increased texture due to reduced chewing due to being edentulous. The pt is in the process of seeing a dentist to have dentures made. Plan:   Hold therapy at this time due to goals met. If the pt gets dentures then she will likely be able to tolerate solids of increased texture. She is in the process of trying to get set up with a dentist to get dentures made. If she gets dentures in the near future then the pt is to contact the therapist to return to therapy to re-assess solids to see if she could tolerate a solid texture diet upgrade at that time. Timed Code Treatment: 0 minutes    Total Treatment Time: 30 minutes ([id number]); Dysphagia treatment    Signature:     Yoel Gomez MA, [address] [facility]  RX#[id number]  Speech-Language Pathologist  Phone #: [phone]  Fax #: [phone]

## 2020-10-20 ENCOUNTER — TELEPHONE (OUTPATIENT)
Dept: FAMILY MEDICINE CLINIC | Age: 80
End: 2020-10-20

## 2020-10-20 NOTE — TELEPHONE ENCOUNTER
Pt called regarding CMN for Wheel chair.  Granddaughter dropped off forms last week and hadn't heard back- stated [facility] hasn't received    Requesting call

## 2020-10-21 NOTE — TELEPHONE ENCOUNTER
Bree called regarding the wheel chair form. The person at [facility] said he will not accept form as is bc the boxes with x's were not filled. I advised additional pages have that information for those boxes. He said he will not accept the additional pages and physician will need to complete the form. Bree would like a call back tomorrow when [employer] returns.

## 2020-10-22 NOTE — TELEPHONE ENCOUNTER
Pt and family are going to change dme providers to [facility] [phone].   Faxed info to [phone]

## 2020-11-18 ENCOUNTER — IMMUNIZATION (OUTPATIENT)
Dept: FAMILY MEDICINE CLINIC | Age: 80
End: 2020-11-18
Payer: COMMERCIAL

## 2020-11-18 VITALS — TEMPERATURE: 97 F

## 2020-11-18 PROCEDURE — 90471 IMMUNIZATION ADMIN: CPT | Performed by: INTERNAL MEDICINE

## 2020-11-18 PROCEDURE — 90694 VACC AIIV4 NO PRSRV 0.5ML IM: CPT | Performed by: INTERNAL MEDICINE

## 2020-11-18 NOTE — PROGRESS NOTES
Vaccine Information Sheet, \"Influenza - Inactivated\"  given to Thomas Davies, or parent/legal guardian of  Thomas Davies and verbalized understanding. Patient responses:    Have you ever had a reaction to a flu vaccine? No  Do you have any current illness? No  Have you ever had Guillian [place] Syndrome? No  Do you have a serious allergy to any of the follow: Neomycin, Polymyxin, Thimerosal, eggs or egg products? No    Flu vaccine given per order. Please see immunization tab. Risks and benefits explained. Current VIS given.       Immunizations Administered     Name Date Dose Route    Influenza, Quadv, adjuvanted, 65 yrs +, IM, PF (Fluad) 11/18/2020 0.5 mL Intramuscular    Site: Deltoid- Left    Lot: [id number]    NDC: 53110-063-55

## 2020-11-23 ENCOUNTER — OFFICE VISIT (OUTPATIENT)
Dept: FAMILY MEDICINE CLINIC | Age: 80
End: 2020-11-23
Payer: COMMERCIAL

## 2020-11-23 VITALS
DIASTOLIC BLOOD PRESSURE: 80 MMHG | BODY MASS INDEX: 30.63 KG/M2 | HEIGHT: 66 IN | RESPIRATION RATE: 18 BRPM | OXYGEN SATURATION: 98 % | TEMPERATURE: 97.3 F | SYSTOLIC BLOOD PRESSURE: 128 MMHG | WEIGHT: 190.6 LBS | HEART RATE: 70 BPM

## 2020-11-23 PROBLEM — Z00.00 MEDICARE ANNUAL WELLNESS VISIT, SUBSEQUENT: Status: ACTIVE | Noted: 2020-11-23

## 2020-11-23 LAB
ALBUMIN SERPL-MCNC: 4.4 G/DL (ref 3.4–5)
ALP BLD-CCNC: 32 U/L (ref 40–129)
ALT SERPL-CCNC: 18 U/L (ref 10–40)
ANION GAP SERPL CALCULATED.3IONS-SCNC: 12 MMOL/L (ref 3–16)
AST SERPL-CCNC: 21 U/L (ref 15–37)
BILIRUB SERPL-MCNC: 1.2 MG/DL (ref 0–1)
BILIRUBIN DIRECT: <0.2 MG/DL (ref 0–0.3)
BILIRUBIN, INDIRECT: ABNORMAL MG/DL (ref 0–1)
BUN BLDV-MCNC: 20 MG/DL (ref 7–20)
CALCIUM SERPL-MCNC: 10.4 MG/DL (ref 8.3–10.6)
CHLORIDE BLD-SCNC: 105 MMOL/L (ref 99–110)
CHOLESTEROL, TOTAL: 172 MG/DL (ref 0–199)
CO2: 27 MMOL/L (ref 21–32)
CREAT SERPL-MCNC: 1.1 MG/DL (ref 0.6–1.2)
GFR AFRICAN AMERICAN: 58
GFR NON-AFRICAN AMERICAN: 48
GLUCOSE BLD-MCNC: 95 MG/DL (ref 70–99)
HCT VFR BLD CALC: 36.9 % (ref 36–48)
HDLC SERPL-MCNC: 76 MG/DL (ref 40–60)
HEMOGLOBIN: 11.9 G/DL (ref 12–16)
LDL CHOLESTEROL CALCULATED: 81 MG/DL
MCH RBC QN AUTO: 29 PG (ref 26–34)
MCHC RBC AUTO-ENTMCNC: 32.3 G/DL (ref 31–36)
MCV RBC AUTO: 90 FL (ref 80–100)
PDW BLD-RTO: 14.6 % (ref 12.4–15.4)
PHOSPHORUS: 3.1 MG/DL (ref 2.5–4.9)
PLATELET # BLD: 181 K/UL (ref 135–450)
PMV BLD AUTO: 8.1 FL (ref 5–10.5)
POTASSIUM SERPL-SCNC: 4.3 MMOL/L (ref 3.5–5.1)
RBC # BLD: 4.1 M/UL (ref 4–5.2)
SODIUM BLD-SCNC: 144 MMOL/L (ref 136–145)
TOTAL PROTEIN: 6.9 G/DL (ref 6.4–8.2)
TRIGL SERPL-MCNC: 77 MG/DL (ref 0–150)
TSH SERPL DL<=0.05 MIU/L-ACNC: 1.99 UIU/ML (ref 0.27–4.2)
VLDLC SERPL CALC-MCNC: 15 MG/DL
WBC # BLD: 4.3 K/UL (ref 4–11)

## 2020-11-23 PROCEDURE — 36415 COLL VENOUS BLD VENIPUNCTURE: CPT | Performed by: INTERNAL MEDICINE

## 2020-11-23 PROCEDURE — 99213 OFFICE O/P EST LOW 20 MIN: CPT | Performed by: INTERNAL MEDICINE

## 2020-11-23 PROCEDURE — G8484 FLU IMMUNIZE NO ADMIN: HCPCS | Performed by: INTERNAL MEDICINE

## 2020-11-23 PROCEDURE — 1123F ACP DISCUSS/DSCN MKR DOCD: CPT | Performed by: INTERNAL MEDICINE

## 2020-11-23 PROCEDURE — G0439 PPPS, SUBSEQ VISIT: HCPCS | Performed by: INTERNAL MEDICINE

## 2020-11-23 PROCEDURE — 4040F PNEUMOC VAC/ADMIN/RCVD: CPT | Performed by: INTERNAL MEDICINE

## 2020-11-23 ASSESSMENT — PATIENT HEALTH QUESTIONNAIRE - PHQ9
SUM OF ALL RESPONSES TO PHQ9 QUESTIONS 1 & 2: 0
2. FEELING DOWN, DEPRESSED OR HOPELESS: 0
1. LITTLE INTEREST OR PLEASURE IN DOING THINGS: 0
SUM OF ALL RESPONSES TO PHQ QUESTIONS 1-9: 0

## 2020-11-23 ASSESSMENT — LIFESTYLE VARIABLES: HOW OFTEN DO YOU HAVE A DRINK CONTAINING ALCOHOL: 0

## 2020-11-23 ASSESSMENT — ENCOUNTER SYMPTOMS
ALLERGIC/IMMUNOLOGIC NEGATIVE: 1
RESPIRATORY NEGATIVE: 1
GASTROINTESTINAL NEGATIVE: 1

## 2020-11-23 NOTE — PROGRESS NOTES
2020    Pam Gomez (:  1940) is a 68 y.o. female, here for evaluation of the following medical concerns:    HPI  Medicare annual wellness visit, subsequent  Mammo: 2019(appt 2020)  Pap: s/p hysterectomy  Colon: ?  Eye: 2020  Hearing: passed in office exam. Some hearing loss  Tob: nonsmoker/Quit / >50 pk yrs. Caff: low  Etoh: none  MMSE: 30/30  Get up and Go: in [place]. LW/MPA: yes    Pulmonary HTN ([address]  Seen on CT scan recently. Still present. No recent change in meds. Saw Pulmonary recently. Aspiration pneumonia of both upper lobes ([address]  Severe in past. Had G tube for a while. Recently went to ER for chicken getting stuck. No reoccurrence. W/u showed reflux. On Pepcid. Sees Dr Tori Rodriguez. Has Hiatal Hernia. Seeing Speech. Not thickening liquids. Centrilobular emphysema (HCC)  Seen on CTA. No recent change in meds. Pure hypercholesterolemia  Stable diet and wt. No c/o w/ meds. Rheumatoid arthritis involving multiple sites with positive rheumatoid factor (HCC)  R Shoulder injected recently, R knee swelling and pain some better. No recent meds other than injections and Prednisone 5 mg. Essential hypertension  No change in meds, no c/o with meds, no chest pain, SOB, palpatations, or syncope. Home bp was 120-140s/70-80s. HEBERT on CPAP  Using Bi-pap most of the time. Review of Systems   Constitutional: Positive for fatigue. Respiratory: Negative. Cardiovascular: Negative. Gastrointestinal: Negative. Endocrine: Negative. Genitourinary: Negative. Musculoskeletal: Positive for arthralgias and myalgias. Skin: Negative. Allergic/Immunologic: Negative. Neurological: Positive for weakness. Hematological: Negative. Prior to Visit Medications    Medication Sig Taking? Authorizing Provider   predniSONE (DELTASONE) 10 MG tablet TAKE 1/2 TABLET BY MOUTH Gonzalo Watson MD   Mis.  Devices [facility]) [facility] 1 each by Does not apply route daily as needed (weakness)  Gustavo Raman MD   famotidine (PEPCID) 20 MG tablet Take 1 tablet by mouth 2 times daily  Gustavo Raman MD   atorvastatin (LIPITOR) 10 MG tablet TAKE 1 TABLET BY MOUTH ONCE DAILY *EMERGENCY REFILL*  ERICA Benoit MD   losartan (COZAAR) 100 MG tablet Take 1 tablet by mouth daily  ERICA Benoit MD   albuterol sulfate  (90 Base) MCG/ACT inhaler INHALE TWO (2) PUFFS BY MOUTH EVERY 6 HOURS AS NEEDED  ERICA Benoit MD   OXYGEN Inhale 2 L/min into the lungs daily as needed  Historical Provider, MD   Roflumilast (DALIRESP) 500 MCG tablet Take 500 mcg by mouth daily  Historical Provider, MD   levothyroxine (SYNTHROID) 100 MCG tablet TAKE 1 TABLET BY MOUTH ONCE DAILY  ERICA Benoit MD   hydrochlorothiazide (HYDRODIURIL) 25 MG tablet Take 1 tablet by mouth daily  Historical Provider, MD   labetalol (NORMODYNE) 100 MG tablet Take 1 tablet by mouth every 12 hours  Niya Stanton MD   Blood Pressure Monitoring (BLOOD PRESSURE MONITOR/M CUFF) MISC 1 Units by Does not apply route daily  ERICA Benoit MD   ipratropium-albuterol (DUONEB) 0.5-2.5 (3) MG/3ML SOLN nebulizer solution Inhale 3 mLs into the lungs every 4 hours as needed. Ramandeep Henao MD   nitroGLYCERIN (NITROSTAT) 0.4 MG SL tablet Place 0.4 mg under the tongue every 5 minutes as needed.     Historical Provider, MD        Allergies   Allergen Reactions    Alendronate Sodium      Jaw pain      Humira [Adalimumab]      Rash      Penicillins      rash    Simvastatin Other (See Comments)     Made legs ache    Sulfa Antibiotics Swelling     Tongue swelled    Tape Jaya Lord Tape]        Past Medical History:   Diagnosis Date    Acid reflux     Acquired hypothyroidism 7/6/2017    Anemia     Asthma     [facility]     COPD (chronic obstructive pulmonary disease) ([facility]     HLD (hyperlipidemia)     Hypertension     Influenza A 01/22/2018    MI (myocardial infarction) [facility] X 2    Migraine     past hx    Rheumatoid arthritis     Wears glasses        Past Surgical History:   Procedure Laterality Date    BRONCHOSCOPY N/A 3/27/2019    BRONCHOSCOPY ALVEOLAR LAVAGE performed by Abel Rowley MD at [address]  3/27/2019    BRONCHOSCOPY THERAPUTIC ASPIRATION INITIAL performed by Abel Rowley MD at [address] Bilateral 03/23/2011    COLONOSCOPY N/A 6/12/2019    COLONOSCOPY WITH ANESTHESIA -SLEEP APNEA- performed by Betty Mccauley MD at [address] Left 12/4/2018    LEFT LUMBAR FOUR, LUMBAR FIVE TRANSFORAMINAL EPIDURAL STEROID INJECTION SITE CONFIRMED BY FLUOROSCOPY performed by Luan Lott MD at [address] N/A 7/23/2019    ESOPHAGEAL MOTILITY/MANOMETRY STUDY performed by Aniket Gutierrez MD at [address] [facility] little toe    GASTROSTOMY TUBE PLACEMENT N/A 4/1/2019    EGD PEG TUBE PLACEMENT performed by Betty Mccauley MD at [address]      total, fibroids    KNEE SURGERY      right    [place] [address] DX/THER SBST INTRLMNR CRV/THRC W/IMG GDN Left 8/21/2018    LEFT LUMBAR FOUR/ LUMBAR FIVE CYST ASPIRATION SITE CONFIRMED BY FLUOROSCOPY performed by Luan Lott MD at [address] N/A 3/27/2019    EGD DIAGNOSTIC ONLY performed by Ian Louis MD at [facility] ENDOSCOPY N/A 9/6/2020    EGD DIAGNOSTIC ONLY performed by Dee Smith DO at [address]       Social History     Socioeconomic History    Marital status: Single     Spouse name: Not on file    Number of children: 3    Years of education: Not on file    Highest education level: Not on file   Occupational History    Occupation: disability   Social Needs    Financial resource strain: Not on file    Food insecurity Worry: Not on file     Inability: Not on file    Transportation needs     Medical: Not on file     Non-medical: Not on file   Tobacco Use    Smoking status: Former Smoker     Packs/day: 3.00     Years: 50.00     Pack years: 150.00     Last attempt to quit: 2009     Years since quittin.8    Smokeless tobacco: Never Used   Substance and Sexual Activity    Alcohol use: No    Drug use: No    Sexual activity: Not on file   Lifestyle    Physical activity     Days per week: Not on file     Minutes per session: Not on file    Stress: Not on file   Relationships    Social connections     Talks on phone: Not on file     Gets together: Not on file     Attends [employer] service: Not on file     Active member of club or organization: Not on file     Attends meetings of clubs or organizations: Not on file     Relationship status: Not on file    Intimate partner violence     Fear of current or ex partner: Not on file     Emotionally abused: Not on file     Physically abused: Not on file     Forced sexual activity: Not on file   Other Topics Concern    Not on file   Social History Narrative    Not on file        Family History   Problem Relation Age of Onset    Cancer Mother     Cancer Father     Heart Disease Maternal Aunt     Cancer Sister        Vitals:    20 1026   BP: 128/80   Pulse: 70   Resp: 18   Temp: 97.3 °F (36.3 °C)   SpO2: 98%   Weight: 190 lb 9.6 oz (86.5 kg)   Height: 5' 6\" (1.676 m)     Estimated body mass index is 30.76 kg/m² as calculated from the following:    Height as of this encounter: 5' 6\" (1.676 m). Weight as of this encounter: 190 lb 9.6 oz (86.5 kg). Physical Exam  Constitutional:       General: She is not in acute distress. Appearance: Normal appearance. She is well-developed. She is obese. She is ill-appearing. She is not toxic-appearing or diaphoretic. HENT:      Head: Normocephalic and atraumatic.       Right Ear: Hearing, tympanic membrane, ear canal and external ear normal. There is no impacted cerumen. Left Ear: Hearing, tympanic membrane, ear canal and external ear normal. There is no impacted cerumen. Nose: Nose normal. No congestion or rhinorrhea. Right Sinus: No maxillary sinus tenderness or frontal sinus tenderness. Left Sinus: No maxillary sinus tenderness or frontal sinus tenderness. Mouth/Throat:      Pharynx: Uvula midline. No oropharyngeal exudate or posterior oropharyngeal erythema. Eyes:      General: Lids are normal. No scleral icterus. Right eye: No discharge. Left eye: No discharge. Extraocular Movements: Extraocular movements intact. Conjunctiva/sclera: Conjunctivae normal.      Pupils: Pupils are equal, round, and reactive to light. Funduscopic exam:     Right eye: No hemorrhage, exudate, AV nicking, arteriolar narrowing or papilledema. Left eye: No hemorrhage, exudate, AV nicking, arteriolar narrowing or papilledema. Comments: Glasses   Neck:      Musculoskeletal: Full passive range of motion without pain, normal range of motion and neck supple. No neck rigidity or muscular tenderness. Thyroid: No thyromegaly. Vascular: Normal carotid pulses. No carotid bruit, hepatojugular reflux or JVD. Trachea: Trachea and phonation normal. No tracheal deviation. Cardiovascular:      Rate and Rhythm: Normal rate and regular rhythm. No extrasystoles are present. Chest Wall: PMI is not displaced. Pulses: Normal pulses. No decreased pulses. Carotid pulses are 2+ on the right side and 2+ on the left side. Radial pulses are 2+ on the right side and 2+ on the left side. Femoral pulses are 2+ on the right side and 2+ on the left side. Popliteal pulses are 2+ on the right side and 2+ on the left side. Dorsalis pedis pulses are 2+ on the right side and 2+ on the left side.         Posterior tibial pulses are 2+ on the right side and 2+ on the left side. Heart sounds: Normal heart sounds. No murmur. No friction rub. No gallop. Pulmonary:      Effort: Pulmonary effort is normal. No respiratory distress. Breath sounds: Normal breath sounds. No stridor. No decreased breath sounds, wheezing, rhonchi or rales. Chest:      Chest wall: No tenderness. Abdominal:      General: Bowel sounds are normal. There is no distension or abdominal bruit. Palpations: Abdomen is soft. There is no shifting dullness, fluid wave, mass or pulsatile mass. Tenderness: There is no abdominal tenderness. There is no right CVA tenderness, left CVA tenderness, guarding or rebound. Hernia: No hernia is present. There is no hernia in the ventral area. Genitourinary:     Exam position: Supine. Musculoskeletal: Normal range of motion. General: Tenderness (hands and wrists, knees. ) present. No swelling, deformity or signs of injury. Right lower leg: No edema. Left lower leg: No edema. Lymphadenopathy:      Head:      Right side of head: No submental, submandibular, tonsillar, preauricular, posterior auricular or occipital adenopathy. Left side of head: No submental, submandibular, tonsillar, preauricular, posterior auricular or occipital adenopathy. Cervical: No cervical adenopathy. Upper Body:      Right upper body: No supraclavicular or epitrochlear adenopathy. Left upper body: No supraclavicular or epitrochlear adenopathy. Skin:     General: Skin is warm and dry. Capillary Refill: Capillary refill takes less than 2 seconds. Coloration: Skin is not jaundiced or pale. Findings: No abrasion, bruising, erythema, lesion or rash. Nails: There is no clubbing. Neurological:      General: No focal deficit present. Mental Status: She is alert and oriented to person, place, and time. Mental status is at baseline. She is not disoriented. Cranial Nerves: No cranial nerve deficit. Sensory: No sensory deficit. Motor: No weakness, tremor, atrophy, abnormal muscle tone or seizure activity. Coordination: Coordination normal.      Gait: Gait normal.      Deep Tendon Reflexes: Reflexes are normal and symmetric. Reflexes normal. Babinski sign absent on the right side. Babinski sign absent on the left side. Reflex Scores:       Tricep reflexes are 2+ on the right side and 2+ on the left side. Bicep reflexes are 2+ on the right side and 2+ on the left side. Brachioradialis reflexes are 2+ on the right side and 2+ on the left side. Patellar reflexes are 2+ on the right side and 2+ on the left side. Achilles reflexes are 2+ on the right side and 2+ on the left side. Psychiatric:         Mood and Affect: Mood normal.         Speech: Speech normal.         Behavior: Behavior normal.         Thought Content: Thought content normal.         Judgment: Judgment normal.         ASSESSMENT/PLAN:  1. Medicare annual wellness visit, subsequent  Mammogram soon. Will check w/ Dr Jud Post about Colonoscopy. 2. Pulmonary HTN ([facility]  Continue present meds. To see Pulmonology as scheduled. Call if new c/o of SOB. 3. Aspiration pneumonia of both upper lobes due to regurgitated food ([facility]  Reoccurring. To continue w/ Speech Therapy, call if new c/o. Will discuss w/ Dr Jud Post. 4. Centrilobular emphysema ([facility]  To continue present meds and Oxygen. Call if new c/o. Will discuss w/ Pulmonary possible . 5. Pure hypercholesterolemia  Will do labs and adjust meds if needed. To improve diet and exercise. To lose some weight. Call if need further assistance. Call if new c/o w/ meds. Next lab is due soon    6. Rheumatoid arthritis involving multiple sites with positive rheumatoid factor ([facility]  To Rheumatology as scheduled. To continue meds. To call if new c/o.     7. Essential hypertension  Continue present meds. Home BP checks. Call if>140/90. Improve diet.  Avoid caffeine and salt. Call if new c/o w/ meds. 8. HEBERT on CPAP  Continue Bi-pap. Call if new c/o. RTAM 3 months. An  electronic signature was used to authenticate this note. --Irasema Guadalupe MD on 2020 at 10:52 AM  Medicare Annual Wellness Visit  Name: Silviano Sample Date: 2020   MRN: <[id number]> Sex: Female   Age: 68 y.o. Ethnicity: Non-/Non    : 1940 Race: Black      Bettie Ceballos is here for Medicare AWV    Screenings for behavioral, psychosocial and functional/safety risks, and cognitive dysfunction are all negative except as indicated below. These results, as well as other patient data from the [address] form, are documented in Flowsheets linked to this Encounter. Allergies   Allergen Reactions    Alendronate Sodium      Jaw pain      Humira [Adalimumab]      Rash      Penicillins      rash    Simvastatin Other (See Comments)     Made legs ache    Sulfa Antibiotics Swelling     Tongue swelled    Tape [place] Tape]        Prior to Visit Medications    Medication Sig Taking? Authorizing Provider   predniSONE (DELTASONE) 10 MG tablet TAKE 1/2 TABLET BY MOUTH Jackson Parks MD   Misc.  Devices [facility]) MISC 1 each by Does not apply route daily as needed (weakness)  Irasema Guadalupe MD   famotidine (PEPCID) 20 MG tablet Take 1 tablet by mouth 2 times daily  ERICA Lee MD   atorvastatin (LIPITOR) 10 MG tablet TAKE 1 TABLET BY MOUTH ONCE DAILY *EMERGENCY REFILL*  ERICA Lee MD   losartan (COZAAR) 100 MG tablet Take 1 tablet by mouth daily  ERICA Lee MD   albuterol sulfate  (90 Base) MCG/ACT inhaler INHALE TWO (2) PUFFS BY MOUTH EVERY 6 HOURS AS NEEDED  C Shravan Lee MD   OXYGEN Inhale 2 L/min into the lungs daily as needed  Historical Provider, MD   Roflumilast (DALIRESP) 500 MCG tablet Take 500 mcg by mouth daily  Historical Provider, MD   levothyroxine (SYNTHROID) 100 MCG tablet TAKE 1 TABLET BY MOUTH ONCE Yaw Davis MD   hydrochlorothiazide (HYDRODIURIL) 25 MG tablet Take 1 tablet by mouth daily  Historical Provider, MD   labetalol (NORMODYNE) 100 MG tablet Take 1 tablet by mouth every 12 hours  Zaina Ho MD   Blood Pressure Monitoring (BLOOD PRESSURE MONITOR/M CUFF) MISC 1 Units by Does not apply route daily  C Jasper Gonzalez MD   ipratropium-albuterol (DUONEB) 0.5-2.5 (3) MG/3ML SOLN nebulizer solution Inhale 3 mLs into the lungs every 4 hours as needed. Cas Mccoy MD   nitroGLYCERIN (NITROSTAT) 0.4 MG SL tablet Place 0.4 mg under the tongue every 5 minutes as needed.     Historical Provider, MD       Past Medical History:   Diagnosis Date    Acid reflux     Acquired hypothyroidism 7/6/2017    Anemia     Asthma     CHF (congestive heart failure) (HCC)     [facility] disease) [facility]     HLD (hyperlipidemia)     Hypertension     Influenza A 01/22/2018    MI (myocardial infarction) ([facility]     X 2    Migraine     past hx    Rheumatoid arthritis     Wears glasses        Past Surgical History:   Procedure Laterality Date    BRONCHOSCOPY N/A 3/27/2019    BRONCHOSCOPY ALVEOLAR LAVAGE performed by Olamide Dubois MD at [address] [facility]  3/27/2019    BRONCHOSCOPY THERAPUTIC ASPIRATION INITIAL performed by Olamide Dubois MD at [address] [facility] Bilateral 03/23/2011    COLONOSCOPY N/A 6/12/2019    COLONOSCOPY WITH ANESTHESIA -SLEEP APNEA- performed by Aidee Solomon MD at [address] [facility] 12/4/2018    LEFT LUMBAR FOUR, LUMBAR FIVE TRANSFORAMINAL EPIDURAL STEROID INJECTION SITE CONFIRMED BY FLUOROSCOPY performed by Daniel Vásquez MD at [address] [facility] N/A 7/23/2019    ESOPHAGEAL MOTILITY/MANOMETRY STUDY performed by Jass Vee MD at [address] little toe    GASTROSTOMY TUBE PLACEMENT N/A 4/1/2019    EGD PEG TUBE PLACEMENT performed by Celia Dejesus MD at [address]      total, fibroids    KNEE SURGERY      right    [place] [address] DX/THER SBST INTRLMNR CRV/THRC W/IMG GDN Left 8/21/2018    LEFT LUMBAR FOUR/ LUMBAR FIVE CYST ASPIRATION SITE CONFIRMED BY FLUOROSCOPY performed by Henrique Eid MD at [address] N/A 3/27/2019    EGD DIAGNOSTIC ONLY performed by Wellington Howard MD at [address] [facility] ENDOSCOPY N/A 9/6/2020    EGD DIAGNOSTIC ONLY performed by Hans Don DO at [address]       Family History   Problem Relation Age of Onset    Cancer Mother     Cancer Father     Heart Disease Maternal Aunt     Cancer Sister        CareTeam (Including outside providers/suppliers regularly involved in providing care):   Patient Care Team:  Addi Moe MD as PCP - General (Internal Medicine)  Addi Moe MD as PCP - [facility] Empaneled Provider  Judy Terry MD as Consulting Physician (Pulmonology)    Wt Readings from Last 3 Encounters:   11/23/20 190 lb 9.6 oz (86.5 kg)   09/10/20 190 lb 9.6 oz (86.5 kg)   09/06/20 184 lb (83.5 kg)     Vitals:    11/23/20 1026   BP: 128/80   Pulse: 70   Resp: 18   Temp: 97.3 °F (36.3 °C)   SpO2: 98%   Weight: 190 lb 9.6 oz (86.5 kg)   Height: 5' 6\" (1.676 m)     Body mass index is 30.76 kg/m². Based upon direct observation of the patient, evaluation of cognition reveals recent and remote memory intact. See H&P above. Patient's complete Health Risk Assessment and screening values have been reviewed and are found in Flowsheets. The following problems were reviewed today and where indicated follow up appointments were made and/or referrals ordered.     Positive Risk Factor Screenings with Interventions:     Fall Risk:  2 or more falls in past year?: (!) yes  Fall with injury in past year?: no  Fall Risk Interventions:    · Home safety tips provided    General Health and ACP:  General  In general, how would you say your health is?: (!) Poor  In the past 7 days, have you experienced any of the following? New or Increased Pain, New or Increased Fatigue, Loneliness, Social Isolation, Stress or Anger?: (!) New or Increased Pain, Stress  Do you get the social and emotional support that you need?: (!) No  Advance Directives     Power of  Living Will ACP-Advance Directive ACP-Power of     Not on File [place] on 04/16/19 Vipin Nicholson      General Health Risk Interventions:  · No Living Will: ACP documents already completed- patient asked to provide copy to the office    Health Habits/Nutrition:  Health Habits/Nutrition  Do you exercise for at least 20 minutes 2-3 times per week?: (!) No  Have you lost any weight without trying in the past 3 months?: No  Do you eat fewer than 2 meals per day?: No  Have you seen a dentist within the past year?: Yes  Body mass index: (!) 30.76  Health Habits/Nutrition Interventions:  · up to date. Hearing/Vision:  No exam data present  Hearing/Vision  Do you or your family notice any trouble with your hearing?: (!) Yes  Do you have difficulty driving, watching TV, or doing any of your daily activities because of your eyesight?: No  Have you had an eye exam within the past year?: Yes  Hearing/Vision Interventions:  · Hearing concerns:  to call if hearing worsens. ADL:  ADLs  In the past 7 days, did you need help from others to perform any of the following everyday activities? Eating, dressing, grooming, bathing, toileting, or walking/balance?: (!) Grooming, Walking/Balance  In the past 7 days, did you need help from others to take care of any of the following?  Laundry, housekeeping, banking/finances, shopping, telephone use, food preparation, transportation, or taking medications?: [employer], Housekeeping, Banking/Finances, Shopping, Transportation  ADL Interventions:  · Patient declines any further evaluation/treatment for this issue    Personalized Preventive Plan   Current Health Maintenance Status  Immunization History   Administered Date(s) Administered    Influenza A ([id number]) Vaccine PF IM 12/30/2009    Influenza Vaccine, unspecified formulation 12/30/2009, 10/18/2011, 08/29/2012, 11/22/2013, 08/27/2014, 11/19/2015, 10/16/2019    Influenza Virus Vaccine 12/30/2009, 12/30/2009, 10/16/2019    Influenza, High Dose (Fluzone 65 yrs and older) 10/18/2011, 08/29/2012, 11/22/2013, 08/27/2014, 11/19/2015, 10/18/2017, 11/06/2018    Influenza, Quadv, adjuvanted, 65 yrs +, IM, PF (Fluad) 11/18/2020    PPD Test 07/20/2016    Pneumococcal Conjugate 13-valent (Rogena Peabody) 11/19/2015, 10/23/2019    Pneumococcal Polysaccharide ([id number]) 12/30/2009, 08/06/2018    Tdap (Boostrix, Adacel) 10/23/2019        Health Maintenance   Topic Date Due    Shingles Vaccine (1 of 2) 03/04/1992    Lipid screen  06/07/2020    TSH testing  11/06/2020    Potassium monitoring  09/06/2021    Creatinine monitoring  09/06/2021    DTaP/Tdap/Td vaccine (2 - Td) 10/23/2029    DEXA (modify frequency per FRAX score)  Completed    Flu vaccine  Completed    Pneumococcal 65+ years Vaccine  Completed    Hepatitis A vaccine  Aged Out    Hepatitis B vaccine  Aged Out    Hib vaccine  Aged Out    Meningococcal (ACWY) vaccine  Aged Out     Recommendations for [place] Due: see orders and patient instructions/AVS.  . Recommended screening schedule for the next 5-10 years is provided to the patient in written form: see Patient Instructions/AVS.    Vicky Plunkett was seen today for medicare awv.     Diagnoses and all orders for this visit:    Medicare annual wellness visit, subsequent    Pulmonary HTN ([address]    Aspiration pneumonia of both upper lobes due to regurgitated food ([address]    Centrilobular emphysema ([address]    Pure hypercholesterolemia    Rheumatoid arthritis involving multiple sites with positive rheumatoid factor [facility])    Essential hypertension    HEBERT on CPAP

## 2020-11-23 NOTE — ASSESSMENT & PLAN NOTE
Mammo: 11/2019(appt 11/27/2020)  Pap: s/p hysterectomy  Colon: ?  Eye: 11/2020  Hearing: passed in office exam. Some hearing loss  Tob: nonsmoker/Quit 2008/ >50 pk yrs. Caff: low  Etoh: none  MMSE: 30/30  Get up and Go: in [place].    LW/MPA: yes

## 2020-11-23 NOTE — PATIENT INSTRUCTIONS
Visit Summary for your review. Other Preventive Recommendations:    · A preventive eye exam performed by an eye specialist is recommended every 1-2 years to screen for glaucoma; cataracts, macular degeneration, and other eye disorders. · A preventive dental visit is recommended every 6 months. · Try to get at least 150 minutes of exercise per week or 10,000 steps per day on a pedometer . · Order or download the FREE \"Exercise & Physical Activity: Your Everyday Guide\" from [employer]. Call 6-911.127.2446 or search [employer] online. · You need 4659-6490 mg of calcium and 9106-1205 IU of vitamin D per day. It is possible to meet your calcium requirement with diet alone, but a vitamin D supplement is usually necessary to meet this goal.  · When exposed to the sun, use a sunscreen that protects against both UVA and UVB radiation with an SPF of 30 or greater. Reapply every 2 to 3 hours or after sweating, drying off with a towel, or swimming. · Always wear a seat belt when traveling in a car. Always wear a helmet when riding a bicycle or motorcycle.

## 2020-11-27 ENCOUNTER — HOSPITAL ENCOUNTER (OUTPATIENT)
Dept: WOMENS IMAGING | Age: 80
Discharge: HOME | End: 2020-11-27
Payer: COMMERCIAL

## 2020-11-27 PROCEDURE — 77067 SCR MAMMO BI INCL CAD: CPT

## 2020-12-03 NOTE — TELEPHONE ENCOUNTER
Please see previous encounters regarding a wheelchair. Patient's daughter Jose Armando Tran called bc she has not heard anything since October. She needs to hear back indicating the wheelchair will be approved, that the prior authorization that was supposed to be started was started, when the forms will be processed.

## 2020-12-23 PROBLEM — Z00.00 MEDICARE ANNUAL WELLNESS VISIT, SUBSEQUENT: Status: RESOLVED | Noted: 2020-11-23 | Resolved: 2020-12-23

## 2021-01-06 ENCOUNTER — TELEPHONE (OUTPATIENT)
Dept: FAMILY MEDICINE CLINIC | Age: 81
End: 2021-01-06

## 2021-01-06 NOTE — TELEPHONE ENCOUNTER
Patient's grand daughter Lindsey Cotton is following up on the prescription for the wheelchair. Please see previous encounters. What is status? It appears the second page of the forms to [employer] was incomplete?

## 2021-01-12 NOTE — TELEPHONE ENCOUNTER
Patient is calling to see what the status is of the wheelchair prescription. Please see previous encounters.

## 2021-01-14 NOTE — TELEPHONE ENCOUNTER
Patient's grand daughter called. Katerina Black at [facility] called to advise the office notes received did not indicate her medical necessity for needing a wheelchair. Katerina Black faxed new forms to you yesterday. Per insurance, he needs to know if a walker and cane has been ruled out. Also for the wheelchair they would like to request the wheelchair to be manual and self propelled. If he receives the information today, she can  the wheelchair tomorrow. She would like a call when the information has been faxed to Katerina Black at [facility]. She states she has not gotten a call in the past to confirm information being requested.

## 2021-01-14 NOTE — TELEPHONE ENCOUNTER
S/w daughter it is difficult for pt to maneuver with cane and even walker in her apartment. Patient actually fell today inside her apartment- has to use a chair on wheels to move around. Leg and back are a little swollen. Pt declined to go to the ER but did ask to been seen in office- scheduled w pa for fall.

## 2021-01-15 ENCOUNTER — HOSPITAL ENCOUNTER (OUTPATIENT)
Dept: GENERAL RADIOLOGY | Age: 81
Discharge: HOME | End: 2021-01-15
Payer: COMMERCIAL

## 2021-01-15 ENCOUNTER — OFFICE VISIT (OUTPATIENT)
Dept: FAMILY MEDICINE CLINIC | Age: 81
End: 2021-01-15
Payer: COMMERCIAL

## 2021-01-15 VITALS
SYSTOLIC BLOOD PRESSURE: 138 MMHG | HEIGHT: 66 IN | HEART RATE: 68 BPM | BODY MASS INDEX: 30.76 KG/M2 | DIASTOLIC BLOOD PRESSURE: 78 MMHG | OXYGEN SATURATION: 93 % | TEMPERATURE: 98 F

## 2021-01-15 DIAGNOSIS — M54.50 LUMBAR PAIN: ICD-10-CM

## 2021-01-15 DIAGNOSIS — M54.50 LUMBAR PAIN: Primary | ICD-10-CM

## 2021-01-15 DIAGNOSIS — Z91.81 AT HIGH RISK FOR FALLS: ICD-10-CM

## 2021-01-15 PROCEDURE — G8484 FLU IMMUNIZE NO ADMIN: HCPCS | Performed by: PHYSICIAN ASSISTANT

## 2021-01-15 PROCEDURE — 72100 X-RAY EXAM L-S SPINE 2/3 VWS: CPT

## 2021-01-15 PROCEDURE — 1036F TOBACCO NON-USER: CPT | Performed by: PHYSICIAN ASSISTANT

## 2021-01-15 PROCEDURE — 1123F ACP DISCUSS/DSCN MKR DOCD: CPT | Performed by: PHYSICIAN ASSISTANT

## 2021-01-15 PROCEDURE — 99213 OFFICE O/P EST LOW 20 MIN: CPT | Performed by: PHYSICIAN ASSISTANT

## 2021-01-15 PROCEDURE — 4040F PNEUMOC VAC/ADMIN/RCVD: CPT | Performed by: PHYSICIAN ASSISTANT

## 2021-01-15 PROCEDURE — G8399 PT W/DXA RESULTS DOCUMENT: HCPCS | Performed by: PHYSICIAN ASSISTANT

## 2021-01-15 PROCEDURE — 1090F PRES/ABSN URINE INCON ASSESS: CPT | Performed by: PHYSICIAN ASSISTANT

## 2021-01-15 PROCEDURE — G8417 CALC BMI ABV UP PARAM F/U: HCPCS | Performed by: PHYSICIAN ASSISTANT

## 2021-01-15 PROCEDURE — G8427 DOCREV CUR MEDS BY ELIG CLIN: HCPCS | Performed by: PHYSICIAN ASSISTANT

## 2021-01-15 RX ORDER — MELOXICAM 7.5 MG/1
7.5 TABLET ORAL DAILY PRN
Qty: 20 TABLET | Refills: 0 | Status: SHIPPED | OUTPATIENT
Start: 2021-01-15 | End: 2021-01-20

## 2021-01-15 RX ORDER — ERGOCALCIFEROL (VITAMIN D2) 1250 MCG
50000 CAPSULE ORAL
COMMUNITY
Start: 2020-09-01

## 2021-01-15 RX ORDER — TIZANIDINE 2 MG/1
2 TABLET ORAL 2 TIMES DAILY PRN
Qty: 15 TABLET | Refills: 0 | Status: SHIPPED | OUTPATIENT
Start: 2021-01-15 | End: 2021-02-18 | Stop reason: CLARIF

## 2021-01-15 RX ORDER — DULOXETIN HYDROCHLORIDE 30 MG/1
CAPSULE, DELAYED RELEASE ORAL
COMMUNITY
Start: 2020-09-01 | End: 2021-01-22

## 2021-01-15 ASSESSMENT — PATIENT HEALTH QUESTIONNAIRE - PHQ9
SUM OF ALL RESPONSES TO PHQ QUESTIONS 1-9: 14
9. THOUGHTS THAT YOU WOULD BE BETTER OFF DEAD, OR OF HURTING YOURSELF: 2
6. FEELING BAD ABOUT YOURSELF - OR THAT YOU ARE A FAILURE OR HAVE LET YOURSELF OR YOUR FAMILY DOWN: 0
1. LITTLE INTEREST OR PLEASURE IN DOING THINGS: 3
2. FEELING DOWN, DEPRESSED OR HOPELESS: 3
SUM OF ALL RESPONSES TO PHQ QUESTIONS 1-9: 14
4. FEELING TIRED OR HAVING LITTLE ENERGY: 1

## 2021-01-15 ASSESSMENT — ENCOUNTER SYMPTOMS
BACK PAIN: 1
RESPIRATORY NEGATIVE: 1

## 2021-01-15 NOTE — PROGRESS NOTES
Subjective:      Patient ID: Reena Fernandez is a 68 y.o. female. HPI  Presents today after a fall yesterday out of her desk chair that is on wheels. The chair hit her in the back on the right sided. She was not able to get up on her own, had to have help getting up. The pain has gradually worsened and the back is tightening up. The pain radiates to her ankle from low back. No numbness or tingling. She uses a desk chair on wheels to get around in her house. Has been trying to get a wheel chair but that has not happened yet, we have sent info twice to no avail. Leg strength is weak, not able to use a can or walker well. She has had a few falls at home now secondary to this. Review of Systems   Constitutional: Negative. Respiratory: Negative. Cardiovascular: Negative. Musculoskeletal: Positive for back pain. Down right leg       Objective:   Physical Exam  Constitutional:       Appearance: Normal appearance. Cardiovascular:      Rate and Rhythm: Regular rhythm. Heart sounds: Normal heart sounds. Pulmonary:      Effort: Pulmonary effort is normal.      Breath sounds: Normal breath sounds. Musculoskeletal:      Lumbar back: She exhibits decreased range of motion, tenderness and pain. Comments: Patient endorses a great amount of pain with minimal exam   Neurological:      Mental Status: She is alert. Assessment / Plan:          Diagnosis Orders   1. Lumbar pain     2. At high risk for falls     3. Positive depression screening  Positive Screen for Clinical Depression with a Documented Follow-up Plan      Xray with no acute issues. Trial of short term mobic and muscle relaxer. Patient is to call if no improvement or signs and symptoms worsen. On the basis of positive falls risk screening, assessment and plan is as follows: home safety tips provided, will work on getting wheel chair approved.

## 2021-01-20 ENCOUNTER — OFFICE VISIT (OUTPATIENT)
Dept: PULMONOLOGY | Age: 81
End: 2021-01-20
Payer: COMMERCIAL

## 2021-01-20 ENCOUNTER — TELEPHONE (OUTPATIENT)
Dept: PULMONOLOGY | Age: 81
End: 2021-01-20

## 2021-01-20 VITALS
HEIGHT: 66 IN | WEIGHT: 187.6 LBS | HEART RATE: 71 BPM | TEMPERATURE: 97.2 F | OXYGEN SATURATION: 98 % | DIASTOLIC BLOOD PRESSURE: 75 MMHG | SYSTOLIC BLOOD PRESSURE: 143 MMHG | BODY MASS INDEX: 30.15 KG/M2

## 2021-01-20 DIAGNOSIS — Z87.891 FORMER SMOKER: ICD-10-CM

## 2021-01-20 DIAGNOSIS — G47.33 OSA ON CPAP: ICD-10-CM

## 2021-01-20 DIAGNOSIS — Q21.10 ASD (ATRIAL SEPTAL DEFECT): ICD-10-CM

## 2021-01-20 DIAGNOSIS — I27.20 PULMONARY HTN (HCC): ICD-10-CM

## 2021-01-20 DIAGNOSIS — M05.79 RHEUMATOID ARTHRITIS INVOLVING MULTIPLE SITES WITH POSITIVE RHEUMATOID FACTOR (HCC): ICD-10-CM

## 2021-01-20 DIAGNOSIS — M54.50 LUMBAR PAIN: ICD-10-CM

## 2021-01-20 DIAGNOSIS — J96.11 CHRONIC RESPIRATORY FAILURE WITH HYPOXIA (HCC): ICD-10-CM

## 2021-01-20 DIAGNOSIS — Z99.89 OSA ON CPAP: ICD-10-CM

## 2021-01-20 DIAGNOSIS — J43.2 CENTRILOBULAR EMPHYSEMA (HCC): Primary | ICD-10-CM

## 2021-01-20 PROCEDURE — 3023F SPIROM DOC REV: CPT | Performed by: INTERNAL MEDICINE

## 2021-01-20 PROCEDURE — G8399 PT W/DXA RESULTS DOCUMENT: HCPCS | Performed by: INTERNAL MEDICINE

## 2021-01-20 PROCEDURE — G8484 FLU IMMUNIZE NO ADMIN: HCPCS | Performed by: INTERNAL MEDICINE

## 2021-01-20 PROCEDURE — 1090F PRES/ABSN URINE INCON ASSESS: CPT | Performed by: INTERNAL MEDICINE

## 2021-01-20 PROCEDURE — G8427 DOCREV CUR MEDS BY ELIG CLIN: HCPCS | Performed by: INTERNAL MEDICINE

## 2021-01-20 PROCEDURE — 1036F TOBACCO NON-USER: CPT | Performed by: INTERNAL MEDICINE

## 2021-01-20 PROCEDURE — 1123F ACP DISCUSS/DSCN MKR DOCD: CPT | Performed by: INTERNAL MEDICINE

## 2021-01-20 PROCEDURE — 4040F PNEUMOC VAC/ADMIN/RCVD: CPT | Performed by: INTERNAL MEDICINE

## 2021-01-20 PROCEDURE — G8926 SPIRO NO PERF OR DOC: HCPCS | Performed by: INTERNAL MEDICINE

## 2021-01-20 PROCEDURE — G8417 CALC BMI ABV UP PARAM F/U: HCPCS | Performed by: INTERNAL MEDICINE

## 2021-01-20 PROCEDURE — 99215 OFFICE O/P EST HI 40 MIN: CPT | Performed by: INTERNAL MEDICINE

## 2021-01-20 RX ORDER — MELOXICAM 7.5 MG/1
TABLET ORAL
Qty: 20 TABLET | Refills: 0 | Status: SHIPPED | OUTPATIENT
Start: 2021-01-20 | End: 2021-02-18 | Stop reason: CLARIF

## 2021-01-20 RX ORDER — FLUTICASONE FUROATE, UMECLIDINIUM BROMIDE AND VILANTEROL TRIFENATATE 200; 62.5; 25 UG/1; UG/1; UG/1
1 POWDER RESPIRATORY (INHALATION) DAILY
Qty: 2 EACH | Refills: 0
Start: 2021-01-20 | End: 2021-02-19

## 2021-01-20 ASSESSMENT — SLEEP AND FATIGUE QUESTIONNAIRES
HOW LIKELY ARE YOU TO NOD OFF OR FALL ASLEEP WHILE SITTING INACTIVE IN A PUBLIC PLACE: 0
HOW LIKELY ARE YOU TO NOD OFF OR FALL ASLEEP WHILE SITTING QUIETLY AFTER LUNCH WITHOUT ALCOHOL: 2
HOW LIKELY ARE YOU TO NOD OFF OR FALL ASLEEP WHILE WATCHING TV: 3
HOW LIKELY ARE YOU TO NOD OFF OR FALL ASLEEP WHILE SITTING AND TALKING TO SOMEONE: 0

## 2021-01-20 NOTE — TELEPHONE ENCOUNTER
Within this Telehealth Consent, the terms you and yours refer to the person using the Telehealth Service (Service), or in the case of a use of the Service by or on behalf of a minor, you and yours refer to and include (i) the parent or legal guardian who provides consent to the use of the Service by such minor or uses the Service on behalf of such minor, and (ii) the minor for whom consent is being provided or on whose behalf the Service is being utilized. When using Service, you will be consulting with your health care providers via the use of Telehealth.   Telehealth involves the delivery of healthcare services using electronic communications, information technology or other means between a healthcare provider and a patient who are not in the same physical location. Telehealth may be used for diagnosis, treatment, follow-up and/or patient education, and may include, but is not limited to, one or more of the following:    Electronic transmission of medical records, photo images, personal health information or other data between a patient and a healthcare provider    Interactions between a patient and healthcare provider via audio, video and/or data communications    Use of output data from medical devices, sound and video files    Anticipated Benefits   The use of Telehealth by your Provider(s) through the Service may have the following possible benefits:    Making it easier and more efficient for you to access medical care and treatment for the conditions treated by such Provider(s) utilizing the Service    Allowing you to obtain medical care and treatment by Provider(s) at times that are convenient for you    Enabling you to interact with Provider(s) without the necessity of an in-office appointment     Possible Risks   While the use of Telehealth can provide potential benefits for you, there are also potential risks associated with the use of Telehealth.  These risks include, but may not be limited to the following:    Your Provider(s) may not able to provide medical treatment for your particular condition and you may be required to seek alternative healthcare or emergency care services.  The electronic systems or other security protocols or safeguards used in the Service could fail, causing a breach of privacy of your medical or other information.  Given regulatory requirements in certain jurisdictions, your Provider(s) diagnosis and/or treatment options, especially pertaining to certain prescriptions, may be limited. Acceptance   1. You understand that Services will be provided via Telehealth. This process involves the use of HIPAA compliant and secure, real-time audio-visual interfacing with a qualified and appropriately trained provider located at [facility]. 2. You understand that, under no circumstances, will this session be recorded. 3. You understand that the Provider(s) at [facility] and other clinical participants will be party to the information obtained during the Telehealth session in accordance with best medical practices. 4. You understand that the information obtained during the Telehealth session will be used to help determine the most appropriate treatment options. 5. You understand that You have the right to revoke this consent at any point in time. 6. You understand that Telehealth is voluntary, and that continued treatment is not dependent upon consent. 7. You understand that, in the event of non-consent to Telehealth services and/or technical difficulties, you will obtain services as typically provided in the absence of Telehealth technology. 8. You understand that this consent will be kept in Your medical record. 9. No potential benefits from the use of Telehealth or specific results can be guaranteed. Your condition may not be cured or improved and, in some cases, may get worse.    10. There are limitations in the provision of medical care and treatment via Telehealth and the Service and you may not be able to receive diagnosis and/or treatment through the Service for every condition for which you seek diagnosis and/or treatment. 11. There are potential risks to the use of Telehealth, including but not limited to the risks described in this Telehealth Consent. 12. Your Provider(s) have discussed the use of Telehealth and the Service with you, including the benefits and risks of such and you have provided oral consent to your Provider(s) for the use of Telehealth and the Service. 15. You understand that it is your duty to provide your Provider(s) truthful, accurate and complete information, including all relevant information regarding care that you may have received or may be receiving from other healthcare providers outside of the Service. 14. You understand that each of your Provider(s) may determine in his or sole discretion that your condition is not suitable for diagnosis and/or treatment using the Service, and that you may need to seek medical care and treatment a specialist or other healthcare provider, outside of the Service. 15. You understand that you are fully responsible for payment for all services provided by Provider(s) or through use of the Service and that you may not be able to use third-party insurance. 16. You represent that (a) you have read this Telehealth Consent carefully, (b) you understand the risks and benefits of the Service and the use of Telehealth in the medical care and treatment provided to you by Provider(s) using the Service, and (c) you have the legal capacity and authority to provide this consent for yourself and/or the minor for which you are consenting under applicable federal and state laws, including laws relating to the age of [de-identified] and/or parental/guardian consent.    17. You give your informed consent to the use of Telehealth by Provider(s) using the Service under the terms described in the Terms of Service and this Telehealth Consent. The patient was read the following statement and has consented to the visit as of 1/20/21. The patient has been scheduled for their first telehealth visit on 2/18/21 with [facility].

## 2021-01-20 NOTE — PROGRESS NOTES
MA Communication:   The following orders are received by verbal communication from Tessy Amador MD    Orders include: FU 4 Wk VV

## 2021-01-20 NOTE — PROGRESS NOTES
Chief Complaint/Referring Provider:  Pulmonary evaluation     Presenting HPI: Patient has come to the office for pulmonary evaluation as referred by his PCP/Dr. Caity Foster  Patient was seen as an inpatient in 2019    Patient states that she has increasing shortness of breath, patient states that she has coughing along with that patient has thick but clear respiratory secretions, patient also has been having increased nasal congestion and nasal stuffiness, patient states that she has not been able to use the humidification along with the oxygen concentrator as patient states that it comes to her nose at times, patient states that she has occasional pleuritic chest pain, patient also has occasional abdominal pain, patient used to have a PEG tube which has been removed, patient is taking food by mouth but it is modified as per the speech therapy  recommendations, patient has a history of rheumatoid arthritis and many biologicals have been tried but patient has had various side effects with that and patient is now taking Actemra twice a month as given by the rheumatologist, patient continues to have intermittent pain and patient states that last night she was having significant shoulder pain which was causing her to have asthma attack, patient takes albuterol inhaler on a as needed basis along with that patient states that she takes nebulizer treatment along with Spiriva on regular basis, patient states that she has CPAP which she is using at nighttime; patient does not have any pets, patient has central air but no humidification, patient is try to take less salt in the diet, patient denies any change in the environment or any sick contacts, patient does not have any confusion lethargy, patient does not have any other pertinent review of system of concern     Past Medical History:   Diagnosis Date    Acid reflux     Acquired hypothyroidism 7/6/2017    Anemia     Asthma     [facility]  COPD [facility]ch[facility] obstructive [facility] disease[facility]     HLD (hyperlipidemia)     Hypertension     Influenza A 01/22/2018    MI (myocardial infarction) [facility]     X 2    Migraine     past hx    Rheumatoid arthritis     Wears glasses        Past Surgical History:   Procedure Laterality Date    BRONCHOSCOPY N/A 3/27/2019    BRONCHOSCOPY ALVEOLAR LAVAGE performed by Aimee Platt MD at [address]  3/27/2019    BRONCHOSCOPY THERAPUTIC ASPIRATION INITIAL performed by Aimee Platt MD at [address] [facility] Bilateral 03/23/2011    COLONOSCOPY N/A 6/12/2019    COLONOSCOPY WITH ANESTHESIA -SLEEP APNEA- performed by Lazaro Priest MD at [address] [facility] Left 12/4/2018    LEFT LUMBAR FOUR, LUMBAR FIVE TRANSFORAMINAL EPIDURAL STEROID INJECTION SITE CONFIRMED BY FLUOROSCOPY performed by Adán Bustamante MD at [address] [facility] N/A 7/23/2019    ESOPHAGEAL MOTILITY/MANOMETRY STUDY performed by Sybil Pedro MD at [address] [facility]. little toe    GASTROSTOMY TUBE PLACEMENT N/A 4/1/2019    EGD PEG TUBE PLACEMENT performed by Lazaro Priest MD at [address]       total, fibroids    KNEE SURGERY      right    [facility] [address] DX/THER SBST INTRLMNR CRV/THRC W/IMG GDN Left 8/21/2018    LEFT LUMBAR FOUR/ LUMBAR FIVE CYST ASPIRATION SITE CONFIRMED BY FLUOROSCOPY performed by Adán Bustamante MD at [address] [facility] [address] 3/27/2019    EGD DIAGNOSTIC ONLY performed by Emiliano Allison MD at [place] [address] 9/6/2020    EGD DIAGNOSTIC ONLY performed by Shanta Gooden DO at [address]       Allergies   Allergen Reactions    Alendronate Sodium      Jaw pain      Humira [Adalimumab]      Rash      Penicillins      rash  Simvastatin Other (See Comments)     Made legs ache    Sulfa Antibiotics Swelling     Tongue swelled    Tape Artemio Bath Tape]        Medication list was reviewed and updated as needed in Epic    Social History     Socioeconomic History    Marital status: Single     Spouse name: Not on file    Number of children: 3    Years of education: Not on file    Highest education level: Not on file   Occupational History    Occupation: disability   Social Needs    Financial resource strain: Not on file    Food insecurity     Worry: Not on file     Inability: Not on file   [employer] needs     Medical: Not on file     Non-medical: Not on file   Tobacco Use    Smoking status: Former Smoker     Packs/day: 3.00     Years: 50.00     Pack years: 150.00     Start date: 3/4/1963     Quit date: 2009     Years since quittin.0    Smokeless tobacco: Never Used   Substance and Sexual Activity    Alcohol use: No    Drug use: No    Sexual activity: Not on file   Lifestyle    Physical activity     Days per week: Not on file     Minutes per session: Not on file    Stress: Not on file   Relationships    Social connections     Talks on phone: Not on file     Gets together: Not on file     Attends Evangelical service: Not on file     Active member of club or organization: Not on file     Attends meetings of clubs or organizations: Not on file     Relationship status: Not on file    Intimate partner violence     Fear of current or ex partner: Not on file     Emotionally abused: Not on file     Physically abused: Not on file     Forced sexual activity: Not on file   Other Topics Concern    Not on file   Social History Narrative    Not on file       Family History   Problem Relation Age of Onset    Cancer Mother     Cancer Father     Heart Disease Maternal Aunt     Cancer Sister             Review of Systems same as above    Physical Exam: Blood pressure (!) 143/75, pulse 71, temperature 97.2 °F (36.2 °C), temperature source Oral, height 5' 6\" (1.676 m), weight 187 lb 9.6 oz (85.1 kg), SpO2 98 %, not currently breastfeeding.'  Constitutional:  No acute distress. While sitting in the wheelchair on nasal cannula oxygen  HENT:  Oropharynx is clear and moist. No thyromegaly. Eyes:  Conjunctivae arenormal. Pupils equal, round, and reactive to light. No scleral icterus. Neck: . No tracheal deviation present. No obvious thyroid mass. Cardiovascular:Normal rate, regular rhythm, normal heart sounds. No right ventricular heave. Mild lower extremity edema. Pulmonary/Chest: No wheezes. No rales. Chest wall is not dull to percussion. Noaccessory muscle usage or stridor. Prolonged expiration with decreased breath sound density  Abdominal: Soft. Bowel sounds present. No distension or hernia. Notenderness. Musculoskeletal: No cyanosis. No clubbing. No obvious joint deformity. Lymphadenopathy: No cervical or supraclavicular adenopathy. Skin: Skin is warm and dry. No rash or nodules on the exposed extremities. Psychiatric: Normal mood and affect. Behavior is normal.  No anxiety. Neurologic: Alert, awake and oriented. PERRL. Speech fluent      Sleep Medicine Data:  Sitting and reading: Would never doze  Watching TV: High chance of dozing  Sitting, inactive in a public place (e.g. a theatre or a meeting): Would never doze  As a passenger in a car for an hour without a break: Would never doze  Lying down to rest in the afternoon when circumstances permit: Slight chance of dozing  Sitting and talking to someone: Would never doze  Sitting quietly after a lunch without alcohol: Moderate chance of dozing  In a car, while stopped for a few minutes in traffic: Would never doze  Total score: 6       Data:     Imaging:  I have reviewed radiology images personally.   No orders to display     Xr Lumbar Spine (2-3 Views)    Result Date: 1/15/2021 - Inferior vena cava: The vessel was normal in size; the    respirophasic diameter changes were in the normal range (>= 50%);    findings are consistent with normal central venous pressure. - Pulmonary arteries: PA peak pressure: 42mm Hg (S). PFT IN 2017-PFT TEST    Spirometry  Spirometry shows moderate obstructive defect. .    Bronchodilator  There is no response to bronchodilator demonstrated. Flow-Volume Loop  The flow-volume loop is compatible with obstructive lung defect. Lung Volumes  Lung volumes are normal except for elevated RV. Lung Volume Comments  There is air trapping present. Diffusing Capacity  Single breath diffusing capacity is severely decreased. A reduced DLCO can be seen in diseases causing destruction of   alveolar-capillary interface (ILD, CHF, COPD, Pneumonia, etc.)   obliteration of pulmonary vascular bed (PE, PAH), tobacco usage,   carbon monoxide poisoning. Airway Resistance  Airway resistance is moderately elevated. Comparison  In comparison to the test of 8/18/16 there is mild deterioration   in spirometry and DLCO    PFT is compatible with moderate Obstructive Lung Disease  -   Possible etiologies include: asthma, COPD, bronchiectasis,   bronchitis. Correlate clinically. 6mwt IN 2017-6 Minute Walk Test Interpretation    Patient Name: Lenny Moat: [id number]  BYTU: 8/15/2017    A 6 Minute Walk Test was performed in accordance with the ATS   Guidelines ([address] [place] [address] 2002; [id number]) at [facility]. The test was performed on room air. The baseline SpO2 while breathing room air was 91%. The patient did complete the 6 minute walk. During the study, the patient walked a total distance of 1050   feet. The minimal recorded SpO2 was 88%.     Oximetry with 6 Minute Walk Test shows desaturation to 88% on   room air, 1L, 2L and 3L nasal cannula oxygen with maximal

## 2021-01-20 NOTE — TELEPHONE ENCOUNTER
Refill Request MELOXICAM 7.5 MG TABLET    Last Seen: 1/15/2021    Last Written: 1/15/21 20 tablets with 0 refills    Next Appointment:   Future Appointments   Date Time Provider Bossman Nevarez   1/20/2021  2:20 PM Tessy Amador MD [facility]   1/22/2021  1:30 PM ERICA Nagel MD [facility]   2/25/2021  8:40 AM ERICA Nagel MD [facility]         Requested Prescriptions     Pending Prescriptions Disp Refills    meloxicam (MOBIC) 7.5 MG tablet [Pharmacy Med Name: MELOXICAM 7.5 MG TABLET] 20 tablet 0     Sig: TAKE 1 TABLET BY MOUTH EVERY DAY AS NEEDED FOR PAIN

## 2021-01-20 NOTE — PATIENT INSTRUCTIONS
.Remember to bring a list of pulmonary medications and any CPAP or BiPAP machines to your next appointment with the office. Please keep all of your future appointments scheduled by [address], [facility] office. Out of respect for other patients and providers, you may be asked to reschedule your appointment if you arrive later than your scheduled appointment time. Appointments cancelled less than 24hrs in advance will be considered a no show. Patients with three missed appointments within 1 year or four missed appointments within 2 years can be dismissed from the practice. You may receive a survey regarding the care you received during your visit. Your input is valuable to us. We encourage you to complete and return your survey. We hope you will choose us in the future for your healthcare needs. Pt instructed of all future appointment dates & times, including radiology, labs, procedures & referrals. If procedures were scheduled preparation instructions provided. Instructions on future appointments with [facility] were given.

## 2021-01-21 NOTE — TELEPHONE ENCOUNTER
Bree called regarding the wheelchair. She said Parvin Wisdom at [employer] told her a lead sheet was sent that outlines what the insurance company is looking for. Please complete and fax as soon as possible. I advised patient had an appointment tomorrow and this could be discussed further tomorrow. Bree is bringing her tomorrow and she wants to make sure she can go back with patient to give and receive information. Let  know if this is ok and they will place in the appt notes.

## 2021-01-22 ENCOUNTER — OFFICE VISIT (OUTPATIENT)
Dept: FAMILY MEDICINE CLINIC | Age: 81
End: 2021-01-22
Payer: COMMERCIAL

## 2021-01-22 VITALS
DIASTOLIC BLOOD PRESSURE: 78 MMHG | OXYGEN SATURATION: 98 % | SYSTOLIC BLOOD PRESSURE: 132 MMHG | WEIGHT: 186.6 LBS | HEIGHT: 66 IN | BODY MASS INDEX: 29.99 KG/M2 | RESPIRATION RATE: 18 BRPM | TEMPERATURE: 97.5 F | HEART RATE: 75 BPM

## 2021-01-22 DIAGNOSIS — J43.2 CENTRILOBULAR EMPHYSEMA (HCC): ICD-10-CM

## 2021-01-22 DIAGNOSIS — F32.9 REACTIVE DEPRESSION: ICD-10-CM

## 2021-01-22 DIAGNOSIS — I10 ESSENTIAL HYPERTENSION: ICD-10-CM

## 2021-01-22 DIAGNOSIS — E03.9 ACQUIRED HYPOTHYROIDISM: ICD-10-CM

## 2021-01-22 DIAGNOSIS — E78.00 PURE HYPERCHOLESTEROLEMIA: ICD-10-CM

## 2021-01-22 DIAGNOSIS — M05.79 RHEUMATOID ARTHRITIS INVOLVING MULTIPLE SITES WITH POSITIVE RHEUMATOID FACTOR (HCC): ICD-10-CM

## 2021-01-22 PROCEDURE — 3023F SPIROM DOC REV: CPT | Performed by: INTERNAL MEDICINE

## 2021-01-22 PROCEDURE — 1036F TOBACCO NON-USER: CPT | Performed by: INTERNAL MEDICINE

## 2021-01-22 PROCEDURE — 1090F PRES/ABSN URINE INCON ASSESS: CPT | Performed by: INTERNAL MEDICINE

## 2021-01-22 PROCEDURE — G8417 CALC BMI ABV UP PARAM F/U: HCPCS | Performed by: INTERNAL MEDICINE

## 2021-01-22 PROCEDURE — G8427 DOCREV CUR MEDS BY ELIG CLIN: HCPCS | Performed by: INTERNAL MEDICINE

## 2021-01-22 PROCEDURE — G8926 SPIRO NO PERF OR DOC: HCPCS | Performed by: INTERNAL MEDICINE

## 2021-01-22 PROCEDURE — G8399 PT W/DXA RESULTS DOCUMENT: HCPCS | Performed by: INTERNAL MEDICINE

## 2021-01-22 PROCEDURE — 1123F ACP DISCUSS/DSCN MKR DOCD: CPT | Performed by: INTERNAL MEDICINE

## 2021-01-22 PROCEDURE — 99214 OFFICE O/P EST MOD 30 MIN: CPT | Performed by: INTERNAL MEDICINE

## 2021-01-22 PROCEDURE — 4040F PNEUMOC VAC/ADMIN/RCVD: CPT | Performed by: INTERNAL MEDICINE

## 2021-01-22 PROCEDURE — G8484 FLU IMMUNIZE NO ADMIN: HCPCS | Performed by: INTERNAL MEDICINE

## 2021-01-22 RX ORDER — PREDNISONE 10 MG/1
5 TABLET ORAL DAILY
Qty: 15 TABLET | Refills: 1 | Status: SHIPPED | OUTPATIENT
Start: 2021-01-22 | End: 2021-02-01

## 2021-01-22 ASSESSMENT — ENCOUNTER SYMPTOMS
GASTROINTESTINAL NEGATIVE: 1
RESPIRATORY NEGATIVE: 1
ALLERGIC/IMMUNOLOGIC NEGATIVE: 1

## 2021-01-22 NOTE — PROGRESS NOTES
Subjective:      Patient ID: Ambrocio Johnson is a 68 y.o. female. HPI  Rheumatoid arthritis involving multiple sites with positive rheumatoid factor ([address]  R Shoulder injected recently, R knee swelling and pain some better. No recent meds other than injections and Prednisone 5 mg. Having severe back and knee pain. Hands stiff and sore. Much harder for her to walk even w/ walker. Appt in March. Bilateral knees swollen, hand stiff, L shoulder no movement. Unable to be mobile in house. Has fallen several times w/ walker. Discussed using Wheelchair last visit(wants self propelled). Says did not get. Essential hypertension  No change in meds, no c/o with meds, no chest pain, SOB, palpatations, or syncope. Home bp was 120-140s/70-80s. Pure hypercholesterolemia  Stable diet and wt. No c/o w/ meds. Centrilobular emphysema (HCC)  Seen on CTA. No recent change in meds. Chronic SOB. Worse when pain bad or aggravated. Acquired hypothyroidism  Stable w/ meds. Reactive depression  Very angry about loss of independence. Review of Systems   Constitutional: Negative. Respiratory: Negative. Cardiovascular: Negative. Gastrointestinal: Negative. Endocrine: Negative. Genitourinary: Negative. Musculoskeletal: Negative. Skin: Negative. Allergic/Immunologic: Negative. Neurological: Negative. Hematological: Negative. Vitals:    01/22/21 1347   BP: 132/78   Pulse: 75   Resp: 18   Temp: 97.5 °F (36.4 °C)   SpO2: 98%   Weight: 186 lb 9.6 oz (84.6 kg)   Height: 5' 6\" (1.676 m)       Objective:   Physical Exam  Constitutional:       General: She is not in acute distress. Appearance: She is well-developed. She is not ill-appearing, toxic-appearing or diaphoretic. HENT:      Head: Normocephalic and atraumatic. Eyes:      Conjunctiva/sclera: Conjunctivae normal.      Pupils: Pupils are equal, round, and reactive to light.    Neck: Musculoskeletal: Full passive range of motion without pain, normal range of motion and neck supple. Normal range of motion. Muscular tenderness present. No edema, erythema or spinous process tenderness. Thyroid: No thyroid mass or thyromegaly. Vascular: Normal carotid pulses. No carotid bruit, hepatojugular reflux or JVD. Trachea: Trachea normal. No tracheal deviation. Cardiovascular:      Rate and Rhythm: Normal rate and regular rhythm. No extrasystoles are present. Chest Wall: PMI is not displaced. Pulses: Normal pulses. No decreased pulses. Carotid pulses are 2+ on the right side and 2+ on the left side. Radial pulses are 2+ on the right side and 2+ on the left side. Femoral pulses are 2+ on the right side and 2+ on the left side. Popliteal pulses are 2+ on the right side and 2+ on the left side. Dorsalis pedis pulses are 2+ on the right side and 2+ on the left side. Posterior tibial pulses are 2+ on the right side and 2+ on the left side. Heart sounds: Normal heart sounds, S1 normal and S2 normal. Heart sounds not distant. No murmur. No friction rub. No gallop. No S3 or S4 sounds. Pulmonary:      Effort: Pulmonary effort is normal. No tachypnea, bradypnea, accessory muscle usage or respiratory distress. Breath sounds: Normal breath sounds. No decreased breath sounds, wheezing, rhonchi or rales. Chest:      Chest wall: No tenderness. Musculoskeletal: Normal range of motion. General: Swelling, tenderness and deformity present. Right lower leg: No edema. Left lower leg: No edema. Lymphadenopathy:      Cervical: No cervical adenopathy. Skin:     General: Skin is warm and dry. Coloration: Skin is not pale. Findings: No abrasion, erythema or rash. Nails: There is no clubbing. Neurological:      Mental Status: She is alert and oriented to person, place, and time. She is not disoriented. Cranial Nerves: No cranial nerve deficit. Sensory: No sensory deficit. Motor: No tremor, atrophy or abnormal muscle tone. Coordination: Coordination normal.      Deep Tendon Reflexes: Reflexes are normal and symmetric. Psychiatric:         Mood and Affect: Mood is anxious and depressed. Speech: Speech normal.         Behavior: Behavior is agitated. Thought Content: Thought content normal.         Judgment: Judgment normal.         Assessment / Plan:     Reactive Depression. Will have Livier call, see if can make Referral to Psychiatrist.     Acquired Hypothyroidism. Continue meds. Essential Hypertension. Continue present meds. Home BP checks. Call if>140/90. Improve diet. Avoid caffeine and salt. Call if new c/o w/ meds. Centrilobular Emphysema (Hcc). Continue meds. Call if new c/o. Pure Hypercholesterolemia. Continue to improve diet and lose some weight. Rheumatoid Arthritis Involving Multiple Sites With Positive Rheumatoid Factor (Hcc)  - Rx for Self Propelled Wheelchair for home use. Has had multiple falls at home w/ Brendan Benavidez.  - To Rheumatologist asap. Call and move up appt. - will restart Prednisone 5 mg.

## 2021-01-22 NOTE — PATIENT INSTRUCTIONS
Reactive Depression. Will have Livier call, see if can make Referral to Psychiatrist.     Acquired Hypothyroidism. Continue meds. Essential Hypertension. Continue present meds. Home BP checks. Call if>140/90. Improve diet. Avoid caffeine and salt. Call if new c/o w/ meds. Centrilobular Emphysema (Hcc). Continue meds. Call if new c/o. Pure Hypercholesterolemia. Continue to improve diet and lose some weight. Rheumatoid Arthritis Involving Multiple Sites With Positive Rheumatoid Factor (Hcc)  - Rx for Self Propelled Wheelchair for home use. Has had multiple falls at home w/ [employer].  - To Rheumatologist asap. Call and move up appt. - will restart Prednisone 5 mg.

## 2021-01-25 DIAGNOSIS — Z91.81 AT HIGH RISK FOR FALLS: Primary | ICD-10-CM

## 2021-01-25 DIAGNOSIS — M05.79 RHEUMATOID ARTHRITIS INVOLVING MULTIPLE SITES WITH POSITIVE RHEUMATOID FACTOR (HCC): ICD-10-CM

## 2021-01-25 DIAGNOSIS — M54.50 LUMBAR PAIN: ICD-10-CM

## 2021-01-25 RX ORDER — WHEELCHAIR
EACH MISCELLANEOUS
Qty: 1 EACH | Refills: 0 | Status: SHIPPED | OUTPATIENT
Start: 2021-01-25

## 2021-02-12 RX ORDER — TIOTROPIUM BROMIDE 18 UG/1
CAPSULE ORAL; RESPIRATORY (INHALATION)
Qty: 30 CAPSULE | Refills: 10 | Status: SHIPPED | OUTPATIENT
Start: 2021-02-12 | End: 2021-02-18 | Stop reason: CLARIF

## 2021-02-18 ENCOUNTER — TELEPHONE (OUTPATIENT)
Dept: PULMONOLOGY | Age: 81
End: 2021-02-18

## 2021-02-18 ENCOUNTER — VIRTUAL VISIT (OUTPATIENT)
Dept: PULMONOLOGY | Age: 81
End: 2021-02-18
Payer: COMMERCIAL

## 2021-02-18 DIAGNOSIS — Z99.89 OSA ON CPAP: ICD-10-CM

## 2021-02-18 DIAGNOSIS — G47.33 OSA ON CPAP: ICD-10-CM

## 2021-02-18 DIAGNOSIS — J43.2 CENTRILOBULAR EMPHYSEMA (HCC): Primary | ICD-10-CM

## 2021-02-18 DIAGNOSIS — I27.20 PULMONARY HTN (HCC): ICD-10-CM

## 2021-02-18 DIAGNOSIS — J96.11 CHRONIC RESPIRATORY FAILURE WITH HYPOXIA (HCC): ICD-10-CM

## 2021-02-18 DIAGNOSIS — M05.79 RHEUMATOID ARTHRITIS INVOLVING MULTIPLE SITES WITH POSITIVE RHEUMATOID FACTOR (HCC): ICD-10-CM

## 2021-02-18 DIAGNOSIS — Z87.891 FORMER SMOKER: ICD-10-CM

## 2021-02-18 PROCEDURE — 99214 OFFICE O/P EST MOD 30 MIN: CPT | Performed by: INTERNAL MEDICINE

## 2021-02-18 PROCEDURE — 1090F PRES/ABSN URINE INCON ASSESS: CPT | Performed by: INTERNAL MEDICINE

## 2021-02-18 PROCEDURE — G8427 DOCREV CUR MEDS BY ELIG CLIN: HCPCS | Performed by: INTERNAL MEDICINE

## 2021-02-18 PROCEDURE — G8417 CALC BMI ABV UP PARAM F/U: HCPCS | Performed by: INTERNAL MEDICINE

## 2021-02-18 PROCEDURE — 3023F SPIROM DOC REV: CPT | Performed by: INTERNAL MEDICINE

## 2021-02-18 PROCEDURE — G8399 PT W/DXA RESULTS DOCUMENT: HCPCS | Performed by: INTERNAL MEDICINE

## 2021-02-18 PROCEDURE — G8484 FLU IMMUNIZE NO ADMIN: HCPCS | Performed by: INTERNAL MEDICINE

## 2021-02-18 PROCEDURE — 1036F TOBACCO NON-USER: CPT | Performed by: INTERNAL MEDICINE

## 2021-02-18 PROCEDURE — G8926 SPIRO NO PERF OR DOC: HCPCS | Performed by: INTERNAL MEDICINE

## 2021-02-18 PROCEDURE — 4040F PNEUMOC VAC/ADMIN/RCVD: CPT | Performed by: INTERNAL MEDICINE

## 2021-02-18 PROCEDURE — 1123F ACP DISCUSS/DSCN MKR DOCD: CPT | Performed by: INTERNAL MEDICINE

## 2021-02-18 RX ORDER — PREDNISONE 10 MG/1
5 TABLET ORAL DAILY
COMMUNITY

## 2021-02-18 ASSESSMENT — SLEEP AND FATIGUE QUESTIONNAIRES
HOW LIKELY ARE YOU TO NOD OFF OR FALL ASLEEP WHEN YOU ARE A PASSENGER IN A CAR FOR AN HOUR WITHOUT A BREAK: 0
HOW LIKELY ARE YOU TO NOD OFF OR FALL ASLEEP WHILE SITTING AND READING: 0
HOW LIKELY ARE YOU TO NOD OFF OR FALL ASLEEP WHILE SITTING INACTIVE IN A PUBLIC PLACE: 0
HOW LIKELY ARE YOU TO NOD OFF OR FALL ASLEEP WHILE LYING DOWN TO REST IN THE AFTERNOON WHEN CIRCUMSTANCES PERMIT: 3
ESS TOTAL SCORE: 5
HOW LIKELY ARE YOU TO NOD OFF OR FALL ASLEEP WHILE SITTING QUIETLY AFTER LUNCH WITHOUT ALCOHOL: 2
HOW LIKELY ARE YOU TO NOD OFF OR FALL ASLEEP IN A CAR, WHILE STOPPED FOR A FEW MINUTES IN TRAFFIC: 0
HOW LIKELY ARE YOU TO NOD OFF OR FALL ASLEEP WHILE SITTING AND TALKING TO SOMEONE: 0

## 2021-02-18 NOTE — TELEPHONE ENCOUNTER
Patient has tried spiriva and duoneb with no significant improvement    Symbicort and spiriva can be tried and see if patient improves or Trelegy can be PAed

## 2021-02-18 NOTE — PROGRESS NOTES
Chief Complaint/Referring Provider:  Pulmonary follow up       A virtual pulmonary visit was done for the patient to evaluate her clinical status and options, patient states that with the new inhaler which is Trelegy Ellipta he feels somewhat better, patient does not have that measured shortness of breath, patient does not have any significant wheezing, patient does have some cough along with that patient feels that she at times have congestion and the mucus is more so in the throat area, patient does not have any chest pain or palpitations, patient does not have any otalgia no ear discharge, patient states that yesterday she felt that she had an episode of difficulty in swallowing on the left side which she attributed to possible thyroid medication has not happened on a regular basis, patient does not have any pleuritic chest pain, no fever no chills, patient does not have any significant abdominal issues of concern, patient states that she does not have any altered bowel habits, no dysuria no hematuria, patient does not have any increasing leg edema, patient is requirement for risk inhaler is limited, patient states that she has been using her CPAP machine on a regular basis but she feels that her CPAP machine pressure is quite high and wants something to be done about that, patient does not see Dr. Azam Vieira now for sleep apnea, does not have any change in the abnormal range sick contacts, no change of medications, patient was alert and communicative when seen, patient does not have any other pertinent review of system of concern     Previous HPI: Patient has come to the office for pulmonary evaluation as referred by his PCP/Dr. Azam Vieira  Patient was seen as an inpatient in 2019    Patient states that she has increasing shortness of breath, patient states that she has coughing along with that patient has thick but clear respiratory secretions, patient also has been having increased nasal congestion and nasal stuffiness, patient states that she has not been able to use the humidification along with the oxygen concentrator as patient states that it comes to her nose at times, patient states that she has occasional pleuritic chest pain, patient also has occasional abdominal pain, patient used to have a PEG tube which has been removed, patient is taking food by mouth but it is modified as per the speech therapy  recommendations, patient has a history of rheumatoid arthritis and many biologicals have been tried but patient has had various side effects with that and patient is now taking Actemra twice a month as given by the rheumatologist, patient continues to have intermittent pain and patient states that last night she was having significant shoulder pain which was causing her to have asthma attack, patient takes albuterol inhaler on a as needed basis along with that patient states that she takes nebulizer treatment along with Spiriva on regular basis, patient states that she has CPAP which she is using at nighttime; patient does not have any pets, patient has central air but no humidification, patient is try to take less salt in the diet, patient denies any change in the environment or any sick contacts, patient does not have any confusion lethargy, patient does not have any other pertinent review of system of concern     Past Medical History:   Diagnosis Date    Acid reflux     Acquired hypothyroidism 7/6/2017    Anemia     Asthma     [facility]estive [facility] failure) [facility]     COPD (chronic obstructive pulmonary disease) ([facility]     HLD (hyperlipidemia)     Hypertension     Influenza A 01/22/2018    MI (myocardial infarction) ([facility]     X 2    Migraine     past hx    Rheumatoid arthritis     Wears glasses        Past Surgical History:   Procedure Laterality Date    BRONCHOSCOPY N/A 3/27/2019    BRONCHOSCOPY ALVEOLAR LAVAGE performed by Rose Flores MD at [address] [place] [address]  3/27/2019    BRONCHOSCOPY THERAPUTIC ASPIRATION INITIAL performed by Sigifredo Carlos MD at [address] Bilateral 03/23/2011    COLONOSCOPY N/A 6/12/2019    COLONOSCOPY WITH ANESTHESIA -SLEEP APNEA- performed by Naomi Sharpe MD at [address] Left 12/4/2018    LEFT LUMBAR FOUR, LUMBAR FIVE TRANSFORAMINAL EPIDURAL STEROID INJECTION SITE CONFIRMED BY FLUOROSCOPY performed by Dorcas Moser MD at [address] N/A 7/23/2019    ESOPHAGEAL MOTILITY/MANOMETRY STUDY performed by Renuka Sloan MD at [address] little toe    GASTROSTOMY TUBE PLACEMENT N/A 4/1/2019    EGD PEG TUBE PLACEMENT performed by Naomi Sharpe MD at [address]      total, fibroids    KNEE SURGERY      right    [place] [address] DX/THER SBST INTRLMNR CRV/THRC W/IMG GDN Left 8/21/2018    LEFT LUMBAR FOUR/ LUMBAR FIVE CYST ASPIRATION SITE CONFIRMED BY FLUOROSCOPY performed by Dorcas Moser MD at [address] N/A 3/27/2019    EGD DIAGNOSTIC ONLY performed by Kris Villagran MD at [address] 9/6/2020    EGD DIAGNOSTIC ONLY performed by Benito Amezcua DO at [address]       Allergies   Allergen Reactions    Alendronate Sodium      Jaw pain      Humira [Adalimumab]      Rash      Penicillins      rash    Simvastatin Other (See Comments)     Made legs ache    Sulfa Antibiotics Swelling     Tongue swelled    Tape Glenice Mukul Tape]        Medication list was reviewed and updated as needed in Epic    Social History     Socioeconomic History    Marital status: Single     Spouse name: Not on file    Number of children: 3    Years of education: Not on file    Highest education level: Not on file   Occupational History    Occupation: disability   Social Needs    Financial resource Noaccessory muscle usage or stridor. Prolonged expiration with decreased breath sound density  Abdominal: Soft. Bowel sounds present. No distension or hernia. Notenderness. Musculoskeletal: No cyanosis. No clubbing. No obvious joint deformity. Lymphadenopathy: No cervical or supraclavicular adenopathy. Skin: Skin is warm and dry. No rash or nodules on the exposed extremities. Psychiatric: Normal mood and affect. Behavior is normal.  No anxiety. Neurologic: Alert, awake and oriented. PERRL. Speech fluent  (deferred)    Sleep Medicine Data:  Sitting and reading: Would never doze  Watching TV: Would never doze  Sitting, inactive in a public place (e.g. a theatre or a meeting): Would never doze  As a passenger in a car for an hour without a break: Would never doze  Lying down to rest in the afternoon when circumstances permit: High chance of dozing  Sitting and talking to someone: Would never doze  Sitting quietly after a lunch without alcohol: Moderate chance of dozing  In a car, while stopped for a few minutes in traffic: Would never doze  Total score: 5       Data:     Imaging:  I have reviewed radiology images personally. No orders to display     Xr Lumbar Spine (2-3 Views)    Result Date: 1/15/2021  EXAMINATION: 3 XRAY VIEWS OF THE LUMBAR SPINE 1/15/2021 12:06 pm COMPARISON: Lumbar spine radiographs April 13, 2018. HISTORY: ORDERING SYSTEM PROVIDED HISTORY: Lumbar pain TECHNOLOGIST PROVIDED HISTORY: Reason for Exam: low back pain Acuity: Acute Type of Exam: Initial Mechanism of Injury: fell to floor from wheelchair Relevant Medical/Surgical History: pt noted having a recent fall down steps FINDINGS: There is 6 mm degenerative anterolisthesis of L3 on L4 and L4 on L5. Lumbar vertebra otherwise demonstrate normal height and alignment. No fracture or suspicious osseous lesion identified. Listhesis is similar in appearance dating back to 2018. There is moderate disc height loss at L3-L4 and L4-L5.  Mild causing destruction of   alveolar-capillary interface (ILD, CHF, COPD, Pneumonia, etc.)   obliteration of pulmonary vascular bed (PE, PAH), tobacco usage,   carbon monoxide poisoning. Airway Resistance  Airway resistance is moderately elevated. Comparison  In comparison to the test of 8/18/16 there is mild deterioration   in spirometry and DLCO    PFT is compatible with moderate Obstructive Lung Disease  -   Possible etiologies include: asthma, COPD, bronchiectasis,   bronchitis. Correlate clinically. 6mwt IN 2017-6 Minute Walk Test Interpretation    Patient Name: Krystle Granados: [id number]  SIIE: 8/15/2017    A 6 Minute Walk Test was performed in accordance with the ATS   Guidelines (9733 Counts include 234 beds at the [facility]; [id number]) at [facility]. The test was performed on room air. The baseline SpO2 while breathing room air was 91%. The patient did complete the 6 minute walk. During the study, the patient walked a total distance of 1050   feet. The minimal recorded SpO2 was 88%. Oximetry with 6 Minute Walk Test shows desaturation to 88% on   room air, 1L, 2L and 3L nasal cannula oxygen with maximal   exercise. Sats improved to 94% on 4L NC oxygen with activity. Comments: Based on testing patient qualifies for 4L NC oxygen to   use with activity.        Barium 9/17/20  Moderate esophageal dysmotility.  No evidence of functional obstruction or  gross reflux.     PAP Compliance 10/3/2019   PAP Setting = 7   Percentage days used = 6   Average hours used = 2.39   Is patient compliant with PAP? no   AHI = 3.2   Leak = 9.4   [place] = -     PSG 9/5/18:  TOTAL AHI = 11.2/hour  REM AHI= 16.6/hr   Central AHI= 1.2/hour  Lowest O2 desaturation = 89 %  Minutes with O2 <90% = 2.6    TITRATION STUDY 9/20/18:  TOTAL AHI = 1.9/hour on PAP = 7 cm of H2O  Sleep emergent CSA events during the initial part of the study   Central AHI= 3.9/hour  Lowest O2 desaturation = 90 % overall  Minutes with O2% <90% = NA      Assessment:    1. Centrilobular emphysema ([address]      2. HEBERT on CPAP      3. Rheumatoid arthritis involving multiple sites with positive rheumatoid factor ([address]      4. Pulmonary HTN ([address]      5. ASD (atrial septal defect)      6. Former smoker      7.  Chronic respiratory failure with hypoxia (HCC)          Plan:   · Patient's ROS discussed   · Patient's previous imaging and tests results were reviewed  · Patient's documentation about her rheumatoid arthritis and management was also reviewed from care everywhere  · Patient's sleep studies in the past along with CPAP data in the past was reviewed  · No new compliance data is available at patient's [employer] since 2019 as per the [employer] but patient states that she is uses the CPAP on a regular basis-patient is stating that the pressure is too high and she says it number is 7 but she is not sure about that  · CPAP compliance data may be obtained from patient's [employer] which is [employer] to see what modifications can be done if any to help the patient more  · Patient has chronic respiratory failure with hypoxemia  · Concept of hypoxemia and hypercarbia discussed  · Patient's oxygen needs to be maintained between 90 to 94% only  · Patient cannot use any humidification along with the concentrator  · Patient was told to use some saline nasal spray  · A humidifier in the bedroom will help  · Salt and fluid restrictions were discussed  · Patient was told about the pathophysiology of the disease process and its modifying factors  · Patient was told about the pathophysiology of the COPD and various medications and how they act  · Patient was given a sample of Trelegy Ellipta from the office and was told to take 1 puff daily which is helping her more  · Patient can have a more samples of Breo if available and also a prescription being sent to the pharmacy for the same  · Patient was told to make sure that she rinses her mouth with water without fail to avoid any hoarseness of voice or oral thrush  · Will hold off on any antibiotics or steroids from pulmonary standpoint of view  · Daily weights will help  · Patient may require loop diuretics down the line-need to discuss with PCP  · Patient's treatment of rheumatoid arthritis as per rheumatology  · Oral diet as per speech therapy recommendations  · Monitor for any aspiration event  · Patient needs to discuss with PCP about Pneumovax and Prevnar 13 vaccines  · Patient to take DuoNeb or albuterol inhaler only on whenever necessary basis and not on regular basis  · Patient's further treatment depending on patient's clinical status and response to the new inhaler  · Steam inhalation will help especially at nighttime before going to bed  · A humidifier in the bedroom will help  · Patient to follow-up in 3 months time or earlier if required    Faviola Baxter is a 68 y.o. female being evaluated by a Virtual Visit (video visit) encounter to address concerns as mentioned above. A caregiver was present when appropriate. Due to this being a TeleHealth encounter (During [facility] public health emergency), evaluation of the following organ systems was limited: Vitals/Constitutional/EENT/Resp/CV/GI//MS/Neuro/Skin/Heme-Lymph-Imm. Pursuant to the emergency declaration under the [address] [place] [address], [address] [place] [address] authority and the [employer] Act, this Virtual Visit was conducted with patient's (and/or legal guardian's) consent, to reduce the patient's risk of exposure to COVID-19 and provide necessary medical care. The patient (and/or legal guardian) has also been advised to contact this office for worsening conditions or problems, and seek emergency medical treatment and/or call 911 if deemed necessary.      Patient identification was verified at the start of the visit: Yes    Total time spent for this encounter: Not billed by time    Services were provided through a video synchronous discussion virtually to substitute for in-person clinic visit. Patient and provider were located at their individual homes. --Doug Hanson MD on 2/18/2021 at 11:38 AM    An electronic signature was used to authenticate this note.

## 2021-02-18 NOTE — TELEPHONE ENCOUNTER
Received fax from [employer] stating that Trelegy is not on patients formulary. Formulary alternatives are:    Symbicort  Spiriva Handihaler  Spiriva Respimat  Dulera  Combivent Respimat  Flovent HFA  Atrovent HFA  Bevespi    Please send in one of these to pharmacy or indicate why a PA should be done for Trelegy.

## 2021-02-18 NOTE — PROGRESS NOTES
MA Communication:   The following orders are received by verbal communication from Calvert Dakins, MD    Orders include:  3 mo fu scheduled 5/18/21

## 2021-02-18 NOTE — TELEPHONE ENCOUNTER
I will first attempt to do a PA with the failed medications listed below. If it is denied, we will proceed with the other medication.

## 2021-02-19 NOTE — TELEPHONE ENCOUNTER
[facility] through 2/19/22. Pharmacy informed.

## 2021-03-01 RX ORDER — FAMOTIDINE 20 MG/1
TABLET, FILM COATED ORAL
Qty: 60 TABLET | Refills: 3 | Status: SHIPPED | OUTPATIENT
Start: 2021-03-01 | End: 2021-06-24

## 2021-04-05 ENCOUNTER — TELEPHONE (OUTPATIENT)
Dept: FAMILY MEDICINE CLINIC | Age: 81
End: 2021-04-05

## 2021-04-05 RX ORDER — LABETALOL 100 MG/1
100 TABLET, FILM COATED ORAL EVERY 12 HOURS SCHEDULED
Qty: 60 TABLET | Refills: 3 | Status: SHIPPED | OUTPATIENT
Start: 2021-04-05 | End: 2021-05-12

## 2021-04-23 ENCOUNTER — OFFICE VISIT (OUTPATIENT)
Dept: FAMILY MEDICINE CLINIC | Age: 81
End: 2021-04-23
Payer: COMMERCIAL

## 2021-04-23 VITALS
BODY MASS INDEX: 29.8 KG/M2 | OXYGEN SATURATION: 95 % | DIASTOLIC BLOOD PRESSURE: 74 MMHG | HEIGHT: 66 IN | HEART RATE: 80 BPM | SYSTOLIC BLOOD PRESSURE: 132 MMHG | WEIGHT: 185.4 LBS | RESPIRATION RATE: 16 BRPM

## 2021-04-23 DIAGNOSIS — J43.2 CENTRILOBULAR EMPHYSEMA (HCC): ICD-10-CM

## 2021-04-23 DIAGNOSIS — M54.50 CHRONIC BILATERAL LOW BACK PAIN WITHOUT SCIATICA: Primary | ICD-10-CM

## 2021-04-23 DIAGNOSIS — E78.00 PURE HYPERCHOLESTEROLEMIA: ICD-10-CM

## 2021-04-23 DIAGNOSIS — G47.33 OSA ON CPAP: ICD-10-CM

## 2021-04-23 DIAGNOSIS — M05.79 RHEUMATOID ARTHRITIS INVOLVING MULTIPLE SITES WITH POSITIVE RHEUMATOID FACTOR (HCC): ICD-10-CM

## 2021-04-23 DIAGNOSIS — I10 ESSENTIAL HYPERTENSION: ICD-10-CM

## 2021-04-23 DIAGNOSIS — G89.29 CHRONIC BILATERAL LOW BACK PAIN WITHOUT SCIATICA: Primary | ICD-10-CM

## 2021-04-23 DIAGNOSIS — Z99.89 OSA ON CPAP: ICD-10-CM

## 2021-04-23 PROCEDURE — G8926 SPIRO NO PERF OR DOC: HCPCS | Performed by: INTERNAL MEDICINE

## 2021-04-23 PROCEDURE — 3023F SPIROM DOC REV: CPT | Performed by: INTERNAL MEDICINE

## 2021-04-23 PROCEDURE — 99214 OFFICE O/P EST MOD 30 MIN: CPT | Performed by: INTERNAL MEDICINE

## 2021-04-23 PROCEDURE — 4040F PNEUMOC VAC/ADMIN/RCVD: CPT | Performed by: INTERNAL MEDICINE

## 2021-04-23 PROCEDURE — 1036F TOBACCO NON-USER: CPT | Performed by: INTERNAL MEDICINE

## 2021-04-23 PROCEDURE — G8417 CALC BMI ABV UP PARAM F/U: HCPCS | Performed by: INTERNAL MEDICINE

## 2021-04-23 PROCEDURE — G8427 DOCREV CUR MEDS BY ELIG CLIN: HCPCS | Performed by: INTERNAL MEDICINE

## 2021-04-23 PROCEDURE — G8399 PT W/DXA RESULTS DOCUMENT: HCPCS | Performed by: INTERNAL MEDICINE

## 2021-04-23 PROCEDURE — 1090F PRES/ABSN URINE INCON ASSESS: CPT | Performed by: INTERNAL MEDICINE

## 2021-04-23 PROCEDURE — 1123F ACP DISCUSS/DSCN MKR DOCD: CPT | Performed by: INTERNAL MEDICINE

## 2021-04-23 ASSESSMENT — ENCOUNTER SYMPTOMS
RESPIRATORY NEGATIVE: 1
EYES NEGATIVE: 1
ALLERGIC/IMMUNOLOGIC NEGATIVE: 1
GASTROINTESTINAL NEGATIVE: 1
BACK PAIN: 1

## 2021-04-23 NOTE — ASSESSMENT & PLAN NOTE
No recent change in meds other than injections and Prednisone 5 mg. Having severe back and knee pain. Hands stiff and sore. Much harder for her to walk even w/ walker. Appt 2 weeks ago. Bilateral knees swollen, hand stiff, L shoulder no movement. Unable to be mobile in house. Discussed using Wheelchair last visit. Says did not get.

## 2021-04-23 NOTE — PROGRESS NOTES
Subjective:      Patient ID: Jonathan Cormier is a 80 y.o. female. HPI  Centrilobular emphysema ([address]   Seen on CTA. No recent change in meds(some better w/ Trelegy and Prednisone 5 mg). Chronic SOB. Worse when pain bad or aggravated. HEBERT on CPAP   Using Bi-pap most of the time. Pure hypercholesterolemia   Stable diet and wt. No c/o w/ meds. Essential hypertension   No change in meds, no c/o with meds, no chest pain, SOB, palpatations, or syncope. Home bp was 120-140s/70-80s. Rheumatoid arthritis involving multiple sites with positive rheumatoid factor (HCC)    No recent change in meds other than injections and Prednisone 5 mg. Having severe back and knee pain. Hands stiff and sore. Much harder for her to walk even w/ walker. Appt 2 weeks ago. Bilateral knees swollen, hand stiff, L shoulder no movement. Unable to be mobile in house. Discussed using Wheelchair last visit. Says did not get. Review of Systems   Constitutional: Positive for fatigue. HENT: Negative. Eyes: Negative. Respiratory: Negative. Cardiovascular: Negative. Gastrointestinal: Negative. Endocrine: Negative. Genitourinary: Negative. Musculoskeletal: Positive for arthralgias, back pain, gait problem, joint swelling and myalgias. Skin: Negative. Allergic/Immunologic: Negative. Neurological: Positive for weakness. Hematological: Negative. Psychiatric/Behavioral: Negative. Vitals:    04/23/21 0851 04/23/21 0912   BP: (!) 168/74 132/74   Pulse: 80    Resp: 16    SpO2: 95%    Weight: 185 lb 6.4 oz (84.1 kg)    Height: 5' 6\" (1.676 m)      Objective:   Physical Exam  Constitutional:       General: She is not in acute distress. Appearance: Normal appearance. She is well-developed. She is ill-appearing. She is not toxic-appearing or diaphoretic. HENT:      Head: Normocephalic and atraumatic.    Eyes:      Conjunctiva/sclera: Conjunctivae normal.      Pupils: Pupils are equal, round, and rash.      Nails: There is no clubbing. Neurological:      General: No focal deficit present. Mental Status: She is alert and oriented to person, place, and time. Mental status is at baseline. She is not disoriented. Cranial Nerves: No cranial nerve deficit. Sensory: No sensory deficit. Motor: No weakness, tremor, atrophy or abnormal muscle tone. Coordination: Coordination normal.      Gait: Gait normal.      Deep Tendon Reflexes: Reflexes are normal and symmetric. Psychiatric:         Mood and Affect: Mood normal.         Speech: Speech normal.         Behavior: Behavior normal.         Thought Content: Thought content normal.         Judgment: Judgment normal.         Assessment / Plan:      John On Cpap. Continue Bi-pap. Call if new c/o. Essential Hypertension. Continue present meds. Home BP checks. Call if>140/90. Improve diet. Avoid caffeine and salt. Call if new c/o w/ meds. Centrilobular Emphysema (Hcc). Continue Telegy and 3 L O2 NC. Call if new c/o. To Pulmonary as scheduled. Pure Hypercholesterolemia. Continue meds and continue to improve diet and activity as tolerated. Rheumatoid Arthritis Involving Multiple Sites With Positive Rheumatoid Factor (Hcc). Continue present meds. Refer to PAin MD for Chronic Back issues.

## 2021-04-23 NOTE — PATIENT INSTRUCTIONS
John On Cpap. Continue Bi-pap. Call if new c/o. Essential Hypertension. Continue present meds. Home BP checks. Call if>140/90. Improve diet. Avoid caffeine and salt. Call if new c/o w/ meds. Centrilobular Emphysema (Hcc). Continue Telegy and 3 L O2 NC. Call if new c/o. To Pulmonary as scheduled. Pure Hypercholesterolemia. Continue meds and continue to improve diet and activity as tolerated. Rheumatoid Arthritis Involving Multiple Sites With Positive Rheumatoid Factor (Hcc). Continue present meds. Refer to PAin MD for Chronic Back issues.

## 2021-04-23 NOTE — ASSESSMENT & PLAN NOTE
Seen on CTA. No recent change in meds(some better w/ Trelegy and Prednisone 5 mg). Chronic SOB. Worse when pain bad or aggravated.

## 2021-05-05 ENCOUNTER — TELEPHONE (OUTPATIENT)
Dept: FAMILY MEDICINE CLINIC | Age: 81
End: 2021-05-05

## 2021-05-05 NOTE — TELEPHONE ENCOUNTER
Patient received a referral to Dr. Denisa Ocampo office on 4/23/21. She called them and tried to get an appointment set up but they advised her that Dr. Lexy Prado was not in network. Patient is requesting a new referral to pain doctor that is in her insurance network.

## 2021-05-13 RX ORDER — LABETALOL 100 MG/1
100 TABLET, FILM COATED ORAL EVERY 12 HOURS SCHEDULED
Qty: 60 TABLET | Refills: 0 | Status: SHIPPED | OUTPATIENT
Start: 2021-05-13 | End: 2021-06-25

## 2021-05-13 RX ORDER — LOSARTAN POTASSIUM 100 MG/1
TABLET ORAL
Qty: 30 TABLET | Refills: 0 | Status: SHIPPED | OUTPATIENT
Start: 2021-05-13 | End: 2021-06-01

## 2021-05-18 ENCOUNTER — TELEPHONE (OUTPATIENT)
Dept: PULMONOLOGY | Age: 81
End: 2021-05-18

## 2021-05-18 NOTE — TELEPHONE ENCOUNTER
Patient did not show for 3 mo sleep/spob follow up appointment  with Dr. Carmelina Lambert on 5/18/21    Same Day Cancellation: No    Patient rescheduled:  No      Patient was also no show on: N/A    LOV 2/18/21    The office tried to contact Hallie 3 times to reschedule this appt due to provider in ICU, there was no answer and voice mail not set up.   She also did not show to the office for the appointment

## 2021-06-01 DIAGNOSIS — J96.11 CHRONIC RESPIRATORY FAILURE WITH HYPOXIA (HCC): ICD-10-CM

## 2021-06-01 DIAGNOSIS — J43.2 CENTRILOBULAR EMPHYSEMA (HCC): ICD-10-CM

## 2021-06-01 RX ORDER — LOSARTAN POTASSIUM 100 MG/1
TABLET ORAL
Qty: 30 TABLET | Refills: 5 | Status: SHIPPED | OUTPATIENT
Start: 2021-06-01 | End: 2021-07-28

## 2021-06-08 ENCOUNTER — OFFICE VISIT (OUTPATIENT)
Dept: PULMONOLOGY | Age: 81
End: 2021-06-08
Payer: COMMERCIAL

## 2021-06-08 VITALS
HEART RATE: 88 BPM | DIASTOLIC BLOOD PRESSURE: 64 MMHG | OXYGEN SATURATION: 93 % | TEMPERATURE: 98.2 F | BODY MASS INDEX: 30.79 KG/M2 | HEIGHT: 66 IN | WEIGHT: 191.6 LBS | SYSTOLIC BLOOD PRESSURE: 134 MMHG

## 2021-06-08 DIAGNOSIS — G47.33 OSA ON CPAP: Primary | ICD-10-CM

## 2021-06-08 DIAGNOSIS — I27.20 PULMONARY HTN (HCC): ICD-10-CM

## 2021-06-08 DIAGNOSIS — R09.81 NASAL CONGESTION: ICD-10-CM

## 2021-06-08 DIAGNOSIS — Z99.89 OSA ON CPAP: Primary | ICD-10-CM

## 2021-06-08 DIAGNOSIS — J43.2 CENTRILOBULAR EMPHYSEMA (HCC): ICD-10-CM

## 2021-06-08 DIAGNOSIS — J96.11 CHRONIC RESPIRATORY FAILURE WITH HYPOXIA (HCC): ICD-10-CM

## 2021-06-08 PROCEDURE — 1090F PRES/ABSN URINE INCON ASSESS: CPT | Performed by: INTERNAL MEDICINE

## 2021-06-08 PROCEDURE — 99214 OFFICE O/P EST MOD 30 MIN: CPT | Performed by: INTERNAL MEDICINE

## 2021-06-08 PROCEDURE — G8427 DOCREV CUR MEDS BY ELIG CLIN: HCPCS | Performed by: INTERNAL MEDICINE

## 2021-06-08 PROCEDURE — 4040F PNEUMOC VAC/ADMIN/RCVD: CPT | Performed by: INTERNAL MEDICINE

## 2021-06-08 PROCEDURE — 1036F TOBACCO NON-USER: CPT | Performed by: INTERNAL MEDICINE

## 2021-06-08 PROCEDURE — G8399 PT W/DXA RESULTS DOCUMENT: HCPCS | Performed by: INTERNAL MEDICINE

## 2021-06-08 PROCEDURE — 1123F ACP DISCUSS/DSCN MKR DOCD: CPT | Performed by: INTERNAL MEDICINE

## 2021-06-08 PROCEDURE — 3023F SPIROM DOC REV: CPT | Performed by: INTERNAL MEDICINE

## 2021-06-08 PROCEDURE — G8417 CALC BMI ABV UP PARAM F/U: HCPCS | Performed by: INTERNAL MEDICINE

## 2021-06-08 PROCEDURE — G8926 SPIRO NO PERF OR DOC: HCPCS | Performed by: INTERNAL MEDICINE

## 2021-06-08 RX ORDER — FLUTICASONE PROPIONATE 50 MCG
2 SPRAY, SUSPENSION (ML) NASAL DAILY
Qty: 2 BOTTLE | Refills: 1 | Status: SHIPPED | OUTPATIENT
Start: 2021-06-08 | End: 2021-09-10

## 2021-06-08 RX ORDER — TOCILIZUMAB 180 MG/ML
INJECTION, SOLUTION SUBCUTANEOUS
COMMUNITY
Start: 2021-05-14

## 2021-06-08 NOTE — PATIENT INSTRUCTIONS
WE ARE MOVING IN July!!!!!!!                [address]            [address]   ([address] [facility])        Remember to bring a list of pulmonary medications and any CPAP or BiPAP machines to your next appointment with the office. Please keep all of your future appointments scheduled by [facility] office. Out of respect for other patients and providers, you may be asked to reschedule your appointment if you arrive later than your scheduled appointment time. Appointments cancelled less than 24hrs in advance will be considered a no show. Patients with three missed appointments within 1 year or four missed appointments within 2 years can be dismissed from the practice. Please be aware that our physicians are required to work in the Intensive Care Unit at [facility].  Your appointment may need to be rescheduled if they are designated to work during your appointment time. You may receive a survey regarding the care you received during your visit. Your input is valuable to us. We encourage you to complete and return your survey. We hope you will choose us in the future for your healthcare needs. Pt instructed of all future appointment dates & times, including radiology, labs, procedures & referrals. If procedures were scheduled preparation instructions provided. Instructions on future appointments with [facility] were given. Remember to bring a list of pulmonary medications and any CPAP or BiPAP machines to your next appointment with the office. Please keep all of your future appointments scheduled by [facility] office. Out of respect for other patients and providers, you may be asked to reschedule your appointment if you arrive later than your scheduled appointment time.  Appointments cancelled less than 24hrs in advance will be considered a no show. Patients with three missed appointments within 1 year or four missed appointments within 2 years can be dismissed from the practice. Please be aware that our physicians are required to work in the Intensive Care Unit at [facility].  Your appointment may need to be rescheduled if they are designated to work during your appointment time. You may receive a survey regarding the care you received during your visit. Your input is valuable to us. We encourage you to complete and return your survey. We hope you will choose us in the future for your healthcare needs. Pt instructed of all future appointment dates & times, including radiology, labs, procedures & referrals. If procedures were scheduled preparation instructions provided. Instructions on future appointments with [facility] were given.

## 2021-06-08 NOTE — PROGRESS NOTES
Chief Complaint/Referring Provider:  Pulmonary follow up to discuss clinical status    Patient is a 31-year-old female who has come to the office for a pulmonary evaluation, patient states that she has not been using her CPAP machine because she feels the pressure is high, patient is having difficulty in getting it evaluated and checked by [employer] [facility] as per patient and family, also patient and family are stating that they have to go personally to get the oxygen and is not delivered by [employer] and they wanted to see if patient can get a new [employer] company, patient has been having increasing nasal congestion sinus congestion postnasal drainage, patient does not have any significant otalgia no ear discharge, patient states that the Jorge Busman is helping her and patient's shortness of breath is less with that, patient does not have any significant fever or chills, patient does not have any palpitations, patient does have reflux symptoms for which she takes medication as given by the GI with improvement, patient does not have any altered bowel habits or any hematochezia or melena, patient has some leg edema but patient takes HCTZ on regular basis, patient does not have any change in the ambient environment any sick contacts, patient has received her 2 doses of COVID-19 vaccine, patient does not have any confusion lethargy, no other pertinent review of system of concern      Previous [facility] virtual pulmonary visit was done for the patient to evaluate her clinical status and options, patient states that with the new inhaler which is Trelegy Ellipta he feels somewhat better, patient does not have that measured shortness of breath, patient does not have any significant wheezing, patient does have some cough along with that patient feels that she at times have congestion and the mucus is more so in the throat area, patient does not have any chest pain or palpitations, patient does not have any otalgia no ear discharge, patient states that yesterday she felt that she had an episode of difficulty in swallowing on the left side which she attributed to possible thyroid medication has not happened on a regular basis, patient does not have any pleuritic chest pain, no fever no chills, patient does not have any significant abdominal issues of concern, patient states that she does not have any altered bowel habits, no dysuria no hematuria, patient does not have any increasing leg edema, patient is requirement for risk inhaler is limited, patient states that she has been using her CPAP machine on a regular basis but she feels that her CPAP machine pressure is quite high and wants something to be done about that, patient does not see Dr. Itzel Lange now for sleep apnea, does not have any change in the abnormal range sick contacts, no change of medications, patient was alert and communicative when seen, patient does not have any other pertinent review of system of concern     : Patient has come to the office for pulmonary evaluation as referred by his PCP/Dr. Itzel Lange  Patient was seen as an inpatient in 2019    Patient states that she has increasing shortness of breath, patient states that she has coughing along with that patient has thick but clear respiratory secretions, patient also has been having increased nasal congestion and nasal stuffiness, patient states that she has not been able to use the humidification along with the oxygen concentrator as patient states that it comes to her nose at times, patient states that she has occasional pleuritic chest pain, patient also has occasional abdominal pain, patient used to have a PEG tube which has been removed, patient is taking food by mouth but it is modified as per the speech therapy  recommendations, patient has a history of rheumatoid arthritis and many biologicals have been tried but patient has had various side effects with that and patient is now taking Actemra twice a month as given by the rheumatologist, patient continues to have intermittent pain and patient states that last night she was having significant shoulder pain which was causing her to have asthma attack, patient takes albuterol inhaler on a as needed basis along with that patient states that she takes nebulizer treatment along with Spiriva on regular basis, patient states that she has CPAP which she is using at nighttime; patient does not have any pets, patient has central air but no humidification, patient is try to take less salt in the diet, patient denies any change in the environment or any sick contacts, patient does not have any confusion lethargy, patient does not have any other pertinent review of system of concern     Past Medical History:   Diagnosis Date    Acid reflux     Acquired hypothyroidism 7/6/2017    Anemia     Asthma     [facility] failure) [facility]     COPD (chronic obstructive pulmonary disease) [facility]     HLD (hyperlipidemia)     Hypertension     Influenza A 01/22/2018    MI (myocardial infarction) ([facility]     X 2    Migraine     past hx    Rheumatoid arthritis     Wears glasses        Past Surgical History:   Procedure Laterality Date    BRONCHOSCOPY N/A 3/27/2019    BRONCHOSCOPY ALVEOLAR LAVAGE performed by Leidy Lange MD at [facility]  3/27/2019    BRONCHOSCOPY [address] performed by Leidy Lange MD at [address] Bilateral 03/23/2011    COLONOSCOPY N/A 6/12/2019    COLONOSCOPY WITH ANESTHESIA -SLEEP APNEA- performed by rAslan Arango MD at [address] [facility] Left 12/4/2018    LEFT LUMBAR FOUR, LUMBAR FIVE TRANSFORAMINAL EPIDURAL STEROID INJECTION SITE CONFIRMED BY FLUOROSCOPY performed by Nancy Dietrich MD at [address] [address] STUDY N/A 7/23/2019    ESOPHAGEAL MOTILITY/MANOMETRY STUDY performed by Kirsten Santoro MD at [address] [facility] the Last Year:    Transportation Needs:     Lack of Transportation (Medical):  Lack of Transportation (Non-Medical):    Physical Activity:     Days of Exercise per Week:     Minutes of Exercise per Session:    Stress:     Feeling of Stress :    Social Connections:     Frequency of Communication with Friends and Family:     Frequency of Social Gatherings with Friends and Family:     Attends Mandaeism Services:     Active Member of Clubs or Organizations:     Attends Club or Organization Meetings:     Marital Status:    Intimate Partner Violence:     Fear of Current or Ex-Partner:     Emotionally Abused:     Physically Abused:     Sexually Abused:        Family History   Problem Relation Age of Onset    Cancer Mother     Cancer Father     Heart Disease Maternal Aunt     Cancer Sister             Review of Systems same as above    Physical Exam:  Blood pressure 134/64, pulse 88, temperature 98.2 °F (36.8 °C), temperature source Oral, height 5' 6\" (1.676 m), weight 191 lb 9.6 oz (86.9 kg), SpO2 93 %, not currently breastfeeding.'  Constitutional:  No acute distress. While sitting in the wheelchair on nasal cannula oxygen  HENT:  Oropharynx is clear and moist. No thyromegaly. Eyes:  Conjunctivae arenormal. Pupils equal, round, and reactive to light. No scleral icterus. Neck: . No tracheal deviation present. No obvious thyroid mass. Cardiovascular:Normal rate, regular rhythm, normal heart sounds. No right ventricular heave. Mild lower extremity edema. Pulmonary/Chest: No wheezes. Left basilar rales which improves with coughing. Chest wall is not dull to percussion. Noaccessory muscle usage or stridor. decreased breath sound density  Abdominal: Soft. Bowel sounds present. No distension or hernia. Notenderness. Musculoskeletal: No cyanosis. No clubbing. No obvious joint deformity. Lymphadenopathy: No cervical or supraclavicular adenopathy. Skin: Skin is warm and dry.  No rash or nodules on the exposed extremities. Psychiatric: Normal mood and affect. Behavior is normal.  No anxiety. Neurologic: Alert, awake and oriented. PERRL. Speech fluent        Data:     Imaging:  I have reviewed radiology images personally. No orders to display     Xr Lumbar Spine (2-3 Views)    Result Date: 1/15/2021  EXAMINATION: 3 XRAY VIEWS OF THE LUMBAR SPINE 1/15/2021 12:06 pm COMPARISON: Lumbar spine radiographs April 13, 2018. HISTORY: ORDERING SYSTEM PROVIDED HISTORY: Lumbar pain TECHNOLOGIST PROVIDED HISTORY: Reason for Exam: low back pain Acuity: Acute Type of Exam: Initial Mechanism of Injury: fell to floor from wheelchair Relevant Medical/Surgical History: pt noted having a recent fall down steps FINDINGS: There is 6 mm degenerative anterolisthesis of L3 on L4 and L4 on L5. Lumbar vertebra otherwise demonstrate normal height and alignment. No fracture or suspicious osseous lesion identified. Listhesis is similar in appearance dating back to 2018. There is moderate disc height loss at L3-L4 and L4-L5. Mild disc height loss is seen at the remainder of lumbar levels. There is multilevel facet arthrosis. Evaluation the soft tissues demonstrates atherosclerotic calcification of the abdominal aorta. Multilevel degenerative changes with grade 1 degenerative listhesis at L3-L4 and L4-L5, similar in appearance dating back to 2018. No acute osseous abnormality. Nuclear stress test-  Conclusions        Summary    There is normal isotope uptake at stress and rest. There is no evidence of    myocardial ischemia or scar. LV function is normal with uniform wall motion    and ejection fraction of 67 %. Lower risk study     CT chest in 2019 from care everywhere-CHEST    1.  No acute findings. 2.  No evidence of mass or lymphadenopathy. 3.  Severe emphysema. 4.  Pulmonary arterial dilatation suspicious for pulmonary hypertension. ECHO in 2019-Study Conclusions    - Left ventricle:  The cavity size was normal. Wall thickness was    normal. Systolic function was normal. The calculated ejection    fraction was in the range of 64% to 68%. Normal diastolic    function.  - Aortic valve: Peak gradient (S): 6mm Hg.  - Tricuspid valve: Regurgitant peak velocity: 314cm/sec. Peak RV-RA    gradient (S): 39mm Hg. - Inferior vena cava: The vessel was normal in size; the    respirophasic diameter changes were in the normal range (>= 50%);    findings are consistent with normal central venous pressure. - Pulmonary arteries: PA peak pressure: 42mm Hg (S). PFT IN 2017-PFT TEST    Spirometry  Spirometry shows moderate obstructive defect. .    Bronchodilator  There is no response to bronchodilator demonstrated. Flow-Volume Loop  The flow-volume loop is compatible with obstructive lung defect. Lung Volumes  Lung volumes are normal except for elevated RV. Lung Volume Comments  There is air trapping present. Diffusing Capacity  Single breath diffusing capacity is severely decreased. A reduced DLCO can be seen in diseases causing destruction of   alveolar-capillary interface (ILD, CHF, COPD, Pneumonia, etc.)   obliteration of pulmonary vascular bed (PE, PAH), tobacco usage,   carbon monoxide poisoning. Airway Resistance  Airway resistance is moderately elevated. Comparison  In comparison to the test of 8/18/16 there is mild deterioration   in spirometry and DLCO    PFT is compatible with moderate Obstructive Lung Disease  -   Possible etiologies include: asthma, COPD, bronchiectasis,   bronchitis. Correlate clinically. 6mwt IN 2017-6 Minute Walk Test Interpretation    Patient Name: Henny Phy: [birth year]  LLSB: 8/15/2017    A 6 Minute Walk Test was performed in accordance with the ATS   Guidelines ([address] [place] [address] 2002; [id number]) at [facility]. The test was performed on room air. The baseline SpO2 while breathing room air was 91%.     The patient did complete the 6 minute walk. During the study, the patient walked a total distance of 1050   feet. The minimal recorded SpO2 was 88%. Oximetry with 6 Minute Walk Test shows desaturation to 88% on   room air, 1L, 2L and 3L nasal cannula oxygen with maximal   exercise. Sats improved to 94% on 4L NC oxygen with activity. Comments: Based on testing patient qualifies for 4L NC oxygen to   use with activity.        Barium 9/17/20  Moderate esophageal dysmotility.  No evidence of functional obstruction or  gross reflux. PAP Compliance 10/3/2019   PAP Setting = 7   Percentage days used = 6   Average hours used = 2.39   Is patient compliant with PAP? no   AHI = 3.2   Leak = 9.4   Hustle = -     PSG 9/5/18:  TOTAL AHI = 11.2/hour  REM AHI= 16.6/hr   Central AHI= 1.2/hour  Lowest O2 desaturation = 89 %  Minutes with O2 <90% = 2.6    TITRATION STUDY 9/20/18:  TOTAL AHI = 1.9/hour on PAP = 7 cm of H2O  Sleep emergent CSA events during the initial part of the study   Central AHI= 3.9/hour  Lowest O2 desaturation = 90 % overall  Minutes with O2% <90% = NA      Assessment:    1. Centrilobular emphysema ([address]      2. HEBERT on CPAP      3. Rheumatoid arthritis involving multiple sites with positive rheumatoid factor ([address]      4. Pulmonary HTN ([address]      5. ASD (atrial septal defect)      6. Former smoker      7. Chronic respiratory failure with hypoxia (HCC)    8.   Nasal congestion          Plan:   · Patient's ROS discussed   · Patient and family were told about the clinical findings including auscultation and implications  · Patient's previous imaging and tests results were reviewed  · Patient's documentation about her rheumatoid arthritis and management was also reviewed from care everywhere  · Patient has been not using her CPAP machine and patient and family were contemplating change the DME company  · Discussed with DME companies and patient may need a new sleep study and baseline sleep study in the sleep lab being ordered for the patient given that patient has moderate COPD  · Patient has chronic respiratory failure with hypoxemia  · Concept of hypoxemia and hypercarbia discussed  · Patient's oxygen needs to be maintained between 90 to 94% only  · Patient cannot use any humidification along with the concentrator  · Patient was told to use some saline nasal spray-patient was given a sample of saline rinse from the office  · A prescription for Flonase was given to the patient and was told to take 2 puffs each nostril once a day and was told how to treat it properly not avoid epistaxis  · A humidifier in the bedroom will help  · Salt and fluid restrictions were discussed  · Patient was told about the pathophysiology of the disease process and its modifying factors  · Patient was told about the pathophysiology of the COPD and various medications and how they act  · Patient to continue with trilogy Caridad Dias as before  · Patient was told to make sure that she rinses her mouth with water without fail to avoid any hoarseness of voice or oral thrush  · No need for any antibiotic and steroids  · Daily weights will help  · Patient may require loop diuretics down the line-need to discuss with PCP  · Patient's treatment of rheumatoid arthritis as per rheumatology  · Oral diet as per speech therapy recommendations  · Monitor for any aspiration event  · Patient needs to discuss with PCP about Pneumovax and Prevnar 13 vaccines  · Patient to take DuoNeb or albuterol inhaler only on whenever necessary basis and not on regular basis  · Patient's further treatment depending on patient's clinical status and response to the new inhaler  · Steam inhalation will help especially at nighttime before going to bed  · A humidifier in the bedroom will help  · Salt and fluid restrictions in the diet  · Patient has been drinking a lot of fluids at this time  · Patient's PCP to decide if patient needs to be changed from HCTZ to a loop diuretic  · Patient to follow-up after the sleep study

## 2021-06-10 ENCOUNTER — TELEPHONE (OUTPATIENT)
Dept: PULMONOLOGY | Age: 81
End: 2021-06-10

## 2021-06-10 DIAGNOSIS — R09.02 HYPOXEMIA: Primary | ICD-10-CM

## 2021-06-10 NOTE — TELEPHONE ENCOUNTER
Dr. Jemima Almaguer could you sign this O2 order as per your note from last visit on 6/8/21.  Thanks

## 2021-06-24 RX ORDER — ATORVASTATIN CALCIUM 10 MG/1
TABLET, FILM COATED ORAL
Qty: 30 TABLET | Refills: 10 | Status: SHIPPED | OUTPATIENT
Start: 2021-06-24 | End: 2022-02-07 | Stop reason: SDUPTHER

## 2021-06-24 RX ORDER — FAMOTIDINE 20 MG/1
TABLET, FILM COATED ORAL
Qty: 60 TABLET | Refills: 3 | Status: SHIPPED | OUTPATIENT
Start: 2021-06-24 | End: 2021-07-19

## 2021-06-24 RX ORDER — ALBUTEROL SULFATE 90 UG/1
AEROSOL, METERED RESPIRATORY (INHALATION)
Qty: 18 G | Refills: 10 | Status: SHIPPED | OUTPATIENT
Start: 2021-06-24 | End: 2022-11-03 | Stop reason: SDUPTHER

## 2021-06-25 ENCOUNTER — HOSPITAL ENCOUNTER (OUTPATIENT)
Dept: SLEEP CENTER | Age: 81
Discharge: HOME | End: 2021-06-27
Payer: COMMERCIAL

## 2021-06-25 DIAGNOSIS — Z99.89 OSA ON CPAP: ICD-10-CM

## 2021-06-25 DIAGNOSIS — G47.33 OSA ON CPAP: ICD-10-CM

## 2021-06-25 PROCEDURE — 95810 POLYSOM 6/> YRS 4/> PARAM: CPT

## 2021-06-25 RX ORDER — LABETALOL 100 MG/1
TABLET, FILM COATED ORAL
Qty: 60 TABLET | Refills: 2 | Status: SHIPPED | OUTPATIENT
Start: 2021-06-25 | End: 2021-10-01

## 2021-06-28 PROCEDURE — 95810 POLYSOM 6/> YRS 4/> PARAM: CPT | Performed by: INTERNAL MEDICINE

## 2021-06-29 ENCOUNTER — TELEPHONE (OUTPATIENT)
Dept: PULMONOLOGY | Age: 81
End: 2021-06-29

## 2021-06-29 DIAGNOSIS — R06.02 SOB (SHORTNESS OF BREATH): Primary | ICD-10-CM

## 2021-06-29 DIAGNOSIS — G47.31 CENTRAL SLEEP APNEA: ICD-10-CM

## 2021-06-29 NOTE — TELEPHONE ENCOUNTER
Please sign order for Echo (as stated on patients sleep study results). Order pending.     Pt is scheduled on 7/19/21 @ [address] [place] [address]

## 2021-07-19 ENCOUNTER — HOSPITAL ENCOUNTER (OUTPATIENT)
Dept: NON INVASIVE DIAGNOSTICS | Age: 81
Discharge: HOME | End: 2021-07-19
Payer: COMMERCIAL

## 2021-07-19 DIAGNOSIS — G47.31 CENTRAL SLEEP APNEA: ICD-10-CM

## 2021-07-19 DIAGNOSIS — R06.02 SOB (SHORTNESS OF BREATH): ICD-10-CM

## 2021-07-19 LAB
LV EF: 65 %
LVEF MODALITY: NORMAL

## 2021-07-19 PROCEDURE — 93306 TTE W/DOPPLER COMPLETE: CPT

## 2021-07-19 RX ORDER — FAMOTIDINE 20 MG/1
TABLET, FILM COATED ORAL
Qty: 60 TABLET | Refills: 3 | Status: SHIPPED | OUTPATIENT
Start: 2021-07-19 | End: 2022-07-20

## 2021-07-28 ENCOUNTER — OFFICE VISIT (OUTPATIENT)
Dept: PULMONOLOGY | Age: 81
End: 2021-07-28
Payer: COMMERCIAL

## 2021-07-28 VITALS
TEMPERATURE: 98.2 F | DIASTOLIC BLOOD PRESSURE: 85 MMHG | WEIGHT: 194.6 LBS | BODY MASS INDEX: 31.27 KG/M2 | SYSTOLIC BLOOD PRESSURE: 157 MMHG | OXYGEN SATURATION: 98 % | HEIGHT: 66 IN | HEART RATE: 85 BPM | RESPIRATION RATE: 16 BRPM

## 2021-07-28 DIAGNOSIS — Z87.891 FORMER SMOKER: ICD-10-CM

## 2021-07-28 DIAGNOSIS — G47.33 OSA (OBSTRUCTIVE SLEEP APNEA): ICD-10-CM

## 2021-07-28 DIAGNOSIS — G47.31 CSA (CENTRAL SLEEP APNEA): ICD-10-CM

## 2021-07-28 DIAGNOSIS — J96.11 CHRONIC RESPIRATORY FAILURE WITH HYPOXIA (HCC): Primary | ICD-10-CM

## 2021-07-28 DIAGNOSIS — I27.20 PULMONARY HTN (HCC): ICD-10-CM

## 2021-07-28 PROCEDURE — G8417 CALC BMI ABV UP PARAM F/U: HCPCS | Performed by: INTERNAL MEDICINE

## 2021-07-28 PROCEDURE — 1123F ACP DISCUSS/DSCN MKR DOCD: CPT | Performed by: INTERNAL MEDICINE

## 2021-07-28 PROCEDURE — 1090F PRES/ABSN URINE INCON ASSESS: CPT | Performed by: INTERNAL MEDICINE

## 2021-07-28 PROCEDURE — 4040F PNEUMOC VAC/ADMIN/RCVD: CPT | Performed by: INTERNAL MEDICINE

## 2021-07-28 PROCEDURE — 99214 OFFICE O/P EST MOD 30 MIN: CPT | Performed by: INTERNAL MEDICINE

## 2021-07-28 PROCEDURE — G8399 PT W/DXA RESULTS DOCUMENT: HCPCS | Performed by: INTERNAL MEDICINE

## 2021-07-28 PROCEDURE — G8427 DOCREV CUR MEDS BY ELIG CLIN: HCPCS | Performed by: INTERNAL MEDICINE

## 2021-07-28 PROCEDURE — 1036F TOBACCO NON-USER: CPT | Performed by: INTERNAL MEDICINE

## 2021-07-28 RX ORDER — LOSARTAN POTASSIUM 100 MG/1
TABLET ORAL
Qty: 30 TABLET | Refills: 2 | Status: SHIPPED | OUTPATIENT
Start: 2021-07-28 | End: 2021-07-29 | Stop reason: ALTCHOICE

## 2021-07-28 ASSESSMENT — SLEEP AND FATIGUE QUESTIONNAIRES
HOW LIKELY ARE YOU TO NOD OFF OR FALL ASLEEP WHEN YOU ARE A PASSENGER IN A CAR FOR AN HOUR WITHOUT A BREAK: 0
HOW LIKELY ARE YOU TO NOD OFF OR FALL ASLEEP IN A CAR, WHILE STOPPED FOR A FEW MINUTES IN TRAFFIC: 0
ESS TOTAL SCORE: 1
HOW LIKELY ARE YOU TO NOD OFF OR FALL ASLEEP WHILE WATCHING TV: 1
HOW LIKELY ARE YOU TO NOD OFF OR FALL ASLEEP WHILE LYING DOWN TO REST IN THE AFTERNOON WHEN CIRCUMSTANCES PERMIT: 0
HOW LIKELY ARE YOU TO NOD OFF OR FALL ASLEEP WHILE SITTING INACTIVE IN A PUBLIC PLACE: 0
HOW LIKELY ARE YOU TO NOD OFF OR FALL ASLEEP WHILE SITTING QUIETLY AFTER LUNCH WITHOUT ALCOHOL: 0
HOW LIKELY ARE YOU TO NOD OFF OR FALL ASLEEP WHILE SITTING AND READING: 0
HOW LIKELY ARE YOU TO NOD OFF OR FALL ASLEEP WHILE SITTING AND TALKING TO SOMEONE: 0

## 2021-07-28 NOTE — PROGRESS NOTES
MA Communication:   The following orders are received by verbal communication from Rosie Connolly MD    Orders include:  ASV titration (sleep center to contact pt)       FU scheduled 8/17/21

## 2021-07-28 NOTE — PROGRESS NOTES
Chief Complaint/Referring Provider:  Pulmonary follow up to discuss test results and options    Patient was updated to the in lab sleep studies and patient has come to follow-up on that, patient continues to have some dry cough with scanty mucoid expectoration once in a while, patient does have some occasional sinus congestion, patient does not have any significant sore throat or difficulty in swallowing, no coughing or choking while eating, no odynophagia or dysphagia, patient does not have any pleuritic chest pain, patient does have a history of recent right shoulder pain for which patient was given muscle relaxants and also an injection was given in the right shoulder as up with the patient, patient also states that she has a lower central chest cystlike structure which swells at times, patient does not have any significant increasing abdominal discomfort nausea vomiting or any reflux symptoms, patient does have some swelling of the lower extremities, patient has been on water pill along with her blood pressure medication as per the patient, patient's requirement for nebulizer is limited, patient continues to have sleep fragmentation, and continues to be on home oxygen, patient does not have any other pertinent review of system of concern      Previous HPIPatient is a 82-year-old female who has come to the office for a pulmonary evaluation, patient states that she has not been using her CPAP machine because she feels the pressure is high, patient is having difficulty in getting it evaluated and checked by [facility] as per patient and family, also patient and family are stating that they have to go personally to get the oxygen and is not delivered by [facility] and they wanted to see if patient can get a new DME company, patient has been having increasing nasal congestion sinus congestion postnasal drainage, patient does not have any significant otalgia no ear discharge, patient states that the Niya Hein is helping her and patient's shortness of breath is less with that, patient does not have any significant fever or chills, patient does not have any palpitations, patient does have reflux symptoms for which she takes medication as given by the GI with improvement, patient does not have any altered bowel habits or any hematochezia or melena, patient has some leg edema but patient takes HCTZ on regular basis, patient does not have any change in the ambient environment any sick contacts, patient has received her 2 doses of COVID-19 vaccine, patient does not have any confusion lethargy, no other pertinent review of system of concern      A virtual pulmonary visit was done for the patient to evaluate her clinical status and options, patient states that with the new inhaler which is Trelegy Ellipta he feels somewhat better, patient does not have that measured shortness of breath, patient does not have any significant wheezing, patient does have some cough along with that patient feels that she at times have congestion and the mucus is more so in the throat area, patient does not have any chest pain or palpitations, patient does not have any otalgia no ear discharge, patient states that yesterday she felt that she had an episode of difficulty in swallowing on the left side which she attributed to possible thyroid medication has not happened on a regular basis, patient does not have any pleuritic chest pain, no fever no chills, patient does not have any significant abdominal issues of concern, patient states that she does not have any altered bowel habits, no dysuria no hematuria, patient does not have any increasing leg edema, patient is requirement for risk inhaler is limited, patient states that she has been using her CPAP machine on a regular basis but she feels that her CPAP machine pressure is quite high and wants something to be done about that, patient does not see Dr. Quang Cheney now for sleep apnea, does not have any change in the abnormal range sick contacts, no change of medications, patient was alert and communicative when seen, patient does not have any other pertinent review of system of concern     : Patient has come to the office for pulmonary evaluation as referred by his PCP/Dr. Bryon Heaton  Patient was seen as an inpatient in 2019    Patient states that she has increasing shortness of breath, patient states that she has coughing along with that patient has thick but clear respiratory secretions, patient also has been having increased nasal congestion and nasal stuffiness, patient states that she has not been able to use the humidification along with the oxygen concentrator as patient states that it comes to her nose at times, patient states that she has occasional pleuritic chest pain, patient also has occasional abdominal pain, patient used to have a PEG tube which has been removed, patient is taking food by mouth but it is modified as per the speech therapy  recommendations, patient has a history of rheumatoid arthritis and many biologicals have been tried but patient has had various side effects with that and patient is now taking Actemra twice a month as given by the rheumatologist, patient continues to have intermittent pain and patient states that last night she was having significant shoulder pain which was causing her to have asthma attack, patient takes albuterol inhaler on a as needed basis along with that patient states that she takes nebulizer treatment along with Spiriva on regular basis, patient states that she has CPAP which she is using at nighttime; patient does not have any pets, patient has central air but no humidification, patient is try to take less salt in the diet, patient denies any change in the environment or any sick contacts, patient does not have any confusion lethargy, patient does not have any other pertinent review of system of concern     Past Medical History:   Diagnosis Date    Acid reflux     Acquired hypothyroidism 7/6/2017    Anemia     Asthma     CHF (congestive heart failure) (HCC)     COPD [facility]ch[facility] obstructive pulmonary disease) [facility]     HLD (hyperlipidemia)     Hypertension     Influenza A 01/22/2018    MI (myocardial infarction) ([facility]     X 2    Migraine     past hx    Rheumatoid arthritis     Wears glasses        Past Surgical History:   Procedure Laterality Date    BRONCHOSCOPY N/A 3/27/2019    BRONCHOSCOPY ALVEOLAR LAVAGE performed by Simba Samano MD at [address]  3/27/2019    BRONCHOSCOPY THERAPUTIC ASPIRATION INITIAL performed by Simba Samano MD at [address] [facility] Bilateral 03/23/2011    COLONOSCOPY N/A 6/12/2019    COLONOSCOPY WITH ANESTHESIA -SLEEP APNEA- performed by Kayla Olsen MD at [address] [facility] Left 12/4/2018    LEFT LUMBAR FOUR, LUMBAR FIVE TRANSFORAMINAL EPIDURAL STEROID INJECTION SITE CONFIRMED BY FLUOROSCOPY performed by Vickie Ortiz MD at [address] [facility] N/A 7/23/2019    ESOPHAGEAL MOTILITY/MANOMETRY STUDY performed by Gretchen Gonzales MD at [address] [facility]. little toe    GASTROSTOMY TUBE PLACEMENT N/A 4/1/2019    EGD PEG TUBE PLACEMENT performed by Kayla Olsen MD at [address]      total, fibroids    KNEE SURGERY      right    [place] [address] DX/THER SBST INTRLMNR CRV/THRC W/IMG GDN Left 8/21/2018    LEFT LUMBAR FOUR/ LUMBAR FIVE CYST ASPIRATION SITE CONFIRMED BY FLUOROSCOPY performed by Vickie Ortiz MD at [address] [place] 3/27/2019    EGD DIAGNOSTIC ONLY performed by Umberto Dawn MD at [address] [facility] 9/6/2020    EGD DIAGNOSTIC ONLY performed [facility] China Rosa DO [facility] [address]       Allergies   Allergen Reactions    Alendronate Sodium      Jaw pain      Humira [Adalimumab]      Rash      Penicillins      rash    Simvastatin Other (See Comments)     Made legs ache    Sulfa Antibiotics Swelling     Tongue swelled    Tape Ressie Men Tape]        Medication list was reviewed and updated as needed in Epic    Social History     Socioeconomic History    Marital status: Single     Spouse name: Not on file    Number of children: 3    Years of education: Not on file    Highest education level: Not on file   Occupational History    Occupation: disability   Tobacco Use    Smoking status: Former Smoker     Packs/day: 3.00     Years: 50.00     Pack years: 150.00     Start date: 3/4/1963     Quit date: 2009     Years since quittin.5    Smokeless tobacco: Never Used   Vaping Use    Vaping Use: Never used   Substance and Sexual Activity    Alcohol use: No    Drug use: No    Sexual activity: Not on file   Other Topics Concern    Not on file   Social History Narrative    Not on file     Social Determinants of Health     Financial Resource Strain:     Difficulty of Paying Living Expenses:    Food Insecurity:     Worried About Running Out of Food in the Last Year:     Ran Out of Food in the Last Year:    Transportation Needs:     Lack of Transportation (Medical):      Lack of Transportation (Non-Medical):    Physical Activity:     Days of Exercise per Week:     Minutes of Exercise per Session:    Stress:     Feeling of Stress :    Social Connections:     Frequency of Communication with Friends and Family:     Frequency of Social Gatherings with Friends and Family:     Attends Hindu Services:     Active Member of Clubs or Organizations:     Attends Club or Organization Meetings:     Marital Status:    Intimate Partner Violence:     Fear of Current or Ex-Partner:     Emotionally Abused:     Physically Abused:     Sexually Abused:        Family History   Problem Relation Age of Onset    Cancer Mother     Cancer Father     Heart Disease Maternal Aunt     Cancer Sister             Review of Systems same as above    Physical Exam:  not currently breastfeeding.'  Constitutional:  No acute distress. While sitting in the wheelchair on nasal cannula oxygen  HENT:  Oropharynx is clear and moist. No thyromegaly. Eyes:  Conjunctivae arenormal. Pupils equal, round, and reactive to light. No scleral icterus. Neck: . No tracheal deviation present. No obvious thyroid mass. Cardiovascular:Normal rate, regular rhythm, normal heart sounds. No right ventricular heave. Mild lower extremity edema. Pulmonary/Chest: No wheezes. Left basilar rales which improves with coughing. Chest wall is not dull to percussion. Noaccessory muscle usage or stridor. decreased breath sound density  Abdominal: Soft. Bowel sounds present. No distension or hernia. Notenderness. Musculoskeletal: No cyanosis. No clubbing. No obvious joint deformity. Lymphadenopathy: No cervical or supraclavicular adenopathy. Skin: Skin is warm and dry. No rash or nodules on the exposed extremities. Psychiatric: Normal mood and affect. Behavior is normal.  No anxiety. Neurologic: Alert, awake and oriented. PERRL. Speech fluent        Data:     Imaging:  I have reviewed radiology images personally. No orders to display     Xr Lumbar Spine (2-3 Views)    Result Date: 1/15/2021  EXAMINATION: 3 XRAY VIEWS OF THE LUMBAR SPINE 1/15/2021 12:06 pm COMPARISON: Lumbar spine radiographs April 13, 2018. HISTORY: ORDERING SYSTEM PROVIDED HISTORY: Lumbar pain TECHNOLOGIST PROVIDED HISTORY: Reason for Exam: low back pain Acuity: Acute Type of Exam: Initial Mechanism of Injury: fell to floor from wheelchair Relevant Medical/Surgical History: pt noted having a recent fall down steps FINDINGS: There is 6 mm degenerative anterolisthesis of L3 on L4 and L4 on L5. Lumbar vertebra otherwise demonstrate normal height and alignment. No fracture or suspicious osseous lesion identified.   Listhesis is similar in appearance dating back to 2018. There is moderate disc height loss at L3-L4 and L4-L5. Mild disc height loss is seen at the remainder of lumbar levels. There is multilevel facet arthrosis. Evaluation the soft tissues demonstrates atherosclerotic calcification of the abdominal aorta. Multilevel degenerative changes with grade 1 degenerative listhesis at L3-L4 and L4-L5, similar in appearance dating back to 2018. No acute osseous abnormality. Nuclear stress test-  Conclusions        Summary    There is normal isotope uptake at stress and rest. There is no evidence of    myocardial ischemia or scar. LV function is normal with uniform wall motion    and ejection fraction of 67 %. Lower risk study     CT chest in 2019 from care [facility]    1.  No acute findings. 2.  No evidence of mass or lymphadenopathy. 3.  Severe emphysema. 4.  Pulmonary arterial dilatation suspicious for pulmonary hypertension. ECHO in 2019-Study Conclusions    - Left ventricle: The cavity size was normal. Wall thickness was    normal. Systolic function was normal. The calculated ejection    fraction was in the range of 64% to 68%. Normal diastolic    function.  - Aortic valve: Peak gradient (S): 6mm Hg.  - Tricuspid valve: Regurgitant peak velocity: 314cm/sec. Peak RV-RA    gradient (S): 39mm Hg. - Inferior vena cava: The vessel was normal in size; the    respirophasic diameter changes were in the normal range (>= 50%);    findings are consistent with normal central venous pressure. - Pulmonary arteries: PA peak pressure: 42mm Hg (S). PFT IN 2017-PFT TEST    Spirometry  Spirometry shows moderate obstructive defect. .    Bronchodilator  There is no response to bronchodilator demonstrated. Flow-Volume Loop  The flow-volume loop is compatible with obstructive lung defect. Lung Volumes  Lung volumes are normal except for elevated RV. Lung Volume Comments  There is air trapping present.     Diffusing Capacity  Single breath diffusing capacity is severely decreased. A reduced DLCO can be seen in diseases causing destruction of   alveolar-capillary interface (ILD, CHF, COPD, Pneumonia, etc.)   obliteration of pulmonary vascular bed (PE, PAH), tobacco usage,   carbon monoxide poisoning. Airway Resistance  Airway resistance is moderately elevated. Comparison  In comparison to the test of 16 there is mild deterioration   in spirometry and DLCO    PFT is compatible with moderate Obstructive Lung Disease  -   Possible etiologies include: asthma, COPD, bronchiectasis,   bronchitis. Correlate clinically. 6mwt IN 2017-6 Minute Walk Test Interpretation    Patient Name: Bell Mask: [birth year]  KHR/15/2017    A 6 Minute Walk Test was performed in accordance with the ATS   Guidelines ([address] [facility] ; [phone]) at [facility]. The test was performed on room air. The baseline SpO2 while breathing room air was 91%. The patient did complete the 6 minute walk. During the study, the patient walked a total distance of 1050   feet. The minimal recorded SpO2 was 88%. Oximetry with 6 Minute Walk Test shows desaturation to 88% on   room air, 1L, 2L and 3L nasal cannula oxygen with maximal   exercise. Sats improved to 94% on 4L NC oxygen with activity. Comments: Based on testing patient qualifies for 4L NC oxygen to   use with activity.      ECHO-Summary  Left ventricular systolic function is hyperdynamic with an estimated ejection fraction of ? 65%. The left ventricle is normal in size with normal wall thickness. Normal left ventricular diastolic function. The right atrium is mildly enlarged. Mild tricuspid regurgitation. Systolic pulmonary artery pressure (SPAP) is elevated and estimated at 39 mmHg (right atrial pressure 3 mmHg) consistent with  borderline pulmonary hypertension. Barium 20  Moderate esophageal dysmotility.  No evidence of functional obstruction or  gross reflux. PAP Compliance 10/3/2019   PAP Setting = 7   Percentage days used = 6   Average hours used = 2.39   Is patient compliant with PAP? no   AHI = 3.2   Leak = 9.4   Snow = -     PSG 9/5/18:  TOTAL AHI = 11.2/hour  REM AHI= 16.6/hr   Central AHI= 1.2/hour  Lowest O2 desaturation = 89 %  Minutes with O2 <90% = 2.6    TITRATION STUDY 9/20/18:  TOTAL AHI = 1.9/hour on PAP = 7 cm of H2O  Sleep emergent CSA events during the initial part of the study   Central AHI= 3.9/hour  Lowest O2 desaturation = 90 % overall  Minutes with O2% <90% = NA    Patient's sleep read shows patient to have severe HEBERT with AHI of 49.1/h along with that patient has central sleep apnea with no  with some nocturnal hypoxemia with saturation dropping to 82% when patient saturation below 89% was 104 minutes    Summary   Left ventricular systolic function is hyperdynamic with an estimated   ejection fraction of >= 65%. The left ventricle is normal in size with normal wall thickness. Normal left ventricular diastolic function. The right atrium is mildly enlarged. Mild tricuspid regurgitation. Systolic pulmonary artery pressure (SPAP) is elevated and estimated at 39   mmHg (right atrial pressure 3 mmHg) consistent with borderline pulmonary   hypertension. Assessment:    1. Centrilobular emphysema ([address]      2. HEBERT on CPAP      3. Rheumatoid arthritis involving multiple sites with positive rheumatoid factor ([address]      4. Pulmonary HTN ([address]      5. ASD (atrial septal defect)      6. Former smoker      7. Chronic respiratory failure with hypoxia (HCC)    8.   Nasal congestion          Plan:   · Patient's ROS discussed   · Patient and family were told about the clinical findings including auscultation and implications  · Patient's previous imaging and tests results were reviewed  · Patient's documentation about her rheumatoid arthritis and management was also reviewed from [facility]  · Patient sleep study results were reviewed and patient has severe HEBERT along with CSA and nocturnal hypoxemia  · Patient was admitted and echocardiogram shows patient's EF to be normal but patient does have some pulmonary hypertension  · Given the patient's clinical status and sleep results along with echocardiogram patient to be sent for sleep study titration for ASV in the lab which is being ordered  · Patient has chronic respiratory failure with hypoxemia  · Concept of hypoxemia and hypercarbia discussed  · Patient's oxygen needs to be maintained between 90 to 94% only  · Patient cannot use any humidification along with the concentrator  · Patient was told to use some saline nasal spray-patient was given a sample of saline rinse from the office  · Flonase nasal spray 2 puff each nostril once a day  · A humidifier in the bedroom will help  · Salt and fluid restrictions were discussed  · Patient was told about the pathophysiology of the disease process and its modifying factors  · Patient was told about the pathophysiology of the COPD and various medications and how they act  · [facility]  · Patient was told to make sure that she rinses her mouth with water without fail to avoid any hoarseness of voice or oral thrush  · No need for any antibiotic and steroids  · Daily weights will help  · Patient may require loop diuretics down the line-need to discuss with PCP  · Patient's treatment of rheumatoid arthritis as per rheumatology  · Oral diet as per speech therapy recommendations  · Monitor for any aspiration event  · Patient needs to discuss with PCP about Pneumovax and Prevnar 13 vaccines  · Patient to take DuoNeb or albuterol inhaler only on whenever necessary basis and not on regular basis  · Patient's further treatment depending on patient's clinical status and response to the new inhaler  · Steam inhalation will help especially at nighttime before going to bed  · A humidifier in the bedroom will help  · Salt and fluid restrictions in the diet  · Patient to follow-up after the sleep study

## 2021-07-28 NOTE — PATIENT INSTRUCTIONS
Remember to bring a list of pulmonary medications and any CPAP or BiPAP machines to your next appointment with the office. Please keep all of your future appointments scheduled by [address]. Out of respect for other patients and providers, you may be asked to reschedule your appointment if you arrive later than your scheduled appointment time. Appointments cancelled less than 24hrs in advance will be considered a no show. Patients with three missed appointments within 1 year or four missed appointments within 2 years can be dismissed from the practice. Please be aware that our physicians are required to work in the Intensive Care Unit at [facility].  Your appointment may need to be rescheduled if they are designated to work during your appointment time. You may receive a survey regarding the care you received during your visit. Your input is valuable to us. We encourage you to complete and return your survey. We hope you will choose us in the future for your healthcare needs. Pt instructed of all future appointment dates & times, including radiology, labs, procedures & referrals. If procedures were scheduled preparation instructions provided. Instructions on future appointments with [facility] were given.

## 2021-07-29 ENCOUNTER — OFFICE VISIT (OUTPATIENT)
Dept: FAMILY MEDICINE CLINIC | Age: 81
End: 2021-07-29
Payer: COMMERCIAL

## 2021-07-29 VITALS
RESPIRATION RATE: 18 BRPM | HEART RATE: 85 BPM | DIASTOLIC BLOOD PRESSURE: 84 MMHG | BODY MASS INDEX: 31.31 KG/M2 | HEIGHT: 66 IN | OXYGEN SATURATION: 98 % | SYSTOLIC BLOOD PRESSURE: 156 MMHG | WEIGHT: 194.8 LBS

## 2021-07-29 DIAGNOSIS — G47.33 OSA ON CPAP: ICD-10-CM

## 2021-07-29 DIAGNOSIS — E78.00 PURE HYPERCHOLESTEROLEMIA: ICD-10-CM

## 2021-07-29 DIAGNOSIS — I27.20 PULMONARY HTN (HCC): ICD-10-CM

## 2021-07-29 DIAGNOSIS — E03.9 ACQUIRED HYPOTHYROIDISM: ICD-10-CM

## 2021-07-29 DIAGNOSIS — J43.2 CENTRILOBULAR EMPHYSEMA (HCC): ICD-10-CM

## 2021-07-29 DIAGNOSIS — M81.0 AGE-RELATED OSTEOPOROSIS WITHOUT CURRENT PATHOLOGICAL FRACTURE: ICD-10-CM

## 2021-07-29 DIAGNOSIS — M05.79 RHEUMATOID ARTHRITIS INVOLVING MULTIPLE SITES WITH POSITIVE RHEUMATOID FACTOR (HCC): ICD-10-CM

## 2021-07-29 DIAGNOSIS — Z99.89 OSA ON CPAP: ICD-10-CM

## 2021-07-29 PROBLEM — J96.00 ACUTE RESPIRATORY FAILURE (HCC): Status: RESOLVED | Noted: 2019-03-27 | Resolved: 2021-07-29

## 2021-07-29 PROBLEM — N17.9 AKI (ACUTE KIDNEY INJURY) (HCC): Status: RESOLVED | Noted: 2019-03-29 | Resolved: 2021-07-29

## 2021-07-29 PROBLEM — J96.01 ACUTE RESPIRATORY FAILURE WITH HYPOXIA (HCC): Status: RESOLVED | Noted: 2019-03-27 | Resolved: 2021-07-29

## 2021-07-29 PROBLEM — J96.11 CHRONIC RESPIRATORY FAILURE WITH HYPOXIA (HCC): Status: RESOLVED | Noted: 2021-01-20 | Resolved: 2021-07-29

## 2021-07-29 PROCEDURE — 99214 OFFICE O/P EST MOD 30 MIN: CPT | Performed by: INTERNAL MEDICINE

## 2021-07-29 PROCEDURE — G8926 SPIRO NO PERF OR DOC: HCPCS | Performed by: INTERNAL MEDICINE

## 2021-07-29 PROCEDURE — 1036F TOBACCO NON-USER: CPT | Performed by: INTERNAL MEDICINE

## 2021-07-29 PROCEDURE — 1090F PRES/ABSN URINE INCON ASSESS: CPT | Performed by: INTERNAL MEDICINE

## 2021-07-29 PROCEDURE — 4040F PNEUMOC VAC/ADMIN/RCVD: CPT | Performed by: INTERNAL MEDICINE

## 2021-07-29 PROCEDURE — G8428 CUR MEDS NOT DOCUMENT: HCPCS | Performed by: INTERNAL MEDICINE

## 2021-07-29 PROCEDURE — 1123F ACP DISCUSS/DSCN MKR DOCD: CPT | Performed by: INTERNAL MEDICINE

## 2021-07-29 PROCEDURE — G8399 PT W/DXA RESULTS DOCUMENT: HCPCS | Performed by: INTERNAL MEDICINE

## 2021-07-29 PROCEDURE — G8417 CALC BMI ABV UP PARAM F/U: HCPCS | Performed by: INTERNAL MEDICINE

## 2021-07-29 PROCEDURE — 3023F SPIROM DOC REV: CPT | Performed by: INTERNAL MEDICINE

## 2021-07-29 RX ORDER — VALSARTAN 160 MG/1
160 TABLET ORAL DAILY
Qty: 30 TABLET | Refills: 3 | Status: SHIPPED | OUTPATIENT
Start: 2021-07-29 | End: 2021-11-15

## 2021-07-29 RX ORDER — FUROSEMIDE 20 MG/1
20 TABLET ORAL 2 TIMES DAILY
Qty: 60 TABLET | Refills: 3 | Status: SHIPPED | OUTPATIENT
Start: 2021-07-29 | End: 2021-11-29

## 2021-07-29 ASSESSMENT — ENCOUNTER SYMPTOMS
EYES NEGATIVE: 1
SHORTNESS OF BREATH: 1
GASTROINTESTINAL NEGATIVE: 1
BACK PAIN: 1

## 2021-07-29 NOTE — ASSESSMENT & PLAN NOTE
No recent change in meds other than injections and Prednisone 5 mg. Having severe back and knee pain. Hands stiff and sore. Much harder for her to walk even w/ walker. Saw Rheumatology 7/7/2021. Injected shoulder. Bilateral knees swollen, hand stiff, L shoulder no movement. Unable to be mobile in house. Discussed using Wheelchair last visit. Says did not get.

## 2021-07-29 NOTE — PROGRESS NOTES
Kaleigh Yoder presents today for   Chief Complaint   Patient presents with    Hyperlipidemia     fasting    Hypertension     discuss possible increase in diuretic        Centrilobular emphysema ([address]   Stable w/ Home O2. Saw Sleep MD. No c/o w/ meds. HEBERT on CPAP   On C-pap. Pulmonary HTN ([address]  Seen on CT scan recently. Still present. No recent change in meds. Saw Pulmonary recently. Acquired hypothyroidism  Stable w/ meds. Rheumatoid arthritis involving multiple sites with positive rheumatoid factor (HCC)    No recent change in meds other than injections and Prednisone 5 mg. Having severe back and knee pain. Hands stiff and sore. Much harder for her to walk even w/ walker. Saw Rheumatology 7/7/2021. Injected shoulder. Bilateral knees swollen, hand stiff, L shoulder no movement. Unable to be mobile in house. Discussed using Wheelchair last visit. Says did not get. Age-related osteoporosis without current pathological fracture   Due for Prolia injection in 10/2021. Pure hypercholesterolemia   Stable diet and wt. No c/o w/ meds. Review of Systems   Constitutional: Positive for fatigue. Eyes: Negative. Respiratory: Positive for shortness of breath. Cardiovascular: Positive for leg swelling. Gastrointestinal: Negative. Endocrine: Negative. Genitourinary: Negative. Musculoskeletal: Positive for arthralgias, back pain, gait problem, joint swelling and myalgias. Skin: Negative. Allergic/Immunologic: Positive for environmental allergies. Neurological: Positive for weakness. Hematological: Negative. Psychiatric/Behavioral: The patient is nervous/anxious. Vitals:    07/29/21 0940   BP: (!) 156/84   Pulse: 85   Resp: 18   SpO2: 98%   Weight: 194 lb 12.8 oz (88.4 kg)   Height: 5' 6\" (1.676 m)        Physical Exam  Constitutional:       General: She is not in acute distress. Appearance: Normal appearance. She is well-developed. She is obese.  She is not ill-appearing, toxic-appearing or diaphoretic. HENT:      Head: Normocephalic and atraumatic. Eyes:      Conjunctiva/sclera: Conjunctivae normal.      Pupils: Pupils are equal, round, and reactive to light. Neck:      Thyroid: No thyroid mass or thyromegaly. Vascular: Normal carotid pulses. No carotid bruit, hepatojugular reflux or JVD. Trachea: Trachea normal. No tracheal deviation. Cardiovascular:      Rate and Rhythm: Normal rate and regular rhythm. No extrasystoles are present. Chest Wall: PMI is not displaced. Pulses: Normal pulses. No decreased pulses. Carotid pulses are 2+ on the right side and 2+ on the left side. Radial pulses are 2+ on the right side and 2+ on the left side. Femoral pulses are 2+ on the right side and 2+ on the left side. Popliteal pulses are 2+ on the right side and 2+ on the left side. Dorsalis pedis pulses are 2+ on the right side and 2+ on the left side. Posterior tibial pulses are 2+ on the right side and 2+ on the left side. Heart sounds: Normal heart sounds, S1 normal and S2 normal. Heart sounds not distant. No murmur heard. No friction rub. No gallop. No S3 or S4 sounds. Pulmonary:      Effort: Pulmonary effort is normal. No tachypnea, bradypnea, accessory muscle usage or respiratory distress. Breath sounds: Rhonchi and rales present. No decreased breath sounds or wheezing. Chest:      Chest wall: No tenderness. Musculoskeletal:         General: Tenderness (OA shoulders, hands, lower back, knees.) present. Normal range of motion. Cervical back: Full passive range of motion without pain, normal range of motion and neck supple. No edema, erythema, rigidity or tenderness. No spinous process tenderness or muscular tenderness. Normal range of motion. Right lower leg: Edema present. Left lower leg: Edema present. Lymphadenopathy:      Cervical: No cervical adenopathy.    Skin: General: Skin is warm and dry. Coloration: Skin is not jaundiced or pale. Findings: No abrasion, bruising, erythema, lesion or rash. Nails: There is no clubbing. Neurological:      Mental Status: She is alert and oriented to person, place, and time. She is not disoriented. Cranial Nerves: No cranial nerve deficit. Sensory: No sensory deficit. Motor: No tremor, atrophy or abnormal muscle tone. Coordination: Coordination normal.      Deep Tendon Reflexes: Reflexes are normal and symmetric. Psychiatric:         Speech: Speech normal.         Behavior: Behavior normal.         Thought Content: Thought content normal.         Judgment: Judgment normal.          A/P:     John On Cpap. F/u w/ Sleep clinic for C-pap alterations. Pulmonary Htn (Hcc). Increased slighly on recent ECHO by Pulm. Acquired Hypothyroidism. Doing well. Continue present med. Centrilobular Emphysema (Hcc). Continue present meds. Call if new c/o. Home O2 at 4 L. Pure Hypercholesterolemia. To lose some weight. Continue present meds. Call if new c/o. Rheumatoid Arthritis Involving Multiple Sites With Positive Rheumatoid Factor (Hcc). To Rheumatology as scheduled. Age-Related Osteoporosis Without Current Pathological Fracture. Prolia in Oct.     CHF. ECHO was good. Exam showed fluid overload. BP fluctuates. Will chage to Diovan 160 mg daily and Lasix 20 mg twice daily. Home BP checks call if <90/60 or >140/90. Call if new c/o. Call if SOB and swelling not improved.             Yessi Irving MD

## 2021-08-03 ENCOUNTER — TELEPHONE (OUTPATIENT)
Dept: PULMONOLOGY | Age: 81
End: 2021-08-03

## 2021-08-03 NOTE — TELEPHONE ENCOUNTER
Eduar Louis (rep from [place] [facility] [place]) stopped by today and suggested instead of pt going to ASV, she may be better off on an NIV. Pt did have echo done and EF was >65. Please advise. Pt is scheduled for ASV titration on 8/20/21.

## 2021-08-04 NOTE — TELEPHONE ENCOUNTER
I understand and they saw the report. They said they can set pt up on ASV but they felt an NIV may be easier and better for patient. That is why they were asking. Please advise.

## 2021-08-04 NOTE — TELEPHONE ENCOUNTER
What will be the criteria to give NIV ?? Last ABG in Epic-  pH, Arterial 7.410  7.350 - 7.450 Final 03/28/2019 10:46 AM [facility]   n\a   pCO2, Arterial 34.8Low   35.0 - 45.0 mmHg Final 03/28/2019 10:46 AM [facility]   pO2, Arterial 106.7  75.0 - 108.0 mmHg Final 03/28/2019 10:46 AM [facility]   HCO3, Arterial 21.6  21.0 - 29.0 mmol/L Final 03/28/2019 10:46 AM [facility] Excess, Arterial -2.5  -3.0 - 3.0 mmol/L Final 03/28/2019 10:46 AM [facility]   Hemoglobin, Art, Extended 11. 0Low   12.0 - 16.0 g/dL Final 03/28/2019 10:46 AM [address] [facility]   O2 Sat, Arterial 97.4  >92 % Final 03/28/2019 10:46 AM [address] [facility]   Carboxyhgb, Arterial 0.3  0.0 - 1.5 % Final 03/28/2019 10:46 AM [facility]        0.0-1.5

## 2021-08-05 NOTE — TELEPHONE ENCOUNTER
Ortiz Barajas from [place]. She asked a few more questions and realized pt has CSA. She said with that info, pt does need ASV titration and to disregard the message below. She apologized about the confusion.

## 2021-08-12 ENCOUNTER — NURSE ONLY (OUTPATIENT)
Dept: FAMILY MEDICINE CLINIC | Age: 81
End: 2021-08-12
Payer: COMMERCIAL

## 2021-08-12 DIAGNOSIS — I27.20 PULMONARY HTN (HCC): ICD-10-CM

## 2021-08-12 LAB
ANION GAP SERPL CALCULATED.3IONS-SCNC: 11 MMOL/L (ref 3–16)
BUN BLDV-MCNC: 25 MG/DL (ref 7–20)
CALCIUM SERPL-MCNC: 9.2 MG/DL (ref 8.3–10.6)
CHLORIDE BLD-SCNC: 102 MMOL/L (ref 99–110)
CO2: 29 MMOL/L (ref 21–32)
CREAT SERPL-MCNC: 1.1 MG/DL (ref 0.6–1.2)
GFR AFRICAN AMERICAN: 58
GFR NON-AFRICAN AMERICAN: 48
GLUCOSE BLD-MCNC: 86 MG/DL (ref 70–99)
POTASSIUM SERPL-SCNC: 4.9 MMOL/L (ref 3.5–5.1)
SODIUM BLD-SCNC: 142 MMOL/L (ref 136–145)

## 2021-08-12 PROCEDURE — 36415 COLL VENOUS BLD VENIPUNCTURE: CPT | Performed by: INTERNAL MEDICINE

## 2021-08-16 ENCOUNTER — TELEPHONE (OUTPATIENT)
Dept: PULMONOLOGY | Age: 81
End: 2021-08-16

## 2021-08-17 ENCOUNTER — OFFICE VISIT (OUTPATIENT)
Dept: PULMONOLOGY | Age: 81
End: 2021-08-17
Payer: COMMERCIAL

## 2021-08-17 VITALS
DIASTOLIC BLOOD PRESSURE: 70 MMHG | HEIGHT: 66 IN | OXYGEN SATURATION: 94 % | HEART RATE: 82 BPM | TEMPERATURE: 98.1 F | WEIGHT: 195.2 LBS | SYSTOLIC BLOOD PRESSURE: 116 MMHG | BODY MASS INDEX: 31.37 KG/M2

## 2021-08-17 DIAGNOSIS — G47.33 OSA ON CPAP: ICD-10-CM

## 2021-08-17 DIAGNOSIS — Z87.891 FORMER SMOKER: ICD-10-CM

## 2021-08-17 DIAGNOSIS — Z99.89 OSA ON CPAP: ICD-10-CM

## 2021-08-17 DIAGNOSIS — G47.31 CSA (CENTRAL SLEEP APNEA): ICD-10-CM

## 2021-08-17 DIAGNOSIS — I27.20 PULMONARY HTN (HCC): ICD-10-CM

## 2021-08-17 DIAGNOSIS — K22.4 ESOPHAGEAL MOTILITY DISORDER: ICD-10-CM

## 2021-08-17 DIAGNOSIS — J43.2 CENTRILOBULAR EMPHYSEMA (HCC): Primary | ICD-10-CM

## 2021-08-17 DIAGNOSIS — R09.02 HYPOXEMIA: ICD-10-CM

## 2021-08-17 PROCEDURE — 4040F PNEUMOC VAC/ADMIN/RCVD: CPT | Performed by: INTERNAL MEDICINE

## 2021-08-17 PROCEDURE — G8427 DOCREV CUR MEDS BY ELIG CLIN: HCPCS | Performed by: INTERNAL MEDICINE

## 2021-08-17 PROCEDURE — 1090F PRES/ABSN URINE INCON ASSESS: CPT | Performed by: INTERNAL MEDICINE

## 2021-08-17 PROCEDURE — 3023F SPIROM DOC REV: CPT | Performed by: INTERNAL MEDICINE

## 2021-08-17 PROCEDURE — G8926 SPIRO NO PERF OR DOC: HCPCS | Performed by: INTERNAL MEDICINE

## 2021-08-17 PROCEDURE — G8399 PT W/DXA RESULTS DOCUMENT: HCPCS | Performed by: INTERNAL MEDICINE

## 2021-08-17 PROCEDURE — G8417 CALC BMI ABV UP PARAM F/U: HCPCS | Performed by: INTERNAL MEDICINE

## 2021-08-17 PROCEDURE — 1123F ACP DISCUSS/DSCN MKR DOCD: CPT | Performed by: INTERNAL MEDICINE

## 2021-08-17 PROCEDURE — 99214 OFFICE O/P EST MOD 30 MIN: CPT | Performed by: INTERNAL MEDICINE

## 2021-08-17 PROCEDURE — 1036F TOBACCO NON-USER: CPT | Performed by: INTERNAL MEDICINE

## 2021-08-17 RX ORDER — TIZANIDINE 2 MG/1
TABLET ORAL
COMMUNITY
Start: 2021-08-06 | End: 2022-02-07 | Stop reason: ALTCHOICE

## 2021-08-17 NOTE — PROGRESS NOTES
MA Communication:   The following orders are received by verbal communication from Mariana Sen MD    Orders include:    PFT: 08/31/2021 8:30 am  Follow up: 08/31/2021 9:40 am

## 2021-08-17 NOTE — PATIENT INSTRUCTIONS
Remember to bring a list of pulmonary medications and any CPAP or BiPAP machines to your next appointment with the office. Please keep all of your future appointments scheduled by [address], [facility] office. Out of respect for other patients and providers, you may be asked to reschedule your appointment if you arrive later than your scheduled appointment time. Appointments cancelled less than 24hrs in advance will be considered a no show. Patients with three missed appointments within 1 year or four missed appointments within 2 years can be dismissed from the practice. Please be aware that our physicians are required to work in the Intensive Care Unit at [facility].  Your appointment may need to be rescheduled if they are designated to work during your appointment time. You may receive a survey regarding the care you received during your visit. Your input is valuable to us. We encourage you to complete and return your survey. We hope you will choose us in the future for your healthcare needs. Pt instructed of all future appointment dates & times, including radiology, labs, procedures & referrals. If procedures were scheduled preparation instructions provided. Instructions on future appointments with [facility] were given.

## 2021-08-17 NOTE — PROGRESS NOTES
Chief Complaint/Referring Provider:  Pulmonary follow up for lung issues and options    Patient is a 65-year-old female who has come back to the office for a pulmonary evaluation, patient was subjected to a sleep study for ASV titration but patient could not tolerate that for 15 minutes and the study was aborted, patient states that she could not catch her breath and also patient states that she was gasping for air along with that patient had some discomfort in the lower part of the center of the sternum and she felt as if something was closing up, patient also thinks that her diaphragm and the stomach sinks intermittently which causes her to have some shortness of breath, patient has had difficulty in swallowing the past and patient has had esophagogram along with modified barium esophagram in the past, patient also has had history of upper GI endoscopies and patient had 2 procedures for esophageal stretching, patient continues to have some cough with mucoid expectoration, patient also does not have any altered bowel habits, no epistaxis, hemoptysis or any hematochezia, patient's water pill was changed and patient is taking Lasix twice a day and patient states that she has to go to the bathroom quite often now, patient also wanted know if she can get a portable concentrator, patient did not have any change of ambient environment, patient has received  COVID-19 vaccine , no other pertinent review of system of concern      Previous HPIPatient was updated to the in lab sleep studies and patient has come to follow-up on that, patient continues to have some dry cough with scanty mucoid expectoration once in a while, patient does have some occasional sinus congestion, patient does not have any significant sore throat or difficulty in swallowing, no coughing or choking while eating, no odynophagia or dysphagia, patient does not have any pleuritic chest pain, patient does have a history of recent right shoulder pain for which patient was given muscle relaxants and also an injection was given in the right shoulder as up with the patient, patient also states that she has a lower central chest cystlike structure which swells at times, patient does not have any significant increasing abdominal discomfort nausea vomiting or any reflux symptoms, patient does have some swelling of the lower extremities, patient has been on water pill along with her blood pressure medication as per the patient, patient's requirement for nebulizer is limited, patient continues to have sleep fragmentation, and continues to be on home oxygen, patient does not have any other pertinent review of system of concern      Patient is a 65-year-old female who has come to the office for a pulmonary evaluation, patient states that she has not been using her CPAP machine because she feels the pressure is high, patient is having difficulty in getting it evaluated and checked by [facility] as per patient and family, also patient and family are stating that they have to go personally to get the oxygen and is not delivered by [facility]argas and they wanted to see if patient can get a new [employer], patient has been having increasing nasal congestion sinus congestion postnasal drainage, patient does not have any significant otalgia no ear discharge, patient states that the Jeanna Royalty is helping her and patient's shortness of breath is less with that, patient does not have any significant fever or chills, patient does not have any palpitations, patient does have reflux symptoms for which she takes medication as given by the GI with improvement, patient does not have any altered bowel habits or any hematochezia or melena, patient has some leg edema but patient takes HCTZ on regular basis, patient does not have any change in the ambient environment any sick contacts, patient has received her 2 doses of COVID-19 vaccine, patient does not have any confusion lethargy, no other pertinent review of system of concern      A virtual pulmonary visit was done for the patient to evaluate her clinical status and options, patient states that with the new inhaler which is Trelegy Ellipta he feels somewhat better, patient does not have that measured shortness of breath, patient does not have any significant wheezing, patient does have some cough along with that patient feels that she at times have congestion and the mucus is more so in the throat area, patient does not have any chest pain or palpitations, patient does not have any otalgia no ear discharge, patient states that yesterday she felt that she had an episode of difficulty in swallowing on the left side which she attributed to possible thyroid medication has not happened on a regular basis, patient does not have any pleuritic chest pain, no fever no chills, patient does not have any significant abdominal issues of concern, patient states that she does not have any altered bowel habits, no dysuria no hematuria, patient does not have any increasing leg edema, patient is requirement for risk inhaler is limited, patient states that she has been using her CPAP machine on a regular basis but she feels that her CPAP machine pressure is quite high and wants something to be done about that, patient does not see Dr. Latisha Nielsen now for sleep apnea, does not have any change in the abnormal range sick contacts, no change of medications, patient was alert and communicative when seen, patient does not have any other pertinent review of system of concern     : Patient has come to the office for pulmonary evaluation as referred by his PCP/Dr. Latisha Nielsen  Patient was seen as an inpatient in 2019    Patient states that she has increasing shortness of breath, patient states that she has coughing along with that patient has thick but clear respiratory secretions, patient also has been having increased nasal congestion and nasal stuffiness, patient states that she has not been able to use the humidification along with the oxygen concentrator as patient states that it comes to her nose at times, patient states that she has occasional pleuritic chest pain, patient also has occasional abdominal pain, patient used to have a PEG tube which has been removed, patient is taking food by mouth but it is modified as per the speech therapy  recommendations, patient has a history of rheumatoid arthritis and many biologicals have been tried but patient has had various side effects with that and patient is now taking Actemra twice a month as given by the rheumatologist, patient continues to have intermittent pain and patient states that last night she was having significant shoulder pain which was causing her to have asthma attack, patient takes albuterol inhaler on a as needed basis along with that patient states that she takes nebulizer treatment along with Spiriva on regular basis, patient states that she has CPAP which she is using at nighttime; patient does not have any pets, patient has central air but no humidification, patient is try to take less salt in the diet, patient denies any change in the environment or any sick contacts, patient does not have any confusion lethargy, patient does not have any other pertinent review of system of concern     Past Medical History:   Diagnosis Date    Acid reflux     Acquired hypothyroidism 7/6/2017    Anemia     Asthma     [facility]     COPD ([facility]ronic obstructive pulmonary disease) [facility]     HLD (hyperlipidemia)     Hypertension     Influenza A 01/22/2018    MI (myocardial infarction) ([facility]     X 2    Migraine     past hx    Rheumatoid arthritis     Wears glasses        Past Surgical History:   Procedure Laterality Date    BRONCHOSCOPY N/A 3/27/2019    BRONCHOSCOPY ALVEOLAR LAVAGE performed by Lucina Murphy MD at [address] [place] [address]  3/27/2019    BRONCHOSCOPY [facility] performed by Maco Michael MD at [address] Bilateral 03/23/2011    COLONOSCOPY N/A 6/12/2019    COLONOSCOPY WITH ANESTHESIA -SLEEP APNEA- performed by Lula Riedel, MD at [address] Left 12/4/2018    LEFT LUMBAR FOUR, LUMBAR FIVE TRANSFORAMINAL EPIDURAL STEROID INJECTION SITE CONFIRMED BY FLUOROSCOPY performed by Andry Orellana MD at [address] N/A 7/23/2019    ESOPHAGEAL MOTILITY/MANOMETRY STUDY performed by Ria Alas MD at [address] little toe    GASTROSTOMY TUBE PLACEMENT N/A 4/1/2019    EGD PEG TUBE PLACEMENT performed by Lula Riedel, MD at [address]      total, fibroids    KNEE SURGERY      right    DE [address] DX/THER SBST INTRLMNR CRV/THRC W/IMG GDN Left 8/21/2018    LEFT LUMBAR FOUR/ LUMBAR FIVE CYST ASPIRATION SITE CONFIRMED BY FLUOROSCOPY performed by Andry Orellana MD at [address] N/A 3/27/2019    EGD DIAGNOSTIC ONLY performed by Charan Lazo MD at [address] N/A 9/6/2020    EGD DIAGNOSTIC ONLY performed by Sumaya Khan DO at [address]       Allergies   Allergen Reactions    Alendronate Sodium      Jaw pain      Humira [Adalimumab]      Rash      Penicillins      rash    Simvastatin Other (See Comments)     Made legs ache    Sulfa Antibiotics Swelling     Tongue swelled    Tape Karie Peaks Tape]        Medication list was reviewed and updated as needed in Epic    Social History     Socioeconomic History    Marital status: Single     Spouse name: Not on file    Number of children: 3    Years of education: Not on file    Highest education level: Not on file   Occupational History    Occupation: disability   Tobacco Use    Smoking status: Former Smoker     Packs/day: 3.00 Years: 50.00     Pack years: 150.00     Start date: 3/4/1963     Quit date: 2009     Years since quittin.5    Smokeless tobacco: Never Used   Vaping Use    Vaping Use: Never used   Substance and Sexual Activity    Alcohol use: No    Drug use: No    Sexual activity: Not on file   Other Topics Concern    Not on file   Social History Narrative    Not on file     Social Determinants of Health     Financial Resource Strain:     Difficulty of Paying Living Expenses:    Food Insecurity:     Worried About Running Out of Food in the Last Year:     [address] in the Last Year:    Transportation Needs:     Lack of Transportation (Medical):  Lack of Transportation (Non-Medical):    Physical Activity:     Days of Exercise per Week:     Minutes of Exercise per Session:    Stress:     Feeling of Stress :    Social Connections:     Frequency of Communication with Friends and Family:     Frequency of Social Gatherings with Friends and Family:     Attends Jewish Services:     Active Member of Clubs or Organizations:     Attends Club or Organization Meetings:     Marital Status:    Intimate Partner Violence:     Fear of Current or Ex-Partner:     Emotionally Abused:     Physically Abused:     Sexually Abused:        Family History   Problem Relation Age of Onset    Cancer Mother     Cancer Father     Heart Disease Maternal Aunt     Cancer Sister             Review of Systems same as above    Physical Exam:  Blood pressure 116/70, pulse 82, temperature 98.1 °F (36.7 °C), temperature source Oral, height 5' 6\" (1.676 m), weight 195 lb 3.2 oz (88.5 kg), SpO2 94 %, not currently breastfeeding.'  Constitutional:  No acute distress. While sitting in the wheelchair on nasal cannula oxygen  HENT:  Oropharynx is clear and moist. No thyromegaly. Eyes:  Conjunctivae arenormal. Pupils equal, round, and reactive to light. No scleral icterus. Neck: . No tracheal deviation present.  No obvious thyroid mass. Cardiovascular:Normal rate, regular rhythm, normal heart sounds. No right ventricular heave. Some lower extremity edema. Pulmonary/Chest: No wheezes. Left basilar rales which improves with coughing. Chest wall is not dull to percussion. Noaccessory muscle usage or stridor. decreased breath sound density  Abdominal: Soft. Bowel sounds present. No distension or hernia. Notenderness. Musculoskeletal: No cyanosis. No clubbing. No obvious joint deformity. Lymphadenopathy: No cervical or supraclavicular adenopathy. Skin: Skin is warm and dry. No rash or nodules on the exposed extremities. Psychiatric: Normal mood and affect. Behavior is normal.  No anxiety. Neurologic: Alert, awake and oriented. PERRL. Speech fluent        Data:     Imaging:  I have reviewed radiology images personally. No orders to display     Xr Lumbar Spine (2-3 Views)    Result Date: 1/15/2021  EXAMINATION: 3 XRAY VIEWS OF THE LUMBAR SPINE 1/15/2021 12:06 pm COMPARISON: Lumbar spine radiographs April 13, 2018. HISTORY: ORDERING SYSTEM PROVIDED HISTORY: Lumbar pain TECHNOLOGIST PROVIDED HISTORY: Reason for Exam: low back pain Acuity: Acute Type of Exam: Initial Mechanism of Injury: fell to floor from wheelchair Relevant Medical/Surgical History: pt noted having a recent fall down steps FINDINGS: There is 6 mm degenerative anterolisthesis of L3 on L4 and L4 on L5. Lumbar vertebra otherwise demonstrate normal height and alignment. No fracture or suspicious osseous lesion identified. Listhesis is similar in appearance dating back to 2018. There is moderate disc height loss at L3-L4 and L4-L5. Mild disc height loss is seen at the remainder of lumbar levels. There is multilevel facet arthrosis. Evaluation the soft tissues demonstrates atherosclerotic calcification of the abdominal aorta.      Multilevel degenerative changes with grade 1 degenerative listhesis at L3-L4 and L4-L5, similar in appearance dating back to 2018.  No acute osseous abnormality. Nuclear stress test-  Conclusions        Summary    There is normal isotope uptake at stress and rest. There is no evidence of    myocardial ischemia or scar. LV function is normal with uniform wall motion    and ejection fraction of 67 %. Lower risk study     CT chest in 2019 from care everywhere-CHEST    1.  No acute findings. 2.  No evidence of mass or lymphadenopathy. 3.  Severe emphysema. 4.  Pulmonary arterial dilatation suspicious for pulmonary hypertension. ECHO in 2019-Study Conclusions    - Left ventricle: The cavity size was normal. Wall thickness was    normal. Systolic function was normal. The calculated ejection    fraction was in the range of 64% to 68%. Normal diastolic    function.  - Aortic valve: Peak gradient (S): 6mm Hg.  - Tricuspid valve: Regurgitant peak velocity: 314cm/sec. Peak RV-RA    gradient (S): 39mm Hg. - Inferior vena cava: The vessel was normal in size; the    respirophasic diameter changes were in the normal range (>= 50%);    findings are consistent with normal central venous pressure. - Pulmonary arteries: PA peak pressure: 42mm Hg (S). PFT IN 2017-PFT TEST    Spirometry  Spirometry shows moderate obstructive defect. .    Bronchodilator  There is no response to bronchodilator demonstrated. Flow-Volume Loop  The flow-volume loop is compatible with obstructive lung defect. Lung Volumes  Lung volumes are normal except for elevated RV. Lung Volume Comments  There is air trapping present. Diffusing Capacity  Single breath diffusing capacity is severely decreased. A reduced DLCO can be seen in diseases causing destruction of   alveolar-capillary interface (ILD, CHF, COPD, Pneumonia, etc.)   obliteration of pulmonary vascular bed (PE, PAH), tobacco usage,   carbon monoxide poisoning. Airway Resistance  Airway resistance is moderately elevated.      Comparison  In comparison to the test of 8/18/16 there is mild deterioration   in spirometry and DLCO    PFT is compatible with moderate Obstructive Lung Disease  -   Possible etiologies include: asthma, COPD, bronchiectasis,   bronchitis. Correlate clinically. 6mwt IN 2017-6 Minute Walk Test Interpretation    Patient Name: Reid Carreon: 8/4/1908  ETXN: 8/15/2017    A 6 Minute Walk Test was performed in accordance with the ATS   Guidelines ([address] [facility] 2002; [id number]) at [facility]. The test was performed on room air. The baseline SpO2 while breathing room air was 91%. The patient did complete the 6 minute walk. During the study, the patient walked a total distance of 1050   feet. The minimal recorded SpO2 was 88%. Oximetry with 6 Minute Walk Test shows desaturation to 88% on   room air, 1L, 2L and 3L nasal cannula oxygen with maximal   exercise. Sats improved to 94% on 4L NC oxygen with activity. Comments: Based on testing patient qualifies for 4L NC oxygen to   use with activity.      ECHO-Summary  Left ventricular systolic function is hyperdynamic with an estimated ejection fraction of ? 65%. The left ventricle is normal in size with normal wall thickness. Normal left ventricular diastolic function. The right atrium is mildly enlarged. Mild tricuspid regurgitation. Systolic pulmonary artery pressure (SPAP) is elevated and estimated at 39 mmHg (right atrial pressure 3 mmHg) consistent with  borderline pulmonary hypertension. Barium 9/17/20  Moderate esophageal dysmotility.  No evidence of functional obstruction or  gross reflux.     PAP Compliance 10/3/2019   PAP Setting = 7   Percentage days used = 6   Average hours used = 2.39   Is patient compliant with PAP? no   AHI = 3.2   Leak = 9.4   [place] = -     PSG 9/5/18:  TOTAL AHI = 11.2/hour  REM AHI= 16.6/hr   Central AHI= 1.2/hour  Lowest O2 desaturation = 89 %  Minutes with O2 <90% = 2.6    TITRATION STUDY 9/20/18:  TOTAL AHI = 1.9/hour on PAP = 7 cm of H2O  Sleep emergent CSA events during the initial part of the study   Central AHI= 3.9/hour  Lowest O2 desaturation = 90 % overall  Minutes with O2% <90% = NA    Patient's sleep read shows patient to have severe HEBERT with AHI of 49.1/h along with that patient has central sleep apnea with no  with some nocturnal hypoxemia with saturation dropping to 82% when patient saturation below 89% was 104 minutes    Summary   Left ventricular systolic function is hyperdynamic with an estimated   ejection fraction of >= 65%. The left ventricle is normal in size with normal wall thickness. Normal left ventricular diastolic function. The right atrium is mildly enlarged. Mild tricuspid regurgitation. Systolic pulmonary artery pressure (SPAP) is elevated and estimated at 39   mmHg (right atrial pressure 3 mmHg) consistent with borderline pulmonary   hypertension. Assessment:    1. Centrilobular emphysema ([address]      2. HEBERT on CPAP      3. Rheumatoid arthritis involving multiple sites with positive rheumatoid factor ([address]      4. Pulmonary HTN ([address]      5. ASD (atrial septal defect)      6. Former smoker      7. Chronic respiratory failure with hypoxia (HCC)    8.   Nasal congestion          Plan:   · Patient's ROS discussed   · Patient and family were told about the clinical findings including auscultation and implications  · Patient's recent attempt in the sleep lab was reviewed  · Patient's documentation about her rheumatoid arthritis and management was also reviewed from care everywhere  · Patient sleep study results were reviewed and patient has severe HEBERT along with CSA and nocturnal hypoxemia  · Patient cannot tolerate ASV and patient was told that the next thing will be to try auto CPAP and a new machine ordered for the patient  · Patient also ordered for a PFT and a 6-minute walk test to evaluate extent of obstructive airway disease and also for exertional hypoxemia to qualify her for portable oxygen concentrator  · Patient was admitted and echocardiogram shows patient's EF to be normal but patient does have some pulmonary hypertension  · Patient has chronic respiratory failure with hypoxemia  · Patient's modified barium swallow and esophagram was reviewed and patient has esophageal motility dysfunction  · Patient was told to at least sit or stand 45 minutes to 1 hour after she eats and patient's supper has to be at least 2 hours prior to sleeping time and patient's head should be about 30 degrees higher than the abdominal and she is lying down  · Concept of hypoxemia and hypercarbia discussed  · Patient's oxygen needs to be maintained between 90 to 94% only  · Patient cannot use any humidification along with the concentrator  · Patient was told to use some saline nasal spray-patient was given a sample of saline rinse from the office  · Flonase nasal spray 2 puff each nostril once a day  · A humidifier in the bedroom will help  · Salt and fluid restrictions were discussed  · Patient was told about the pathophysiology of the disease process and its modifying factors  · Patient was told about the pathophysiology of the COPD and various medications and how they act  · Patient to continue with [place] as before  · Patient was told to make sure that she rinses her mouth with water without fail to avoid any hoarseness of voice or oral thrush  · No need for any antibiotic and steroids  · Daily weights will help  · Patient has been started on loop diuretics by the PCP  · Salt and fluid restrictions discussed  · Patient's treatment of rheumatoid arthritis as per rheumatology  · Oral diet as per speech therapy recommendations  · Monitor for any aspiration event  · Patient to take DuoNeb or albuterol inhaler only on whenever necessary basis and not on regular basis  · Patient's further treatment depending on patient's clinical status and response to the new inhaler  · A humidifier in the bedroom will help  · Patient to follow-up after the PFT/6-minute walk test and after the compliance data can be reviewed

## 2021-08-31 ENCOUNTER — OFFICE VISIT (OUTPATIENT)
Dept: PULMONOLOGY | Age: 81
End: 2021-08-31
Payer: COMMERCIAL

## 2021-08-31 ENCOUNTER — HOSPITAL ENCOUNTER (OUTPATIENT)
Dept: PULMONOLOGY | Age: 81
Discharge: HOME | End: 2021-08-31
Payer: COMMERCIAL

## 2021-08-31 VITALS
HEART RATE: 65 BPM | WEIGHT: 195 LBS | SYSTOLIC BLOOD PRESSURE: 136 MMHG | TEMPERATURE: 97.7 F | OXYGEN SATURATION: 93 % | DIASTOLIC BLOOD PRESSURE: 70 MMHG | BODY MASS INDEX: 31.34 KG/M2 | HEIGHT: 66 IN | RESPIRATION RATE: 16 BRPM

## 2021-08-31 DIAGNOSIS — Z99.89 OSA ON CPAP: ICD-10-CM

## 2021-08-31 DIAGNOSIS — R91.8 PULMONARY INFILTRATES: Primary | ICD-10-CM

## 2021-08-31 DIAGNOSIS — R09.02 HYPOXEMIA: ICD-10-CM

## 2021-08-31 DIAGNOSIS — J43.2 CENTRILOBULAR EMPHYSEMA (HCC): ICD-10-CM

## 2021-08-31 DIAGNOSIS — G47.33 OSA ON CPAP: ICD-10-CM

## 2021-08-31 LAB
DLCO %PRED: 1 %
DLCO PRED: NORMAL
DLCO/VA %PRED: NORMAL
DLCO/VA PRED: NORMAL
DLCO/VA: NORMAL
DLCO: NORMAL
EXPIRATORY TIME-POST: NORMAL
EXPIRATORY TIME: NORMAL
FEF 25-75% %CHNG: NORMAL
FEF 25-75% %PRED-POST: NORMAL
FEF 25-75% %PRED-PRE: NORMAL
FEF 25-75% PRED: NORMAL
FEF 25-75%-POST: NORMAL
FEF 25-75%-PRE: NORMAL
FEV1 %PRED-POST: 71 %
FEV1 %PRED-PRE: 74 %
FEV1 PRED: NORMAL
FEV1-POST: NORMAL
FEV1-PRE: NORMAL
FEV1/FVC %PRED-POST: NORMAL
FEV1/FVC %PRED-PRE: NORMAL
FEV1/FVC PRED: NORMAL
FEV1/FVC-POST: 59 %
FEV1/FVC-PRE: 62 %
FVC %PRED-POST: 93 L
FVC %PRED-PRE: 93 %
FVC PRED: NORMAL
FVC-POST: NORMAL
FVC-PRE: NORMAL
GAW %PRED: NORMAL
GAW PRED: NORMAL
GAW: NORMAL
IC %PRED: NORMAL
IC PRED: NORMAL
IC: NORMAL
MEP: NORMAL
MIP: NORMAL
MVV %PRED-PRE: NORMAL
MVV PRED: NORMAL
MVV-PRE: NORMAL
PEF %PRED-POST: NORMAL
PEF %PRED-PRE: NORMAL
PEF PRED: NORMAL
PEF%CHNG: NORMAL
PEF-POST: NORMAL
PEF-PRE: NORMAL
RAW %PRED: NORMAL
RAW PRED: NORMAL
RAW: NORMAL
RV %PRED: NORMAL
RV PRED: NORMAL
RV: NORMAL
SVC %PRED: NORMAL
SVC PRED: NORMAL
SVC: NORMAL
TLC %PRED: 125 %
TLC PRED: NORMAL
TLC: NORMAL
VA %PRED: NORMAL
VA PRED: NORMAL
VA: NORMAL
VTG %PRED: NORMAL
VTG PRED: NORMAL
VTG: NORMAL

## 2021-08-31 PROCEDURE — 99213 OFFICE O/P EST LOW 20 MIN: CPT | Performed by: INTERNAL MEDICINE

## 2021-08-31 PROCEDURE — 6370000000 HC RX 637 (ALT 250 FOR IP): Performed by: INTERNAL MEDICINE

## 2021-08-31 PROCEDURE — G8926 SPIRO NO PERF OR DOC: HCPCS | Performed by: INTERNAL MEDICINE

## 2021-08-31 PROCEDURE — 1123F ACP DISCUSS/DSCN MKR DOCD: CPT | Performed by: INTERNAL MEDICINE

## 2021-08-31 PROCEDURE — G8417 CALC BMI ABV UP PARAM F/U: HCPCS | Performed by: INTERNAL MEDICINE

## 2021-08-31 PROCEDURE — 3023F SPIROM DOC REV: CPT | Performed by: INTERNAL MEDICINE

## 2021-08-31 PROCEDURE — 4040F PNEUMOC VAC/ADMIN/RCVD: CPT | Performed by: INTERNAL MEDICINE

## 2021-08-31 PROCEDURE — 1090F PRES/ABSN URINE INCON ASSESS: CPT | Performed by: INTERNAL MEDICINE

## 2021-08-31 PROCEDURE — 94618 PULMONARY STRESS TESTING: CPT

## 2021-08-31 PROCEDURE — G8399 PT W/DXA RESULTS DOCUMENT: HCPCS | Performed by: INTERNAL MEDICINE

## 2021-08-31 PROCEDURE — 94729 DIFFUSING CAPACITY: CPT

## 2021-08-31 PROCEDURE — 94060 EVALUATION OF WHEEZING: CPT

## 2021-08-31 PROCEDURE — 1036F TOBACCO NON-USER: CPT | Performed by: INTERNAL MEDICINE

## 2021-08-31 PROCEDURE — G8427 DOCREV CUR MEDS BY ELIG CLIN: HCPCS | Performed by: INTERNAL MEDICINE

## 2021-08-31 PROCEDURE — 94726 PLETHYSMOGRAPHY LUNG VOLUMES: CPT

## 2021-08-31 RX ORDER — ALBUTEROL SULFATE 90 UG/1
4 AEROSOL, METERED RESPIRATORY (INHALATION) ONCE
Status: COMPLETED | OUTPATIENT
Start: 2021-08-31 | End: 2021-08-31

## 2021-08-31 RX ADMIN — Medication 4 PUFF: at 08:55

## 2021-08-31 ASSESSMENT — PULMONARY FUNCTION TESTS
FVC_PERCENT_PREDICTED_POST: 93
FEV1_PERCENT_PREDICTED_POST: 71
FEV1/FVC_PRE: 62
FEV1/FVC_POST: 59
FVC_PERCENT_PREDICTED_PRE: 93
FEV1_PERCENT_PREDICTED_PRE: 74

## 2021-08-31 NOTE — PROGRESS NOTES
Chief Complaint/Referring Provider:  Pulmonary follow up for lung issues and test results    Patient has come to the office for a pulmonary follow-up and to discuss her test results, patient was subjected to a PFT and a 6-minute walk test, patient states that from pulmonary standpoint of view if she is medically stable, patient does not have any increasing cough expectoration shortness of breath or wheezing of concern, patient does not have any significant chest pain or palpitations, patient does not have any abdominal symptoms of concern, patient does not have any dysuria hematuria or any change in them environment per se, no other pertinent review of system of concern    Previous HPIPatient is a 72-year-old female who has come back to the office for a pulmonary evaluation, patient was subjected to a sleep study for ASV titration but patient could not tolerate that for 15 minutes and the study was aborted, patient states that she could not catch her breath and also patient states that she was gasping for air along with that patient had some discomfort in the lower part of the center of the sternum and she felt as if something was closing up, patient also thinks that her diaphragm and the stomach sinks intermittently which causes her to have some shortness of breath, patient has had difficulty in swallowing the past and patient has had esophagogram along with modified barium esophagram in the past, patient also has had history of upper GI endoscopies and patient had 2 procedures for esophageal stretching, patient continues to have some cough with mucoid expectoration, patient also does not have any altered bowel habits, no epistaxis, hemoptysis or any hematochezia, patient's water pill was changed and patient is taking Lasix twice a day and patient states that she has to go to the bathroom quite often now, patient also wanted know if she can get a portable concentrator, patient did not have any change of ambient environment, patient has received  COVID-19 vaccine , no other pertinent review of system of concern      Patient was updated to the in lab sleep studies and patient has come to follow-up on that, patient continues to have some dry cough with scanty mucoid expectoration once in a while, patient does have some occasional sinus congestion, patient does not have any significant sore throat or difficulty in swallowing, no coughing or choking while eating, no odynophagia or dysphagia, patient does not have any pleuritic chest pain, patient does have a history of recent right shoulder pain for which patient was given muscle relaxants and also an injection was given in the right shoulder as up with the patient, patient also states that she has a lower central chest cystlike structure which swells at times, patient does not have any significant increasing abdominal discomfort nausea vomiting or any reflux symptoms, patient does have some swelling of the lower extremities, patient has been on water pill along with her blood pressure medication as per the patient, patient's requirement for nebulizer is limited, patient continues to have sleep fragmentation, and continues to be on home oxygen, patient does not have any other pertinent review of system of concern      Patient is a 70-year-old female who has come to the office for a pulmonary evaluation, patient states that she has not been using her CPAP machine because she feels the pressure is high, patient is having difficulty in getting it evaluated and checked by [employer] as per patient and family, also patient and family are stating that they have to go personally to get the oxygen and is not delivered by [employer] and they wanted to see if patient can get a new [employer] company, patient has been having increasing nasal congestion sinus congestion postnasal drainage, patient does not have any significant otalgia no ear discharge, patient states that the thePlatform is helping her and patient's shortness of breath is less with that, patient does not have any significant fever or chills, patient does not have any palpitations, patient does have reflux symptoms for which she takes medication as given by the GI with improvement, patient does not have any altered bowel habits or any hematochezia or melena, patient has some leg edema but patient takes HCTZ on regular basis, patient does not have any change in the ambient environment any sick contacts, patient has received her 2 doses of COVID-19 vaccine, patient does not have any confusion lethargy, no other pertinent review of system of concern      A virtual pulmonary visit was done for the patient to evaluate her clinical status and options, patient states that with the new inhaler which is Trelegy Ellipta he feels somewhat better, patient does not have that measured shortness of breath, patient does not have any significant wheezing, patient does have some cough along with that patient feels that she at times have congestion and the mucus is more so in the throat area, patient does not have any chest pain or palpitations, patient does not have any otalgia no ear discharge, patient states that yesterday she felt that she had an episode of difficulty in swallowing on the left side which she attributed to possible thyroid medication has not happened on a regular basis, patient does not have any pleuritic chest pain, no fever no chills, patient does not have any significant abdominal issues of concern, patient states that she does not have any altered bowel habits, no dysuria no hematuria, patient does not have any increasing leg edema, patient is requirement for risk inhaler is limited, patient states that she has been using her CPAP machine on a regular basis but she feels that her CPAP machine pressure is quite high and wants something to be done about that, patient does not see Dr. Carina Figueroa now for sleep apnea, does not have any change in the abnormal range sick contacts, no change of medications, patient was alert and communicative when seen, patient does not have any other pertinent review of system of concern     : Patient has come to the office for pulmonary evaluation as referred by his PCP/Dr. Danay Cottrell  Patient was seen as an inpatient in 2019    Patient states that she has increasing shortness of breath, patient states that she has coughing along with that patient has thick but clear respiratory secretions, patient also has been having increased nasal congestion and nasal stuffiness, patient states that she has not been able to use the humidification along with the oxygen concentrator as patient states that it comes to her nose at times, patient states that she has occasional pleuritic chest pain, patient also has occasional abdominal pain, patient used to have a PEG tube which has been removed, patient is taking food by mouth but it is modified as per the speech therapy  recommendations, patient has a history of rheumatoid arthritis and many biologicals have been tried but patient has had various side effects with that and patient is now taking Actemra twice a month as given by the rheumatologist, patient continues to have intermittent pain and patient states that last night she was having significant shoulder pain which was causing her to have asthma attack, patient takes albuterol inhaler on a as needed basis along with that patient states that she takes nebulizer treatment along with Spiriva on regular basis, patient states that she has CPAP which she is using at nighttime; patient does not have any pets, patient has central air but no humidification, patient is try to take less salt in the diet, patient denies any change in the environment or any sick contacts, patient does not have any confusion lethargy, patient does not have any other pertinent review of system of concern     Past Medical History:   Diagnosis Date    Acid reflux     Acquired hypothyroidism 7/6/2017    Anemia     Asthma     CHF (congestive heart failure) (HCC)     [facility]     HLD (hyperlipidemia)     Hypertension     Influenza A 01/22/2018    MI (myocardial infarction) ([facility]     X 2    Migraine     past hx    Rheumatoid arthritis     Wears glasses        Past Surgical History:   Procedure Laterality Date    BRONCHOSCOPY N/A 3/27/2019    BRONCHOSCOPY ALVEOLAR LAVAGE performed by Kaycee Mayberry MD at [address]  3/27/2019    BRONCHOSCOPY THERAPUTIC ASPIRATION INITIAL performed by Kaycee Mayberry MD at [address] [facility] Bilateral 03/23/2011    COLONOSCOPY N/A 6/12/2019    COLONOSCOPY WITH ANESTHESIA -SLEEP APNEA- performed by Gisela Carrasquillo MD at [address] [facility] Left 12/4/2018    LEFT LUMBAR FOUR, LUMBAR FIVE TRANSFORAMINAL EPIDURAL STEROID INJECTION SITE CONFIRMED BY FLUOROSCOPY performed by Harjeet Gottlieb MD at [address] [facility] N/A 7/23/2019    ESOPHAGEAL MOTILITY/MANOMETRY STUDY performed by Nimesh Valdes MD at [address] [facility]. little toe    GASTROSTOMY TUBE PLACEMENT N/A 4/1/2019    EGD PEG TUBE PLACEMENT performed by Gisela Carrasquillo MD at [address]      total, fibroids    KNEE SURGERY      right    IN [address] DX/THER SBST INTRLMNR CRV/THRC W/IMG GDN Left 8/21/2018    LEFT LUMBAR FOUR/ LUMBAR FIVE CYST ASPIRATION SITE CONFIRMED BY FLUOROSCOPY performed by Harjeet Gottlieb MD at [address] [facility] 3/27/2019    EGD DIAGNOSTIC ONLY performed by Olya Carballo MD at [place] [address] 9/6/2020    EGD DIAGNOSTIC ONLY performed by Edwin Pollard DO at [address] [facility] [address]       Allergies   Allergen Reactions    Alendronate Sodium      Jaw pain      Humira [Adalimumab]      Rash      Penicillins      rash    Simvastatin Other (See Comments)     Made legs ache    Sulfa Antibiotics Swelling     Tongue swelled    Tape [place] Tape]        Medication list was reviewed and updated as needed in Epic    Social History     Socioeconomic History    Marital status: Single     Spouse name: Not on file    Number of children: 3    Years of education: Not on file    Highest education level: Not on file   Occupational History    Occupation: disability   Tobacco Use    Smoking status: Former Smoker     Packs/day: 3.00     Years: 50.00     Pack years: 150.00     Types: Cigarettes     Start date: 3/4/1963     Quit date: 2009     Years since quittin.6    Smokeless tobacco: Never Used   Vaping Use    Vaping Use: Never used   Substance and Sexual Activity    Alcohol use: No    Drug use: No    Sexual activity: Not on file   Other Topics Concern    Not on file   Social History Narrative    Not on file     Social Determinants of Health     Financial Resource Strain:     Difficulty of Paying Living Expenses:    Food Insecurity:     Worried About Running Out of Food in the Last Year:     Ran Out of Food in the Last Year:    Transportation Needs:     Lack of Transportation (Medical):      Lack of Transportation (Non-Medical):    Physical Activity:     Days of Exercise per Week:     Minutes of Exercise per Session:    Stress:     Feeling of Stress :    Social Connections:     Frequency of Communication with Friends and Family:     Frequency of Social Gatherings with Friends and Family:     Attends Jewish Services:     Active Member of Clubs or Organizations:     Attends Club or Organization Meetings:     Marital Status:    Intimate Partner Violence:     Fear of Current or Ex-Partner:     Emotionally Abused:     Physically Abused:     Sexually Abused:        Family History   Problem Relation Age of Onset    Cancer Mother    Sarah Severino Father  Heart Disease Maternal Aunt     Cancer Sister             Review of Systems same as above    Physical Exam:  Blood pressure 136/70, pulse 65, temperature 97.7 °F (36.5 °C), temperature source Oral, resp. rate 16, height 5' 6\" (1.676 m), weight 195 lb (88.5 kg), SpO2 93 %, not currently breastfeeding.'  Constitutional:  No acute distress. While sitting in the wheelchair on nasal cannula oxygen  HENT:  Oropharynx is clear and moist. No thyromegaly. Eyes:  Conjunctivae arenormal. Pupils equal, round, and reactive to light. No scleral icterus. Neck: . No tracheal deviation present. No obvious thyroid mass. Cardiovascular:Normal rate, regular rhythm, normal heart sounds. No right ventricular heave. Some lower extremity edema. Pulmonary/Chest: No wheezes. Left basilar rales which improves with coughing. Chest wall is not dull to percussion. Noaccessory muscle usage or stridor. decreased breath sound density  Abdominal: Soft. Bowel sounds present. No distension or hernia. Notenderness. Musculoskeletal: No cyanosis. No clubbing. No obvious joint deformity. Lymphadenopathy: No cervical or supraclavicular adenopathy. Skin: Skin is warm and dry. No rash or nodules on the exposed extremities. Psychiatric: Normal mood and affect. Behavior is normal.  No anxiety. Neurologic: Alert, awake and oriented. PERRL. Speech fluent        Data:     Imaging:  I have reviewed radiology images personally. No orders to display     Xr Lumbar Spine (2-3 Views)    Result Date: 1/15/2021  EXAMINATION: 3 XRAY VIEWS OF THE LUMBAR SPINE 1/15/2021 12:06 pm COMPARISON: Lumbar spine radiographs April 13, 2018.  HISTORY: ORDERING SYSTEM PROVIDED HISTORY: Lumbar pain TECHNOLOGIST PROVIDED HISTORY: Reason for Exam: low back pain Acuity: Acute Type of Exam: Initial Mechanism of Injury: fell to floor from wheelchair Relevant Medical/Surgical History: pt noted having a recent fall down steps FINDINGS: There is 6 mm degenerative anterolisthesis of L3 on L4 and L4 on L5. Lumbar vertebra otherwise demonstrate normal height and alignment. No fracture or suspicious osseous lesion identified. Listhesis is similar in appearance dating back to 2018. There is moderate disc height loss at L3-L4 and L4-L5. Mild disc height loss is seen at the remainder of lumbar levels. There is multilevel facet arthrosis. Evaluation the soft tissues demonstrates atherosclerotic calcification of the abdominal aorta. Multilevel degenerative changes with grade 1 degenerative listhesis at L3-L4 and L4-L5, similar in appearance dating back to 2018. No acute osseous abnormality. Nuclear stress test-  Conclusions        Summary    There is normal isotope uptake at stress and rest. There is no evidence of    myocardial ischemia or scar. LV function is normal with uniform wall motion    and ejection fraction of 67 %. Lower risk study     CT chest in 2019 from care everywhere-CHEST    1.  No acute findings. 2.  No evidence of mass or lymphadenopathy. 3.  Severe emphysema. 4.  Pulmonary arterial dilatation suspicious for pulmonary hypertension. ECHO in 2019-Study Conclusions    - Left ventricle: The cavity size was normal. Wall thickness was    normal. Systolic function was normal. The calculated ejection    fraction was in the range of 64% to 68%. Normal diastolic    function.  - Aortic valve: Peak gradient (S): 6mm Hg.  - Tricuspid valve: Regurgitant peak velocity: 314cm/sec. Peak RV-RA    gradient (S): 39mm Hg. - Inferior vena cava: The vessel was normal in size; the    respirophasic diameter changes were in the normal range (>= 50%);    findings are consistent with normal central venous pressure. - Pulmonary arteries: PA peak pressure: 42mm Hg (S). PFT IN 2017-PFT TEST    Spirometry  Spirometry shows moderate obstructive defect. .    Bronchodilator  There is no response to bronchodilator demonstrated.     Flow-Volume Loop  The flow-volume loop is compatible with obstructive lung defect. Lung Volumes  Lung volumes are normal except for elevated RV. Lung Volume Comments  There is air trapping present. Diffusing Capacity  Single breath diffusing capacity is severely decreased. A reduced DLCO can be seen in diseases causing destruction of   alveolar-capillary interface (ILD, CHF, COPD, Pneumonia, etc.)   obliteration of pulmonary vascular bed (PE, PAH), tobacco usage,   carbon monoxide poisoning. Airway Resistance  Airway resistance is moderately elevated. Comparison  In comparison to the test of 8/18/16 there is mild deterioration   in spirometry and DLCO    PFT is compatible with moderate Obstructive Lung Disease  -   Possible etiologies include: asthma, COPD, bronchiectasis,   bronchitis. Correlate clinically. 6mwt IN 2017-6 Minute Walk Test Interpretation    Patient Name: Chasity Eli: [birth year]  DAGL: 8/15/2017    A 6 Minute Walk Test was performed in accordance with the ATS   Guidelines ([address] [place] [address] 2002; [phone]) at [facility]. The test was performed on room air. The baseline SpO2 while breathing room air was 91%. The patient did complete the 6 minute walk. During the study, the patient walked a total distance of 1050   feet. The minimal recorded SpO2 was 88%. Oximetry with 6 Minute Walk Test shows desaturation to 88% on   room air, 1L, 2L and 3L nasal cannula oxygen with maximal   exercise. Sats improved to 94% on 4L NC oxygen with activity. Comments: Based on testing patient qualifies for 4L NC oxygen to   use with activity.      ECHO-Summary  Left ventricular systolic function is hyperdynamic with an estimated ejection fraction of ? 65%. The left ventricle is normal in size with normal wall thickness. Normal left ventricular diastolic function. The right atrium is mildly enlarged. Mild tricuspid regurgitation.   Systolic pulmonary artery pressure (SPAP) is patient to have hypoxemia after 1 minute of walking and patient required 2 L of oxygen to sustain the oxygen saturation      Assessment:    1. Centrilobular emphysema ([address]      2. HEBERT on CPAP      3. Rheumatoid arthritis involving multiple sites with positive rheumatoid factor ([facility]      4. Pulmonary HTN ([facility]      5. ASD (atrial septal defect)      6. Former smoker      7. Chronic respiratory failure with hypoxia (HCC)    8.   Nasal congestion          Plan:   · Patient's ROS discussed   · Patient and family were told about the clinical findings including auscultation and implications  · Patient's recent attempt in the sleep lab was reviewed  · Patient's documentation about her rheumatoid arthritis and management was also reviewed from care everywhere  · Patient sleep study results were reviewed and patient has severe HEBERT along with CSA and nocturnal hypoxemia  · Patient cannot tolerate ASV and patient was told that the next thing will be to try auto CPAP and a new machine ordered for the patient  · Patient's PFT and 6-minute walk test was discussed along with interpretation/implication options  · Portable oxygen concentrator ordered from patient's [employer] company  · Patient was admitted and echocardiogram shows patient's EF to be normal but patient does have some pulmonary hypertension  · Patient has chronic respiratory failure with hypoxemia  · Patient's modified barium swallow and esophagram has been reviewed and patient has esophageal motility dysfunction  · Patient was told to at least sit or stand 45 minutes to 1 hour after she eats and patient's supper has to be at least 2 hours prior to sleeping time and patient's head should be about 30 degrees higher than the abdominal and she is lying down  · Concept of hypoxemia and hypercarbia discussed  · Patient's oxygen needs to be maintained between 90 to 94% only  · Patient cannot use any humidification along with the concentrator  · Patient was told to use some saline nasal spray-patient was given a sample of saline rinse from the office  · Flonase nasal spray 2 puff each nostril once a day  · A humidifier in the bedroom will help  · Salt and fluid restrictions were discussed  · Patient was told about the pathophysiology of the disease process and its modifying factors  · Patient was told about the pathophysiology of the COPD and various medications and how they act  · Patient to continue with Taty Matthew as before  · Patient was told to make sure that she rinses her mouth with water without fail to avoid any hoarseness of voice or oral thrush  · No need for any antibiotic and steroids  · Daily weights will help  · Patient has been started on loop diuretics by the PCP  · Salt and fluid restrictions discussed  · Patient's treatment of rheumatoid arthritis as per rheumatology  · Oral diet as per speech therapy recommendations  · Monitor for any aspiration event  · Patient to take DuoNeb or albuterol inhaler only on whenever necessary basis and not on regular basis  · Patient's further treatment depending on patient's clinical status and response to the new inhaler  · A humidifier in the bedroom will help  · Patient to follow-up after 2 months with a compliance data with the CPAP machine

## 2021-08-31 NOTE — PATIENT INSTRUCTIONS
Remember to bring a list of pulmonary medications and any CPAP or BiPAP machines to your next appointment with the office. Please keep all of your future appointments scheduled by [address], [facility] office. Out of respect for other patients and providers, you may be asked to reschedule your appointment if you arrive later than your scheduled appointment time. Appointments cancelled less than 24hrs in advance will be considered a no show. Patients with three missed appointments within 1 year or four missed appointments within 2 years can be dismissed from the practice. Please be aware that our physicians are required to work in the Intensive Care Unit at [facility].  Your appointment may need to be rescheduled if they are designated to work during your appointment time. You may receive a survey regarding the care you received during your visit. Your input is valuable to us. We encourage you to complete and return your survey. We hope you will choose us in the future for your healthcare needs. Pt instructed of all future appointment dates & times, including radiology, labs, procedures & referrals. If procedures were scheduled preparation instructions provided. Instructions on future appointments with [facility] were given.

## 2021-08-31 NOTE — PROCEDURES
[facility] Pulmonary Function Lab - Six Minute Walk      Test Performed on:   Room Air___X___   Oxygen at __2___ lpm via N/C- continuous Oxygen at _____ lpm via N/C- OCD  Assist Device Used During Test:  None______ Cane________ Walker___X__    Modified Josh's Scale  0 Nothing at all 5 Strong    0.5 Extremely Weak 6 Stronger (Hard)    1 Very weak 7 Very Strong   2 Weak (light) 8 Very Very Strong   3 Moderate 9 Extremely Strong   4 Somewhat Strong 10 Maximum All out      Time SpO2 Heart Rate Respiratory Rate Dyspnea-  Modified Joshs Scale Fatigue- Modified Joshs Scale Other Symptoms   Baseline                   94% room air @rest 80 20 7 7      1 minute                     86%  22 7 7 Stopped, placed pt on 1L, incr in 1L increment to keep sat 88% and above   2 minutes                     91% 2L 90 22 7 7      3 minutes                     92% 2L 96 24 7 7    4 minutes                     92% 2L 93 24 7 7    5 minutes                     92% 2L 95 26 8 8    6 minutes                     92% 2L 97 26 8 8    Recovery x 1 minute                     93% 2L 84 24 7 7    Recovery x 2 Minute                     95% 2L 83 22 7 7     Number of Laps___1.5______ X 120 feet + _________ additional feet = Total Distance _180___feet   Stopped or paused before 6 minutes? No_______ Yes ___X_____  Total expected 6 MW distance is _______feet. Patient achieved ______% of expected distance. Pre Blood Pressure: 141/80  Post Blood Pressure: 141/80  Other symptoms at the end of exercise: very SOB, pt had back and leg pain throughout walk.

## 2021-08-31 NOTE — PROGRESS NOTES
MA Communication:   The following orders are received by verbal communication from Duran Uribe MD    Orders include:    3 month follow up: 12/07/2021 11:20 am

## 2021-09-03 NOTE — PROCEDURES
[address] [address]                               PULMONARY FUNCTION    PATIENT NAME: Masoud Stone                 :        1940  MED REC NO:   [id number]                          ROOM:  ACCOUNT NO:   [de-identified]                           ADMIT DATE: 2021  PROVIDER:     Mariana Sen MD    DATE OF PROCEDURE:  2021    SIX-MINUTE WALK TEST  The patient also underwent a six-minute walk test which shows baseline  oxygen saturation 94% at room air at rest, heart rate of 80, respiratory  rate of 20, dyspnea-modified Josh scale of 7, fatigue-modified Josh  scale of 7. The patient had drop in saturation to 86% within 1 minute  of walking. The patient had to be given supplemental oxygen as on  incremental levels and the patient had to be put on 2L of nasal cannula  oxygen and the patient's six-minute parameters were 92% of oxygen  saturation on 2L of nasal cannula oxygen saturation with a heart rate of  97, respiratory rate of 26, dyspnea-modified Josh scale of 8,  fatigue-modified Josh scale of 8. The patient walked 180 feet only. The patient had very shortness of breath and had back and leg pain  throughout the walk and the patient does qualify for supplemental oxygen  with exercise. Please correlate clinically.           Kelly Blandon MD    D: 2021 19:47:31       T: 2021 21:59:30     [email or web address]  Job#: [id number]     Doc#: [id number]    CC:

## 2021-09-03 NOTE — PROCEDURES
[address] [address]                               PULMONARY FUNCTION    PATIENT NAME: Solomon Vee                 :        1940  MED REC NO:   [id number]                          ROOM:  ACCOUNT NO:   [de-identified]                           ADMIT DATE: 2021  PROVIDER:     Rosie Connolly MD    DATE OF PROCEDURE:  2021    PFT INTERPRETATION    The patient is a 72-year-old female who underwent a PFT for  centrilobular emphysema. Spirometry shows FVC to be 93%, FEV1 to be  74%, FEV1 to FVC ratio was 82%, FEF25%-75% was 38%. The patient has  some postbronchodilator improvement in the small airways. Lung volume  shows the total lung capacity was not decreased. The patient does have  some air trapping and hyperinflation. The patient also has decrease in  ERV which maybe because of body habitus. The patient's diffusion  capacity was significantly decreased, but it maybe not objective because  as per the technician's note, the patient could not generate the effort  for good forced vital capacity due to health reasons. The patient had  difficulty in performing diffusion capacity test to get a good sample of  alveolar gas. The patient had difficulty in performing this test.  The  patient's flow-volume loop was suggestive of obstructive pattern. On  the basis of this PFT, the patient has mild obstructive airway disease  with some changes in the PFT parameters because of body habitus and  diffusion capacity was not objective because of the patient's factors.         Shanda Hyde MD    D: 2021 19:47:31       T: 2021 19:50:08     [email or web address]  Job#: [id number]     Doc#: [id number]    CC:

## 2021-09-10 DIAGNOSIS — R09.81 NASAL CONGESTION: ICD-10-CM

## 2021-09-12 RX ORDER — FLUTICASONE PROPIONATE 50 MCG
SPRAY, SUSPENSION (ML) NASAL
Qty: 16 G | Refills: 5 | Status: SHIPPED | OUTPATIENT
Start: 2021-09-12 | End: 2022-03-09

## 2021-10-01 RX ORDER — LABETALOL 100 MG/1
TABLET, FILM COATED ORAL
Qty: 60 TABLET | Refills: 2 | Status: SHIPPED | OUTPATIENT
Start: 2021-10-01 | End: 2021-12-21

## 2021-11-03 ENCOUNTER — TELEPHONE (OUTPATIENT)
Dept: PULMONOLOGY | Age: 81
End: 2021-11-03

## 2021-11-03 NOTE — TELEPHONE ENCOUNTER
Patient call stated the [facility] haven't received Order for her CPAP.   Order were faxed to number Patient provide [phone]

## 2021-11-09 ENCOUNTER — OFFICE VISIT (OUTPATIENT)
Dept: FAMILY MEDICINE CLINIC | Age: 81
End: 2021-11-09
Payer: COMMERCIAL

## 2021-11-09 ENCOUNTER — HOSPITAL ENCOUNTER (OUTPATIENT)
Dept: GENERAL RADIOLOGY | Age: 81
Discharge: HOME | End: 2021-11-09
Payer: COMMERCIAL

## 2021-11-09 VITALS
BODY MASS INDEX: 31.98 KG/M2 | OXYGEN SATURATION: 95 % | HEART RATE: 84 BPM | DIASTOLIC BLOOD PRESSURE: 92 MMHG | HEIGHT: 66 IN | TEMPERATURE: 96.2 F | SYSTOLIC BLOOD PRESSURE: 162 MMHG | WEIGHT: 199 LBS

## 2021-11-09 DIAGNOSIS — E78.00 PURE HYPERCHOLESTEROLEMIA: ICD-10-CM

## 2021-11-09 DIAGNOSIS — M79.642 PAIN OF LEFT HAND: Primary | ICD-10-CM

## 2021-11-09 DIAGNOSIS — M79.642 PAIN OF LEFT HAND: ICD-10-CM

## 2021-11-09 DIAGNOSIS — I10 ESSENTIAL HYPERTENSION: ICD-10-CM

## 2021-11-09 DIAGNOSIS — M05.79 RHEUMATOID ARTHRITIS INVOLVING MULTIPLE SITES WITH POSITIVE RHEUMATOID FACTOR (HCC): ICD-10-CM

## 2021-11-09 DIAGNOSIS — E03.9 ACQUIRED HYPOTHYROIDISM: ICD-10-CM

## 2021-11-09 DIAGNOSIS — J43.2 CENTRILOBULAR EMPHYSEMA (HCC): ICD-10-CM

## 2021-11-09 PROCEDURE — 3023F SPIROM DOC REV: CPT | Performed by: INTERNAL MEDICINE

## 2021-11-09 PROCEDURE — G8399 PT W/DXA RESULTS DOCUMENT: HCPCS | Performed by: INTERNAL MEDICINE

## 2021-11-09 PROCEDURE — 90694 VACC AIIV4 NO PRSRV 0.5ML IM: CPT | Performed by: INTERNAL MEDICINE

## 2021-11-09 PROCEDURE — G8427 DOCREV CUR MEDS BY ELIG CLIN: HCPCS | Performed by: INTERNAL MEDICINE

## 2021-11-09 PROCEDURE — 1090F PRES/ABSN URINE INCON ASSESS: CPT | Performed by: INTERNAL MEDICINE

## 2021-11-09 PROCEDURE — 99214 OFFICE O/P EST MOD 30 MIN: CPT | Performed by: INTERNAL MEDICINE

## 2021-11-09 PROCEDURE — G8484 FLU IMMUNIZE NO ADMIN: HCPCS | Performed by: INTERNAL MEDICINE

## 2021-11-09 PROCEDURE — 73130 X-RAY EXAM OF HAND: CPT

## 2021-11-09 PROCEDURE — 1036F TOBACCO NON-USER: CPT | Performed by: INTERNAL MEDICINE

## 2021-11-09 PROCEDURE — G8417 CALC BMI ABV UP PARAM F/U: HCPCS | Performed by: INTERNAL MEDICINE

## 2021-11-09 PROCEDURE — G8926 SPIRO NO PERF OR DOC: HCPCS | Performed by: INTERNAL MEDICINE

## 2021-11-09 PROCEDURE — 1123F ACP DISCUSS/DSCN MKR DOCD: CPT | Performed by: INTERNAL MEDICINE

## 2021-11-09 PROCEDURE — 4040F PNEUMOC VAC/ADMIN/RCVD: CPT | Performed by: INTERNAL MEDICINE

## 2021-11-09 RX ORDER — SPIRONOLACTONE 25 MG/1
25 TABLET ORAL 2 TIMES DAILY
Qty: 60 TABLET | Refills: 5 | Status: SHIPPED | OUTPATIENT
Start: 2021-11-09 | End: 2022-03-09 | Stop reason: SDUPTHER

## 2021-11-09 ASSESSMENT — ENCOUNTER SYMPTOMS
SHORTNESS OF BREATH: 1
GASTROINTESTINAL NEGATIVE: 1
COUGH: 0

## 2021-11-09 NOTE — ASSESSMENT & PLAN NOTE
Stopped Losartan due to leg swelling and changed to Valsartan. Pt stopped due to reoccurrence of edema, no chest pain, new SOB, palpatations, or syncope.  Home bp high off Valsartan

## 2021-11-09 NOTE — PROGRESS NOTES
Maggy Ken presents today for   Chief Complaint   Patient presents with    Hyperlipidemia     Pt is here for 3 month follow up and is fasting    Hypertension        Rheumatoid arthritis involving multiple sites with positive rheumatoid factor (HCC)    No recent change in meds other than injections and Prednisone 5 mg. Having severe back and knee pain. Hands stiff and sore. Much harder for her to walk even w/ walker. Saw Rheumatology 7/7/2021. Injected shoulder. Bilateral knees swollen, hand stiff, L shoulder no movement. Unable to be mobile in house. Discussed using Wheelchair last visit. Finally got. Acquired hypothyroidism  Stable w/ meds. Centrilobular emphysema ([address]   Stable w/ Home O2. Saw Sleep MD. To recalibrating C-pap. No c/o w/ meds. Pure hypercholesterolemia   Stable diet and wt. No c/o w/ meds. Essential hypertension   Stopped Losartan due to leg swelling and changed to Valsartan. Pt stopped due to reoccurrence of edema, no chest pain, new SOB, palpatations, or syncope. Home bp high off Valsartan       Review of Systems   Constitutional: Positive for fatigue. Respiratory: Positive for shortness of breath. Negative for cough. Gastrointestinal: Negative. Endocrine: Negative. Genitourinary: Negative. Musculoskeletal: Positive for arthralgias, gait problem, joint swelling and myalgias. Allergic/Immunologic: Positive for environmental allergies. Hematological: Negative. Psychiatric/Behavioral: Positive for sleep disturbance. Vitals:    11/09/21 0847 11/09/21 0903   BP: (!) 142/78 (!) 162/92   Site: Right Upper Arm    Position: Sitting    Pulse: 84    Temp: 96.2 °F (35.7 °C)    TempSrc: Temporal    SpO2: 95%    Weight: 199 lb (90.3 kg)    Height: 5' 6\" (1.676 m)         Physical Exam  Constitutional:       General: She is not in acute distress. Appearance: Normal appearance. She is well-developed. She is obese.  She is not ill-appearing, toxic-appearing Skin:     General: Skin is warm and dry. Coloration: Skin is not jaundiced or pale. Findings: No abrasion, bruising, erythema, lesion or rash. Nails: There is no clubbing. Neurological:      Mental Status: She is alert and oriented to person, place, and time. She is not disoriented. Cranial Nerves: No cranial nerve deficit. Sensory: No sensory deficit. Motor: No weakness, tremor, atrophy or abnormal muscle tone. Coordination: Coordination normal.      Gait: Gait normal.      Deep Tendon Reflexes: Reflexes are normal and symmetric. Psychiatric:         Mood and Affect: Mood normal.         Speech: Speech normal.         Behavior: Behavior normal.         Thought Content: Thought content normal.         Judgment: Judgment normal.          A/P:     Acquired Hypothyroidism. Continue med. Centrilobular Emphysema (Hcc). continue meds. To Pulmonary as scheduled for Sleep apnea and check C-pap. .     Pure Hypercholesterolemia. Continue meds. Improve diet and lose some weight. Rheumatoid Arthritis Involving Multiple Sites With Positive Rheumatoid Factor (Hcc). To Rhuematologist as scheduled. Will X-ray L thumb due to trauma. Essential Hypertension. Will stop Valsartan due to edema. Will begin Spironolactone 25 mg twice daily and do home BP checks. Call if >140/90. rechx kidney function in 1 weeks. Flu shot.             Olimpia Palmer MD

## 2021-11-09 NOTE — PATIENT INSTRUCTIONS
A/P:     Acquired Hypothyroidism. Continue med. Centrilobular Emphysema (Hcc). continue meds. To Pulmonary as scheduled for Sleep apnea and check C-pap. .     Pure Hypercholesterolemia. Continue meds. Improve diet and lose some weight. Rheumatoid Arthritis Involving Multiple Sites With Positive Rheumatoid Factor (Hcc). To Rhuematologist as scheduled. Will X-ray L thumb due to trauma. Essential Hypertension. Will stop Valsartan due to edema. Will begin Spironolactone 25 mg twice daily and do home BP checks. Call if >140/90. rechx kidney function in 1 weeks.                 Abdullahi Orellana MD

## 2021-11-09 NOTE — ASSESSMENT & PLAN NOTE
No recent change in meds other than injections and Prednisone 5 mg. Having severe back and knee pain. Hands stiff and sore. Much harder for her to walk even w/ walker. Saw Rheumatology 7/7/2021. Injected shoulder. Bilateral knees swollen, hand stiff, L shoulder no movement. Unable to be mobile in house. Discussed using Wheelchair last visit. Finally got.

## 2021-11-15 RX ORDER — VALSARTAN 160 MG/1
TABLET ORAL
Qty: 30 TABLET | Refills: 3 | Status: ON HOLD | OUTPATIENT
Start: 2021-11-15 | End: 2021-12-29 | Stop reason: HOSPADM

## 2021-11-17 ENCOUNTER — NURSE ONLY (OUTPATIENT)
Dept: FAMILY MEDICINE CLINIC | Age: 81
End: 2021-11-17

## 2021-11-17 ENCOUNTER — TELEPHONE (OUTPATIENT)
Dept: FAMILY MEDICINE CLINIC | Age: 81
End: 2021-11-17

## 2021-11-17 DIAGNOSIS — I10 ESSENTIAL HYPERTENSION: ICD-10-CM

## 2021-11-17 DIAGNOSIS — M05.79 RHEUMATOID ARTHRITIS INVOLVING MULTIPLE SITES WITH POSITIVE RHEUMATOID FACTOR (HCC): Primary | ICD-10-CM

## 2021-11-17 DIAGNOSIS — E03.9 ACQUIRED HYPOTHYROIDISM: ICD-10-CM

## 2021-11-17 DIAGNOSIS — R53.83 FATIGUE, UNSPECIFIED TYPE: ICD-10-CM

## 2021-11-17 DIAGNOSIS — E78.00 PURE HYPERCHOLESTEROLEMIA: ICD-10-CM

## 2021-11-17 DIAGNOSIS — I27.20 PULMONARY HTN (HCC): ICD-10-CM

## 2021-11-17 DIAGNOSIS — M25.50 ARTHRALGIA, UNSPECIFIED JOINT: ICD-10-CM

## 2021-11-17 DIAGNOSIS — F32.9 REACTIVE DEPRESSION: ICD-10-CM

## 2021-11-17 DIAGNOSIS — J43.2 CENTRILOBULAR EMPHYSEMA (HCC): ICD-10-CM

## 2021-11-17 DIAGNOSIS — E87.6 HYPOKALEMIA: ICD-10-CM

## 2021-11-17 LAB
ANION GAP SERPL CALCULATED.3IONS-SCNC: 16 MMOL/L (ref 3–16)
BASOPHILS ABSOLUTE: 0 K/UL (ref 0–0.2)
BASOPHILS RELATIVE PERCENT: 0.5 %
BUN BLDV-MCNC: 25 MG/DL (ref 7–20)
CALCIUM SERPL-MCNC: 10 MG/DL (ref 8.3–10.6)
CHLORIDE BLD-SCNC: 97 MMOL/L (ref 99–110)
CO2: 28 MMOL/L (ref 21–32)
CREAT SERPL-MCNC: 1.4 MG/DL (ref 0.6–1.2)
EOSINOPHILS ABSOLUTE: 0 K/UL (ref 0–0.6)
EOSINOPHILS RELATIVE PERCENT: 0.5 %
GFR AFRICAN AMERICAN: 44
GFR NON-AFRICAN AMERICAN: 36
GLUCOSE BLD-MCNC: 92 MG/DL (ref 70–99)
HCT VFR BLD CALC: 38.3 % (ref 36–48)
HEMOGLOBIN: 12.4 G/DL (ref 12–16)
LYMPHOCYTES ABSOLUTE: 2.2 K/UL (ref 1–5.1)
LYMPHOCYTES RELATIVE PERCENT: 29.9 %
MCH RBC QN AUTO: 28.9 PG (ref 26–34)
MCHC RBC AUTO-ENTMCNC: 32.3 G/DL (ref 31–36)
MCV RBC AUTO: 89.5 FL (ref 80–100)
MONOCYTES ABSOLUTE: 0.7 K/UL (ref 0–1.3)
MONOCYTES RELATIVE PERCENT: 8.8 %
NEUTROPHILS ABSOLUTE: 4.5 K/UL (ref 1.7–7.7)
NEUTROPHILS RELATIVE PERCENT: 60.3 %
PDW BLD-RTO: 14.2 % (ref 12.4–15.4)
PLATELET # BLD: 216 K/UL (ref 135–450)
PMV BLD AUTO: 7.9 FL (ref 5–10.5)
POTASSIUM SERPL-SCNC: 3.8 MMOL/L (ref 3.5–5.1)
RBC # BLD: 4.27 M/UL (ref 4–5.2)
SEDIMENTATION RATE, ERYTHROCYTE: 10 MM/HR (ref 0–30)
SODIUM BLD-SCNC: 141 MMOL/L (ref 136–145)
WBC # BLD: 7.5 K/UL (ref 4–11)

## 2021-11-17 NOTE — TELEPHONE ENCOUNTER
Patient was here on the lab schedule this morning. The appointment note said fasting blood work. Patient states she had not eaten anything this morning. The only order was for a BMP. Do you want additional fasting labs?

## 2021-11-18 LAB
ALBUMIN SERPL-MCNC: 4.6 G/DL (ref 3.4–5)
ALP BLD-CCNC: 37 U/L (ref 40–129)
ALT SERPL-CCNC: 23 U/L (ref 10–40)
AST SERPL-CCNC: 26 U/L (ref 15–37)
BILIRUB SERPL-MCNC: 0.4 MG/DL (ref 0–1)
BILIRUBIN DIRECT: <0.2 MG/DL (ref 0–0.3)
BILIRUBIN, INDIRECT: ABNORMAL MG/DL (ref 0–1)
C-REACTIVE PROTEIN: <3 MG/L (ref 0–5.1)
CHOLESTEROL, TOTAL: 209 MG/DL (ref 0–199)
HDLC SERPL-MCNC: 89 MG/DL (ref 40–60)
LDL CHOLESTEROL CALCULATED: 103 MG/DL
RHEUMATOID FACTOR: >650 IU/ML
TOTAL PROTEIN: 7.2 G/DL (ref 6.4–8.2)
TRIGL SERPL-MCNC: 87 MG/DL (ref 0–150)
TSH SERPL DL<=0.05 MIU/L-ACNC: 4.62 UIU/ML (ref 0.27–4.2)
URIC ACID, SERUM: 8.6 MG/DL (ref 2.6–6)
VLDLC SERPL CALC-MCNC: 17 MG/DL

## 2021-11-29 RX ORDER — LOSARTAN POTASSIUM 100 MG/1
TABLET ORAL
Qty: 30 TABLET | Refills: 2 | Status: ON HOLD | OUTPATIENT
Start: 2021-11-29 | End: 2021-12-29 | Stop reason: HOSPADM

## 2021-11-29 RX ORDER — FUROSEMIDE 20 MG/1
TABLET ORAL
Qty: 60 TABLET | Refills: 3 | Status: SHIPPED | OUTPATIENT
Start: 2021-11-29 | End: 2022-02-07 | Stop reason: SDUPTHER

## 2021-11-29 NOTE — TELEPHONE ENCOUNTER
Vin Beckman is requesting refill(s)   Last OV 11/09/2021 (pertaining to medication)  LR 07/29/2021 (per medication requested)  Next office visit scheduled or attempted none

## 2021-11-29 NOTE — TELEPHONE ENCOUNTER
Shannan Dickson is requesting refill(s)   Last OV 11/09/2021 (pertaining to medication)  LR 07/28/2021  (per medication requested)  Next office visit scheduled or attempted none

## 2021-12-07 ENCOUNTER — OFFICE VISIT (OUTPATIENT)
Dept: PULMONOLOGY | Age: 81
End: 2021-12-07
Payer: COMMERCIAL

## 2021-12-07 VITALS
HEART RATE: 82 BPM | TEMPERATURE: 98.9 F | SYSTOLIC BLOOD PRESSURE: 125 MMHG | HEIGHT: 66 IN | RESPIRATION RATE: 18 BRPM | WEIGHT: 194 LBS | DIASTOLIC BLOOD PRESSURE: 67 MMHG | BODY MASS INDEX: 31.18 KG/M2 | OXYGEN SATURATION: 92 %

## 2021-12-07 DIAGNOSIS — J43.2 CENTRILOBULAR EMPHYSEMA (HCC): ICD-10-CM

## 2021-12-07 DIAGNOSIS — G47.33 OSA ON CPAP: Primary | ICD-10-CM

## 2021-12-07 DIAGNOSIS — Z87.891 FORMER SMOKER: ICD-10-CM

## 2021-12-07 DIAGNOSIS — R09.81 NASAL CONGESTION: ICD-10-CM

## 2021-12-07 DIAGNOSIS — M05.79 RHEUMATOID ARTHRITIS INVOLVING MULTIPLE SITES WITH POSITIVE RHEUMATOID FACTOR (HCC): ICD-10-CM

## 2021-12-07 DIAGNOSIS — I27.20 PULMONARY HTN (HCC): ICD-10-CM

## 2021-12-07 DIAGNOSIS — Z99.89 OSA ON CPAP: Primary | ICD-10-CM

## 2021-12-07 PROCEDURE — G8926 SPIRO NO PERF OR DOC: HCPCS | Performed by: INTERNAL MEDICINE

## 2021-12-07 PROCEDURE — G8399 PT W/DXA RESULTS DOCUMENT: HCPCS | Performed by: INTERNAL MEDICINE

## 2021-12-07 PROCEDURE — 1123F ACP DISCUSS/DSCN MKR DOCD: CPT | Performed by: INTERNAL MEDICINE

## 2021-12-07 PROCEDURE — 4040F PNEUMOC VAC/ADMIN/RCVD: CPT | Performed by: INTERNAL MEDICINE

## 2021-12-07 PROCEDURE — 3023F SPIROM DOC REV: CPT | Performed by: INTERNAL MEDICINE

## 2021-12-07 PROCEDURE — G8417 CALC BMI ABV UP PARAM F/U: HCPCS | Performed by: INTERNAL MEDICINE

## 2021-12-07 PROCEDURE — 1090F PRES/ABSN URINE INCON ASSESS: CPT | Performed by: INTERNAL MEDICINE

## 2021-12-07 PROCEDURE — G8427 DOCREV CUR MEDS BY ELIG CLIN: HCPCS | Performed by: INTERNAL MEDICINE

## 2021-12-07 PROCEDURE — 1036F TOBACCO NON-USER: CPT | Performed by: INTERNAL MEDICINE

## 2021-12-07 PROCEDURE — G8484 FLU IMMUNIZE NO ADMIN: HCPCS | Performed by: INTERNAL MEDICINE

## 2021-12-07 PROCEDURE — 99213 OFFICE O/P EST LOW 20 MIN: CPT | Performed by: INTERNAL MEDICINE

## 2021-12-07 NOTE — PATIENT INSTRUCTIONS
Remember to bring a list of pulmonary medications and any CPAP or BiPAP machines to your next appointment with the office. Please keep all of your future appointments scheduled by [facility] office. Out of respect for other patients and providers, you may be asked to reschedule your appointment if you arrive later than your scheduled appointment time. Appointments cancelled less than 24hrs in advance will be considered a no show. Patients with three missed appointments within 1 year or four missed appointments within 2 years can be dismissed from the practice. Please be aware that our physicians are required to work in the Intensive Care Unit at [facility].  Your appointment may need to be rescheduled if they are designated to work during your appointment time. You may receive a survey regarding the care you received during your visit. Your input is valuable to us. We encourage you to complete and return your survey. We hope you will choose us in the future for your healthcare needs. Pt instructed of all future appointment dates & times, including radiology, labs, procedures & referrals. If procedures were scheduled preparation instructions provided. Instructions on future appointments with [facility] were given.

## 2021-12-07 NOTE — PROGRESS NOTES
MA Communication: The following orders are received by verbal communication from Adela Sena MD    Orders include:    6 month follow up  CPAP switched from [address] to [facility].

## 2021-12-09 NOTE — PROGRESS NOTES
Chief Complaint/Referring Provider:  Pulmonary follow up for lung issues     Patient states that from pulmonary standpoint of view she is clinically stable, patient does not have any increasing nasal congestion sinus congestion postnasal drainage, patient does not have any significant increasing cough expectoration shortness of breath of concern, no palpitation diaphoresis, patient does not have any significant fever or chills, no nausea or vomiting, patient does have some nonspecific left upper abdominal discomfort without any hematochezia or melena, patient does not have any history of any chest wall or abdominal trauma, no abnormal movement per se, patient does not have any significant dysuria hematuria or any increasing leg edema, patient does not have any confusion lethargy, no other pertinent review of system of concern    Previous HPIPatient is a 42-year-old female who has come back to the office for a pulmonary evaluation, patient was subjected to a sleep study for ASV titration but patient could not tolerate that for 15 minutes and the study was aborted, patient states that she could not catch her breath and also patient states that she was gasping for air along with that patient had some discomfort in the lower part of the center of the sternum and she felt as if something was closing up, patient also thinks that her diaphragm and the stomach sinks intermittently which causes her to have some shortness of breath, patient has had difficulty in swallowing the past and patient has had esophagogram along with modified barium esophagram in the past, patient also has had history of upper GI endoscopies and patient had 2 procedures for esophageal stretching, patient continues to have some cough with mucoid expectoration, patient also does not have any altered bowel habits, no epistaxis, hemoptysis or any hematochezia, patient's water pill was changed and patient is taking Lasix twice a day and patient states that she has to go to the bathroom quite often now, patient also wanted know if she can get a portable concentrator, patient did not have any change of ambient environment, patient has received  COVID-19 vaccine , no other pertinent review of system of concern      Patient was updated to the in lab sleep studies and patient has come to follow-up on that, patient continues to have some dry cough with scanty mucoid expectoration once in a while, patient does have some occasional sinus congestion, patient does not have any significant sore throat or difficulty in swallowing, no coughing or choking while eating, no odynophagia or dysphagia, patient does not have any pleuritic chest pain, patient does have a history of recent right shoulder pain for which patient was given muscle relaxants and also an injection was given in the right shoulder as up with the patient, patient also states that she has a lower central chest cystlike structure which swells at times, patient does not have any significant increasing abdominal discomfort nausea vomiting or any reflux symptoms, patient does have some swelling of the lower extremities, patient has been on water pill along with her blood pressure medication as per the patient, patient's requirement for nebulizer is limited, patient continues to have sleep fragmentation, and continues to be on home oxygen, patient does not have any other pertinent review of system of concern      Patient is a 72-year-old female who has come to the office for a pulmonary evaluation, patient states that she has not been using her CPAP machine because she feels the pressure is high, patient is having difficulty in getting it evaluated and checked by [facility] as per patient and family, also patient and family are stating that they have to go personally to get the oxygen and is not delivered by [facility] and they wanted to see if patient can get a new DME company, patient has been having increasing nasal congestion sinus congestion postnasal drainage, patient does not have any significant otalgia no ear discharge, patient states that the Radha Nesbitt is helping her and patient's shortness of breath is less with that, patient does not have any significant fever or chills, patient does not have any palpitations, patient does have reflux symptoms for which she takes medication as given by the GI with improvement, patient does not have any altered bowel habits or any hematochezia or melena, patient has some leg edema but patient takes HCTZ on regular basis, patient does not have any change in the ambient environment any sick contacts, patient has received her 2 doses of COVID-19 vaccine, patient does not have any confusion lethargy, no other pertinent review of system of concern      A virtual pulmonary visit was done for the patient to evaluate her clinical status and options, patient states that with the new inhaler which is Trelegy Ellipta he feels somewhat better, patient does not have that measured shortness of breath, patient does not have any significant wheezing, patient does have some cough along with that patient feels that she at times have congestion and the mucus is more so in the throat area, patient does not have any chest pain or palpitations, patient does not have any otalgia no ear discharge, patient states that yesterday she felt that she had an episode of difficulty in swallowing on the left side which she attributed to possible thyroid medication has not happened on a regular basis, patient does not have any pleuritic chest pain, no fever no chills, patient does not have any significant abdominal issues of concern, patient states that she does not have any altered bowel habits, no dysuria no hematuria, patient does not have any increasing leg edema, patient is requirement for risk inhaler is limited, patient states that she has been using her CPAP machine on a regular basis but she feels that her CPAP machine pressure is quite high and wants something to be done about that, patient does not see Dr. Yaakov Lanier now for sleep apnea, does not have any change in the abnormal range sick contacts, no change of medications, patient was alert and communicative when seen, patient does not have any other pertinent review of system of concern     : Patient has come to the office for pulmonary evaluation as referred by his PCP/Dr. Yaakov Lanier  Patient was seen as an inpatient in 2019    Patient states that she has increasing shortness of breath, patient states that she has coughing along with that patient has thick but clear respiratory secretions, patient also has been having increased nasal congestion and nasal stuffiness, patient states that she has not been able to use the humidification along with the oxygen concentrator as patient states that it comes to her nose at times, patient states that she has occasional pleuritic chest pain, patient also has occasional abdominal pain, patient used to have a PEG tube which has been removed, patient is taking food by mouth but it is modified as per the speech therapy  recommendations, patient has a history of rheumatoid arthritis and many biologicals have been tried but patient has had various side effects with that and patient is now taking Actemra twice a month as given by the rheumatologist, patient continues to have intermittent pain and patient states that last night she was having significant shoulder pain which was causing her to have asthma attack, patient takes albuterol inhaler on a as needed basis along with that patient states that she takes nebulizer treatment along with Spiriva on regular basis, patient states that she has CPAP which she is using at nighttime; patient does not have any pets, patient has central air but no humidification, patient is try to take less salt in the diet, patient denies any change in the environment or any sick contacts, patient does not have any confusion lethargy, patient does not have any other pertinent review of system of concern     Past Medical History:   Diagnosis Date    Acid reflux     Acquired hypothyroidism 7/6/2017    Anemia     Asthma     CHF (congestive heart failure) (HCC)     [facility]ch[facility]     HLD (hyperlipidemia)     Hypertension     Influenza A 01/22/2018    MI (myocardial infarction) ([facility]     X 2    Migraine     past hx    Rheumatoid arthritis     Wears glasses        Past Surgical History:   Procedure Laterality Date    BRONCHOSCOPY N/A 3/27/2019    BRONCHOSCOPY ALVEOLAR LAVAGE performed by Anushka Hope MD at [address]  3/27/2019    BRONCHOSCOPY THERAPUTIC ASPIRATION INITIAL performed by Anushka Hope MD at [address] [facility] Bilateral 03/23/2011    COLONOSCOPY N/A 6/12/2019    COLONOSCOPY WITH ANESTHESIA -SLEEP APNEA- performed by Jose Johnson MD at [address] [facility] Left 12/4/2018    LEFT LUMBAR FOUR, LUMBAR FIVE TRANSFORAMINAL EPIDURAL STEROID INJECTION SITE CONFIRMED BY FLUOROSCOPY performed by Odette Najera MD at [address] [facility] N/A 7/23/2019    ESOPHAGEAL MOTILITY/MANOMETRY STUDY performed by Laura Escudero MD at [address] [facility]. little toe    GASTROSTOMY TUBE PLACEMENT N/A 4/1/2019    EGD PEG TUBE PLACEMENT performed by Jose Johnson MD at [address]      total, fibroids    KNEE SURGERY      right    SC Phillip [address] DX/THER SBST INTRLMNR CRV/THRC W/IMG GDN Left 8/21/2018    LEFT LUMBAR FOUR/ LUMBAR FIVE CYST ASPIRATION SITE CONFIRMED BY FLUOROSCOPY performed by Odette Najera MD at [address] [facility] [place] [place] 3/27/2019    EGD DIAGNOSTIC ONLY performed by Jil Morrison MD at [address] [facility] 9/6/2020    EGD DIAGNOSTIC ONLY performed by Cadence Townsend DO at [address]       Allergies   Allergen Reactions    Alendronate Sodium      Jaw pain      Humira [Adalimumab]      Rash      Losartan Swelling    Penicillins      rash    Simvastatin Other (See Comments)     Made legs ache    Sulfa Antibiotics Swelling     Tongue swelled    Tape [place] Dorinda Tape]        Medication list was reviewed and updated as needed in [facility]    Social History     Socioeconomic History    Marital status: Single     Spouse name: Not on file    Number of children: 3    Years of education: Not on file    Highest education level: Not on file   Occupational History    Occupation: disability   Tobacco Use    Smoking status: Former Smoker     Packs/day: 3.00     Years: 50.00     Pack years: 150.00     Types: Cigarettes     Start date: 3/4/1963     Quit date: 2009     Years since quittin.8    Smokeless tobacco: Never Used   Vaping Use    Vaping Use: Never used   Substance and Sexual Activity    Alcohol use: No    Drug use: No    Sexual activity: Not on file   Other Topics Concern    Not on file   Social History Narrative    Not on file     Social Determinants of Health     Financial Resource Strain:     Difficulty of Paying Living Expenses: Not on file   Food Insecurity:     Worried About Running Out of Food in the Last Year: Not on file    [place] of Food in the Last Year: Not on file   Transportation Needs:     Lack of Transportation (Medical): Not on file    Lack of Transportation (Non-Medical):  Not on file   Physical Activity:     Days of Exercise per Week: Not on file    Minutes of Exercise per Session: Not on file   Stress:     Feeling of Stress : Not on file   Social Connections:     Frequency of Communication with Friends and Family: Not on file    Frequency of Social Gatherings with Friends and Family: Not on file    Attends Synagogue Services: Not on file   [employer] or Imaging:  I have reviewed radiology images personally. No orders to display     Xr Lumbar Spine (2-3 Views)    Result Date: 1/15/2021  EXAMINATION: 3 XRAY VIEWS OF THE LUMBAR SPINE 1/15/2021 12:06 pm COMPARISON: Lumbar spine radiographs April 13, 2018. HISTORY: ORDERING SYSTEM PROVIDED HISTORY: Lumbar pain TECHNOLOGIST PROVIDED HISTORY: Reason for Exam: low back pain Acuity: Acute Type of Exam: Initial Mechanism of Injury: fell to floor from wheelchair Relevant Medical/Surgical History: pt noted having a recent fall down steps FINDINGS: There is 6 mm degenerative anterolisthesis of L3 on L4 and L4 on L5. Lumbar vertebra otherwise demonstrate normal height and alignment. No fracture or suspicious osseous lesion identified. Listhesis is similar in appearance dating back to 2018. There is moderate disc height loss at L3-L4 and L4-L5. Mild disc height loss is seen at the remainder of lumbar levels. There is multilevel facet arthrosis. Evaluation the soft tissues demonstrates atherosclerotic calcification of the abdominal aorta. Multilevel degenerative changes with grade 1 degenerative listhesis at L3-L4 and L4-L5, similar in appearance dating back to 2018. No acute osseous abnormality. Nuclear stress test-  Conclusions        Summary    There is normal isotope uptake at stress and rest. There is no evidence of    myocardial ischemia or scar. LV function is normal with uniform wall motion    and ejection fraction of 67 %. Lower risk study     CT chest in 2019 from [facility]    1.  No acute findings. 2.  No evidence of mass or lymphadenopathy. 3.  Severe emphysema. 4.  Pulmonary arterial dilatation suspicious for pulmonary hypertension. ECHO in 2019-Study Conclusions    - Left ventricle: The cavity size was normal. Wall thickness was    normal. Systolic function was normal. The calculated ejection    fraction was in the range of 64% to 68%.  Normal diastolic    function.  - Aortic valve: Peak gradient (S): 6mm Hg.  - Tricuspid valve: Regurgitant peak velocity: 314cm/sec. Peak RV-RA    gradient (S): 39mm Hg. - Inferior vena cava: The vessel was normal in size; the    respirophasic diameter changes were in the normal range (>= 50%);    findings are consistent with normal central venous pressure. - Pulmonary arteries: PA peak pressure: 42mm Hg (S). PFT IN 2017-PFT TEST    Spirometry  Spirometry shows moderate obstructive defect. .    Bronchodilator  There is no response to bronchodilator demonstrated. Flow-Volume Loop  The flow-volume loop is compatible with obstructive lung defect. Lung Volumes  Lung volumes are normal except for elevated RV. Lung Volume Comments  There is air trapping present. Diffusing Capacity  Single breath diffusing capacity is severely decreased. A reduced DLCO can be seen in diseases causing destruction of   alveolar-capillary interface (ILD, CHF, COPD, Pneumonia, etc.)   obliteration of pulmonary vascular bed (PE, PAH), tobacco usage,   carbon monoxide poisoning. Airway Resistance  Airway resistance is moderately elevated. Comparison  In comparison to the test of 8/18/16 there is mild deterioration   in spirometry and DLCO    PFT is compatible with moderate Obstructive Lung Disease  -   Possible etiologies include: asthma, COPD, bronchiectasis,   bronchitis. Correlate clinically. 6mwt IN 2017-6 Minute Walk Test Interpretation    Patient Name: Debra Oconnor: [birth year]  KOMB: 8/15/2017    A 6 Minute Walk Test was performed in accordance with the ATS   Guidelines ([address] [facility] 2002; [id number]) at [facility]. The test was performed on room air. The baseline SpO2 while breathing room air was 91%. The patient did complete the 6 minute walk. During the study, the patient walked a total distance of 1050   feet. The minimal recorded SpO2 was 88%.     Oximetry with 6 Minute Walk Test shows desaturation to 88% on   room air, 1L, 2L and 3L nasal cannula oxygen with maximal   exercise. Sats improved to 94% on 4L NC oxygen with activity. Comments: Based on testing patient qualifies for 4L NC oxygen to   use with activity.      ECHO-Summary  Left ventricular systolic function is hyperdynamic with an estimated ejection fraction of ? 65%. The left ventricle is normal in size with normal wall thickness. Normal left ventricular diastolic function. The right atrium is mildly enlarged. Mild tricuspid regurgitation. Systolic pulmonary artery pressure (SPAP) is elevated and estimated at 39 mmHg (right atrial pressure 3 mmHg) consistent with  borderline pulmonary hypertension. Barium 9/17/20  Moderate esophageal dysmotility.  No evidence of functional obstruction or  gross reflux. PAP Compliance 10/3/2019   PAP Setting = 7   Percentage days used = 6   Average hours used = 2.39   Is patient compliant with PAP? no   AHI = 3.2   Leak = 9.4   [place] = -     PSG 9/5/18:  TOTAL AHI = 11.2/hour  REM AHI= 16.6/hr   Central AHI= 1.2/hour  Lowest O2 desaturation = 89 %  Minutes with O2 <90% = 2.6    TITRATION STUDY 9/20/18:  TOTAL AHI = 1.9/hour on PAP = 7 cm of H2O  Sleep emergent CSA events during the initial part of the study   Central AHI= 3.9/hour  Lowest O2 desaturation = 90 % overall  Minutes with O2% <90% = NA    Patient's sleep read shows patient to have severe HEBERT with AHI of 49.1/h along with that patient has central sleep apnea with no  with some nocturnal hypoxemia with saturation dropping to 82% when patient saturation below 89% was 104 minutes    Summary   Left ventricular systolic function is hyperdynamic with an estimated   ejection fraction of >= 65%. The left ventricle is normal in size with normal wall thickness. Normal left ventricular diastolic function. The right atrium is mildly enlarged. Mild tricuspid regurgitation.    Systolic pulmonary artery pressure (SPAP) is elevated and estimated at 39   mmHg (right atrial pressure 3 mmHg) consistent with borderline pulmonary   hypertension. Patient's sleep study shows patient to have mild obstructive airway disease with significant small airway disease with some postprandial improvement in the small airways on the study, patient does not have any restrictive lung disease patient does have some air trapping and hyperinflation along with that patient was not able to objectively do the diffusion test patient's flow volume loop was a stable obstructive pattern    Patient 6-minute walk test shows patient to have hypoxemia after 1 minute of walking and patient required 2 L of oxygen to sustain the oxygen saturation      Assessment:    1. Centrilobular emphysema ([address]      2. HEBERT on CPAP      3. Rheumatoid arthritis involving multiple sites with positive rheumatoid factor ([facility] [address]      4. Pulmonary HTN ([facility] [address] [facility]      5. ASD (atrial septal defect)      6. Former smoker      7. Chronic respiratory failure with hypoxia (HCC)    8.   Nasal congestion          Plan:   · Patient's ROS discussed   · Patient was told about the clinical findings including auscultation and implications  · Patient sleep study results were reviewed and patient has severe HEBERT along with CSA and nocturnal hypoxemia  · Patient cannot tolerate ASV and patient was told that the next thing will be to try auto CPAP and a new machine ordered for the patient  · Patient's compliance data to be reviewed in 1 month time  · Portable oxygen concentrator ordered from patient's [employer] company  · Patient was admitted and echocardiogram shows patient's EF to be normal but patient does have some pulmonary hypertension  · Patient has chronic respiratory failure with hypoxemia  · Patient's modified barium swallow and esophagram has been reviewed and patient has esophageal motility dysfunction  · Patient was told to at least sit or stand 45 minutes to 1 hour after she eats and patient's supper has to be at least 2 hours prior to sleeping time and patient's head should be about 30 degrees higher than the abdominal and she is lying down  · Concept of hypoxemia and hypercarbia discussed  · Patient's oxygen needs to be maintained between 90 to 94% only  · Patient was told to use some saline nasal spray-  · Flonase nasal spray 2 puff each nostril once a day  · A humidifier in the bedroom will help  · Salt and fluid restrictions were discussed  · Patient was told about the pathophysiology of the disease process and its modifying factors  · Patient was told about the pathophysiology of the COPD and various medications and how they act  · Patient to continue with Alisha Mantle as before  · Patient was told to make sure that she rinses her mouth with water without fail to avoid any hoarseness of voice or oral thrush  · No need for any antibiotic and steroids  · Daily weights will help  · Patient has been started on loop diuretics by the PCP  · Salt and fluid restrictions discussed  · Patient's treatment of rheumatoid arthritis as per rheumatology  · Oral diet as per speech therapy recommendations  · Monitor for any aspiration event  · Patient to take DuoNeb or albuterol inhaler only on whenever necessary basis and not on regular basis  · Patient's further treatment depending on patient's clinical status and response to the new inhaler  · A humidifier in the bedroom will help  · Patient to follow-up after 6 months with a compliance data with the CPAP machine

## 2021-12-13 ENCOUNTER — TELEPHONE (OUTPATIENT)
Dept: PULMONOLOGY | Age: 81
End: 2021-12-13

## 2021-12-15 ENCOUNTER — TELEPHONE (OUTPATIENT)
Dept: PULMONOLOGY | Age: 81
End: 2021-12-15

## 2021-12-15 NOTE — TELEPHONE ENCOUNTER
cancel this message I found note from MA that pt.  Is hast switching DME's and I will take care of re faxing to [facility]

## 2021-12-15 NOTE — TELEPHONE ENCOUNTER
PT. STATES  You were going to change her pressure settings on her CPAP I do not see that in your note please advise.

## 2021-12-21 RX ORDER — TIOTROPIUM BROMIDE 18 UG/1
CAPSULE ORAL; RESPIRATORY (INHALATION)
Qty: 30 CAPSULE | Refills: 10 | Status: SHIPPED | OUTPATIENT
Start: 2021-12-21 | End: 2022-05-26 | Stop reason: ALTCHOICE

## 2021-12-21 RX ORDER — LABETALOL 100 MG/1
TABLET, FILM COATED ORAL
Qty: 60 TABLET | Refills: 5 | Status: SHIPPED | OUTPATIENT
Start: 2021-12-21 | End: 2022-02-07 | Stop reason: SDUPTHER

## 2021-12-23 RX ORDER — LEVOTHYROXINE SODIUM 0.1 MG/1
TABLET ORAL
Qty: 90 TABLET | Refills: 0 | Status: ON HOLD | OUTPATIENT
Start: 2021-12-23 | End: 2021-12-29 | Stop reason: HOSPADM

## 2021-12-23 NOTE — TELEPHONE ENCOUNTER
Jia Bullock [phone] (home) [phone] (work)   is requesting refill(s) of medication levothyroxine (SYNTHROID) 100 MCG tablet  to preferred pharmacy [facility]/pharmacy [address] [facility] [address] [facility] [phone]    Last OV 11/9/21 (pertaining to medication)   Last refill 3/25/19 (per medication requested)  Next office visit scheduled or attempted Yes  Date 2/7/22  If No, reason made.

## 2021-12-26 ENCOUNTER — APPOINTMENT (OUTPATIENT)
Dept: GENERAL RADIOLOGY | Age: 81
DRG: 137 | End: 2021-12-26
Payer: COMMERCIAL

## 2021-12-26 ENCOUNTER — HOSPITAL ENCOUNTER (INPATIENT)
Age: 81
LOS: 3 days | Discharge: HOME | DRG: 137 | End: 2021-12-29
Attending: EMERGENCY MEDICINE | Admitting: INTERNAL MEDICINE
Payer: COMMERCIAL

## 2021-12-26 DIAGNOSIS — J44.1 COPD EXACERBATION (HCC): ICD-10-CM

## 2021-12-26 DIAGNOSIS — U07.1 COVID-19: Primary | ICD-10-CM

## 2021-12-26 DIAGNOSIS — E03.9 ACQUIRED HYPOTHYROIDISM: ICD-10-CM

## 2021-12-26 DIAGNOSIS — R77.8 ELEVATED TROPONIN: ICD-10-CM

## 2021-12-26 PROBLEM — J12.82 PNEUMONIA DUE TO COVID-19 VIRUS: Status: ACTIVE | Noted: 2021-12-26

## 2021-12-26 LAB
A/G RATIO: 1.6 (ref 1.1–2.2)
ALBUMIN SERPL-MCNC: 3.8 G/DL (ref 3.4–5)
ALP BLD-CCNC: 34 U/L (ref 40–129)
ALT SERPL-CCNC: 19 U/L (ref 10–40)
ANION GAP SERPL CALCULATED.3IONS-SCNC: 11 MMOL/L (ref 3–16)
AST SERPL-CCNC: 26 U/L (ref 15–37)
BASOPHILS ABSOLUTE: 0 K/UL (ref 0–0.2)
BASOPHILS RELATIVE PERCENT: 0.6 %
BILIRUB SERPL-MCNC: 0.7 MG/DL (ref 0–1)
BUN BLDV-MCNC: 15 MG/DL (ref 7–20)
CALCIUM SERPL-MCNC: 9 MG/DL (ref 8.3–10.6)
CHLORIDE BLD-SCNC: 102 MMOL/L (ref 99–110)
CO2: 26 MMOL/L (ref 21–32)
CREAT SERPL-MCNC: 1.2 MG/DL (ref 0.6–1.2)
D DIMER: 3117 NG/ML DDU (ref 0–229)
EOSINOPHILS ABSOLUTE: 0 K/UL (ref 0–0.6)
EOSINOPHILS RELATIVE PERCENT: 0.1 %
GFR AFRICAN AMERICAN: 52
GFR NON-AFRICAN AMERICAN: 43
GLUCOSE BLD-MCNC: 95 MG/DL (ref 70–99)
HCT VFR BLD CALC: 37.3 % (ref 36–48)
HEMOGLOBIN: 12.3 G/DL (ref 12–16)
LYMPHOCYTES ABSOLUTE: 1.6 K/UL (ref 1–5.1)
LYMPHOCYTES RELATIVE PERCENT: 32.5 %
MCH RBC QN AUTO: 28.8 PG (ref 26–34)
MCHC RBC AUTO-ENTMCNC: 32.9 G/DL (ref 31–36)
MCV RBC AUTO: 87.8 FL (ref 80–100)
MONOCYTES ABSOLUTE: 0.8 K/UL (ref 0–1.3)
MONOCYTES RELATIVE PERCENT: 17.5 %
NEUTROPHILS ABSOLUTE: 2.4 K/UL (ref 1.7–7.7)
NEUTROPHILS RELATIVE PERCENT: 49.3 %
PDW BLD-RTO: 14.3 % (ref 12.4–15.4)
PLATELET # BLD: 172 K/UL (ref 135–450)
PMV BLD AUTO: 7.3 FL (ref 5–10.5)
POTASSIUM REFLEX MAGNESIUM: 3.6 MMOL/L (ref 3.5–5.1)
PRO-BNP: 123 PG/ML (ref 0–449)
RAPID INFLUENZA  B AGN: NEGATIVE
RAPID INFLUENZA A AGN: NEGATIVE
RBC # BLD: 4.25 M/UL (ref 4–5.2)
SARS-COV-2, NAAT: DETECTED
SODIUM BLD-SCNC: 139 MMOL/L (ref 136–145)
TOTAL PROTEIN: 6.2 G/DL (ref 6.4–8.2)
TROPONIN: 0.03 NG/ML
TROPONIN: 0.08 NG/ML
WBC # BLD: 4.8 K/UL (ref 4–11)

## 2021-12-26 PROCEDURE — 80053 COMPREHEN METABOLIC PANEL: CPT

## 2021-12-26 PROCEDURE — 84484 ASSAY OF TROPONIN QUANT: CPT

## 2021-12-26 PROCEDURE — 6370000000 HC RX 637 (ALT 250 FOR IP): Performed by: INTERNAL MEDICINE

## 2021-12-26 PROCEDURE — 6360000002 HC RX W HCPCS: Performed by: EMERGENCY MEDICINE

## 2021-12-26 PROCEDURE — 86140 C-REACTIVE PROTEIN: CPT

## 2021-12-26 PROCEDURE — 96374 THER/PROPH/DIAG INJ IV PUSH: CPT

## 2021-12-26 PROCEDURE — 70360 X-RAY EXAM OF NECK: CPT

## 2021-12-26 PROCEDURE — 83880 ASSAY OF NATRIURETIC PEPTIDE: CPT

## 2021-12-26 PROCEDURE — 87635 SARS-COV-2 COVID-19 AMP PRB: CPT

## 2021-12-26 PROCEDURE — 2580000003 HC RX 258: Performed by: INTERNAL MEDICINE

## 2021-12-26 PROCEDURE — 1200000000 HC SEMI PRIVATE

## 2021-12-26 PROCEDURE — 99284 EMERGENCY DEPT VISIT MOD MDM: CPT

## 2021-12-26 PROCEDURE — 71046 X-RAY EXAM CHEST 2 VIEWS: CPT

## 2021-12-26 PROCEDURE — 36415 COLL VENOUS BLD VENIPUNCTURE: CPT

## 2021-12-26 PROCEDURE — 85379 FIBRIN DEGRADATION QUANT: CPT

## 2021-12-26 PROCEDURE — 94761 N-INVAS EAR/PLS OXIMETRY MLT: CPT

## 2021-12-26 PROCEDURE — 85025 COMPLETE CBC W/AUTO DIFF WBC: CPT

## 2021-12-26 PROCEDURE — 94660 CPAP INITIATION&MGMT: CPT

## 2021-12-26 PROCEDURE — 87804 INFLUENZA ASSAY W/OPTIC: CPT

## 2021-12-26 PROCEDURE — 93005 ELECTROCARDIOGRAM TRACING: CPT | Performed by: EMERGENCY MEDICINE

## 2021-12-26 PROCEDURE — 2700000000 HC OXYGEN THERAPY PER DAY

## 2021-12-26 PROCEDURE — 94640 AIRWAY INHALATION TREATMENT: CPT

## 2021-12-26 RX ORDER — POLYETHYLENE GLYCOL 3350 17 G/17G
17 POWDER, FOR SOLUTION ORAL DAILY PRN
Status: DISCONTINUED | OUTPATIENT
Start: 2021-12-26 | End: 2021-12-29 | Stop reason: HOSPADM

## 2021-12-26 RX ORDER — DEXAMETHASONE 4 MG/1
6 TABLET ORAL DAILY
Status: DISCONTINUED | OUTPATIENT
Start: 2021-12-27 | End: 2021-12-29 | Stop reason: HOSPADM

## 2021-12-26 RX ORDER — LABETALOL 100 MG/1
50 TABLET, FILM COATED ORAL EVERY 12 HOURS PRN
Status: DISCONTINUED | OUTPATIENT
Start: 2021-12-26 | End: 2021-12-29 | Stop reason: HOSPADM

## 2021-12-26 RX ORDER — ASPIRIN 81 MG/1
324 TABLET, CHEWABLE ORAL ONCE
Status: DISCONTINUED | OUTPATIENT
Start: 2021-12-26 | End: 2021-12-29 | Stop reason: HOSPADM

## 2021-12-26 RX ORDER — SODIUM CHLORIDE 0.9 % (FLUSH) 0.9 %
5-40 SYRINGE (ML) INJECTION EVERY 12 HOURS SCHEDULED
Status: DISCONTINUED | OUTPATIENT
Start: 2021-12-26 | End: 2021-12-29 | Stop reason: HOSPADM

## 2021-12-26 RX ORDER — FLUTICASONE PROPIONATE 50 MCG
2 SPRAY, SUSPENSION (ML) NASAL DAILY
Status: DISCONTINUED | OUTPATIENT
Start: 2021-12-26 | End: 2021-12-29 | Stop reason: HOSPADM

## 2021-12-26 RX ORDER — ONDANSETRON 4 MG/1
4 TABLET, ORALLY DISINTEGRATING ORAL EVERY 8 HOURS PRN
Status: DISCONTINUED | OUTPATIENT
Start: 2021-12-26 | End: 2021-12-29 | Stop reason: HOSPADM

## 2021-12-26 RX ORDER — FAMOTIDINE 20 MG/1
20 TABLET, FILM COATED ORAL DAILY
Status: DISCONTINUED | OUTPATIENT
Start: 2021-12-26 | End: 2021-12-29 | Stop reason: HOSPADM

## 2021-12-26 RX ORDER — SPIRONOLACTONE 25 MG/1
25 TABLET ORAL 2 TIMES DAILY
Status: DISCONTINUED | OUTPATIENT
Start: 2021-12-26 | End: 2021-12-29 | Stop reason: HOSPADM

## 2021-12-26 RX ORDER — SODIUM CHLORIDE 9 MG/ML
25 INJECTION, SOLUTION INTRAVENOUS PRN
Status: DISCONTINUED | OUTPATIENT
Start: 2021-12-26 | End: 2021-12-29 | Stop reason: HOSPADM

## 2021-12-26 RX ORDER — TIZANIDINE 4 MG/1
2 TABLET ORAL 2 TIMES DAILY
Status: DISCONTINUED | OUTPATIENT
Start: 2021-12-26 | End: 2021-12-29 | Stop reason: HOSPADM

## 2021-12-26 RX ORDER — ACETAMINOPHEN 325 MG/1
650 TABLET ORAL EVERY 6 HOURS PRN
Status: DISCONTINUED | OUTPATIENT
Start: 2021-12-26 | End: 2021-12-29 | Stop reason: HOSPADM

## 2021-12-26 RX ORDER — SODIUM CHLORIDE 0.9 % (FLUSH) 0.9 %
5-40 SYRINGE (ML) INJECTION PRN
Status: DISCONTINUED | OUTPATIENT
Start: 2021-12-26 | End: 2021-12-29 | Stop reason: HOSPADM

## 2021-12-26 RX ORDER — GUAIFENESIN/DEXTROMETHORPHAN 100-10MG/5
5 SYRUP ORAL EVERY 4 HOURS PRN
Status: DISCONTINUED | OUTPATIENT
Start: 2021-12-26 | End: 2021-12-29 | Stop reason: HOSPADM

## 2021-12-26 RX ORDER — DEXAMETHASONE SODIUM PHOSPHATE 10 MG/ML
10 INJECTION INTRAMUSCULAR; INTRAVENOUS ONCE
Status: COMPLETED | OUTPATIENT
Start: 2021-12-26 | End: 2021-12-26

## 2021-12-26 RX ORDER — IPRATROPIUM BROMIDE AND ALBUTEROL SULFATE 2.5; .5 MG/3ML; MG/3ML
3 SOLUTION RESPIRATORY (INHALATION) EVERY 4 HOURS PRN
Status: DISCONTINUED | OUTPATIENT
Start: 2021-12-26 | End: 2021-12-29 | Stop reason: HOSPADM

## 2021-12-26 RX ORDER — AZITHROMYCIN 250 MG/1
500 TABLET, FILM COATED ORAL DAILY
Status: COMPLETED | OUTPATIENT
Start: 2021-12-26 | End: 2021-12-26

## 2021-12-26 RX ORDER — LOSARTAN POTASSIUM 100 MG/1
1 TABLET ORAL DAILY
Status: CANCELLED | OUTPATIENT
Start: 2021-12-26

## 2021-12-26 RX ORDER — AZITHROMYCIN 250 MG/1
250 TABLET, FILM COATED ORAL DAILY
Status: DISCONTINUED | OUTPATIENT
Start: 2021-12-27 | End: 2021-12-28

## 2021-12-26 RX ORDER — ERGOCALCIFEROL 1.25 MG/1
50000 CAPSULE ORAL
Status: DISCONTINUED | OUTPATIENT
Start: 2021-12-31 | End: 2021-12-29 | Stop reason: HOSPADM

## 2021-12-26 RX ORDER — ACETAMINOPHEN 650 MG/1
650 SUPPOSITORY RECTAL EVERY 6 HOURS PRN
Status: DISCONTINUED | OUTPATIENT
Start: 2021-12-26 | End: 2021-12-29 | Stop reason: HOSPADM

## 2021-12-26 RX ORDER — FUROSEMIDE 20 MG/1
20 TABLET ORAL 2 TIMES DAILY
Status: DISCONTINUED | OUTPATIENT
Start: 2021-12-26 | End: 2021-12-29 | Stop reason: HOSPADM

## 2021-12-26 RX ORDER — ONDANSETRON 2 MG/ML
4 INJECTION INTRAMUSCULAR; INTRAVENOUS EVERY 6 HOURS PRN
Status: DISCONTINUED | OUTPATIENT
Start: 2021-12-26 | End: 2021-12-29 | Stop reason: HOSPADM

## 2021-12-26 RX ORDER — BUDESONIDE AND FORMOTEROL FUMARATE DIHYDRATE 160; 4.5 UG/1; UG/1
2 AEROSOL RESPIRATORY (INHALATION) 2 TIMES DAILY
Status: DISCONTINUED | OUTPATIENT
Start: 2021-12-26 | End: 2021-12-29 | Stop reason: HOSPADM

## 2021-12-26 RX ORDER — LEVOTHYROXINE SODIUM 0.1 MG/1
100 TABLET ORAL DAILY
Status: DISCONTINUED | OUTPATIENT
Start: 2021-12-27 | End: 2021-12-29

## 2021-12-26 RX ADMIN — FUROSEMIDE 20 MG: 20 TABLET ORAL at 20:21

## 2021-12-26 RX ADMIN — Medication 10 ML: at 20:22

## 2021-12-26 RX ADMIN — AZITHROMYCIN 500 MG: 250 TABLET, FILM COATED ORAL at 18:35

## 2021-12-26 RX ADMIN — FAMOTIDINE 20 MG: 20 TABLET, FILM COATED ORAL at 18:35

## 2021-12-26 RX ADMIN — FLUTICASONE PROPIONATE 2 SPRAY: 50 SPRAY, METERED NASAL at 18:34

## 2021-12-26 RX ADMIN — DEXAMETHASONE SODIUM PHOSPHATE 10 MG: 10 INJECTION INTRAMUSCULAR; INTRAVENOUS at 11:28

## 2021-12-26 RX ADMIN — GUAIFENESIN AND DEXTROMETHORPHAN 5 ML: 100; 10 SYRUP ORAL at 23:15

## 2021-12-26 RX ADMIN — SPIRONOLACTONE 25 MG: 25 TABLET ORAL at 20:21

## 2021-12-26 RX ADMIN — Medication 2 PUFF: at 19:30

## 2021-12-26 RX ADMIN — TIZANIDINE 2 MG: 4 TABLET ORAL at 20:21

## 2021-12-26 ASSESSMENT — PAIN SCALES - GENERAL
PAINLEVEL_OUTOF10: 3
PAINLEVEL_OUTOF10: 0

## 2021-12-26 NOTE — PROGRESS NOTES
4 Eyes Skin Assessment     The patient is being assess for  Admission    I agree that 2 RN's have performed a thorough Head to Toe Skin Assessment on the patient. ALL assessment sites listed below have been assessed. Areas assessed by both nurses:   [x]   Head, Face, and Ears   [x]   Shoulders, Back, and Chest  [x]   Arms, Elbows, and Hands   [x]   Coccyx, Sacrum, and IschIum  [x]   Legs, Feet, and Heels        Does the Patient have Skin Breakdown?   No         Tashi Prevention initiated:  No   Wound Care Orders initiated:  No      [facility] nurse consulted for Pressure Injury (Stage 3,4, Unstageable, DTI, NWPT, and Complex wounds), New and Established Ostomies:  No      Nurse 1 eSignature: Electronically signed by Cesar Peña RN on 12/26/21 at 6:40 PM EST    **SHARE this note so that the co-signing nurse is able to place an eSignature**    Nurse 2 eSignature: Electronically signed by Penny Thompson RN on 12/26/21 at 6:45 PM EST

## 2021-12-26 NOTE — ED NOTES
Patient identified as a positive fall risk on the ED triage fall screening. Patient placed in fall precautions which includes:  yellow fall risk bracelet on wrist and yellow socks on feet. Patient instructed on importance of not getting out of bed or ambulating without assistance for safety. Pt verbalized understanding.      Klaudia Salter RN  12/26/21 [id number]

## 2021-12-26 NOTE — ED NOTES
Pt came in today with c/o diff breathing. Upon arrival per squad pt was on non rebreathe. Pt is on 3-4 lit of oxygen at home and is in the 90's with it now. Pt state that she is suppose to be on CPAP at home however she has not yet receive a machine. Pt had audible stridor on arrival that was improved with decadron. Pt with a cough non productive. Pt granddaughter lives with her and was dx with COVID 2 days ago and pt was dx this visit. Pt shanae fever. Pt ambulated to bedside commode with one asst. Pt state that at home she has dyspnea when walking around the home. Pt shanae CP and no palpitations. Pt does state that she has had decrease in appetite.       Landon Rees RN  12/26/21 [id number]

## 2021-12-26 NOTE — H&P
Hospital Medicine History & Physical      PCP: Zenaida Jacobs MD    Date of Admission: 12/26/2021    Date of Service: Pt seen/examined on 12/26/2021 and Admitted to Inpatient with expected LOS greater than two midnights due to medical therapy. Chief Complaint:    Shortness of breath    History Of Present Illness:      80 y.o. female who presented to [facility] with increased shortness of breath. Patient states that she uses 3-4LPM oxygen at home. She states she is also supposed to use CPAP at home but she is not compliant with it. No fever or chills. She states 3 days ago she began having increase in shortness of breath. Cough present but nonproductive. She states she did not require an increase in O2. She does not ambulate much. Dyspnea with exertion walking to bathroom or kitchen. Patient had seen Dr. Alirio Hernandez 3 weeks ago. She states she is compliant with Oxygen and inhalers but not CPAP. No chest pain. No palpitations. She states her appetite decreased but she still has sense of taste and smell.      Past Medical History:          Diagnosis Date    Acid reflux     Acquired hypothyroidism 7/6/2017    Anemia     Asthma     CHF (congestive heart failure) (HCC)     [facility]     HLD (hyperlipidemia)     Hypertension     Influenza A 01/22/2018    MI (myocardial infarction) ([facility]     X 2    Migraine     past hx    Rheumatoid arthritis     Wears glasses        Past Surgical History:          Procedure Laterality Date    BRONCHOSCOPY N/A 3/27/2019    BRONCHOSCOPY ALVEOLAR LAVAGE performed by Teresita Lopez MD at [address] [place] [address]  3/27/2019    BRONCHOSCOPY THERAPUTIC ASPIRATION INITIAL performed by Tereista Lopez MD at [facility] Bilateral 03/23/2011    COLONOSCOPY N/A 6/12/2019    COLONOSCOPY WITH ANESTHESIA -SLEEP APNEA- performed by Suhail Jordan MD at [address] STEROID INJECTION Left 12/4/2018    LEFT LUMBAR FOUR, LUMBAR FIVE TRANSFORAMINAL EPIDURAL STEROID INJECTION SITE CONFIRMED BY FLUOROSCOPY performed by Chaz Pereira MD at [address] N/A 7/23/2019    ESOPHAGEAL MOTILITY/MANOMETRY STUDY performed by Carla Perry MD at [address] little toe    GASTROSTOMY TUBE PLACEMENT N/A 4/1/2019    EGD PEG TUBE PLACEMENT performed by Daphney Atkinson MD at [address]      total, fibroids    KNEE SURGERY      right    [place] [address] DX/THER SBST INTRLMNR CRV/THRC W/IMG GDN Left 8/21/2018    LEFT LUMBAR FOUR/ LUMBAR FIVE CYST ASPIRATION SITE CONFIRMED BY FLUOROSCOPY performed by Chaz Pereira MD at [address] [facility] N/A 3/27/2019    EGD DIAGNOSTIC ONLY performed by Milagro Barriga MD at [facility] 9/6/2020    EGD DIAGNOSTIC ONLY performed by Cristian Toth DO at [facility]       Medications Prior to Admission:      Prior to Admission medications    Medication Sig Start Date End Date Taking?  Authorizing Provider   levothyroxine (SYNTHROID) 100 MCG tablet TAKE 1 TABLET BY MOUTH ONCE DAILY 12/23/21   C Amanda Rowe MD   SPIRIVA HANDIHALER 18 MCG inhalation capsule INHALE THE CONTENTS OF ONE CAPSULE BY MOUTH VIA HANDINHALER ONCE DAILY AS DIRECTED 12/21/21   C Amanda Rowe MD   labetalol (NORMODYNE) 100 MG tablet TAKE 1 TABLET BY MOUTH EVERY 12 HOURS 12/21/21   C Amanda Rowe MD   losartan (COZAAR) 100 MG tablet TAKE 1 TABLET BY MOUTH DAILY  Patient not taking: Reported on 12/7/2021 11/29/21   Brayden Win MD   furosemide (LASIX) 20 MG tablet TAKE 1 TABLET BY MOUTH TWICE A DAY 11/29/21   C Amanda Rowe MD   valsartan (DIOVAN) 160 MG tablet TAKE 1 TABLET BY MOUTH EVERY DAY  Patient not taking: Reported on 12/7/2021 11/15/21   C Amanda Rowe MD   spironolactone (ALDACTONE) 25 MG tablet Take 1 tablet by mouth 2 times daily 11/9/21   C Rogelio Galdamez MD   fluticasone (FLONASE) 50 MCG/ACT nasal spray SPRAY 2 SPRAYS INTO EACH NOSTRIL EVERY DAY 9/12/21   [place] MD Tammy   tiZANidine (ZANAFLEX) 2 MG tablet TAKE 1 TABLET (2 MG) BY MOUTH 2 TIMES DAILY 8/6/21   Historical Provider, MD   famotidine (PEPCID) 20 MG tablet TAKE ONE (1) TABLET BY MOUTH TWICE DAILY 7/19/21   C Rogelio Galdamez MD   albuterol sulfate  (90 Base) MCG/ACT inhaler INHALE TWO (2) PUFFS BY MOUTH EVERY 6 HOURS AS NEEDED 6/24/21   C Rogelio Galdamez MD   atorvastatin (LIPITOR) 10 MG tablet TAKE (1) TABLET BY MOUTH ONCE DAILY *EMERGENCY REFILL* 6/24/21   C Rogelio Galdamez MD   ACTEMRA 162 MG/0.9ML SOSY injection  5/14/21   Historical Provider, MD   fluticasone-umeclidin-vilant (TRELEGY ELLIPTA) 100-62.5-25 MCG/INH AEPB Inhale 1 puff into the lungs daily Rinse mouth with water after use 6/1/21   Stephen Edwards MD   predniSONE (DELTASONE) 10 MG tablet Take 5 mg by mouth daily     Historical Provider, MD   Misc. Devices [facility]) MISC SELF PROPELLED, LIGHT WEIGHT,  STANDARD SIZE 1/25/21   C Rogelio Galdamez MD   ergocalciferol (ERGOCALCIFEROL) 1.25 MG (03142 UT) capsule Take 50,000 Units by mouth every 7 days 9/1/20   Historical Provider, MD   Misc. Devices [facility]) MISC 1 each by Does not apply route daily as needed (weakness) 9/3/20   C Rogelio Galdamez MD   OXYGEN Inhale 3 L/min into the lungs daily as needed     Historical Provider, MD   Blood Pressure Monitoring (BLOOD PRESSURE MONITOR/M CUFF) MISC 1 Units by Does not apply route daily 8/6/18   Millie Barnard MD   ipratropium-albuterol (DUONEB) 0.5-2.5 (3) MG/3ML SOLN nebulizer solution Inhale 3 mLs into the lungs every 4 hours as needed. 2/10/15   Luis Angel Wood MD   nitroGLYCERIN (NITROSTAT) 0.4 MG SL tablet Place 0.4 mg under the tongue every 5 minutes as needed.       Historical Provider, MD       Allergies:  Alendronate sodium, Humira [adalimumab], Losartan, Penicillins, Simvastatin, Sulfa antibiotics, and Tape [adhesive tape]    Social History:      The patient currently lives home    TOBACCO:   reports that she quit smoking about 12 years ago. Her smoking use included cigarettes. She started smoking about 58 years ago. She has a 150.00 pack-year smoking history. She has never used smokeless tobacco.  ETOH:   reports no history of alcohol use. E-Cigarettes/Vaping Use     Questions Responses    E-Cigarette/Vaping Use Never User    Start Date     Passive Exposure     Quit Date     Counseling Given     Comments             Family History:            Problem Relation Age of Onset    Cancer Mother     Cancer Father     Heart Disease Maternal Aunt     Cancer Sister        REVIEW OF SYSTEMS COMPLETED:   Pertinent positives as noted in the HPI. All other systems reviewed and negative. PHYSICAL EXAM PERFORMED:    BP (!) 142/84   Pulse 89   Temp 99.4 °F (37.4 °C)   Resp 25   Ht 5' 6\" (1.676 m)   Wt 195 lb (88.5 kg)   SpO2 96%   BMI 31.47 kg/m²     General appearance:  No apparent distress, appears stated age and cooperative. HEENT:  Normal cephalic, atraumatic without obvious deformity. Pupils equal, round, and reactive to light. Extra ocular muscles intact. Conjunctivae/corneas clear. Neck: Supple, with full range of motion. No jugular venous distention. Trachea midline. Respiratory:  Expiratory wheeze. Prolonged exp phase. No crackles. No rhonchi. Cardiovascular:  Regular rate and rhythm with normal S1/S2 without murmurs, rubs or gallops. Abdomen: Soft, non-tender, non-distended with normal bowel sounds. Musculoskeletal:  No clubbing, cyanosis or edema bilaterally. Full range of motion without deformity. Skin: Skin color, texture, turgor normal.  No rashes or lesions. Deformity in joints. Neurologic:  Neurovascularly intact without any focal sensory/motor deficits.  Cranial nerves: II-XII intact, grossly non-focal.  Psychiatric:  Alert and oriented, thought content appropriate, normal insight  Capillary Refill: Brisk,3 seconds, normal  Peripheral Pulses: +2 palpable, equal bilaterally       Labs:     Recent Labs     12/26/21  1122   WBC 4.8   HGB 12.3   HCT 37.3        Recent Labs     12/26/21  1122      K 3.6      CO2 26   BUN 15   CREATININE 1.2   CALCIUM 9.0     Recent Labs     12/26/21  1122   AST 26   ALT 19   BILITOT 0.7   ALKPHOS 34*     No results for input(s): INR in the last 72 hours. Recent Labs     12/26/21  1122   TROPONINI 0.08*       Urinalysis:      Lab Results   Component Value Date    NITRU Negative 03/27/2019    BLOODU Negative 03/27/2019    SPECGRAV >=1.030 03/27/2019    GLUCOSEU Negative 03/27/2019       Radiology:     CXR: I have reviewed the CXR with the following interpretation: No acute changes  EKG:  I have reviewed the EKG with the following interpretation: No acute ST changes noted. XR CHEST (2 VW)   Final Result   Chronic pattern of mild bibasilar atelectasis. No acute findings in the   chest.         XR NECK SOFT TISSUE   Final Result   Soft tissues of the neck are normal.             ASSESSMENT:    Active Hospital Problems    Diagnosis Date Noted    Pneumonia due to COVID-19 virus [U07.1, J12.82] 12/26/2021         PLAN:    1. COVID 19 infection - Patient symptoms are mild and dyspnea is a little worse than baseline. O2 requirement the same as baseline. Patient treated with Decadron in ED. Will start oral decadron in AM. Will consider aggressive COVID treatment if O2 requirement or dyspnea worsens. Check CRP. Previous CRP low in November. 2. COPD exacerbation - Continue steroids. Aggressive pulmonary toilet. Recheck CXR. Zithromax empirically. Continue nebulizer and bronchodilator treatment. 3. Rheumatoid arthritis - Rheumatoid factor elevated. Patient on Actemra at home. 4. Hypertension - Hold ARB due to listed allergy. Monitor BP closely.   5. Deconditioning - PT/OT eval    DVT Prophylaxis:

## 2021-12-26 NOTE — ED PROVIDER NOTES
Emergency Physician Note        Note Open Time: 3:09 PM EST    Chief Complaint  Shortness of Breath (pt with increase wob pt COPD pt state that she is on 4 lit of oxygen at home however cont SOB. pt state that asia was dx with COVID and lives with pt . pt also state that she is suppose to be on bipap at night however she don't her machine at this time)       History of Present Illness  Carolyn Bruce is a 80 y.o. female who presents to the ED for shortness of breath. Patient reports that she has COPD and is on 3 to 4 L oxygen at home and has had increasing shortness of breath recently. She had a family member that is positive for Covid. Patient reports she had a few days of mild worsening shortness of breath. She denies any vomiting but has been nauseated. She denies any chest pain at all. Patient received albuterol and Solu-Medrol prior to arrival.    10 systems reviewed, pertinent positives per HPI otherwise noted to be negative    I have reviewed the following from the nursing documentation:      Prior to Admission medications    Medication Sig Start Date End Date Taking?  Authorizing Provider   levothyroxine (SYNTHROID) 100 MCG tablet TAKE 1 TABLET BY MOUTH ONCE DAILY 12/23/21   ERICA Schwartz MD   SPIRIVA HANDIHALER 18 MCG inhalation capsule INHALE THE CONTENTS OF ONE CAPSULE BY MOUTH VIA HANDINHALER ONCE DAILY AS DIRECTED 12/21/21   ERICA Schwartz MD   labetalol (NORMODYNE) 100 MG tablet TAKE 1 TABLET BY MOUTH EVERY 12 HOURS 12/21/21   ERICA Schwartz MD   losartan (COZAAR) 100 MG tablet TAKE 1 TABLET BY MOUTH DAILY  Patient not taking: Reported on 12/7/2021 11/29/21   Chris Qureshi MD   furosemide (LASIX) 20 MG tablet TAKE 1 TABLET BY MOUTH TWICE A DAY 11/29/21   ERICA Schwartz MD   valsartan (DIOVAN) 160 MG tablet TAKE 1 TABLET BY MOUTH EVERY DAY  Patient not taking: Reported on 12/7/2021 11/15/21   Chris Qureshi MD   spironolactone (ALDACTONE) 25 MG tablet Take 1 tablet by mouth 2 times daily 11/9/21   ERICA Hdz MD   fluticasone (FLONASE) 50 MCG/ACT nasal spray SPRAY 2 SPRAYS INTO EACH NOSTRIL EVERY DAY 9/12/21   Tiki Doss MD   tiZANidine (ZANAFLEX) 2 MG tablet TAKE 1 TABLET (2 MG) BY MOUTH 2 TIMES DAILY 8/6/21   Historical Provider, MD   famotidine (PEPCID) 20 MG tablet TAKE ONE (1) TABLET BY MOUTH TWICE DAILY 7/19/21   ERICA Hdz MD   albuterol sulfate  (90 Base) MCG/ACT inhaler INHALE TWO (2) PUFFS BY MOUTH EVERY 6 HOURS AS NEEDED 6/24/21   C Woody Hdz MD   atorvastatin (LIPITOR) 10 MG tablet TAKE (1) TABLET BY MOUTH ONCE DAILY *EMERGENCY REFILL* 6/24/21   C Woody Hdz MD   ACTEMRA 162 MG/0.9ML SOSY injection  5/14/21   Historical Provider, MD   fluticasone-umeclidin-vilant (TRELEGY ELLIPTA) 100-62.5-25 MCG/INH AEPB Inhale 1 puff into the lungs daily Rinse mouth with water after use 6/1/21   Anushka Hope MD   predniSONE (DELTASONE) 10 MG tablet Take 5 mg by mouth daily     Historical Provider, MD   Misc. Devices [facility]) MISC SELF PROPELLED, LIGHT WEIGHT,  STANDARD SIZE 1/25/21   ERICA Hdz MD   ergocalciferol (ERGOCALCIFEROL) 1.25 MG (15172 UT) capsule Take 50,000 Units by mouth every 7 days 9/1/20   Historical Provider, MD   Misc. Devices [facility]) MISC 1 each by Does not apply route daily as needed (weakness) 9/3/20   ERICA Hdz MD   OXYGEN Inhale 3 L/min into the lungs daily as needed     Historical Provider, MD   Blood Pressure Monitoring (BLOOD PRESSURE MONITOR/M CUFF) MISC 1 Units by Does not apply route daily 8/6/18   José Miguel Morel MD   ipratropium-albuterol (DUONEB) 0.5-2.5 (3) MG/3ML SOLN nebulizer solution Inhale 3 mLs into the lungs every 4 hours as needed. 2/10/15   Derrick Webber MD   nitroGLYCERIN (NITROSTAT) 0.4 MG SL tablet Place 0.4 mg under the tongue every 5 minutes as needed.       Historical Provider, MD       Allergies as of 12/26/2021 - Fully Reviewed 12/26/2021   Allergen Reaction Noted    Alendronate sodium 04/05/2012    Humira [adalimumab]  02/05/2013    Losartan Swelling 11/09/2021    Penicillins  11/22/2010    Simvastatin Other (See Comments) 08/20/2012    Sulfa antibiotics Swelling 11/22/2010    Tape [adhesive tape]  09/06/2020       Past Medical History:   Diagnosis Date    Acid reflux     Acquired hypothyroidism 7/6/2017    Anemia     Asthma     CHF (congestive heart failure) (HCC)     [facility]     HLD (hyperlipidemia)     Hypertension     Influenza A 01/22/2018    MI (myocardial infarction) ([facility]     X 2    Migraine     past hx    Rheumatoid arthritis     Wears glasses         Surgical History:   Past Surgical History:   Procedure Laterality Date    BRONCHOSCOPY N/A 3/27/2019    BRONCHOSCOPY ALVEOLAR LAVAGE [facility]  3/27/2019    BRONCHOSCOPY THERAPUTIC ASPIRATION INITIAL performed by Negrito Simpson MD at [facility] Bilateral 03/23/2011    COLONOSCOPY N/A 6/12/2019    COLONOSCOPY WITH ANESTHESIA -SLEEP APNEA- performed by Akil Pickard MD at [facility] Left 12/4/2018    LEFT LUMBAR FOUR, LUMBAR FIVE TRANSFORAMINAL EPIDURAL STEROID INJECTION SITE CONFIRMED BY FLUOROSCOPY performed by Stefania Fletcher MD at [facility] N/A 7/23/2019    ESOPHAGEAL MOTILITY/MANOMETRY STUDY performed by Giuseppe Platt MD at [address] [place] [address]. little toe    GASTROSTOMY TUBE PLACEMENT N/A 4/1/2019    EGD PEG TUBE PLACEMENT performed by Akil Pickard MD at [address]      total, fibroids    KNEE SURGERY      right    [place] [address] DX/THER SBST INTRLMNR CRV/THRC W/IMG GDN Left 8/21/2018    LEFT LUMBAR FOUR/ LUMBAR FIVE CYST ASPIRATION SITE CONFIRMED BY FLUOROSCOPY performed by Stefania Fletcher MD at [address] [facility] [address] Not on file    Attends Club or Organization Meetings: Not on file    Marital Status: Not on file   Intimate Partner Violence:     Fear of Current or Ex-Partner: Not on file    Emotionally Abused: Not on file    Physically Abused: Not on file    Sexually Abused: Not on file   Housing Stability:     Unable to Pay for Housing in the Last Year: Not on file    Number of Moose in the Last Year: Not on file    Unstable Housing in the Last Year: Not on file       Nursing notes reviewed. ED Triage Vitals [12/26/21 1105]   Enc Vitals Group      BP (!) 142/84      Pulse 89      Resp 25      Temp 99.4 °F (37.4 °C)      Temp src       SpO2 96 %      Weight 195 lb (88.5 kg)      Height 5' 6\" (1.676 m)      Head Circumference       Peak Flow       Pain Score       Pain Loc       Pain Edu? Excl. in [address]? GENERAL:  Awake, alert. Well developed, well nourished with no apparent distress. HENT:  Normocephalic, Atraumatic, moist mucous membranes. EYES:  Pupils equal round and reactive to light, Conjunctiva normal, extraocular movements normal.  NECK:  No meningeal signs, Supple. CHEST:  Regular rate and rhythm, chest wall non-tender. LUNGS: Scattered rhonchi and occasional rales bilaterally. ABDOMEN:  Soft, non-tender, no rebound, rigidity or guarding, non-distended, normal bowel sounds. No costovertebral angle tenderness to palpation. BACK:  No tenderness. EXTREMITIES:  Normal range of motion, no edema, no bony tenderness, no deformity, distal pulses present. SKIN: Warm, dry and intact. NEUROLOGIC: Normal mental status. Moving all extremities to command. LABS and DIAGNOSTIC RESULTS  EKG  The Ekg interpreted by me shows  normal sinus rhythm with a rate of 76  Axis is   Normal  QTc is  normal  Intervals and Durations are unremarkable. ST Segments: no acute change  Delta waves, Brugada Syndrome, and Short IN are not present.   No significant change from prior EKG dated 12 apr 2019    RADIOLOGY  X-RAYS:  I have reviewed radiologic plain film image(s). ALL OTHER NON-PLAIN FILM IMAGES SUCH AS CT, ULTRASOUND AND MRI HAVE BEEN READ BY THE RADIOLOGIST. XR CHEST (2 VW)   Final Result   Chronic pattern of mild bibasilar atelectasis.   No acute findings in the   chest.         XR NECK SOFT TISSUE   Final Result   Soft tissues of the neck are normal.              LABS  Labs Reviewed   COVID-19, RAPID - Abnormal; Notable for the following components:       Result Value    SARS-CoV-2, NAAT DETECTED (*)     All other components within normal limits    Narrative:     Mckinley Rosenberg tel. [phone],  Microbiology results called to and read back by tim haque Rn, 12/26/2021  11:46, by [facility]  Performed at:  [address], [facility] [place]   Phone [phone]   COMPREHENSIVE METABOLIC PANEL W/ REFLEX TO MG FOR LOW K - Abnormal; Notable for the following components:    GFR Non- 43 (*)     GFR  52 (*)     Total Protein 6.2 (*)     Alkaline Phosphatase 34 (*)     All other components within normal limits    Narrative:     Performed at:  [facility] - [address], [facility] [place]   Phone [phone]   TROPONIN - Abnormal; Notable for the following components:    Troponin 0.08 (*)     All other components within normal limits    Narrative:     Performed at:  [address], [facility] [place]   Phone [phone]   RAPID INFLUENZA A/B ANTIGENS    Narrative:     Performed at:  [facility] - [address], [facility] [place]   Phone [phone]   CBC WITH AUTO DIFFERENTIAL    Narrative:     Performed at:  [facility] [address], [address] [place]   Phone [phone]    Narrative:     Performed at:  [facility] - [facility]  [address],  [place], [facility] Leif Arnulfo   Phone [phone]       MEDICAL DECISION MAKING        Given the patient has no chest pain and has a normal-appearing EKG I do not believe this is a STEMI nor do I even believe it is a non-ST elevation MI. It seems to be more likely that this is troponin leak related to the patient's respiratory problems. She is being admitted however for further treatment and evaluation of her COPD/Covid exacerbation and elevated troponin  The total Critical Care time is 40 minutes which excludes separately billable procedures. The critical care was concerning treatment of supplemental oxygen for hypoxia as well as steroids for COPD exacerbation. This time is exclusive of any time documented by any other providers. I spoke with Dr. Guerrero Mcclure. We thoroughly discussed the history, physical exam, laboratory and imaging studies, as well as, emergency department course. Based upon that discussion, we've decided to admit Tom Dawn for further observation and evaluation of Bettie Strauss's dyspnea. As I have deemed necessary from their history, physical, and studies, I have considered and evaluated Tom Dawn for the following diagnoses:  ACUTE CORONARY SYNDROME, CHRONIC OBSTRUCTIVE PULMONARY DISEASE, CONGESTIVE HEART FAILURE, PERICARDIAL TAMPONADE, PNEUMONIA, PNEUMOTHORAX, PULMONARY EMBOLISM, SEPSIS, and THORACIC DISSECTION. FINAL IMPRESSION  1. COVID-19    2. Elevated troponin    3. COPD exacerbation (HCC)        Vitals:  Blood pressure (!) 142/84, pulse 89, temperature 99.4 °F (37.4 °C), resp. rate 25, height 5' 6\" (1.676 m), weight 195 lb (88.5 kg), SpO2 96 %, not currently breastfeeding. Disposition  Pt is in stable condition upon Admit to telemetry. This chart was generated using the [address] [place] [address] dictation system. I created this record but it may contain dictation errors.           Joyce Viveros MD  12/26/21 1658

## 2021-12-26 NOTE — ED NOTES
Bed: 17  Expected date:   Expected time:   Means of arrival:   Comments:  Medic 666 Elpaty Garcia RN  12/26/21 8865

## 2021-12-26 NOTE — ED NOTES
Report given to [facility] floor nurse     Governor BILLIE Florez  12/26/21 0493

## 2021-12-26 NOTE — CONSULTS
Consult placed  Perfect served    Who  paola  Date:12/26/2021,  Time:5:05 PM        Electronically signed by Keyur Garcia on 12/26/2021 at 5:05 PM

## 2021-12-27 ENCOUNTER — APPOINTMENT (OUTPATIENT)
Dept: VASCULAR LAB | Age: 81
DRG: 137 | End: 2021-12-27
Payer: COMMERCIAL

## 2021-12-27 ENCOUNTER — APPOINTMENT (OUTPATIENT)
Dept: CT IMAGING | Age: 81
DRG: 137 | End: 2021-12-27
Payer: COMMERCIAL

## 2021-12-27 ENCOUNTER — APPOINTMENT (OUTPATIENT)
Dept: GENERAL RADIOLOGY | Age: 81
DRG: 137 | End: 2021-12-27
Payer: COMMERCIAL

## 2021-12-27 LAB
A/G RATIO: 1.5 (ref 1.1–2.2)
ALBUMIN SERPL-MCNC: 3.8 G/DL (ref 3.4–5)
ALP BLD-CCNC: 35 U/L (ref 40–129)
ALT SERPL-CCNC: 21 U/L (ref 10–40)
ANION GAP SERPL CALCULATED.3IONS-SCNC: 12 MMOL/L (ref 3–16)
APTT: 31.5 SEC (ref 26.2–38.6)
AST SERPL-CCNC: 29 U/L (ref 15–37)
BASOPHILS ABSOLUTE: 0 K/UL (ref 0–0.2)
BASOPHILS RELATIVE PERCENT: 0.1 %
BILIRUB SERPL-MCNC: 0.5 MG/DL (ref 0–1)
BUN BLDV-MCNC: 21 MG/DL (ref 7–20)
C-REACTIVE PROTEIN: <3 MG/L (ref 0–5.1)
C-REACTIVE PROTEIN: <3 MG/L (ref 0–5.1)
CALCIUM SERPL-MCNC: 9 MG/DL (ref 8.3–10.6)
CHLORIDE BLD-SCNC: 100 MMOL/L (ref 99–110)
CO2: 25 MMOL/L (ref 21–32)
CREAT SERPL-MCNC: 1.1 MG/DL (ref 0.6–1.2)
EKG ATRIAL RATE: 76 BPM
EKG DIAGNOSIS: NORMAL
EKG P AXIS: 30 DEGREES
EKG P-R INTERVAL: 132 MS
EKG Q-T INTERVAL: 378 MS
EKG QRS DURATION: 86 MS
EKG QTC CALCULATION (BAZETT): 425 MS
EKG R AXIS: 20 DEGREES
EKG T AXIS: 47 DEGREES
EKG VENTRICULAR RATE: 76 BPM
EOSINOPHILS ABSOLUTE: 0 K/UL (ref 0–0.6)
EOSINOPHILS RELATIVE PERCENT: 0 %
GFR AFRICAN AMERICAN: 58
GFR NON-AFRICAN AMERICAN: 48
GLUCOSE BLD-MCNC: 122 MG/DL (ref 70–99)
HCT VFR BLD CALC: 37 % (ref 36–48)
HEMOGLOBIN: 12.4 G/DL (ref 12–16)
INR BLD: 0.99 (ref 0.88–1.12)
LYMPHOCYTES ABSOLUTE: 0.9 K/UL (ref 1–5.1)
LYMPHOCYTES RELATIVE PERCENT: 12 %
MCH RBC QN AUTO: 29 PG (ref 26–34)
MCHC RBC AUTO-ENTMCNC: 33.5 G/DL (ref 31–36)
MCV RBC AUTO: 86.5 FL (ref 80–100)
MONOCYTES ABSOLUTE: 0.4 K/UL (ref 0–1.3)
MONOCYTES RELATIVE PERCENT: 5 %
NEUTROPHILS ABSOLUTE: 6 K/UL (ref 1.7–7.7)
NEUTROPHILS RELATIVE PERCENT: 82.9 %
PDW BLD-RTO: 14.1 % (ref 12.4–15.4)
PLATELET # BLD: 176 K/UL (ref 135–450)
PMV BLD AUTO: 7.9 FL (ref 5–10.5)
POTASSIUM REFLEX MAGNESIUM: 4 MMOL/L (ref 3.5–5.1)
PROTHROMBIN TIME: 11.2 SEC (ref 9.9–12.7)
RBC # BLD: 4.28 M/UL (ref 4–5.2)
SODIUM BLD-SCNC: 137 MMOL/L (ref 136–145)
TOTAL PROTEIN: 6.3 G/DL (ref 6.4–8.2)
WBC # BLD: 7.3 K/UL (ref 4–11)

## 2021-12-27 PROCEDURE — 99222 1ST HOSP IP/OBS MODERATE 55: CPT | Performed by: INTERNAL MEDICINE

## 2021-12-27 PROCEDURE — 94761 N-INVAS EAR/PLS OXIMETRY MLT: CPT

## 2021-12-27 PROCEDURE — 97110 THERAPEUTIC EXERCISES: CPT

## 2021-12-27 PROCEDURE — 6360000002 HC RX W HCPCS: Performed by: INTERNAL MEDICINE

## 2021-12-27 PROCEDURE — 85025 COMPLETE CBC W/AUTO DIFF WBC: CPT

## 2021-12-27 PROCEDURE — 2700000000 HC OXYGEN THERAPY PER DAY

## 2021-12-27 PROCEDURE — 86140 C-REACTIVE PROTEIN: CPT

## 2021-12-27 PROCEDURE — 97535 SELF CARE MNGMENT TRAINING: CPT

## 2021-12-27 PROCEDURE — 71260 CT THORAX DX C+: CPT

## 2021-12-27 PROCEDURE — 6370000000 HC RX 637 (ALT 250 FOR IP): Performed by: INTERNAL MEDICINE

## 2021-12-27 PROCEDURE — 93970 EXTREMITY STUDY: CPT

## 2021-12-27 PROCEDURE — 94660 CPAP INITIATION&MGMT: CPT

## 2021-12-27 PROCEDURE — 2580000003 HC RX 258: Performed by: INTERNAL MEDICINE

## 2021-12-27 PROCEDURE — 85610 PROTHROMBIN TIME: CPT

## 2021-12-27 PROCEDURE — 1200000000 HC SEMI PRIVATE

## 2021-12-27 PROCEDURE — 97166 OT EVAL MOD COMPLEX 45 MIN: CPT

## 2021-12-27 PROCEDURE — 97116 GAIT TRAINING THERAPY: CPT

## 2021-12-27 PROCEDURE — 71045 X-RAY EXAM CHEST 1 VIEW: CPT

## 2021-12-27 PROCEDURE — 85730 THROMBOPLASTIN TIME PARTIAL: CPT

## 2021-12-27 PROCEDURE — 80053 COMPREHEN METABOLIC PANEL: CPT

## 2021-12-27 PROCEDURE — 2580000003 HC RX 258: Performed by: STUDENT IN AN ORGANIZED HEALTH CARE EDUCATION/TRAINING PROGRAM

## 2021-12-27 PROCEDURE — 97530 THERAPEUTIC ACTIVITIES: CPT

## 2021-12-27 PROCEDURE — 6360000004 HC RX CONTRAST MEDICATION: Performed by: INTERNAL MEDICINE

## 2021-12-27 PROCEDURE — 6360000002 HC RX W HCPCS: Performed by: STUDENT IN AN ORGANIZED HEALTH CARE EDUCATION/TRAINING PROGRAM

## 2021-12-27 PROCEDURE — 36415 COLL VENOUS BLD VENIPUNCTURE: CPT

## 2021-12-27 PROCEDURE — 93010 ELECTROCARDIOGRAM REPORT: CPT | Performed by: INTERNAL MEDICINE

## 2021-12-27 PROCEDURE — 94640 AIRWAY INHALATION TREATMENT: CPT

## 2021-12-27 RX ORDER — HEPARIN SODIUM 1000 [USP'U]/ML
5700 INJECTION, SOLUTION INTRAVENOUS; SUBCUTANEOUS PRN
Status: DISCONTINUED | OUTPATIENT
Start: 2021-12-27 | End: 2021-12-29 | Stop reason: HOSPADM

## 2021-12-27 RX ORDER — HEPARIN SODIUM 10000 [USP'U]/100ML
1280 INJECTION, SOLUTION INTRAVENOUS CONTINUOUS
Status: DISCONTINUED | OUTPATIENT
Start: 2021-12-27 | End: 2021-12-28

## 2021-12-27 RX ORDER — HEPARIN SODIUM 1000 [USP'U]/ML
5700 INJECTION, SOLUTION INTRAVENOUS; SUBCUTANEOUS ONCE
Status: COMPLETED | OUTPATIENT
Start: 2021-12-27 | End: 2021-12-27

## 2021-12-27 RX ORDER — HEPARIN SODIUM 1000 [USP'U]/ML
2800 INJECTION, SOLUTION INTRAVENOUS; SUBCUTANEOUS PRN
Status: DISCONTINUED | OUTPATIENT
Start: 2021-12-27 | End: 2021-12-29 | Stop reason: HOSPADM

## 2021-12-27 RX ADMIN — AZITHROMYCIN 250 MG: 250 TABLET, FILM COATED ORAL at 09:26

## 2021-12-27 RX ADMIN — HEPARIN SODIUM 5700 UNITS: 1000 INJECTION INTRAVENOUS; SUBCUTANEOUS at 22:37

## 2021-12-27 RX ADMIN — DEXAMETHASONE 6 MG: 4 TABLET ORAL at 09:25

## 2021-12-27 RX ADMIN — SPIRONOLACTONE 25 MG: 25 TABLET ORAL at 22:14

## 2021-12-27 RX ADMIN — ENOXAPARIN SODIUM 40 MG: 100 INJECTION SUBCUTANEOUS at 09:24

## 2021-12-27 RX ADMIN — IOPAMIDOL 75 ML: 755 INJECTION, SOLUTION INTRAVENOUS at 14:15

## 2021-12-27 RX ADMIN — Medication 2 PUFF: at 09:30

## 2021-12-27 RX ADMIN — LEVOTHYROXINE SODIUM 100 MCG: 0.1 TABLET ORAL at 06:24

## 2021-12-27 RX ADMIN — FUROSEMIDE 20 MG: 20 TABLET ORAL at 22:14

## 2021-12-27 RX ADMIN — Medication 10 ML: at 22:15

## 2021-12-27 RX ADMIN — FAMOTIDINE 20 MG: 20 TABLET, FILM COATED ORAL at 09:26

## 2021-12-27 RX ADMIN — HEPARIN SODIUM 1280 UNITS/HR: 5000 INJECTION INTRAVENOUS; SUBCUTANEOUS at 22:38

## 2021-12-27 RX ADMIN — TIZANIDINE 2 MG: 4 TABLET ORAL at 22:14

## 2021-12-27 RX ADMIN — FLUTICASONE PROPIONATE 2 SPRAY: 50 SPRAY, METERED NASAL at 09:27

## 2021-12-27 RX ADMIN — TIOTROPIUM BROMIDE INHALATION SPRAY 2 PUFF: 3.12 SPRAY, METERED RESPIRATORY (INHALATION) at 09:30

## 2021-12-27 RX ADMIN — SPIRONOLACTONE 25 MG: 25 TABLET ORAL at 09:26

## 2021-12-27 RX ADMIN — Medication 10 ML: at 09:29

## 2021-12-27 RX ADMIN — Medication 2 PUFF: at 20:07

## 2021-12-27 RX ADMIN — FUROSEMIDE 20 MG: 20 TABLET ORAL at 09:25

## 2021-12-27 RX ADMIN — TIZANIDINE 2 MG: 4 TABLET ORAL at 09:26

## 2021-12-27 ASSESSMENT — PAIN SCALES - GENERAL
PAINLEVEL_OUTOF10: 0
PAINLEVEL_OUTOF10: 0

## 2021-12-27 NOTE — PROGRESS NOTES
Patient resting in bed, complains of cough, Robitussin given, 02 97% on 3L, Carmona Fall Risk: High (45 and higher), Pain Level: 0, vitals stable, assisted to position of comfort, call light and belongings in reach, encouraged to call with needs, will continue to monitor.

## 2021-12-27 NOTE — ACP (ADVANCE CARE PLANNING)
Advance Care Planning     General Advance Care Planning (ACP) Conversation    Date of Conversation: 12/26/2021  Conducted with: [email or web address] Kate Decision Maker:    Primary Decision Maker: Cathryn Fishman Henrry - [phone]  Click here to complete Healthcare Decision Makers including selection of the Healthcare Decision Maker Relationship (ie \"Primary\"). Today we documented Decision Maker(s) consistent with Legal Next of Kin hierarchy.     Content/Action Overview:  Has NO ACP documents/care preferences - information provided, considering goals and options  Reviewed DNR/DNI and patient elects Full Code (Attempt Resuscitation)    Length of Voluntary ACP Conversation in minutes:  <16 minutes (Non-Billable)    Ran Hoffman RN

## 2021-12-27 NOTE — PROGRESS NOTES
12/26/21 0684   NIV Type   $NIV $Daily Charge   NIV Started/Stopped On   Equipment Type v60   Mode CPAP   Mask Type Full face mask   Mask Size Small   Settings/Measurements   CPAP/EPAP 8 cmH2O   Resp 22   FiO2  45 %   Vt Exhaled 710 mL   Mask Leak (lpm) 2 lpm   Comfort Level Good   Using Accessory Muscles No   SpO2 100   Alarm Settings   Alarms On Y   Press Low Alarm 6 cmH2O   High Pressure Alarm 20 cmH2O   Resp Rate Low Alarm 6   High Respiratory Rate 40 br/min

## 2021-12-27 NOTE — SIGNIFICANT EVENT
Notified pts u/s demonstrates acute bilateral DVT in distal vessels. U/s obtained in light of elevated ddimer >3000. Risk factors COVID-19 infection. No h/o GIB per chart review in the past.    Started heparin gtt overnight. Can likely transition to [address] in morning.

## 2021-12-27 NOTE — PROGRESS NOTES
12/27/21 0327   NIV Type   $NIV $Daily Charge   NIV Started/Stopped On   Equipment Type v60   Mode CPAP   Mask Type Full face mask   Mask Size Small   Settings/Measurements   CPAP/EPAP 8 cmH2O   Resp 23   FiO2  45 %   Vt Exhaled 623 mL   Mask Leak (lpm) 9 lpm   Comfort Level Good   Using Accessory Muscles No   SpO2 99   Patient Observation   Observations mepilex on nose   Alarm Settings   Alarms On Y   Press Low Alarm 6 cmH2O   High Pressure Alarm 20 cmH2O   Resp Rate Low Alarm 6   High Respiratory Rate 40 br/min

## 2021-12-27 NOTE — PROGRESS NOTES
Occupational Therapy  Facility/Department: [facility] C3 TELE/MED SURG/ONC  Daily Treatment Note  NAME: Marshall Bence  : 1940  MRN: [id number]    Date of Service: 2021    Discharge Recommendations:  24 hour supervision or assist,Home with Home health OT,Outpatient OT  OT Equipment Recommendations  Equipment Needed: Yes  Mobility Devices: ADL Assistive Devices  ADL Assistive Devices: Toileting - 3-in-1 Commode;Sock-Aid Soft;Long-handled Sponge;Transfer Tub Bench  Other: Pt may benefit from referral to Outpatient OT for assessment/dx of RUE pain/ROM limiting participation in daily living post fall to right shoulder    Assessment   Performance deficits / Impairments: Decreased functional mobility ; Decreased ADL status; Decreased safe awareness;Decreased endurance;Decreased ROM; Decreased strength;Decreased sensation;Decreased balance  Assessment: Pt is a 80year old female with PMH including CHF, COPD, HTN, RA, and MI who presented  with SOB. Pt diagnosed with COVID-19 and COPD exacerbation. At baseline, pt lives with her granddaughter home. Granddaughter responsible and primary for IADLs. PLOF ADLs assist for LBD (total A socks, min A underwear/pants), min A tub/shower transfers, mod A showering (assist for legs/back). Upon evaluation, pt presents with above deficits requiring grossly min A for fx transfers, total A don LB clothing (socks), and min A for standing x1 min at sink for grooming. Pt limited by arthritic pain and limited endurance with SpO2 dropping to 84% with activity while on 3L. Anticipate pt will be safe to return home with 24/A or PRN assistance from family pending progress once medically stable. recommend Home Health OT to continue to progress safety  and independence with ADLs within the home. Recommend referral to outpatient OT for RUE pain/ROM limitations from injury from fall. Pt benefits from skilled OT while in acute to address deficits above.   Treatment Diagnosis: COVID, SOB, decreased endurance  Prognosis: Good  Decision Making: Medium Complexity  OT Education: OT Role;Plan of Care;Energy Conservation;Home Exercise Program;Family Education; ADL Adaptive Strategies  Patient Education: disease specific: importance of BUE and ROM, safety and energy conservation, adaptive ADL equipment  REQUIRES OT FOLLOW UP: Yes  Activity Tolerance  Activity Tolerance: Patient Tolerated treatment well;Patient limited by fatigue  Activity Tolerance: 161/79 BP, 90HR, 97% O2 on 3 L NC. Pt O2 to 84% with mobility, recovering to >90% after 1 minute. Safety Devices  Safety Devices in place: Yes  Type of devices: All fall risk precautions in place;Gait belt;Patient at risk for falls;Call light within reach; Left in chair;Chair alarm in place;Nurse notified         Patient Diagnosis(es): The primary encounter diagnosis was COVID-19. Diagnoses of Elevated troponin and COPD exacerbation (HCC) were also pertinent to this visit. has a past medical history of Acid reflux, Acquired hypothyroidism, Anemia, Asthma, CHF (congestive heart failure) ([facility] [address] COPD (chronic obstructive pulmonary disease) ([facility] [address] HLD (hyperlipidemia), Hypertension, Influenza A, MI (myocardial infarction) ([facility] [address] Migraine, Rheumatoid arthritis, and Wears glasses. has a past surgical history that includes Hysterectomy; knee surgery; Foot surgery; Wrist surgery; Cataract removal with implant (Bilateral, 03/23/2011); pr njx dx/ther sbst intrlmnr crv/thrc w/img gdn (Left, 8/21/2018); epidural steroid injection (Left, 12/4/2018); bronchoscopy (N/A, 3/27/2019); bronchoscopy (3/27/2019); Upper gastrointestinal endoscopy (N/A, 3/27/2019); Gastrostomy tube placement (N/A, 4/1/2019); Colonoscopy (N/A, 6/12/2019); esophageal motility study (N/A, 7/23/2019); and Upper gastrointestinal endoscopy (N/A, 9/6/2020).     Restrictions  Restrictions/Precautions  Restrictions/Precautions: Fall Risk,Isolation,General Precautions  Position Activity Restriction  Other position/activity restrictions:  Up with assist, COVID+ isolation, 3L O2  Subjective   General  Chart Reviewed: Yes,Orders,Progress Notes,History and Physical,Imaging,Labs  Patient assessed for rehabilitation services?: Yes  Additional Pertinent Hx: self-reports hx of falls and arthritis, L3/L4 cyst, COPD  Response to previous treatment: Patient with no complaints from previous session  Family / Caregiver Present: No (Nelsy (granddaughter) on facetime throughout session)  Diagnosis: COVID+, Elevated troponin  Subjective  Subjective: \"I feel good\"  Vital Signs  Pulse: 90  BP: (!) 161/79  BP Location: Left upper arm  Patient Currently in Pain: Denies  Oxygen Therapy  SpO2: 97 %  Pulse Oximeter Device Mode: Intermittent  Pulse Oximeter Device Location: Left  O2 Device: Nasal cannula  O2 Flow Rate (L/min): 3 L/min   Orientation  Orientation  Overall Orientation Status: Within Normal Limits  Objective    ADL  Grooming: Stand by assistance (standing at sink to wash hands; dentures but left at home)  LE Dressing: Dependent/Total (socks)  Toileting: Stand by assistance        Balance  Sitting Balance: Supervision  Standing Balance: Contact guard assistance  Standing Balance  Time: x1-2 minutes during fx ambulation<>bathroom to chair, standing at sink to wash hands, standing for pants management  Functional Mobility  Functional - Mobility Device: Rolling Walker  Activity: To/from bathroom  Assist Level: Minimal assistance  Functional Mobility Comments: assist for O2 line management  Toilet Transfers  Toilet - Technique: Ambulating  Equipment Used: Standard toilet  Toilet Transfer: Minimal assistance;2 Person assistance  Toilet Transfers Comments: with RW and grab bars  Bed mobility  Supine to Sit: Unable to assess  Sit to Supine: Unable to assess  Comment: Pt seated EOB upon arrival and in recliner at end of session  Transfers  Sit to stand: Minimal assistance  Stand to sit: Minimal assistance           Cognition  Overall Reorientation    AM-PAC Score        AM-PAC Inpatient Daily Activity Raw Score: 16 (12/27/21 1158)  AM-PAC Inpatient ADL T-Scale Score : 35.96 (12/27/21 1158)  ADL Inpatient CMS 0-100% Score: 53.32 (12/27/21 1158)  ADL Inpatient CMS G-Code Modifier : CK (12/27/21 1158)    Goals  Short term goals  Time Frame for Short term goals: 1 week (by 1/3/2021)  Short term goal 1: Pt will complete toilet transfer with mod I  Short term goal 2: Pt will demo improved standing tolerance x5 minutes for standing ADLs with no O2 destate  Short term goal 3: Pt will complete toileting with mod I  Short term goal 4: Pt will complete grooming with SBA  Short term goal 5: Pt will demonstrate and recall 2-3 BUE/scapular exercises as part of HEP for improved STM and carryover of HEP. Patient Goals   Patient goals : \"to go home to my granddaughter\"       Therapy Time   Individual Concurrent Group Co-treatment   Time In 1004         Time Out 1101         Minutes 57         Timed Code Treatment Minutes: 52 Minutes (10 min evaluation)       Khari Sky, OT   If pt is unable to be seen after this session, please let this note serve as discharge summary. Please see case management note for discharge disposition. Thank you.

## 2021-12-27 NOTE — CARE COORDINATION
CASE MANAGEMENT INITIAL ASSESSMENT      Reviewed chart and completed assessment with patient:granddaughter Zo Martin  Explained Case Management role/services. Primary contact information:[facility] Decision Maker :   Primary Decision Maker: Juan Michelle - [phone]          Can this person be reached and be able to respond quickly, such as within a few minutes or hours? Yes    Admit date/status:12/26/21  Diagnosis:elevated troponin   Is this a Readmission?:  No      Insurance:fla[place] Cop[place] required for SNF: Yes       3 night stay required: No    Living arrangements, Adls, care needs, prior to admission:lives in 2 story home with granddaughter. Has been staying in 1st floor recliner. Transportation:private     Durable Medical Equipment at home:  Walker_x_Cane__RTS_x_ BSC__Shower Chair_x_  02_x 3-4 L provided by johnson_ MICHAEL__ CPAP_x_  BiPap__  Hospital Bed__ W/C_x__ Other__electric scooter________    Services in the home and/or outpatient, prior to [address] (if applicable)   · Name:  · Address:  · Dialysis Schedule:  · Phone:  · Fax:    PT/OT [address] Notification (HEN):needed for SNF    Barriers to discharge:none    Plan/comments:spoke with patients granddaughter, unable to get to patient via phone. She reported they live together,  Stated patient can no longer get up the step due to arthritis and cyst on back so she resides in recliner on 1st floor. Uses O2 continuously. Therapy with recs for [facility]. Ok with referral to [facility]. They can service therapy at this time. Will continue to follow for increased O2 needs.  Artie Darnell RN      [facility]OC on chart for MD marr

## 2021-12-27 NOTE — PROGRESS NOTES
vs rolling desk chair for safety - pt's granddaughter verbalizing understanding but pt will require reinforcement. Barriers to Learning: Memory deficits  REQUIRES PT FOLLOW UP: Yes  Activity Tolerance  Activity Tolerance: Patient Tolerated treatment well;Patient limited by endurance  Activity Tolerance: 161/79, 90bp, 97% on 3L upon arrival; SPO2 down to 84% after ambulation, recovers to 90% after 1 minute. Patient Diagnosis(es): The primary encounter diagnosis was COVID-19. Diagnoses of Elevated troponin and COPD exacerbation (HCC) were also pertinent to this visit. has a past medical history of Acid reflux, Acquired hypothyroidism, Anemia, Asthma, CHF (congestive heart failure) ([address] COPD (chronic obstructive pulmonary disease) ([address] HLD (hyperlipidemia), Hypertension, Influenza A, MI (myocardial infarction) ([facility] [address] Migraine, Rheumatoid arthritis, and Wears glasses. has a past surgical history that includes Hysterectomy; knee surgery; Foot surgery; Wrist surgery; Cataract removal with implant (Bilateral, 03/23/2011); pr njx dx/ther sbst intrlmnr crv/thrc w/img gdn (Left, 8/21/2018); epidural steroid injection (Left, 12/4/2018); bronchoscopy (N/A, 3/27/2019); bronchoscopy (3/27/2019); Upper gastrointestinal endoscopy (N/A, 3/27/2019); Gastrostomy tube placement (N/A, 4/1/2019); Colonoscopy (N/A, 6/12/2019); esophageal motility study (N/A, 7/23/2019); and Upper gastrointestinal endoscopy (N/A, 9/6/2020). Restrictions  Restrictions/Precautions  Restrictions/Precautions: Fall Risk,Isolation,General Precautions  Position Activity Restriction  Other position/activity restrictions: Up with assist, COVID+ isolation, 3L O2  Vision/Hearing  Vision: Impaired  Vision Exceptions: Wears glasses at all times; Wears glasses for distance;Wears glasses for reading  Hearing: Within functional limits     Subjective  General  Chart Reviewed: Yes  Patient assessed for rehabilitation services?: Yes  Response To Previous Treatment: Not applicable  Family / Caregiver Present: Yes (Granddaughter Nicho Boone present over [employer])  Referring Practitioner: Barbara Alvarez MD  Referral Date : 12/26/21  Diagnosis: COVID-19, COPD exacerbation  Follows Commands: Within Functional Limits  General Comment  Comments: RN clears for therapy  Subjective  Subjective: Pt seated EOB upon arrival, agreeable to PT Evaluation. Pt talkative and requires cues for redirection  Pain Screening  Patient Currently in Pain: Denies  Vital Signs  Patient Currently in Pain: Denies       Orientation  Orientation  Overall Orientation Status: Within Functional Limits  Social/Functional History  Social/Functional History  Lives With:  (granddaughter (swetha))  Type of Home: House  Home Layout: Multi-level,Performs ADL's on one level (sleeps on blowup mattress or in recliner on first floor)  Home Access: Stairs to enter with rails  Entrance Stairs - Number of Steps: 6 SUSAN  Entrance Stairs - Rails: Both  Bathroom Shower/Tub: Tub/Shower unit,Shower chair with back  Bathroom Toilet: Standard  Bathroom Equipment: Toilet raiser  Home Equipment: Cane,Electric scooter,Wheelchair-manual,Grab bars,4 wheeled walker (scooter and w/c in community; RW and desk chair in home)  Receives Help From: Family (granddaughter)  ADL Assistance: Needs assistance  Bath:  (min A back and LBD)  Dressing: Minimal assistance (Assist for LBD. total A socks/shoes, min A pants/underwear)  Grooming: Supervision (seated position)  Feeding: Supervision  Toileting: Independent  Homemaking Assistance: Needs assistance (granddaughter or grandsons clean and cook, laundry)  Homemaking Responsibilities: No  Ambulation Assistance: Needs assistance (shower transfer; bed transfer)  Transfer Assistance: Needs assistance  Active : No  Occupation: Unemployed  Additional Comments: 2-3 falls within the past year.  3-4 L of O2 with 50+ Long cord PLOF  Cognition   Cognition  Overall Cognitive Status: Exceptions  Arousal/Alertness: Appropriate responses to stimuli  Following Commands: Follows one step commands consistently; Follows multistep commands with increased time; Follows multistep commands with repitition  Attention Span: Attends with cues to redirect; Difficulty dividing attention  Memory: Appears intact; Decreased short term memory  Problem Solving: Assistance required to identify errors made;Assistance required to implement solutions;Assistance required to correct errors made;Decreased awareness of errors  Insights: Decreased awareness of deficits  Initiation: Requires cues for some  Sequencing: Requires cues for some    Objective          AROM RLE (degrees)  RLE AROM: WFL  AROM LLE (degrees)  LLE AROM : WFL  AROM RUE (degrees)  RUE AROM : Exceptions  RUE General AROM: Limited to ~85 degrees flexion due to injury  Strength RLE  Strength RLE: WFL  Comment: Grossly 4/5  Strength LLE  Strength LLE: WFL  Comment: Grossly 4/5  Tone RLE  RLE Tone: Normotonic  Tone LLE  LLE Tone: Normotonic  Motor Control  Gross Motor?: WFL     Bed mobility  Supine to Sit: Unable to assess  Sit to Supine: Unable to assess  Comment: Pt seated EOB upon arrival and in recliner at end of session  Transfers  Sit to Stand: Minimal Assistance  Stand to sit: Minimal Assistance  Comment: Performed from EOB and lower toilet surface. Increased BUE reliance and momentum to complete. Ambulation  Ambulation?: Yes  Ambulation 1  Surface: level tile  Device: Rolling Walker  Other Apparatus: O2  Assistance: Minimal assistance  Quality of Gait: Pt ambulates with RW, flexed posture, decreased step length and clearance bilaterally, very slow lenore requiring brief periods of standing rest to complete.   Distance: 15ft + 30ft  Stairs/Curb  Stairs?: No     Balance  Posture: Fair  Sitting - Static: Good  Sitting - Dynamic: Good;-  Standing - Static: Fair  Standing - Dynamic: Poor  Exercises  Comments: Pt reports completing exercise program at home including marches, LAQ, scissor kicks and ankle pumps - pt demonstrated ability to complete each exercise safely, encouraged to perform as long as not SOB at rest.     Plan   Plan  Times per week: 3-5x/wk  Times per day: Daily  Current Treatment Recommendations: Strengthening,Transfer Training,Endurance Training,Patient/Caregiver Education & Training,Balance Training,Gait Training,Safety Education & Training,Stair training,Functional Mobility Training  Safety Devices  Type of devices: All fall risk precautions in place,Gait belt,Patient at risk for falls,Nurse notified,Call light within reach,Left in chair,Chair alarm in place  Restraints  Initially in place: No      AM-PAC Score  AM-PAC Inpatient Mobility Raw Score : 18 (12/27/21 1210)  AM-PAC Inpatient T-Scale Score : 43.63 (12/27/21 1210)  Mobility Inpatient CMS 0-100% Score: 46.58 (12/27/21 1210)  Mobility Inpatient CMS G-Code Modifier : CK (12/27/21 1210)          Goals  Short term goals  Time Frame for Short term goals: 1 week, 1/03/22 unlesss otherwise noted  Short term goal 1: Pt will perform bed mobility with mod I  Short term goal 2: Pt will perform sit<>stand transfers with mod I  Short term goal 3: Pt will perform bed<>chair transfers with mod I  Short term goal 4: Pt will ambulate 50ft with RW and mod I  Short term goal 5: Pt will ascend/descend 6 steps with BUE support and min A  Patient Goals   Patient goals : \"To be able to do things myself. \"       Therapy Time   Individual Concurrent Group Co-treatment   Time In 1004         Time Out 1101         Minutes 57         Timed Code Treatment Minutes: 52 Minutes (10 minute evaluation)       Letty Hernandez PT     If pt is unable to be seen after this session, please let this note serve as discharge summary. Please see case management note for discharge disposition. Thank you.

## 2021-12-27 NOTE — PROGRESS NOTES
Hospitalist Progress Note      PCP: Segundo Babin MD    Date of Admission: 12/26/2021    Chief Complaint:     Hospital Course:    80 y.o. female who presented to [facility] with increased shortness of breath. Patient states that she uses 3-4LPM oxygen at home. She states she is also supposed to use CPAP at home but she is not compliant with it. No fever or chills. She states 3 days ago she began having increase in shortness of breath. Cough present but nonproductive. She states she did not require an increase in O2. She does not ambulate much. Dyspnea with exertion walking to bathroom or kitchen. Patient had seen Dr. Noemi Zaidi 3 weeks ago. She states she is compliant with Oxygen and inhalers but not CPAP. No chest pain. No palpitations. She states her appetite decreased but she still has sense of taste and smell.      Subjective:   Patient feeling better after steroids. No increase in SOB. No chest pain.         Medications:  Reviewed    Infusion Medications    sodium chloride       Scheduled Medications    aspirin  324 mg Oral Once    [START ON 12/31/2021] vitamin D  50,000 Units Oral Q7 Days    famotidine  20 mg Oral Daily    fluticasone  2 spray Each Nostril Daily    furosemide  20 mg Oral BID    levothyroxine  100 mcg Oral Daily    spironolactone  25 mg Oral BID    tiZANidine  2 mg Oral BID    sodium chloride flush  5-40 mL IntraVENous 2 times per day    enoxaparin  40 mg SubCUTAneous Daily    dexamethasone  6 mg Oral Daily    azithromycin  250 mg Oral Daily    budesonide-formoterol  2 puff Inhalation BID    tiotropium  2 puff Inhalation Daily     PRN Meds: ipratropium-albuterol, labetalol, sodium chloride flush, sodium chloride, ondansetron **OR** ondansetron, polyethylene glycol, acetaminophen **OR** acetaminophen, guaiFENesin-dextromethorphan      Intake/Output Summary (Last 24 hours) at 12/27/2021 1532  Last data filed at 12/27/2021 0405  Gross per 24 hour   Intake 1230 ml   Output  Net 1230 ml       Physical Exam Performed:    BP (!) 153/68   Pulse 81   Temp 97.9 °F (36.6 °C) (Oral)   Resp 20   Ht 5' 6\" (1.676 m)   Wt 195 lb (88.5 kg)   SpO2 95%   BMI 31.47 kg/m²     General appearance:  No apparent distress, appears stated age and cooperative. HEENT:  Normal cephalic, atraumatic without obvious deformity. Pupils equal, round, and reactive to light. Extra ocular muscles intact. Conjunctivae/corneas clear. Neck: Supple, with full range of motion. No jugular venous distention. Trachea midline. Respiratory:  Expiratory wheeze. Prolonged exp phase. No crackles. No rhonchi. Cardiovascular:  Regular rate and rhythm with normal S1/S2 without murmurs, rubs or gallops. Abdomen: Soft, non-tender, non-distended with normal bowel sounds. Musculoskeletal:  No clubbing, cyanosis or edema bilaterally. Full range of motion without deformity. Skin: Skin color, texture, turgor normal.  No rashes or lesions. Deformity in joints. Neurologic:  Neurovascularly intact without any focal sensory/motor deficits. Cranial nerves: II-XII intact, grossly non-focal.  Psychiatric:  Alert and oriented, thought content appropriate, normal insight  Capillary Refill: Brisk,3 seconds, normal  Peripheral Pulses: +2 palpable, equal bilaterally        Labs:   Recent Labs     12/26/21  1122 12/27/21  0604   WBC 4.8 7.3   HGB 12.3 12.4   HCT 37.3 37.0    176     Recent Labs     12/26/21  1122 12/27/21  0604    137   K 3.6 4.0    100   CO2 26 25   BUN 15 21*   CREATININE 1.2 1.1   CALCIUM 9.0 9.0     Recent Labs     12/26/21  1122 12/27/21  0604   AST 26 29   ALT 19 21   BILITOT 0.7 0.5   ALKPHOS 34* 35*     No results for input(s): INR in the last 72 hours.   Recent Labs     12/26/21  1122 12/26/21  1949   TROPONINI 0.08* 0.03*       Urinalysis:      Lab Results   Component Value Date    NITRU Negative 03/27/2019    BLOODU Negative 03/27/2019    SPECGRAV >=1.030 03/27/2019    GLUCOSEU Negative 03/27/2019       Radiology:  CT CHEST PULMONARY EMBOLISM W CONTRAST   Final Result   No central pulmonary embolus identified. Distal pulmonary branches are not   well evaluated secondary to motion. There is severe coronary artery   calcification      Bandlike opacities in the lung bases, similar to prior. Bandlike morphology   favors atelectasis or scarring rather than pneumonia. There is severe   emphysema. No pulmonary edema      RECOMMENDATIONS:   Unavailable         XR CHEST PORTABLE   Final Result   Limited evaluation with no evidence of detrimental interval change. XR CHEST (2 VW)   Final Result   Chronic pattern of mild bibasilar atelectasis. No acute findings in the   chest.         XR NECK SOFT TISSUE   Final Result   Soft tissues of the neck are normal.         VL Extremity Venous Bilateral    (Results Pending)           Assessment/Plan:    Active Hospital Problems    Diagnosis     Pneumonia due to COVID-19 virus [U07.1, J12.82]      1. COVID 19 - Continue supportive care. Patient on 3LPM which is baseline rate. 2. COPD exacerbation - Improved after steroids. Continue resp treatments. 3. Rheumatoid arthritis - Continue supportive care. 4. Elevated D-dimer - Check CTPA and doppler lower extremities. 5. HTN - Labile. Continue to monitor. DVT Prophylaxis: Lovenox  Diet: ADULT DIET; Easy to Chew  Code Status: Full Code    PT/OT Eval Status: Pending    Dispo - Home when stable possibly tomorrow if tests negative.      Lea Herman MD

## 2021-12-28 PROBLEM — I82.463 ACUTE DEEP VEIN THROMBOSIS (DVT) OF CALF MUSCLE VEIN OF BOTH LOWER EXTREMITIES (HCC): Status: ACTIVE | Noted: 2021-12-28

## 2021-12-28 PROBLEM — R77.8 ELEVATED TROPONIN: Status: ACTIVE | Noted: 2021-12-28

## 2021-12-28 PROBLEM — R79.89 ELEVATED D-DIMER: Status: ACTIVE | Noted: 2021-12-28

## 2021-12-28 PROBLEM — I10 UNCONTROLLED HYPERTENSION: Status: ACTIVE | Noted: 2021-12-28

## 2021-12-28 PROBLEM — R79.89 ELEVATED TROPONIN: Status: ACTIVE | Noted: 2021-12-28

## 2021-12-28 LAB
ANTI-XA UNFRAC HEPARIN: 1.9 IU/ML (ref 0.3–0.7)
C-REACTIVE PROTEIN: <3 MG/L (ref 0–5.1)
D DIMER: 2519 NG/ML DDU (ref 0–229)

## 2021-12-28 PROCEDURE — 6370000000 HC RX 637 (ALT 250 FOR IP): Performed by: INTERNAL MEDICINE

## 2021-12-28 PROCEDURE — 36415 COLL VENOUS BLD VENIPUNCTURE: CPT

## 2021-12-28 PROCEDURE — 2700000000 HC OXYGEN THERAPY PER DAY

## 2021-12-28 PROCEDURE — 99232 SBSQ HOSP IP/OBS MODERATE 35: CPT | Performed by: INTERNAL MEDICINE

## 2021-12-28 PROCEDURE — 85379 FIBRIN DEGRADATION QUANT: CPT

## 2021-12-28 PROCEDURE — 6360000002 HC RX W HCPCS: Performed by: INTERNAL MEDICINE

## 2021-12-28 PROCEDURE — 94660 CPAP INITIATION&MGMT: CPT

## 2021-12-28 PROCEDURE — 85520 HEPARIN ASSAY: CPT

## 2021-12-28 PROCEDURE — 86140 C-REACTIVE PROTEIN: CPT

## 2021-12-28 PROCEDURE — 94761 N-INVAS EAR/PLS OXIMETRY MLT: CPT

## 2021-12-28 PROCEDURE — 94640 AIRWAY INHALATION TREATMENT: CPT

## 2021-12-28 PROCEDURE — 2580000003 HC RX 258: Performed by: INTERNAL MEDICINE

## 2021-12-28 PROCEDURE — 1200000000 HC SEMI PRIVATE

## 2021-12-28 RX ORDER — HEPARIN SODIUM 10000 [USP'U]/100ML
1000 INJECTION, SOLUTION INTRAVENOUS CONTINUOUS
Status: DISCONTINUED | OUTPATIENT
Start: 2021-12-28 | End: 2021-12-28

## 2021-12-28 RX ADMIN — FLUTICASONE PROPIONATE 2 SPRAY: 50 SPRAY, METERED NASAL at 08:59

## 2021-12-28 RX ADMIN — AZITHROMYCIN 250 MG: 250 TABLET, FILM COATED ORAL at 08:58

## 2021-12-28 RX ADMIN — Medication 10 ML: at 08:59

## 2021-12-28 RX ADMIN — TIZANIDINE 2 MG: 4 TABLET ORAL at 08:57

## 2021-12-28 RX ADMIN — FUROSEMIDE 20 MG: 20 TABLET ORAL at 08:59

## 2021-12-28 RX ADMIN — SPIRONOLACTONE 25 MG: 25 TABLET ORAL at 21:04

## 2021-12-28 RX ADMIN — SPIRONOLACTONE 25 MG: 25 TABLET ORAL at 08:57

## 2021-12-28 RX ADMIN — FUROSEMIDE 20 MG: 20 TABLET ORAL at 21:05

## 2021-12-28 RX ADMIN — TIZANIDINE 2 MG: 4 TABLET ORAL at 21:05

## 2021-12-28 RX ADMIN — TIOTROPIUM BROMIDE INHALATION SPRAY 2 PUFF: 3.12 SPRAY, METERED RESPIRATORY (INHALATION) at 08:05

## 2021-12-28 RX ADMIN — Medication 2 PUFF: at 08:06

## 2021-12-28 RX ADMIN — DEXAMETHASONE 6 MG: 4 TABLET ORAL at 08:58

## 2021-12-28 RX ADMIN — APIXABAN 5 MG: 5 TABLET, FILM COATED ORAL at 21:05

## 2021-12-28 RX ADMIN — FAMOTIDINE 20 MG: 20 TABLET, FILM COATED ORAL at 08:58

## 2021-12-28 RX ADMIN — Medication 10 ML: at 21:06

## 2021-12-28 RX ADMIN — Medication 2 PUFF: at 20:45

## 2021-12-28 RX ADMIN — LEVOTHYROXINE SODIUM 100 MCG: 0.1 TABLET ORAL at 06:24

## 2021-12-28 RX ADMIN — APIXABAN 5 MG: 5 TABLET, FILM COATED ORAL at 08:58

## 2021-12-28 ASSESSMENT — PAIN SCALES - GENERAL: PAINLEVEL_OUTOF10: 0

## 2021-12-28 NOTE — PROGRESS NOTES
Pt a/o. VSS. Shift assessment complete and charted. Lungs clear. Pt states she coughs up clear drainage. BLE feel weak and painful to touch. BS active. BM yesterday. Pt stable and denied needs when writer left room.

## 2021-12-28 NOTE — CARE COORDINATION
Chart reviewed. Spoke with Dr Marcel Staples and Frankey Beat RN. [facility] patient however will not have nursing available in her area, discussed with both, they said therapy only would be ok. Plan to d/c tomorrow. Currently on normal liter flow of O2. Following.  Diego Cameron RN

## 2021-12-28 NOTE — PROGRESS NOTES
12/28/21 0335   NIV Type   NIV Started/Stopped On   Equipment Type v60   Mode Bilevel   Mask Type Full face mask   Settings/Measurements   CPAP/EPAP 5 cmH2O   Resp 13   FiO2  35 %   Minute Volume 13 Liters   Mask Leak (lpm) 12 lpm   Comfort Level Good   Using Accessory Muscles No   Alarm Settings   Alarms On Y   Press Low Alarm 6 cmH2O   High Pressure Alarm 30 cmH2O   Delay Alarm 20 sec(s)   Resp Rate Low Alarm 6   High Respiratory Rate 40 br/min

## 2021-12-28 NOTE — PROGRESS NOTES
Hospitalist Progress Note      PCP: José Miguel Mroel MD    Date of Admission: 12/26/2021    Chief Complaint:   Calf pain    Hospital Course:    80 y.o. female who presented to [facility] with increased shortness of breath. Patient states that she uses 3-4LPM oxygen at home. She states she is also supposed to use CPAP at home but she is not compliant with it. No fever or chills. She states 3 days ago she began having increase in shortness of breath. Cough present but nonproductive. She states she did not require an increase in O2. She does not ambulate much. Dyspnea with exertion walking to bathroom or kitchen. Patient had seen Dr. Namrata Meyer 3 weeks ago. She states she is compliant with Oxygen and inhalers but not CPAP. No chest pain. No palpitations. She states her appetite decreased but she still has sense of taste and smell.      Subjective:   Patient states shortness of breath better. No chest pain. No nausea or vomiting. NO fever or chills. Some calf tenderness.      Medications:  Reviewed    Infusion Medications    sodium chloride       Scheduled Medications    apixaban  5 mg Oral BID    aspirin  324 mg Oral Once    [START ON 12/31/2021] vitamin D  50,000 Units Oral Q7 Days    famotidine  20 mg Oral Daily    fluticasone  2 spray Each Nostril Daily    furosemide  20 mg Oral BID    levothyroxine  100 mcg Oral Daily    spironolactone  25 mg Oral BID    tiZANidine  2 mg Oral BID    sodium chloride flush  5-40 mL IntraVENous 2 times per day    dexamethasone  6 mg Oral Daily    azithromycin  250 mg Oral Daily    budesonide-formoterol  2 puff Inhalation BID    tiotropium  2 puff Inhalation Daily     PRN Meds: heparin (porcine), heparin (porcine), ipratropium-albuterol, labetalol, sodium chloride flush, sodium chloride, ondansetron **OR** ondansetron, polyethylene glycol, acetaminophen **OR** acetaminophen, guaiFENesin-dextromethorphan      Intake/Output Summary (Last 24 hours) at 12/28/2021 1150  Last data filed at 12/27/2021 1600  Gross per 24 hour   Intake 240 ml   Output 600 ml   Net -360 ml       Physical Exam Performed:    BP (!) 165/92   Pulse 84   Temp 98.2 °F (36.8 °C) (Oral)   Resp 17   Ht 5' 6\" (1.676 m)   Wt 195 lb (88.5 kg)   SpO2 94%   BMI 31.47 kg/m²     General appearance:  No apparent distress, appears stated age and cooperative. HEENT:  Normal cephalic, atraumatic without obvious deformity. Pupils equal, round, and reactive to light. Extra ocular muscles intact. Conjunctivae/corneas clear. Neck: Supple, with full range of motion. No jugular venous distention. Trachea midline. Respiratory:  Expiratory wheeze. Prolonged exp phase. No crackles. No rhonchi. Cardiovascular:  Regular rate and rhythm with normal S1/S2 without murmurs, rubs or gallops. Abdomen: Soft, non-tender, non-distended with normal bowel sounds. Musculoskeletal:  No clubbing, cyanosis or edema bilaterally. Full range of motion without deformity. No cording in legs. Skin: Skin color, texture, turgor normal.  No rashes or lesions. Deformity in joints. Neurologic:  Neurovascularly intact without any focal sensory/motor deficits.  Cranial nerves: II-XII intact, grossly non-focal.  Psychiatric:  Alert and oriented, thought content appropriate, normal insight  Capillary Refill: Brisk,3 seconds, normal  Peripheral Pulses: +2 palpable, equal bilaterally        Labs:   Recent Labs     12/26/21  1122 12/27/21  0604   WBC 4.8 7.3   HGB 12.3 12.4   HCT 37.3 37.0    176     Recent Labs     12/26/21  1122 12/27/21  0604    137   K 3.6 4.0    100   CO2 26 25   BUN 15 21*   CREATININE 1.2 1.1   CALCIUM 9.0 9.0     Recent Labs     12/26/21  1122 12/27/21  0604   AST 26 29   ALT 19 21   BILITOT 0.7 0.5   ALKPHOS 34* 35*     Recent Labs     12/27/21  1900   INR 0.99     Recent Labs     12/26/21  1122 12/26/21  1949   TROPONINI 0.08* 0.03*       Urinalysis:      Lab Results   Component Value Date NITRU Negative 03/27/2019    BLOODU Negative 03/27/2019    SPECGRAV >=1.030 03/27/2019    GLUCOSEU Negative 03/27/2019       Radiology:  VL Extremity Venous Bilateral   Final Result      CT CHEST PULMONARY EMBOLISM W CONTRAST   Final Result   No central pulmonary embolus identified. Distal pulmonary branches are not   well evaluated secondary to motion. There is severe coronary artery   calcification      Bandlike opacities in the lung bases, similar to prior. Bandlike morphology   favors atelectasis or scarring rather than pneumonia. There is severe   emphysema. No pulmonary edema      RECOMMENDATIONS:   Unavailable         XR CHEST PORTABLE   Final Result   Limited evaluation with no evidence of detrimental interval change. XR CHEST (2 VW)   Final Result   Chronic pattern of mild bibasilar atelectasis. No acute findings in the   chest.         XR NECK SOFT TISSUE   Final Result   Soft tissues of the neck are normal.                 Assessment/Plan:    Active Hospital Problems    Diagnosis     Uncontrolled hypertension [I10]     Elevated d-dimer [R79.89]     Elevated troponin [R77.8]     Pneumonia due to COVID-19 virus [U07.1, J12.82]     Former smoker [Z87.891]     HEBERT on CPAP [G47.33, Z99.89]     Atelectasis [J98.11]     Pulmonary HTN (HCC) [I27.20]     Rheumatoid arthritis involving multiple sites with positive rheumatoid factor ([address] [M05.79]     Centrilobular emphysema ([address] [J43.2]      1. COVID 19 - Continue supportive care. Patient on 3LPM which is her baseline. 2. COPD exacerbation - Improved after steroids. Continue resp treatments. 3. Rheumatoid arthritis - Continue supportive care. 4. Elevated D-dimer - CTPA  Negative for PE but Doppler positive for calf DVT. Stop heparin drip and start Eliquis for 3 months. 5. HTN - Labile. Continue to monitor.     DVT Prophylaxis: Eliquis  Diet: ADULT DIET; Easy to Chew  Code Status: Full Code    PT/OT Eval Status: Recommend Home with Jordan Hdz probably tomorrow.     Miriam Bustos MD

## 2021-12-28 NOTE — PROGRESS NOTES
Anti-Xa 1.9  The hospitalist on call JOSE Rao) was informed. IV Heparin put on hold, to recommence in 1 hour.

## 2021-12-28 NOTE — DISCHARGE INSTR - COC
Continuity of Care Form    Patient Name: Jia Bullock   :  1940  MRN:  [id number]    Admit date:  2021  Discharge date:  ***    Code Status Order: Full Code   Advance Directives:      Admitting Physician:  Stanford Cerda MD  PCP: Augustus Escobedo MD    Discharging Nurse: [facility] Unit/Room#: 0536/8382-99  Discharging Unit Phone Number: ***    Emergency Contact:   Extended Emergency Contact Information  Primary Emergency Contact: [address] Phone: [phone]  Mobile Phone: [phone]  Relation: Grandchild    Past Surgical History:  Past Surgical History:   Procedure Laterality Date    BRONCHOSCOPY N/A 3/27/2019    BRONCHOSCOPY ALVEOLAR LAVAGE performed by Nancy Francisco MD at [facility]  3/27/2019    BRONCHOSCOPY THERAPUTIC ASPIRATION INITIAL performed by Nancy Francisco MD at [address] Bilateral 2011    COLONOSCOPY N/A 2019    COLONOSCOPY WITH ANESTHESIA -SLEEP APNEA- performed by Ronda Vazquez MD at [address] [facility] [address] Left 2018    LEFT LUMBAR FOUR, LUMBAR FIVE TRANSFORAMINAL EPIDURAL STEROID INJECTION SITE CONFIRMED BY FLUOROSCOPY performed by Pooja Pichardo MD at [address] N/A 2019    ESOPHAGEAL MOTILITY/MANOMETRY STUDY performed by Wynema Snellen, MD at [address] little toe    GASTROSTOMY TUBE PLACEMENT N/A 2019    EGD PEG TUBE PLACEMENT performed by Ronda Vazquez MD at [address] [facility]      total, fibroids    KNEE SURGERY      right    TX NJX DX/THER SBST INTRLMNR CRV/THRC W/IMG GDN Left 2018    LEFT LUMBAR FOUR/ LUMBAR FIVE CYST ASPIRATION SITE CONFIRMED BY FLUOROSCOPY performed by Pooja Pichardo MD at [address] [facility] 3/27/2019    EGD DIAGNOSTIC ONLY performed by Mackenzie Cervantes MD at [address] [facility] N/A 9/6/2020    EGD DIAGNOSTIC ONLY performed by Belia Barnard DO at [address] [facility]       Immunization History:   Immunization History   Administered Date(s) Administered    COVID-19, Pfizer, PF, 30mcg/0.3mL 01/29/2021, 03/01/2021    Influenza A ([id number]) Vaccine PF IM 12/30/2009    Influenza Vaccine, unspecified formulation 12/30/2009, 10/18/2011, 08/29/2012, 11/22/2013, 08/27/2014, 11/19/2015, 10/16/2019    Influenza Virus Vaccine 12/30/2009, 12/30/2009, 10/18/2011, 08/29/2012, 11/22/2013, 08/27/2014, 11/19/2015, 10/16/2019    Influenza, High Dose (Fluzone 65 yrs and older) 10/18/2011, 08/29/2012, 11/22/2013, 08/27/2014, 11/19/2015, 10/18/2017, 11/06/2018    Influenza, Florencio Davis, adjuvanted, 65 yrs +, IM, PF (Fluad) 11/18/2020, 11/09/2021    PPD Test 07/20/2016    Pneumococcal Conjugate 13-valent (Noreene Hoof) 11/19/2015, 10/23/2019    Pneumococcal Polysaccharide ([id number]) 12/30/2009, 08/06/2018    Tdap (Boostrix, Adacel) 10/23/2019       Active Problems:  Patient Active Problem List   Diagnosis Code    Centrilobular emphysema ([facility] [address] J43.2    Pure hypercholesterolemia E78.00    Rheumatoid arthritis involving multiple sites with positive rheumatoid factor ([facility] [address] M05.79    Essential hypertension I10    Dizziness R42    Age-related osteoporosis without current pathological fracture M81.0    Unexplained weight loss R63.4    Acquired hypothyroidism E03.9    Cervical disc disorder at C4-C5 level with radiculopathy M50.121    Epigastric pain R10.13    Sciatica of left side M54.32    Influenza J11.1    Pulmonary HTN (HCC) I27.20    Hypotension I95.9    Hypokalemia E87.6    Normal anion gap metabolic acidosis A85.8    Anemia D64.9    Pulmonary infiltrates R91.8    Atelectasis J98.11    HEBERT on CPAP G47.33, Z99.89    ASD (atrial septal defect) Q21.1    Aspiration pneumonia of both upper lobes ([address] J69.0 Esophageal motility disorder K22.4    Former smoker Z87.891    Reactive depression F32.9    Nasal congestion R09.81    CSA (central sleep apnea) G47.31    Hypoxemia R09.02    Pneumonia due to COVID-19 virus U07.1, J12.82    Uncontrolled hypertension I10    Elevated d-dimer R79.89    Elevated troponin R77.8       Isolation/Infection:   Isolation            Droplet  Droplet Plus          Patient Infection Status       Infection Onset Added Last Indicated Last Indicated By Review Planned Expiration Resolved Resolved By    COVID-19 21 COVID-19, Rapid 22      Resolved    COVID-19 (Rule Out) 21 COVID-19, Rapid (Ordered)   21 Rule-Out Test Resulted            Nurse Assessment:  Last Vital Signs: BP (!) 144/81   Pulse 73   Temp 98 °F (36.7 °C) (Oral)   Resp 17   Ht 5' 6\" (1.676 m)   Wt 195 lb (88.5 kg)   SpO2 92%   BMI 31.47 kg/m²     Last documented pain score (0-10 scale): Pain Level: 0  Last Weight:   Wt Readings from Last 1 Encounters:   21 195 lb (88.5 kg)     Mental Status:  {IP PT MENTAL STATUS:07786}    IV Access:  { [place] IV ACCESS:375213561}    Nursing Mobility/ADLs:  Walking   {P DME TZMR:488133973}  Transfer  {P DME IKRT:195215234}  Bathing  {P DME XKRD:611855812}  Dressing  {P DME ADGW:971118743}  Toileting  {P DME FLRI:689570709}  Feeding  {P DME GZQD:910778299}  Med Admin  {[facility] DME FRY}  Med Delivery   { [place] MED Delivery:969751064}    Wound Care Documentation and Therapy:        Elimination:  Continence: Bowel: {YES / SB:06687}  Bladder: {YES / ET:16255}  Urinary Catheter: {Urinary Catheter:159841262}   Colostomy/Ileostomy/Ileal Conduit: {YES / LY:00739}       Date of Last BM: ***    Intake/Output Summary (Last 24 hours) at 2021 1315  Last data filed at 2021 0856  Gross per 24 hour   Intake 240 ml   Output 600 ml   Net -360 ml     I/O last 3 completed shifts:   In: 240 [P.O.:240]  Out: 600 [Urine:600]    Safety Concerns:     [address] [employer] Safety Concerns:219200014}    Impairments/Disabilities:      [address] [place] Impairments/Disabilities:634067394}    Nutrition Therapy:  Current Nutrition Therapy:   [address] [employer] Diet List:321308747}    Routes of Feeding: {CHP DME Other Feedings:591279537}  Liquids: {Slp liquid thickness:79921}  Daily Fluid Restriction: {CHP DME Yes amt example:848605258}  Last Modified Barium Swallow with Video (Video Swallowing Test): {Done Not Done [facility]:408730695}    Treatments at the Time of Hospital Discharge:   Respiratory Treatments: ***  Oxygen Therapy:  {Therapy; copd oxygen:62916}  Ventilator:    { CC Vent HLXV:533070492}    Rehab Therapies: {THERAPEUTIC INTERVENTION:6809124231}  Weight Bearing Status/Restrictions: [address] na[address]ol  Weight Bearin}  Other Medical Equipment (for information only, NOT a DME order):  {EQUIPMENT:651322709}  Other Treatments: ***    Patient's personal belongings (please select all that are sent with patient):  {CHP DME Belongings:944377074}    RN SIGNATURE:  {Esignature:068917335}    CASE MANAGEMENT/SOCIAL WORK SECTION    Inpatient Status Date: 21    Readmission Risk Assessment Score:  Readmission Risk              Risk of Unplanned Readmission:  19           Discharging to Facility/ Agency   [address] [address]   [phone]     / signature: Electronically signed by Vincent Emanuel RN on 21 at 11:26 AM EST    PHYSICIAN SECTION    Prognosis: Good    Condition at Discharge: Stable    Rehab Potential (if transferring to Rehab): Good    Recommended Labs or Other Treatments After Discharge: Follow up with Omer Rodriguez MD 2-3 weeks  Return to hospital with chest pain or increased shortness of breath.     Physician Certification: I certify the above information and transfer of Anthony Debbie  is necessary for the continuing treatment of the

## 2021-12-28 NOTE — CONSULTS
Pharmacy to Manage Heparin Infusion per [facility] CLINICS    Dx: DVT  Pt wt = 71 kg -adjusted. Baseline anti-Xa and/or aPTT = 31.5 sec at 1900    Oral factor Xa-inhibitors may alter and elevate anti-Xa levels used for unfractionated heparin monitoring. As a result, anti-Xa monitoring is not accurate while Xa-inhibitor activity is detectable. Utilize aPTT monitoring when patient received an oral factor Xa-inhibitor (apixaban, betrixaban, edoxaban or rivaroxaban) within 72 hours prior to admission (please document last administration time). The goal is to allow a washout of oral factor Xa-inhibitors by using aPTT for 72 hours, then change to ant-Xa levels for UFH. High Dose Heparin Infusion  Heparin 80 units/kg IVP bolus followed by Heparin infusion at 18 units/kg/hr (recommended initial max dose 2100 units/hr). Recheck aPTT in 6 hours. Goal anti-Xa 0.3-0.7 IU/mL  Goal aPTT = 60-90 seconds.   Cosmo Canavan, PharmD  12/27/2021 7:59 PM    12/28 0105  Xa = 1.9  Hold x 1 hr; rate = 10 ml/hr  Next Xa [address]  [address].12/28/2021 2:15 AM

## 2021-12-28 NOTE — PROGRESS NOTES
INPATIENT PULMONARY CRITICAL CARE PROGRESS NOTE      Reason for visit    COPD exacerbation     SUBJECTIVE: Patient when evaluated this morning was comfortably sitting in the bed without any increased work of breathing, patient was not having any increasing cough expectoration or shortness of breath, patient was complaining of some calf tenderness, patient does not have any chest pain or palpitations, patient was afebrile and hemodynamically maintained, patient wasn't 3 L of nasal oxygen with saturation of 92% when evaluated, patient had maintain hemodynamics, patient documented urine output was borderline, patient's cumulative fluid balance was +1.1 L, patient use the BiPAP last night as per nursing, patient's glycemic control was acceptable, no other pertinent review of system of concern          Physical Exam:  Blood pressure 126/75, pulse 81, temperature 98.6 °F (37 °C), temperature source Oral, resp.  rate 17, height 5' 6\" (1.676 m), weight 195 lb (88.5 kg), SpO2 95 %, not currently breastfeeding.'        In-person bedside physical examination deferred. Remigio Angulo to the emergency declaration under the [facility] [address] waiver authority and the [employer] Act, this clinical encounter was conducted to provide necessary medical care.   (Also consistent with new provisions and guidance offered by [employer] on March 18, 2020 in setting of COVID 19 outbreak and in order to preserve personal protective equipment in accordance with the flexibilities announced by CMS on March 30, 2020)   References: https://[facility]. [facility]/Portals/0/Resources/COVID-19/3_18%20Telemed%20Guidance%20Updated%20March%2018. pdf?yqu=[email or web address]                      https://[facility]/Portals/0/Resources/COVID-19/3_18%20Telemed%20Guidance%20Updated%20March%2018. pdf?dfb=[email or web address]  [email or web address]. pdf        Results:  CBC:   Recent Labs     12/26/21  1122 12/27/21  0604   WBC 4.8 7.3   HGB 12.3 12.4   HCT 37.3 37.0   MCV 87.8 86.5    176     BMP:   Recent Labs     12/26/21  1122 12/27/21  0604    137   K 3.6 4.0    100   CO2 26 25   BUN 15 21*   CREATININE 1.2 1.1     LIVER PROFILE:   Recent Labs     12/26/21  1122 12/27/21  0604   AST 26 29   ALT 19 21   BILITOT 0.7 0.5   ALKPHOS 34* 35*     PT/INR:   Recent Labs     12/27/21  1900   PROTIME 11.2   INR 0.99     APTT:   Recent Labs     12/27/21  1900   APTT 31.5       Imaging:  I have reviewed radiology images personally. VL Extremity Venous Bilateral   Final Result      CT CHEST PULMONARY EMBOLISM W CONTRAST   Final Result   No central pulmonary embolus identified. Distal pulmonary branches are not   well evaluated secondary to motion. There is severe coronary artery   calcification      Bandlike opacities in the lung bases, similar to prior. Bandlike morphology   favors atelectasis or scarring rather than pneumonia. There is severe   emphysema. No pulmonary edema      RECOMMENDATIONS:   Unavailable         XR CHEST PORTABLE   Final Result   Limited evaluation with no evidence of detrimental interval change. XR CHEST (2 VW)   Final Result   Chronic pattern of mild bibasilar atelectasis.   No acute findings in the   chest.         XR NECK SOFT TISSUE   Final Result   Soft tissues of the neck are normal.           XR NECK SOFT TISSUE    Result Date: 12/26/2021  EXAMINATION: TWO XRAY VIEWS OF THE NECK SOFT TISSUES 12/26/2021 11:19 am COMPARISON: 09/06/2020 radiograph HISTORY: ORDERING SYSTEM PROVIDED HISTORY: dyspnea TECHNOLOGIST PROVIDED HISTORY: Reason for exam:->dyspnea Reason for Exam: sob, feels like something is stuck in her throat FINDINGS: The epiglottis and aryepiglottic folds are normal.  No pharyngeal soft tissue swelling. No radiopaque foreign body is detected. There are chronic degenerative changes at C4-5. Soft tissues of the neck are normal.     XR CHEST (2 VW)    Result Date: 12/26/2021  EXAMINATION: TWO XRAY VIEWS OF THE CHEST 12/26/2021 11:25 am COMPARISON: 09/06/2020 radiograph HISTORY: ORDERING SYSTEM PROVIDED HISTORY: SOB TECHNOLOGIST PROVIDED HISTORY: Reason for exam:->SOB Reason for Exam: sob, feels like something is stuck in her throat FINDINGS: The heart and mediastinum are normal.  Pulmonary vascular markings are normal.  Mild bibasilar atelectasis. The lungs are otherwise clear. No skeletal abnormality. No radiopaque foreign body. Chronic pattern of mild bibasilar atelectasis. No acute findings in the chest.     XR CHEST PORTABLE    Result Date: 12/27/2021  EXAMINATION: ONE XRAY VIEW OF THE CHEST 12/27/2021 6:17 am COMPARISON: 12/26/2021 HISTORY: ORDERING SYSTEM PROVIDED HISTORY: COPD exacerbation, COVID pneumonia TECHNOLOGIST PROVIDED HISTORY: Reason for exam:->COPD exacerbation, COVID pneumonia Reason for Exam: COPD exacerbation, COVID pneumonia FINDINGS: Technically suboptimal exam.  The lungs are underinflated, resulting in vascular crowding and subsegmental atelectasis. Bibasilar airspace opacities likely reflect atelectasis. No sizable effusion or pneumothorax. The cardiac silhouette and mediastinal contours are stable. Osseous structures are stable     Limited evaluation with no evidence of detrimental interval change.      CT CHEST PULMONARY EMBOLISM W CONTRAST    Result Date: 12/27/2021  EXAMINATION: CTA OF THE CHEST 12/27/2021 2:02 pm TECHNIQUE: CTA of the chest was performed after the administration of intravenous contrast.  Multiplanar reformatted images are provided for review. MIP images are provided for review. Dose modulation, iterative reconstruction, and/or weight based adjustment of the mA/kV was utilized to reduce the radiation dose to as low as reasonably achievable. COMPARISON: September 2020 HISTORY: ORDERING SYSTEM PROVIDED HISTORY: elevated d-dimer dyspnea TECHNOLOGIST PROVIDED HISTORY: Reason for exam:->elevated d-dimer dyspnea Reason for Exam: Covid+, SOB, elevated D dimer, pt has COPD Additional signs and symptoms: no chest pain, former smoker, quit many years ago, no prev surgery FINDINGS: Mediastinum: Atherosclerotic changes seen in the aorta. No intimal flap seen in aorta. Severe coronary artery calcification is seen. No pericardial effusion is seen. Small calcified and noncalcified mediastinal nodes are noted No embolus is seen in the central, right, or left main pulmonary artery. No embolus is seen in the proximal segmental branches. Distal segmental branches and subsegmental branches are not well evaluated secondary to motion. No intimal flap seen in aorta. Main pulmonary artery caliber is unchanged Lungs/pleura: Severe underlying emphysema is seen. Bandlike opacity is seen in the right middle lobe. Bandlike opacity seen in the right lower lobe Bandlike opacities are seen in the left lower lobe. .  No significant pleural fluid. Calcified granuloma seen in the left lower lobe. Upper Abdomen: Adrenal glands are unremarkable. Mild stool load seen in the colon. Calcification is seen in the right hepatic lobe. Atherosclerotic change seen in abdominal aorta. Soft Tissues/Bones: Spurring is seen in the spine. Spurring is seen in the shoulder joints. No central pulmonary embolus identified. Distal pulmonary branches are not well evaluated secondary to motion. There is severe coronary artery calcification Bandlike opacities in the lung bases, similar to prior.   Bandlike morphology favors atelectasis or scarring rather than pneumonia. There is severe emphysema. No pulmonary edema RECOMMENDATIONS: Unavailable       Venous doppler-      Summary        Acute deep vein thrombosis involving the right gastrocnemius veins.        Acute deep vein thrombosis involving the left soleal vein. Results for Martha Siegel" (MRN [id number]) as of 12/28/2021 17:55   Ref. Range 12/26/2021 19:50 12/27/2021 19:00 12/28/2021 01:05   Anti-XA Unfrac Heparin Latest Ref Range: 0.30 - 0.70 IU/mL   1.90 (HH)   Prothrombin Time Latest Ref Range: 9.9 - 12.7 sec  11.2    INR Latest Ref Range: 0.88 - 1.12   0.99    aPTT Latest Ref Range: 26.2 - 38.6 sec  31.5    D-Dimer, Quant Latest Ref Range: 0 - 229 ng/mL DDU 3117 (H)  2519 (H)       Assessment:  Active Problems:    Centrilobular emphysema (HCC)    Rheumatoid arthritis involving multiple sites with positive rheumatoid factor (HCC)    Pulmonary HTN (HCC)    Atelectasis    HEBERT on CPAP    Former smoker    Pneumonia due to COVID-19 virus    Uncontrolled hypertension    Elevated d-dimer    Elevated troponin  Resolved Problems:    * No resolved hospital problems.  *          Plan:   · Oxygen supplementation to keep saturation between 90 to 94% only  · Please titrate the oxygen as per the above parameters  · Patient was on 3 L of nasal can oxygen when seen  · BiPAP/CPAP on intermittent basis to continue  · Bronchodilators  · Patient was found to have bilateral DVT and patient's IV heparin was changed to p.o. Eliquis  · Patient's Zithromax being discontinued  · Oral Decadron to continue  · Keep negative fluid balance  · Monitor input output and BMP  · Correct electrolytes on whenever necessary basis  · Droplet plus precautions to continue  · Monitor for any worsening hypoxemia or respiratory compromise  · Manual proning to be promoted  · Antihypertensives as per IM  · PUD prophylaxis as per IM    Case discussed with nursing          Electronically signed by: Heather Lal MD    12/28/2021    5:54 PM.

## 2021-12-28 NOTE — PROGRESS NOTES
Pt a/o. Vss. Shift assessment complete and charted. Pt stable on 3L NC but does desat with ambulation with a <1-2 minute recovery to 90%. Lungs clear. Pt walking to BR with SBA and standard walker. Pt sat up in chair for a few hours today and tolerated well. Pt stable and denied needs when writer left room.

## 2021-12-28 NOTE — CONSULTS
INPATIENT PULMONARY CRITICAL CARE CONSULT NOTE      Chief Complaint/Referring Provider:  Patient is being seen at the request of Dr. Katie Clarke for a consultation for COPD exacerbation      Presenting HPI: He came to the hospital because of increasing shortness of breath    As per admitting provider-81 y.o. female who presented to [facility] with increased shortness of breath. Patient states that she uses 3-4LPM oxygen at home. She states she is also supposed to use CPAP at home but she is not compliant with it. No fever or chills. She states 3 days ago she began having increase in shortness of breath. Cough present but nonproductive. She states she did not require an increase in O2. She does not ambulate much. Dyspnea with exertion walking to bathroom or kitchen. Patient had seen Dr. Reynaldo Silva 3 weeks ago. She states she is compliant with Oxygen and inhalers but not CPAP. No chest pain. No palpitations. She states her appetite decreased but she still has sense of taste and smell.    Patient was given BiPAP overnight and patient when evaluated this morning was on 3 L nasal oxygen with saturation 95%, patient was not having any increased work of breathing, patient was not having any paradoxical breathing when evaluated, patient was afebrile and patient's blood pressure is not optimally controlled, patient urine output has not been documented the epic for review, patient's p.o. intake is limited, patient glycemic control was acceptable, no other pertinent review of system of concern      Patient Active Problem List    Diagnosis Date Noted    Uncontrolled hypertension 12/28/2021    Elevated d-dimer 12/28/2021    Elevated troponin 12/28/2021    Pneumonia due to COVID-19 virus 12/26/2021    Hypoxemia 08/17/2021    CSA (central sleep apnea) 07/28/2021    Nasal congestion 06/08/2021    Reactive depression 01/22/2021    Former smoker 01/20/2021    Esophageal motility disorder 03/30/2019    Hypokalemia 03/27/2019    Normal anion gap metabolic acidosis 50/10/4700    Anemia 03/27/2019    Pulmonary infiltrates 03/27/2019    Atelectasis 03/27/2019    HEBERT on CPAP 03/27/2019    ASD (atrial septal defect) 03/27/2019    Aspiration pneumonia of both upper lobes (HCC)     Hypotension 01/15/2019    Pulmonary HTN ([address] 05/07/2018    Influenza 01/26/2018    Sciatica of left side 11/21/2017    Epigastric pain 10/19/2017    Cervical disc disorder at C4-C5 level with radiculopathy 09/26/2017    Acquired hypothyroidism 07/06/2017    Unexplained weight loss 03/17/2017    Age-related osteoporosis without current pathological fracture 12/09/2015    Dizziness 08/14/2013    Essential hypertension 07/17/2012    Centrilobular emphysema ([address] 10/18/2011    Pure hypercholesterolemia 10/18/2011    Rheumatoid arthritis involving multiple sites with positive rheumatoid factor ([address] 10/18/2011       Past Medical History:   Diagnosis Date    Acid reflux     Acquired hypothyroidism 7/6/2017    Anemia     Asthma     CHF (congestive heart failure) (HCC)     COPD (chronic obstructive pulmonary disease) ([address]     HLD (hyperlipidemia)     Hypertension     Influenza A 01/22/2018    MI (myocardial infarction) ([address]     X 2    Migraine     past hx    Rheumatoid arthritis     Wears glasses         Past Surgical History:   Procedure Laterality Date    BRONCHOSCOPY N/A 3/27/2019    BRONCHOSCOPY ALVEOLAR LAVAGE performed by Ellyn Puentes MD at [address]  3/27/2019    BRONCHOSCOPY THERAPUTIC ASPIRATION INITIAL performed by Ellyn Puentes MD at [address] [facility] Bilateral 03/23/2011    COLONOSCOPY N/A 6/12/2019    COLONOSCOPY WITH ANESTHESIA -SLEEP APNEA- performed by Precious Rivero MD at [address] [facility] Left 12/4/2018    LEFT LUMBAR FOUR, LUMBAR FIVE TRANSFORAMINAL EPIDURAL STEROID INJECTION SITE CONFIRMED BY FLUOROSCOPY performed by Terrence Acosta Lissa Arciniega MD at [address] [facility] N/A 2019    ESOPHAGEAL MOTILITY/MANOMETRY STUDY performed by Denis Mandujano MD at [address] little toe    GASTROSTOMY TUBE PLACEMENT N/A 2019    EGD PEG TUBE PLACEMENT performed by Edith Nickerson MD at [address]      total, fibroids    KNEE SURGERY      right    ND Phillip Trung [address] DX/THER SBST INTRLMNR CRV/THRC W/IMG GDN Left 2018    LEFT LUMBAR FOUR/ LUMBAR FIVE CYST ASPIRATION SITE CONFIRMED BY FLUOROSCOPY performed by Aline Espinosa MD at [address] [facility] ENDOSCOPY N/A 3/27/2019    EGD DIAGNOSTIC ONLY performed by Marybeth Ryan MD at [address] [facility] ENDOSCOPY N/A 2020    EGD DIAGNOSTIC ONLY performed by Maximilian Patton DO [facility] [address] [facility] [address]        Family History   Problem Relation Age of Onset    Cancer Mother     Cancer Father     Heart Disease Maternal Aunt     Cancer Sister         Social History     Tobacco Use    Smoking status: Former Smoker     Packs/day: 3.00     Years: 50.00     Pack years: 150.00     Types: Cigarettes     Start date: 3/4/1963     Quit date: 2009     Years since quittin.9    Smokeless tobacco: Never Used   Substance Use Topics    Alcohol use: No        Allergies   Allergen Reactions    Alendronate Sodium      Jaw pain      Humira [Adalimumab]      Rash      Losartan Swelling    Penicillins      rash    Simvastatin Other (See Comments)     Made legs ache    Sulfa Antibiotics Swelling     Tongue swelled    Tape Christina Butler Tape]                Physical Exam:  Blood pressure (!) 176/93, pulse 86, temperature 97.4 °F (36.3 °C), temperature source Oral, resp. rate 11, height 5' 6\" (1.676 m), weight 195 lb (88.5 kg), SpO2 96 %, not currently breastfeeding.'       In-person bedside physical examination deferred.   Pursuant to the emergency declaration under the [address] [facility], [address] [place] [address] authority and the [employer] Act, this clinical encounter was conducted to provide necessary medical care. (Also consistent with new provisions and guidance offered by [facility] on March 18, 2020 in setting of COVID 19 outbreak and in order to preserve personal protective equipment in accordance with the flexibilities announced by CMS on March 30, 2020)   References: https://[facility]. [facility]/Portals/0/Resources/COVID-19/3_18%20Telemed%20Guidance%20Updated%20March%2018. pdf?ckl=[email or web address]                      https://[facility]. [facility]/Portals/0/Resources/COVID-19/3_18%20Telemed%20Guidance%20Updated%20March%2018. pdf?[email or web address]                      [email or web address]/. pdf             Results:  CBC:   Recent Labs     12/26/21  1122 12/27/21  0604   WBC 4.8 7.3   HGB 12.3 12.4   HCT 37.3 37.0   MCV 87.8 86.5    176     BMP:   Recent Labs     12/26/21  1122 12/27/21  0604    137   K 3.6 4.0    100   CO2 26 25   BUN 15 21*   CREATININE 1.2 1.1     LIVER PROFILE:   Recent Labs     12/26/21  1122 12/27/21  0604   AST 26 29   ALT 19 21   BILITOT 0.7 0.5   ALKPHOS 34* 35*     PT/INR:   Recent Labs     12/27/21  1900   PROTIME 11.2   INR 0.99     APTT:   Recent Labs     12/27/21  1900   APTT 31.5       Imaging:  I have reviewed radiology images personally. VL Extremity Venous Bilateral   Final Result      CT CHEST PULMONARY EMBOLISM W CONTRAST   Final Result   No central pulmonary embolus identified. Distal pulmonary branches are not   well evaluated secondary to motion. There is severe coronary artery   calcification      Bandlike opacities in the lung bases, similar to prior. Bandlike morphology   favors atelectasis or scarring rather than pneumonia. There is severe   emphysema.   No pulmonary edema      RECOMMENDATIONS:   Unavailable         XR CHEST PORTABLE   Final Result   Limited evaluation with no evidence of detrimental interval change. XR CHEST (2 VW)   Final Result   Chronic pattern of mild bibasilar atelectasis. No acute findings in the   chest.         XR NECK SOFT TISSUE   Final Result   Soft tissues of the neck are normal.           Results for Roberto Jacobo" (MRN [id number]) as of 12/28/2021 01:41   Ref. Range 12/26/2021 11:22   Rapid Influenza A Ag Latest Ref Range: Negative  Negative   Rapid Influenza B Ag Latest Ref Range: Negative  Negative   RAPID INFLUENZA A/B ANTIGENS Unknown Rpt   SARS-CoV-2, NAAT Latest Ref Range: Not Detected  DETECTED (AA)     Results for Roberto Jacobo" (MRN [id number]) as of 12/28/2021 01:43   Ref. Range 12/26/2021 19:50 12/27/2021 19:00 12/28/2021 01:05   Anti-XA Unfrac Heparin Latest Ref Range: 0.30 - 0.70 IU/mL   1.90 (HH)   Prothrombin Time Latest Ref Range: 9.9 - 12.7 sec  11.2    INR Latest Ref Range: 0.88 - 1.12   0.99    aPTT Latest Ref Range: 26.2 - 38.6 sec  31.5    D-Dimer, Quant Latest Ref Range: 0 - 229 ng/mL DDU 3117 (H)  2519 (H)   Results for Maria De Jesus Rouse \"BARON\" (MRN [id number]) as of 12/28/2021 01:43   Ref. Range 12/26/2021 11:22 12/26/2021 19:49 12/27/2021 06:04   Pro-BNP Latest Ref Range: 0 - 449 pg/mL 123     Troponin Latest Ref Range: <0.01 ng/mL 0.08 (H) 0.03 (H)      Echocardiogram:Summary   Left ventricular systolic function is hyperdynamic with an estimated   ejection fraction of >= 65%. The left ventricle is normal in size with normal wall thickness. Normal left ventricular diastolic function. The right atrium is mildly enlarged. Mild tricuspid regurgitation. Systolic pulmonary artery pressure (SPAP) is elevated and estimated at 39   mmHg (right atrial pressure 3 mmHg) consistent with borderline pulmonary   hypertension.     PFT-PFT IN 2017-PFT TEST    Spirometry  Spirometry shows moderate obstructive defect. .    Bronchodilator  There is no response to bronchodilator demonstrated. Flow-Volume Loop  The flow-volume loop is compatible with obstructive lung defect. Lung Volumes  Lung volumes are normal except for elevated RV. Lung Volume Comments  There is air trapping present. Diffusing Capacity  Single breath diffusing capacity is severely decreased. A reduced DLCO can be seen in diseases causing destruction of   alveolar-capillary interface (ILD, CHF, COPD, Pneumonia, etc.)   obliteration of pulmonary vascular bed (PE, PAH), tobacco usage,   carbon monoxide poisoning. Airway Resistance  Airway resistance is moderately elevated. Comparison  In comparison to the test of 8/18/16 there is mild deterioration   in spirometry and DLCO    PFT is compatible with moderate Obstructive Lung Disease  -   Possible etiologies include: asthma, COPD, bronchiectasis,   bronchitis. Correlate clinically. Results for Vini Nunez" (MRN [id number]) as of 12/28/2021 01:43   Ref.  Range 3/27/2019 00:18 3/27/2019 03:18 3/28/2019 10:46 3/28/2019 16:09   Hemoglobin, Art, Extended Latest Ref Range: 12.0 - 16.0 g/dL  11.2 (L) 11.0 (L) 11.9 (L)   pH, Arterial Latest Ref Range: 7.350 - 7.450   7.252 (L) 7.410 7.470 (H)   pCO2, Arterial Latest Ref Range: 35.0 - 45.0 mmHg  55.4 (H) 34.8 (L) 31.8 (L)   pO2, Arterial Latest Ref Range: 75.0 - 108.0 mmHg  90.7 106.7 82.7   HCO3, Arterial Latest Ref Range: 21.0 - 29.0 mmol/L  23.9 21.6 22.6   TCO2 (calc), Art Latest Ref Range: Not Established mmol/L  25.6 22.6 23.6   Base Excess, Arterial Latest Ref Range: -3.0 - 3.0 mmol/L  -3.8 (L) -2.5 -0.4   O2 Sat, Arterial Latest Ref Range: >92 %  95.2 97.4 96.1   O2 Content, Arterial Latest Ref Range: Not Established mL/dL  15 15 16   Methemoglobin, Arterial Latest Ref Range: <1.5 %  0.2 0.1 0.1   Carboxyhgb, Arterial Latest Ref Range: 0.0 - 1.5 %  0.9 0.3 0.1   pH, [place] Latest Ref Range: 7.350 - 7.450  7.257 (L)      pCO2, [place] Latest Ref Range: 40.0 - 50.0 mmHg 55.6 (H)      pO2, [place] Latest Ref Range: 25.0 - 40.0 mmHg 66.3 (H)      HCO3, Venous Latest Ref Range: 23.0 - 29.0 mmol/L 24.2      TC02 (Calc), [place] Latest Ref Range: Not Established mmol/L 26      Base Excess, [place] Latest Ref Range: -3.0 - 3.0 mmol/L -3.3 (L)      O2 Content, [place] Latest Ref Range: Not Established VOL % 13      MetHgb, [place] Latest Ref Range: <1.5 % 0.1      O2 Sat, [place] Latest Ref Range: Not Established % 88          Patient's sleep read shows patient to have severe HEBERT with AHI of 49.1/h along with that patient has central sleep apnea with no  with some nocturnal hypoxemia with saturation dropping to 82% when patient saturation below 89% was 104 minutes    Assessment:  Active Problems:    Centrilobular emphysema (HCC)    Rheumatoid arthritis involving multiple sites with positive rheumatoid factor (HCC)    Pulmonary HTN (HCC)    Atelectasis    HEBERT on CPAP    Former smoker    Pneumonia due to COVID-19 virus    Uncontrolled hypertension    Elevated d-dimer    Elevated troponin  Resolved Problems:    * No resolved hospital problems.  *          Plan:   · Oxygen supplementation to keep saturation between 90 to 94% only  · Please titrate the oxygen as per the above parameters  · Patient was on 3 L of nasal can oxygen when seen  · BiPAP/CPAP on intermittent basis  · Bronchodilators  · Patient is getting IV heparin because of elevated D-dimers and slightly elevated troponin levels-can consider changing to Lovenox if deemed appropriate  · Patient has been started on Zithromax per the admitting provider will reassess in the morning about discontinuation  · Oral Decadron to continue  · Keep negative fluid balance  · Monitor input output and BMP  · Correct electrolytes and whenever necessary basis  · Droplet plus precautions to continue  · Monitor for any worsening hypoxemia or respite compromise  · Manual proning to be promoted  · Antihypertensives as per IM  · PUD prophylaxis as per IM            Electronically signed by:  Bianca Bell MD    12/28/2021    1:50 AM.

## 2021-12-28 NOTE — PROGRESS NOTES
12/27/21 2322   NIV Type   Skin Protection for O2 Device Yes   Location Nose   NIV Started/Stopped On   Equipment Type v60   Mode CPAP   Mask Type Full face mask   Settings/Measurements   CPAP/EPAP 5 cmH2O   Resp 11   FiO2  35 %   Vt Exhaled 716 mL   Minute Volume 7.9 Liters   Mask Leak (lpm) 7 lpm   Comfort Level Good   Using Accessory Muscles No   SpO2 99   Alarm Settings   Alarms On Y   Press Low Alarm 6 cmH2O   High Pressure Alarm 30 cmH2O   Delay Alarm 20 sec(s)   Resp Rate Low Alarm 6   High Respiratory Rate 40 br/min   Oxygen Therapy/Pulse Ox   SpO2 99 %

## 2021-12-29 VITALS
HEIGHT: 66 IN | SYSTOLIC BLOOD PRESSURE: 134 MMHG | TEMPERATURE: 97.9 F | RESPIRATION RATE: 20 BRPM | WEIGHT: 195 LBS | OXYGEN SATURATION: 94 % | HEART RATE: 82 BPM | BODY MASS INDEX: 31.34 KG/M2 | DIASTOLIC BLOOD PRESSURE: 76 MMHG

## 2021-12-29 PROCEDURE — 2700000000 HC OXYGEN THERAPY PER DAY

## 2021-12-29 PROCEDURE — 2580000003 HC RX 258: Performed by: INTERNAL MEDICINE

## 2021-12-29 PROCEDURE — 94761 N-INVAS EAR/PLS OXIMETRY MLT: CPT

## 2021-12-29 PROCEDURE — 6370000000 HC RX 637 (ALT 250 FOR IP): Performed by: INTERNAL MEDICINE

## 2021-12-29 PROCEDURE — 94660 CPAP INITIATION&MGMT: CPT

## 2021-12-29 PROCEDURE — 97535 SELF CARE MNGMENT TRAINING: CPT

## 2021-12-29 PROCEDURE — 94640 AIRWAY INHALATION TREATMENT: CPT

## 2021-12-29 PROCEDURE — 6360000002 HC RX W HCPCS: Performed by: INTERNAL MEDICINE

## 2021-12-29 PROCEDURE — 99232 SBSQ HOSP IP/OBS MODERATE 35: CPT | Performed by: INTERNAL MEDICINE

## 2021-12-29 RX ORDER — LEVOTHYROXINE SODIUM 0.12 MG/1
125 TABLET ORAL DAILY
Status: DISCONTINUED | OUTPATIENT
Start: 2021-12-30 | End: 2021-12-29 | Stop reason: HOSPADM

## 2021-12-29 RX ORDER — LEVOTHYROXINE SODIUM 0.12 MG/1
125 TABLET ORAL DAILY
Qty: 30 TABLET | Refills: 3 | Status: SHIPPED | OUTPATIENT
Start: 2021-12-30 | End: 2022-02-01 | Stop reason: SDUPTHER

## 2021-12-29 RX ADMIN — TIOTROPIUM BROMIDE INHALATION SPRAY 2 PUFF: 3.12 SPRAY, METERED RESPIRATORY (INHALATION) at 08:23

## 2021-12-29 RX ADMIN — DEXAMETHASONE 6 MG: 4 TABLET ORAL at 09:52

## 2021-12-29 RX ADMIN — Medication 2 PUFF: at 08:23

## 2021-12-29 RX ADMIN — FLUTICASONE PROPIONATE 2 SPRAY: 50 SPRAY, METERED NASAL at 09:50

## 2021-12-29 RX ADMIN — SPIRONOLACTONE 25 MG: 25 TABLET ORAL at 09:52

## 2021-12-29 RX ADMIN — Medication 10 ML: at 09:51

## 2021-12-29 RX ADMIN — FUROSEMIDE 20 MG: 20 TABLET ORAL at 09:52

## 2021-12-29 RX ADMIN — APIXABAN 5 MG: 5 TABLET, FILM COATED ORAL at 09:52

## 2021-12-29 RX ADMIN — FAMOTIDINE 20 MG: 20 TABLET, FILM COATED ORAL at 09:52

## 2021-12-29 RX ADMIN — TIZANIDINE 2 MG: 4 TABLET ORAL at 09:52

## 2021-12-29 RX ADMIN — LEVOTHYROXINE SODIUM 100 MCG: 0.1 TABLET ORAL at 06:33

## 2021-12-29 ASSESSMENT — PAIN SCALES - GENERAL
PAINLEVEL_OUTOF10: 0
PAINLEVEL_OUTOF10: 0

## 2021-12-29 NOTE — PROGRESS NOTES
INPATIENT PULMONARY CRITICAL CARE PROGRESS NOTE      Reason for visit    COPD exacerbation     SUBJECTIVE: Patient when evaluated this morning was comfortably sitting in the bed without any increased work of breathing, patient was not having any increasing cough expectoration or shortness of breath, patient was complaining of some calf tenderness, patient does not have any chest pain or palpitations, patient was afebrile and hemodynamically maintained, patient wasn't 3 L of nasal oxygen with saturation of 94% when evaluated, patient had maintained hemodynamics, patient documented urine output was borderline normal , patient's cumulative fluid balance was +1.1 L, patient use the BiPAP last night as per nursing, patient's glycemic control was acceptable, no other pertinent review of system of concern          Physical Exam:  Blood pressure 134/76, pulse 82, temperature 97.9 °F (36.6 °C), temperature source Oral, resp.  rate 20, height 5' 6\" (1.676 m), weight 195 lb (88.5 kg), SpO2 94 %, not currently breastfeeding.'        In-person bedside physical examination deferred. Yeny Hernandez to the emergency declaration under the [facility], [address]70 waiver authority and the [employer] Act, this clinical encounter was conducted to provide necessary medical care.   (Also consistent with new provisions and guidance offered by Bernardino Brooks on March 18, 2020 in setting of COVID 19 outbreak and in order to preserve personal protective equipment in accordance with the flexibilities announced by CMS on March 30, 2020)   References: https://[facility]. [facility]/Portals/0/Resources/COVID-19/3_18%20Telemed%20Guidance%20Updated%20March%2018. pdf?[email or web address]                      [email or web address]://[facility]/Portals/0/Resources/COVID-19/3_18%20Telemed%20Guidance%20Updated%20March%2018. pdf?cki=[email or web address]  [email or web address]. pdf        Results:  CBC:   Recent Labs     12/27/21  0604   WBC 7.3   HGB 12.4   HCT 37.0   MCV 86.5        BMP:   Recent Labs     12/27/21 0604      K 4.0      CO2 25   BUN 21*   CREATININE 1.1     LIVER PROFILE:   Recent Labs     12/27/21 0604   AST 29   ALT 21   BILITOT 0.5   ALKPHOS 35*     PT/INR:   Recent Labs     12/27/21 1900   PROTIME 11.2   INR 0.99     APTT:   Recent Labs     12/27/21 1900   APTT 31.5       Imaging:  I have reviewed radiology images personally. VL Extremity Venous Bilateral   Final Result      CT CHEST PULMONARY EMBOLISM W CONTRAST   Final Result   No central pulmonary embolus identified. Distal pulmonary branches are not   well evaluated secondary to motion. There is severe coronary artery   calcification      Bandlike opacities in the lung bases, similar to prior. Bandlike morphology   favors atelectasis or scarring rather than pneumonia. There is severe   emphysema. No pulmonary edema      RECOMMENDATIONS:   Unavailable         XR CHEST PORTABLE   Final Result   Limited evaluation with no evidence of detrimental interval change. XR CHEST (2 VW)   Final Result   Chronic pattern of mild bibasilar atelectasis.   No acute findings in the   chest.         XR NECK SOFT TISSUE   Final Result   Soft tissues of the neck are normal.                 Assessment:  Active Problems:    Centrilobular emphysema (HCC)    Rheumatoid arthritis involving multiple sites with positive rheumatoid factor (HCC)    Pulmonary HTN (HCC)    Atelectasis    HEBERT on CPAP    Former smoker    Pneumonia due to COVID-19 virus    Uncontrolled hypertension    Elevated d-dimer    Elevated troponin    Acute deep vein thrombosis (DVT) of calf muscle vein of both lower extremities (HCC)  Resolved Problems:    * No resolved hospital problems. *          Plan:   · Oxygen supplementation to keep saturation between 90 to 94% only  · Please titrate the oxygen as per the above parameters  · Patient was on 3 L of nasal can oxygen when seen  · BiPAP/CPAP on intermittent basis to continue  · Bronchodilators  · Patient was found to have bilateral DVT and patient's IV heparin was changed to p.o. Eliquis  · Oral Decadron to continue  · Keep negative fluid balance  · Monitor input output and BMP  · Correct electrolytes on whenever necessary basis  · Droplet plus precautions to continue  · Monitor for any worsening hypoxemia or respiratory compromise  · Manual proning to be promoted  · Antihypertensives as per IM  · PUD prophylaxis as per IM    ? Discharge planning    No other recommendations from pulmonary/critical care standpoint of view-will sign off -please call on PRN basis           Electronically signed by:  Sharon Palomo MD    12/29/2021    4:25 PM.

## 2021-12-29 NOTE — PROGRESS NOTES
Occupational Therapy  Facility/Department: [facility] C3 TELE/MED SURG/ONC  Daily Treatment Note  NAME: Diego Healy  : 1940  MRN: [id number]    Date of Service: 2021    Discharge Recommendations:  24 hour supervision or assist,Home with Home health OT  OT Equipment Recommendations  Equipment Needed: Yes  Mobility Devices: ADL Assistive Devices  ADL Assistive Devices: Toileting - 3-in-1 Commode;Sock-Aid Soft;Long-handled Sponge;Transfer Tub Bench    Assessment   Performance deficits / Impairments: Decreased functional mobility ; Decreased ADL status; Decreased safe awareness;Decreased endurance;Decreased ROM; Decreased strength;Decreased sensation;Decreased balance;Decreased cognition  Assessment: Pt is a 80year old female with PMH including CHF, COPD, HTN, RA, and MI who presented  with SOB. Pt diagnosed with COVID-19 and COPD exacerbation. At baseline, pt lives with her granddaughter home. Granddaughter responsible and primary for IADLs. PLOF ADLs assist for LBD (total A socks, min A underwear/pants), min A tub/shower transfers, mod A showering (assist for legs/back). Pt currently requiring Min-SBA with simple transfers and ambulates up to 35 feet SBA using RW. SpO2 ranges 93%-85% on 3L O2. Pt continues to demonstrate performance component deficits in balance, activity tolerance, strength, cognition. Pt reports short term memory deficits. Anticipate pt will require initial 24/7 supervision/assist and  Home Health OT services upon discharge to continue to progress safety  and independence with ADLs within the home. Pt benefits from skilled OT while in acute to address deficits above. Prognosis: Good    OT Education: OT Role;Plan of Care;Energy Conservation; ADL Adaptive Strategies;Transfer Training;Equipment  Patient Education: home safety concerns, OT discharge recommendations, standing balance, safety with transfers  Barriers to Learning: pt demonstrates and verbalizes understanding.   REQUIRES OT Patient with no complaints from previous session  Family / Caregiver Present: No    Diagnosis: COVID19 penumonia, Elevated troponin, elevated D-dimer with acute DVT BLE, uncontroled HTN, pulmonary HTN, centrilobular emphysema    Subjective  Subjective: Pt states, \"I haven't been down for this long before, but I'm willing to work to get back to myself. \" Pt reports experiencing some short term memory loss more than normal.  General Comment  Comments: RN agreeable to OT session. Pre Treatment Pain Screening  Intervention List: Patient able to continue with treatment;Nurse/Physician notified  Vital Signs  Pulse: 85  Heart Rate Source: Monitor  BP: 137/77  BP Location: Right upper arm  Patient Position: Sitting  Level of Consciousness: Alert (0)  Patient Currently in Pain: Denies  Oxygen Therapy  SpO2: 92 %  Pulse Oximeter Device Mode: Intermittent  Pulse Oximeter Device Location: Finger  O2 Device: Nasal cannula  O2 Flow Rate (L/min): 2.5 L/min     Objective    ADL  Grooming: Setup (seated to wash hands/face)  LE Dressing: Dependent/Total (socks)           Balance  Sitting Balance: Supervision  Standing Balance: Stand by assistance (RW)  Standing Balance  Time: 4-5 minutes  Activity: ambulation 35 feet x2 using RW, static standing with RW  Comment: SBA using RW with extra time. OT provides verbal cues for safety with O2 cord management. SpO2 ranges 85%-93% on 3L O2    Functional Mobility  Functional - Mobility Device: Rolling Walker  Activity: Other  Assist Level: Stand by assistance  Functional Mobility Comments: 35 feet x2 using RW with cues for safety with O2 cord management. Pt requiring extra time        Transfers  Stand Step Transfers: Stand by assistance  Sit to stand: Stand by assistance  Stand to sit: Minimal assistance  Transfer Comments: to/from EOB and chair using RW           Cognition  Overall Cognitive Status: Exceptions  Arousal/Alertness: Appropriate responses to stimuli  Following Commands:  Follows one step commands consistently; Follows multistep commands with increased time; Follows multistep commands with repitition  Attention Span: Attends with cues to redirect; Difficulty dividing attention  Memory: Decreased short term memory  Safety Judgement: Decreased awareness of need for assistance  Problem Solving: Assistance required to identify errors made;Assistance required to correct errors made;Decreased awareness of errors  Insights: Decreased awareness of deficits  Initiation: Does not require cues  Sequencing: Requires cues for some              Plan   Plan  Times per week: 3-5x per week  Times per day: Daily  Current Treatment Recommendations: Strengthening,ROM,Safety Education & Training,Balance Training,Patient/Caregiver Education & Training,Self-Care / ADL,Functional Mobility Training,Endurance Training,Cognitive Reorientation,Equipment Evaluation, Education, & procurement,Pain Management,Home Management Training,Cognitive/Perceptual Training,Positioning    AM-PAC Score        AM-PAC Inpatient Daily Activity Raw Score: 17 (12/29/21 1140)  AM-PAC Inpatient ADL T-Scale Score : 37.26 (12/29/21 1140)  ADL Inpatient CMS 0-100% Score: 50.11 (12/29/21 1140)  ADL Inpatient CMS G-Code Modifier : CK (12/29/21 1140)    Goals  Short term goals  Time Frame for Short term goals: 1 week (by 1/3/2021)  Short term goal 1: Pt will complete toilet transfer with mod I; ongoing  Short term goal 2: Pt will demo improved standing tolerance x5 minutes for standing ADLs with no O2 destate; ongoing  Short term goal 3: Pt will complete toileting with mod I; ongoing  Short term goal 4: Pt will complete grooming with SBA; ongoing  Short term goal 5: Pt will demonstrate and recall 2-3 BUE/scapular exercises as part of HEP for improved STM and carryover of HEP; ongoing  Patient Goals   Patient goals : \"to go home to my granddaughter\"       Therapy Time   Individual Concurrent Group Co-treatment   Time In 1025         Time Out 1105 Minutes 40         Timed Code Treatment Minutes: 40 Minutes     If pt is discharged prior to next OT session, this note will serve as the discharge summary.     Rashad Agudelo, OT

## 2021-12-29 NOTE — PROGRESS NOTES
12/29/21 0352   NIV Type   $NIV $Daily Charge   NIV Started/Stopped On   Equipment Type V60   Mode Bilevel   Mask Type Full face mask   Mask Size Small   Settings/Measurements   CPAP/EPAP 5 cmH2O   Resp 14   FiO2  35 %   Vt Exhaled 505 mL   Minute Volume 5.6 Liters   Mask Leak (lpm) 11 lpm   Comfort Level Good   Using Accessory Muscles No   Alarm Settings   Alarms On Y

## 2021-12-29 NOTE — CARE COORDINATION
Chart reviewed. Spoke with Linwood Baez. Confirmed again patient does not need skilled nursing at d/c, as did MD and RN yesterday. Would benefit from therapy. [facility] order modified to reflect patients need. Will confirm with [facility] when d/c order in.  Sabiha Mcgowan RN

## 2021-12-29 NOTE — PROGRESS NOTES
12/28/21 2339   NIV Type   Skin Protection for O2 Device Yes   Location Nose   NIV Started/Stopped On   Equipment Type V60   Mode Bilevel   Mask Type Full face mask   Mask Size Small   Settings/Measurements   CPAP/EPAP 5 cmH2O   Resp 15   FiO2  35 %   Vt Exhaled 522 mL   Minute Volume 7.4 Liters   Mask Leak (lpm) 11 lpm   Comfort Level Good   Using Accessory Muscles No   SpO2 94   Alarm Settings   Alarms On Y   Press Low Alarm 6 cmH2O   High Pressure Alarm 30 cmH2O   Delay Alarm 20 sec(s)   Resp Rate Low Alarm 6   High Respiratory Rate 40 br/min

## 2021-12-29 NOTE — DISCHARGE SUMMARY
Hospital Medicine Discharge Summary    Patient ID: Jia Bullock      Patient's PCP: Augustus Escobedo MD    Admit Date: 12/26/2021     Discharge Date:   12/29/2021    Admitting Provider: Stanford Cerda MD     Discharge Provider: Amari Romero MD     Discharge Diagnoses: Active Hospital Problems    Diagnosis     Uncontrolled hypertension [I10]     Elevated d-dimer [R79.89]     Elevated troponin [R77.8]     Acute deep vein thrombosis (DVT) of calf muscle vein of both lower extremities (HCC) [I82.463]     Pneumonia due to COVID-19 virus [U07.1, J12.82]     Former smoker [Z87.891]     HEBERT on CPAP [G47.33, Z99.89]     Atelectasis [J98.11]     Pulmonary HTN (HCC) [I27.20]     Rheumatoid arthritis involving multiple sites with positive rheumatoid factor ([facility] [address] [M05.79]     Centrilobular emphysema ([facility] [address] [J43.2]        The patient was seen and examined on day of discharge and this discharge summary is in conjunction with any daily progress note from day of discharge. Hospital Course:   80 y.o. female who presented to [facility] with increased shortness of breath. Patient states that she uses 3-4LPM oxygen at home. She states she is also supposed to use CPAP at home but she is not compliant with it. No fever or chills. She states 3 days ago she began having increase in shortness of breath. Cough present but nonproductive. She states she did not require an increase in O2. She does not ambulate much. Dyspnea with exertion walking to bathroom or kitchen. Patient had seen Dr. Regine Velazquez 3 weeks ago. She states she is compliant with Oxygen and inhalers but not CPAP. No chest pain. No palpitations. She states her appetite decreased but she still has sense of taste and smell.     Patient doing better on steroids. Ultrasound positive for calf DVT. Started on heparin and transitioned to Eliquis.     Physical Exam Performed:     /77   Pulse 85   Temp 99.4 °F (37.4 °C) (Oral)   Resp 20 Ht 5' 6\" (1.676 m)   Wt 195 lb (88.5 kg)   SpO2 92%   BMI 31.47 kg/m²       General appearance:  No apparent distress, appears stated age and cooperative. HEENT:  Normal cephalic, atraumatic without obvious deformity. Pupils equal, round, and reactive to light.  Extra ocular muscles intact. Conjunctivae/corneas clear. Neck: Supple, with full range of motion. No jugular venous distention. Trachea midline. Respiratory:  Expiratory wheeze. Prolonged exp phase. No crackles. No rhonchi. Cardiovascular:  Regular rate and rhythm with normal S1/S2 without murmurs, rubs or gallops. Abdomen: Soft, non-tender, non-distended with normal bowel sounds. Musculoskeletal:  No clubbing, cyanosis or edema bilaterally.  Full range of motion without deformity. No cording in legs. Skin: Skin color, texture, turgor normal.  No rashes or lesions. Deformity in joints.   Neurologic:  Neurovascularly intact without any focal sensory/motor deficits. Cranial nerves: II-XII intact, grossly non-focal.  Psychiatric:  Alert and oriented, thought content appropriate, normal insight  Capillary Refill: Brisk,3 seconds, normal  Peripheral Pulses: +2 palpable, equal bilaterally        Labs: For convenience and continuity at follow-up the following most recent labs are provided:      CBC:    Lab Results   Component Value Date    WBC 7.3 12/27/2021    HGB 12.4 12/27/2021    HCT 37.0 12/27/2021     12/27/2021       Renal:    Lab Results   Component Value Date     12/27/2021    K 4.0 12/27/2021     12/27/2021    CO2 25 12/27/2021    BUN 21 12/27/2021    CREATININE 1.1 12/27/2021    CALCIUM 9.0 12/27/2021    PHOS 3.1 11/23/2020         Significant Diagnostic Studies    Radiology:   VL Extremity Venous Bilateral   Final Result      CT CHEST PULMONARY EMBOLISM W CONTRAST   Final Result   No central pulmonary embolus identified. Distal pulmonary branches are not   well evaluated secondary to motion.  There is severe coronary artery calcification      Bandlike opacities in the lung bases, similar to prior. Bandlike morphology   favors atelectasis or scarring rather than pneumonia. There is severe   emphysema. No pulmonary edema      RECOMMENDATIONS:   Unavailable         XR CHEST PORTABLE   Final Result   Limited evaluation with no evidence of detrimental interval change. XR CHEST (2 VW)   Final Result   Chronic pattern of mild bibasilar atelectasis. No acute findings in the   chest.         XR NECK SOFT TISSUE   Final Result   Soft tissues of the neck are normal.                Consults:     IP CONSULT TO HOSPITALIST  IP CONSULT TO PULMONOLOGY  IP CONSULT TO HOME CARE NEEDS    Disposition:  Home     Condition at Discharge: Stable    Discharge Instructions/Follow-up:    Follow up with Marcell Nixon MD 2-3 weeks  Return to hospital with chest pain or increased shortness of breath.     Code Status:  Full Code     Activity: activity as tolerated    Diet: regular diet      Discharge Medications:     Current Discharge Medication List           Details   apixaban (ELIQUIS) 5 MG TABS tablet Take 1 tablet by mouth 2 times daily  Qty: 60 tablet, Refills: 2              Details   levothyroxine (SYNTHROID) 125 MCG tablet Take 1 tablet by mouth Daily  Qty: 30 tablet, Refills: 3              Details   SPIRIVA HANDIHALER 18 MCG inhalation capsule INHALE THE CONTENTS OF ONE CAPSULE BY MOUTH VIA HANDINHALER ONCE DAILY AS DIRECTED  Qty: 30 capsule, Refills: 10      labetalol (NORMODYNE) 100 MG tablet TAKE 1 TABLET BY MOUTH EVERY 12 HOURS  Qty: 60 tablet, Refills: 5      furosemide (LASIX) 20 MG tablet TAKE 1 TABLET BY MOUTH TWICE A DAY  Qty: 60 tablet, Refills: 3      spironolactone (ALDACTONE) 25 MG tablet Take 1 tablet by mouth 2 times daily  Qty: 60 tablet, Refills: 5      fluticasone (FLONASE) 50 MCG/ACT nasal spray SPRAY 2 SPRAYS INTO EACH NOSTRIL EVERY DAY  Qty: 16 g, Refills: 5    Associated Diagnoses: Nasal congestion      tiZANidine (ZANAFLEX) 2 MG tablet TAKE 1 TABLET (2 MG) BY MOUTH 2 TIMES DAILY      famotidine (PEPCID) 20 MG tablet TAKE ONE (1) TABLET BY MOUTH TWICE DAILY  Qty: 60 tablet, Refills: 3      albuterol sulfate  (90 Base) MCG/ACT inhaler INHALE TWO (2) PUFFS BY MOUTH EVERY 6 HOURS AS NEEDED  Qty: 18 g, Refills: 10      atorvastatin (LIPITOR) 10 MG tablet TAKE (1) TABLET BY MOUTH ONCE DAILY *EMERGENCY REFILL*  Qty: 30 tablet, Refills: 10      ACTEMRA 162 MG/0.9ML SOSY injection       fluticasone-umeclidin-vilant (TRELEGY ELLIPTA) 100-62.5-25 MCG/INH AEPB Inhale 1 puff into the lungs daily Rinse mouth with water after use  Qty: 1 each, Refills: 5    Associated Diagnoses: Centrilobular emphysema ([facility] [address] Chronic respiratory failure with hypoxia (HCC)      predniSONE (DELTASONE) 10 MG tablet Take 5 mg by mouth daily       !! Misc. Devices (WHEELCHAIR) MISC SELF PROPELLED, LIGHT WEIGHT,  STANDARD SIZE  Qty: 1 each, Refills: 0    Associated Diagnoses: At high risk for falls; Rheumatoid arthritis involving multiple sites with positive rheumatoid factor ([facility] [address] Lumbar pain      ergocalciferol (ERGOCALCIFEROL) 1.25 MG (56941 UT) capsule Take 50,000 Units by mouth every 7 days      !! Misc. Devices [facility]) MISC 1 each by Does not apply route daily as needed (weakness)  Qty: 1 each, Refills: 0      OXYGEN Inhale 3 L/min into the lungs daily as needed       Blood Pressure Monitoring (BLOOD PRESSURE MONITOR/M CUFF) MISC 1 Units by Does not apply route daily  Qty: 1 each, Refills: 0      ipratropium-albuterol (DUONEB) 0.5-2.5 (3) MG/3ML SOLN nebulizer solution Inhale 3 mLs into the lungs every 4 hours as needed. Qty: 360 mL, Refills: 3      nitroGLYCERIN (NITROSTAT) 0.4 MG SL tablet Place 0.4 mg under the tongue every 5 minutes as needed. !! - Potential duplicate medications found. Please discuss with provider.           Time Spent on discharge is more than 45 minutes in the examination, evaluation, counseling and review of medications and discharge plan. Signed:    Pauly Mendoza MD   12/29/2021      Thank you Omer Rodriguez MD for the opportunity to be involved in this patient's care. If you have any questions or concerns please feel free to contact me at [phone].

## 2021-12-29 NOTE — PROGRESS NOTES
Pt. Resting in bed. Alert/oriented. Vitals and assessment stable as charted. O2 92% on 2.5L NC at rest now; (pt states she wears 3L baseline at home). She does have mild dyspnea on exertion. Denies any pain/nausea or any other discomfort at present time. Call light in reach. Will continue to monitor.

## 2021-12-29 NOTE — PROGRESS NOTES
Discharge: Pt discharged to home as per order. IV removed. Prescriptions & instructions reviewed. Pt verbalized understanding. Denied questions. Taken down to [facility] via w\c in stable & ambulatory condition.

## 2021-12-29 NOTE — CARE COORDINATION
CASE MANAGEMENT DISCHARGE SUMMARY      Discharge to: home with [facility]  )     New Durable Medical Equipment ordered/agency: none    Transportation: private        Confirmed discharge plan with:     Patient: yes     Family:  Yes swetha, will have tank     Facility/Agency, name:  [place] faxed pulled per Raj Bragg with [employer]       RN, name: Celeste Chiang RN

## 2021-12-30 ENCOUNTER — TELEPHONE (OUTPATIENT)
Dept: FAMILY MEDICINE CLINIC | Age: 81
End: 2021-12-30

## 2021-12-30 ENCOUNTER — CARE COORDINATION (OUTPATIENT)
Dept: CASE MANAGEMENT | Age: 81
End: 2021-12-30

## 2021-12-30 DIAGNOSIS — J98.11 ATELECTASIS: Primary | ICD-10-CM

## 2021-12-30 NOTE — TELEPHONE ENCOUNTER
Has new patient appt in Feb with Dr. Crowell Rater. Any way someone can see her sooner for TCM or is she ok to wait?  Please advise

## 2021-12-30 NOTE — TELEPHONE ENCOUNTER
ANDREA for Tiki Masker to call back   Per Marisol Izaguirre please schedule [facility] hospital follow up with Chelsea Engle for the hospital follow up

## 2021-12-30 NOTE — TELEPHONE ENCOUNTER
Do TCM through [facility], unless someone here has an opening and is willing to do it.

## 2021-12-30 NOTE — TELEPHONE ENCOUNTER
Polo He with [facility] calling to schedule patients hosp follow up as she is going to be a new patient here due to Jacek Headley retiring

## 2021-12-30 NOTE — CARE COORDINATION
[facility] Transitions Initial Follow Up Call    Call within 2 business days of discharge: Yes    Patient: Tom Dawn Patient : 1940   MRN: [id number]  Reason for Admission: SOB COVID-19  Discharge Date: 21 RARS: Readmission Risk Score: 9.8 ( )      Last Discharge [facility]       Complaint Diagnosis Description Type Department Provider    21 Shortness of Breath COVID-19 . .. ED to Hosp-Admission (Discharged) (ADMITTED) Mary Hernandez MD; Marielle Terry . .. Spoke with: [address] [facility]     Transitions of Care Initial Call    Was this an external facility discharge? No Discharge Facility: n/a    Challenges to be reviewed by the provider   Additional needs identified to be addressed with provider: No  none             Method of communication with provider : none      Advance Care Planning:   Does patient have an Advance Directive: reviewed and current. Was this a readmission? No  Patient stated reason for admission: covid   Patients top risk factors for readmission: medical condition-.    Care Transition Nurse (CTN) contacted the patient by telephone to perform post hospital discharge assessment. Verified name and  with patient as identifiers. Provided introduction to self, and explanation of the CTN role. CTN reviewed discharge instructions, medical action plan and red flags with patient who verbalized understanding. Patient given an opportunity to ask questions and does not have any further questions or concerns at this time. Were discharge instructions available to patient? Yes. Reviewed appropriate site of care based on symptoms and resources available to patient including: PCP, Specialist and When to call 911. The patient agrees to contact the PCP office for questions related to their healthcare. Medication reconciliation was performed with patient, who verbalizes understanding of administration of home medications.  Advised obtaining a 90-day supply of all daily and as-needed medications. Covid Risk Education     Educated patient about risk for severe COVID-19 due to risk factors according to CDC guidelines. CTN reviewed discharge instructions, medical action plan and red flag symptoms with the patient who verbalized understanding. Discussed COVID vaccination status: Yes. Education provided on COVID-19 vaccination as appropriate. Discussed exposure protocols and quarantine with CDC Guidelines. Patient was given an opportunity to verbalize any questions and concerns and agrees to contact CTN or health care provider for questions related to their healthcare. Reviewed and educated patient on any new and changed medications related to discharge diagnosis. Was patient discharged with a pulse oximeter? No Discussed and confirmed pulse oximeter discharge instructions and when to notify provider or seek emergency care. CTN provided contact information. Plan for follow-up call in 5-7 days based on severity of symptoms and risk factors. Plan for next call: F/u apt, symptoms/breathing/cough/HC/Meds     Spoke with patient who reported she is doing alright just tired and continues to have coughing spells. Patient reported her breathing is stable and she denied any worsening sob at this time. CTN reviewed all medications with patient who reported she has all new prescriptions and her granddaughter is helping her getting all medications straighten out. CTN reached out to PCP office to setup follow up apt and PCP office to contact CTN back. CTN contact [facility] and per intake patient is scheduled for soc today. CTN encouraged patient to reach out to her doctor or return to ER if symptoms worsen. CTN scheduled [facility]V HFU with Rae Brownlee for 1/3/22.            Care Transitions 24 Hour Call    Do you have any ongoing symptoms?: Yes  Patient-reported symptoms: Fatigue  Do you have a copy of your discharge instructions?: Yes  Do you have all of your prescriptions and are they filled?: Yes  Have you been contacted by a [address]?: No  Were you discharged with any Home Care or Post Acute Services: Yes  Post Acute Services: [facility] [address] you feel like you have everything you need to keep you well at home?: Yes  Care Transitions Interventions         Follow Up  Future Appointments   Date Time Provider Bossman Nevarez   2/7/2022  4:00 PM DO MARTA Griffin  [place] - [facility]   6/7/2022 11:20 AM Arrie Sever, MD AND ROSA ELENA GARCIAN, RN, [facility]  Care Transition Nurse  [phone] mobile

## 2022-01-03 ENCOUNTER — TELEMEDICINE (OUTPATIENT)
Dept: FAMILY MEDICINE CLINIC | Age: 82
End: 2022-01-03
Payer: COMMERCIAL

## 2022-01-03 DIAGNOSIS — I27.20 PULMONARY HTN (HCC): ICD-10-CM

## 2022-01-03 DIAGNOSIS — J12.82 PNEUMONIA DUE TO COVID-19 VIRUS: Primary | ICD-10-CM

## 2022-01-03 DIAGNOSIS — M05.79 RHEUMATOID ARTHRITIS INVOLVING MULTIPLE SITES WITH POSITIVE RHEUMATOID FACTOR (HCC): ICD-10-CM

## 2022-01-03 DIAGNOSIS — U07.1 PNEUMONIA DUE TO COVID-19 VIRUS: Primary | ICD-10-CM

## 2022-01-03 DIAGNOSIS — I10 ESSENTIAL HYPERTENSION: ICD-10-CM

## 2022-01-03 DIAGNOSIS — J43.2 CENTRILOBULAR EMPHYSEMA (HCC): ICD-10-CM

## 2022-01-03 PROCEDURE — 99214 OFFICE O/P EST MOD 30 MIN: CPT | Performed by: PHYSICIAN ASSISTANT

## 2022-01-03 PROCEDURE — 1111F DSCHRG MED/CURRENT MED MERGE: CPT | Performed by: PHYSICIAN ASSISTANT

## 2022-01-03 ASSESSMENT — ENCOUNTER SYMPTOMS
SINUS PAIN: 0
SHORTNESS OF BREATH: 0
NAUSEA: 0
COUGH: 0
WHEEZING: 0
DIARRHEA: 0
RHINORRHEA: 0
SORE THROAT: 0
APNEA: 0
ABDOMINAL PAIN: 0
EYES NEGATIVE: 1

## 2022-01-03 NOTE — PROGRESS NOTES
Post-Discharge Transitional Care Management Services or Hospital Follow Up      Nilsa Miles   YOB: 1940    Date of Office Visit:  1/3/2022  Date of Hospital Admission: 12/26/21  Date of Hospital Discharge: 12/29/21  Readmission Risk Score(high >=14%.  Medium >=10%):Readmission Risk Score: 9.8 ( )      Care management risk score Rising risk (score 2-5) and Complex Care (Scores >=6): 5     Non face to face  following discharge, date last encounter closed (first attempt may have been earlier): 12/30/2021  2:52 PM 12/30/2021  2:52 PM    Call initiated 2 business days of discharge: Yes     Patient Active Problem List   Diagnosis    Centrilobular emphysema ([address]    Pure hypercholesterolemia    Rheumatoid arthritis involving multiple sites with positive rheumatoid factor ([address]    Essential hypertension    Dizziness    Age-related osteoporosis without current pathological fracture    Unexplained weight loss    Acquired hypothyroidism    Cervical disc disorder at C4-C5 level with radiculopathy    Epigastric pain    Sciatica of left side    Influenza    Pulmonary HTN ([address]    Hypotension    Hypokalemia    Normal anion gap metabolic acidosis    Anemia    Pulmonary infiltrates    Atelectasis    HEBERT on CPAP    ASD (atrial septal defect)    Aspiration pneumonia of both upper lobes (HCC)    Esophageal motility disorder    Former smoker    Reactive depression    Nasal congestion    CSA (central sleep apnea)    Hypoxemia    Pneumonia due to COVID-19 virus    Uncontrolled hypertension    Elevated d-dimer    Elevated troponin    Acute deep vein thrombosis (DVT) of calf muscle vein of both lower extremities (HCC)       Allergies   Allergen Reactions    Alendronate Sodium      Jaw pain      Humira [Adalimumab]      Rash      Losartan Swelling    Penicillins      rash    Simvastatin Other (See Comments)     Made legs ache    Sulfa Antibiotics Swelling     Tongue swelled    Tape Irvin Vazquezer Tape]        Medications listed as ordered at the time of discharge from hospital     Medication List          Accurate as of January 3, 2022 11:47 AM. If you have any questions, ask your nurse or doctor. CONTINUE taking these medications    Actemra 162 MG/0.9ML Sosy injection  Generic drug: tocilizumab     albuterol sulfate  (90 Base) MCG/ACT inhaler  INHALE TWO (2) PUFFS BY MOUTH EVERY 6 HOURS AS NEEDED     apixaban 5 MG Tabs tablet  Commonly known as: ELIQUIS  Take 1 tablet by mouth 2 times daily     atorvastatin 10 MG tablet  Commonly known as: LIPITOR  TAKE (1) TABLET BY MOUTH ONCE DAILY *EMERGENCY REFILL*     Blood Pressure Monitor/M Cuff Misc  1 Units by Does not apply route daily     ergocalciferol 1.25 MG (84907 UT) capsule  Commonly known as: ERGOCALCIFEROL     famotidine 20 MG tablet  Commonly known as: PEPCID  TAKE ONE (1) TABLET BY MOUTH TWICE DAILY     fluticasone 50 MCG/ACT nasal spray  Commonly known as: FLONASE  SPRAY 2 SPRAYS INTO EACH NOSTRIL EVERY DAY     fluticasone-umeclidin-vilant 100-62.5-25 MCG/INH Aepb  Commonly known as: TRELEGY ELLIPTA  Inhale 1 puff into the lungs daily Rinse mouth with water after use     furosemide 20 MG tablet  Commonly known as: LASIX  TAKE 1 TABLET BY MOUTH TWICE A DAY     ipratropium-albuterol 0.5-2.5 (3) MG/3ML Soln nebulizer solution  Commonly known as: DUONEB  Inhale 3 mLs into the lungs every 4 hours as needed.      labetalol 100 MG tablet  Commonly known as: NORMODYNE  TAKE 1 TABLET BY MOUTH EVERY 12 HOURS     levothyroxine 125 MCG tablet  Commonly known as: SYNTHROID  Take 1 tablet by mouth Daily     nitroGLYCERIN 0.4 MG SL tablet  Commonly known as: NITROSTAT     OXYGEN     predniSONE 10 MG tablet  Commonly known as: DELTASONE     Spiriva HandiHaler 18 MCG inhalation capsule  Generic drug: tiotropium  INHALE THE CONTENTS OF ONE CAPSULE BY MOUTH VIA HANDINHALER ONCE DAILY AS DIRECTED     spironolactone 25 MG tablet  Commonly known as: ALDACTONE  Take 1 tablet by mouth 2 times daily     tiZANidine 2 MG tablet  Commonly known as: ZANAFLEX     Wheelchair Misc  SELF PROPELLED, LIGHT WEIGHT,  STANDARD SIZE              Medications marked \"taking\" at this time  Outpatient Medications Marked as Taking for the 1/3/22 encounter (Telemedicine) with CHRISSY Wlels   Medication Sig Dispense Refill    apixaban (ELIQUIS) 5 MG TABS tablet Take 1 tablet by mouth 2 times daily 60 tablet 2    levothyroxine (SYNTHROID) 125 MCG tablet Take 1 tablet by mouth Daily 30 tablet 3    SPIRIVA HANDIHALER 18 MCG inhalation capsule INHALE THE CONTENTS OF ONE CAPSULE BY MOUTH VIA HANDINHALER ONCE DAILY AS DIRECTED 30 capsule 10    labetalol (NORMODYNE) 100 MG tablet TAKE 1 TABLET BY MOUTH EVERY 12 HOURS 60 tablet 5    furosemide (LASIX) 20 MG tablet TAKE 1 TABLET BY MOUTH TWICE A DAY 60 tablet 3    spironolactone (ALDACTONE) 25 MG tablet Take 1 tablet by mouth 2 times daily 60 tablet 5    fluticasone (FLONASE) 50 MCG/ACT nasal spray SPRAY 2 SPRAYS INTO EACH NOSTRIL EVERY DAY 16 g 5    tiZANidine (ZANAFLEX) 2 MG tablet TAKE 1 TABLET (2 MG) BY MOUTH 2 TIMES DAILY      famotidine (PEPCID) 20 MG tablet TAKE ONE (1) TABLET BY MOUTH TWICE DAILY 60 tablet 3    albuterol sulfate  (90 Base) MCG/ACT inhaler INHALE TWO (2) PUFFS BY MOUTH EVERY 6 HOURS AS NEEDED 18 g 10    atorvastatin (LIPITOR) 10 MG tablet TAKE (1) TABLET BY MOUTH ONCE DAILY *EMERGENCY REFILL* 30 tablet 10    ACTEMRA 162 MG/0.9ML SOSY injection       fluticasone-umeclidin-vilant (TRELEGY ELLIPTA) 100-62.5-25 MCG/INH AEPB Inhale 1 puff into the lungs daily Rinse mouth with water after use 1 each 5    predniSONE (DELTASONE) 10 MG tablet Take 5 mg by mouth daily       Misc.  Devices (WHEELCHAIR) MISC SELF PROPELLED, LIGHT WEIGHT,  STANDARD SIZE 1 each 0    ergocalciferol (ERGOCALCIFEROL) 1.25 MG (18624 UT) capsule Take 50,000 Units by mouth every 7 days      OXYGEN Inhale 3 L/min into the lungs daily as needed       ipratropium-albuterol (DUONEB) 0.5-2.5 (3) MG/3ML SOLN nebulizer solution Inhale 3 mLs into the lungs every 4 hours as needed. 360 mL 3    nitroGLYCERIN (NITROSTAT) 0.4 MG SL tablet Place 0.4 mg under the tongue every 5 minutes as needed. Medications patient taking as of now reconciled against medications ordered at time of hospital discharge: Yes    Chief Complaint   Patient presents with    Positive For Covid-19     tested positive on 12/26/2021, pt reports no symptoms, cannot smell but can taste    Follow-Up from Hospital       HPI    Inpatient course: Discharge summary reviewed- see chart. Interval history/Current status: Feeling a bit better today. Jun Avers at home with her. Is ambulating. Able to take a deep breath and cough up the phlegn. Home health and PT coming out soon. Meds unchanged. Review of Systems   Constitutional: Negative. HENT: Negative for congestion, rhinorrhea, sinus pain and sore throat. Eyes: Negative. Respiratory: Negative for apnea, cough, shortness of breath and wheezing. Cardiovascular: Negative for chest pain and palpitations. Gastrointestinal: Negative for abdominal pain, diarrhea and nausea. Musculoskeletal: Negative for arthralgias, myalgias and neck pain. Skin: Negative for rash. Neurological: Negative for syncope, numbness and headaches. There were no vitals filed for this visit. There is no height or weight on file to calculate BMI.    Wt Readings from Last 3 Encounters:   12/26/21 195 lb (88.5 kg)   12/07/21 194 lb (88 kg)   11/09/21 199 lb (90.3 kg)     BP Readings from Last 3 Encounters:   12/29/21 134/76   12/07/21 125/67   11/09/21 (!) 162/92       Physical Exam    Patient-Reported Vitals 1/3/2022   Patient-Reported Weight 195lb   Patient-Reported Height 5'6   Patient-Reported Systolic 596   Patient-Reported Diastolic 68   Patient-Reported Pulse 71   Patient-Reported Temperature 98.1 Patient-Reported SpO2 94      PHYSICAL EXAMINATION:   Constitutional: [x] Appears well-developed and well-nourished [x] No apparent distress      [] Abnormal- Looks sick, non toxic  Mental status  [x] Alert and awake  [x] Oriented to person/place/time [x]Able to follow commands      Eyes:  EOM    [x]  Normal  [] Abnormal-  Sclera  [x]  Normal  [] Abnormal -         Discharge [x]  None visible  [] Abnormal -    HENT:   [x] Normocephalic, atraumatic. [] Abnormal   [x] Mouth/Throat: Mucous membranes are moist.     External Ears [x] Normal  [] Abnormal-     Neck: [x] No visualized mass     Pulmonary/Chest: [x] Respiratory effort normal.  [x] No visualized signs of difficulty breathing or respiratory distress . On O2 NC         Musculoskeletal:   [] Normal gait with no signs of ataxia         [x] Normal range of motion of neck   [] Abnormal-       Neurological:        [x] No Facial Asymmetry (Cranial nerve 7 motor function) (limited exam to video visit)          [x] No gaze palsy   [] Abnormal-         Skin:        [x] No significant exanthematous lesions or discoloration noted on facial skin    [] Abnormal-            Psychiatric:       [x] Normal Affect [x] No Hallucinations    [] Abnormal-   Other pertinent observable physical exam findings-     Assessment/Plan:  1. Pneumonia due to COVID-19 virus   2. Centrilobular emphysema ([address]   3. Rheumatoid arthritis involving multiple sites with positive rheumatoid factor ([facility]   4. Essential hypertension  5. Pulmonary HTN ([facility]    - Home health and PT pending.   - improving. Stressed imptc of regular ambulation  - take deep inspirations and cough up and out phlegm  - if worse, return to office or go to ER.    - has appointment with new PMD in Feb.     Medical Decision Making: high complexity      being evaluated by a Virtual Visit (video visit) encounter to address concerns as mentioned above. A caregiver was present when appropriate.  Due to this being a TeleHealth encounter (During JRPZB-37 public health emergency), evaluation of the following organ systems was limited: Vitals/Constitutional/EENT/Resp/CV/GI//MS/Neuro/Skin/Heme-Lymph-Imm. Pursuant to the emergency declaration under the [address] [place] [address], [address] [place] [address] authority and the [employer] Act, this Virtual Visit was conducted with patient's (and/or legal guardian's) consent, to reduce the patient's risk of exposure to COVID-19 and provide necessary medical care. The patient (and/or legal guardian) has also been advised to contact this office for worsening conditions or problems, and seek emergency medical treatment and/or call 911 if deemed necessary. Patient identification was verified at the start of the visit: Yes  Total time spent on this encounter: Not billed by time  Services were provided through a video synchronous discussion virtually to substitute for in-person clinic visit. Patient and provider were located at their individual homes. Electronically signed by CHRISSY Blackwell on 1/3/2022 at 11:47 AM   electronic signature was used to authenticate this note.

## 2022-01-06 ENCOUNTER — CARE COORDINATION (OUTPATIENT)
Dept: CASE MANAGEMENT | Age: 82
End: 2022-01-06

## 2022-01-06 NOTE — CARE COORDINATION
[facility] Transitions Follow Up Call    2022    Patient: Tia Mars  Patient : 1940   MRN: [id number]  Reason for Admission: Covid  Discharge Date: 21 RARS: Readmission Risk Score: 9.8 ( )         Spoke with: [facility] Transitions Follow Up Call    Needs to be reviewed by the provider   Additional needs identified to be addressed with provider: Candie Devine             Method of communication with provider : none      Care Transition Nurse (CTN) contacted the patient by telephone to follow up after admission on 21. Verified name and  with patient as identifiers. Addressed changes since last contact: F/u with PCP  Discussed follow-up appointments. If no appointment was previously scheduled, appointment scheduling offered: Yes. Is follow up appointment scheduled within 7 days of discharge? Yes. Advance Care Planning:   Does patient have an Advance Directive: Reviewed and current  CTN reviewed discharge instructions, medical action plan and red flags with patient and discussed any barriers to care and/or understanding of plan of care after discharge. Discussed appropriate site of care based on symptoms and resources available to patient including: PCP, Specialist, Urgent care clinics, When to call 911 and [address]. The patient agrees to contact the PCP office for questions related to their healthcare. Patients top risk factors for readmission: ineffective coping  Interventions to address risk factors: Obtained and reviewed discharge summary and/or continuity of care documents      Non-[facility] follow up appointment(s):     CTN provided contact information for future needs. Plan for follow-up call in 5-7 days based on severity of symptoms and risk factors. Plan for next call: self management-Exercise as tolerated    This [facility] spoke with pt and pt stated that she is doing well. Patient denied any worsening symptoms.  Denied fever, chills, N/V and any difficulty breathing at this time. Denied difficulty with urination, BMs or appetite. Denied chest pain and SOB. Pt had a f/u with new PCP on 01/03/22 and it went well. No new changes or medications to report or address. Denied any needs and concerns at this time. Advised pt to immediately report any worsening symptoms to the PCP. Patient verbalized understanding and agreed. Kim Adamson LPN, [facility]  PH: [phone]            Care Transitions Subsequent and Final Call    Schedule Follow Up Appointment with PCP: Completed  Subsequent and Final Calls  Do you have any ongoing symptoms?: No  Have your medications changed?: No  Do you have any questions related to your medications?: No  Do you currently have any active services?: No  Are you currently active with any services?: Home Health  Do you have any needs or concerns that I can assist you with?: No  Identified Barriers: None  Care Transitions Interventions  No Identified Needs  Other Interventions:            Follow Up  Future Appointments   Date Time Provider Bossman Nevarez   2/7/2022  4:00 PM DO MARTA Negrete  [place] - [facility]   6/7/2022 11:20 AM Rayshawn Frost MD AND PULM JOSH Adamson LPN

## 2022-01-13 ENCOUNTER — CARE COORDINATION (OUTPATIENT)
Dept: CASE MANAGEMENT | Age: 82
End: 2022-01-13

## 2022-01-13 NOTE — CARE COORDINATION
[facility] Transitions Follow Up Call    2022    Patient: Chantel Workman  Patient : 1940   MRN: [id number]  Reason for Admission: covid  Discharge Date: 21 RARS: Readmission Risk Score: 9.8 ( )         Spoke with: Chantel Workman    Patient contacted regarding COVID-19 diagnosis. Care Transition Nurse contacted the patient by telephone to perform follow-up assessment. Symptoms reviewed with patient who verbalized the following symptoms: no new symptoms and no worsening symptoms. Due to no new or worsening symptoms encounter was not routed to provider for escalation. Patient was given an opportunity to verbalize any questions and concerns and agrees to contact the CTN or health care provider for questions related to their healthcare. Patient answered call and verified . Patient pleasant and agreeable to transition call. Patient noted to have congestion, but stated that it was getting better. Patient was seen by PCP and no changes noted. Denied any CP, SOB, or problems with bowel/bladder. Appetite is fair. Continues to have home care services and aware that visits can be scheduled sooner than scheduled if needed. Denies needs at this time. CTN provided contact information for future reference. Plan for follow-up call in 5-7 days based on severity of symptoms and risk factors. Alexandrea Colmenares RN  Care Transition Nurse  [phone] mobile       Care Transitions Subsequent and Final Call    Subsequent and Final Calls  Do you have any ongoing symptoms?: No  Have your medications changed?: No  Do you have any questions related to your medications?: No  Do you currently have any active services?: Yes  Are you currently active with any services?: Home Health  Do you have any needs or concerns that I can assist you with?: No  Identified Barriers: None  Care Transitions Interventions  Other Interventions:            Follow Up  Future Appointments   Date Time Provider Bossman Nevarez   2/7/2022  4:00 PM DO MARTA Roberts  [place] - [facility]   6/7/2022 11:20 AM Asia Dejesus MD AND ROSA ELENA Santiago RN

## 2022-01-20 ENCOUNTER — CARE COORDINATION (OUTPATIENT)
Dept: CASE MANAGEMENT | Age: 82
End: 2022-01-20

## 2022-01-20 NOTE — CARE COORDINATION
[facility] Transitions Follow Up Call     2022     Patient: Pricilla Sandifer            Patient : 1940   MRN: [id number]     Reason for Admission: covid  Discharge Date: 21     RARS: Readmission Risk Score: 9.8 ( )                      Spoke with: Pricilla Sandifer     Patient contacted regarding COVID-19 diagnosis. Care Transition Nurse contacted the patient by telephone to perform follow-up assessment.       Symptoms reviewed with patient who verbalized the following symptoms: no new symptoms and no worsening symptoms.               Due to no new or worsening symptoms encounter was not routed to provider for escalation.                  Patient was given an opportunity to verbalize any questions and concerns and agrees to contact the CTN or health care provider for questions related to their healthcare.        Patient answered call and verified . Patient pleasant and agreeable to transition call. Patient having arthritis pain due to cold weather. Denied any CP, SOB, or problems with bowel/bladder. Appetite is fair. Continues to have home care services and aware that visits can be scheduled sooner than scheduled if needed. Denies needs at this time. episode ended.  CTN provided contact information for future reference.      Plan for follow-up call in 5-7 days based on severity of symptoms and risk factors.     Darcy Emanuel RN  Care Transition Nurse  [phone] mobile

## 2022-01-27 PROBLEM — R77.8 ELEVATED TROPONIN: Status: RESOLVED | Noted: 2021-12-28 | Resolved: 2022-01-27

## 2022-01-27 PROBLEM — R79.89 ELEVATED TROPONIN: Status: RESOLVED | Noted: 2021-12-28 | Resolved: 2022-01-27

## 2022-02-01 DIAGNOSIS — E03.9 ACQUIRED HYPOTHYROIDISM: ICD-10-CM

## 2022-02-01 DIAGNOSIS — I82.463 ACUTE DEEP VEIN THROMBOSIS (DVT) OF CALF MUSCLE VEIN OF BOTH LOWER EXTREMITIES (HCC): Primary | ICD-10-CM

## 2022-02-01 RX ORDER — LEVOTHYROXINE SODIUM 0.12 MG/1
125 TABLET ORAL DAILY
Qty: 30 TABLET | Refills: 0 | Status: SHIPPED | OUTPATIENT
Start: 2022-02-01 | End: 2022-02-07 | Stop reason: SDUPTHER

## 2022-02-01 NOTE — TELEPHONE ENCOUNTER
1 month of medications sent to pharmacy.  Can discuss additional refills and other medications at upcoming visit

## 2022-02-03 ENCOUNTER — TELEPHONE (OUTPATIENT)
Dept: FAMILY MEDICINE CLINIC | Age: 82
End: 2022-02-03

## 2022-02-07 ENCOUNTER — OFFICE VISIT (OUTPATIENT)
Dept: FAMILY MEDICINE CLINIC | Age: 82
End: 2022-02-07
Payer: COMMERCIAL

## 2022-02-07 VITALS — DIASTOLIC BLOOD PRESSURE: 58 MMHG | SYSTOLIC BLOOD PRESSURE: 114 MMHG

## 2022-02-07 DIAGNOSIS — Z99.89 OSA ON CPAP: ICD-10-CM

## 2022-02-07 DIAGNOSIS — E03.9 ACQUIRED HYPOTHYROIDISM: ICD-10-CM

## 2022-02-07 DIAGNOSIS — I50.32 CHRONIC DIASTOLIC HEART FAILURE (HCC): ICD-10-CM

## 2022-02-07 DIAGNOSIS — I10 ESSENTIAL HYPERTENSION: ICD-10-CM

## 2022-02-07 DIAGNOSIS — J43.2 CENTRILOBULAR EMPHYSEMA (HCC): ICD-10-CM

## 2022-02-07 DIAGNOSIS — I27.20 PULMONARY HTN (HCC): ICD-10-CM

## 2022-02-07 DIAGNOSIS — I82.463 ACUTE DEEP VEIN THROMBOSIS (DVT) OF CALF MUSCLE VEIN OF BOTH LOWER EXTREMITIES (HCC): ICD-10-CM

## 2022-02-07 DIAGNOSIS — J96.11 CHRONIC RESPIRATORY FAILURE WITH HYPOXIA (HCC): ICD-10-CM

## 2022-02-07 DIAGNOSIS — G47.33 OSA ON CPAP: ICD-10-CM

## 2022-02-07 DIAGNOSIS — Q21.10 ASD (ATRIAL SEPTAL DEFECT): ICD-10-CM

## 2022-02-07 DIAGNOSIS — E78.00 PURE HYPERCHOLESTEROLEMIA: ICD-10-CM

## 2022-02-07 DIAGNOSIS — M05.79 RHEUMATOID ARTHRITIS INVOLVING MULTIPLE SITES WITH POSITIVE RHEUMATOID FACTOR (HCC): Primary | ICD-10-CM

## 2022-02-07 PROBLEM — J11.1 INFLUENZA: Status: RESOLVED | Noted: 2018-01-26 | Resolved: 2022-02-07

## 2022-02-07 PROBLEM — R63.4 UNEXPLAINED WEIGHT LOSS: Status: RESOLVED | Noted: 2017-03-17 | Resolved: 2022-02-07

## 2022-02-07 PROBLEM — R91.8 PULMONARY INFILTRATES: Status: RESOLVED | Noted: 2019-03-27 | Resolved: 2022-02-07

## 2022-02-07 PROBLEM — J12.82 PNEUMONIA DUE TO COVID-19 VIRUS: Status: RESOLVED | Noted: 2021-12-26 | Resolved: 2022-02-07

## 2022-02-07 PROBLEM — R10.13 EPIGASTRIC PAIN: Status: RESOLVED | Noted: 2017-10-19 | Resolved: 2022-02-07

## 2022-02-07 PROBLEM — J98.11 ATELECTASIS: Status: RESOLVED | Noted: 2019-03-27 | Resolved: 2022-02-07

## 2022-02-07 PROBLEM — E87.6 HYPOKALEMIA: Status: RESOLVED | Noted: 2019-03-27 | Resolved: 2022-02-07

## 2022-02-07 PROBLEM — U07.1 PNEUMONIA DUE TO COVID-19 VIRUS: Status: RESOLVED | Noted: 2021-12-26 | Resolved: 2022-02-07

## 2022-02-07 PROCEDURE — 3023F SPIROM DOC REV: CPT | Performed by: STUDENT IN AN ORGANIZED HEALTH CARE EDUCATION/TRAINING PROGRAM

## 2022-02-07 PROCEDURE — G8399 PT W/DXA RESULTS DOCUMENT: HCPCS | Performed by: STUDENT IN AN ORGANIZED HEALTH CARE EDUCATION/TRAINING PROGRAM

## 2022-02-07 PROCEDURE — G8484 FLU IMMUNIZE NO ADMIN: HCPCS | Performed by: STUDENT IN AN ORGANIZED HEALTH CARE EDUCATION/TRAINING PROGRAM

## 2022-02-07 PROCEDURE — 1123F ACP DISCUSS/DSCN MKR DOCD: CPT | Performed by: STUDENT IN AN ORGANIZED HEALTH CARE EDUCATION/TRAINING PROGRAM

## 2022-02-07 PROCEDURE — 99204 OFFICE O/P NEW MOD 45 MIN: CPT | Performed by: STUDENT IN AN ORGANIZED HEALTH CARE EDUCATION/TRAINING PROGRAM

## 2022-02-07 PROCEDURE — 4040F PNEUMOC VAC/ADMIN/RCVD: CPT | Performed by: STUDENT IN AN ORGANIZED HEALTH CARE EDUCATION/TRAINING PROGRAM

## 2022-02-07 PROCEDURE — G8417 CALC BMI ABV UP PARAM F/U: HCPCS | Performed by: STUDENT IN AN ORGANIZED HEALTH CARE EDUCATION/TRAINING PROGRAM

## 2022-02-07 PROCEDURE — G8427 DOCREV CUR MEDS BY ELIG CLIN: HCPCS | Performed by: STUDENT IN AN ORGANIZED HEALTH CARE EDUCATION/TRAINING PROGRAM

## 2022-02-07 PROCEDURE — 1036F TOBACCO NON-USER: CPT | Performed by: STUDENT IN AN ORGANIZED HEALTH CARE EDUCATION/TRAINING PROGRAM

## 2022-02-07 PROCEDURE — 1090F PRES/ABSN URINE INCON ASSESS: CPT | Performed by: STUDENT IN AN ORGANIZED HEALTH CARE EDUCATION/TRAINING PROGRAM

## 2022-02-07 RX ORDER — TRAMADOL HYDROCHLORIDE 50 MG/1
TABLET ORAL
COMMUNITY
Start: 2022-02-06 | End: 2022-07-20

## 2022-02-07 RX ORDER — LABETALOL 100 MG/1
100 TABLET, FILM COATED ORAL 2 TIMES DAILY
Qty: 180 TABLET | Refills: 1 | Status: SHIPPED | OUTPATIENT
Start: 2022-02-07 | End: 2022-08-01

## 2022-02-07 RX ORDER — ATORVASTATIN CALCIUM 10 MG/1
10 TABLET, FILM COATED ORAL DAILY
Qty: 90 TABLET | Refills: 1 | Status: SHIPPED | OUTPATIENT
Start: 2022-02-07 | End: 2022-08-01

## 2022-02-07 RX ORDER — FUROSEMIDE 20 MG/1
20 TABLET ORAL 2 TIMES DAILY
Qty: 180 TABLET | Refills: 1 | Status: SHIPPED | OUTPATIENT
Start: 2022-02-07 | End: 2022-08-02

## 2022-02-07 RX ORDER — LEVOTHYROXINE SODIUM 0.12 MG/1
125 TABLET ORAL DAILY
Qty: 90 TABLET | Refills: 1 | Status: SHIPPED | OUTPATIENT
Start: 2022-02-07 | End: 2022-07-27 | Stop reason: DRUGHIGH

## 2022-02-07 ASSESSMENT — PATIENT HEALTH QUESTIONNAIRE - PHQ9
SUM OF ALL RESPONSES TO PHQ QUESTIONS 1-9: 8
SUM OF ALL RESPONSES TO PHQ QUESTIONS 1-9: 8
7. TROUBLE CONCENTRATING ON THINGS, SUCH AS READING THE NEWSPAPER OR WATCHING TELEVISION: 1
1. LITTLE INTEREST OR PLEASURE IN DOING THINGS: 3
5. POOR APPETITE OR OVEREATING: 1
2. FEELING DOWN, DEPRESSED OR HOPELESS: 1
6. FEELING BAD ABOUT YOURSELF - OR THAT YOU ARE A FAILURE OR HAVE LET YOURSELF OR YOUR FAMILY DOWN: 0
3. TROUBLE FALLING OR STAYING ASLEEP: 1
SUM OF ALL RESPONSES TO PHQ9 QUESTIONS 1 & 2: 4
4. FEELING TIRED OR HAVING LITTLE ENERGY: 0
SUM OF ALL RESPONSES TO PHQ QUESTIONS 1-9: 8
9. THOUGHTS THAT YOU WOULD BE BETTER OFF DEAD, OR OF HURTING YOURSELF: 0
SUM OF ALL RESPONSES TO PHQ QUESTIONS 1-9: 8
10. IF YOU CHECKED OFF ANY PROBLEMS, HOW DIFFICULT HAVE THESE PROBLEMS MADE IT FOR YOU TO DO YOUR WORK, TAKE CARE OF THINGS AT HOME, OR GET ALONG WITH OTHER PEOPLE: 1
8. MOVING OR SPEAKING SO SLOWLY THAT OTHER PEOPLE COULD HAVE NOTICED. OR THE OPPOSITE, BEING SO FIGETY OR RESTLESS THAT YOU HAVE BEEN MOVING AROUND A LOT MORE THAN USUAL: 1

## 2022-02-07 ASSESSMENT — SOCIAL DETERMINANTS OF HEALTH (SDOH): HOW HARD IS IT FOR YOU TO PAY FOR THE VERY BASICS LIKE FOOD, HOUSING, MEDICAL CARE, AND HEATING?: NOT HARD AT ALL

## 2022-02-07 NOTE — PROGRESS NOTES
2022    Nikia Jane (:  1940) is a 80 y.o. female, here for evaluation of the following medical concerns:    HPI    COPD  Using Trellegy ellipta  3 to 4 L of oxygen at home  Follows with Dr. Maria Isabel Forman    Severe HEBERT  Started on auto CPAP machine    DVT  Diagnosed   Currently on Eliquis    Rheumatoid arthritis  Follows with rheumatology  5mg prednisone daily previously --> now 10mg daily sine December  Has follow up tomorrow  Worsens with cold weather  Does have times that her hands give out on her, dropped hot water on herself 3 weeks ago  Has tried home PT but did poorly with this. Has done well with aqua therapy in the past.     Does require help with getting on clothes   Has walker to use at home  Also has electric scooter at home  Nurse from [facility] care to home      HTN  Has failed losartan and valsartan due to leg swelling  Labetalol 100 mg twice daily  Prolactin 25 mg twice daily    HF w/o reduced EF  Following with [facility]ologist at [facility]  Would like to establish with new cardiologist      Hypothyroidism  Synthroid 125    Review of Systems  All other systems reviewed and negative    Prior to Visit Medications    Medication Sig Taking?  Authorizing Provider   traMADol (ULTRAM) 50 MG tablet  Yes Historical Provider, MD   apixaban (ELIQUIS) 5 MG TABS tablet Take 1 tablet by mouth 2 times daily Yes Patti Maya DO   levothyroxine (SYNTHROID) 125 MCG tablet Take 1 tablet by mouth Daily Yes Patti Maya DO   labetalol (NORMODYNE) 100 MG tablet Take 1 tablet by mouth 2 times daily Yes Patti Maya DO   atorvastatin (LIPITOR) 10 MG tablet Take 1 tablet by mouth daily Yes Patti Maya DO   furosemide (LASIX) 20 MG tablet Take 1 tablet by mouth 2 times daily Yes Aguada Amira, DO   Handicap Placard MISC by Does not apply route 22 Yes Patti Maya DO   SPIRIVA HANDIHALER 18 MCG inhalation capsule INHALE THE CONTENTS OF ONE CAPSULE BY MOUTH VIA HANDINHALER ONCE DAILY AS DIRECTED Yes ERICA Galvan MD   spironolactone (ALDACTONE) 25 MG tablet Take 1 tablet by mouth 2 times daily Yes Stella Elena MD   fluticasone (FLONASE) 50 MCG/ACT nasal spray SPRAY 2 SPRAYS INTO EACH NOSTRIL EVERY DAY Yes Boby Prakash MD   albuterol sulfate  (90 Base) MCG/ACT inhaler INHALE TWO (2) PUFFS BY MOUTH EVERY 6 HOURS AS NEEDED Yes Stella Elena MD   ACTEMRA 162 MG/0.9ML SOSY injection  Yes Historical Provider, MD   fluticasone-umeclidin-vilant (TRELEGY ELLIPTA) 100-62.5-25 MCG/INH AEPB Inhale 1 puff into the lungs daily Rinse mouth with water after use Yes Dorene Sims MD   predniSONE (DELTASONE) 10 MG tablet Take 5 mg by mouth daily  Yes Historical Provider, MD   Misc. Devices (WHEELCHAIR) MISC SELF PROPELLED, LIGHT WEIGHT,  STANDARD SIZE Yes Stella Elena MD   ergocalciferol (ERGOCALCIFEROL) 1.25 MG (15310 UT) capsule Take 50,000 Units by mouth every 7 days Yes Historical Provider, MD   OXYGEN Inhale 3 L/min into the lungs daily as needed  Yes Historical Provider, MD   Blood Pressure Monitoring (BLOOD PRESSURE MONITOR/M CUFF) MISC 1 Units by Does not apply route daily Yes Stella Elena MD   ipratropium-albuterol (DUONEB) 0.5-2.5 (3) MG/3ML SOLN nebulizer solution Inhale 3 mLs into the lungs every 4 hours as needed. Yes Sahra Khan MD   nitroGLYCERIN (NITROSTAT) 0.4 MG SL tablet Place 0.4 mg under the tongue every 5 minutes as needed.    Yes Historical Provider, MD   famotidine (PEPCID) 20 MG tablet TAKE ONE (1) TABLET BY MOUTH TWICE DAILY  Patient not taking: Reported on 2/7/2022  ERICA Galvan MD        Allergies   Allergen Reactions    Alendronate Sodium      Jaw pain      Humira [Adalimumab]      Rash      Losartan Swelling    Penicillins      rash    Simvastatin Other (See Comments)     Made legs ache    Sulfa Antibiotics Swelling     Tongue swelled    Tape Navi Spitz Tape]        Past Medical History:   Diagnosis Date    Acid reflux     Acquired hypothyroidism 7/6/2017    Anemia     Asthma     CHF [facility]congestive heart failure) [facility]     COPD (chronic obstructive pulmonary disease) ([facility]     HLD (hyperlipidemia)     Hypertension     Influenza A 01/22/2018    MI (myocardial infarction) ([facility]     X 2    Migraine     past hx    Rheumatoid arthritis     Wears glasses        Past Surgical History:   Procedure Laterality Date    BRONCHOSCOPY N/A 3/27/2019    BRONCHOSCOPY ALVEOLAR LAVAGE performed by Doron Colón MD at [address]  3/27/2019    BRONCHOSCOPY THERAPUTIC ASPIRATION INITIAL performed by Doron Colón MD at [address] [facility] Bilateral 03/23/2011    COLONOSCOPY N/A 6/12/2019    COLONOSCOPY WITH ANESTHESIA -SLEEP APNEA- performed by Manuel Dickson MD at [address] [facility] [address] Left 12/4/2018    LEFT LUMBAR FOUR, LUMBAR FIVE TRANSFORAMINAL EPIDURAL STEROID INJECTION SITE CONFIRMED BY FLUOROSCOPY performed by Gabriel Zhou MD at [address] [facility] N/A 7/23/2019    ESOPHAGEAL MOTILITY/MANOMETRY STUDY performed by Kerri Garnica MD at [address] [facility]. little toe    GASTROSTOMY TUBE PLACEMENT N/A 4/1/2019    EGD PEG TUBE PLACEMENT performed by Manuel Dickson MD at [address]      total, fibroids    KNEE SURGERY      right    MA Phillip Trung [address] DX/THER SBST INTRLMNR CRV/THRC W/IMG GDN Left 8/21/2018    LEFT LUMBAR FOUR/ LUMBAR FIVE CYST ASPIRATION SITE CONFIRMED BY FLUOROSCOPY performed by Gabriel Zhou MD at [address] [facility] [address] 3/27/2019    EGD DIAGNOSTIC ONLY performed by Jacquie Saab MD at [address] N/A 9/6/2020    EGD DIAGNOSTIC ONLY performed by Abel Mccauley DO at [address] [facility] History     Socioeconomic History    Marital status: Single Spouse name: Not on file    Number of children: 3    Years of education: Not on file    Highest education level: Not on file   Occupational History    Occupation: disability   Tobacco Use    Smoking status: Former Smoker     Packs/day: 3.00     Years: 50.00     Pack years: 150.00     Types: Cigarettes     Start date: 3/4/1963     Quit date: 2009     Years since quittin.0    Smokeless tobacco: Never Used   Vaping Use    Vaping Use: Never used   Substance and Sexual Activity    Alcohol use: No    Drug use: No    Sexual activity: Not on file   Other Topics Concern    Not on file   Social History Narrative    Not on file     Social Determinants of Health     Financial Resource Strain: Low Risk     Difficulty of Paying Living Expenses: Not hard at all   Food Insecurity:     Worried About [address] [employer] [address] [employer] in the Last Year: Not on file    [address] [facility] [address] in the Last Year: Not on file   Transportation Needs:     Lack of Transportation (Medical): Not on file    Lack of Transportation (Non-Medical):  Not on file   Physical Activity:     Days of Exercise per Week: Not on file    Minutes of Exercise per Session: Not on file   Stress:     Feeling of Stress : Not on file   Social Connections:     Frequency of Communication with Friends and Family: Not on file    Frequency of Social Gatherings with Friends and Family: Not on file    Attends Zoroastrian Services: Not on file    Active Member of [address] [place] [address] or Organizations: Not on file    Attends Club or Organization Meetings: Not on file    Marital Status: Not on file   Intimate Partner Violence:     Fear of Current or Ex-Partner: Not on file    Emotionally Abused: Not on file    Physically Abused: Not on file    Sexually Abused: Not on file   Housing Stability:     Unable to Pay for Housing in the Last Year: Not on file    Number of [place] in the Last Year: Not on file    Unstable Housing in the Last Year: Not on file        Family History   Problem Relation Age of Onset    Cancer Mother     Cancer Father     Heart Disease Maternal Aunt     Cancer Sister        Vitals:    02/07/22 1704   BP: (!) 114/58     Estimated body mass index is 31.47 kg/m² as calculated from the following:    Height as of 12/26/21: 5' 6\" (1.676 m). Weight as of 12/26/21: 195 lb (88.5 kg). Physical Exam  Vitals reviewed. Constitutional:       Appearance: Normal appearance. Comments: Wheelchair bound   HENT:      Head: Normocephalic and atraumatic. Right Ear: Tympanic membrane normal.      Left Ear: Tympanic membrane normal.   Cardiovascular:      Rate and Rhythm: Normal rate and regular rhythm. Pulmonary:      Effort: Pulmonary effort is normal.      Breath sounds: Normal breath sounds. Neurological:      General: No focal deficit present. Mental Status: She is alert and oriented to person, place, and time. Psychiatric:         Behavior: Behavior normal.         Thought Content: Thought content normal.         ASSESSMENT/PLAN:  1. ASD (atrial septal defect)  Previously following with outside cardiologist. Would like to establish with [facility] cardiology  - Gunnar Joyner MD, Cardiology, [facility]    2. Rheumatoid arthritis involving multiple sites with positive rheumatoid factor ([facility] [address]  Following with rheumatologist. Reports significant chronic pain. Discussed need to follow up with rheumatologist and can consider referral to pain management if not improving.   - Handicap Placard MISC; by Does not apply route 02/07/22  Dispense: 1 each; Refill: 0    3. Centrilobular emphysema ([address]  Following with pulmonology    4. Acute deep vein thrombosis (DVT) of calf muscle vein of both lower extremities ([address]  Diagnosed 12/26. ON eliquis. Follow up for evaluation of DVT in 2 month for 3 month post diagnosis evaluation. - apixaban (ELIQUIS) 5 MG TABS tablet; Take 1 tablet by mouth 2 times daily  Dispense: 120 tablet; Refill: 1    5.  Chronic respiratory failure with hypoxia ([facility]  Following with pulm. On 3-4 L Maria Teresa Wren MD, Cardiology, [place]  [facility]; by Does not apply route 02/07/22  Dispense: 1 each; Refill: 0    6. Pulmonary HTN ([facility]  Would like to meet with cardiology. Following with pulm. - Rayo Puente MD, Cardiology, [place]    7. HEBERT on CPAP  Reports non-compliance with CPAP    8. Pure hypercholesterolemia  Will continue current medication  - atorvastatin (LIPITOR) 10 MG tablet; Take 1 tablet by mouth daily  Dispense: 90 tablet; Refill: 1    9. Essential hypertension  BP at goal today. Will continue current medications  - labetalol (NORMODYNE) 100 MG tablet; Take 1 tablet by mouth 2 times daily  Dispense: 180 tablet; Refill: 1    10. Chronic diastolic heart failure ([facility]  Wishing to establish with [facility]. - Rayo [facility]  - labetalol (NORMODYNE) 100 MG tablet; Take 1 tablet by mouth 2 times daily  Dispense: 180 tablet; Refill: 1  - furosemide (LASIX) 20 MG tablet; Take 1 tablet by mouth 2 times daily  Dispense: 180 tablet; Refill: 1    11. Acquired hypothyroidism  TSH stable. Will continue  - levothyroxine (SYNTHROID) 125 MCG tablet; Take 1 tablet by mouth Daily  Dispense: 90 tablet; Refill: 1      No follow-ups on file. An  electronic signature was used to authenticate this note.     --Lyn Bradley, DO on 2/13/2022 at 5:58 PM

## 2022-02-07 NOTE — LETTER
[address]  [facility] [address]  Phone: [phone]  Fax: [address] [facility],          February 7, 2022     Patient: Marla Bowles   YOB: 1940   Date of Visit: 2/7/2022       To Whom It May Concern: It is my medical opinion that Sosa Early requires a disability parking placard for the following reasons:  She uses portable oxygen. Duration of need: permanent    If you have any questions or concerns, please don't hesitate to call.     Sincerely,        Sergio Jones, DO

## 2022-02-09 ENCOUNTER — TELEPHONE (OUTPATIENT)
Dept: FAMILY MEDICINE CLINIC | Age: 82
End: 2022-02-09

## 2022-02-09 DIAGNOSIS — G89.29 OTHER CHRONIC PAIN: ICD-10-CM

## 2022-02-09 DIAGNOSIS — M05.79 RHEUMATOID ARTHRITIS INVOLVING MULTIPLE SITES WITH POSITIVE RHEUMATOID FACTOR (HCC): Primary | ICD-10-CM

## 2022-02-09 NOTE — TELEPHONE ENCOUNTER
Patient daughter notifying you that they did see Shanelle's rheumatologist and they are not able to order her pain medication for a daily use. They did stop her tramadol. Will you order pain meds? She states she was suppose to let you know the outcome of her rheumatologist visit.

## 2022-02-14 ENCOUNTER — TELEPHONE (OUTPATIENT)
Dept: FAMILY MEDICINE CLINIC | Age: 82
End: 2022-02-14

## 2022-02-14 DIAGNOSIS — M05.79 RHEUMATOID ARTHRITIS INVOLVING MULTIPLE SITES WITH POSITIVE RHEUMATOID FACTOR (HCC): Primary | ICD-10-CM

## 2022-02-14 DIAGNOSIS — M54.50 CHRONIC BILATERAL LOW BACK PAIN WITHOUT SCIATICA: ICD-10-CM

## 2022-02-14 DIAGNOSIS — G89.29 CHRONIC BILATERAL LOW BACK PAIN WITHOUT SCIATICA: ICD-10-CM

## 2022-02-14 NOTE — TELEPHONE ENCOUNTER
I called the home number and got tammi I gave her the information and let her know of the importance of scheduling as soon as possible. She claimed understanding.

## 2022-02-14 NOTE — TELEPHONE ENCOUNTER
Patient asia called and states that the Rheumatologist you referred her too does not except her [employer] insurance.  Is requesting another referral Rheumatologist.

## 2022-02-15 NOTE — TELEPHONE ENCOUNTER
Dr. Conor Hamilton office does not accept patient insurance, [facility], as a primary insurance only as a secondary. Please advise if there is another Pain specialist accepting new patients that patient can try.

## 2022-02-16 NOTE — TELEPHONE ENCOUNTER
Would recommend calling insurance and seeeing if they have a list of any nearby providers that accept insurance

## 2022-03-03 NOTE — PROGRESS NOTES
CARDIOLOGY CONSULTATION        Patient Name: Adrian Martel  Primary Care physician: José Carbajal DO    Reason for Referral/Chief Complaint: Adrian Martel is a [de-identified] y.o. patient who is referred to cardiology clinic today for evaluation and treatment of atrial septal defect. History of Present Illness:   Adrian Martel [de-identified] y.o. female has a past medical history of hypertension, Pulmonary hypertension, atrial fibrillation, atrial septal defect, CHF, COPD, HLD,  DVT, and hypothyroidism. She previously followed at [facility] for cardiac care. She is looking to establish care today with a new cardiologist.     Today 3/4/22 she presents to the office with her granddaughter Bree. She reports that she has been having reflux symptoms, and is trialling different medications to treat this. She wears 2-4L at baseline. Chronic stable dyspnea. She presents in a wheelchair and is limited in mobility due to progressive RA and osteoarthritis. She sleeps in a recliner, at a 45 degree angle. She does not sleep in a bed at the moment due to her inability to climb the stairs. She reports that she is unable to sleep flat, and when in a bed requires multiple pillows due to orthopedic issues. The patient denies chest pain, Denies palpitations, dizziness, near-syncope or joel syncope. Denies paroxysmal nocturnal dyspnea, orthopnea, increasing lower extremity edema or weight gain. Past Medical History:   has a past medical history of Acid reflux, Acquired hypothyroidism, Anemia, Asthma, CHF (congestive heart failure) ([address] COPD (chronic obstructive pulmonary disease) ([address] HLD (hyperlipidemia), Hypertension, Influenza A, MI (myocardial infarction) ([address] Migraine, Rheumatoid arthritis, and Wears glasses. Surgical History:   has a past surgical history that includes Hysterectomy; knee surgery; Foot surgery; Wrist surgery;  Cataract removal with implant (Bilateral, 03/23/2011); pr njx dx/ther sbst intrlmnr crv/thrc w/img gdn (Left, 8/21/2018); epidural steroid injection (Left, 12/4/2018); bronchoscopy (N/A, 3/27/2019); bronchoscopy (3/27/2019); Upper gastrointestinal endoscopy (N/A, 3/27/2019); Gastrostomy tube placement (N/A, 4/1/2019); Colonoscopy (N/A, 6/12/2019); esophageal motility study (N/A, 7/23/2019); and Upper gastrointestinal endoscopy (N/A, 9/6/2020). Social History:   reports that she quit smoking about 13 years ago. Her smoking use included cigarettes. She started smoking about 59 years ago. She has a 150.00 pack-year smoking history. She has never used smokeless tobacco. She reports that she does not drink alcohol and does not use drugs. Family History:  family history includes Cancer in her father, mother, and sister; Heart Disease in her maternal aunt. Home Medications:  Were reviewed and are listed in nursing record and/or below  Prior to Admission medications    Medication Sig Start Date End Date Taking?  Authorizing Provider   DULoxetine (CYMBALTA) 30 MG extended release capsule Take 30 mg by mouth daily   Yes Historical Provider, MD   lansoprazole (PREVACID) 30 MG delayed release capsule Take 30 mg by mouth daily   Yes Historical Provider, MD   traMADol (ULTRAM) 50 MG tablet  2/6/22  Yes Historical Provider, MD   apixaban (ELIQUIS) 5 MG TABS tablet Take 1 tablet by mouth 2 times daily 2/7/22  Yes Jasper Maya DO   levothyroxine (SYNTHROID) 125 MCG tablet Take 1 tablet by mouth Daily 2/7/22  Yes Jasper Maya DO   labetalol (NORMODYNE) 100 MG tablet Take 1 tablet by mouth 2 times daily 2/7/22  Yes Jasper Maya DO   atorvastatin (LIPITOR) 10 MG tablet Take 1 tablet by mouth daily 2/7/22  Yes Jasper Maya DO   furosemide (LASIX) 20 MG tablet Take 1 tablet by mouth 2 times daily 2/7/22  Yes Jasper Maya DO   Handicap Placard MISC by Does not apply route 02/07/22 2/7/22  Yes Jasper Maya, DO   SPIRIVA HANDIHALER 18 MCG inhalation capsule INHALE THE CONTENTS OF ONE CAPSULE BY MOUTH VIA HANDINHALER ONCE DAILY AS DIRECTED 12/21/21  Yes ERICA Arreguin MD   spironolactone (ALDACTONE) 25 MG tablet Take 1 tablet by mouth 2 times daily 11/9/21  Yes Seamus Gregory MD   fluticasone (FLONASE) 50 MCG/ACT nasal spray SPRAY 2 SPRAYS INTO EACH NOSTRIL EVERY DAY 9/12/21  Yes Brant Zamudio MD   famotidine (PEPCID) 20 MG tablet TAKE ONE (1) TABLET BY MOUTH TWICE DAILY 7/19/21  Yes Seamus Gregory MD   albuterol sulfate  (90 Base) MCG/ACT inhaler INHALE TWO (2) PUFFS BY MOUTH EVERY 6 HOURS AS NEEDED 6/24/21  Yes Seamus Gregory MD   ACTEMRA 162 MG/0.9ML SOSY injection  5/14/21  Yes Historical Provider, MD   fluticasone-umeclidin-vilant (TRELEGY ELLIPTA) 100-62.5-25 MCG/INH AEPB Inhale 1 puff into the lungs daily Rinse mouth with water after use 6/1/21  Yes Edmar Jacobo MD   predniSONE (DELTASONE) 10 MG tablet Take 5 mg by mouth daily    Yes Historical Provider, MD   Misc. Devices [facility]) MISC SELF PROPELLED, LIGHT WEIGHT,  STANDARD SIZE 1/25/21  Yes Seamus Gregory MD   ergocalciferol (ERGOCALCIFEROL) 1.25 MG (32231 UT) capsule Take 50,000 Units by mouth every 7 days 9/1/20  Yes Historical Provider, MD   OXYGEN Inhale 3 L/min into the lungs daily as needed    Yes Historical Provider, MD   Blood Pressure Monitoring (BLOOD PRESSURE MONITOR/M CUFF) MISC 1 Units by Does not apply route daily 8/6/18  Yes Seamus Gregory MD   ipratropium-albuterol (DUONEB) 0.5-2.5 (3) MG/3ML SOLN nebulizer solution Inhale 3 mLs into the lungs every 4 hours as needed. 2/10/15  Yes Rajni Maxwell MD   nitroGLYCERIN (NITROSTAT) 0.4 MG SL tablet Place 0.4 mg under the tongue every 5 minutes as needed. Yes Historical Provider, MD        CURRENT Medications:  No current facility-administered medications for this visit.       Allergies:  Alendronate sodium, Humira [adalimumab], Losartan, Penicillins, Simvastatin, Sulfa antibiotics, and Tape [adhesive tape]     Review of Systems:   A 14 point review of symptoms completed. Pertinent positives identified in the HPI, all other review of symptoms negative as below. Objective:     Vitals:    03/04/22 0851   BP: 124/62   Pulse: 89   SpO2: 98%    Weight: 183 lb (83 kg)       PHYSICAL EXAM:    General:  Elderly AAF sitting in wheelchair, NAD,Alert and oriented   Head:  Normocephalic, atraumatic   Eyes:  Conjunctiva/corneas clear, anicteric sclerae    Nose: Nares normal, no drainage or sinus tenderness   Throat: No abnormalities of the lips, oral mucosa or tongue. Neck: Trachea midline. Neck supple with no lymphadenopathy, thyroid not enlarged, symmetric, no tenderness/mass/nodules, no Jugular venous pressure elevation    Lungs:   Diminished at the bases, no wheezes, no rales, no respiratory distress. Maintaining sats on 3L O2   Chest Wall:  No deformity or tenderness to palpation   Heart:  Regular rate and rhythm, normal S1, normal S2, no murmur, no rub, no S3/S4, PMI non-displaced. Abdomen:   Soft, non-tender, with normoactive bowel sounds. No masses, no hepatosplenomegaly   Extremities: No cyanosis, clubbing or pitting edema. Vascular: 2+ radial, dec dorsalis pedis and posterior tibial pulses bilaterally. Brisk carotid upstrokes without carotid bruit. Skin: Skin color, texture, turgor are normal with no rashes or ulceration. Pysch: Euthymic mood, appropriate affect   Neurologic: Oriented to person, place and time. No slurred speech or facial asymmetry. No motor or sensory deficits on gross examination.          Labs:   CBC:   Lab Results   Component Value Date    WBC 7.3 12/27/2021    RBC 4.28 12/27/2021    HGB 12.4 12/27/2021    HCT 37.0 12/27/2021    MCV 86.5 12/27/2021    RDW 14.1 12/27/2021     12/27/2021     CMP:  Lab Results   Component Value Date     12/27/2021    K 4.0 12/27/2021     12/27/2021    CO2 25 12/27/2021    BUN 21 12/27/2021    CREATININE 1.1 12/27/2021    GFRAA 58 12/27/2021    GFRAA >60 04/05/2012 AGRATIO 1.5 12/27/2021    LABGLOM 48 12/27/2021    GLUCOSE 122 12/27/2021    PROT 6.3 12/27/2021    PROT 7.6 03/14/2012    CALCIUM 9.0 12/27/2021    BILITOT 0.5 12/27/2021    ALKPHOS 35 12/27/2021    AST 29 12/27/2021    ALT 21 12/27/2021     PT/INR:  No results found for: PTINR  HgBA1c:  Lab Results   Component Value Date    LABA1C 6.3 04/03/2019     Lab Results   Component Value Date    TROPONINI 0.03 (H) 12/26/2021         Cardiac Data:     EKG: personally reviewed today, with my interpretation: NSR with PAC, otherwise normal ECG. Echo 7/19/21:  Summary   Left ventricular systolic function is hyperdynamic with an estimated   ejection fraction of >= 65%. The left ventricle is normal in size with normal wall thickness. Normal left ventricular diastolic function. The right atrium is mildly enlarged. Mild tricuspid regurgitation. Systolic pulmonary artery pressure (SPAP) is elevated and estimated at 39   mmHg (right atrial pressure 3 mmHg) consistent with borderline pulmonary   hypertension. Echo [facility])  5/29/19:  Study Conclusions     - Left ventricle: The cavity size was normal. Wall thickness was     normal. Systolic function was normal. The calculated ejection     fraction was in the range of 64% to 68%. Normal diastolic     function.   - Aortic valve: Peak gradient (S): 6mm Hg.   - Tricuspid valve: Regurgitant peak velocity: 314cm/sec. Peak RV-RA     gradient (S): 39mm Hg. - Inferior vena cava: The vessel was normal in size; the     respirophasic diameter changes were in the normal range (>= 50%);     findings are consistent with normal central venous pressure. - Pulmonary arteries: PA peak pressure: 42mm Hg (S). Stress Test 1/15/19:   Summary  There is normal isotope uptake at stress and rest. There is no evidence of  myocardial ischemia or scar. LV function is normal with uniform wall motion  and ejection fraction of 67 %. Lower risk study.       Stress Protocols   Resting ECG  Normal sinus rhythm. Resting HR:82 bpm  Resting BP:162/113 mmHg    Stress Protocol:Pharmacologic - Lexiscan's   Peak HR:101 bpm                                HR/BP product:[id number]  Peak BP:164/84 mmHg  Predicted HR: 144 bpm  % of predicted HR: 70  Test duration: 11 min and 30 sec  Reason for termination:Completed   ECG Findings  <0.5mm ST segment depression. Arrhythmias  Occasional PVC's. Symptoms  Patient developed shortness of breath and Lexiscan likely related to  [address] [place] [address]. Symptoms resolved with aminophylline. Right heart catheterization with Nitric ([facility]) 5/15/18:  Impression:  1. Normal filling pressures as evidenced by a right atrial   pressure of 5 and a mean pulmonary capillary wedge pressure of   10.   2. Mild pulmonary hypertension with a PA pressure of 31/15   (mean:20) and a PVR of 2.7 (thermodilution)-3.2 (chris) [place]   Units. 3. Borderline normal cardiac output and index: Chris: 3 and 1.8. Thermodilution: 3.6 and 2.3.   4. No oxygenation step up between the right atrium and the   pulmonary artery: RA: 54.4% and PA: 54.6%       Additional studies:    CT chest pulmonary 12/27/21:  No central pulmonary embolus identified. Distal pulmonary branches are not well evaluated secondary to motion. There is severe coronary artery calcification  Bandlike opacities in the lung bases, similar to prior. Bandlike morphology favors atelectasis or scarring rather than pneumonia. There is severe emphysema. No pulmonary edema     Bilateral Venous Extremity 12/27/21:  Summary  Acute deep vein thrombosis involving the right gastrocnemius veins. Acute deep vein thrombosis involving the left soleal vein. Impression and Plan:      1. Paroxysmal Atrial fibrillation - maintaining NSR today  2. Longterm use oral anti-coagulant  3. HFpEF - euvolemic by exam with stable chronic dyspnea  4. CAD by CT, without angina   5. DVT 12/2021 - on AC  6. HEBERT on CPAP  7.  COPD with chronic hypoxic resp failure, on 3L NC baseline  8. Hypertension  9. Pulmonary hypertension, mild  10. RA    Patient Active Problem List   Diagnosis    Centrilobular emphysema (HCC)    Pure hypercholesterolemia    Rheumatoid arthritis involving multiple sites with positive rheumatoid factor ([address]    Essential hypertension    Age-related osteoporosis without current pathological fracture    Acquired hypothyroidism    Cervical disc disorder at C4-C5 level with radiculopathy    Sciatica of left side    Pulmonary HTN ([address]    Anemia    HEBERT on CPAP    ASD (atrial septal defect)    Esophageal motility disorder    Former smoker    Chronic respiratory failure with hypoxia (HCC)    Reactive depression    Nasal congestion    CSA (central sleep apnea)    Hypoxemia    Uncontrolled hypertension    Elevated d-dimer    Acute deep vein thrombosis (DVT) of calf muscle vein of both lower extremities ([address]       PLAN  1. START Zetia 10mg daily in addition to max tolerated statin for Lipid management in setting of CAD  2. Continue lasix BID, aldactone BID  3. Eliquis BID  4. Labetalol for BP - at goal < 130/80  5. No cardiac studies as this time. Follow up with me in 6 months    Scribe's attestation: This note was scribed in the presence of Dr. Almyra Holstein, M.D. By Clair Fischer RN    The scribes documentation has been prepared under my direction and personally reviewed by me in its entirety. I confirm that the note above accurately reflects all work, treatment, procedures, and medical decision making performed by me. Gerri Cerrato MD, personally performed the services described in this documentation as scribed by Clair Fischer RN in my presence, and it is both accurate and complete to the best of our ability. I will address the patient's cardiac risk factors and adjusted pharmacologic treatment as needed. In addition, I have reinforced the need for patient directed risk factor modification.   All questions and concerns were addressed to the patient/family. Alternatives to my treatment were discussed. Thank you for allowing us to participate in the care of Bettie Strauss. Please call me with any questions [phone].     Kip Lobo MD, [facility]  Cardiovascular Disease  [facility]  [phone] [address] [facility]  [phone] [address] [place] [address]  3/4/2022 9:21 AM

## 2022-03-04 ENCOUNTER — OFFICE VISIT (OUTPATIENT)
Dept: CARDIOLOGY CLINIC | Age: 82
End: 2022-03-04
Payer: COMMERCIAL

## 2022-03-04 VITALS
HEIGHT: 66 IN | WEIGHT: 183 LBS | SYSTOLIC BLOOD PRESSURE: 124 MMHG | OXYGEN SATURATION: 98 % | HEART RATE: 89 BPM | BODY MASS INDEX: 29.41 KG/M2 | DIASTOLIC BLOOD PRESSURE: 62 MMHG

## 2022-03-04 DIAGNOSIS — Z76.89 ESTABLISHING CARE WITH NEW DOCTOR, ENCOUNTER FOR: Primary | ICD-10-CM

## 2022-03-04 DIAGNOSIS — E78.5 HYPERLIPIDEMIA, UNSPECIFIED HYPERLIPIDEMIA TYPE: ICD-10-CM

## 2022-03-04 PROCEDURE — 1123F ACP DISCUSS/DSCN MKR DOCD: CPT | Performed by: INTERNAL MEDICINE

## 2022-03-04 PROCEDURE — G8427 DOCREV CUR MEDS BY ELIG CLIN: HCPCS | Performed by: INTERNAL MEDICINE

## 2022-03-04 PROCEDURE — 1036F TOBACCO NON-USER: CPT | Performed by: INTERNAL MEDICINE

## 2022-03-04 PROCEDURE — 93000 ELECTROCARDIOGRAM COMPLETE: CPT | Performed by: INTERNAL MEDICINE

## 2022-03-04 PROCEDURE — 99204 OFFICE O/P NEW MOD 45 MIN: CPT | Performed by: INTERNAL MEDICINE

## 2022-03-04 PROCEDURE — G8417 CALC BMI ABV UP PARAM F/U: HCPCS | Performed by: INTERNAL MEDICINE

## 2022-03-04 PROCEDURE — G8399 PT W/DXA RESULTS DOCUMENT: HCPCS | Performed by: INTERNAL MEDICINE

## 2022-03-04 PROCEDURE — 4040F PNEUMOC VAC/ADMIN/RCVD: CPT | Performed by: INTERNAL MEDICINE

## 2022-03-04 PROCEDURE — G8484 FLU IMMUNIZE NO ADMIN: HCPCS | Performed by: INTERNAL MEDICINE

## 2022-03-04 PROCEDURE — 1090F PRES/ABSN URINE INCON ASSESS: CPT | Performed by: INTERNAL MEDICINE

## 2022-03-04 RX ORDER — DULOXETIN HYDROCHLORIDE 30 MG/1
30 CAPSULE, DELAYED RELEASE ORAL DAILY
COMMUNITY

## 2022-03-04 RX ORDER — EZETIMIBE 10 MG/1
10 TABLET ORAL DAILY
Qty: 90 TABLET | Refills: 1 | Status: SHIPPED | OUTPATIENT
Start: 2022-03-04 | End: 2022-08-02

## 2022-03-04 RX ORDER — LANSOPRAZOLE 30 MG/1
30 CAPSULE, DELAYED RELEASE ORAL DAILY
COMMUNITY
End: 2022-07-20

## 2022-03-04 NOTE — PATIENT INSTRUCTIONS
PLAN  1. START taking Zetia 10mg daily for tighter cholesterol control  2.  Continue all other current medications as prescribed, no other changes made today    Follow up with me in 6 months

## 2022-03-07 ENCOUNTER — TELEPHONE (OUTPATIENT)
Dept: FAMILY MEDICINE CLINIC | Age: 82
End: 2022-03-07

## 2022-03-07 DIAGNOSIS — M05.79 RHEUMATOID ARTHRITIS INVOLVING MULTIPLE SITES WITH POSITIVE RHEUMATOID FACTOR (HCC): Primary | ICD-10-CM

## 2022-03-07 NOTE — TELEPHONE ENCOUNTER
Pt. Care giver asking for referral to [facility] on [address].  DX: Rheumatoid Arthritis

## 2022-03-09 DIAGNOSIS — R09.81 NASAL CONGESTION: ICD-10-CM

## 2022-03-09 RX ORDER — SPIRONOLACTONE 25 MG/1
25 TABLET ORAL 2 TIMES DAILY
Qty: 180 TABLET | Refills: 1 | Status: SHIPPED | OUTPATIENT
Start: 2022-03-09 | End: 2022-09-01

## 2022-03-09 RX ORDER — FLUTICASONE PROPIONATE 50 MCG
SPRAY, SUSPENSION (ML) NASAL
Qty: 1 EACH | Refills: 5 | Status: SHIPPED | OUTPATIENT
Start: 2022-03-09 | End: 2022-08-22 | Stop reason: SDUPTHER

## 2022-03-09 RX ORDER — SPIRONOLACTONE 25 MG/1
TABLET ORAL
Qty: 60 TABLET | Refills: 5 | OUTPATIENT
Start: 2022-03-09

## 2022-03-09 NOTE — TELEPHONE ENCOUNTER
Refill Request     Last Seen: Last Seen Department: 2/7/2022  Last Seen by PCP: 2/7/2022    Last Written: 11/09/2021 60 Tablet 5 Refill    Next Appointment:   Future Appointments   Date Time Provider Bossman Grei   4/25/2022  8:30 AM DO MARTA Galdamez  [place] [facility]   6/7/2022 11:20 AM Guilherme Heart MD AND [facility]   9/22/2022  8:45 AM MD Clarissa Rand [facility]       Future appointment scheduled      Requested Prescriptions     Pending Prescriptions Disp Refills    spironolactone (ALDACTONE) 25 MG tablet 60 tablet 5     Sig: Take 1 tablet by mouth 2 times daily

## 2022-03-21 ENCOUNTER — TELEPHONE (OUTPATIENT)
Dept: FAMILY MEDICINE CLINIC | Age: 82
End: 2022-03-21

## 2022-03-21 NOTE — TELEPHONE ENCOUNTER
Pt can not get in with pain management for 3 months. Pt granddaughter calling to see if pt can have medication in the mean time. Please advise.

## 2022-03-23 NOTE — TELEPHONE ENCOUNTER
Please schedule office visit to complete pain contract and discuss medications.  Can start short term script while awaiting pain management

## 2022-03-24 ENCOUNTER — OFFICE VISIT (OUTPATIENT)
Dept: FAMILY MEDICINE CLINIC | Age: 82
End: 2022-03-24
Payer: COMMERCIAL

## 2022-03-24 VITALS — OXYGEN SATURATION: 97 % | SYSTOLIC BLOOD PRESSURE: 130 MMHG | HEART RATE: 82 BPM | DIASTOLIC BLOOD PRESSURE: 66 MMHG

## 2022-03-24 DIAGNOSIS — G89.29 CHRONIC BILATERAL LOW BACK PAIN WITHOUT SCIATICA: ICD-10-CM

## 2022-03-24 DIAGNOSIS — M54.50 CHRONIC BILATERAL LOW BACK PAIN WITHOUT SCIATICA: ICD-10-CM

## 2022-03-24 DIAGNOSIS — M05.79 RHEUMATOID ARTHRITIS INVOLVING MULTIPLE SITES WITH POSITIVE RHEUMATOID FACTOR (HCC): Primary | ICD-10-CM

## 2022-03-24 PROCEDURE — G8399 PT W/DXA RESULTS DOCUMENT: HCPCS | Performed by: STUDENT IN AN ORGANIZED HEALTH CARE EDUCATION/TRAINING PROGRAM

## 2022-03-24 PROCEDURE — 4040F PNEUMOC VAC/ADMIN/RCVD: CPT | Performed by: STUDENT IN AN ORGANIZED HEALTH CARE EDUCATION/TRAINING PROGRAM

## 2022-03-24 PROCEDURE — 99214 OFFICE O/P EST MOD 30 MIN: CPT | Performed by: STUDENT IN AN ORGANIZED HEALTH CARE EDUCATION/TRAINING PROGRAM

## 2022-03-24 PROCEDURE — 1036F TOBACCO NON-USER: CPT | Performed by: STUDENT IN AN ORGANIZED HEALTH CARE EDUCATION/TRAINING PROGRAM

## 2022-03-24 PROCEDURE — G8417 CALC BMI ABV UP PARAM F/U: HCPCS | Performed by: STUDENT IN AN ORGANIZED HEALTH CARE EDUCATION/TRAINING PROGRAM

## 2022-03-24 PROCEDURE — G8484 FLU IMMUNIZE NO ADMIN: HCPCS | Performed by: STUDENT IN AN ORGANIZED HEALTH CARE EDUCATION/TRAINING PROGRAM

## 2022-03-24 PROCEDURE — G8427 DOCREV CUR MEDS BY ELIG CLIN: HCPCS | Performed by: STUDENT IN AN ORGANIZED HEALTH CARE EDUCATION/TRAINING PROGRAM

## 2022-03-24 PROCEDURE — 1123F ACP DISCUSS/DSCN MKR DOCD: CPT | Performed by: STUDENT IN AN ORGANIZED HEALTH CARE EDUCATION/TRAINING PROGRAM

## 2022-03-24 PROCEDURE — 1090F PRES/ABSN URINE INCON ASSESS: CPT | Performed by: STUDENT IN AN ORGANIZED HEALTH CARE EDUCATION/TRAINING PROGRAM

## 2022-03-24 RX ORDER — OXYCODONE HYDROCHLORIDE AND ACETAMINOPHEN 5; 325 MG/1; MG/1
1 TABLET ORAL 2 TIMES DAILY PRN
Qty: 60 TABLET | Refills: 0 | Status: SHIPPED | OUTPATIENT
Start: 2022-03-24 | End: 2022-04-23

## 2022-03-24 SDOH — ECONOMIC STABILITY: INCOME INSECURITY: IN THE LAST 12 MONTHS, WAS THERE A TIME WHEN YOU WERE NOT ABLE TO PAY THE MORTGAGE OR RENT ON TIME?: NO

## 2022-03-24 SDOH — ECONOMIC STABILITY: TRANSPORTATION INSECURITY
IN THE PAST 12 MONTHS, HAS THE LACK OF TRANSPORTATION KEPT YOU FROM MEDICAL APPOINTMENTS OR FROM GETTING MEDICATIONS?: NO

## 2022-03-24 SDOH — ECONOMIC STABILITY: TRANSPORTATION INSECURITY
IN THE PAST 12 MONTHS, HAS LACK OF TRANSPORTATION KEPT YOU FROM MEETINGS, WORK, OR FROM GETTING THINGS NEEDED FOR DAILY LIVING?: NO

## 2022-03-24 SDOH — ECONOMIC STABILITY: FOOD INSECURITY: WITHIN THE PAST 12 MONTHS, THE FOOD YOU BOUGHT JUST DIDN'T LAST AND YOU DIDN'T HAVE MONEY TO GET MORE.: NEVER TRUE

## 2022-03-24 SDOH — ECONOMIC STABILITY: HOUSING INSECURITY: IN THE LAST 12 MONTHS, HOW MANY PLACES HAVE YOU LIVED?: 1

## 2022-03-24 SDOH — ECONOMIC STABILITY: FOOD INSECURITY: WITHIN THE PAST 12 MONTHS, YOU WORRIED THAT YOUR FOOD WOULD RUN OUT BEFORE YOU GOT MONEY TO BUY MORE.: NEVER TRUE

## 2022-03-24 SDOH — ECONOMIC STABILITY: HOUSING INSECURITY
IN THE LAST 12 MONTHS, WAS THERE A TIME WHEN YOU DID NOT HAVE A STEADY PLACE TO SLEEP OR SLEPT IN A SHELTER (INCLUDING NOW)?: NO

## 2022-03-24 NOTE — PROGRESS NOTES
Patient: Michael Thompson is a [de-identified] y.o. female who presents today with the following Chief Complaint(s):  Chief Complaint   Patient presents with    Other     discuss pain managment needs evaluation sent to [facility] pain clinic so that she can get in to the facility for pain managment has to wait 3 months for an appt. HPI     Has used muscle relaxers from rheumatologist as well as tramadol and does not feel this is doing much for her any more. Pain is now significantly impairing her ability to perform ADLs. Having difficulty getting into or out of cars. Pain in bilateral knees and right side of hip into back. Waking up from pain in low back. Also on cymbalta. Current Outpatient Medications   Medication Sig Dispense Refill    oxyCODONE-acetaminophen (PERCOCET) 5-325 MG per tablet Take 1 tablet by mouth 2 times daily as needed for Pain for up to 30 days.  60 tablet 0    fluticasone (FLONASE) 50 MCG/ACT nasal spray SPRAY 2 SPRAYS INTO EACH NOSTRIL EVERY DAY 1 each 5    spironolactone (ALDACTONE) 25 MG tablet Take 1 tablet by mouth 2 times daily 180 tablet 1    DULoxetine (CYMBALTA) 30 MG extended release capsule Take 30 mg by mouth daily      lansoprazole (PREVACID) 30 MG delayed release capsule Take 30 mg by mouth daily      ezetimibe (ZETIA) 10 MG tablet Take 1 tablet by mouth daily 90 tablet 1    traMADol (ULTRAM) 50 MG tablet       apixaban (ELIQUIS) 5 MG TABS tablet Take 1 tablet by mouth 2 times daily 120 tablet 1    levothyroxine (SYNTHROID) 125 MCG tablet Take 1 tablet by mouth Daily 90 tablet 1    labetalol (NORMODYNE) 100 MG tablet Take 1 tablet by mouth 2 times daily 180 tablet 1    atorvastatin (LIPITOR) 10 MG tablet Take 1 tablet by mouth daily 90 tablet 1    furosemide (LASIX) 20 MG tablet Take 1 tablet by mouth 2 times daily 180 tablet 1    Handicap Placard MISC by Does not apply route 02/07/22 1 each 0    SPIRIVA HANDIHALER 18 MCG inhalation capsule INHALE THE CONTENTS OF ONE CAPSULE BY MOUTH VIA HANDINHALER ONCE DAILY AS DIRECTED 30 capsule 10    famotidine (PEPCID) 20 MG tablet TAKE ONE (1) TABLET BY MOUTH TWICE DAILY 60 tablet 3    albuterol sulfate  (90 Base) MCG/ACT inhaler INHALE TWO (2) PUFFS BY MOUTH EVERY 6 HOURS AS NEEDED 18 g 10    ACTEMRA 162 MG/0.9ML SOSY injection       fluticasone-umeclidin-vilant (TRELEGY ELLIPTA) 100-62.5-25 MCG/INH AEPB Inhale 1 puff into the lungs daily Rinse mouth with water after use 1 each 5    predniSONE (DELTASONE) 10 MG tablet Take 5 mg by mouth daily       Misc. Devices (WHEELCHAIR) MISC SELF PROPELLED, LIGHT WEIGHT,  STANDARD SIZE 1 each 0    ergocalciferol (ERGOCALCIFEROL) 1.25 MG (27418 UT) capsule Take 50,000 Units by mouth every 7 days      OXYGEN Inhale 3 L/min into the lungs daily as needed       Blood Pressure Monitoring (BLOOD PRESSURE MONITOR/M CUFF) MISC 1 Units by Does not apply route daily 1 each 0    ipratropium-albuterol (DUONEB) 0.5-2.5 (3) MG/3ML SOLN nebulizer solution Inhale 3 mLs into the lungs every 4 hours as needed. 360 mL 3    nitroGLYCERIN (NITROSTAT) 0.4 MG SL tablet Place 0.4 mg under the tongue every 5 minutes as needed. No current facility-administered medications for this visit. Patient's past medical history, surgical history, family history, medications,  andallergies  were all reviewed and updated as appropriate today. Review of Systems  All other systems reviewed and negative    Physical Exam  Vitals reviewed. Constitutional:       Appearance: Normal appearance. HENT:      Head: Normocephalic and atraumatic. Cardiovascular:      Rate and Rhythm: Normal rate and regular rhythm. Pulmonary:      Effort: Pulmonary effort is normal.      Breath sounds: Normal breath sounds. Musculoskeletal:         General: Tenderness (bilateral knee) present. Neurological:      General: No focal deficit present.       Mental Status: She is alert and oriented to person, place, and

## 2022-03-25 ENCOUNTER — TELEPHONE (OUTPATIENT)
Dept: FAMILY MEDICINE CLINIC | Age: 82
End: 2022-03-25

## 2022-03-25 NOTE — TELEPHONE ENCOUNTER
Submitted PA for oxyCODONE-Acetaminophen 5-325MG tablets, Key: [id number]. Medication has been APPROVED through 06/24/2022. Please notify patient. Thank you.

## 2022-03-25 NOTE — TELEPHONE ENCOUNTER
PA needed for OXYCODONE-ACETAminophen 5-325MG TABLETS    KEY: [id number]  Diagnosis Association: Rheumatoid arthritis involving multiple sites with positive rheumatoid factor (HCC) (M05.79);  Chronic bilateral low back pain without sciatica (M54.50 , G89.29)

## 2022-03-30 NOTE — TELEPHONE ENCOUNTER
Patient has already picked up this medication.  Medication has helped \"A LOT LOT LOT\" the patient states and is very grateful

## 2022-04-25 ENCOUNTER — OFFICE VISIT (OUTPATIENT)
Dept: FAMILY MEDICINE CLINIC | Age: 82
End: 2022-04-25
Payer: COMMERCIAL

## 2022-04-25 VITALS
BODY MASS INDEX: 29.57 KG/M2 | HEIGHT: 66 IN | TEMPERATURE: 97.8 F | OXYGEN SATURATION: 96 % | WEIGHT: 184 LBS | DIASTOLIC BLOOD PRESSURE: 74 MMHG | SYSTOLIC BLOOD PRESSURE: 116 MMHG | HEART RATE: 83 BPM

## 2022-04-25 DIAGNOSIS — M05.79 RHEUMATOID ARTHRITIS INVOLVING MULTIPLE SITES WITH POSITIVE RHEUMATOID FACTOR (HCC): Primary | ICD-10-CM

## 2022-04-25 DIAGNOSIS — J96.11 CHRONIC RESPIRATORY FAILURE WITH HYPOXIA (HCC): ICD-10-CM

## 2022-04-25 DIAGNOSIS — I82.463 ACUTE DEEP VEIN THROMBOSIS (DVT) OF CALF MUSCLE VEIN OF BOTH LOWER EXTREMITIES (HCC): ICD-10-CM

## 2022-04-25 PROBLEM — G47.31 CSA (CENTRAL SLEEP APNEA): Status: RESOLVED | Noted: 2021-07-28 | Resolved: 2022-04-25

## 2022-04-25 PROBLEM — R79.89 ELEVATED D-DIMER: Status: RESOLVED | Noted: 2021-12-28 | Resolved: 2022-04-25

## 2022-04-25 PROBLEM — R09.02 HYPOXEMIA: Status: RESOLVED | Noted: 2021-08-17 | Resolved: 2022-04-25

## 2022-04-25 PROCEDURE — G8427 DOCREV CUR MEDS BY ELIG CLIN: HCPCS | Performed by: STUDENT IN AN ORGANIZED HEALTH CARE EDUCATION/TRAINING PROGRAM

## 2022-04-25 PROCEDURE — 1036F TOBACCO NON-USER: CPT | Performed by: STUDENT IN AN ORGANIZED HEALTH CARE EDUCATION/TRAINING PROGRAM

## 2022-04-25 PROCEDURE — 99214 OFFICE O/P EST MOD 30 MIN: CPT | Performed by: STUDENT IN AN ORGANIZED HEALTH CARE EDUCATION/TRAINING PROGRAM

## 2022-04-25 PROCEDURE — G8399 PT W/DXA RESULTS DOCUMENT: HCPCS | Performed by: STUDENT IN AN ORGANIZED HEALTH CARE EDUCATION/TRAINING PROGRAM

## 2022-04-25 PROCEDURE — 1090F PRES/ABSN URINE INCON ASSESS: CPT | Performed by: STUDENT IN AN ORGANIZED HEALTH CARE EDUCATION/TRAINING PROGRAM

## 2022-04-25 PROCEDURE — G8417 CALC BMI ABV UP PARAM F/U: HCPCS | Performed by: STUDENT IN AN ORGANIZED HEALTH CARE EDUCATION/TRAINING PROGRAM

## 2022-04-25 PROCEDURE — 1123F ACP DISCUSS/DSCN MKR DOCD: CPT | Performed by: STUDENT IN AN ORGANIZED HEALTH CARE EDUCATION/TRAINING PROGRAM

## 2022-04-25 PROCEDURE — 4040F PNEUMOC VAC/ADMIN/RCVD: CPT | Performed by: STUDENT IN AN ORGANIZED HEALTH CARE EDUCATION/TRAINING PROGRAM

## 2022-04-25 RX ORDER — OXYCODONE HYDROCHLORIDE AND ACETAMINOPHEN 5; 325 MG/1; MG/1
1 TABLET ORAL EVERY 8 HOURS PRN
Qty: 60 TABLET | Refills: 0 | Status: SHIPPED | OUTPATIENT
Start: 2022-04-28 | End: 2022-05-28

## 2022-04-25 NOTE — PROGRESS NOTES
Patient: Sonia Elizabeth is a [de-identified] y.o. female who presents today with the following Chief Complaint(s):  Chief Complaint   Patient presents with    Other     blood thinners (end of december)     Discuss Medications     discuss pain medications, has been helping         HPI     DVT  Initially diagnosed 12/26/21  Has now been on Eliquis treatment for approximately 4 months. No current symptoms. No previous history of DVT. Rheumatoid arthritis/chronic pain  Patient reports significant improvement in pain with use of Percocet twice daily. Able to move around her home without walker. She is using OTC bowel regimen and reports no issues with constipation. Current Outpatient Medications   Medication Sig Dispense Refill    [START ON 4/28/2022] oxyCODONE-acetaminophen (PERCOCET) 5-325 MG per tablet Take 1 tablet by mouth every 8 hours as needed for Pain for up to 30 days. Intended supply: 30 days.  Take lowest dose possible to manage pain 60 tablet 0    fluticasone (FLONASE) 50 MCG/ACT nasal spray SPRAY 2 SPRAYS INTO EACH NOSTRIL EVERY DAY 1 each 5    spironolactone (ALDACTONE) 25 MG tablet Take 1 tablet by mouth 2 times daily 180 tablet 1    DULoxetine (CYMBALTA) 30 MG extended release capsule Take 30 mg by mouth daily      lansoprazole (PREVACID) 30 MG delayed release capsule Take 30 mg by mouth daily      ezetimibe (ZETIA) 10 MG tablet Take 1 tablet by mouth daily 90 tablet 1    traMADol (ULTRAM) 50 MG tablet       levothyroxine (SYNTHROID) 125 MCG tablet Take 1 tablet by mouth Daily 90 tablet 1    labetalol (NORMODYNE) 100 MG tablet Take 1 tablet by mouth 2 times daily 180 tablet 1    atorvastatin (LIPITOR) 10 MG tablet Take 1 tablet by mouth daily 90 tablet 1    furosemide (LASIX) 20 MG tablet Take 1 tablet by mouth 2 times daily 180 tablet 1    Handicap Placard MISC by Does not apply route 02/07/22 1 each 0    SPIRIVA HANDIHALER 18 MCG inhalation capsule INHALE THE CONTENTS OF ONE CAPSULE BY MOUTH VIA HANDINHALER ONCE DAILY AS DIRECTED 30 capsule 10    famotidine (PEPCID) 20 MG tablet TAKE ONE (1) TABLET BY MOUTH TWICE DAILY 60 tablet 3    albuterol sulfate  (90 Base) MCG/ACT inhaler INHALE TWO (2) PUFFS BY MOUTH EVERY 6 HOURS AS NEEDED 18 g 10    ACTEMRA 162 MG/0.9ML SOSY injection       fluticasone-umeclidin-vilant (TRELEGY ELLIPTA) 100-62.5-25 MCG/INH AEPB Inhale 1 puff into the lungs daily Rinse mouth with water after use 1 each 5    predniSONE (DELTASONE) 10 MG tablet Take 5 mg by mouth daily       Misc. Devices (WHEELCHAIR) MISC SELF PROPELLED, LIGHT WEIGHT,  STANDARD SIZE 1 each 0    ergocalciferol (ERGOCALCIFEROL) 1.25 MG (37855 UT) capsule Take 50,000 Units by mouth every 7 days      OXYGEN Inhale 3 L/min into the lungs daily as needed       Blood Pressure Monitoring (BLOOD PRESSURE MONITOR/M CUFF) MISC 1 Units by Does not apply route daily 1 each 0    ipratropium-albuterol (DUONEB) 0.5-2.5 (3) MG/3ML SOLN nebulizer solution Inhale 3 mLs into the lungs every 4 hours as needed. 360 mL 3    nitroGLYCERIN (NITROSTAT) 0.4 MG SL tablet Place 0.4 mg under the tongue every 5 minutes as needed. No current facility-administered medications for this visit. Patient's past medical history, surgical history, family history, medications,  andallergies  were all reviewed and updated as appropriate today. Review of Systems  All other systems reviewed and negative    Physical Exam  Constitutional:       Comments: In wheelchair   Cardiovascular:      Rate and Rhythm: Normal rate and regular rhythm. Heart sounds: No murmur heard. Comments: No palpable cords in legs  Pulmonary:      Effort: Pulmonary effort is normal.      Breath sounds: Normal breath sounds. Comments:  On nasal canula  Musculoskeletal:      Comments: No lower extremity swelling, no calf tenderness       Vitals:    04/25/22 0827   BP: 116/74   Pulse: 83   Temp: 97.8 °F (36.6 °C)   SpO2: 96% Assessment:  Encounter Diagnoses   Name Primary?  Rheumatoid arthritis involving multiple sites with positive rheumatoid factor (HCC) Yes    Chronic respiratory failure with hypoxia (HCC)     Acute deep vein thrombosis (DVT) of calf muscle vein of both lower extremities ([facility])        Plan:  1. Rheumatoid arthritis involving multiple sites with positive rheumatoid factor ([facility]  Discussed continued risks associated with medication. Also encouraged to make follow-up appointment with pain management. We will continue current medication. - oxyCODONE-acetaminophen (PERCOCET) 5-325 MG per tablet; Take 1 tablet by mouth every 8 hours as needed for Pain for up to 30 days. Intended supply: 30 days. Take lowest dose possible to manage pain  Dispense: 60 tablet; Refill: 0    2. Chronic respiratory failure with hypoxia ([facility])  Patient reports 1 episode of shortness of breath following previous day exposure to large amount of allergens. This is now resolved. On chronic oxygen. Lung exam normal today. Has follow-up with pulmonology in 1 month. 3. Acute deep vein thrombosis (DVT) of calf muscle vein of both lower extremities ([facility]  Patient has completed 3 months therapy for DVT. Will stop Eliquis. Discussed monitoring signs and symptoms for recurrent DVT. If these occur would restart Eliquis. Encouraged ambulation as tolerated as well as calf exercises to use if in wheelchair for prolonged period of time. No follow-ups on file.

## 2022-05-06 ENCOUNTER — TELEPHONE (OUTPATIENT)
Dept: FAMILY MEDICINE CLINIC | Age: 82
End: 2022-05-06

## 2022-05-06 NOTE — TELEPHONE ENCOUNTER
pts granddaughter called in asking for a referral to be put in for [employer]. He is a pain management doctor. They found this doctor. Please advise please fax referral over to [phone]    Pt also wants a note for a medical recliner and adjustable hospital bed.

## 2022-05-09 DIAGNOSIS — M05.79 RHEUMATOID ARTHRITIS INVOLVING MULTIPLE SITES WITH POSITIVE RHEUMATOID FACTOR (HCC): Primary | ICD-10-CM

## 2022-05-09 DIAGNOSIS — M50.121 CERVICAL DISC DISORDER AT C4-C5 LEVEL WITH RADICULOPATHY: ICD-10-CM

## 2022-05-09 NOTE — TELEPHONE ENCOUNTER
External referral placed, printed and faxed over to the fax # provided from pt.  Will scan in referral fax confirmation once received

## 2022-05-09 NOTE — TELEPHONE ENCOUNTER
I am not able to find this provider when I search for external referral. Please see if you are able to find him and pend referral. Thank you

## 2022-05-09 NOTE — TELEPHONE ENCOUNTER
Spoke with Libia, she stated that she is needing a letter providing information in regards to pt needing a lift chair and a hospital bed due to the daughter not always being there.  Libia, pt daughter stated that she will call the company and see where the letter needs to be sent and or faxed to and will give our office a call back

## 2022-05-09 NOTE — PROGRESS NOTES
Referral placed, printed and faxed over fax confirmation scanned into chart.  Pt daughter advised referral has been faxed over

## 2022-05-26 ENCOUNTER — OFFICE VISIT (OUTPATIENT)
Dept: PULMONOLOGY | Age: 82
End: 2022-05-26
Payer: COMMERCIAL

## 2022-05-26 VITALS
HEIGHT: 66 IN | DIASTOLIC BLOOD PRESSURE: 60 MMHG | OXYGEN SATURATION: 97 % | SYSTOLIC BLOOD PRESSURE: 119 MMHG | WEIGHT: 184 LBS | RESPIRATION RATE: 18 BRPM | HEART RATE: 69 BPM | BODY MASS INDEX: 29.57 KG/M2 | TEMPERATURE: 96.9 F

## 2022-05-26 DIAGNOSIS — J43.2 CENTRILOBULAR EMPHYSEMA (HCC): Primary | ICD-10-CM

## 2022-05-26 DIAGNOSIS — M05.79 RHEUMATOID ARTHRITIS INVOLVING MULTIPLE SITES WITH POSITIVE RHEUMATOID FACTOR (HCC): ICD-10-CM

## 2022-05-26 DIAGNOSIS — Z87.891 FORMER SMOKER: ICD-10-CM

## 2022-05-26 DIAGNOSIS — I27.20 PULMONARY HTN (HCC): ICD-10-CM

## 2022-05-26 DIAGNOSIS — J96.11 CHRONIC RESPIRATORY FAILURE WITH HYPOXIA (HCC): ICD-10-CM

## 2022-05-26 DIAGNOSIS — G47.33 OSA (OBSTRUCTIVE SLEEP APNEA): ICD-10-CM

## 2022-05-26 PROCEDURE — 99214 OFFICE O/P EST MOD 30 MIN: CPT | Performed by: INTERNAL MEDICINE

## 2022-05-26 PROCEDURE — G8399 PT W/DXA RESULTS DOCUMENT: HCPCS | Performed by: INTERNAL MEDICINE

## 2022-05-26 PROCEDURE — 1123F ACP DISCUSS/DSCN MKR DOCD: CPT | Performed by: INTERNAL MEDICINE

## 2022-05-26 PROCEDURE — G8427 DOCREV CUR MEDS BY ELIG CLIN: HCPCS | Performed by: INTERNAL MEDICINE

## 2022-05-26 PROCEDURE — 1036F TOBACCO NON-USER: CPT | Performed by: INTERNAL MEDICINE

## 2022-05-26 PROCEDURE — 3023F SPIROM DOC REV: CPT | Performed by: INTERNAL MEDICINE

## 2022-05-26 PROCEDURE — G8417 CALC BMI ABV UP PARAM F/U: HCPCS | Performed by: INTERNAL MEDICINE

## 2022-05-26 PROCEDURE — 1090F PRES/ABSN URINE INCON ASSESS: CPT | Performed by: INTERNAL MEDICINE

## 2022-05-26 RX ORDER — FLUTICASONE FUROATE, UMECLIDINIUM BROMIDE AND VILANTEROL TRIFENATATE 200; 62.5; 25 UG/1; UG/1; UG/1
200 POWDER RESPIRATORY (INHALATION) DAILY
Qty: 28 EACH | Refills: 0 | Status: ON HOLD
Start: 2022-05-26 | End: 2022-07-19 | Stop reason: HOSPADM

## 2022-05-26 ASSESSMENT — SLEEP AND FATIGUE QUESTIONNAIRES
HOW LIKELY ARE YOU TO NOD OFF OR FALL ASLEEP WHILE SITTING INACTIVE IN A PUBLIC PLACE: 0
ESS TOTAL SCORE: 3
HOW LIKELY ARE YOU TO NOD OFF OR FALL ASLEEP WHILE SITTING QUIETLY AFTER LUNCH WITHOUT ALCOHOL: 0
HOW LIKELY ARE YOU TO NOD OFF OR FALL ASLEEP WHILE LYING DOWN TO REST IN THE AFTERNOON WHEN CIRCUMSTANCES PERMIT: 1
HOW LIKELY ARE YOU TO NOD OFF OR FALL ASLEEP IN A CAR, WHILE STOPPED FOR A FEW MINUTES IN TRAFFIC: 0
HOW LIKELY ARE YOU TO NOD OFF OR FALL ASLEEP WHILE SITTING AND READING: 1
HOW LIKELY ARE YOU TO NOD OFF OR FALL ASLEEP WHILE SITTING AND TALKING TO SOMEONE: 0
HOW LIKELY ARE YOU TO NOD OFF OR FALL ASLEEP WHEN YOU ARE A PASSENGER IN A CAR FOR AN HOUR WITHOUT A BREAK: 0
NECK CIRCUMFERENCE (INCHES): 14.25
HOW LIKELY ARE YOU TO NOD OFF OR FALL ASLEEP WHILE WATCHING TV: 1

## 2022-05-26 NOTE — PATIENT INSTRUCTIONS
Remember to bring a list of pulmonary medications and any CPAP or BiPAP machines to your next appointment with the office. Please keep all of your future appointments scheduled by [address], [facility] office. Out of respect for other patients and providers, you may be asked to reschedule your appointment if you arrive later than your scheduled appointment time. Appointments cancelled less than 24hrs in advance will be considered a no show. Patients with three missed appointments within 1 year or four missed appointments within 2 years can be dismissed from the practice. Please be aware that our physicians are required to work in the Intensive Care Unit at [facility].  Your appointment may need to be rescheduled if they are designated to work during your appointment time. You may receive a survey regarding the care you received during your visit. Your input is valuable to us. We encourage you to complete and return your survey. We hope you will choose us in the future for your healthcare needs. Pt instructed of all future appointment dates & times, including radiology, labs, procedures & referrals. If procedures were scheduled preparation instructions provided. Instructions on future appointments with [facility] were given.

## 2022-05-26 NOTE — PROGRESS NOTES
Chief Complaint/Referring Provider:  Pulmonary follow up for lung issues     Patient is a 53-year-old female who has come to the office for a pulmonary follow-up, patient has been having more shortness of breath, patient states that she has been having some increased dyspnea on exertion, patient also feels intermittent sensation of gasping for air, patient states that she has been having some increased cough and expectoration especially at nighttime for 2 weeks time, patient's cough is wet and congested as per patient's family, patient also has some rhinorrhea, patient cannot lie flat, patient also has been having some ear pain off-and-on, patient does not have chest pain, patient does have some abdominal discomfort especially in the abdomen, patient has been given lansoprazole sublingual in the past with improvement but patient is due for a new course of the medication which the family has indicated from the pharmacy, patient has been having sensation of gasping for breath and air hunger when she bends forward, patient does have reflux symptoms as stated above, patient also has pedal edema, patient given information that she has stopped taking a pill which she was supposed to take for 3 months and patient is not sure whether it is Eliquis or patient has also stopped taking Lasix and patient's family is going to check on that and do the needful, patient was taking Trelegy Ellipta and patient was doing very well but patient insurance is not willing to pay for it now, patient and family states that they have called the office for alternative but have not heard anything back, patient is also supposed to be on CPAP and patient has had multiple communication from this office to patient's [employer] company but patient has not received any CPAP so far, patient has cut down on the salt intake, no other pertinent review of system of current    Previous HPIPatient states that from pulmonary standpoint of view she is clinically stable, patient does not have any increasing nasal congestion sinus congestion postnasal drainage, patient does not have any significant increasing cough expectoration shortness of breath of concern, no palpitation diaphoresis, patient does not have any significant fever or chills, no nausea or vomiting, patient does have some nonspecific left upper abdominal discomfort without any hematochezia or melena, patient does not have any history of any chest wall or abdominal trauma, no abnormal movement per se, patient does not have any significant dysuria hematuria or any increasing leg edema, patient does not have any confusion lethargy, no other pertinent review of system of concern    Patient is a 77-year-old female who has come back to the office for a pulmonary evaluation, patient was subjected to a sleep study for ASV titration but patient could not tolerate that for 15 minutes and the study was aborted, patient states that she could not catch her breath and also patient states that she was gasping for air along with that patient had some discomfort in the lower part of the center of the sternum and she felt as if something was closing up, patient also thinks that her diaphragm and the stomach sinks intermittently which causes her to have some shortness of breath, patient has had difficulty in swallowing the past and patient has had esophagogram along with modified barium esophagram in the past, patient also has had history of upper GI endoscopies and patient had 2 procedures for esophageal stretching, patient continues to have some cough with mucoid expectoration, patient also does not have any altered bowel habits, no epistaxis, hemoptysis or any hematochezia, patient's water pill was changed and patient is taking Lasix twice a day and patient states that she has to go to the bathroom quite often now, patient also wanted know if she can get a portable concentrator, patient did not have any change of ambient environment, patient has received  COVID-19 vaccine , no other pertinent review of system of concern      Patient was updated to the in lab sleep studies and patient has come to follow-up on that, patient continues to have some dry cough with scanty mucoid expectoration once in a while, patient does have some occasional sinus congestion, patient does not have any significant sore throat or difficulty in swallowing, no coughing or choking while eating, no odynophagia or dysphagia, patient does not have any pleuritic chest pain, patient does have a history of recent right shoulder pain for which patient was given muscle relaxants and also an injection was given in the right shoulder as up with the patient, patient also states that she has a lower central chest cystlike structure which swells at times, patient does not have any significant increasing abdominal discomfort nausea vomiting or any reflux symptoms, patient does have some swelling of the lower extremities, patient has been on water pill along with her blood pressure medication as per the patient, patient's requirement for nebulizer is limited, patient continues to have sleep fragmentation, and continues to be on home oxygen, patient does not have any other pertinent review of system of concern      Patient is a 57-year-old female who has come to the office for a pulmonary evaluation, patient states that she has not been using her CPAP machine because she feels the pressure is high, patient is having difficulty in getting it evaluated and checked by [facility] as per patient and family, also patient and family are stating that they have to go personally to get the oxygen and is not delivered by [facility] and they wanted to see if patient can get a new DME company, patient has been having increasing nasal congestion sinus congestion postnasal drainage, patient does not have any significant otalgia no ear discharge, patient states that the Niya Hein is helping her and patient's shortness of breath is less with that, patient does not have any significant fever or chills, patient does not have any palpitations, patient does have reflux symptoms for which she takes medication as given by the GI with improvement, patient does not have any altered bowel habits or any hematochezia or melena, patient has some leg edema but patient takes HCTZ on regular basis, patient does not have any change in the ambient environment any sick contacts, patient has received her 2 doses of COVID-19 vaccine, patient does not have any confusion lethargy, no other pertinent review of system of concern      A virtual pulmonary visit was done for the patient to evaluate her clinical status and options, patient states that with the new inhaler which is Trelegy Ellipta he feels somewhat better, patient does not have that measured shortness of breath, patient does not have any significant wheezing, patient does have some cough along with that patient feels that she at times have congestion and the mucus is more so in the throat area, patient does not have any chest pain or palpitations, patient does not have any otalgia no ear discharge, patient states that yesterday she felt that she had an episode of difficulty in swallowing on the left side which she attributed to possible thyroid medication has not happened on a regular basis, patient does not have any pleuritic chest pain, no fever no chills, patient does not have any significant abdominal issues of concern, patient states that she does not have any altered bowel habits, no dysuria no hematuria, patient does not have any increasing leg edema, patient is requirement for risk inhaler is limited, patient states that she has been using her CPAP machine on a regular basis but she feels that her CPAP machine pressure is quite high and wants something to be done about that, patient does not see Dr. Janette Estrella now for sleep apnea, does not have any change in the abnormal range sick contacts, no change of medications, patient was alert and communicative when seen, patient does not have any other pertinent review of system of concern     : Patient has come to the office for pulmonary evaluation as referred by his PCP/Dr. Edna Alas  Patient was seen as an inpatient in 2019    Patient states that she has increasing shortness of breath, patient states that she has coughing along with that patient has thick but clear respiratory secretions, patient also has been having increased nasal congestion and nasal stuffiness, patient states that she has not been able to use the humidification along with the oxygen concentrator as patient states that it comes to her nose at times, patient states that she has occasional pleuritic chest pain, patient also has occasional abdominal pain, patient used to have a PEG tube which has been removed, patient is taking food by mouth but it is modified as per the speech therapy  recommendations, patient has a history of rheumatoid arthritis and many biologicals have been tried but patient has had various side effects with that and patient is now taking Actemra twice a month as given by the rheumatologist, patient continues to have intermittent pain and patient states that last night she was having significant shoulder pain which was causing her to have asthma attack, patient takes albuterol inhaler on a as needed basis along with that patient states that she takes nebulizer treatment along with Spiriva on regular basis, patient states that she has CPAP which she is using at nighttime; patient does not have any pets, patient has central air but no humidification, patient is try to take less salt in the diet, patient denies any change in the environment or any sick contacts, patient does not have any confusion lethargy, patient does not have any other pertinent review of system of concern     Past Medical History:   Diagnosis Date    Acid reflux     Acquired hypothyroidism 7/6/2017    Anemia     Asthma     CHF (congestive heart failure) (HCC)     COPD (ch[facility] obstructive [facility] disease) [facility]     HLD (hyperlipidemia)     Hypertension     Influenza A 01/22/2018    [facility]     X 2    Migraine     past hx    Rheumatoid arthritis     Wears glasses        Past Surgical History:   Procedure Laterality Date    BRONCHOSCOPY N/A 3/27/2019    BRONCHOSCOPY ALVEOLAR LAVAGE performed by Liza Chun MD at [address]  3/27/2019    BRONCHOSCOPY THERAPUTIC ASPIRATION INITIAL performed by Liza Chun MD at [facility] Bilateral 03/23/2011    COLONOSCOPY N/A 6/12/2019    COLONOSCOPY WITH ANESTHESIA -SLEEP APNEA- performed by Harjeet Morocho MD at [facility] Left 12/4/2018    LEFT LUMBAR FOUR, LUMBAR FIVE TRANSFORAMINAL EPIDURAL STEROID INJECTION SITE CONFIRMED BY FLUOROSCOPY performed by Paola Johnston MD at [address] [facility] N/A 7/23/2019    ESOPHAGEAL MOTILITY/MANOMETRY STUDY performed by Chris Oconnor MD at [address] [facility]. little toe    GASTROSTOMY TUBE PLACEMENT N/A 4/1/2019    EGD PEG TUBE PLACEMENT performed by Harjeet Morocho MD at [address]      total, fibroids    KNEE SURGERY      right    ME [address] DX/THER SBST INTRLMNR CRV/THRC W/IMG GDN Left 8/21/2018    LEFT LUMBAR FOUR/ LUMBAR FIVE CYST ASPIRATION SITE CONFIRMED BY FLUOROSCOPY performed by Paola Johnston MD at [address] [facility] [address] 3/27/2019    EGD DIAGNOSTIC ONLY performed by Scott Hannon MD at [address] [facility] 9/6/2020    EGD DIAGNOSTIC ONLY performed by Jana Agarwal DO at [address]       Allergies   Allergen Reactions    Alendronate Sodium      Jaw pain      Humira [Adalimumab]      Rash      Losartan Swelling    Penicillins      rash    Simvastatin Other (See Comments)     Made legs ache    Sulfa Antibiotics Swelling     Tongue swelled    Tape Elige Lathe Tape]        Medication list was reviewed and updated as needed in Epic    Social History     Socioeconomic History    Marital status: Single     Spouse name: Not on file    Number of children: 3    Years of education: Not on file    Highest education level: Not on file   Occupational History    Occupation: disability   Tobacco Use    Smoking status: Former Smoker     Packs/day: 3.00     Years: 50.00     Pack years: 150.00     Types: Cigarettes     Start date: 3/4/1963     Quit date: 2009     Years since quittin.3    Smokeless tobacco: Never Used   Vaping Use    Vaping Use: Never used   Substance and Sexual Activity    Alcohol use: No    Drug use: No    Sexual activity: Not on file   Other Topics Concern    Not on file   Social History Narrative    Not on file     Social Determinants of Health     Financial Resource Strain: Low Risk     Difficulty of Paying Living Expenses: Not hard at all   Food Insecurity: No Food Insecurity    Worried About [address] in the Last Year: Never true    [facility] in the Last Year: Never true   Transportation Needs: No Transportation Needs    Lack of Transportation (Medical): No    Lack of Transportation (Non-Medical):  No   Physical Activity:     Days of Exercise per Week: Not on file    Minutes of Exercise per Session: Not on file   Stress:     Feeling of Stress : Not on file   Social Connections:     Frequency of Communication with Friends and Family: Not on file    Frequency of Social Gatherings with Friends and Family: Not on file    Attends Rastafari Services: Not on file    Active Member of Clubs or Organizations: Not on file    Attends Club or Organization Meetings: Not on file    Marital Status: Not on file   Intimate Partner Violence:     Fear of Current or Ex-Partner: Not on file    Emotionally Abused: Not on file    Physically Abused: Not on file    Sexually Abused: Not on file   Housing Stability: [address] Unable to Pay for Housing in the Last Year: No    Number of Places Lived in the Last Year: 1    Unstable Housing in the Last Year: No       Family History   Problem Relation Age of Onset    Cancer Mother     Cancer Father     Heart Disease Maternal Aunt     Cancer Sister             Review of Systems same as above    Physical Exam:  Blood pressure 119/60, pulse 69, temperature 96.9 °F (36.1 °C), temperature source Temporal, resp. rate 18, height 5' 6\" (1.676 m), weight 184 lb (83.5 kg), SpO2 97 %, not currently breastfeeding.'  Constitutional:  No acute distress. While sitting in the wheelchair on nasal cannula oxygen  HENT:  Oropharynx is clear and moist. No thyromegaly. Eyes:  Conjunctivae arenormal. Pupils equal, round, and reactive to light. No scleral icterus. Neck: . No tracheal deviation present. No obvious thyroid mass. Cardiovascular:Normal rate, regular rhythm, normal heart sounds. No right ventricular heave. Some lower extremity edema. Pulmonary/Chest: No wheezes. No rales g. Chest wall is not dull to percussion. Noaccessory muscle usage or stridor. decreased breath sound density  Abdominal: Soft. Bowel sounds present. No distension or hernia. Mild epigastric tenderness. Musculoskeletal: No cyanosis. No clubbing. No obvious joint deformity. Lymphadenopathy: No cervical or supraclavicular adenopathy. Skin: Skin is warm and dry. No rash or nodules on the exposed extremities. Psychiatric: Normal mood and affect. Behavior is normal.  No anxiety. Neurologic: Alert, awake and oriented. PERRL. Speech fluent        Data:     Imaging:  I have reviewed radiology images personally.   No orders to display     Xr Lumbar Spine (2-3 Views)    Result Date: 1/15/2021  EXAMINATION: 3 XRAY VIEWS OF THE LUMBAR SPINE 1/15/2021 12:06 pm COMPARISON: Lumbar spine radiographs April 13, 2018. HISTORY: ORDERING SYSTEM PROVIDED HISTORY: Lumbar pain TECHNOLOGIST PROVIDED HISTORY: Reason for Exam: low back pain Acuity: Acute Type of Exam: Initial Mechanism of Injury: fell to floor from wheelchair Relevant Medical/Surgical History: pt noted having a recent fall down steps FINDINGS: There is 6 mm degenerative anterolisthesis of L3 on L4 and L4 on L5. Lumbar vertebra otherwise demonstrate normal height and alignment. No fracture or suspicious osseous lesion identified. Listhesis is similar in appearance dating back to 2018. There is moderate disc height loss at L3-L4 and L4-L5. Mild disc height loss is seen at the remainder of lumbar levels. There is multilevel facet arthrosis. Evaluation the soft tissues demonstrates atherosclerotic calcification of the abdominal aorta. Multilevel degenerative changes with grade 1 degenerative listhesis at L3-L4 and L4-L5, similar in appearance dating back to 2018. No acute osseous abnormality. Nuclear stress test-  Conclusions        Summary    There is normal isotope uptake at stress and rest. There is no evidence of    myocardial ischemia or scar. LV function is normal with uniform wall motion    and ejection fraction of 67 %. Lower risk study     CT chest in 2019 [facility]    1.  No acute findings. 2.  No evidence of mass or lymphadenopathy. 3.  Severe emphysema. 4.  Pulmonary arterial dilatation suspicious for pulmonary hypertension. ECHO in 2019-Study Conclusions    - Left ventricle: The cavity size was normal. Wall thickness was    normal. Systolic function was normal. The calculated ejection    fraction was in the range of 64% to 68%. Normal diastolic    function.  - Aortic valve: Peak gradient (S): 6mm Hg.  - Tricuspid valve: Regurgitant peak velocity: 314cm/sec. Peak RV-RA    gradient (S): 39mm Hg. - Inferior vena cava:  The vessel was normal in size; the    respirophasic diameter changes were in the normal range (>= 50%);    findings are consistent with normal central venous pressure. - Pulmonary arteries: PA peak pressure: 42mm Hg (S). PFT IN 2017-PFT TEST    Spirometry  Spirometry shows moderate obstructive defect. .    Bronchodilator  There is no response to bronchodilator demonstrated. Flow-Volume Loop  The flow-volume loop is compatible with obstructive lung defect. Lung Volumes  Lung volumes are normal except for elevated RV. Lung Volume Comments  There is air trapping present. Diffusing Capacity  Single breath diffusing capacity is severely decreased. A reduced DLCO can be seen in diseases causing destruction of   alveolar-capillary interface (ILD, CHF, COPD, Pneumonia, etc.)   obliteration of pulmonary vascular bed (PE, PAH), tobacco usage,   carbon monoxide poisoning. Airway Resistance  Airway resistance is moderately elevated. Comparison  In comparison to the test of 8/18/16 there is mild deterioration   in spirometry and DLCO    PFT is compatible with moderate Obstructive Lung Disease  -   Possible etiologies include: asthma, COPD, bronchiectasis,   bronchitis. Correlate clinically. 6mwt IN 2017-6 Minute Walk Test Interpretation    Patient Name: Dimitris Pollock: [id number]  MXAR: 8/15/2017    A 6 Minute Walk Test was performed in accordance with the ATS   Guidelines ([address] [place] [address] 2002; [id number]) at [facility]. The test was performed on room air. The baseline SpO2 while breathing room air was 91%. The patient did complete the 6 minute walk. During the study, the patient walked a total distance of 1050   feet. The minimal recorded SpO2 was 88%. Oximetry with 6 Minute Walk Test shows desaturation to 88% on   room air, 1L, 2L and 3L nasal cannula oxygen with maximal   exercise. Sats improved to 94% on 4L NC oxygen with activity.      Comments: Based on testing patient qualifies for 4L NC oxygen to   use with activity.      ECHO-Summary  Left ventricular systolic function is hyperdynamic with an estimated ejection fraction of ? 65%. The left ventricle is normal in size with normal wall thickness. Normal left ventricular diastolic function. The right atrium is mildly enlarged. Mild tricuspid regurgitation. Systolic pulmonary artery pressure (SPAP) is elevated and estimated at 39 mmHg (right atrial pressure 3 mmHg) consistent with  borderline pulmonary hypertension. Barium 9/17/20  Moderate esophageal dysmotility.  No evidence of functional obstruction or  gross reflux. PAP Compliance 10/3/2019   PAP Setting = 7   Percentage days used = 6   Average hours used = 2.39   Is patient compliant with PAP? no   AHI = 3.2   Leak = 9.4   [place] = -     PSG 9/5/18:  TOTAL AHI = 11.2/hour  REM AHI= 16.6/hr   Central AHI= 1.2/hour  Lowest O2 desaturation = 89 %  Minutes with O2 <90% = 2.6    TITRATION STUDY 9/20/18:  TOTAL AHI = 1.9/hour on PAP = 7 cm of H2O  Sleep emergent CSA events during the initial part of the study   Central AHI= 3.9/hour  Lowest O2 desaturation = 90 % overall  Minutes with O2% <90% = NA    Patient's sleep read shows patient to have severe HEBERT with AHI of 49.1/h along with that patient has central sleep apnea with no  with some nocturnal hypoxemia with saturation dropping to 82% when patient saturation below 89% was 104 minutes    Summary   Left ventricular systolic function is hyperdynamic with an estimated   ejection fraction of >= 65%. The left ventricle is normal in size with normal wall thickness. Normal left ventricular diastolic function. The right atrium is mildly enlarged. Mild tricuspid regurgitation. Systolic pulmonary artery pressure (SPAP) is elevated and estimated at 39   mmHg (right atrial pressure 3 mmHg) consistent with borderline pulmonary   hypertension.     Patient's sleep study shows patient to have mild obstructive airway disease with significant small airway disease with some postprandial improvement in the small airways on the study, patient does not have any restrictive lung disease patient does have some air trapping and hyperinflation along with that patient was not able to objectively do the diffusion test patient's flow volume loop was a stable obstructive pattern    Patient 6-minute walk test shows patient to have hypoxemia after 1 minute of walking and patient required 2 L of oxygen to sustain the oxygen saturation      Assessment:    1. Centrilobular emphysema ([address]      2. HEBERT on CPAP      3. Rheumatoid arthritis involving multiple sites with positive rheumatoid factor ([address]      4. Pulmonary HTN ([address]      5. ASD (atrial septal defect)      6. Former smoker      7. Chronic respiratory failure with hypoxia (HCC)    8.   Nasal congestion          Plan:   · Patient's ROS discussed   · Patient was told about the clinical findings including auscultation and implications  · Patient sleep study results were reviewed and patient has severe HEBERT along with CSA and nocturnal hypoxemia  · Patient has been ordered auto CPAP but patient is stating that she has not heard anything back from the [employer] company-to be followed up  · Patient and family were told about the pathophysiology of the disease process and its modifying factors and how the medications act  · Part of the patient's symptomatology is secondary to patient not taking the Trelegy Ellipta  · Patient was given a sample of Trelegy Ellipta and patient needs to discuss with her insurance company/pharmacy about options if her insurance company is not paying for the Trelegy Ellipta  · Oxygen supplementation to keep saturation between 90 to 94% only  · Concept of hypoxemia and hypercarbia discussed  · Patient was admitted and echocardiogram shows patient's EF to be normal but patient does have some pulmonary hypertension  · Patient has chronic respiratory failure with hypoxemia  · Patient's modified barium swallow and esophagram has been reviewed and patient has esophageal motility dysfunction  · Patient was told to at least sit or stand 45 minutes to 1 hour after she eats and patient's supper has to be at least 2 hours prior to sleeping time and patient's head should be about 30 degrees higher than the abdominal and she is lying down  · Patient's family to get lansoprazole for the patient for her symptomatology and if patient continues to have increased abdominal symptoms and patient may require GI evaluation for possible upper GI endoscopy  · Patient's family will also recheck if patient is still taking the Lasix or not  as part of the symptoms may be secondary to that  · Patient was told to use some saline nasal spray-  · Flonase nasal spray 2 puff each nostril once a day  · A humidifier in the bedroom will help  · Salt and fluid restrictions were discussed  · Patient was told about the pathophysiology of the disease process and its modifying factors  · Patient was told about the pathophysiology of the COPD and various medications and how they act  · Patient to continue with Mikael Medina as before  · Patient was told to make sure that she rinses her mouth with water without fail to avoid any hoarseness of voice or oral thrush  · No need for any antibiotic and steroids  · Daily weights will help  · Patient has been started on loop diuretics by the PCP  · Salt and fluid restrictions discussed  · Patient's treatment of rheumatoid arthritis as per rheumatology  · Oral diet as per speech therapy recommendations  · Monitor for any aspiration event  · Patient to take DuoNeb or albuterol inhaler only on whenever necessary basis and not on regular basis  · Patient's further treatment depending on patient's clinical status and response to the new inhaler  · A humidifier in the bedroom will help  · Patient to follow-up after 3 months or earlier if required  · Patient's shortness of breath is multifactorial in nature and each component has to be addressed in order to help the patient otherwise patient will be symptomatic patient was explained to patient and the family

## 2022-05-26 NOTE — PROGRESS NOTES
MA Communication:   The following orders are received by verbal communication from Cristel Zepeda MD    Orders include:    3 month follow up on 8/26/22

## 2022-07-15 ENCOUNTER — TELEPHONE (OUTPATIENT)
Dept: PULMONOLOGY | Age: 82
End: 2022-07-15

## 2022-07-15 ENCOUNTER — APPOINTMENT (OUTPATIENT)
Dept: CT IMAGING | Age: 82
DRG: 134 | End: 2022-07-15
Payer: MEDICAID

## 2022-07-15 ENCOUNTER — APPOINTMENT (OUTPATIENT)
Dept: GENERAL RADIOLOGY | Age: 82
DRG: 134 | End: 2022-07-15
Payer: MEDICAID

## 2022-07-15 ENCOUNTER — HOSPITAL ENCOUNTER (INPATIENT)
Age: 82
LOS: 4 days | Discharge: HOME HEALTH | DRG: 134 | End: 2022-07-19
Attending: EMERGENCY MEDICINE | Admitting: INTERNAL MEDICINE
Payer: MEDICAID

## 2022-07-15 DIAGNOSIS — I26.99 ACUTE PULMONARY EMBOLISM WITHOUT ACUTE COR PULMONALE, UNSPECIFIED PULMONARY EMBOLISM TYPE (HCC): Primary | ICD-10-CM

## 2022-07-15 LAB
A/G RATIO: 1.7 (ref 1.1–2.2)
ALBUMIN SERPL-MCNC: 4.3 G/DL (ref 3.4–5)
ALP BLD-CCNC: 42 U/L (ref 40–129)
ALT SERPL-CCNC: 18 U/L (ref 10–40)
ANION GAP SERPL CALCULATED.3IONS-SCNC: 13 MMOL/L (ref 3–16)
APTT: 20.5 SEC (ref 23–34.3)
AST SERPL-CCNC: 26 U/L (ref 15–37)
BASE EXCESS VENOUS: 0.1 MMOL/L (ref -3–3)
BASOPHILS ABSOLUTE: 0.1 K/UL (ref 0–0.2)
BASOPHILS RELATIVE PERCENT: 0.7 %
BILIRUB SERPL-MCNC: 1.8 MG/DL (ref 0–1)
BUN BLDV-MCNC: 23 MG/DL (ref 7–20)
CALCIUM SERPL-MCNC: 10.3 MG/DL (ref 8.3–10.6)
CARBOXYHEMOGLOBIN: 3.2 % (ref 0–1.5)
CHLORIDE BLD-SCNC: 98 MMOL/L (ref 99–110)
CO2: 23 MMOL/L (ref 21–32)
CREAT SERPL-MCNC: 1.1 MG/DL (ref 0.6–1.2)
EOSINOPHILS ABSOLUTE: 0.1 K/UL (ref 0–0.6)
EOSINOPHILS RELATIVE PERCENT: 0.9 %
GFR AFRICAN AMERICAN: 58
GFR NON-AFRICAN AMERICAN: 48
GLUCOSE BLD-MCNC: 145 MG/DL (ref 70–99)
HCO3 VENOUS: 20.4 MMOL/L (ref 23–29)
HCT VFR BLD CALC: 37.7 % (ref 36–48)
HEMOGLOBIN: 12.1 G/DL (ref 12–16)
INR BLD: 1.13 (ref 0.87–1.14)
LACTIC ACID, SEPSIS: 1.9 MMOL/L (ref 0.4–1.9)
LACTIC ACID, SEPSIS: 2.2 MMOL/L (ref 0.4–1.9)
LYMPHOCYTES ABSOLUTE: 2.3 K/UL (ref 1–5.1)
LYMPHOCYTES RELATIVE PERCENT: 30 %
MCH RBC QN AUTO: 27.4 PG (ref 26–34)
MCHC RBC AUTO-ENTMCNC: 32.2 G/DL (ref 31–36)
MCV RBC AUTO: 84.9 FL (ref 80–100)
METHEMOGLOBIN VENOUS: 0.3 %
MONOCYTES ABSOLUTE: 1 K/UL (ref 0–1.3)
MONOCYTES RELATIVE PERCENT: 12.9 %
NEUTROPHILS ABSOLUTE: 4.3 K/UL (ref 1.7–7.7)
NEUTROPHILS RELATIVE PERCENT: 55.5 %
O2 SAT, VEN: 94 %
O2 THERAPY: ABNORMAL
PCO2, VEN: 22.9 MMHG (ref 40–50)
PDW BLD-RTO: 14.7 % (ref 12.4–15.4)
PH VENOUS: 7.57 (ref 7.35–7.45)
PLATELET # BLD: 171 K/UL (ref 135–450)
PMV BLD AUTO: 7.8 FL (ref 5–10.5)
PO2, VEN: 58.5 MMHG (ref 25–40)
POTASSIUM REFLEX MAGNESIUM: 3.8 MMOL/L (ref 3.5–5.1)
PRO-BNP: 151 PG/ML (ref 0–449)
PROTHROMBIN TIME: 14.3 SEC (ref 11.7–14.5)
RBC # BLD: 4.44 M/UL (ref 4–5.2)
SARS-COV-2, NAAT: NOT DETECTED
SODIUM BLD-SCNC: 134 MMOL/L (ref 136–145)
TCO2 CALC VENOUS: 21 MMOL/L
TOTAL PROTEIN: 6.9 G/DL (ref 6.4–8.2)
TROPONIN: 0.04 NG/ML
WBC # BLD: 7.7 K/UL (ref 4–11)

## 2022-07-15 PROCEDURE — 94761 N-INVAS EAR/PLS OXIMETRY MLT: CPT

## 2022-07-15 PROCEDURE — 99285 EMERGENCY DEPT VISIT HI MDM: CPT

## 2022-07-15 PROCEDURE — 85610 PROTHROMBIN TIME: CPT

## 2022-07-15 PROCEDURE — 6360000004 HC RX CONTRAST MEDICATION: Performed by: EMERGENCY MEDICINE

## 2022-07-15 PROCEDURE — 83880 ASSAY OF NATRIURETIC PEPTIDE: CPT

## 2022-07-15 PROCEDURE — 83605 ASSAY OF LACTIC ACID: CPT

## 2022-07-15 PROCEDURE — 93005 ELECTROCARDIOGRAM TRACING: CPT | Performed by: EMERGENCY MEDICINE

## 2022-07-15 PROCEDURE — 87635 SARS-COV-2 COVID-19 AMP PRB: CPT

## 2022-07-15 PROCEDURE — 2700000000 HC OXYGEN THERAPY PER DAY

## 2022-07-15 PROCEDURE — 6360000002 HC RX W HCPCS: Performed by: EMERGENCY MEDICINE

## 2022-07-15 PROCEDURE — 80053 COMPREHEN METABOLIC PANEL: CPT

## 2022-07-15 PROCEDURE — 71045 X-RAY EXAM CHEST 1 VIEW: CPT

## 2022-07-15 PROCEDURE — 6370000000 HC RX 637 (ALT 250 FOR IP): Performed by: NURSE PRACTITIONER

## 2022-07-15 PROCEDURE — 85025 COMPLETE CBC W/AUTO DIFF WBC: CPT

## 2022-07-15 PROCEDURE — 85730 THROMBOPLASTIN TIME PARTIAL: CPT

## 2022-07-15 PROCEDURE — 82803 BLOOD GASES ANY COMBINATION: CPT

## 2022-07-15 PROCEDURE — 84484 ASSAY OF TROPONIN QUANT: CPT

## 2022-07-15 PROCEDURE — 96374 THER/PROPH/DIAG INJ IV PUSH: CPT

## 2022-07-15 PROCEDURE — 2060000000 HC ICU INTERMEDIATE R&B

## 2022-07-15 PROCEDURE — 71260 CT THORAX DX C+: CPT | Performed by: EMERGENCY MEDICINE

## 2022-07-15 RX ORDER — IPRATROPIUM BROMIDE AND ALBUTEROL SULFATE 2.5; .5 MG/3ML; MG/3ML
3 SOLUTION RESPIRATORY (INHALATION) EVERY 4 HOURS PRN
Status: DISCONTINUED | OUTPATIENT
Start: 2022-07-15 | End: 2022-07-19 | Stop reason: HOSPADM

## 2022-07-15 RX ORDER — SODIUM CHLORIDE 9 MG/ML
25 INJECTION, SOLUTION INTRAVENOUS PRN
Status: DISCONTINUED | OUTPATIENT
Start: 2022-07-15 | End: 2022-07-19 | Stop reason: HOSPADM

## 2022-07-15 RX ORDER — DULOXETIN HYDROCHLORIDE 30 MG/1
30 CAPSULE, DELAYED RELEASE ORAL DAILY
Status: DISCONTINUED | OUTPATIENT
Start: 2022-07-16 | End: 2022-07-19 | Stop reason: HOSPADM

## 2022-07-15 RX ORDER — HEPARIN SODIUM 1000 [USP'U]/ML
2800 INJECTION, SOLUTION INTRAVENOUS; SUBCUTANEOUS PRN
Status: DISCONTINUED | OUTPATIENT
Start: 2022-07-15 | End: 2022-07-17

## 2022-07-15 RX ORDER — SPIRONOLACTONE 25 MG/1
25 TABLET ORAL 2 TIMES DAILY
Status: DISCONTINUED | OUTPATIENT
Start: 2022-07-16 | End: 2022-07-19 | Stop reason: HOSPADM

## 2022-07-15 RX ORDER — ONDANSETRON 2 MG/ML
4 INJECTION INTRAMUSCULAR; INTRAVENOUS EVERY 6 HOURS PRN
Status: DISCONTINUED | OUTPATIENT
Start: 2022-07-15 | End: 2022-07-19 | Stop reason: HOSPADM

## 2022-07-15 RX ORDER — FLUTICASONE PROPIONATE 50 MCG
1 SPRAY, SUSPENSION (ML) NASAL DAILY
Status: DISCONTINUED | OUTPATIENT
Start: 2022-07-16 | End: 2022-07-19 | Stop reason: HOSPADM

## 2022-07-15 RX ORDER — POLYETHYLENE GLYCOL 3350 17 G/17G
17 POWDER, FOR SOLUTION ORAL DAILY PRN
Status: DISCONTINUED | OUTPATIENT
Start: 2022-07-15 | End: 2022-07-19 | Stop reason: HOSPADM

## 2022-07-15 RX ORDER — SODIUM CHLORIDE 0.9 % (FLUSH) 0.9 %
5-40 SYRINGE (ML) INJECTION PRN
Status: DISCONTINUED | OUTPATIENT
Start: 2022-07-15 | End: 2022-07-19 | Stop reason: HOSPADM

## 2022-07-15 RX ORDER — FAMOTIDINE 20 MG/1
20 TABLET, FILM COATED ORAL DAILY
Status: DISCONTINUED | OUTPATIENT
Start: 2022-07-16 | End: 2022-07-19 | Stop reason: HOSPADM

## 2022-07-15 RX ORDER — PANTOPRAZOLE SODIUM 40 MG/1
40 TABLET, DELAYED RELEASE ORAL
Status: DISCONTINUED | OUTPATIENT
Start: 2022-07-16 | End: 2022-07-19 | Stop reason: HOSPADM

## 2022-07-15 RX ORDER — ACETAMINOPHEN 325 MG/1
650 TABLET ORAL EVERY 6 HOURS PRN
Status: DISCONTINUED | OUTPATIENT
Start: 2022-07-15 | End: 2022-07-19 | Stop reason: HOSPADM

## 2022-07-15 RX ORDER — LEVOTHYROXINE SODIUM 0.12 MG/1
125 TABLET ORAL DAILY
Status: DISCONTINUED | OUTPATIENT
Start: 2022-07-16 | End: 2022-07-19 | Stop reason: HOSPADM

## 2022-07-15 RX ORDER — HEPARIN SODIUM 10000 [USP'U]/100ML
1240 INJECTION, SOLUTION INTRAVENOUS CONTINUOUS
Status: DISCONTINUED | OUTPATIENT
Start: 2022-07-15 | End: 2022-07-16

## 2022-07-15 RX ORDER — ONDANSETRON 4 MG/1
4 TABLET, ORALLY DISINTEGRATING ORAL EVERY 8 HOURS PRN
Status: DISCONTINUED | OUTPATIENT
Start: 2022-07-15 | End: 2022-07-19 | Stop reason: HOSPADM

## 2022-07-15 RX ORDER — SODIUM CHLORIDE 0.9 % (FLUSH) 0.9 %
5-40 SYRINGE (ML) INJECTION EVERY 12 HOURS SCHEDULED
Status: DISCONTINUED | OUTPATIENT
Start: 2022-07-16 | End: 2022-07-19 | Stop reason: HOSPADM

## 2022-07-15 RX ORDER — ALBUTEROL SULFATE 90 UG/1
2 AEROSOL, METERED RESPIRATORY (INHALATION) EVERY 4 HOURS PRN
Status: DISCONTINUED | OUTPATIENT
Start: 2022-07-15 | End: 2022-07-19 | Stop reason: HOSPADM

## 2022-07-15 RX ORDER — HEPARIN SODIUM 1000 [USP'U]/ML
5500 INJECTION, SOLUTION INTRAVENOUS; SUBCUTANEOUS PRN
Status: DISCONTINUED | OUTPATIENT
Start: 2022-07-15 | End: 2022-07-17

## 2022-07-15 RX ORDER — ATORVASTATIN CALCIUM 10 MG/1
10 TABLET, FILM COATED ORAL DAILY
Status: DISCONTINUED | OUTPATIENT
Start: 2022-07-16 | End: 2022-07-19 | Stop reason: HOSPADM

## 2022-07-15 RX ORDER — EZETIMIBE 10 MG/1
10 TABLET ORAL DAILY
Status: DISCONTINUED | OUTPATIENT
Start: 2022-07-16 | End: 2022-07-19 | Stop reason: HOSPADM

## 2022-07-15 RX ORDER — FUROSEMIDE 20 MG/1
20 TABLET ORAL 2 TIMES DAILY
Status: DISCONTINUED | OUTPATIENT
Start: 2022-07-16 | End: 2022-07-19 | Stop reason: HOSPADM

## 2022-07-15 RX ORDER — LABETALOL 100 MG/1
100 TABLET, FILM COATED ORAL 2 TIMES DAILY
Status: DISCONTINUED | OUTPATIENT
Start: 2022-07-16 | End: 2022-07-19 | Stop reason: HOSPADM

## 2022-07-15 RX ORDER — HEPARIN SODIUM 1000 [USP'U]/ML
5500 INJECTION, SOLUTION INTRAVENOUS; SUBCUTANEOUS ONCE
Status: COMPLETED | OUTPATIENT
Start: 2022-07-15 | End: 2022-07-15

## 2022-07-15 RX ORDER — ACETAMINOPHEN 650 MG/1
650 SUPPOSITORY RECTAL EVERY 6 HOURS PRN
Status: DISCONTINUED | OUTPATIENT
Start: 2022-07-15 | End: 2022-07-19 | Stop reason: HOSPADM

## 2022-07-15 RX ORDER — ERGOCALCIFEROL 1.25 MG/1
50000 CAPSULE ORAL
Status: DISCONTINUED | OUTPATIENT
Start: 2022-07-19 | End: 2022-07-19 | Stop reason: HOSPADM

## 2022-07-15 RX ADMIN — HEPARIN SODIUM AND DEXTROSE 1240 UNITS/HR: 10000; 5 INJECTION INTRAVENOUS at 22:54

## 2022-07-15 RX ADMIN — IOPAMIDOL 75 ML: 755 INJECTION, SOLUTION INTRAVENOUS at 21:17

## 2022-07-15 RX ADMIN — HEPARIN SODIUM 5500 UNITS: 1000 INJECTION INTRAVENOUS; SUBCUTANEOUS at 22:52

## 2022-07-15 ASSESSMENT — PAIN - FUNCTIONAL ASSESSMENT: PAIN_FUNCTIONAL_ASSESSMENT: NONE - DENIES PAIN

## 2022-07-15 NOTE — TELEPHONE ENCOUNTER
Patient having increased trouble breathing with ambulation. Visiting nurse came out to the house today and is worried that her symptoms are related to the oxygen. Patient is scheduled for an appointment on Monday. Advised that we don't have a provider in the office and if symptoms have been getting worse the patient should proceed to the ED.

## 2022-07-15 NOTE — TELEPHONE ENCOUNTER
Patient calling stating she is having breathing issues. Has a nurse that comes to her house and she thinks she is having an issue with her oxygen. Wants to speak to someone to find out what to do. Patient has an appointment on Monday, July 18, with Dr. Denzil Hashimoto, but want to talk with someone to find out what to do before then. Please call pt and advise.

## 2022-07-16 LAB
ANION GAP SERPL CALCULATED.3IONS-SCNC: 16 MMOL/L (ref 3–16)
APTT: 117.1 SEC (ref 23–34.3)
APTT: >180 SEC (ref 23–34.3)
BASOPHILS ABSOLUTE: 0 K/UL (ref 0–0.2)
BASOPHILS RELATIVE PERCENT: 0 %
BUN BLDV-MCNC: 26 MG/DL (ref 7–20)
CALCIUM SERPL-MCNC: 10.3 MG/DL (ref 8.3–10.6)
CHLORIDE BLD-SCNC: 99 MMOL/L (ref 99–110)
CO2: 23 MMOL/L (ref 21–32)
CREAT SERPL-MCNC: 1.2 MG/DL (ref 0.6–1.2)
EKG ATRIAL RATE: 76 BPM
EKG DIAGNOSIS: NORMAL
EKG P AXIS: 35 DEGREES
EKG P-R INTERVAL: 120 MS
EKG Q-T INTERVAL: 366 MS
EKG QRS DURATION: 86 MS
EKG QTC CALCULATION (BAZETT): 411 MS
EKG R AXIS: 46 DEGREES
EKG T AXIS: 61 DEGREES
EKG VENTRICULAR RATE: 76 BPM
EOSINOPHILS ABSOLUTE: 0 K/UL (ref 0–0.6)
EOSINOPHILS RELATIVE PERCENT: 0 %
GFR AFRICAN AMERICAN: 52
GFR NON-AFRICAN AMERICAN: 43
GLUCOSE BLD-MCNC: 114 MG/DL (ref 70–99)
GLUCOSE BLD-MCNC: 157 MG/DL (ref 70–99)
GLUCOSE BLD-MCNC: 171 MG/DL (ref 70–99)
GLUCOSE BLD-MCNC: 174 MG/DL (ref 70–99)
GLUCOSE BLD-MCNC: 192 MG/DL (ref 70–99)
HCT VFR BLD CALC: 36.1 % (ref 36–48)
HEMOGLOBIN: 11.8 G/DL (ref 12–16)
LACTIC ACID: 1.6 MMOL/L (ref 0.4–2)
LYMPHOCYTES ABSOLUTE: 1.1 K/UL (ref 1–5.1)
LYMPHOCYTES RELATIVE PERCENT: 15.9 %
MAGNESIUM: 1.9 MG/DL (ref 1.8–2.4)
MCH RBC QN AUTO: 27.3 PG (ref 26–34)
MCHC RBC AUTO-ENTMCNC: 32.6 G/DL (ref 31–36)
MCV RBC AUTO: 83.8 FL (ref 80–100)
MONOCYTES ABSOLUTE: 0.1 K/UL (ref 0–1.3)
MONOCYTES RELATIVE PERCENT: 1.1 %
NEUTROPHILS ABSOLUTE: 5.5 K/UL (ref 1.7–7.7)
NEUTROPHILS RELATIVE PERCENT: 83 %
PDW BLD-RTO: 14.5 % (ref 12.4–15.4)
PERFORMED ON: ABNORMAL
PLATELET # BLD: 188 K/UL (ref 135–450)
PMV BLD AUTO: 8.2 FL (ref 5–10.5)
POTASSIUM REFLEX MAGNESIUM: 4.4 MMOL/L (ref 3.5–5.1)
RBC # BLD: 4.31 M/UL (ref 4–5.2)
SODIUM BLD-SCNC: 138 MMOL/L (ref 136–145)
T4 FREE: 2.9 NG/DL (ref 0.9–1.8)
TROPONIN: 0.03 NG/ML
TSH SERPL DL<=0.05 MIU/L-ACNC: <0.01 UIU/ML (ref 0.27–4.2)
WBC # BLD: 6.7 K/UL (ref 4–11)

## 2022-07-16 PROCEDURE — 83036 HEMOGLOBIN GLYCOSYLATED A1C: CPT

## 2022-07-16 PROCEDURE — 93325 DOPPLER ECHO COLOR FLOW MAPG: CPT

## 2022-07-16 PROCEDURE — 85613 RUSSELL VIPER VENOM DILUTED: CPT

## 2022-07-16 PROCEDURE — 99223 1ST HOSP IP/OBS HIGH 75: CPT | Performed by: INTERNAL MEDICINE

## 2022-07-16 PROCEDURE — 84484 ASSAY OF TROPONIN QUANT: CPT

## 2022-07-16 PROCEDURE — 85730 THROMBOPLASTIN TIME PARTIAL: CPT

## 2022-07-16 PROCEDURE — 85670 THROMBIN TIME PLASMA: CPT

## 2022-07-16 PROCEDURE — 80048 BASIC METABOLIC PNL TOTAL CA: CPT

## 2022-07-16 PROCEDURE — 6370000000 HC RX 637 (ALT 250 FOR IP): Performed by: NURSE PRACTITIONER

## 2022-07-16 PROCEDURE — 93308 TTE F-UP OR LMTD: CPT

## 2022-07-16 PROCEDURE — 93320 DOPPLER ECHO COMPLETE: CPT

## 2022-07-16 PROCEDURE — 36415 COLL VENOUS BLD VENIPUNCTURE: CPT

## 2022-07-16 PROCEDURE — 94640 AIRWAY INHALATION TREATMENT: CPT

## 2022-07-16 PROCEDURE — 84443 ASSAY THYROID STIM HORMONE: CPT

## 2022-07-16 PROCEDURE — 2580000003 HC RX 258: Performed by: NURSE PRACTITIONER

## 2022-07-16 PROCEDURE — 94761 N-INVAS EAR/PLS OXIMETRY MLT: CPT

## 2022-07-16 PROCEDURE — 83735 ASSAY OF MAGNESIUM: CPT

## 2022-07-16 PROCEDURE — 2060000000 HC ICU INTERMEDIATE R&B

## 2022-07-16 PROCEDURE — 86147 CARDIOLIPIN ANTIBODY EA IG: CPT

## 2022-07-16 PROCEDURE — 6360000002 HC RX W HCPCS: Performed by: INTERNAL MEDICINE

## 2022-07-16 PROCEDURE — 85610 PROTHROMBIN TIME: CPT

## 2022-07-16 PROCEDURE — 83605 ASSAY OF LACTIC ACID: CPT

## 2022-07-16 PROCEDURE — 85635 REPTILASE TEST: CPT

## 2022-07-16 PROCEDURE — 84439 ASSAY OF FREE THYROXINE: CPT

## 2022-07-16 PROCEDURE — 2700000000 HC OXYGEN THERAPY PER DAY

## 2022-07-16 PROCEDURE — 93010 ELECTROCARDIOGRAM REPORT: CPT | Performed by: INTERNAL MEDICINE

## 2022-07-16 PROCEDURE — 85025 COMPLETE CBC W/AUTO DIFF WBC: CPT

## 2022-07-16 RX ORDER — INSULIN LISPRO 100 [IU]/ML
0-3 INJECTION, SOLUTION INTRAVENOUS; SUBCUTANEOUS NIGHTLY
Status: DISCONTINUED | OUTPATIENT
Start: 2022-07-16 | End: 2022-07-19 | Stop reason: HOSPADM

## 2022-07-16 RX ORDER — INSULIN LISPRO 100 [IU]/ML
0-6 INJECTION, SOLUTION INTRAVENOUS; SUBCUTANEOUS
Status: DISCONTINUED | OUTPATIENT
Start: 2022-07-16 | End: 2022-07-19 | Stop reason: HOSPADM

## 2022-07-16 RX ORDER — DEXTROSE MONOHYDRATE 50 MG/ML
100 INJECTION, SOLUTION INTRAVENOUS PRN
Status: DISCONTINUED | OUTPATIENT
Start: 2022-07-16 | End: 2022-07-19 | Stop reason: HOSPADM

## 2022-07-16 RX ORDER — PREDNISONE 1 MG/1
5 TABLET ORAL DAILY
Status: DISCONTINUED | OUTPATIENT
Start: 2022-07-16 | End: 2022-07-19 | Stop reason: HOSPADM

## 2022-07-16 RX ORDER — HEPARIN SODIUM 10000 [USP'U]/100ML
890 INJECTION, SOLUTION INTRAVENOUS CONTINUOUS
Status: DISCONTINUED | OUTPATIENT
Start: 2022-07-16 | End: 2022-07-17

## 2022-07-16 RX ADMIN — SPIRONOLACTONE 25 MG: 25 TABLET ORAL at 00:28

## 2022-07-16 RX ADMIN — ATORVASTATIN CALCIUM 10 MG: 10 TABLET, FILM COATED ORAL at 09:15

## 2022-07-16 RX ADMIN — TIOTROPIUM BROMIDE INHALATION SPRAY 2 PUFF: 3.12 SPRAY, METERED RESPIRATORY (INHALATION) at 08:00

## 2022-07-16 RX ADMIN — FAMOTIDINE 20 MG: 20 TABLET ORAL at 09:15

## 2022-07-16 RX ADMIN — Medication 2 PUFF: at 19:26

## 2022-07-16 RX ADMIN — LEVOTHYROXINE SODIUM 125 MCG: 0.12 TABLET ORAL at 05:48

## 2022-07-16 RX ADMIN — LABETALOL HYDROCHLORIDE 100 MG: 100 TABLET, FILM COATED ORAL at 09:15

## 2022-07-16 RX ADMIN — LABETALOL HYDROCHLORIDE 100 MG: 100 TABLET, FILM COATED ORAL at 21:02

## 2022-07-16 RX ADMIN — Medication 2 PUFF: at 08:00

## 2022-07-16 RX ADMIN — LABETALOL HYDROCHLORIDE 100 MG: 100 TABLET, FILM COATED ORAL at 00:28

## 2022-07-16 RX ADMIN — FUROSEMIDE 20 MG: 20 TABLET ORAL at 00:28

## 2022-07-16 RX ADMIN — FLUTICASONE PROPIONATE 1 SPRAY: 50 SPRAY, METERED NASAL at 09:16

## 2022-07-16 RX ADMIN — SPIRONOLACTONE 25 MG: 25 TABLET ORAL at 21:02

## 2022-07-16 RX ADMIN — PANTOPRAZOLE SODIUM 40 MG: 40 TABLET, DELAYED RELEASE ORAL at 05:48

## 2022-07-16 RX ADMIN — FUROSEMIDE 20 MG: 20 TABLET ORAL at 21:02

## 2022-07-16 RX ADMIN — FUROSEMIDE 20 MG: 20 TABLET ORAL at 09:15

## 2022-07-16 RX ADMIN — Medication 10 ML: at 21:02

## 2022-07-16 RX ADMIN — SPIRONOLACTONE 25 MG: 25 TABLET ORAL at 09:15

## 2022-07-16 RX ADMIN — DULOXETINE HYDROCHLORIDE 30 MG: 30 CAPSULE, DELAYED RELEASE ORAL at 09:14

## 2022-07-16 RX ADMIN — EZETIMIBE 10 MG: 10 TABLET ORAL at 09:15

## 2022-07-16 RX ADMIN — INSULIN LISPRO 1 UNITS: 100 INJECTION, SOLUTION INTRAVENOUS; SUBCUTANEOUS at 17:52

## 2022-07-16 RX ADMIN — PREDNISONE 5 MG: 5 TABLET ORAL at 09:15

## 2022-07-16 RX ADMIN — HEPARIN SODIUM AND DEXTROSE 1030 UNITS/HR: 10000; 5 INJECTION INTRAVENOUS at 11:26

## 2022-07-16 NOTE — CONSULTS
Pharmacy to Manage Heparin Infusion per [facility]    Dx: PE  Pt wt = _83.5kg__ (will use adjusted wt if actual body weight > 120% ideal body weight). Baseline aPTT = 20.5sec_ at 2016    Last Eliquis dose 7/15/22 AM    Oral factor Xa-inhibitors may alter and elevate anti-Xa levels used for unfractionated heparin monitoring. As a result, anti-Xa monitoring is not accurate while Xa-inhibitor activity is detectable. Utilize aPTT monitoring when patient received an oral factor Xa-inhibitor (apixaban, betrixaban, edoxaban or rivaroxaban) within 72 hours prior to admission (please document last administration time). The goal is to allow a washout of oral factor Xa-inhibitors by using aPTT for 72 hours, then change to ant-Xa levels for UFH. High Dose Heparin Infusion  Heparin 80 units/kg IVP bolus followed by Heparin infusion at 18 units/kg/hr (recommended initial max dose 2100 units/hr). Recheck anti-Xa (unless aPTT being used) in 6 hours. Goal anti-Xa 0.3-0.7 IU/mL  Goal aPTT =  seconds.   Iraida Hillman [address] [place] [address]  7/15/2022 at 10:33 PM    7/16 @ 1637  aPTT 117.1 seconds  Decrease Heparin infusion to 890 units/hr  Recheck aPTT in 6 hours @ [id number], PharmD 7/16/2022 5:34 PM    7/17 0337   aPTT = 80.6 sec  Continue with heparin drip of 890 units/hr  Recheck aPTT in 6 hours at [facility], PharmD  7/17/2022 at 4:21 AM

## 2022-07-16 NOTE — H&P
Hospital Medicine History & Physical      PCP: Anthony Trejo DO    Date of Admission: 7/15/2022    Time of Service: 2300    Date of Service: Pt seen/examined on 7/15/2022 and admitted to Inpatient with expected LOS greater than two midnights due to medical therapy. Historian: Self    Chief Concern:    Chief Complaint   Patient presents with    Shortness of Breath     Patient reports increased SOB walking to bathroom, hx of COPD given 2 duoneb treatments and 125mg solumedrol IV en route by EMS patient 96% on 4L NC at triage, patient normally 3L O2 NC. History Of Present Illness:      Nikky Archer is an 62-year-old female with significant past medical history of hypertension, hyperlipidemia, COPD, congestive heart failure, and recently treated for bilateral lower leg DVTs in December 2021 who presents to [facility] emergency department with complaint of chest pain and dyspnea. Of note, she was treated with Eliquis for bilateral DVTs, had finished a regimen sometime in April. She notes that she was doing well but within the last 2 weeks she feels that her breathing and dyspnea has increased above baseline of requiring 4 L nasal cannula. She denies any fever, endorses chronic cough that is nonproductive, no chest congestion, no dizziness or chest pain. When she arrived here, she was evaluation with laboratory studies, EKG, and CT of the chest PE protocol which showed suspected segmental embolus to the right lower lobe. Pertinent lab values tonight include sodium 134, chloride 98, BUN 23, GFR 48, initial lactic acid of 2.2 with a repeat of 1.9, and a blood sugar of 145. Initial troponin was 0.04 with a repeat of 0.03. Cell count was unremarkable for any leukocytosis or leukopenia, acute anemia, or acute platelet abnormalities. Patient was started on a heparin infusion. Hospital team was consulted admit this patient for acute right lower lobe pulmonary embolus.     Past Medical History:          Diagnosis Date    Acid reflux     Acquired hypothyroidism 7/6/2017    Anemia     Asthma     CHF (congestive heart failure) (HCC)     COPD (chronic obstructive pulmonary disease) ([address]     HLD (hyperlipidemia)     Hypertension     Influenza A 01/22/2018    MI (myocardial infarction) ([address]     X 2    Migraine     past hx    Rheumatoid arthritis     Wears glasses      Past Surgical History:          Procedure Laterality Date    BRONCHOSCOPY N/A 3/27/2019    BRONCHOSCOPY ALVEOLAR LAVAGE performed by Richelle Cotter MD at [facility] [address]  3/27/2019    BRONCHOSCOPY THERAPUTIC ASPIRATION INITIAL performed by Richelle Cotter MD at [address] Bilateral 03/23/2011    COLONOSCOPY N/A 6/12/2019    COLONOSCOPY WITH ANESTHESIA -SLEEP APNEA- performed by Bo Doss MD at [address] [address] Left 12/4/2018    LEFT LUMBAR FOUR, LUMBAR FIVE TRANSFORAMINAL EPIDURAL STEROID INJECTION SITE CONFIRMED BY FLUOROSCOPY performed by Bran Palacio MD at [address] N/A 7/23/2019    ESOPHAGEAL MOTILITY/MANOMETRY STUDY performed by Chuckie Spicer MD at [address] little toe    GASTROSTOMY TUBE PLACEMENT N/A 4/1/2019    EGD PEG TUBE PLACEMENT performed by Bo Doss MD at [facility] ([address] [facility])      total, fibroids    KNEE SURGERY      right    MA NJX DX/THER SBST INTRLMNR CRV/THRC W/IMG GDN Left 8/21/2018    LEFT LUMBAR FOUR/ LUMBAR FIVE CYST ASPIRATION SITE CONFIRMED BY FLUOROSCOPY performed by Bran Palacio MD at [address] [facility] 3/27/2019    EGD DIAGNOSTIC ONLY performed by Davi Bell MD at [facility] 9/6/2020    EGD DIAGNOSTIC ONLY performed by Marina Yu DO at [address] [facility] Medications Prior to Admission:      Prior to Admission medications    Medication Sig Start Date End Date Taking?  Authorizing Provider   fluticasone-umeclidin-vilant (TRELEGY ELLIPTA) 100-62.5-25 MCG/INH AEPB Inhale 1 puff into the lungs daily 5/26/22   Jocelyne Lawson MD   Fluticasone-Umeclidin-Vilant (TRELEGY ELLIPTA) 200-62.5-25 MCG/INH AEPB Inhale 200 mcg into the lungs daily 2 Sample given of Trelegy Ellipta 200mcg  Lot wt3n exp 12/23 5/26/22   Jocelyne Lawson MD   fluticasone (FLONASE) 50 MCG/ACT nasal spray SPRAY 2 SPRAYS INTO EACH NOSTRIL EVERY DAY 3/9/22   Jocelyne Lawson MD   spironolactone (ALDACTONE) 25 MG tablet Take 1 tablet by mouth 2 times daily 3/9/22   Jerri Maya DO   DULoxetine (CYMBALTA) 30 MG extended release capsule Take 30 mg by mouth daily    Historical Provider, MD   lansoprazole (PREVACID) 30 MG delayed release capsule Take 30 mg by mouth daily    Historical Provider, MD   ezetimibe (ZETIA) 10 MG tablet Take 1 tablet by mouth daily 3/4/22   Clair Vidales MD   traMADol Cleta Aliment) 50 MG tablet  2/6/22   Historical Provider, MD   levothyroxine (SYNTHROID) 125 MCG tablet Take 1 tablet by mouth Daily 2/7/22   Jerri Maya DO   labetalol (NORMODYNE) 100 MG tablet Take 1 tablet by mouth 2 times daily 2/7/22   Jerri Maya DO   atorvastatin (LIPITOR) 10 MG tablet Take 1 tablet by mouth daily 2/7/22   Radha Mcginnis DO   furosemide (LASIX) 20 MG tablet Take 1 tablet by mouth 2 times daily 2/7/22   Radha Mcginnis DO   Handicap Placard MISC by Does not apply route 02/07/22 2/7/22   Jerri Maya DO   famotidine (PEPCID) 20 MG tablet TAKE ONE (1) TABLET BY MOUTH TWICE DAILY 7/19/21   ERICA Albrecht MD   albuterol sulfate  (90 Base) MCG/ACT inhaler INHALE TWO (2) PUFFS BY MOUTH EVERY 6 HOURS AS NEEDED 6/24/21   ERICA Albrecht MD   ACTEMRA 162 MG/0.9ML SOSY injection  5/14/21   Historical Provider, MD   fluticasone-umeclidin-vilant ([place]) 677-07.6-90 MCG/INH AEPB Final   Suboptimal examination. Suspected segmental pulmonary embolism in the right   lower lobe. Similar right ventricular and main pulmonary artery dilatation   since at least 12/27/2021. XR CHEST PORTABLE   Final Result   Mild left basilar atelectasis or scarring. In the appropriate clinical   setting pneumonia is not excluded. VL Extremity Venous Bilateral    (Results Pending)     EKG:  I have reviewed the EKG with the following interpretation in the absence of a cardiologist: Sinus rhythm, 76 bpm, no acute findings, no acute ST-segment or T-wave deviations, intervals normal; non-specific EKG    Echocardiogram from 8/2012:    - Left ventricle: The cavity size was normal. Wall thickness     was normal. Systolic function was normal. The estimated     ejection fraction was in the range of 55% to 60%. Wall     motion was normal; there were no regional wall motion     abnormalities. Doppler parameters are consistent with     abnormal left ventricular relaxation (grade 1 diastolic     dysfunction). Mitral valve: Mildly calcified annulus. Mildly thickened leaflets.     Consults:    IP CONSULT TO PHARMACY  IP CONSULT TO CARDIOLOGY  IP CONSULT TO HEART FAILURE NURSE/COORDINATOR  IP CONSULT TO DIETITIAN    ASSESSMENT:    Active Hospital Problems    Diagnosis Date Noted    Acute pulmonary embolism without acute cor pulmonale, unspecified pulmonary embolism type ([address] [I26.99] 07/15/2022     Priority: High    Hypertension, uncontrolled [I10] 12/28/2021     Priority: Medium    Chronic respiratory failure with hypoxia ([address] [J96.11] 01/20/2021     Priority: Medium    Centrilobular emphysema ([address] [J43.2] 10/18/2011     Priority: Medium    Rheumatoid arthritis involving multiple sites with positive rheumatoid factor ([address] [M05.79] 10/18/2011     Priority: Medium     PLAN:    Acute right lung PE  - Segmental, some mention RV and pulmonary artery dilatation present from 12/2021  - Completed Eliquis regimen for bilateral DVT in April, not currently on [address]  - Continue heparin infusion, pharmacy following  - Consulted Cardiology for AM for eval/recs  - Order limited echocardiogram to eval RV  - Order bilateral LE dopplers    Uncontrolled hypertension  - Continue home antihypertensives  - Add PRN agent if BP not at target of < 170/99    COPD/Chronic respiratory failure  - Baseline O2 use 3-4LNC at home, maintaining well at baseline O2'  - Continue inhaler therapy PRN    History of diastolic HF  - No overt findings to suggest acute exacerbation  - Continue CHF GDMT per home regimen  - Consulted CHF RN and dietitian for AM    Rheumatoid Arthritis  - Continue oral Prednisone    DVT Prophylaxis: Heparin infusion  SRMB Prophylaxis: Protonix  Diet: ADULT DIET; Regular; Low Fat/Low Chol/High Fiber/2 gm Na; 1200 ml  Code Status: Full Code    PT/OT Eval Status: Not indicated    Dispo - 2-3 days per clinical improvement    JOSE Douglas - CNP    Thank you Carter Alcala DO for the opportunity to be involved in this patient's care.  If you have any questions or concerns please feel free to contact me at [phone].---Anticipated co-signer, Dr. Marybeth Arana    (Please note that portions of this note were completed with a voice recognition program. Efforts were made to edit the dictations but occasionally words are mis-transcribed.)

## 2022-07-16 NOTE — CONSULTS
Nutrition Education    Consult received for CHF diet education. Provided pt and pt's granddaughter with written and verbal instruction on HF nutrition therapy. Discussed low sodium diet, daily weights, and fluid restriction. Pt's granddaughter reports that she helps patient to strictly follow a low sodium diet at home and does not add salt to foods. Encouraged continue diet compliancy. RD is available for further discussion or questions. Pt voiced understanding. Time spent: 5 minutes     Educated on CHF diet   Learners: Patient and Family  Readiness: Acceptance  Method: Explanation  Response: Verbalizes Understanding  Contact name and number provided.     Jesu Corado RD, LD  Contact Number: [phone] no

## 2022-07-16 NOTE — CONSULTS
staff.     ERICA/Vikki Tee 1106 Problems    Diagnosis Date Noted    Acute pulmonary embolism without acute cor pulmonale, unspecified pulmonary embolism type ([facility] [I26.99] 07/15/2022     Priority: Medium    Hypertension, uncontrolled [I10] 12/28/2021    Chronic respiratory failure with hypoxia ([facility] [J96.11] 01/20/2021    Centrilobular emphysema ([facility] [J43.2] 10/18/2011    Rheumatoid arthritis involving multiple sites with positive rheumatoid factor ([facility] [M05.79] 10/18/2011       Diagnostic studies:   Troponin: 0.03 < 0.04  ECG: Normal sinus rhythm     CTPA:   Suspected segmental pulmonary embolism in the right   lower lobe. Similar right ventricular and main pulmonary artery dilatation   since at least 12/27/2021. Echo 7/19/21:  Summary   Left ventricular systolic function is hyperdynamic with an estimated   ejection fraction of >= 65%. The left ventricle is normal in size with normal wall thickness. Normal left ventricular diastolic function. The right atrium is mildly enlarged. Mild tricuspid regurgitation. Systolic pulmonary artery pressure (SPAP) is elevated and estimated at 39   mmHg (right atrial pressure 3 mmHg) consistent with borderline pulmonary   hypertension. Echo [facility] THE [facility])  5/29/19:  Study Conclusions     - Left ventricle: The cavity size was normal. Wall thickness was     normal. Systolic function was normal. The calculated ejection     fraction was in the range of 64% to 68%. Normal diastolic     function.   - Aortic valve: Peak gradient (S): 6mm Hg.   - Tricuspid valve: Regurgitant peak velocity: 314cm/sec. Peak RV-RA     gradient (S): 39mm Hg. - Inferior vena cava: The vessel was normal in size; the     respirophasic diameter changes were in the normal range (>= 50%);     findings are consistent with normal central venous pressure. - Pulmonary arteries: PA peak pressure: 42mm Hg (S).      Stress Test 1/15/19:   Summary  There is normal isotope uptake at stress and rest. There is no evidence of  myocardial ischemia or scar. LV function is normal with uniform wall motion  and ejection fraction of 67 %. Lower risk study. Stress Protocols   Resting ECG  Normal sinus rhythm. Resting HR:82 bpm  Resting BP:162/113 mmHg   Stress Protocol:Pharmacologic - Lexiscan's   Peak HR:101 bpm                                HR/BP product:[id number]  Peak BP:164/84 mmHg  Predicted HR: 144 bpm  % of predicted HR: 70  Test duration: 11 min and 30 sec  Reason for termination:Completed   ECG Findings  <0.5mm ST segment depression. Arrhythmias  Occasional PVC's. Symptoms  Patient developed shortness of breath and Lexiscan likely related to  [address] [place] [address]. Symptoms resolved with aminophylline. Right heart catheterization with Nitric ([facility]) 5/15/18:  Impression:  1. Normal filling pressures as evidenced by a right atrial   pressure of 5 and a mean pulmonary capillary wedge pressure of   10.   2. Mild pulmonary hypertension with a PA pressure of 31/15   (mean:20) and a PVR of 2.7 (thermodilution)-3.2 (chris) [place]   Units. 3. Borderline normal cardiac output and index: Chris: 3 and 1.8. Thermodilution: 3.6 and 2.3.   4. No oxygenation step up between the right atrium and the   pulmonary artery: RA: 54.4% and PA: 54.6%       Bilateral Venous Extremity 21:  Summary  Acute deep vein thrombosis involving the right gastrocnemius veins. Acute deep vein thrombosis involving the left soleal vein. I independently reviewed the cardiac diagnostic studies, ECG and relevant imaging studies.      Lab Results   Component Value Date    LVEF 65 2021     Lab Results   Component Value Date    TSH 4.62 (H) 2021       Physical Examination:  Vitals:    22 1127   BP: (!) 141/70   Pulse: 88   Resp: 16   Temp: 98.4 °F (36.9 °C)   SpO2: 98%      In: -   Out: 251    Wt Readings from Last 3 Encounters:   07/15/22 184 lb (83.5 kg)   22 184 lb (83.5 kg)   22 184 lb (83.5 kg)     Temp  Av.6 °F (37 °C)  Min: 98.2 °F (36.8 °C)  Max: 99 °F (37.2 °C)  Pulse  Av.4  Min: 78  Max: 103  BP  Min: 141/70  Max: 176/87  SpO2  Av.6 %  Min: 93 %  Max: 98 %    Intake/Output Summary (Last 24 hours) at 2022 1200  Last data filed at 2022 1045  Gross per 24 hour   Intake --   Output 251 ml   Net -251 ml         I independently reviewed all cardiac tracing from cardiac telemetry. Constitutional: Oriented. No distress. Head: Normocephalic and atraumatic. Mouth/Throat: Oropharynx is clear and moist.   Eyes: Conjunctivae normal. EOM are normal.   Neck: Neck supple. No JVD present. Cardiovascular: Normal rate, regular rhythm, S1&S2. Pulmonary/Chest: Bilateral respiratory sounds. No rhonchi. Abdominal: Soft. No tenderness. Musculoskeletal: No tenderness. No edema    Lymphadenopathy: Has no cervical adenopathy. Neurological: Alert and oriented. Follows command, No Gross deficit   Skin: Skin is warm, No rash noted.    Psychiatric: Has a normal behavior       Scheduled Meds:   predniSONE  5 mg Oral Daily    insulin lispro  0-6 Units SubCUTAneous TID WC    insulin lispro  0-3 Units SubCUTAneous Nightly    atorvastatin  10 mg Oral Daily    DULoxetine  30 mg Oral Daily    [START ON 2022] vitamin D  50,000 Units Oral Q7 Days    ezetimibe  10 mg Oral Daily    famotidine  20 mg Oral Daily    fluticasone  1 spray Each Nostril Daily    furosemide  20 mg Oral BID    labetalol  100 mg Oral BID    pantoprazole  40 mg Oral QAM AC    levothyroxine  125 mcg Oral Daily    spironolactone  25 mg Oral BID    sodium chloride flush  5-40 mL IntraVENous 2 times per day    mometasone-formoterol  2 puff Inhalation BID    And    tiotropium  2 puff Inhalation Daily     Continuous Infusions:   dextrose      heparin (PORCINE) Infusion 1,030 Units/hr (22 1126)    sodium chloride       PRN Meds:.perflutren lipid microspheres, glucose, dextrose bolus **OR** dextrose bolus, glucagon (rDNA), dextrose, heparin (porcine), heparin (porcine), albuterol sulfate HFA, ipratropium-albuterol, sodium chloride flush, sodium chloride, ondansetron **OR** ondansetron, polyethylene glycol, acetaminophen **OR** acetaminophen     Review of System:    [x] Full ROS obtained and negative except as mentioned in HPI    Prior to Admission medications    Medication Sig Start Date End Date Taking?  Authorizing Provider   fluticasone-umeclidin-vilant (TRELEGY ELLIPTA) 100-62.5-25 MCG/INH AEPB Inhale 1 puff into the lungs daily 5/26/22   Nancy Francisco MD   Fluticasone-Umeclidin-Vilant (TRELEGY ELLIPTA) 200-62.5-25 MCG/INH AEPB Inhale 200 mcg into the lungs daily 2 Sample given of Trelegy Ellipta 200mcg  Lot wt3n exp 12/23 5/26/22   Nancy Francisco MD   fluticasone (FLONASE) 50 MCG/ACT nasal spray SPRAY 2 SPRAYS INTO EACH NOSTRIL EVERY DAY 3/9/22   Nancy Francisco MD   spironolactone (ALDACTONE) 25 MG tablet Take 1 tablet by mouth 2 times daily 3/9/22   Drake Maya DO   DULoxetine (CYMBALTA) 30 MG extended release capsule Take 30 mg by mouth daily    Historical Provider, MD   lansoprazole (PREVACID) 30 MG delayed release capsule Take 30 mg by mouth daily    Historical Provider, MD   ezetimibe (ZETIA) 10 MG tablet Take 1 tablet by mouth daily 3/4/22   Marlon De Anda MD   traMADol Theresa Gault) 50 MG tablet  2/6/22   Historical Provider, MD   levothyroxine (SYNTHROID) 125 MCG tablet Take 1 tablet by mouth Daily 2/7/22   Drake Maya DO   labetalol (NORMODYNE) 100 MG tablet Take 1 tablet by mouth 2 times daily 2/7/22   Drake Maya DO   atorvastatin (LIPITOR) 10 MG tablet Take 1 tablet by mouth daily 2/7/22   Renata Dopp, DO   furosemide (LASIX) 20 MG tablet Take 1 tablet by mouth 2 times daily 2/7/22   Renata Dopp, DO   Handicap Placard MISC by Does not apply route 02/07/22 2/7/22   Drake Maya,    famotidine (PEPCID) 20 MG tablet TAKE ONE (1) TABLET BY MOUTH TWICE DAILY 7/19/21   C Shaw Jenkins MD   albuterol sulfate  (90 Base) MCG/ACT inhaler INHALE TWO (2) PUFFS BY MOUTH EVERY 6 HOURS AS NEEDED 6/24/21   ERICA Rhoades MD   ACTEMRA 162 MG/0.9ML SOSY injection  5/14/21   Historical Provider, MD   fluticasone-umeclidin-vilant (TRELEGY ELLIPTA) 100-62.5-25 MCG/INH AEPB Inhale 1 puff into the lungs daily Rinse mouth with water after use 6/1/21   Karson Fatima MD   predniSONE (DELTASONE) 10 MG tablet Take 5 mg by mouth daily     Historical Provider, MD   Misc. Devices [facility]) MISC SELF PROPELLED, LIGHT WEIGHT,  STANDARD SIZE 1/25/21   ERICA Rhoades MD   ergocalciferol (ERGOCALCIFEROL) 1.25 MG (69853 UT) capsule Take 50,000 Units by mouth every 7 days 9/1/20   Historical Provider, MD   OXYGEN Inhale 3 L/min into the lungs daily as needed     Historical Provider, MD   Blood Pressure Monitoring (BLOOD PRESSURE MONITOR/M CUFF) MISC 1 Units by Does not apply route daily 8/6/18   Thalia Valle MD   ipratropium-albuterol (DUONEB) 0.5-2.5 (3) MG/3ML SOLN nebulizer solution Inhale 3 mLs into the lungs every 4 hours as needed. 2/10/15   Luma Rivas MD   nitroGLYCERIN (NITROSTAT) 0.4 MG SL tablet Place 0.4 mg under the tongue every 5 minutes as needed.       Historical Provider, MD       Past Medical History:   Diagnosis Date    Acid reflux     Acquired hypothyroidism 7/6/2017    Anemia     Asthma     CHF (congestive heart failure) (HCC)     COPD (chronic obstructive pulmonary disease) ([facility]     HLD (hyperlipidemia)     Hypertension     Influenza A 01/22/2018    MI (myocardial infarction) ([facility]     X 2    Migraine     past hx    Rheumatoid arthritis     Wears glasses         Past Surgical History:   Procedure Laterality Date    BRONCHOSCOPY N/A 3/27/2019    BRONCHOSCOPY ALVEOLAR LAVAGE performed by Karson Fatima MD at [address]  3/27/2019    BRONCHOSCOPY THERAPUTIC ASPIRATION INITIAL performed by Karson Fatima MD at [address] Bilateral 03/23/2011    COLONOSCOPY 07/15/22  2016 07/16/22  0712   * 138   K 3.8 4.4   CL 98* 99   CO2 23 23   BUN 23* 26*   CREATININE 1.1 1.2     Recent Labs     07/15/22  2016 07/16/22  0712   WBC 7.7 6.7   HGB 12.1 11.8*   HCT 37.7 36.1   MCV 84.9 83.8    188     Estimated Creatinine Clearance: 41 mL/min (based on SCr of 1.2 mg/dL). Lab Results   Component Value Date/Time    BNP 19.4 04/05/2012 04:40 PM    BNP 7.8 11/22/2010 08:35 PM       I independently reviewed all cardiac tracing from cardiac telemetry. I independently reviewed relevant and available cardiac diagnostic tests ECG, CXR, Echo, Stress test, Device interrogation, Holter, CT scan. Outside medical records via Care everywhere reviewed and summarized in H&P above. Complex medical condition with multiple medical problems affecting prognosis and outcome of interventions  Severe exacerbation of underlying medical condition requiring hospitalization and at risk of decompensation. All questions and concerns were addressed to the patient/family. Alternatives to my treatment were discussed. I have discussed the above stated plan and the patient verbalized understanding and agreed with the plan. NOTE: This report was transcribed using voice recognition software. Every effort was made to ensure accuracy, however, inadvertent computerized transcription errors may be present.      Kavon Patel MD, MPH  [facility]   Office: [phone]  Fax: [phone]

## 2022-07-16 NOTE — PROGRESS NOTES
Hospitalist Progress Note      PCP: Melisa Dalal DO    Date of Admission: 7/15/2022    Chief Complaint: SOB     Hospital Course:  H& P reviewed     Subjective:        Pt reports improvement in SOB with no overt chest pain. On 3L 02 which is her baseline     Medications:  Reviewed    Infusion Medications    dextrose      heparin (PORCINE) Infusion 1,030 Units/hr (07/16/22 1126)    sodium chloride       Scheduled Medications    predniSONE  5 mg Oral Daily    insulin lispro  0-6 Units SubCUTAneous TID WC    insulin lispro  0-3 Units SubCUTAneous Nightly    atorvastatin  10 mg Oral Daily    DULoxetine  30 mg Oral Daily    [START ON 7/19/2022] vitamin D  50,000 Units Oral Q7 Days    ezetimibe  10 mg Oral Daily    famotidine  20 mg Oral Daily    fluticasone  1 spray Each Nostril Daily    furosemide  20 mg Oral BID    labetalol  100 mg Oral BID    pantoprazole  40 mg Oral QAM AC    levothyroxine  125 mcg Oral Daily    spironolactone  25 mg Oral BID    sodium chloride flush  5-40 mL IntraVENous 2 times per day    mometasone-formoterol  2 puff Inhalation BID    And    tiotropium  2 puff Inhalation Daily     PRN Meds: perflutren lipid microspheres, glucose, dextrose bolus **OR** dextrose bolus, glucagon (rDNA), dextrose, heparin (porcine), heparin (porcine), albuterol sulfate HFA, ipratropium-albuterol, sodium chloride flush, sodium chloride, ondansetron **OR** ondansetron, polyethylene glycol, acetaminophen **OR** acetaminophen      Intake/Output Summary (Last 24 hours) at 7/16/2022 1144  Last data filed at 7/16/2022 1045  Gross per 24 hour   Intake --   Output 251 ml   Net -251 ml       Physical Exam Performed:    BP (!) 141/70   Pulse 88   Temp 98.4 °F (36.9 °C) (Oral)   Resp 16   Ht 5' 6\" (1.676 m)   Wt 184 lb (83.5 kg)   SpO2 98%   BMI 29.70 kg/m²     General appearance: No apparent distress, appears stated age and cooperative. HEENT: Pupils equal, round, Conjunctivae/corneas clear.   Neck: Supple, with full range of motion. No jugular venous distention. Trachea midline. Respiratory:  Normal respiratory effort. Clear to auscultation, bilaterally without Rales/Wheezes/Rhonchi. Cardiovascular: Regular rate and rhythm with normal S1/S2 without murmurs, rubs or gallops. Abdomen: Soft, non-tender, non-distended with normal bowel sounds. Musculoskeletal: No clubbing, cyanosis. Bilat calf tenderness  Skin: warm and dry   Neurologic:   alert, speech clear with no overt facial droop  Psychiatric: Alert and oriented,         Labs:   Recent Labs     07/15/22  2016 07/16/22  0712   WBC 7.7 6.7   HGB 12.1 11.8*   HCT 37.7 36.1    188     Recent Labs     07/15/22  2016 07/16/22  0712   * 138   K 3.8 4.4   CL 98* 99   CO2 23 23   BUN 23* 26*   CREATININE 1.1 1.2   CALCIUM 10.3 10.3     Recent Labs     07/15/22  2016   AST 26   ALT 18   BILITOT 1.8*   ALKPHOS 42     Recent Labs     07/15/22  2016   INR 1.13     Recent Labs     07/15/22  2016 07/16/22  0236   TROPONINI 0.04* 0.03*       Urinalysis:      Lab Results   Component Value Date/Time    NITRU Negative 03/27/2019 12:18 AM    BLOODU Negative 03/27/2019 12:18 AM    SPECGRAV >=1.030 03/27/2019 12:18 AM    GLUCOSEU Negative 03/27/2019 12:18 AM       Radiology:  CT CHEST PULMONARY EMBOLISM W CONTRAST   Final Result   Addendum (preliminary) 1 of 1   ADDENDUM:   Critical results were called by Dr. Chasidy London to Dr. Marcelina Mallory on 7/15/2022    at   21:46. Final   Suboptimal examination. Suspected segmental pulmonary embolism in the right   lower lobe. Similar right ventricular and main pulmonary artery dilatation   since at least 12/27/2021. XR CHEST PORTABLE   Final Result   Mild left basilar atelectasis or scarring. In the appropriate clinical   setting pneumonia is not excluded.          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 Appreciate help  -pt would likely need need long term anticoagulation as has recurrent thromboembolism. Pt unsure if she has had a colonoscopy in past. Would likely need one outpatient to look for occult malignancy if she has not had one. Hypercoagulable work up ordered. Uncontrolled hypertension  - Continue home antihypertensives, IV PRN hydralazine        COPD/Chronic respiratory failure  - Baseline O2 use 3-4LNC at home, maintaining well at baseline O2'  - Continue inhaler therapy PRN     History of diastolic HF  - No overt findings to suggest acute exacerbation  - Continue CHF GDMT per home regimen     Rheumatoid Arthritis  - Continue oral Prednisone    DVT Prophylaxis: heparin         Diet: ADULT DIET;  Regular; Low Fat/Low Chol/High Fiber/2 gm Na; 1200 ml  Code Status: Full Code    PT/OT Eval Status: not yet ordered     Dispo - 1-3 days pending clinical course     Lorenzo Farrar MD

## 2022-07-16 NOTE — PROGRESS NOTES
RN call to US to see if dopplers would be completed today. Calls forwarded to Xray. Xray to attempt to get ahold of US to call RN back.

## 2022-07-16 NOTE — ED PROVIDER NOTES
[facility]  ED  EMERGENCY DEPARTMENT ENCOUNTER      Pt Name: Anthony Lewis  MRN: [id number]  [place] 1940  Date of evaluation: 7/15/2022  Provider: Filipe Hsieh MD    CHIEF COMPLAINT       Chief Complaint   Patient presents with    Shortness of Breath     Patient reports increased SOB walking to bathroom, hx of COPD given 2 duoneb treatments and 125mg solumedrol IV en route by EMS patient 96% on 4L NC at triage, patient normally 3L O2 NC.           HISTORY OF PRESENT ILLNESS   (Location/Symptom, Timing/Onset, Context/Setting, Quality, Duration, Modifying Factors, Severity)  Note limiting factors. Anthony Lewis is a [de-identified] y.o. female with past medical history of hypertension, hyperlipidemia, coronary artery disease with CHF, rheumatoid arthritis and COPD on 3 L home nasal cannula oxygen here with shortness of breath    The patient states that for at least 2 weeks she has had increased shortness of breath. This is been associated with increased cough that is been occasionally productive of sputum. No hemoptysis. She denies chest pain. States she is at increased wheezing and coughing. No known fevers though she does note chills. She states she has occasional lower leg swelling but not at present. Denies abdominal pain, nausea or vomiting. She has been taking bronchodilator treatments at home with little relief. She states eventually the shortness of breath just got too bad which prompted her visit to the hospital here. HPI    Nursing Notes were reviewed. REVIEW OF SYSTEMS    (2-9 systems for level 4, 10 or more for level 5)     Review of Systems    Please see HPI for pertinent positive and negative review of system findings. A full 10 system ROS was performed and otherwise negative.         PAST MEDICAL HISTORY     Past Medical History:   Diagnosis Date    Acid reflux     Acquired hypothyroidism 7/6/2017    Anemia     Asthma     CHF (congestive heart failure) (HCC)     COPD (chronic obstructive pulmonary disease) (HCC)     HLD (hyperlipidemia)     Hypertension     Influenza A 01/22/2018    MI (myocardial infarction) ([address]     X 2    Migraine     past hx    Rheumatoid arthritis     Wears glasses          SURGICAL HISTORY       Past Surgical History:   Procedure Laterality Date    BRONCHOSCOPY N/A 3/27/2019    BRONCHOSCOPY ALVEOLAR LAVAGE performed by Ellyn Puentes MD at [address]  3/27/2019    BRONCHOSCOPY THERAPUTIC ASPIRATION INITIAL performed by Ellyn Puentes MD at [address] Bilateral 03/23/2011    COLONOSCOPY N/A 6/12/2019    COLONOSCOPY WITH ANESTHESIA -SLEEP APNEA- performed by Precious Rivero MD at [address] [facility] Left 12/4/2018    LEFT LUMBAR FOUR, LUMBAR FIVE TRANSFORAMINAL EPIDURAL STEROID INJECTION SITE CONFIRMED BY FLUOROSCOPY performed by Bobby Zimmerman MD at [address] N/A 7/23/2019    ESOPHAGEAL MOTILITY/MANOMETRY STUDY performed by Stef Partida MD at [address] little toe    GASTROSTOMY TUBE PLACEMENT N/A 4/1/2019    EGD PEG TUBE PLACEMENT performed by Precious Rivero MD at [address] [facility] (CERVIX STATUS UNKNOWN)      total, fibroids    KNEE SURGERY      right    UT NJX DX/THER SBST INTRLMNR CRV/THRC W/IMG GDN Left 8/21/2018    LEFT LUMBAR FOUR/ LUMBAR FIVE CYST ASPIRATION SITE CONFIRMED BY FLUOROSCOPY performed by Bobby Zimmerman MD at [address] [facility] [facility] 3/27/2019    EGD DIAGNOSTIC ONLY performed by Maritza Del Real MD at [address] [facility] [address] 9/6/2020    EGD DIAGNOSTIC ONLY performed by Clair Simpson DO at [address]       Previous Medications    ACTEMRA 162 MG/0.9ML SOSY INJECTION        ALBUTEROL SULFATE  (90 BASE) MCG/ACT SPIRONOLACTONE (ALDACTONE) 25 MG TABLET    Take 1 tablet by mouth 2 times daily    TRAMADOL (ULTRAM) 50 MG TABLET           ALLERGIES     Alendronate sodium, Humira [adalimumab], Losartan, Penicillins, Simvastatin, Sulfa antibiotics, and Tape [adhesive tape]    FAMILY HISTORY       Family History   Problem Relation Age of Onset    Cancer Mother     Cancer Father     Heart Disease Maternal Aunt     Cancer Sister           SOCIAL HISTORY       Social History     Socioeconomic History    Marital status: Single     Spouse name: None    Number of children: 3    Years of education: None    Highest education level: None   Occupational History    Occupation: disability   Tobacco Use    Smoking status: Former     Packs/day: 3.00     Years: 50.00     Pack years: 150.00     Types: Cigarettes     Start date: 3/4/1963     Quit date: 2009     Years since quittin.4    Smokeless tobacco: Never   Vaping Use    Vaping Use: Never used   Substance and Sexual Activity    Alcohol use: No    Drug use: No     Social Determinants of Health     Financial Resource Strain: Low Risk     Difficulty of Paying Living Expenses: Not hard at all   Food Insecurity: No Food Insecurity    Worried About Running Out of Food in the Last Year: Never true    Ran Out of Food in the Last Year: Never true   Transportation Needs: No Transportation Needs    Lack of Transportation (Medical): No    Lack of Transportation (Non-Medical): No   Housing Stability: Low Risk     Unable to Pay for Housing in the Last Year: No    Number of Places Lived in the Last Year: 1    Unstable Housing in the Last Year: No       SCREENINGS    [place] Coma Scale  Eye Opening: Spontaneous  Best Verbal Response: Oriented  Best Motor Response: Obeys commands  Viktoria Coma Scale Score: 15          PHYSICAL EXAM    (up to 7 for level 4, 8 or more for level 5)     ED Triage Vitals [07/15/22 2004]   BP Temp Temp Source Heart Rate Resp SpO2 Height Weight   (!) 158/78 99 °F (37.2 °C) Oral (!) 103 26 93 % 5' 6\" (1.676 m) 184 lb (83.5 kg)       Physical Exam    General appearance:  Cooperative. No acute distress. Skin:  Warm. Dry. Eye:  Extraocular movements intact. Ears, nose, mouth and throat:  Oral mucosa moist,  Neck:  Trachea midline. Heart: Slightly tachycardic but regular  Perfusion:  intact  Respiratory: Some increased work of breathing with occasional retractions however lung signs are clear and there is no wheezing. Abdominal:   Non distended. Nontender  Neurological:  Alert and oriented x 3. Moves all extremities spontaneously  Musculoskeletal:   Normal ROM, no deformities.   No lower extremity edema          Psychiatric:  Normal mood      DIAGNOSTIC RESULTS       Labs Reviewed   COMPREHENSIVE METABOLIC PANEL W/ REFLEX TO MG FOR LOW K - Abnormal; Notable for the following components:       Result Value    Sodium 134 (*)     Chloride 98 (*)     Glucose 145 (*)     BUN 23 (*)     GFR Non- 48 (*)     GFR  58 (*)     Total Bilirubin 1.8 (*)     All other components within normal limits   TROPONIN - Abnormal; Notable for the following components:    Troponin 0.04 (*)     All other components within normal limits   LACTATE, SEPSIS - Abnormal; Notable for the following components:    Lactic Acid, Sepsis 2.2 (*)     All other components within normal limits   BLOOD GAS, VENOUS - Abnormal; Notable for the following components:    pH, [place] 7.568 (*)     pCO2, [place] 22.9 (*)     pO2, [place] 58.5 (*)     HCO3, Venous 20.4 (*)     Carboxyhemoglobin 3.2 (*)     All other components within normal limits   APTT - Abnormal; Notable for the following components:    aPTT 20.5 (*)     All other components within normal limits   COVID-19, RAPID   CBC WITH AUTO DIFFERENTIAL   BRAIN NATRIURETIC PEPTIDE   PROTIME-INR   LACTATE, SEPSIS   APTT   APTT   APTT       Interpretation per the Radiologist below, if obtained/available at the time of this note:    CT CHEST PULMONARY EMBOLISM W CONTRAST   Final Result   Suboptimal examination. Suspected segmental pulmonary embolism in the right   lower lobe. Similar right ventricular and main pulmonary artery dilatation   since at least 12/27/2021. XR CHEST PORTABLE   Final Result   Mild left basilar atelectasis or scarring. In the appropriate clinical   setting pneumonia is not excluded. All other labs/imaging were within normal range or not returned as of this dictation. EMERGENCY DEPARTMENT COURSE and DIFFERENTIAL DIAGNOSIS/MDM:   Vitals:    Vitals:    07/15/22 2004 07/15/22 2050 07/15/22 2105   BP: (!) 158/78 (!) 146/73 (!) 154/83   Pulse: (!) 103 83 83   Resp: 26 16 17   Temp: 99 °F (37.2 °C)     TempSrc: Oral     SpO2: 93% 94% 97%   Weight: 184 lb (83.5 kg)     Height: 5' 6\" (1.676 m)         EKG: Sinus rhythm rate of 76 bpm.  No ST elevation or arrhythmia. No change from prior. Patient presented to the emergency department today complaining of shortness of breath. She did receive bronchodilator treatments by EMS but for me, her lungs were clear bilaterally with no wheezing. Chest x-ray question atelectasis versus infiltrate. I certainly was concern for possible pneumonia but given prior history of DVT and the fact that she was on anticoagulation complaining of shortness of breath with clear lungs I did feel a CT pulmonary angiogram was prudent to rule out pulmonary embolism. While the exam was limited by poor contrast bolus timing, radiology did feel that she likely did have an acute pulmonary embolism which certainly would explain her symptoms. She has a mild troponin elevation that appears somewhat chronic. No new EKG changes or chest pain at present.   Due to chronic renal insufficiency will be started on a heparin drip for presumed acute pulmonary embolism and otherwise will be admitted to the hospital    MDM      CONSULTS     IP CONSULT TO HOSPITALIST    Critical Care:     CRITICAL CARE TIME   Total Critical Care time was 30 minutes, excluding separately reportable procedures. There was a high probability of clinically significant/life threatening deterioration in the patient's condition which required my urgent intervention. This time was spent reviewing the patient chart, interpreting diagnostic/laboratory data, initiation and maintenance of heparin drip    REASSESSMENT          PROCEDURE     Unless otherwise noted below, none     Procedures      FINAL IMPRESSION      1. Acute pulmonary embolism without acute cor pulmonale, unspecified pulmonary embolism type [facility])            DISPOSITION/PLAN   DISPOSITION Decision To Admit 07/15/2022 10:10:35 PM        PATIENT REFERRED TO:  No follow-up provider specified. DISCHARGE MEDICATIONS:  New Prescriptions    No medications on file     Controlled Substances Monitoring:     RX Monitoring 4/25/2022   Periodic Controlled Substance Monitoring Possible medication side effects, risk of tolerance/dependence & alternative treatments discussed.    Chronic Pain > 80 MEDD -       (Please note that portions of this note were completed with a voice recognition program.  Efforts were made to edit the dictations but occasionally words are mis-transcribed.)    Herrera Lund MD (electronically signed)  Attending Emergency Physician            Bharati Rodriguez MD  07/15/22 7663

## 2022-07-16 NOTE — PROGRESS NOTES
Patient admitted to room 213 from ER. Patient oriented to room, call light, bed rails, phone, lights and bathroom. Patient instructed about the schedule of the day including: vital sign frequency, lab draws, possible tests, frequency of MD and staff rounds, including RN/MD rounding together at bedside, daily weights, and I &O's. Patient instructed about prescribed diet, how to use  television. bed alarm in place, patient aware of placement and reason. Telemetry box  in place, patient aware of placement and reason. Bed locked, in lowest position, side rails up 2/4, call light within reach. Will continue to monitor.

## 2022-07-16 NOTE — DISCHARGE INSTR - COC
Continuity of Care Form    Patient Name: Domingo Parents   :  1940  MRN:  [id number]    Admit date:  7/15/2022  Discharge date:  ***    Code Status Order: Full Code   Advance Directives:     Admitting Physician:  Aleida Larsen MD  PCP: Aleisha Rosas DO    Discharging Nurse: [facility] Unit/Room#: 0213/0213-01  Discharging Unit Phone Number: ***    Emergency Contact:   Extended Emergency Contact Information  Primary Emergency Contact: [facility] [address]  Phone: [phone]  Mobile Phone: [phone]  Relation: Grandchild    Past Surgical History:  Past Surgical History:   Procedure Laterality Date    BRONCHOSCOPY N/A 3/27/2019    BRONCHOSCOPY ALVEOLAR LAVAGE performed by Teresita Lopez MD at [facility]  3/27/2019    BRONCHOSCOPY THERAPUTIC ASPIRATION INITIAL performed by Teresita Lopez MD at [address] Bilateral 2011    COLONOSCOPY N/A 2019    COLONOSCOPY WITH ANESTHESIA -SLEEP APNEA- performed by Suhail Jordan MD at [address] [facility] Left 2018    LEFT LUMBAR FOUR, LUMBAR FIVE TRANSFORAMINAL EPIDURAL STEROID INJECTION SITE CONFIRMED BY FLUOROSCOPY performed by Osvaldo Khan MD at [address] N/A 2019    ESOPHAGEAL MOTILITY/MANOMETRY STUDY performed by Luis Daniel Cano MD at [address] little toe    GASTROSTOMY TUBE PLACEMENT N/A 2019    EGD PEG TUBE PLACEMENT performed by Suhail Jordan MD at [address] [facility] (CERVIX STATUS UNKNOWN)      total, fibroids    KNEE SURGERY      right    DC NJX DX/THER SBST INTRLMNR CRV/THRC W/IMG GDN Left 2018    LEFT LUMBAR FOUR/ LUMBAR FIVE CYST ASPIRATION SITE CONFIRMED BY FLUOROSCOPY performed by Osvaldo Khan MD at [address] [facility] 3/27/2019    EGD DIAGNOSTIC ONLY performed by Maritza Del Real MD at [address] [facility] N/A 9/6/2020    EGD DIAGNOSTIC ONLY performed by Clair Simpson DO at [address] [facility]       Immunization History:   Immunization History   Administered Date(s) Administered    COVID-19, PFIZER PURPLE top, DILUTE for use, (age 15 y+), 30mcg/0.3mL 01/29/2021, 03/01/2021    Influenza A ([id number]) Vaccine PF IM 12/30/2009    Influenza Vaccine, unspecified formulation 12/30/2009, 10/18/2011, 08/29/2012, 11/22/2013, 08/27/2014, 11/19/2015, 10/16/2019    Influenza Virus Vaccine 12/30/2009, 12/30/2009, 10/18/2011, 08/29/2012, 11/22/2013, 08/27/2014, 11/19/2015, 10/16/2019    Influenza, High Dose (Fluzone 65 yrs and older) 10/18/2011, 08/29/2012, 11/22/2013, 08/27/2014, 11/19/2015, 10/18/2017, 11/06/2018    Influenza, Scot Spark, adjuvanted, 65 yrs +, IM, PF (Fluad) 11/18/2020, 11/09/2021    PPD Test 07/20/2016    Pneumococcal Conjugate 13-valent (Nyoka Saber) 11/19/2015, 10/23/2019    Pneumococcal Polysaccharide (Pusoptqyb40) 12/30/2009, 08/06/2018    Tdap (Boostrix, Adacel) 10/23/2019       Active Problems:  Patient Active Problem List   Diagnosis Code    Centrilobular emphysema ([facility] J43.2    Pure hypercholesterolemia E78.00    Rheumatoid arthritis involving multiple sites with positive rheumatoid factor ([facility] M05.79    Essential hypertension I10    Age-related osteoporosis without current pathological fracture M81.0    Acquired hypothyroidism E03.9    Cervical disc disorder at C4-C5 level with radiculopathy M50.121    Sciatica of left side M54.32    Pulmonary HTN (HCC) I27.20    Anemia D64.9    HEBERT (obstructive sleep apnea) G47.33    ASD (atrial septal defect) Q21.1    Esophageal motility disorder K22.4    Former smoker Z87.891    Chronic respiratory failure with hypoxia (HCC) J96.11    Reactive depression F32.9    Nasal congestion R09.81    Hypertension, uncontrolled I10    Acute deep vein thrombosis Safety Concerns:600693554}    Impairments/Disabilities:      { [place] Impairments/Disabilities:629810188}    Nutrition Therapy:  Current Nutrition Therapy:   [address] [facility] Diet List:693082687}    Routes of Feeding: {[facility] DME Other Feedings:852179256}  Liquids: {Slp liquid thickness:51567}  Daily Fluid Restriction: {CHP DME Yes amt example:376771785}  Last Modified Barium Swallow with Video (Video Swallowing Test): {Done Not Done WTNE:448218299}    Treatments at the Time of Hospital Discharge:   Respiratory Treatments: ***  Oxygen Therapy:  {Therapy; copd oxygen:57724}  Ventilator:    {[facility] Vent PORQ:345058685}    Rehab Therapies: {THERAPEUTIC INTERVENTION:4395740542}  Weight Bearing Status/Restrictions: [address]  Weight Bearin}  Other Medical Equipment (for information only, NOT a DME order):  {EQUIPMENT:381383605}  Other Treatments: ***    Patient's personal belongings (please select all that are sent with patient):  {[facility] DME Belongings:068914936}    RN SIGNATURE:  {Esignature:238900236}    CASE MANAGEMENT/SOCIAL WORK SECTION    Inpatient Status Date: 7/15/2022    Readmission Risk Assessment Score:  Readmission Risk              Risk of Unplanned Readmission:  [id number]           Discharging to Facility/ Agency   Name: [address] [facility]  Address:  Phone:[phone]  [facility]:[phone]        / signature: Electronically signed by RUMA Lucas on 22 at 11:44 AM EDT    PHYSICIAN SECTION    Prognosis: Good    Condition at Discharge: Stable    Rehab Potential (if transferring to Rehab): Good    Recommended Labs or Other Treatments After Discharge: PT/OT, skilled nursing    Physician Certification: I certify the above information and transfer of Ruben Mares  is necessary for the continuing treatment of the diagnosis listed and that she requires Home Care for less 30 days.      Update Admission H&P: No change in H&P    PHYSICIAN SIGNATURE:  Electronically signed by Mak Gray MD on 7/19/22 at 8:30 AM EDT

## 2022-07-16 NOTE — PROGRESS NOTES
07/16/22 0117   RT Protocol   History Pulmonary Disease 2   Respiratory pattern 0   Breath sounds 0   Cough 0   Indications for Bronchodilator Therapy On home bronchodilators   Bronchodilator Assessment Score 2

## 2022-07-17 LAB
ANION GAP SERPL CALCULATED.3IONS-SCNC: 12 MMOL/L (ref 3–16)
APTT: 80.6 SEC (ref 23–34.3)
APTT: 83.7 SEC (ref 23–34.3)
BUN BLDV-MCNC: 34 MG/DL (ref 7–20)
CALCIUM SERPL-MCNC: 9.9 MG/DL (ref 8.3–10.6)
CHLORIDE BLD-SCNC: 101 MMOL/L (ref 99–110)
CO2: 27 MMOL/L (ref 21–32)
CREAT SERPL-MCNC: 1.3 MG/DL (ref 0.6–1.2)
ESTIMATED AVERAGE GLUCOSE: 131.2 MG/DL
GFR AFRICAN AMERICAN: 48
GFR NON-AFRICAN AMERICAN: 39
GLUCOSE BLD-MCNC: 106 MG/DL (ref 70–99)
GLUCOSE BLD-MCNC: 111 MG/DL (ref 70–99)
GLUCOSE BLD-MCNC: 115 MG/DL (ref 70–99)
GLUCOSE BLD-MCNC: 119 MG/DL (ref 70–99)
GLUCOSE BLD-MCNC: 158 MG/DL (ref 70–99)
GLUCOSE BLD-MCNC: 173 MG/DL (ref 70–99)
HBA1C MFR BLD: 6.2 %
MAGNESIUM: 2 MG/DL (ref 1.8–2.4)
PERFORMED ON: ABNORMAL
POTASSIUM SERPL-SCNC: 4 MMOL/L (ref 3.5–5.1)
SODIUM BLD-SCNC: 140 MMOL/L (ref 136–145)

## 2022-07-17 PROCEDURE — 94761 N-INVAS EAR/PLS OXIMETRY MLT: CPT

## 2022-07-17 PROCEDURE — 85300 ANTITHROMBIN III ACTIVITY: CPT

## 2022-07-17 PROCEDURE — 85301 ANTITHROMBIN III ANTIGEN: CPT

## 2022-07-17 PROCEDURE — 2060000000 HC ICU INTERMEDIATE R&B

## 2022-07-17 PROCEDURE — 94640 AIRWAY INHALATION TREATMENT: CPT

## 2022-07-17 PROCEDURE — 6370000000 HC RX 637 (ALT 250 FOR IP): Performed by: NURSE PRACTITIONER

## 2022-07-17 PROCEDURE — 85303 CLOT INHIBIT PROT C ACTIVITY: CPT

## 2022-07-17 PROCEDURE — 2700000000 HC OXYGEN THERAPY PER DAY

## 2022-07-17 PROCEDURE — 80048 BASIC METABOLIC PNL TOTAL CA: CPT

## 2022-07-17 PROCEDURE — 36415 COLL VENOUS BLD VENIPUNCTURE: CPT

## 2022-07-17 PROCEDURE — 6370000000 HC RX 637 (ALT 250 FOR IP): Performed by: INTERNAL MEDICINE

## 2022-07-17 PROCEDURE — 83735 ASSAY OF MAGNESIUM: CPT

## 2022-07-17 PROCEDURE — 2580000003 HC RX 258: Performed by: INTERNAL MEDICINE

## 2022-07-17 PROCEDURE — 2580000003 HC RX 258: Performed by: NURSE PRACTITIONER

## 2022-07-17 PROCEDURE — 85730 THROMBOPLASTIN TIME PARTIAL: CPT

## 2022-07-17 RX ORDER — SODIUM CHLORIDE 9 MG/ML
INJECTION, SOLUTION INTRAVENOUS CONTINUOUS
Status: DISCONTINUED | OUTPATIENT
Start: 2022-07-17 | End: 2022-07-18

## 2022-07-17 RX ADMIN — PREDNISONE 5 MG: 5 TABLET ORAL at 09:06

## 2022-07-17 RX ADMIN — Medication 10 ML: at 20:52

## 2022-07-17 RX ADMIN — LEVOTHYROXINE SODIUM 125 MCG: 0.12 TABLET ORAL at 05:44

## 2022-07-17 RX ADMIN — DULOXETINE HYDROCHLORIDE 30 MG: 30 CAPSULE, DELAYED RELEASE ORAL at 09:06

## 2022-07-17 RX ADMIN — Medication 2 PUFF: at 20:14

## 2022-07-17 RX ADMIN — FUROSEMIDE 20 MG: 20 TABLET ORAL at 09:04

## 2022-07-17 RX ADMIN — FAMOTIDINE 20 MG: 20 TABLET ORAL at 09:05

## 2022-07-17 RX ADMIN — LABETALOL HYDROCHLORIDE 100 MG: 100 TABLET, FILM COATED ORAL at 20:52

## 2022-07-17 RX ADMIN — FLUTICASONE PROPIONATE 1 SPRAY: 50 SPRAY, METERED NASAL at 09:04

## 2022-07-17 RX ADMIN — SPIRONOLACTONE 25 MG: 25 TABLET ORAL at 20:52

## 2022-07-17 RX ADMIN — TIOTROPIUM BROMIDE INHALATION SPRAY 2 PUFF: 3.12 SPRAY, METERED RESPIRATORY (INHALATION) at 08:26

## 2022-07-17 RX ADMIN — PANTOPRAZOLE SODIUM 40 MG: 40 TABLET, DELAYED RELEASE ORAL at 05:44

## 2022-07-17 RX ADMIN — APIXABAN 10 MG: 5 TABLET, FILM COATED ORAL at 09:06

## 2022-07-17 RX ADMIN — EZETIMIBE 10 MG: 10 TABLET ORAL at 09:05

## 2022-07-17 RX ADMIN — LABETALOL HYDROCHLORIDE 100 MG: 100 TABLET, FILM COATED ORAL at 09:05

## 2022-07-17 RX ADMIN — FUROSEMIDE 20 MG: 20 TABLET ORAL at 20:52

## 2022-07-17 RX ADMIN — ATORVASTATIN CALCIUM 10 MG: 10 TABLET, FILM COATED ORAL at 09:04

## 2022-07-17 RX ADMIN — Medication 2 PUFF: at 08:27

## 2022-07-17 RX ADMIN — SODIUM CHLORIDE: 9 INJECTION, SOLUTION INTRAVENOUS at 15:41

## 2022-07-17 RX ADMIN — Medication 10 ML: at 09:06

## 2022-07-17 RX ADMIN — APIXABAN 10 MG: 5 TABLET, FILM COATED ORAL at 20:51

## 2022-07-17 RX ADMIN — SPIRONOLACTONE 25 MG: 25 TABLET ORAL at 09:04

## 2022-07-17 NOTE — PROGRESS NOTES
Hospitalist Progress Note      PCP: Krystle Azul DO    Date of Admission: 7/15/2022    Chief Complaint: SOB     Hospital Course:  H& P reviewed     Subjective:      Pt complains of intermittent muscle jerks/ tremors involving her hands without any overt seizure-like activity or LOC. Going on for months. Would like this evaluated inpatient. Hx of seizures  reported as a child- non as an adult.   No alcohol use or withdrawal in past. Denies any new med       Medications:  Reviewed    Infusion Medications    dextrose      sodium chloride       Scheduled Medications    apixaban  10 mg Oral BID    Followed by    Tika Stanley ON 7/24/2022] apixaban  5 mg Oral BID    predniSONE  5 mg Oral Daily    insulin lispro  0-6 Units SubCUTAneous TID WC    insulin lispro  0-3 Units SubCUTAneous Nightly    atorvastatin  10 mg Oral Daily    DULoxetine  30 mg Oral Daily    [START ON 7/19/2022] vitamin D  50,000 Units Oral Q7 Days    ezetimibe  10 mg Oral Daily    famotidine  20 mg Oral Daily    fluticasone  1 spray Each Nostril Daily    furosemide  20 mg Oral BID    labetalol  100 mg Oral BID    pantoprazole  40 mg Oral QAM AC    levothyroxine  125 mcg Oral Daily    spironolactone  25 mg Oral BID    sodium chloride flush  5-40 mL IntraVENous 2 times per day    mometasone-formoterol  2 puff Inhalation BID    And    tiotropium  2 puff Inhalation Daily     PRN Meds: perflutren lipid microspheres, glucose, dextrose bolus **OR** dextrose bolus, glucagon (rDNA), dextrose, heparin (porcine), heparin (porcine), albuterol sulfate HFA, ipratropium-albuterol, sodium chloride flush, sodium chloride, ondansetron **OR** ondansetron, polyethylene glycol, acetaminophen **OR** acetaminophen      Intake/Output Summary (Last 24 hours) at 7/17/2022 1775  Last data filed at 7/16/2022 2308  Gross per 24 hour   Intake 240 ml   Output 301 ml   Net -61 ml         Physical Exam Performed:    /84   Pulse 80   Temp 98.2 °F (36.8 °C) (Oral)   Resp 18 Ht 5' 6\" (1.676 m)   Wt 184 lb (83.5 kg)   SpO2 99%   BMI 29.70 kg/m²     General appearance: No apparent distress, appears stated age and cooperative. HEENT: Pupils equal, round, Conjunctivae/corneas clear. Neck: Supple, with full range of motion. No jugular venous distention. Trachea midline. Respiratory:  Normal respiratory effort. Clear to auscultation, bilaterally without Rales/Wheezes/Rhonchi. Cardiovascular: Regular rate and rhythm with normal S1/S2 without murmurs, rubs or gallops. Abdomen: Soft, non-tender, non-distended with normal bowel sounds. Musculoskeletal: No clubbing, cyanosis. Severe bilat calf tenderness  Skin: warm and dry   Neurologic:   alert, speech clear with no overt facial droop  Psychiatric: Alert and oriented,         Labs:   Recent Labs     07/15/22  2016 07/16/22  0712   WBC 7.7 6.7   HGB 12.1 11.8*   HCT 37.7 36.1    188       Recent Labs     07/15/22  2016 07/16/22  0712   * 138   K 3.8 4.4   CL 98* 99   CO2 23 23   BUN 23* 26*   CREATININE 1.1 1.2   CALCIUM 10.3 10.3       Recent Labs     07/15/22  2016   AST 26   ALT 18   BILITOT 1.8*   ALKPHOS 42       Recent Labs     07/15/22  2016   INR 1.13       Recent Labs     07/15/22  2016 07/16/22  0236   TROPONINI 0.04* 0.03*         Urinalysis:      Lab Results   Component Value Date/Time    NITRU Negative 03/27/2019 12:18 AM    BLOODU Negative 03/27/2019 12:18 AM    SPECGRAV >=1.030 03/27/2019 12:18 AM    GLUCOSEU Negative 03/27/2019 12:18 AM       Radiology:  CT CHEST PULMONARY EMBOLISM W CONTRAST   Final Result   Addendum (preliminary) 1 of 1   ADDENDUM:   Critical results were called by Dr. Rowan Keane to Dr. Aletha Dietz on 7/15/2022    at   21:46. Final   Suboptimal examination. Suspected segmental pulmonary embolism in the right   lower lobe. Similar right ventricular and main pulmonary artery dilatation   since at least 12/27/2021.             XR CHEST PORTABLE   Final Result   Mild left basilar atelectasis or scarring. In the appropriate clinical   setting pneumonia is not excluded. 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 No RV strain on echo. Has severe bilat LE calf tenderness on exam. Doppler of LE pending, hypercoagulabe work up pending   - switch IV heparin to PO eliquis. Needs lifelong anticoagulation for recurrent thromboembolism        Uncontrolled hypertension  - Continue home antihypertensives, IV PRN hydralazine        COPD/Chronic respiratory failure  - Baseline O2 use 3-4LNC at home, maintaining well at baseline O2'  - Continue inhaler therapy PRN     History of diastolic HF  - No overt findings to suggest acute exacerbation  - Continue CHF GDMT per home regimen     Rheumatoid Arthritis  - Continue oral Prednisone    Myoclonic jerks: unclear etiology. No overt severe electrolyte abnormality noted on lab. Neuro consulted     Mild prerenal azotemia: noted on lab today. Start gentle IVF. Repeat lab tomorrow      DVT Prophylaxis: heparin         Diet: ADULT DIET;  Regular; Low Fat/Low Chol/High Fiber/2 gm Na; 1200 ml  Code Status: Full Code    PT/OT Eval Status: ordered    Dispo - likely tomorrow as doppler studies unavailable today and await neuro eval.      Eran Grace MD

## 2022-07-17 NOTE — PLAN OF CARE
Problem: Safety - Adult  Goal: Free from fall injury  Outcome: Progressing   Pt will remain fall free throughout hospital stay. Bed alarm on and in lowest position. Call light within reach.

## 2022-07-17 NOTE — PROGRESS NOTES
Consult placed    Who: Added Dr. Ronda Harrison to treatment team  Date:7/17/2022,  Naatlya Hazel AM        Electronically signed by Shanon Calle RN on 7/17/2022 at 8:39 AM

## 2022-07-18 ENCOUNTER — APPOINTMENT (OUTPATIENT)
Dept: VASCULAR LAB | Age: 82
DRG: 134 | End: 2022-07-18
Payer: MEDICAID

## 2022-07-18 PROBLEM — G25.3 MYOCLONUS: Status: ACTIVE | Noted: 2022-07-18

## 2022-07-18 LAB
A/G RATIO: 2.2 (ref 1.1–2.2)
ALBUMIN SERPL-MCNC: 4.1 G/DL (ref 3.4–5)
ALP BLD-CCNC: 35 U/L (ref 40–129)
ALT SERPL-CCNC: 14 U/L (ref 10–40)
ANION GAP SERPL CALCULATED.3IONS-SCNC: 10 MMOL/L (ref 3–16)
ANTICARDIOLIPIN IGA ANTIBODY: <10 APL
AST SERPL-CCNC: 19 U/L (ref 15–37)
BILIRUB SERPL-MCNC: 1 MG/DL (ref 0–1)
BUN BLDV-MCNC: 35 MG/DL (ref 7–20)
CALCIUM SERPL-MCNC: 9.4 MG/DL (ref 8.3–10.6)
CHLORIDE BLD-SCNC: 106 MMOL/L (ref 99–110)
CO2: 26 MMOL/L (ref 21–32)
CREAT SERPL-MCNC: 1.2 MG/DL (ref 0.6–1.2)
GFR AFRICAN AMERICAN: 52
GFR NON-AFRICAN AMERICAN: 43
GLUCOSE BLD-MCNC: 105 MG/DL (ref 70–99)
GLUCOSE BLD-MCNC: 111 MG/DL (ref 70–99)
GLUCOSE BLD-MCNC: 114 MG/DL (ref 70–99)
GLUCOSE BLD-MCNC: 143 MG/DL (ref 70–99)
GLUCOSE BLD-MCNC: 99 MG/DL (ref 70–99)
MAGNESIUM: 2 MG/DL (ref 1.8–2.4)
PERFORMED ON: ABNORMAL
POTASSIUM SERPL-SCNC: 3.9 MMOL/L (ref 3.5–5.1)
SODIUM BLD-SCNC: 142 MMOL/L (ref 136–145)
TOTAL PROTEIN: 6 G/DL (ref 6.4–8.2)

## 2022-07-18 PROCEDURE — 94640 AIRWAY INHALATION TREATMENT: CPT

## 2022-07-18 PROCEDURE — 97530 THERAPEUTIC ACTIVITIES: CPT

## 2022-07-18 PROCEDURE — 36415 COLL VENOUS BLD VENIPUNCTURE: CPT

## 2022-07-18 PROCEDURE — 2580000003 HC RX 258: Performed by: NURSE PRACTITIONER

## 2022-07-18 PROCEDURE — 2700000000 HC OXYGEN THERAPY PER DAY

## 2022-07-18 PROCEDURE — 2060000000 HC ICU INTERMEDIATE R&B

## 2022-07-18 PROCEDURE — 80053 COMPREHEN METABOLIC PANEL: CPT

## 2022-07-18 PROCEDURE — 83735 ASSAY OF MAGNESIUM: CPT

## 2022-07-18 PROCEDURE — 94761 N-INVAS EAR/PLS OXIMETRY MLT: CPT

## 2022-07-18 PROCEDURE — 97166 OT EVAL MOD COMPLEX 45 MIN: CPT

## 2022-07-18 PROCEDURE — 93970 EXTREMITY STUDY: CPT

## 2022-07-18 PROCEDURE — 6370000000 HC RX 637 (ALT 250 FOR IP): Performed by: INTERNAL MEDICINE

## 2022-07-18 PROCEDURE — 6370000000 HC RX 637 (ALT 250 FOR IP): Performed by: NURSE PRACTITIONER

## 2022-07-18 PROCEDURE — 97162 PT EVAL MOD COMPLEX 30 MIN: CPT

## 2022-07-18 PROCEDURE — 2580000003 HC RX 258: Performed by: INTERNAL MEDICINE

## 2022-07-18 PROCEDURE — 99221 1ST HOSP IP/OBS SF/LOW 40: CPT | Performed by: PSYCHIATRY & NEUROLOGY

## 2022-07-18 RX ADMIN — LEVOTHYROXINE SODIUM 125 MCG: 0.12 TABLET ORAL at 07:45

## 2022-07-18 RX ADMIN — Medication 2 PUFF: at 08:20

## 2022-07-18 RX ADMIN — APIXABAN 10 MG: 5 TABLET, FILM COATED ORAL at 09:21

## 2022-07-18 RX ADMIN — Medication 10 ML: at 20:39

## 2022-07-18 RX ADMIN — SPIRONOLACTONE 25 MG: 25 TABLET ORAL at 09:21

## 2022-07-18 RX ADMIN — APIXABAN 10 MG: 5 TABLET, FILM COATED ORAL at 20:38

## 2022-07-18 RX ADMIN — SODIUM CHLORIDE: 9 INJECTION, SOLUTION INTRAVENOUS at 03:52

## 2022-07-18 RX ADMIN — ATORVASTATIN CALCIUM 10 MG: 10 TABLET, FILM COATED ORAL at 09:21

## 2022-07-18 RX ADMIN — EZETIMIBE 10 MG: 10 TABLET ORAL at 10:17

## 2022-07-18 RX ADMIN — PREDNISONE 5 MG: 5 TABLET ORAL at 09:21

## 2022-07-18 RX ADMIN — DULOXETINE HYDROCHLORIDE 30 MG: 30 CAPSULE, DELAYED RELEASE ORAL at 09:20

## 2022-07-18 RX ADMIN — SPIRONOLACTONE 25 MG: 25 TABLET ORAL at 20:39

## 2022-07-18 RX ADMIN — TIOTROPIUM BROMIDE INHALATION SPRAY 2 PUFF: 3.12 SPRAY, METERED RESPIRATORY (INHALATION) at 08:20

## 2022-07-18 RX ADMIN — PANTOPRAZOLE SODIUM 40 MG: 40 TABLET, DELAYED RELEASE ORAL at 07:45

## 2022-07-18 RX ADMIN — FAMOTIDINE 20 MG: 20 TABLET ORAL at 09:21

## 2022-07-18 RX ADMIN — FUROSEMIDE 20 MG: 20 TABLET ORAL at 09:21

## 2022-07-18 RX ADMIN — LABETALOL HYDROCHLORIDE 100 MG: 100 TABLET, FILM COATED ORAL at 09:21

## 2022-07-18 RX ADMIN — FUROSEMIDE 20 MG: 20 TABLET ORAL at 20:39

## 2022-07-18 RX ADMIN — FLUTICASONE PROPIONATE 1 SPRAY: 50 SPRAY, METERED NASAL at 09:20

## 2022-07-18 RX ADMIN — LABETALOL HYDROCHLORIDE 100 MG: 100 TABLET, FILM COATED ORAL at 20:44

## 2022-07-18 RX ADMIN — Medication 2 PUFF: at 19:32

## 2022-07-18 ASSESSMENT — PAIN SCALES - GENERAL
PAINLEVEL_OUTOF10: 0
PAINLEVEL_OUTOF10: 8

## 2022-07-18 ASSESSMENT — PAIN DESCRIPTION - ORIENTATION: ORIENTATION: RIGHT;LEFT;LOWER

## 2022-07-18 ASSESSMENT — PAIN DESCRIPTION - LOCATION: LOCATION: BACK;LEG

## 2022-07-18 ASSESSMENT — PAIN DESCRIPTION - DESCRIPTORS: DESCRIPTORS: NAGGING

## 2022-07-18 NOTE — PROGRESS NOTES
Hospitalist Progress Note      PCP: Mey Kelley DO    Date of Admission: 7/15/2022    Chief Complaint: SOB    Hospital Course:   Liang Hidalgo is an 61-year-old female with significant past medical history of hypertension, hyperlipidemia, COPD, congestive heart failure, and recently treated for bilateral lower leg DVTs in December 2021 who presents to [facility] emergency department with complaint of chest pain and dyspnea. Of note, she was treated with Eliquis for bilateral DVTs, had finished a regimen sometime in April. She notes that she was doing well but within the last 2 weeks she feels that her breathing and dyspnea has increased above baseline of requiring 4 L nasal cannula. She denies any fever, endorses chronic cough that is nonproductive, no chest congestion, no dizziness or chest pain. When she arrived here, she was evaluation with laboratory studies, EKG, and CT of the chest PE protocol which showed suspected segmental embolus to the right lower lobe. Pertinent lab values tonight include sodium 134, chloride 98, BUN 23, GFR 48, initial lactic acid of 2.2 with a repeat of 1.9, and a blood sugar of 145. Initial troponin was 0.04 with a repeat of 0.03. Cell count was unremarkable for any leukocytosis or leukopenia, acute anemia, or acute platelet abnormalities. Patient was started on a heparin infusion. Hospital team was consulted admit this patient for acute right lower lobe pulmonary embolus. Subjective: No new complaints today.       Medications:  Reviewed    Infusion Medications    sodium chloride 75 mL/hr at 07/18/22 0352    dextrose      sodium chloride       Scheduled Medications    apixaban  10 mg Oral BID    Followed by    Brenda Marcus ON 7/24/2022] apixaban  5 mg Oral BID    predniSONE  5 mg Oral Daily    insulin lispro  0-6 Units SubCUTAneous TID     insulin lispro  0-3 Units SubCUTAneous Nightly    atorvastatin  10 mg Oral Daily    DULoxetine  30 mg Oral Daily    [START ON 7/19/2022] vitamin D  50,000 Units Oral Q7 Days    ezetimibe  10 mg Oral Daily    famotidine  20 mg Oral Daily    fluticasone  1 spray Each Nostril Daily    furosemide  20 mg Oral BID    labetalol  100 mg Oral BID    pantoprazole  40 mg Oral QAM AC    levothyroxine  125 mcg Oral Daily    spironolactone  25 mg Oral BID    sodium chloride flush  5-40 mL IntraVENous 2 times per day    mometasone-formoterol  2 puff Inhalation BID    And    tiotropium  2 puff Inhalation Daily     PRN Meds: perflutren lipid microspheres, glucose, dextrose bolus **OR** dextrose bolus, glucagon (rDNA), dextrose, albuterol sulfate HFA, ipratropium-albuterol, sodium chloride flush, sodium chloride, ondansetron **OR** ondansetron, polyethylene glycol, acetaminophen **OR** acetaminophen      Intake/Output Summary (Last 24 hours) at 7/18/2022 1550  Last data filed at 7/18/2022 0851  Gross per 24 hour   Intake 1219 ml   Output 975 ml   Net 244 ml       Physical Exam Performed:    /73   Pulse 73   Temp 98 °F (36.7 °C) (Oral)   Resp 18   Ht 5' 6\" (1.676 m)   Wt 184 lb (83.5 kg)   SpO2 98%   BMI 29.70 kg/m²     General appearance:  No apparent distress, appears stated age and cooperative. HEENT:  Normal cephalic, atraumatic without obvious deformity. Pupils equal, round, and reactive to light. Extra ocular muscles intact. Conjunctivae/corneas clear. Neck: Supple, with full range of motion. No jugular venous distention. Trachea midline. Respiratory:  Normal respiratory effort. Diminished to auscultation bilaterally with faint wheezing noted generally  Cardiovascular:  Regular rate and rhythm with normal S1/S2 without murmurs, rubs or gallops. No LE edema  Abdomen: Soft, non-tender, non-distended with normal bowel sounds. Musculoskeletal:  No clubbing, cyanosis or edema bilaterally. Full range of motion without deformity. Skin: Skin color, texture, turgor normal.  No rashes or lesions.   Neurologic: Neurovascularly intact without any focal sensory/motor deficits. Cranial nerves: II-XII intact, grossly non-focal.  Psychiatric:  Alert and oriented, thought content appropriate, normal insight  Capillary Refill: Brisk,3 seconds, normal  Peripheral Pulses: +2 palpable, equal bilaterally      Labs:   Recent Labs     07/15/22  2016 07/16/22  0712   WBC 7.7 6.7   HGB 12.1 11.8*   HCT 37.7 36.1    188     Recent Labs     07/16/22  0712 07/17/22  0912 07/18/22  0649    140 142   K 4.4 4.0 3.9   CL 99 101 106   CO2 23 27 26   BUN 26* 34* 35*   CREATININE 1.2 1.3* 1.2   CALCIUM 10.3 9.9 9.4     Recent Labs     07/15/22  2016 07/18/22  0649   AST 26 19   ALT 18 14   BILITOT 1.8* 1.0   ALKPHOS 42 35*     Recent Labs     07/15/22  2016   INR 1.13     Recent Labs     07/15/22  2016 07/16/22  0236   TROPONINI 0.04* 0.03*       Urinalysis:      Lab Results   Component Value Date/Time    NITRU Negative 03/27/2019 12:18 AM    BLOODU Negative 03/27/2019 12:18 AM    SPECGRAV >=1.030 03/27/2019 12:18 AM    GLUCOSEU Negative 03/27/2019 12:18 AM       Radiology:  VL Extremity Venous Bilateral         CT CHEST PULMONARY EMBOLISM W CONTRAST   Final Result   Addendum (preliminary) 1 of 1   ADDENDUM:   Critical results were called by Dr. Mercedes Muñoz to Dr. Betzy [place] on 7/15/2022    at   21:46. Final   Suboptimal examination. Suspected segmental pulmonary embolism in the right   lower lobe. Similar right ventricular and main pulmonary artery dilatation   since at least 12/27/2021. XR CHEST PORTABLE   Final Result   Mild left basilar atelectasis or scarring. In the appropriate clinical   setting pneumonia is not excluded.                  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]      Acute PE  - continue eliquis  - LE doppler confirms RLE DVT    Chronic hypoxic respiratory failure  - stable on home 3L NC    COPD  - no evidence of exacerbation  - continue home inhalers    Chronic diastolic heart failure  - appears euvolemic  - continue home lasix, aldactone    HTN  - poorly controlled  - continue labetalol, aldactone    HLD  - continue statin    Myoclonus  - neurology consulted  - subacute, likely metabolic  - no further testing needed    RA  - continue home prednisone    Hypothyroidism  - continue synthroid    DVT Prophylaxis: eliquis  Diet: ADULT DIET;  Regular; Low Fat/Low Chol/High Fiber/2 gm Na; 1200 ml  Code Status: Full Code    PT/OT Eval Status: ordered    Westley Castro MD

## 2022-07-18 NOTE — PLAN OF CARE
Problem: Safety - Adult  Goal: Free from fall injury  7/18/2022 0932 by Addie Aleaxndra RN  Outcome: Progressing Towards Goal  7/17/2022 2053 by Manas Sen RN  Outcome: Progressing Towards Goal  Flowsheets (Taken 7/17/2022 2053)  Free From Fall Injury:   Instruct family/caregiver on patient safety   Based on caregiver fall risk screen, instruct family/caregiver to ask for assistance with transferring infant if caregiver noted to have fall risk factors

## 2022-07-18 NOTE — CONSULTS
In patient Neurology consult        [facility] Neurology      Vinnie Medina MD      Guillaume Saavedra  1940    Date of Service: 7/18/2022    Referring Physician: Nikki Taylor MD      Reason for the consult and CC: Worsening myoclonus    HPI:   The patient is a [de-identified]y.o.  years old female with history of hypertension, COPD, hyperlipidemia and CHF who was admitted to the hospital few days ago for acute breathing difficulties and respiratory failure. Neurology was consulted for worsening muscle jerks. Symptoms started few weeks ago. Description intermittent but frequent episodes of upper extremity brief muscle jerks in response to stress or fine motor skills. Degree was moderate. Duration was seconds. Frequency is clusters throughout the day. No other relieving or aggravating factors. No falling or injury. No family history of similar illness. No falling or injury. She did try medicine for such symptoms. Blood test reviewed and showed mild DANY which has improved. She is on blood thinner for possible acute PE. Other review of system was unremarkable.       Family History   Problem Relation Age of Onset    Cancer Mother     Cancer Father     Heart Disease Maternal Aunt     Cancer Sister      Past Surgical History:   Procedure Laterality Date    BRONCHOSCOPY N/A 3/27/2019    BRON[facility]OS[facility]OLAR LAV[facility]  3/27/2019    BRONCHOSCOPY THERAPUTIC ASPIRATION INITIAL performed by Richelle Cotter MD at [address] Bilateral 03/23/2011    COLONOSCOPY N/A 6/12/2019    COLONOSCOPY WITH ANESTHESIA -SLEEP APNEA- performed by Bo Doss MD at [address] Left 12/4/2018    LEFT LUMBAR FOUR, LUMBAR FIVE TRANSFORAMINAL EPIDURAL STEROID INJECTION SITE CONFIRMED BY FLUOROSCOPY performed by Bran Palacio MD at [address] N/A 7/23/2019 ESOPHAGEAL MOTILITY/MANOMETRY STUDY performed by Thelma Weber MD at [address] little toe    GASTROSTOMY TUBE PLACEMENT N/A 2019    EGD PEG TUBE PLACEMENT performed by Jermain Cabral MD at [address] [facility] (CERVIX STATUS UNKNOWN)      total, fibroids    KNEE SURGERY      right    [place] NJX DX/THER SBST INTRLMNR CRV/THRC W/IMG GDN Left 2018    LEFT LUMBAR FOUR/ LUMBAR FIVE CYST ASPIRATION SITE CONFIRMED BY FLUOROSCOPY performed by Adilene Brizuela MD at [address] [facility] ENDOSCOPY N/A 3/27/2019    EGD DIAGNOSTIC ONLY performed by Lluvia Brizuela MD at [address] [facility] N/A 2020    EGD DIAGNOSTIC ONLY performed by Antoine Echavarria DO at [address] [facility] [place]        Past Medical History:   Diagnosis Date    Acid reflux     Acquired hypothyroidism 2017    Anemia     Asthma     CHF (congestive heart failure) ([facility])     COPD (chronic obstructive pulmonary disease) ([facility] [address]     HLD (hyperlipidemia)     Hypertension     Influenza A 2018    MI (myocardial infarction) ([facility])     X 2    Migraine     past hx    Rheumatoid arthritis     Wears glasses      Social History     Tobacco Use    Smoking status: Former     Packs/day: 3.00     Years: 50.00     Pack years: 150.00     Types: Cigarettes     Start date: 3/4/1963     Quit date: 2009     Years since quittin.4    Smokeless tobacco: Never   Vaping Use    Vaping Use: Never used   Substance Use Topics    Alcohol use: No    Drug use: No     Allergies   Allergen Reactions    Alendronate Sodium      Jaw pain      Humira [Adalimumab]      Rash      Losartan Swelling    Penicillins      rash    Simvastatin Other (See Comments)     Made legs ache    Sulfa Antibiotics Swelling     Tongue swelled    Tape Nicanor Blackbird Tape]      Current Facility-Administered Medications   Medication Dose Route Frequency Provider Last Rate Last Admin    apixaban (ELIQUIS) tablet 10 mg  10 mg Oral BID Jamie Burger MD   10 mg at 07/18/22 5017    Followed by    Sarah Burroughs ON 7/24/2022] apixaban (ELIQUIS) tablet 5 mg  5 mg Oral BID Jamie Burger MD        0.9 % sodium chloride infusion   IntraVENous Continuous Jamie Burger MD 75 mL/hr at 07/18/22 0352 New Bag at 07/18/22 0352    perflutren lipid microspheres (DEFINITY) injection 1.65 mg  1.5 mL IntraVENous ONCE PRN JOSE Vasquez - CNP        predniSONE (DELTASONE) tablet 5 mg  5 mg Oral Daily JOSE Vasquez - CNP   5 mg at 07/18/22 0921    insulin lispro (HUMALOG) injection vial 0-6 Units  0-6 Units SubCUTAneous TID WC JOSE Vasquez - CNP   1 Units at 07/16/22 1752    insulin lispro (HUMALOG) injection vial 0-3 Units  0-3 Units SubCUTAneous Nightly JOSE Vasquez - CNP        glucose chewable tablet 16 g  4 tablet Oral PRN Yamileth Londono APRN - CNP        dextrose bolus 10% 125 mL  125 mL IntraVENous PRN JOSE Vasquez - CNP        Or    dextrose bolus 10% 250 mL  250 mL IntraVENous PRN Yamileth Londono APRN - CNP        glucagon (rDNA) injection 1 mg  1 mg IntraMUSCular PRN JOSE Vasquez - CNP        dextrose 5 % solution  100 mL/hr IntraVENous PRN Yamileth Londono APRN - CNP        albuterol sulfate HFA (PROVENTIL;VENTOLIN;PROAIR) 108 (90 Base) MCG/ACT inhaler 2 puff  2 puff Inhalation Q4H PRN Yamileth Londono APRN - CNP        atorvastatin (LIPITOR) tablet 10 mg  10 mg Oral Daily JOSE Vasquez - CNP   10 mg at 07/18/22 0921    DULoxetine (CYMBALTA) extended release capsule 30 mg  30 mg Oral Daily Yamileth Londono APRN - CNP   30 mg at 07/18/22 0920    [START ON 7/19/2022] vitamin D (ERGOCALCIFEROL) capsule 50,000 Units  50,000 Units Oral Q7 Days JOSE Vasquez CNP        ezetimibe (ZETIA) tablet 10 mg  10 mg Oral Daily JOSE Vasquez CNP   10 mg at 07/18/22 1017    famotidine (PEPCID) tablet 20 mg  20 mg Oral Daily Bernardo Life, APRN - CNP   20 mg at 07/18/22 0921    fluticasone (FLONASE) 50 MCG/ACT nasal spray 1 spray  1 spray Each Nostril Daily Bernardo Life, APRN - CNP   1 spray at 07/18/22 0920    furosemide (LASIX) tablet 20 mg  20 mg Oral BID Bernardo Life, APRN - CNP   20 mg at 07/18/22 0921    ipratropium-albuterol (DUONEB) nebulizer solution 3 mL  3 mL Inhalation Q4H PRN Bernardo Life, APRN - CNP        labetalol (NORMODYNE) tablet 100 mg  100 mg Oral BID Bernardo Life, APRN - CNP   100 mg at 07/18/22 0921    pantoprazole (PROTONIX) tablet 40 mg  40 mg Oral QAM AC Bernardo Life, APRN - CNP   40 mg at 07/18/22 0745    levothyroxine (SYNTHROID) tablet 125 mcg  125 mcg Oral Daily Bernardo Life, APRN - CNP   125 mcg at 07/18/22 0745    spironolactone (ALDACTONE) tablet 25 mg  25 mg Oral BID Bernardo Life, APRN - CNP   25 mg at 07/18/22 4064    sodium chloride flush 0.9 % injection 5-40 mL  5-40 mL IntraVENous 2 times per day Bernardo Life, APRN - CNP   10 mL at 07/17/22 2052    sodium chloride flush 0.9 % injection 5-40 mL  5-40 mL IntraVENous PRN Bernardo Life, APRN - CNP        0.9 % sodium chloride infusion  25 mL IntraVENous PRN Bernardo Life, APRN - CNP        ondansetron (ZOFRAN-ODT) disintegrating tablet 4 mg  4 mg Oral Q8H PRN Bernardo Life, APRN - CNP        Or    ondansetron (ZOFRAN) injection 4 mg  4 mg IntraVENous Q6H PRN Bernardo Life, APRN - CNP        polyethylene glycol (GLYCOLAX) packet 17 g  17 g Oral Daily PRN Bernardo Life, APRN - CNP        acetaminophen (TYLENOL) tablet 650 mg  650 mg Oral Q6H PRN Bernardo Life, APRN - CNP        Or    acetaminophen (TYLENOL) suppository 650 mg  650 mg Rectal Q6H PRN Bernardo Life, APRN - CNP        mometasone-formoterol (DULERA) 200-5 MCG/ACT inhaler 2 puff  2 puff Inhalation BID Bernardo Whelan, APRN - CNP   2 puff at 07/18/22 0820    And    tiotropium (SPIRIVA RESPIMAT) 2.5 MCG/ACT inhaler 2 puff  2 puff Inhalation Daily Ho SHIN Yazan Gage, APRN - CNP   2 puff at 07/18/22 0820       ROS : A 10-14 system review of constitutional, cardiovascular, respiratory, eyes, musculoskeletal, endocrine, GI, ENT, skin, hematological, genitourinary, psychiatric and neurologic systems was obtained and updated today and is unremarkable except as mentioned in my HPI      Exam:     Constitutional:   Vitals:    07/18/22 0353 07/18/22 0822 07/18/22 0915 07/18/22 1142   BP: 126/61  (!) 125/57 106/61   Pulse: 71  73 76   Resp: 20 20 18   Temp: 97.7 °F (36.5 °C)  97.6 °F (36.4 °C) 98.2 °F (36.8 °C)   TempSrc: Oral  Axillary    SpO2: 100% 99% 96% 99%   Weight:       Height:           General appearance and observation: Normal development and appear in no acute distress. Eye:  Fundus: No blurring of optic disc. Neck: supple  Cardiovascular: No lower leg edema with good pulsation. Mental Status:   Oriented to person, place, problem, and time. Memory: Good immediate recall. Intact remote memory  Normal attention span and concentration. Language: intact naming, repeating and fluency   Good fund of Knowledge. Aware of current events and vocabulary   Cranial Nerves:   II: Visual fields: Full. Pupils: equal, round, reactive to light  III,IV,VI: Extra Ocular Movements are intact. No nystagmus  V: Facial sensation is intact  VII: Facial strength and movements: intact and symmetric  VIII: Hearing: Intact  IX: Palate elevation is symmetric  XI: Shoulder shrug is intact  XII: Tongue movements are normal  Musculoskeletal: 5/5 in all 4 extremities. Tone: Normal tone. Reflexes: Symmetric 2+ in the arms and 1+ in the legs   Planters: flexor bilaterally. Coordination: no pronator drift, no dysmetria with FNF in upper extremities. Normal REM. Partial hand tremors and intermittent asterixis in both hands. Sensation: normal to all modalities in both arms and legs. Gait/Posture: steady gait.      Data:  LABS:   Lab Results   Component Value Date/Time     07/18/2022 06:49 AM    K 3.9 07/18/2022 06:49 AM    K 4.4 07/16/2022 07:12 AM     07/18/2022 06:49 AM    CO2 26 07/18/2022 06:49 AM    BUN 35 07/18/2022 06:49 AM    CREATININE 1.2 07/18/2022 06:49 AM    GFRAA 52 07/18/2022 06:49 AM    GFRAA >60 04/05/2012 04:40 PM    LABGLOM 43 07/18/2022 06:49 AM    GLUCOSE 99 07/18/2022 06:49 AM    PHOS 3.1 11/23/2020 11:08 AM    MG 2.00 07/18/2022 06:49 AM    CALCIUM 9.4 07/18/2022 06:49 AM     Lab Results   Component Value Date/Time    WBC 6.7 07/16/2022 07:12 AM    RBC 4.31 07/16/2022 07:12 AM    HGB 11.8 07/16/2022 07:12 AM    HCT 36.1 07/16/2022 07:12 AM    MCV 83.8 07/16/2022 07:12 AM    RDW 14.5 07/16/2022 07:12 AM     07/16/2022 07:12 AM     Lab Results   Component Value Date    INR 1.13 07/15/2022    PROTIME 14.3 07/15/2022     Reviewed notes from different physicians  Reviewed lab and blood testing    Impression:  New onset myoclonus, subacute. Likely related to metabolic myoclonus with DANY, hypoxia or other metabolic derangement. No need for further inpatient work-up at this point or start new medication giving risk outweighs the benefit. Would expect improvement of her myoclonus over the next few weeks spontaneously. Discussed risk of falling and injury with her family. Acute PE  Hypertension, not controlled  Respiratory failure  COPD    Recommendation:  No need for further inpatient work-up  No need to start medication for myoclonus  Continue anticoagulation  PT OT  Respiratory support  Blood pressure control  Follow kidney function testing  Hydration  Discussed with her family  No further recommendation  We will sign off      Thank you for referring such patient. If you have any questions regarding my consult note, please don't hesitate to call me. Magdalena Ruiz MD  [phone]    This dictation was generated by voice recognition computer software.  Although all attempts are made to edit the dictation for accuracy, there may be errors in the transcription that are not intended

## 2022-07-18 NOTE — RT PROTOCOL NOTE
RT Inhaler-Nebulizer Bronchodilator Protocol Note    There is a bronchodilator order in the chart from a provider indicating to follow the RT Bronchodilator Protocol and there is an Initiate RT Inhaler-Nebulizer Bronchodilator Protocol order as well (see protocol at bottom of note). CXR Findings:  XR CHEST PORTABLE    Result Date: 7/15/2022  Mild left basilar atelectasis or scarring. In the appropriate clinical setting pneumonia is not excluded. The findings from the last RT Protocol Assessment were as follows:   History Pulmonary Disease: Chronic pulmonary disease  Respiratory Pattern: Regular pattern and RR 12-20 bpm  Breath Sounds: Slightly diminished and/or crackles  Cough: Strong, spontaneous, non-productive  Indication for Bronchodilator Therapy: On home bronchodilators  Bronchodilator Assessment Score: 4    Aerosolized bronchodilator medication orders have been revised according to the RT Inhaler-Nebulizer Bronchodilator Protocol below. Respiratory Therapist to perform RT Therapy Protocol Assessment initially then follow the protocol. Repeat RT Therapy Protocol Assessment PRN for score 0-3 or on second treatment, BID, and PRN for scores above 3. No Indications - adjust the frequency to every 6 hours PRN wheezing or bronchospasm, if no treatments needed after 48 hours then discontinue using Per Protocol order mode. If indication present, adjust the RT bronchodilator orders based on the Bronchodilator Assessment Score as indicated below. Use Inhaler orders unless patient has one or more of the following: on home nebulizer, not able to hold breath for 10 seconds, is not alert and oriented, cannot activate and use MDI correctly, or respiratory rate 25 breaths per minute or more, then use the equivalent nebulizer order(s) with same Frequency and PRN reasons based on the score. If a patient is on this medication at home then do not decrease Frequency below that used at home.     0-3 - enter or revise RT bronchodilator order(s) to equivalent RT Bronchodilator order with Frequency of every 4 hours PRN for wheezing or increased work of breathing using Per Protocol order mode. 4-6 - enter or revise RT Bronchodilator order(s) to two equivalent RT bronchodilator orders with one order with BID Frequency and one order with Frequency of every 4 hours PRN wheezing or increased work of breathing using Per Protocol order mode. 7-10 - enter or revise RT Bronchodilator order(s) to two equivalent RT bronchodilator orders with one order with TID Frequency and one order with Frequency of every 4 hours PRN wheezing or increased work of breathing using Per Protocol order mode. 11-13 - enter or revise RT Bronchodilator order(s) to one equivalent RT bronchodilator order with QID Frequency and an Albuterol order with Frequency of every 4 hours PRN wheezing or increased work of breathing using Per Protocol order mode. Greater than 13 - enter or revise RT Bronchodilator order(s) to one equivalent RT bronchodilator order with every 4 hours Frequency and an Albuterol order with Frequency of every 2 hours PRN wheezing or increased work of breathing using Per Protocol order mode. RT to enter RT Home Evaluation for COPD & MDI Assessment order using Per Protocol order mode.     Electronically signed by Melida Dawson RCP on 7/17/2022 at 8:17 PM

## 2022-07-18 NOTE — PLAN OF CARE
Problem: Discharge Planning  Goal: Discharge to home or other facility with appropriate resources  Outcome: Progressing  Flowsheets (Taken 7/17/2022 2053)  Discharge to home or other facility with appropriate resources:   Identify barriers to discharge with patient and caregiver   Arrange for needed discharge resources and transportation as appropriate   Identify discharge learning needs (meds, wound care, etc)   Arrange for interpreters to assist at discharge as needed   Refer to discharge planning if patient needs post-hospital services based on physician order or complex needs related to functional status, cognitive ability or social support system     Problem: Safety - Adult  Goal: Free from fall injury  7/17/2022 2053 by Mitchel Oliveira RN  Outcome: Progressing  Flowsheets (Taken 7/17/2022 2053)  Free From Fall Injury:   Instruct family/caregiver on patient safety   Based on caregiver fall risk screen, instruct family/caregiver to ask for assistance with transferring infant if caregiver noted to have fall risk factors

## 2022-07-18 NOTE — PROGRESS NOTES
General  Chart Reviewed: Yes  Patient assessed for rehabilitation services?: Yes  Family / Caregiver Present: No     Social/Functional History  Social/Functional History  Lives With: Family (granddaughter(works from Home))  Type of Home: House  Home Layout: One level  Home Access: Stairs to enter with rails  Entrance Stairs - Number of Steps: 6  Entrance Stairs - Rails: Both  Bathroom Shower/Tub: Tub/Shower unit  Bathroom Toilet: Standard  Bathroom Equipment: Shower chair, Toilet raiser, Grab bars around toilet  Home Equipment: Genna Mandujano, rolling, Electric scooter  Receives Help From: Family, Home health (RN visits 2x/ week)  ADL Assistance: Needs assistance  Bath: Moderate assistance  Dressing: Moderate assistance (with Right LE due to knee pain to daysi/doff)  Feeding: Independent  Toileting: Needs assistance  Ambulation Assistance: Needs assistance (with RW)  Transfer Assistance: Needs assistance (off couch)  Occupation: On disability  Leisure & Hobbies: games on tablet  Additional Comments: sleeps in recliner; prefers use of rolling desk chair to get to/from bathroom due to urge incontinence       Objective   Heart Rate: 73  Heart Rate Source: Monitor  BP: (!) 125/57  BP Location: Left upper arm  BP Method: Automatic  Patient Position: Sitting;Up in chair  MAP (Calculated): 79.67  Resp: 20  SpO2: 96 %  O2 Device: Nasal cannula             Safety Devices  Type of Devices: Call light within reach;Nurse notified; Left in chair;Chair alarm in place        AROM: Generally decreased, functional  Strength: Generally decreased, functional  Coordination: Generally decreased, functional  Tone: Normal  ADL  Feeding: Setup  Toileting:  Moderate assistance (per RN to manage LE clothing for toileting; declined BSC use)        Bed mobility  Supine to Sit: Stand by assistance (HOB elevated & use of bedrail on Right)  Sit to Supine: Unable to assess (Left up in chair for breakfast, upon exiting, pt agreeable)  Transfers  Stand Pivot Transfers: Contact guard assistance (bed-->chairwith gait belt  without AD)  Sit to stand: Contact guard assistance  Stand to sit: Contact guard assistance    Vision - Basic Assessment  Prior Vision: Wears glasses all the time    Cognition  Overall Cognitive Status: Exceptions  Arousal/Alertness: Appropriate responses to stimuli  Following Commands:  Follows one step commands consistently  Attention Span: Appears intact  Memory: Decreased recall of precautions (No recall of Heart Failure teaching/fluid restrictions)  Insights: Decreased awareness of deficits  Initiation: Requires cues for some  Sequencing: Does not require cues  Orientation  Overall Orientation Status: Within Functional Limits                  Education Given To: Patient  Education Provided: Role of Therapy;Plan of Care;Precautions  Education Provided Comments: disease specific: importance of OOB to chair for meals & cardiopulmonary benefits for meals & short periods of time  Education Method: Verbal  Barriers to Learning: Cognition  Education Outcome: Verbalized understanding           OCCUPATIONAL THERAPY HEART FAILURE EDUCATION    Andrewcrispin Nguyen    : 1940  Acct #: [de-identified]  MRN: [id number]  PHYSICIAN:  Yrn Shaffer MD  Primary Problem:   Patient Active Problem List   Diagnosis    Centrilobular emphysema ([facility] [address]    Pure hypercholesterolemia    Rheumatoid arthritis involving multiple sites with positive rheumatoid factor ([facility] [address]    Essential hypertension    Age-related osteoporosis without current pathological fracture    Acquired hypothyroidism    Cervical disc disorder at C4-C5 level with radiculopathy    Sciatica of left side    Pulmonary HTN (HCC)    Anemia    HEBERT (obstructive sleep apnea)    ASD (atrial septal defect)    Esophageal motility disorder    Former smoker    Chronic respiratory failure with hypoxia (HCC)    Reactive depression    Nasal congestion    Hypertension, uncontrolled    Acute deep vein thrombosis (DVT) of calf muscle vein of both lower extremities (HCC)    Acute pulmonary embolism without acute cor pulmonale, unspecified pulmonary embolism type ([facility] [address]       OT Heart Failure Education Goals    Patient educated and demonstrates /verbalizes appropriate teach back with ability to self rate on MARY ANNE and identify HF activity zone, prior to initiation of ADLs to appropriately determine need for daily activity level/ energy conservation/pacing. [x] Goal Met, [] Goal Not Met    Patient demonstrates /[id number] understanding of appropriate employment of Energy Conservation with every day activity. [x] Goal Met, [] Goal Not Met    Patient demonstrates/verbalizes understanding of current barriers, impairments, and recommendations for compensatory techniques in Managing Medication organization and scheduling. [] Goal Met, [x] Goal Not Met    Patient demonstrates/verbalizes ability to correctly identify mL to cups conversion, what constitutes FLUID in diet, and knowledge of when to communicate changes in weight to physician consistent with patient orders and HF education. [] Goal Met, [x] Goal Not Met    Pt voices and demonstrates appropriate teach back of Heart Failure Education Program with the use of:  [] Energy Conservation techniques                              [] Choosing daily activity level  [] Importance of fluid restriction             [] Importance of managing medication with organization and scheduling.     Pt will benefit from reinforcement of education due to   [] Readiness to learn                               [x] Decreased cognition  [] Language barrier                                  [] Decreased vision/hearing  [] First introduction to new information      [] Current Living (LTC, SNF, etc)  []Other:    Education provided via:  [x] Oral instruction  [] Demonstration  [] Written handout    ------------------------------------------------------------------------------------------------------------------------------------                    1)Heart Failure Zones Self Check Plan referencing Red/Yellow/Green Light Symbol with:  [] Green Zone/All Clear:   physical activity is normal for you,   No new swelling in feet, ankles, legs or stomach,   No weight gain of more than 2-3 pounds,   No chest pain or worsening of shortness of breath. (Continue daily: weight check, meds as directed, low salt eating, monitoring of fluid intake, balance activity, follow up visits)  [x] Yellow Zone/Caution:   Increased cough or shortness of breath with activity  Increased swelling in your feet, ankles, legs or stomach from baseline  Weight gain or loss of more than 2-3 pounds in 1 day. Increase in the number of pillows needed while sleeping  (Check In!: You need to contact your doctor or provider as soon as possible)  [] Red Zone/Medical Alert:  Unrelieved chest pain or shortness of breath, especially while resting  Increased Discomfort or swelling in the abdomen or lower body  Sudden weight gain of more than 5 pounds in a week  Increased cough with bubbly and/or pink sputum  (Warning: You need to be seen right away . If you cannot reach your physician, call 911)    Patient educated in and [x]demonstrated/[]verbalized understanding of identifying HF activity zone with the Self Check Plan referencing Red/Yellow/Green Light Symbol. *prior to ADL's/activity  to appropriately determine daily activity level*  ------------------------------------------------------------------------------------------------------------------------------------         2)Josh Rating of Perceived Exertion (RPE) Scale     The Josh rating scale ranges from 6 to 20, where 6 means \"no exertion at all\" and 20 means \"maximal exertion. \" The patient chooses the number that best describes their level of exertion.  This will objectify the intensity level of the patient's activity, and the therapist can use this information to accelerate or decelerate activity levels to reach the desired range. The patient should appraise their feeling of exertion as honestly as possible, without thinking about what the actual physical load is. Their feeling of effort and exertion is important, and it should not be compared to the level of others. Josh RPE Scale  Activity Completed: NA    [] 6  No exertion at all  [] 7  Extremely light  [] 8  [] 9  Very light (For a healthy person, it is like walking slowly at his or her own pace for some minutes)  []10  []11  Light  []12  [x]13  Somewhat hard (exercise, but it still feels OK to continue)  []14  []15  Hard (heavy)  []16  []17  Very hard (can still go on, but really has to push himself. It feels very heavy, and the person is very tired.)  []18  []19  Extremely hard (For most people this is the most strenuous exercise they have ever experienced.)  []20  Maximal exertion. Patient educated in and []demonstrated/[x]verbalized understanding []teach back  []Rating self on JOSH and   []Identifying HF activity zone with the Self Check Plan referencing Red/Yellow/Green Light Symbol in chair to ADls/activity to appropriately determine daily activity  ------------------------------------------------------------------------------------------------------------------------------------         3) 5 P's of Energy Conservation    Pt was educated on the following aspects of Energy Conservation techniques. Prioritize/Plan: Decide what needs to be done today, and what can wait for a later date, write to do lists, Plan ahead to avoid extra trips, Gather supplies and equipment needed before starting an activity. Position: Avoid tiring and awkward posture that may impair breathing  Pace: Slow and steady pace, never rushing! Pursed lip breathing.   Pursed Lip Breathing: \"Smell the roses, blow out the candles\"    Patient [x]demonstrates / []verbalizes understanding of appropriate employment of Energy Conservation with every day activity.    ------------------------------------------------------------------------------------------------------------------------------------         4) Medication Management Activity and Education     Patient []demonstrates/[x]verbalizes understanding of current barriers, impairments, and recommendations for compensatory techniques in Managing Medication organization and scheduling. Pt participated in the following medication management activity. Identifying answers to questions related to simulated prescription label on worksheet. Pt required assistance or correction of error in the following: NA    Pt was educated on the following aspects of medication management:   [] Ordering new and refill medications  [] Taking medication at the right time   [] How to read prescription label  [] Knowing when to report adverse symptoms/side effects and to whom    Discussion was had with the patient to promote better medication management, including:   [] Importance of double checking medication (talking self through the process as needed)  [] Setting up auto refills through their pharmacy  [] Use of adaptive equipment to open pill bottle containers  [] Asking a trusted family member or friend to sort out daily medications in a medication organizer   [] Setting an alarm as a reminder; Use of phone bijal  [] Keeping medication near other items that are part of daily routine (ie.  On the nightstand or next to the )   [] Use of a medication chart to better track medications/dosages/reason for taking (condition)  [] Witting down a list of side effects felt with various medication to bring to the next appointment   [] Consulting the doctor or pharmacist with questions and/or concerns regarding prescription medications     Impairments: Pain  Recommendations: Allow family to manage meds    ------------------------------------------------------------------------------------------------------------------------------------         5) Fluid intake tracking    Patient  []demonstrates/[]verbalizes ability to correctly identify mL to cups conversion, what constitutes FLUID in diet, and  knowledge of when to  communicate changes in weight to physician consistent with patient orders and HF education. Pt participated in the following fluid tracking management   [] Identifying 4, 8, and 12 ounces of fluid size  [] Identify mL to cup conversion  [] Understanding Jello, watermelon and Ice cream contributing to fluid intake    [x]Acknowledge current fluid restriction limits/orders  Pt required assistance or correction of error in the following: Needs reinforcement  /continued education     ------------------------------------------------------------------------------------------------------------------------------------         Therapy Time:   Individual Concurrent Group Co-treatment   Time In 0810         Time Out 0851         Minutes 41           Timed Code Treatment Minutes:       Pt was left in chair with alarm set, needs within reach, educated to use RED call light to alert nursing staff of needs. RN aware. If pt is unable to be seen after this session, please see prior Occupational Therapy Evaluation and/or Treatment note as discharge summary. Please see case management note for discharge disposition.       Thank you,   Electronically signed by Adriano Dan OT on 7/18/2022 at 9:37 AM       AM-PAC Score        AM-PAC Inpatient Daily Activity Raw Score: 13 (07/18/22 0933)  AM-PAC Inpatient ADL T-Scale Score : 32.03 (07/18/22 0933)  ADL Inpatient CMS 0-100% Score: 63.03 (07/18/22 0933)  ADL Inpatient CMS G-Code Modifier : CL (07/18/22 7930)    Goals  Short Term Goals  Time Frame for Short term goals: 1 week(7-25-22)  Short Term Goal 1: verbalize good understanding of fluid restrictions for CHF  Patient Goals   Patient goals : \"go home\"       Therapy Time   Individual Concurrent Group Co-treatment   Time In [facility] [email or web address] 13         Time Out 0851         Minutes [id number] [address]

## 2022-07-18 NOTE — PROGRESS NOTES
Physical Therapy  Facility/Department: [facility] A2 CARD TELEMETRY  Physical Therapy Initial Assessment/Treatment    Name: Abel Caro  : 1940  MRN: [id number]  Date of Service: 2022    Discharge Recommendations:  24 hour supervision or assist, Home with Home health PT          Patient Diagnosis(es): The encounter diagnosis was Acute pulmonary embolism without acute cor pulmonale, unspecified pulmonary embolism type ([facility] [address] Past Medical History:  has a past medical history of Acid reflux, Acquired hypothyroidism, Anemia, Asthma, CHF (congestive heart failure) ([facility] [address] COPD (chronic obstructive pulmonary disease) ([facility] [address] HLD (hyperlipidemia), Hypertension, Influenza A, MI (myocardial infarction) ([facility] [address] Migraine, Rheumatoid arthritis, and Wears glasses. Past Surgical History:  has a past surgical history that includes Hysterectomy; knee surgery; Foot surgery; Wrist surgery; Cataract removal with implant (Bilateral, 2011); pr njx dx/ther sbst intrlmnr crv/thrc w/img gdn (Left, 2018); epidural steroid injection (Left, 2018); bronchoscopy (N/A, 3/27/2019); bronchoscopy (3/27/2019); Upper gastrointestinal endoscopy (N/A, 3/27/2019); Gastrostomy tube placement (N/A, 2019); Colonoscopy (N/A, 2019); esophageal motility study (N/A, 2019); and Upper gastrointestinal endoscopy (N/A, 2020). Assessment   Body Structures, Functions, Activity Limitations Requiring Skilled Therapeutic Intervention: Decreased functional mobility ; Decreased ADL status; Decreased strength;Decreased safe awareness;Decreased balance;Decreased endurance; Increased pain  Assessment: Pt presents to [facility] with R lung pulmonary embolism. PTA, pt lives with granddaughter with 6 SUSAN. Pt reports using computer chair for indoor mobility, occasionally needs help from granddaughter for transfers and has w/c for community mobility.  Currently, pt requires increased assist for functional mobility and demo decreased endurance and pain limiting function. Pt would benefit from continued skilled PT to address current deficits. Recommend home with 24hr supervision and HHPT, although anticipate pt will decline HHPT.   Treatment Diagnosis: impaired functional mobility  Therapy Prognosis: Good  Decision Making: Medium Complexity  Requires PT Follow-Up: Yes  Activity Tolerance  Activity Tolerance: Patient tolerated evaluation without incident;Patient limited by pain     Plan   Plan  Plan: 2-3 times per week  Current Treatment Recommendations: Strengthening, Balance training, Functional mobility training, Transfer training, Endurance training, Neuromuscular re-education, Stair training, Gait training, Pain management, Patient/Caregiver education & training, Safety education & training, Home exercise program, Therapeutic activities  Safety Devices  Type of Devices: Bed alarm in place, Call light within reach, Gait belt, Nurse notified, Left in bed, Patient at risk for falls     Restrictions  Restrictions/Precautions  Restrictions/Precautions: Fall Risk, General Precautions  Position Activity Restriction  Other position/activity restrictions: 3 L O 2, IV, telemetry     Subjective   General  Chart Reviewed: Yes  Patient assessed for rehabilitation services?: Yes  Response To Previous Treatment: Not applicable  Family / Caregiver Present: Yes (granddaughter)  Referring Practitioner: Kuldeep Swift MD  Referral Date : 07/17/22  Diagnosis: Acute PE  Follows Commands: Within Functional Limits  General Comment  Comments: RN cleared pt for PT eval  Subjective  Subjective: Pt sitting EOB upon arrival, eating lunch, agreeable to PT eval.         Social/Functional History  Social/Functional History  Lives With: Family (granddaughter(works from Home))  Type of Home: House  Home Layout: One level  Home Access: Stairs to enter with rails  Entrance Stairs - Number of Steps: 6  Entrance Stairs - Rails: Both  Bathroom Shower/Tub: Tub/Shower unit  Bathroom Toilet: Standard  Bathroom Equipment: Shower chair, Toilet raiser, Grab bars around toilet  Home Equipment: Oxygen, Walker, rolling, Electric scooter  Has the patient had two or more falls in the past year or any fall with injury in the past year?: No  Receives Help From: Family, Home health (RN visits 2x/ week)  ADL Assistance: Needs assistance  Bath: Moderate assistance  Dressing:  Moderate assistance (with Right LE due to knee pain to daysi/doff)  Feeding: Independent  Toileting: Needs assistance  Ambulation Assistance: Needs assistance (with RW)  Transfer Assistance: Needs assistance (off couch)  Occupation: On disability  Leisure & Hobbies: games on tablet  Additional Comments: sleeps in recliner; prefers use of rolling desk chair to get to/from bathroom due to urge incontinence    Vision/Hearing  Vision  Vision: Impaired  Vision Exceptions: Wears glasses at all times  Hearing  Hearing: Within functional limits      Cognition         Objective   Heart Rate: 76  Heart Rate Source: Monitor  BP: 106/61  BP Location: Left upper arm  BP Method: Automatic  Patient Position: Sitting;Up in chair  MAP (Calculated): 76  Resp: 18  SpO2: 99 %  O2 Device: Nasal cannula        Gross Assessment  AROM: Generally decreased, functional  PROM: Within functional limits  Strength: Generally decreased, functional  Coordination: Generally decreased, functional  Tone: Abnormal (tremors noted with activity)  Sensation: Intact       Bed Mobility Training  Bed Mobility Training: No (Pt sitting EOB at beginning and end of session)  Balance  Sitting: Intact  Standing: Impaired  Standing - Static: Occasional  Standing - Dynamic: Occasional  Transfer Training  Transfer Training: Yes  Sit to Stand: Minimum assistance;Assist X1;Additional time (from EOB, pt states needs increased help due to poor hand placement on bed; increased tremors with activity)  Stand to Sit: Minimum assistance;Assist X1;Additional time  Stand Pivot Transfers:  (Pt declines transfer to chair)  Gait Training  Gait Training: No (Pt declines ambulation at this time; Granddaughter states pt is able to ambulate to/from bathroom with assist)      AM-PAC Score  AM-PAC Inpatient Mobility Raw Score : 17 (07/18/22 1457)  AM-PAC Inpatient T-Scale Score : 42.13 (07/18/22 1457)  Mobility Inpatient CMS 0-100% Score: 50.57 (07/18/22 1457)  Mobility Inpatient CMS G-Code Modifier : CK (07/18/22 1457)          Goals  Short Term Goals  Time Frame for Short term goals: 7 days (7/25/22) unless otherwise noted  Short term goal 1: Pt will perform transfers with CGA and LRAD  Short term goal 2: Pt will ambulate 15 ft with CGA and LRAD  Short term goal 3: Pt will perform 6 steps with min(A)  Patient Goals   Patient goals : \"to go home\"       Education  Patient Education  Education Given To: Patient; Family  Education Provided: Role of Therapy;Plan of Care;Transfer Training  Education Provided Comments: Granddaughter and pt both hesitant to participate in PT due to previous \"doctor told me never to do PT again\" Both educated on role of PT and importance of continued mobility while in acute care. Education Method: Verbal  Barriers to Learning: Hearing  Education Outcome: Verbalized understanding;Demonstrated understanding;Continued education needed    Attempted Therapy Heart Failure Education this date. Pt inappropriate for education at this time due to :  []Cognition   []Medical Status   [x]Readiness to learn  [x]Refusal   []Language barrier  []Decreased vision/hearing    []No family present   Comment: Pt and granddaughter decline HF education during eval.        Therapy Time   Individual Concurrent Group Co-treatment   Time In 1304         Time Out 1322         Minutes 18         Timed Code Treatment Minutes: 8 Minutes (10 min eval)     If pt is unable to be seen after this session, please let this note serve as discharge summary.   Please see case management note for discharge disposition. Thank you.     Lady Mandujano, PT

## 2022-07-18 NOTE — CARE COORDINATION
From home alone w [facility] and [employer]. Granddaughter assists w transportation. Wants Hosp Bed if eligible. CM following. Rudy Neal RN     1600 Call placed to [facility] Rep [phone] to look into hospital bed for pt. Message left- continue to follow. Rudy Neal RN     [phone]- Received call back from [address]- they are checking to see if have bed in stock. Will also check epic for eligibility, and notify CM tomorrow. Rudy Neal RN     [phone] [facility] has a bed- will fax order to cm office.   Rudy Neal RN

## 2022-07-19 ENCOUNTER — TELEPHONE (OUTPATIENT)
Dept: PULMONOLOGY | Age: 82
End: 2022-07-19

## 2022-07-19 VITALS
RESPIRATION RATE: 17 BRPM | HEIGHT: 66 IN | HEART RATE: 80 BPM | SYSTOLIC BLOOD PRESSURE: 142 MMHG | BODY MASS INDEX: 27.55 KG/M2 | TEMPERATURE: 97.8 F | OXYGEN SATURATION: 96 % | WEIGHT: 171.4 LBS | DIASTOLIC BLOOD PRESSURE: 71 MMHG

## 2022-07-19 LAB
GLUCOSE BLD-MCNC: 125 MG/DL (ref 70–99)
GLUCOSE BLD-MCNC: 98 MG/DL (ref 70–99)
MAGNESIUM: 1.9 MG/DL (ref 1.8–2.4)
PERFORMED ON: ABNORMAL
PERFORMED ON: NORMAL
PRO-BNP: 404 PG/ML (ref 0–449)

## 2022-07-19 PROCEDURE — 6370000000 HC RX 637 (ALT 250 FOR IP): Performed by: NURSE PRACTITIONER

## 2022-07-19 PROCEDURE — 2580000003 HC RX 258: Performed by: NURSE PRACTITIONER

## 2022-07-19 PROCEDURE — 83880 ASSAY OF NATRIURETIC PEPTIDE: CPT

## 2022-07-19 PROCEDURE — 6370000000 HC RX 637 (ALT 250 FOR IP): Performed by: INTERNAL MEDICINE

## 2022-07-19 PROCEDURE — 36415 COLL VENOUS BLD VENIPUNCTURE: CPT

## 2022-07-19 PROCEDURE — 83735 ASSAY OF MAGNESIUM: CPT

## 2022-07-19 RX ADMIN — ERGOCALCIFEROL 50000 UNITS: 1.25 CAPSULE ORAL at 09:20

## 2022-07-19 RX ADMIN — PANTOPRAZOLE SODIUM 40 MG: 40 TABLET, DELAYED RELEASE ORAL at 05:59

## 2022-07-19 RX ADMIN — ATORVASTATIN CALCIUM 10 MG: 10 TABLET, FILM COATED ORAL at 08:55

## 2022-07-19 RX ADMIN — LEVOTHYROXINE SODIUM 125 MCG: 0.12 TABLET ORAL at 05:59

## 2022-07-19 RX ADMIN — FUROSEMIDE 20 MG: 20 TABLET ORAL at 08:55

## 2022-07-19 RX ADMIN — DULOXETINE HYDROCHLORIDE 30 MG: 30 CAPSULE, DELAYED RELEASE ORAL at 08:55

## 2022-07-19 RX ADMIN — SPIRONOLACTONE 25 MG: 25 TABLET ORAL at 08:55

## 2022-07-19 RX ADMIN — APIXABAN 10 MG: 5 TABLET, FILM COATED ORAL at 08:55

## 2022-07-19 RX ADMIN — Medication 10 ML: at 08:55

## 2022-07-19 RX ADMIN — LABETALOL HYDROCHLORIDE 100 MG: 100 TABLET, FILM COATED ORAL at 09:20

## 2022-07-19 RX ADMIN — PREDNISONE 5 MG: 5 TABLET ORAL at 09:20

## 2022-07-19 RX ADMIN — EZETIMIBE 10 MG: 10 TABLET ORAL at 09:20

## 2022-07-19 RX ADMIN — FAMOTIDINE 20 MG: 20 TABLET ORAL at 08:55

## 2022-07-19 ASSESSMENT — PAIN SCALES - GENERAL: PAINLEVEL_OUTOF10: 0

## 2022-07-19 NOTE — DISCHARGE SUMMARY
Hospital Medicine Discharge Summary    Patient ID: Bobo Roche      Patient's PCP: Nixon Doss DO    Admit Date: 7/15/2022     Discharge Date: 7/19/2022      Admitting Provider: Mirna Sharma MD     Discharge Provider: Ulises Merino MD     Discharge Diagnoses: Active Hospital Problems    Diagnosis     Myoclonus [G25.3]      Priority: Medium    Acute pulmonary embolism without acute cor pulmonale, unspecified pulmonary embolism type (HCC) [I26.99]      Priority: Medium    HTN (hypertension), benign [I10]     Chronic respiratory failure with hypoxia (HCC) [J96.11]     Centrilobular emphysema (HCC) [J43.2]     Rheumatoid arthritis involving multiple sites with positive rheumatoid factor ([facility] [address] [M05.79]        The patient was seen and examined on day of discharge and this discharge summary is in conjunction with any daily progress note from day of discharge. Hospital Course:   Bobo Roche is an 72-year-old female with significant past medical history of hypertension, hyperlipidemia, COPD, congestive heart failure, and recently treated for bilateral lower leg DVTs in December 2021 who presents to [facility] emergency department with complaint of chest pain and dyspnea. Of note, she was treated with Eliquis for bilateral DVTs, had finished a regimen sometime in April. She notes that she was doing well but within the last 2 weeks she feels that her breathing and dyspnea has increased above baseline of requiring 4 L nasal cannula. She denies any fever, endorses chronic cough that is nonproductive, no chest congestion, no dizziness or chest pain. When she arrived here, she was evaluation with laboratory studies, EKG, and CT of the chest PE protocol which showed suspected segmental embolus to the right lower lobe. Pertinent lab values tonight include sodium 134, chloride 98, BUN 23, GFR 48, initial lactic acid of 2.2 with a repeat of 1.9, and a blood sugar of 145. Initial troponin was 0.04 with a repeat of 0.03. Cell count was unremarkable for any leukocytosis or leukopenia, acute anemia, or acute platelet abnormalities. Patient was started on a heparin infusion. Hospital team was consulted admit this patient for acute right lower lobe pulmonary embolus. Acute PE  - continue eliquis  - LE doppler confirms RLE DVT     Chronic hypoxic respiratory failure  - stable on home 3L NC     COPD  - no evidence of exacerbation  - continue home inhalers     Chronic diastolic heart failure  - appears euvolemic  - continue home lasix, aldactone     HTN  - poorly controlled  - continue labetalol, aldactone     HLD  - continue statin     Myoclonus  - neurology consulted  - subacute, likely metabolic  - no further testing needed     RA  - continue home prednisone     Hypothyroidism  - continue synthroid    Physical Exam Performed:     BP (!) 142/71   Pulse 80   Temp 97.8 °F (36.6 °C) (Oral)   Resp 17   Ht 5' 6\" (1.676 m)   Wt 171 lb 6.4 oz (77.7 kg)   SpO2 96%   BMI 27.66 kg/m²       General appearance:  No apparent distress, appears stated age and cooperative. HEENT:  Normal cephalic, atraumatic without obvious deformity. Pupils equal, round, and reactive to light. Extra ocular muscles intact. Conjunctivae/corneas clear. Neck: Supple, with full range of motion. No jugular venous distention. Trachea midline. Respiratory:  Normal respiratory effort. Diminished to auscultation bilaterally with faint wheezing noted generally  Cardiovascular:  Regular rate and rhythm with normal S1/S2 without murmurs, rubs or gallops. No LE edema  Abdomen: Soft, non-tender, non-distended with normal bowel sounds. Musculoskeletal:  No clubbing, cyanosis or edema bilaterally. Full range of motion without deformity. Skin: Skin color, texture, turgor normal.  No rashes or lesions. Neurologic:  Neurovascularly intact without any focal sensory/motor deficits.  Cranial nerves: II-XII intact, grossly non-focal.  Psychiatric:  Alert and oriented, thought content appropriate, normal insight  Capillary Refill: Brisk,3 seconds, normal  Peripheral Pulses: +2 palpable, equal bilaterally      Labs: For convenience and continuity at follow-up the following most recent labs are provided:      CBC:    Lab Results   Component Value Date/Time    WBC 6.7 07/16/2022 07:12 AM    HGB 11.8 07/16/2022 07:12 AM    HCT 36.1 07/16/2022 07:12 AM     07/16/2022 07:12 AM       Renal:    Lab Results   Component Value Date/Time     07/18/2022 06:49 AM    K 3.9 07/18/2022 06:49 AM    K 4.4 07/16/2022 07:12 AM     07/18/2022 06:49 AM    CO2 26 07/18/2022 06:49 AM    BUN 35 07/18/2022 06:49 AM    CREATININE 1.2 07/18/2022 06:49 AM    CALCIUM 9.4 07/18/2022 06:49 AM    PHOS 3.1 11/23/2020 11:08 AM         Significant Diagnostic Studies    Radiology:   VL Extremity Venous Bilateral   Final Result      CT CHEST PULMONARY EMBOLISM W CONTRAST   Final Result   Addendum (preliminary) 1 of 1   ADDENDUM:   Critical results were called by Dr. Marly Nava to Dr. Rj Morse on 7/15/2022    at   21:46. Final   Suboptimal examination. Suspected segmental pulmonary embolism in the right   lower lobe. Similar right ventricular and main pulmonary artery dilatation   since at least 12/27/2021. XR CHEST PORTABLE   Final Result   Mild left basilar atelectasis or scarring. In the appropriate clinical   setting pneumonia is not excluded.                 Consults:     IP CONSULT TO HOSPITALIST  IP CONSULT TO PHARMACY  IP CONSULT TO CARDIOLOGY  IP CONSULT TO HEART FAILURE NURSE/COORDINATOR  IP CONSULT TO DIETITIAN  IP CONSULT TO NEUROLOGY  IP CONSULT TO HOME CARE NEEDS    Disposition:  home     Condition at Discharge: Stable    Discharge Instructions/Follow-up:  Follow up with PCP, hem/onc within 1-2 weeks    Code Status:  Full Code     Activity: activity as tolerated    Diet: regular diet      Discharge Medications: Discharge Medication List as of 7/19/2022 11:24 AM             Details   apixaban starter pack (ELIQUIS DVT/PE STARTER PACK) 5 MG TBPK tablet Take 1 tablet by mouth See Admin Instructions, Disp-74 tablet, R-0Normal                Details   fluticasone-umeclidin-vilant (TRELEGY ELLIPTA) 100-62.5-25 MCG/INH AEPB Inhale 1 puff into the lungs daily, Disp-1 each, R-5Normal      fluticasone (FLONASE) 50 MCG/ACT nasal spray SPRAY 2 SPRAYS INTO EACH NOSTRIL EVERY DAY, Disp-1 each, R-5Normal      spironolactone (ALDACTONE) 25 MG tablet Take 1 tablet by mouth 2 times daily, Disp-180 tablet, R-1Normal      DULoxetine (CYMBALTA) 30 MG extended release capsule Take 30 mg by mouth dailyHistorical Med      lansoprazole (PREVACID) 30 MG delayed release capsule Take 30 mg by mouth dailyHistorical Med      ezetimibe (ZETIA) 10 MG tablet Take 1 tablet by mouth daily, Disp-90 tablet, R-1Normal      traMADol (ULTRAM) 50 MG tablet Historical Med      levothyroxine (SYNTHROID) 125 MCG tablet Take 1 tablet by mouth Daily, Disp-90 tablet, R-1Normal      labetalol (NORMODYNE) 100 MG tablet Take 1 tablet by mouth 2 times daily, Disp-180 tablet, R-1Normal      atorvastatin (LIPITOR) 10 MG tablet Take 1 tablet by mouth daily, Disp-90 tablet, R-1Normal      furosemide (LASIX) 20 MG tablet Take 1 tablet by mouth 2 times daily, Disp-180 tablet, R-1Normal      Handicap Placard [facility] [place] Mon 2/7/2022, Disp-1 each, R-0, Print02/07/22      famotidine (PEPCID) 20 MG tablet TAKE ONE (1) TABLET BY MOUTH TWICE DAILY, Disp-60 tablet, R-3Normal      albuterol sulfate  (90 Base) MCG/ACT inhaler INHALE TWO (2) PUFFS BY MOUTH EVERY 6 HOURS AS NEEDED, Disp-18 g, R-10Normal      ACTEMRA 162 MG/0.9ML SOSY injection DAWHistorical Med      predniSONE (DELTASONE) 10 MG tablet Take 5 mg by mouth daily Historical Med      Misc.  Devices [facility]) MISC Disp-1 each, R-0, PrintSELF PROPELLED, LIGHT WEIGHT,  STANDARD SIZE      ergocalciferol (ERGOCALCIFEROL) 1.25 MG (11155 UT) capsule Take 50,000 Units by mouth every 7 daysHistorical Med      OXYGEN Inhale 3 L/min into the lungs daily as needed Historical Med      Blood Pressure Monitoring (BLOOD PRESSURE MONITOR/M CUFF) MISC DAILY Starting Mon 8/6/2018, Disp-1 each, R-0, Print      ipratropium-albuterol (DUONEB) 0.5-2.5 (3) MG/3ML SOLN nebulizer solution Inhale 3 mLs into the lungs every 4 hours as needed. , Disp-360 mL, R-3      nitroGLYCERIN (NITROSTAT) 0.4 MG SL tablet Place 0.4 mg under the tongue every 5 minutes as needed. Historical Med             Time Spent on discharge is more than 30 minutes in the examination, evaluation, counseling and review of medications and discharge plan. Signed:    Simeon Pugh MD   7/19/2022      Thank you Della Waldrop DO for the opportunity to be involved in this patient's care. If you have any questions or concerns, please feel free to contact me at [phone].

## 2022-07-19 NOTE — PROGRESS NOTES
PM Assessment and Mediations completed. /60   Pulse 78   Temp 98.6 °F (37 °C) (Oral)   Resp 16   Ht 5' 6\" (1.676 m)   Wt 184 lb (83.5 kg)   SpO2 96%   BMI 29.70 kg/m²     Alert and oriented. Denies pain. Respirations easy unlabored. Oxygen 3L/NC in place. Calls appropriately. Plan of care and safety measures reviewed with the patient. Needed items including call light in reach and exit alarm in place.        Electronically signed by Renee Lo RN on 7/18/2022 at 9:31 PM

## 2022-07-19 NOTE — PROGRESS NOTES
Initial troponin was 0.04 with a repeat of 0.03. Cell count was unremarkable for any leukocytosis or leukopenia, acute anemia, or acute platelet abnormalities. Patient was started on a heparin infusion. Hospital team was consulted admit this patient for acute right lower lobe pulmonary embolus. Acute PE  - continue eliquis  - LE doppler confirms RLE DVT     Chronic hypoxic respiratory failure  - stable on home 3L NC     COPD  - no evidence of exacerbation  - continue home inhalers     Chronic diastolic heart failure  - appears euvolemic  - continue home lasix, aldactone     HTN  - poorly controlled  - continue labetalol, aldactone     HLD  - continue statin     Myoclonus  - neurology consulted  - subacute, likely metabolic  - no further testing needed     RA  - continue home prednisone     Hypothyroidism  - continue synthroid    Physical Exam Performed:     BP (!) 142/71   Pulse 80   Temp 97.8 °F (36.6 °C) (Oral)   Resp 17   Ht 5' 6\" (1.676 m)   Wt 171 lb 6.4 oz (77.7 kg)   SpO2 96%   BMI 27.66 kg/m²       General appearance:  No apparent distress, appears stated age and cooperative. HEENT:  Normal cephalic, atraumatic without obvious deformity. Pupils equal, round, and reactive to light. Extra ocular muscles intact. Conjunctivae/corneas clear. Neck: Supple, with full range of motion. No jugular venous distention. Trachea midline. Respiratory:  Normal respiratory effort. Diminished to auscultation bilaterally with faint wheezing noted generally  Cardiovascular:  Regular rate and rhythm with normal S1/S2 without murmurs, rubs or gallops. No LE edema  Abdomen: Soft, non-tender, non-distended with normal bowel sounds. Musculoskeletal:  No clubbing, cyanosis or edema bilaterally. Full range of motion without deformity. Skin: Skin color, texture, turgor normal.  No rashes or lesions. Neurologic:  Neurovascularly intact without any focal sensory/motor deficits.  Cranial nerves: II-XII intact, grossly non-focal.  Psychiatric:  Alert and oriented, thought content appropriate, normal insight  Capillary Refill: Brisk,3 seconds, normal  Peripheral Pulses: +2 palpable, equal bilaterally      Labs: For convenience and continuity at follow-up the following most recent labs are provided:      CBC:    Lab Results   Component Value Date/Time    WBC 6.7 07/16/2022 07:12 AM    HGB 11.8 07/16/2022 07:12 AM    HCT 36.1 07/16/2022 07:12 AM     07/16/2022 07:12 AM       Renal:    Lab Results   Component Value Date/Time     07/18/2022 06:49 AM    K 3.9 07/18/2022 06:49 AM    K 4.4 07/16/2022 07:12 AM     07/18/2022 06:49 AM    CO2 26 07/18/2022 06:49 AM    BUN 35 07/18/2022 06:49 AM    CREATININE 1.2 07/18/2022 06:49 AM    CALCIUM 9.4 07/18/2022 06:49 AM    PHOS 3.1 11/23/2020 11:08 AM         Significant Diagnostic Studies    Radiology:   VL Extremity Venous Bilateral   Final Result      CT CHEST PULMONARY EMBOLISM W CONTRAST   Final Result   Addendum (preliminary) 1 of 1   ADDENDUM:   Critical results were called by Dr. Calvin Roberts to Dr. David Brian on 7/15/2022    at   21:46. Final   Suboptimal examination. Suspected segmental pulmonary embolism in the right   lower lobe. Similar right ventricular and main pulmonary artery dilatation   since at least 12/27/2021. XR CHEST PORTABLE   Final Result   Mild left basilar atelectasis or scarring. In the appropriate clinical   setting pneumonia is not excluded.                 Consults:     IP CONSULT TO HOSPITALIST  IP CONSULT TO PHARMACY  IP CONSULT TO CARDIOLOGY  IP CONSULT TO HEART FAILURE NURSE/COORDINATOR  IP CONSULT TO DIETITIAN  IP CONSULT TO NEUROLOGY  IP CONSULT TO HOME CARE NEEDS    Disposition:  home     Condition at Discharge: Stable    Discharge Instructions/Follow-up:  Follow up with PCP, hem/onc within 1-2 weeks    Code Status:  Full Code     Activity: activity as tolerated    Diet: regular diet      Discharge Medications: Discharge Medication List as of 7/19/2022 11:24 AM             Details   apixaban starter pack (ELIQUIS DVT/PE STARTER PACK) 5 MG TBPK tablet Take 1 tablet by mouth See Admin Instructions, Disp-74 tablet, R-0Normal                Details   fluticasone-umeclidin-vilant (TRELEGY ELLIPTA) 100-62.5-25 MCG/INH AEPB Inhale 1 puff into the lungs daily, Disp-1 each, R-5Normal      fluticasone (FLONASE) 50 MCG/ACT nasal spray SPRAY 2 SPRAYS INTO EACH NOSTRIL EVERY DAY, Disp-1 each, R-5Normal      spironolactone (ALDACTONE) 25 MG tablet Take 1 tablet by mouth 2 times daily, Disp-180 tablet, R-1Normal      DULoxetine (CYMBALTA) 30 MG extended release capsule Take 30 mg by mouth dailyHistorical Med      lansoprazole (PREVACID) 30 MG delayed release capsule Take 30 mg by mouth dailyHistorical Med      ezetimibe (ZETIA) 10 MG tablet Take 1 tablet by mouth daily, Disp-90 tablet, R-1Normal      traMADol (ULTRAM) 50 MG tablet Historical Med      levothyroxine (SYNTHROID) 125 MCG tablet Take 1 tablet by mouth Daily, Disp-90 tablet, R-1Normal      labetalol (NORMODYNE) 100 MG tablet Take 1 tablet by mouth 2 times daily, Disp-180 tablet, R-1Normal      atorvastatin (LIPITOR) 10 MG tablet Take 1 tablet by mouth daily, Disp-90 tablet, R-1Normal      furosemide (LASIX) 20 MG tablet Take 1 tablet by mouth 2 times daily, Disp-180 tablet, R-1Normal      Handicap Placard [facility] Starting Mon 2/7/2022, Disp-1 each, R-0, Print02/07/22      famotidine (PEPCID) 20 MG tablet TAKE ONE (1) TABLET BY MOUTH TWICE DAILY, Disp-60 tablet, R-3Normal      albuterol sulfate  (90 Base) MCG/ACT inhaler INHALE TWO (2) PUFFS BY MOUTH EVERY 6 HOURS AS NEEDED, Disp-18 g, R-10Normal      ACTEMRA 162 MG/0.9ML SOSY injection DAWHistorical Med      predniSONE (DELTASONE) 10 MG tablet Take 5 mg by mouth daily Historical Med      Misc.  Devices [facility]) MISC Disp-1 each, R-0, PrintSELF PROPELLED, LIGHT WEIGHT,  STANDARD SIZE      ergocalciferol (ERGOCALCIFEROL) 1.25 MG (38740 UT) capsule Take 50,000 Units by mouth every 7 daysHistorical Med      OXYGEN Inhale 3 L/min into the lungs daily as needed Historical Med      Blood Pressure Monitoring (BLOOD PRESSURE MONITOR/M CUFF) MISC DAILY Starting Mon 8/6/2018, Disp-1 each, R-0, Print      ipratropium-albuterol (DUONEB) 0.5-2.5 (3) MG/3ML SOLN nebulizer solution Inhale 3 mLs into the lungs every 4 hours as needed. , Disp-360 mL, R-3      nitroGLYCERIN (NITROSTAT) 0.4 MG SL tablet Place 0.4 mg under the tongue every 5 minutes as needed. Historical Med             Time Spent on discharge is more than 30 minutes in the examination, evaluation, counseling and review of medications and discharge plan. Signed:    Mariela Grace MD   7/19/2022      Thank you Renata Rojas DO for the opportunity to be involved in this patient's care. If you have any questions or concerns, please feel free to contact me at [phone].

## 2022-07-19 NOTE — PROGRESS NOTES
Hospitalist Progress Note      PCP: Macarena Zamarripa DO    Date of Admission: 7/15/2022    Chief Complaint: SOB    Hospital Course:   Tom Dawn is an 82-year-old female with significant past medical history of hypertension, hyperlipidemia, COPD, congestive heart failure, and recently treated for bilateral lower leg DVTs in December 2021 who presents to [facility] [place] [place] emergency department with complaint of chest pain and dyspnea. Of note, she was treated with Eliquis for bilateral DVTs, had finished a regimen sometime in April. She notes that she was doing well but within the last 2 weeks she feels that her breathing and dyspnea has increased above baseline of requiring 4 L nasal cannula. She denies any fever, endorses chronic cough that is nonproductive, no chest congestion, no dizziness or chest pain. When she arrived here, she was evaluation with laboratory studies, EKG, and CT of the chest PE protocol which showed suspected segmental embolus to the right lower lobe. Pertinent lab values tonight include sodium 134, chloride 98, BUN 23, GFR 48, initial lactic acid of 2.2 with a repeat of 1.9, and a blood sugar of 145. Initial troponin was 0.04 with a repeat of 0.03. Cell count was unremarkable for any leukocytosis or leukopenia, acute anemia, or acute platelet abnormalities. Patient was started on a heparin infusion. Hospital team was consulted admit this patient for acute right lower lobe pulmonary embolus. Subjective: No new complaints today.       Medications:  Reviewed    Infusion Medications    dextrose      sodium chloride       Scheduled Medications    apixaban  10 mg Oral BID    Followed by    Rupal Gooden ON 7/24/2022] apixaban  5 mg Oral BID    predniSONE  5 mg Oral Daily    insulin lispro  0-6 Units SubCUTAneous TID     insulin lispro  0-3 Units SubCUTAneous Nightly    atorvastatin  10 mg Oral Daily    DULoxetine  30 mg Oral Daily    vitamin D  50,000 Units Oral Q7 Days ezetimibe  10 mg Oral Daily    famotidine  20 mg Oral Daily    fluticasone  1 spray Each Nostril Daily    furosemide  20 mg Oral BID    labetalol  100 mg Oral BID    pantoprazole  40 mg Oral QAM AC    levothyroxine  125 mcg Oral Daily    spironolactone  25 mg Oral BID    sodium chloride flush  5-40 mL IntraVENous 2 times per day    mometasone-formoterol  2 puff Inhalation BID    And    tiotropium  2 puff Inhalation Daily     PRN Meds: perflutren lipid microspheres, glucose, dextrose bolus **OR** dextrose bolus, glucagon (rDNA), dextrose, albuterol sulfate HFA, ipratropium-albuterol, sodium chloride flush, sodium chloride, ondansetron **OR** ondansetron, polyethylene glycol, acetaminophen **OR** acetaminophen      Intake/Output Summary (Last 24 hours) at 7/19/2022 1204  Last data filed at 7/19/2022 0938  Gross per 24 hour   Intake 395 ml   Output --   Net 395 ml       Physical Exam Performed:    BP (!) 142/71   Pulse 80   Temp 97.8 °F (36.6 °C) (Oral)   Resp 17   Ht 5' 6\" (1.676 m)   Wt 171 lb 6.4 oz (77.7 kg)   SpO2 96%   BMI 27.66 kg/m²     General appearance:  No apparent distress, appears stated age and cooperative. HEENT:  Normal cephalic, atraumatic without obvious deformity. Pupils equal, round, and reactive to light. Extra ocular muscles intact. Conjunctivae/corneas clear. Neck: Supple, with full range of motion. No jugular venous distention. Trachea midline. Respiratory:  Normal respiratory effort. Diminished to auscultation bilaterally with faint wheezing noted generally  Cardiovascular:  Regular rate and rhythm with normal S1/S2 without murmurs, rubs or gallops. No LE edema  Abdomen: Soft, non-tender, non-distended with normal bowel sounds. Musculoskeletal:  No clubbing, cyanosis or edema bilaterally. Full range of motion without deformity. Skin: Skin color, texture, turgor normal.  No rashes or lesions. Neurologic:  Neurovascularly intact without any focal sensory/motor deficits.  Cranial nerves: II-XII intact, grossly non-focal.  Psychiatric:  Alert and oriented, thought content appropriate, normal insight  Capillary Refill: Brisk,3 seconds, normal  Peripheral Pulses: +2 palpable, equal bilaterally      Labs:   No results for input(s): WBC, HGB, HCT, PLT in the last 72 hours. Recent Labs     07/17/22  0912 07/18/22  0649    142   K 4.0 3.9    106   CO2 27 26   BUN 34* 35*   CREATININE 1.3* 1.2   CALCIUM 9.9 9.4     Recent Labs     07/18/22  0649   AST 19   ALT 14   BILITOT 1.0   ALKPHOS 35*     No results for input(s): INR in the last 72 hours. No results for input(s): [facility] in the last 72 hours. Urinalysis:      Lab Results   Component Value Date/Time    NITRU Negative 03/27/2019 12:18 AM    BLOODU Negative 03/27/2019 12:18 AM    SPECGRAV >=1.030 03/27/2019 12:18 AM    GLUCOSEU Negative 03/27/2019 12:18 AM       Radiology:  VL Extremity Venous Bilateral         CT CHEST PULMONARY EMBOLISM W CONTRAST   Final Result   Addendum (preliminary) 1 of 1   ADDENDUM:   Critical results were called by Dr. Rachid Don to Dr. Aamir Nye on 7/15/2022    at   21:46. Final   Suboptimal examination. Suspected segmental pulmonary embolism in the right   lower lobe. Similar right ventricular and main pulmonary artery dilatation   since at least 12/27/2021. XR CHEST PORTABLE   Final Result   Mild left basilar atelectasis or scarring. In the appropriate clinical   setting pneumonia is not excluded.                  Assessment/Plan:    Active Hospital Problems    Diagnosis     Myoclonus [G25.3]      Priority: Medium    Acute pulmonary embolism without acute cor pulmonale, unspecified pulmonary embolism type ([address] [I26.99]      Priority: Medium    HTN (hypertension), benign [I10]     Chronic respiratory failure with hypoxia (HCC) [J96.11]     Centrilobular emphysema (HCC) [J43.2]     Rheumatoid arthritis involving multiple sites with positive rheumatoid factor ([facility] [address] [M05.79]      Acute PE  - continue eliquis  - LE doppler confirms RLE DVT    Chronic hypoxic respiratory failure  - stable on home 3L NC    COPD  - no evidence of exacerbation  - continue home inhalers    Chronic diastolic heart failure  - appears euvolemic  - continue home lasix, aldactone    HTN  - poorly controlled  - continue labetalol, aldactone    HLD  - continue statin    Myoclonus  - neurology consulted  - subacute, likely metabolic  - no further testing needed    RA  - continue home prednisone    Hypothyroidism  - continue synthroid    DVT Prophylaxis: eliquis  Diet: ADULT DIET; Regular; Low Fat/Low Chol/High Fiber/2 gm Na; 1200 ml  Code Status: Full Code    PT/OT Eval Status: ordered    Dispo - home with [facility]. Patient will need medical bed due to need for frequent position changes and keeping head of bed greater than 30 degrees due to SOB related to CHF.     Abbey Arreaga MD

## 2022-07-19 NOTE — CARE COORDINATION
CASE MANAGEMENT DISCHARGE SUMMARY      Discharge to: home with [address] completed: [facility] Exemption Notification (HENS) completed: NA    IMM given: NA     New Durable Medical Equipment ordered/agency: [facility] for hospital bed, bed is available, MD updated for order. [employer] to contact patient for delivery of bed    Transportation:    Family/car: daughter       Confirmed discharge plan with:     Patient: yes     Family:  yes, daughter     Facility/Agency, name:  [facility] faxed, to obtain from Epic        RN, name: Eugene Wharton    Note: Discharging nurse to complete [place], reconcile [facility], and place final copy with patient's discharge packet. RN to ensure that written prescriptions for  Level II medications are sent with patient to the facility as per protocol.

## 2022-07-19 NOTE — PROGRESS NOTES
Patient's EF (Ejection Fraction) is greater than 40%    Heart Failure Medications:  Diuretics[de-identified] Furosemide, Spironolactone    (One of the following REQUIRED for EF </= 40%/SYSTOLIC FAILURE but MAY be used in EF% >40%/DIASTOLIC FAILURE)        ACE[de-identified] None        ARB[de-identified] None         ARNI[de-identified] None    (Beta Blockers)  NON- Evidenced Based Beta Blocker (for EF% >40%/DIASTOLIC FAILURE): Labetalol- Trandate    Evidenced Based Beta Blocker::(REQUIRED for EF% <40%/SYSTOLIC FAILURE) None  . .................................................................................................................................................. Patient's weights and intake/output reviewed: Yes    Patient's Last Weight: 184 lbs obtained by  stated . Difference of  lbs     than last documented weight. Intake/Output Summary (Last 24 hours) at 7/19/2022 0248  Last data filed at 7/18/2022 2039  Gross per 24 hour   Intake 185 ml   Output 475 ml   Net -290 ml       Comorbidities Reviewed Yes    Patient has a past medical history of Acid reflux, Acquired hypothyroidism, Anemia, Asthma, CHF (congestive heart failure) ([address] COPD (chronic obstructive pulmonary disease) ([address] HLD (hyperlipidemia), Hypertension, Influenza A, MI (myocardial infarction) ([address] Migraine, Rheumatoid arthritis, and Wears glasses. >>For CHF and Comorbidity documentation on Education Time and Topics, please see Education Tab    Progressive Mobility Assessment:  What is this patient's Current Level of Mobility?: Ambulatory- with Assistance  How was this patient Mobilized today?: Edge of Bed, Up to Chair, Bedside Commode, and  Up to Toilet/Shower, ambulated 10 ft                 With Whom? Nurse                 Level of Difficulty/Assistance: Independent     Pt sitting in bed at this time on  2 L O2. Pt denies shortness of breath. Pt with nonpitting lower extremity edema.      Patient and/or Family's stated Goal of Care this Admission: increase activity tolerance and reduce lower extremity edema prior to discharge        :

## 2022-07-19 NOTE — TELEPHONE ENCOUNTER
Tanya with [employer] is calling back about cpap machine. Will be in office until 3 today and back on Thursday.

## 2022-07-19 NOTE — PROGRESS NOTES
Pt d/c'd home with [facility]. Removed peripheral IV and stopped bleeding. Catheter intact. Pt tolerated well. No redness noted at site. Notified [facility] and removed tele box. Reviewed d/c instructions, home meds, and  f/u information utilizing teach-back method. [facility] Patient verbalized understanding.  Electronically signed by Markus Mayfield RN on 7/19/2022 at 1:56 PM

## 2022-07-20 ENCOUNTER — CARE COORDINATION (OUTPATIENT)
Dept: CASE MANAGEMENT | Age: 82
End: 2022-07-20

## 2022-07-20 ENCOUNTER — TELEPHONE (OUTPATIENT)
Dept: FAMILY MEDICINE CLINIC | Age: 82
End: 2022-07-20

## 2022-07-20 DIAGNOSIS — I26.99 ACUTE PULMONARY EMBOLISM WITHOUT ACUTE COR PULMONALE, UNSPECIFIED PULMONARY EMBOLISM TYPE (HCC): Primary | ICD-10-CM

## 2022-07-20 LAB
ANTITHROMBIN ACTIVITY: 98 % (ref 76–128)
ANTITHROMBIN ANTIGEN: 88 % (ref 82–136)
PROTEIN C FUNCTIONAL: 162 % (ref 83–168)

## 2022-07-20 NOTE — TELEPHONE ENCOUNTER
Patient is requesting a script for eliquis, she states they didn't get the order that was sent to the hospital before she DC.

## 2022-07-20 NOTE — CARE COORDINATION
Tete 45 Transitions Initial Follow Up Call    Call within 2 business days of discharge: Yes    Patient: Domingo Parents Patient : 1940   MRN: [id number]  Reason for Admission: PE  Discharge Date: 22 RARS: Readmission Risk Score: 14.6      Last Discharge [facility]       Date Complaint Diagnosis Description Type Department Provider    7/15/22 Shortness of Breath Acute pulmonary embolism without acute cor pulmonale, unspecified pulmonary embolism type [facility]) ED to Hosp-Admission (Discharged) (Milvia Sprain) Basilia Orellana MD; Ricardo Trejo. .. Spoke with: [address]: [facility]     Non-face-to-face services provided:  Obtained and reviewed discharge summary and/or continuity of care documents  Education of patient/family/caregiver/guardian to support self-management-.    Transitions of Care Initial Call    Was this an external facility discharge? No Discharge Facility: n.a    Challenges to be reviewed by the provider   Additional needs identified to be addressed with provider: Yes  medications-Please update med list. Patient reports no longer taking tramadol. Not taking Pepcid or Prevacid. Patient reports taking oxycodone/acetaminophen 5/325 mg 1 tab Q8 prn. Method of communication with provider : chart routing    Advance Care Planning:   Does patient have an Advance Directive: reviewed and current. Advance Care Planning   Healthcare Decision Maker:    Primary Decision Maker: David Croft - [phone]    Click here to complete Healthcare Decision Makers including selection of the Healthcare Decision Maker Relationship (ie \"Primary\"). Today we documented Decision Maker(s) consistent with ACP documents on file. Care Transition Nurse contacted the patient by telephone to perform post hospital discharge assessment. Verified name and  with patient as identifiers.  Provided introduction to self, and explanation of the CTN role. CTN reviewed discharge instructions, medical action plan and red flags with patient who verbalized understanding. Patient given an opportunity to ask questions and does not have any further questions or concerns at this time. Were discharge instructions available to patient? Yes. Reviewed appropriate site of care based on symptoms and resources available to patient including: PCP  Home health  When to call 911. The patient agrees to contact the PCP office for questions related to their healthcare. Medication reconciliation was performed with patient, who verbalizes understanding of administration of home medications. Advised obtaining a 90-day supply of all daily and as-needed medications. Was patient discharged with a pulse oximeter? no    CTN provided contact information. Plan for follow-up call in 5-7 days based on severity of symptoms and risk factors. Plan for next call: symptom management-.  self management-.  follow up appointment-.  medication management-. CTN spoke with patient who reported she is doing better. Patient reported her breathing is stable and denied any increased sob. Patient denied any cp at this time. Patient reports she is seeing her arthritis daughter today and will address back pain. CTN reviewed all medications and updated medication list, CTN will route message to PCP to discuss med changes. Patient reported she will [facility]. today and has apt with PCP on 7/27. CTN educated patient regarding s/sx to monitor for and notify provider. CTN also encouraged patient to notify provider with any worsening sob. Denies any acute needs at present time. Agreeable to f/u calls. Educated on the use of urgent care or physicians 24 hr access line if assistance is needed after hours & that they can always contact their home care provider to request a nurse visit even if it isn't their regularly scheduled day for their nurse to visit.              Care Transitions 24 Hour Call Do you have a copy of your discharge instructions?: Yes  Do you have all of your prescriptions and are they filled?: Yes  Have you been contacted by a [address]?: No  Have you scheduled your follow up appointment?: Yes  How are you going to get to your appointment?: Car - family or friend to transport  [address]: [address] you have support at home?: Grandchild  Do you feel like you have everything you need to keep you well at home?: Yes  Are you an active caregiver in your home?: No  Care Transitions Interventions         Follow Up  Future Appointments   Date Time Provider Bossman Nevarez   7/27/2022  9:30 AM DO MARTA Kwan [place] - DYD   8/26/2022  8:40 AM Stephen Edwards MD AND PULM [facility]   9/22/2022  8:45 AM MD Laverne HartN, RN, [facility]  Care Transition Nurse  [phone] mobile

## 2022-07-20 NOTE — TELEPHONE ENCOUNTER
Tete 45 Transitions Initial Follow Up Call    Outreach made within 2 business days of discharge: Yes    Patient: Waylon Christie Patient : 1940   MRN: [id number]  Reason for Admission: There are no discharge diagnoses documented for the most recent discharge. Discharge Date: 22       Spoke with: Patient    Discharge department/facility: [facility]    Non-face-to-face services provided:  Scheduled appointment with PCP-22  Obtained and reviewed discharge summary and/or continuity of care documents    TCM Interactive Patient Contact:  Was patient able to fill all prescriptions: Yes  Was patient instructed to bring all medications to the follow-up visit: Yes  Is patient taking all medications as directed in the discharge summary?  Yes  Does patient understand their discharge instructions: Yes  Does patient have questions or concerns that need addressed prior to 7-14 day follow up office visit: no    Scheduled appointment with PCP within 7-14 days    Follow Up  Future Appointments   Date Time Provider Bossman Nevarez   2022  9:30 AM DO MARTA Singh [place] - DYD   2022  8:40 AM Kanika Bacon MD AND ROSA ELENA MORENO   2022  8:45 AM MD Margaux Man, RN

## 2022-07-21 ENCOUNTER — FOLLOWUP TELEPHONE ENCOUNTER (OUTPATIENT)
Dept: TELEMETRY | Age: 82
End: 2022-07-21

## 2022-07-21 NOTE — TELEPHONE ENCOUNTER
Tete 45 Transitions Initial Follow Up Call    Call within 2 business days of discharge: Yes     Patient: Anthony Lewis Patient : 1940 MRN: [id number]    [unfilled]    RARS: Readmission Risk Score: 14.6       Spoke with: patient and granddaughter    Discharge department/facility: home    Non-face-to-face services provided:  Scheduled appointment with PCP-22    Spoke to patient for hospital follow up. Pt states that she is doing well. She has returned to baseline. Denies any current needs. Aware of follow up appts.      Follow Up  Future Appointments   Date Time Provider Bossman Nevarez   2022  9:30 AM DO MARTA Simmons  [place] - DYERMELINDA   2022  8:40 AM Aime Roy MD AND ROSA ELENA MORENO   2022  8:45 AM MD Queta Fried RN

## 2022-07-24 LAB
DRVVT CONFIRMATION TEST: ABNORMAL RATIO
DRVVT SCREEN: 29 SEC (ref 33–44)
DRVVT,DIL: ABNORMAL SEC (ref 33–44)
HEXAGONAL PHOSPHOLIPID NEUTRALIZAT TEST: ABNORMAL
LUPUS ANTICOAG INTERP: ABNORMAL
PLT NEUTA: ABNORMAL
PT D: 14.7 SEC (ref 12–15.5)
PTT D: >150 SEC (ref 32–48)
PTT-D CORR REFLEX: ABNORMAL SEC (ref 32–48)
PTT-HEPARIN NEUTRALIZED: 48 SEC (ref 32–48)
REPTILASE TIME: 17.8 SEC
THROMBIN TIME: >150 SEC (ref 14.7–19.5)

## 2022-07-24 PROCEDURE — 85670 THROMBIN TIME PLASMA: CPT

## 2022-07-24 PROCEDURE — 85635 REPTILASE TEST: CPT

## 2022-07-24 PROCEDURE — 85730 THROMBOPLASTIN TIME PARTIAL: CPT

## 2022-07-26 ENCOUNTER — TELEPHONE (OUTPATIENT)
Dept: FAMILY MEDICINE CLINIC | Age: 82
End: 2022-07-26

## 2022-07-26 DIAGNOSIS — R53.81 PHYSICAL DEBILITY: Primary | ICD-10-CM

## 2022-07-26 DIAGNOSIS — R32 URINARY INCONTINENCE, UNSPECIFIED TYPE: ICD-10-CM

## 2022-07-26 NOTE — TELEPHONE ENCOUNTER
Tasha Ball is calling with  on aging asking for orders for   XL pull ups   Lift chair   Bedside [facility]   Fax number [phone]

## 2022-07-27 ENCOUNTER — OFFICE VISIT (OUTPATIENT)
Dept: FAMILY MEDICINE CLINIC | Age: 82
End: 2022-07-27
Payer: MEDICAID

## 2022-07-27 VITALS — HEART RATE: 73 BPM | SYSTOLIC BLOOD PRESSURE: 124 MMHG | DIASTOLIC BLOOD PRESSURE: 60 MMHG | OXYGEN SATURATION: 99 %

## 2022-07-27 DIAGNOSIS — I26.99 ACUTE PULMONARY EMBOLISM, UNSPECIFIED PULMONARY EMBOLISM TYPE, UNSPECIFIED WHETHER ACUTE COR PULMONALE PRESENT (HCC): ICD-10-CM

## 2022-07-27 DIAGNOSIS — Z09 HOSPITAL DISCHARGE FOLLOW-UP: ICD-10-CM

## 2022-07-27 DIAGNOSIS — E03.9 ACQUIRED HYPOTHYROIDISM: Primary | ICD-10-CM

## 2022-07-27 DIAGNOSIS — I82.401 RECURRENT ACUTE DEEP VEIN THROMBOSIS (DVT) OF RIGHT LOWER EXTREMITY (HCC): ICD-10-CM

## 2022-07-27 PROCEDURE — 1111F DSCHRG MED/CURRENT MED MERGE: CPT | Performed by: STUDENT IN AN ORGANIZED HEALTH CARE EDUCATION/TRAINING PROGRAM

## 2022-07-27 PROCEDURE — 99214 OFFICE O/P EST MOD 30 MIN: CPT | Performed by: STUDENT IN AN ORGANIZED HEALTH CARE EDUCATION/TRAINING PROGRAM

## 2022-07-27 RX ORDER — PREGABALIN 50 MG/1
CAPSULE ORAL
COMMUNITY
Start: 2022-07-15

## 2022-07-27 RX ORDER — LEVOTHYROXINE SODIUM 0.1 MG/1
100 TABLET ORAL DAILY
Qty: 30 TABLET | Refills: 1 | Status: SHIPPED | OUTPATIENT
Start: 2022-07-27 | End: 2022-08-18

## 2022-07-27 NOTE — PROGRESS NOTES
Post-Discharge Transitional Care  Follow Up      Liang Hidalgo   YOB: 1940    Date of Office Visit:  7/27/2022  Date of Hospital Admission: 7/15/22  Date of Hospital Discharge: 7/19/22  Risk of hospital readmission (high >=14%. 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 , Disp-180 tablet, R-1Normal  Acute pulmonary embolism, unspecified pulmonary embolism type, unspecified whether acute cor pulmonale present (HCC)  -     apixaban (ELIQUIS) 5 MG TABS tablet; Take 1 tablet by mouth in the morning and 1 tablet before bedtime. , Disp-180 tablet, R-1Normal  Patient with history of hypothyroidism and had Synthroid dose increased from 100-1 25 mcg approximately 8 months ago. Recent TSH shows hyperthyroid with elevated T4 of 2.9. Will decrease back to 100 mcg and recheck in 6 weeks    We will plan for lifelong anticoagulation with Eliquis given recurrent DVT likely secondary to limited mobility. Patient with physical debility limiting her ability to care for herself. Working with [employer] on aging and have sent in orders for bedside commode, urinary incontinence supplies, lift chair. Medical Decision Making: moderate complexity  Return in about 3 months (around 10/27/2022).            Subjective:   HPI:  Follow up of Jayla Calderon problems/diagnosis(es):     Inpatient course: Discharge summary reviewed- see chart. Interval history/Current status:     Acute PE  Doppler in hospital revealed recurrent right lower extremity DVT  Will likely require lifelong anticoagulation given to unprovoked DVTs with PE. Likely related to wheelchair status. Has follow-up with heme-onc    Chronic hypoxic respiratory failure/COPD  Back to baseline 3 L nasal cannula  Continuing with home inhalers    Urinary incontinence  Reports urgency incontinence    Hypothyroid  Previously on 100mcg and increased to 125. Now hyperthyroid. Now working with [employer] on [employer] written today for lift chair, bedside commode, incontinence supplies    Patient Active Problem List   Diagnosis    Centrilobular emphysema ([address]    Pure hypercholesterolemia    Rheumatoid arthritis involving multiple sites with positive rheumatoid factor ([address]    Essential hypertension    Age-related osteoporosis without current pathological fracture    Acquired hypothyroidism    Cervical disc disorder at C4-C5 level with radiculopathy    Sciatica of left side    Pulmonary HTN (HCC)    Anemia    HEBERT (obstructive sleep apnea)    ASD (atrial septal defect)    Esophageal motility disorder    Former smoker    Chronic respiratory failure with hypoxia (HCC)    Reactive depression    Nasal congestion    HTN (hypertension), benign    Acute deep vein thrombosis (DVT) of calf muscle vein of both lower extremities ([address]    Acute pulmonary embolism without acute cor pulmonale, unspecified pulmonary embolism type ([address]    Myoclonus       Medications listed as ordered at the time of discharge from hospital     Medication List            Accurate as of July 27, 2022 12:18 PM. If you have any questions, ask your nurse or doctor. START taking these medications      apixaban 5 MG Tabs tablet  Commonly known as: Eliquis  Take 1 tablet by mouth in the morning and 1 tablet before bedtime.   Started by: Macarena Zamarripa, DO     levothyroxine 100 MCG tablet  Commonly known as: SYNTHROID  Take 1 tablet by mouth in the morning. Started by: Elora Snellen Schklar, DO            CONTINUE taking these medications      Actemra 162 MG/0.9ML Sosy injection  Generic drug: tocilizumab     albuterol sulfate  (90 Base) MCG/ACT inhaler  Commonly known as: PROVENTIL;VENTOLIN;PROAIR  INHALE TWO (2) PUFFS BY MOUTH EVERY 6 HOURS AS NEEDED     atorvastatin 10 MG tablet  Commonly known as: LIPITOR  Take 1 tablet by mouth daily     Blood Pressure Monitor/M Cuff Misc  1 Units by Does not apply route daily     DULoxetine 30 MG extended release capsule  Commonly known as: CYMBALTA     ergocalciferol 1.25 MG (55572 UT) capsule  Commonly known as: ERGOCALCIFEROL     ezetimibe 10 MG tablet  Commonly known as: ZETIA  Take 1 tablet by mouth daily     fluticasone 50 MCG/ACT nasal spray  Commonly known as: FLONASE  SPRAY 2 SPRAYS INTO EACH NOSTRIL EVERY DAY     fluticasone-umeclidin-vilant 100-62.5-25 MCG/INH Aepb  Commonly known as: TRELEGY ELLIPTA  Inhale 1 puff into the lungs daily     furosemide 20 MG tablet  Commonly known as: LASIX  Take 1 tablet by mouth 2 times daily     Handicap Placard Misc  by Does not apply route 02/07/22     Incontinence Supply Disposable Misc  1 each by Does not apply route 4 times daily as needed (incontinence)     ipratropium-albuterol 0.5-2.5 (3) MG/3ML Soln nebulizer solution  Commonly known as: DUONEB  Inhale 3 mLs into the lungs every 4 hours as needed.      labetalol 100 MG tablet  Commonly known as: NORMODYNE  Take 1 tablet by mouth 2 times daily     Lift Chair Misc  by Does not apply route     nitroGLYCERIN 0.4 MG SL tablet  Commonly known as: NITROSTAT     OXYGEN     predniSONE 10 MG tablet  Commonly known as: DELTASONE     pregabalin 50 MG capsule  Commonly known as: LYRICA     spironolactone 25 MG tablet  Commonly known as: ALDACTONE  Take 1 tablet by mouth 2 times daily     * Wheelchair Misc  SELF PROPELLED, LIGHT WEIGHT,  STANDARD SIZE     * Commode Bedside Misc  Use daily. * This list has 2 medication(s) that are the same as other medications prescribed for you. Read the directions carefully, and ask your doctor or other care provider to review them with you. Where to Get Your Medications        These medications were sent to [address] [place], [place] [address]ERICA/ David Morris [address]  [address]      Phone: [phone]   apixaban 5 MG Tabs tablet  levothyroxine 100 MCG tablet           Medications marked \"taking\" at this time  Outpatient Medications Marked as Taking for the 7/27/22 encounter (Office Visit) with Mey Kelley, DO   Medication Sig Dispense Refill    Lift Chair MISC by Does not apply route 1 each 0    Misc. Devices (COMMODE BEDSIDE) MISC Use daily. 1 each 0    Incontinence Supply Disposable MISC 1 each by Does not apply route 4 times daily as needed (incontinence) 120 each 3    pregabalin (LYRICA) 50 MG capsule TAKE 1 CAPSULE BY MOUTH EVERY DAY      levothyroxine (SYNTHROID) 100 MCG tablet Take 1 tablet by mouth in the morning. 30 tablet 1    apixaban (ELIQUIS) 5 MG TABS tablet Take 1 tablet by mouth in the morning and 1 tablet before bedtime.  180 tablet 1    fluticasone-umeclidin-vilant (TRELEGY ELLIPTA) 100-62.5-25 MCG/INH AEPB Inhale 1 puff into the lungs daily 1 each 5    fluticasone (FLONASE) 50 MCG/ACT nasal spray SPRAY 2 SPRAYS INTO EACH NOSTRIL EVERY DAY 1 each 5    spironolactone (ALDACTONE) 25 MG tablet Take 1 tablet by mouth 2 times daily 180 tablet 1    DULoxetine (CYMBALTA) 30 MG extended release capsule Take 30 mg by mouth daily      ezetimibe (ZETIA) 10 MG tablet Take 1 tablet by mouth daily 90 tablet 1    labetalol (NORMODYNE) 100 MG tablet Take 1 tablet by mouth 2 times daily 180 tablet 1    atorvastatin (LIPITOR) 10 MG tablet Take 1 tablet by mouth daily 90 tablet 1    furosemide (LASIX) 20 MG tablet Take 1 tablet by mouth 2 times daily 180 tablet 1    Handicap Placard MISC by Does not apply route 02/07/22 1 each 0    albuterol sulfate  (90 Base) MCG/ACT inhaler INHALE TWO (2) PUFFS BY MOUTH EVERY 6 HOURS AS NEEDED 18 g 10    ACTEMRA 162 MG/0.9ML SOSY injection       predniSONE (DELTASONE) 10 MG tablet Take 5 mg by mouth daily       Misc. Devices (WHEELCHAIR) MISC SELF PROPELLED, LIGHT WEIGHT,  STANDARD SIZE 1 each 0    ergocalciferol (ERGOCALCIFEROL) 1.25 MG (50433 UT) capsule Take 50,000 Units by mouth every 7 days      OXYGEN Inhale 3 L/min into the lungs daily as needed       Blood Pressure Monitoring (BLOOD PRESSURE MONITOR/M CUFF) MISC 1 Units by Does not apply route daily 1 each 0    ipratropium-albuterol (DUONEB) 0.5-2.5 (3) MG/3ML SOLN nebulizer solution Inhale 3 mLs into the lungs every 4 hours as needed. 360 mL 3    nitroGLYCERIN (NITROSTAT) 0.4 MG SL tablet Place 0.4 mg under the tongue every 5 minutes as needed. Medications patient taking as of now reconciled against medications ordered at time of hospital discharge: Yes        Objective:    /60 (Site: Right Upper Arm, Position: Sitting, Cuff Size: Medium Adult)   Pulse 73   SpO2 99%         An electronic signature was used to authenticate this note.   --Vicky Rangel DO

## 2022-07-28 ENCOUNTER — CARE COORDINATION (OUTPATIENT)
Dept: CASE MANAGEMENT | Age: 82
End: 2022-07-28

## 2022-07-28 ENCOUNTER — TELEPHONE (OUTPATIENT)
Dept: FAMILY MEDICINE CLINIC | Age: 82
End: 2022-07-28

## 2022-07-28 NOTE — TELEPHONE ENCOUNTER
[address] daughter Libia calling in regarding Oxycodone that was supposed to be called in yesterday for patient I guess? Please advise.

## 2022-07-28 NOTE — CARE COORDINATION
Tete 45 Transitions Follow Up Call    2022    Patient: Fabián Weiss  Patient : 1940   MRN: [id number]  Reason for Admission: DVT   Discharge Date: 22 RARS: Readmission Risk Score: 14.6         Spoke with: Fabián Weiss    CTN spoke with patient who reported she is doing alright just having some pain still. Patient reported her breathing has been ok just some sob with exertion and she uses her inhaler which helps. Patient reported she is waiting to hear from [address] regarding some lift chair, BSC, and depends sent in by PCP. Patient encouraged to notify provider right away with any worsening symptoms. Care Transitions Subsequent and Final Call    Subsequent and Final Calls  Do you have any ongoing symptoms?: No  Have your medications changed?: No  Do you have any questions related to your medications?: No  Do you currently have any active services?: Yes  Are you currently active with any services?: Home Health  Do you have any needs or concerns that I can assist you with?: No  Identified Barriers: None  Care Transitions Interventions  Other Interventions:              Follow Up  Future Appointments   Date Time Provider Bossman Nevarez   8/15/2022  8:20 AM JOSE Montaño - CNP R BANK PAIN [facility]   2022  8:40 AM Ofelia Vaz MD AND PULM [facility]   2022  8:45 AM MD Prasanth Ventura [facility]   11/3/2022  4:00 PM Bernardo Joiner DO [place] [address]       Madhuri HOLLEY, RN, [facility]  Care Transition Nurse  [phone] mobile

## 2022-07-29 DIAGNOSIS — M05.79 RHEUMATOID ARTHRITIS INVOLVING MULTIPLE SITES WITH POSITIVE RHEUMATOID FACTOR (HCC): Primary | ICD-10-CM

## 2022-07-29 RX ORDER — OXYCODONE HYDROCHLORIDE AND ACETAMINOPHEN 5; 325 MG/1; MG/1
1 TABLET ORAL 2 TIMES DAILY PRN
Qty: 60 TABLET | Refills: 0 | Status: SHIPPED | OUTPATIENT
Start: 2022-07-29 | End: 2022-09-12

## 2022-07-31 DIAGNOSIS — I10 ESSENTIAL HYPERTENSION: ICD-10-CM

## 2022-07-31 DIAGNOSIS — E78.00 PURE HYPERCHOLESTEROLEMIA: ICD-10-CM

## 2022-07-31 DIAGNOSIS — I50.32 CHRONIC DIASTOLIC HEART FAILURE (HCC): ICD-10-CM

## 2022-08-01 ENCOUNTER — TELEPHONE (OUTPATIENT)
Dept: ADMINISTRATIVE | Age: 82
End: 2022-08-01

## 2022-08-01 DIAGNOSIS — E78.5 HYPERLIPIDEMIA, UNSPECIFIED HYPERLIPIDEMIA TYPE: ICD-10-CM

## 2022-08-01 DIAGNOSIS — I50.32 CHRONIC DIASTOLIC HEART FAILURE (HCC): ICD-10-CM

## 2022-08-01 RX ORDER — ATORVASTATIN CALCIUM 10 MG/1
TABLET, FILM COATED ORAL
Qty: 90 TABLET | Refills: 1 | Status: SHIPPED | OUTPATIENT
Start: 2022-08-01

## 2022-08-01 RX ORDER — LABETALOL 100 MG/1
TABLET, FILM COATED ORAL
Qty: 180 TABLET | Refills: 1 | Status: SHIPPED | OUTPATIENT
Start: 2022-08-01

## 2022-08-01 NOTE — TELEPHONE ENCOUNTER
.Refill Request     CONFIRM preferrred pharmacy with the patient. If Mail Order Rx - Pend for 90 day refill.       Last Seen: Last Seen Department: 7/27/2022  Last Seen by PCP: 7/27/2022    Last Written: 2-7-22 180 WITH 1     Next Appointment:   Future Appointments   Date Time Provider Bossman Geri   8/15/2022  8:20 AM JOSE Rasmussen - CNP R BANK PAIN [facility]   8/26/2022  8:40 AM Mary Ramirez MD AND PULM [facility]   9/22/2022  8:45 AM MD Melinda Barragan [facility]   11/3/2022  4:00 [facility]  [place] - JOYCELYN       Future appointment scheduled      Requested Prescriptions     Pending Prescriptions Disp Refills    furosemide (LASIX) 20 MG tablet [Pharmacy Med Name: FUROSEMIDE 20 MG TABLET] 180 tablet 1     Sig: TAKE 1 TABLET BY MOUTH TWICE A DAY

## 2022-08-01 NOTE — TELEPHONE ENCOUNTER
Refill Request     CONFIRM preferrred pharmacy with the patient. If Mail Order Rx - Pend for 90 day refill.       Last Seen: Last Seen Department: 7/27/2022  Last Seen by PCP: 7/27/2022    Last Written:   Labetalol-  02/07/2022 180 tablet 1 refill  Lipitor- 02/07/2022 90 tablet 1 refill    Next Appointment:   Future Appointments   Date Time Provider Bossman Nevarez   8/15/2022  8:20 AM JOSE Cervantes - CNP R BANK PAIN [facility]   8/26/2022  8:40 AM Teresita Lopez MD AND PULM [facility]   9/22/2022  8:45 AM MD Zara Patino [facility]   11/3/2022  4:00 PM DO MARTA Masters  [place] - JOYCELYN       Future appointment scheduled      Requested Prescriptions     Pending Prescriptions Disp Refills    atorvastatin (LIPITOR) 10 MG tablet [Pharmacy Med Name: ATORVASTATIN 10 MG TABLET] 90 tablet 1     Sig: TAKE 1 TABLET BY MOUTH EVERY DAY    labetalol (NORMODYNE) 100 MG tablet [Pharmacy Med Name: LABETALOL  MG TABLET] 180 tablet 1     Sig: TAKE 1 TABLET BY MOUTH TWICE A DAY

## 2022-08-02 RX ORDER — FUROSEMIDE 20 MG/1
TABLET ORAL
Qty: 180 TABLET | Refills: 1 | Status: SHIPPED | OUTPATIENT
Start: 2022-08-02

## 2022-08-02 RX ORDER — EZETIMIBE 10 MG/1
TABLET ORAL
Qty: 90 TABLET | Refills: 3 | Status: SHIPPED | OUTPATIENT
Start: 2022-08-02

## 2022-08-04 ENCOUNTER — CARE COORDINATION (OUTPATIENT)
Dept: CASE MANAGEMENT | Age: 82
End: 2022-08-04

## 2022-08-04 ENCOUNTER — TELEPHONE (OUTPATIENT)
Dept: PULMONOLOGY | Age: 82
End: 2022-08-04

## 2022-08-04 DIAGNOSIS — J96.11 CHRONIC RESPIRATORY FAILURE WITH HYPOXIA (HCC): Primary | ICD-10-CM

## 2022-08-04 NOTE — CARE COORDINATION
Tete 45 Transitions Follow Up Call    2022    Patient: Andrew Nguyen  Patient : 1940   MRN: [id number]  Reason for Admission: DVT  Discharge Date: 22 RARS: Readmission Risk Score: 14.6         Spoke with: Andrew Nguyen    CTN spoke with patient who reported her shoulder has been hurting for the past couple of days. Patient denied any falls and is unsure if she bumped into something. Patient reported she has been ice and heat with no relief. Patient reported she has reached out to her arthritis provider to discuss shoulder pain and waiting for a call back. Patient denied any other concerns at this time. CTN encouraged patient to continue to use ice and heat therapy to help shoulder. Care Transitions Subsequent and Final Call    Subsequent and Final Calls  Do you have any ongoing symptoms?: Yes  Onset of Patient-reported symptoms: Other  Patient-reported symptoms: Other  Have your medications changed?: No  Do you have any questions related to your medications?: No  Do you currently have any active services?: Yes  Are you currently active with any services?: Home Health  Do you have any needs or concerns that I can assist you with?: No  Identified Barriers: None  Care Transitions Interventions  Other Interventions:              Follow Up  Future Appointments   Date Time Provider Bossman Nevarez   8/15/2022  8:20 AM JOSE Ayala - CNP R BANK PAIN [facility]   2022  8:40 AM Andie De La Cruz MD AND PULM [facility]   2022  8:45 AM MD Brendon Villagran [facility]   11/3/2022  4:00 PM Perla Hope DO [facility] [address] [facility]     Celia GARCIAN, RN, [facility]  Care Transition Nurse  [phone] [place]

## 2022-08-04 NOTE — TELEPHONE ENCOUNTER
Vanessa from [facility] called and said they need to requalify pt for her O2. She is going to need another 6MW. Pt has an appt with  Page Page on 8/26/22. OK to order 6MW to see if we can get her in before that appt or we can do testing while in office. Please advise.

## 2022-08-05 NOTE — TELEPHONE ENCOUNTER
CHERYL pt and scheduled 6MW for 8/22/22 and has follow up with Dr. Reynaldo Silva on 8/26/22. Will send testing and OVN to [address] [place] [address].

## 2022-08-11 ENCOUNTER — CARE COORDINATION (OUTPATIENT)
Dept: CASE MANAGEMENT | Age: 82
End: 2022-08-11

## 2022-08-11 NOTE — CARE COORDINATION
[facility]    2022    Patient: Simeon Niño  Patient : 1940   MRN: [id number]  Reason for Admission:  DVT  Discharge Date: 22 RARS: Readmission Risk Score: 14.6         Spoke with: Simeon Niño who reports that she is doing ok. Stating that she is still having pain in her shoulder. She said that her arthritis  doctor has recommended her to see an orthopedic and she is waiting on a call from them to schedule a visit. She states that she continues using heat and ice therapy to the shoulder. Writer advised if she do not get a call back today to follow up and patient v/u. Patient denies cp, sob, cough, dizziness, headache, n/v, diarrhea, abdominal pains, fever, or chills. She states that her [address] was in to see her today and checked her vs. Patient report that appetite and fluid intake is good and denies any problems with bowel or bladder. Patient is taking all medications as ordered. Patient reports that she continues on 3 liter of O2 and her O2 sat is good no values given. Writer advised patient on CHF management tools of daily wts and fluid and sodium restrictions. Patient states she did not know she should weigh daily. Writer explained why daily wts are important and patient v/u and states that she will start weighing daily. Patient denies any needs at this time. Patient instructed to continue to monitor s/s, reporting any that may present to MD immediately for early intervention. Agreeable to f/u calls. Care Transitions Follow Up Call    Needs to be reviewed by the provider   Additional needs identified to be addressed with provider: No  none             Method of communication with provider : none      LPN Care Coordinator contacted the patient by telephone to follow up after admission on 7/15/2022. Verified name and  with patient as identifiers. Addressed changes since last contact:  Right shoulder pain  Discussed follow-up appointments. If no appointment was previously scheduled, appointment scheduling offered: Yes. Is follow up appointment scheduled within 7 days of discharge? Yes. LPN CC reviewed discharge instructions, medical action plan and red flags with patient and discussed any barriers to care and/or understanding of plan of care after discharge. Discussed appropriate site of care based on symptoms and resources available to patient including: PCP  Specialist  Home health  When to call 911. The patient agrees to contact the PCP office for questions related to their healthcare. Patients top risk factors for readmission: medical condition-HTN,RA, COPD, CHF, HLD  Interventions to address risk factors: Assessment and support for treatment adherence and medication management-. Non-[facility] follow up appointment(s): [facility]    LPN CC provided contact information for future needs. Plan for follow-up call in 7-10 days based on severity of symptoms and risk factors. Plan for next call: symptom management-,  follow up appointment-to the Orthopedic            Care Transitions Subsequent and Final Call    Subsequent and Final Calls  Care Transitions Interventions  Other Interventions:              Follow Up  Future Appointments   Date Time Provider Bossman Nevarez   8/15/2022  8:20 AM JOSE Tse - CNP R BANK PAIN [facility]   8/22/2022  2:30 PM SCHEDULE, MHAZ PFT MHAZ PFT Carlos Torres   8/26/2022  8:40 AM Anushka Hope MD AND PULM [facility]   9/22/2022  8:45 AM Raheem Pelaez MD [facility]   11/3/2022  4:00 PM DO MARTA Perez - JOYCELYN Suresh LPN

## 2022-08-15 ENCOUNTER — OFFICE VISIT (OUTPATIENT)
Dept: PAIN MANAGEMENT | Age: 82
End: 2022-08-15
Payer: MEDICAID

## 2022-08-15 VITALS
DIASTOLIC BLOOD PRESSURE: 70 MMHG | HEART RATE: 76 BPM | HEIGHT: 67 IN | TEMPERATURE: 97 F | OXYGEN SATURATION: 96 % | BODY MASS INDEX: 27.25 KG/M2 | SYSTOLIC BLOOD PRESSURE: 135 MMHG

## 2022-08-15 DIAGNOSIS — M48.061 SPINAL STENOSIS OF LUMBAR REGION WITHOUT NEUROGENIC CLAUDICATION: ICD-10-CM

## 2022-08-15 DIAGNOSIS — M81.0 AGE-RELATED OSTEOPOROSIS WITHOUT CURRENT PATHOLOGICAL FRACTURE: ICD-10-CM

## 2022-08-15 DIAGNOSIS — F32.9 REACTIVE DEPRESSION: ICD-10-CM

## 2022-08-15 DIAGNOSIS — G89.29 BILATERAL CHRONIC KNEE PAIN: ICD-10-CM

## 2022-08-15 DIAGNOSIS — M54.32 SCIATICA OF LEFT SIDE: ICD-10-CM

## 2022-08-15 DIAGNOSIS — M25.511 BILATERAL SHOULDER PAIN, UNSPECIFIED CHRONICITY: ICD-10-CM

## 2022-08-15 DIAGNOSIS — M25.512 BILATERAL SHOULDER PAIN, UNSPECIFIED CHRONICITY: ICD-10-CM

## 2022-08-15 DIAGNOSIS — M05.79 RHEUMATOID ARTHRITIS INVOLVING MULTIPLE SITES WITH POSITIVE RHEUMATOID FACTOR (HCC): ICD-10-CM

## 2022-08-15 DIAGNOSIS — M71.38 SYNOVIAL CYST OF LUMBAR SPINE: ICD-10-CM

## 2022-08-15 DIAGNOSIS — M25.561 BILATERAL CHRONIC KNEE PAIN: ICD-10-CM

## 2022-08-15 DIAGNOSIS — M50.121 CERVICAL DISC DISORDER AT C4-C5 LEVEL WITH RADICULOPATHY: ICD-10-CM

## 2022-08-15 DIAGNOSIS — M25.562 BILATERAL CHRONIC KNEE PAIN: ICD-10-CM

## 2022-08-15 DIAGNOSIS — G89.4 CHRONIC PAIN SYNDROME: ICD-10-CM

## 2022-08-15 DIAGNOSIS — M51.36 DDD (DEGENERATIVE DISC DISEASE), LUMBAR: ICD-10-CM

## 2022-08-15 PROCEDURE — 99244 OFF/OP CNSLTJ NEW/EST MOD 40: CPT | Performed by: NURSE PRACTITIONER

## 2022-08-15 RX ORDER — LIDOCAINE 50 MG/G
1 PATCH TOPICAL DAILY
Qty: 30 PATCH | Refills: 0 | Status: SHIPPED | OUTPATIENT
Start: 2022-08-15 | End: 2022-09-12 | Stop reason: SDUPTHER

## 2022-08-15 RX ORDER — OMEPRAZOLE 10 MG/1
CAPSULE, DELAYED RELEASE ORAL DAILY
COMMUNITY

## 2022-08-15 RX ORDER — ACETAMINOPHEN 500 MG
500 TABLET ORAL EVERY 12 HOURS PRN
Qty: 120 TABLET | Refills: 1 | Status: SHIPPED | OUTPATIENT
Start: 2022-08-15 | End: 2022-10-31 | Stop reason: SDUPTHER

## 2022-08-15 RX ORDER — OXYCODONE HYDROCHLORIDE AND ACETAMINOPHEN 5; 325 MG/1; MG/1
1 TABLET ORAL EVERY 12 HOURS
Qty: 30 TABLET | Refills: 0 | Status: SHIPPED | OUTPATIENT
Start: 2022-08-31 | End: 2022-09-19 | Stop reason: SDUPTHER

## 2022-08-15 NOTE — PROGRESS NOTES
HISTORY OF PRESENT ILLNESS:  Ms. Alanis Royal is a [de-identified] y.o. female presents for consultation at the kind request of Dr. Bernardo Joiner her primary care physician for chronic pain management. Her presenting problems are pain in the neck, mid-lower back, shoulders, knees R>L, hands/feet. She has also been evaluated by cardiology for pulmonary hypertension/A. Fib, hypertension, rheumatology for RA, osteoporosis, pulmonology for COPD/asthma on chronic 3 L continuous oxygen therapy, physical medicine and rehab for cervical spine degeneration. Onset of pain began at the age of 18, attributes most of the pain to genetic related arthritis. Endorses having worked as a  for many years. Received lumbar DEVI's through Dr. Gretta Padron in 2018 who also completed synovial cyst aspiration in the lumbar spine with short-term pain relief. Failed TPI of the cervical spine with Dr. Corrine Cabezas who also referred patient to physical therapy. Denies having been in pain management, opioids have primarily been prescribed by her PCP with oxycodone-acetaminophen 5-325 mg tablets having last been dispensed on 8/1/202022 x30 days. She failed tramadol in the past previously prescribed by Dr. Stanislaw Franco her rheumatologist who also prescribes Prolia, Actemra, prednisone, Cymbalta as well as Lyrica for RA. Pain seems to bother her mainly to the right shoulder, lower back with activities. She is on chronic anticoagulation therapy due to history of recurrent DVTs bilateral lower extremities/PE, also has acquired hypothyroidism. Last had physical therapy 2 years ago which caused worsened pain. Ambulates short distances with the aid of a walker, uses wheelchair for long distance travels. She rates the pain in the lower back/legs at 9/10, 8/10 in the neck/arms. She describes it as aching, burning, numbness, tingling, pins and needles. Pain is greaterin her lower back/legs than neck/shoulders. Pain is made worse by: Most daily activities. Activities that have been limited by pain that she otherwise tolerated well are activities of daily living. Alternative therapies she has previously attempted are pain medicine, anti-inflammatories, muscle relaxants, oral steroids, local injections, ice/heat pack, brace/cane/TENS unit, exercise by therapist, home exercise program. Current treatment regimen has helped relieve about 50% of the pain. Relieving factors of pain include nothing. Shedenies side effects from the current pain regimen. In the last week she reports having extreme bothersome symptoms to the neck/lower back all the time with numbness, tingling, weakness. Pain prevents her from dressing without severe discomfort, lifting anything, walking more than a few steps at a time, standing more than a few minutes. Her social/recreational life is restricted by pain severely. Patient reports mood is sad and that she has no suicidal/homicidal inclination. sadness mainly due to activity intolerance secondary to pain, mood is overall stable on Cymbalta 30 mg tablets daily through rheumatology. Sleep patterns are  fair with an average of 4-5 hours . Patient denies neurological bowel concerns, has urinary incontinence. Patient denies misusing/abusing her narcotic pain medications or using any illegal drugs. she admits to morning stiffness, fatigue, and denies headaches. Currently on SSI, lives alone with support from her grandchildren. She is a former smoker, denies alcohol consumption. ROS:  The patient's social history, past medical history, family history, medications, allergies and review of systems have all been reviewed and verified from  the patient questionnaire form which has been filled by the patient. The  allergies, medication list  and past medical and surgical history and other information recorded by the MA was again reviewed today  These forms have been scanned into the \"media\" tab of patients electronic medical record.  Please check page 6 of the patient questionnaire for for family history     PHYSICAL EXAM:  Please see the physical exam form for a detailed examination on this visit. This form has been completed and scanned into the  Media section of the chart     Physical Exam  Constitutional:       General: She is not in acute distress. Appearance: She is well-developed. HENT:      Head: Normocephalic and atraumatic. Nose: Nose normal.   Eyes:      Conjunctiva/sclera: Conjunctivae normal.      Pupils: Pupils are equal, round, and reactive to light. Neck:      Thyroid: No thyromegaly. Cardiovascular:      Rate and Rhythm: Normal rate and regular rhythm. Heart sounds: Normal heart sounds. Pulmonary:      Effort: Pulmonary effort is normal. No respiratory distress. Breath sounds: Normal breath sounds. Abdominal:      General: Bowel sounds are normal. There is no distension. Palpations: Abdomen is soft. Tenderness: There is no abdominal tenderness. There is no right CVA tenderness or left CVA tenderness. Musculoskeletal:         General: Tenderness present. Right shoulder: Tenderness (Guarded pain with palpation, 30 degree flexion/abduction, palpation) present. Decreased range of motion. Left shoulder: Tenderness (tenderness mainluy with palpation) present. Decreased range of motion. Right hand: Tenderness present. Left hand: Tenderness present. Cervical back: Neck supple. Tenderness (guarded pain with 30 degree exetionsion, palpation) present. Decreased range of motion. Thoracic back: Tenderness present. Lumbar back: Tenderness (pain with minimal flexion, 5 degree extionsion, palpation) present. No swelling or deformity. Decreased range of motion. Right hip: Tenderness (tender spots with palpation, minimal ability to as ROM due to pain) present. Decreased range of motion.       Left hip: Tenderness (tender spots with palpation, minimal ability to as ROM due to pain) present. Decreased range of motion. Right knee: Tenderness present. Left knee: Tenderness present. Right lower leg: Edema (+1 pitting edema BLE) present. Left lower leg: Edema (+1 pitting edema BLE) present. Right foot: Tenderness present. Left foot: Tenderness present. Lymphadenopathy:      Cervical: No cervical adenopathy. Lower Body: No right inguinal adenopathy. No left inguinal adenopathy. Skin:     General: Skin is warm and dry. Findings: No rash. Nails: There is no clubbing. Neurological:      Mental Status: She is alert and oriented to person, place, and time. Cranial Nerves: No cranial nerve deficit. Sensory: No sensory deficit. Motor: Weakness present. Gait: Gait abnormal (Week, unsteady gait with assist to the examining table. ). Deep Tendon Reflexes:      Reflex Scores:       Patellar reflexes are 1+ on the right side and 1+ on the left side. Achilles reflexes are 2+ on the right side and 2+ on the left side. Psychiatric:         Mood and Affect: Mood is depressed. Speech: Speech normal.      Xray of the Left Hand 11/9/21:  1. No evidence of fracture   2. Multifocal osteoarthritis     Xray of the Lumbar spine 1/15/21:  Multilevel degenerative changes with grade 1 degenerative listhesis at L3-L4   and L4-L5, similar in appearance dating back to 2018. No acute osseous   abnormality. MRI of the Lumbar spine  1/3/19:  Interval increase in size of the synovial cyst at L4-5, resulting in   impingement the left L5 nerve root     /70   Pulse 76   Temp 97 °F (36.1 °C)   Ht 5' 6.5\" (1.689 m)   SpO2 96%   BMI 27.25 kg/m²      ASSESSMENT:    1. Chronic pain syndrome    2. DDD (degenerative disc disease), lumbar    3. Spinal stenosis of lumbar region without neurogenic claudication    4. Sciatica of left side    5. Synovial cyst of lumbar spine    6. Cervical disc disorder at C4-C5 level with radiculopathy    7. Rheumatoid arthritis involving multiple sites with positive rheumatoid factor ([facility] [address]    8. Bilateral shoulder pain, unspecified chronicity    9. Bilateral chronic knee pain    10. Age-related osteoporosis without current pathological fracture    11. Reactive depression         PLAN:   -Chronic opiate treatment protocol was discussed withthe patient, informed consent was obtained. -Treatment guidelines were discussed and established  -Risks and benefits of narcotics were addressedwith the patient  - Obtainable long term and short term goals of opioid therapy were reviewed, including pain relief, sleep, psychosocial and physical functioning   -Obtain urine Toxicology today  -Oxycodone-acetaminophen 5-325 mg tabs bid prn x15 days, Lidocaine 5% topical daily, Tylenol 500 mg tabs orally tid prn  -Patient collected 30 days worth of Oxycodone-acetaminophen 5-325 mg tabs on 8/1/22, she should have 15 days   -Home PT will be ordered for the patient for balance training  -Reviewed previous imaging studies/blood work  -Xray of Bilateral shoulders, Bilateral Knees  -Maintain f/u with Pulmonology for COPD, Stable on 2 L O2  CBT techniques- relaxation therapies such as biofeedback, mindfulness based stress reduction, imagery, cognitive restructuring, problem solving discussed with patient   -she was advised proper sleep hygiene-told to avoid:use of caffeine or other stimulants after noon, alcohol use near bedtime, long or frequent naps during the day, erratic sleep schedule, heavy meals near bedtime, vigorous exercise near bedtime and use of electronic devices near bedtime   -SOAPP score 0  -ORT score 0 low risk  -PHQ-9 score 16 moderate depression  -Return in about 4 weeks (around 9/12/2022). Controlled Substances Monitoring: Periodic Controlled Substance Monitoring: No signs of potential drug abuse or diversion identified.  Linda Mac, APRN - CNP)

## 2022-08-18 ENCOUNTER — CARE COORDINATION (OUTPATIENT)
Dept: CASE MANAGEMENT | Age: 82
End: 2022-08-18

## 2022-08-18 DIAGNOSIS — E03.9 ACQUIRED HYPOTHYROIDISM: ICD-10-CM

## 2022-08-18 RX ORDER — LEVOTHYROXINE SODIUM 0.1 MG/1
TABLET ORAL
Qty: 30 TABLET | Refills: 1 | Status: SHIPPED | OUTPATIENT
Start: 2022-08-18 | End: 2022-09-12

## 2022-08-18 NOTE — TELEPHONE ENCOUNTER
Refill Request     CONFIRM preferrred pharmacy with the patient. If Mail Order Rx - Pend for 90 day refill.       Last Seen: Last Seen Department: 7/27/2022  Last Seen by PCP: 7/27/2022    Last Written: 07/27/2022 30 tablet 1 refill    Next Appointment:   Future Appointments   Date Time Provider Bossman Geri   8/22/2022  2:30 PM KASIE Cunningham PFT KASIE PFT [facility]   8/26/2022  8:40 AM Negrito Simpson MD AND PULM [facility]   9/12/2022  2:20 PM JOSE Marsh CNP R BANK PAIN [facility]   9/22/2022  8:45 AM Liane Aver, MD Caryle Neve [facility]   11/3/2022  4:00 PM DO MARTA Diaz  Raffy - JOYCELYN       Future appointment scheduled      Requested Prescriptions     Pending Prescriptions Disp Refills    levothyroxine (SYNTHROID) 100 MCG tablet [Pharmacy Med Name: LEVOTHYROXINE 100 MCG TABLET] 30 tablet 1     Sig: TAKE 1 TABLET BY MOUTH EVERY DAY IN THE MORNING

## 2022-08-18 NOTE — CARE COORDINATION
Tete 45 Transitions Follow Up Call    2022    Patient: Nikky Archer  Patient : 1940   MRN: [id number]  Reason for Admission: DVT  Discharge Date: 22 RARS: Readmission Risk Score: 14.6         Spoke with: Nikky Archer    CTN spoke with patient who reported she is doing alright today just still having pain in her shoulder and back. Patient reported she had a follow up with orthopedic provider who recommended patient have blood work performed and prescribed different pain regiment. Patient reported her breathing is stable and oxygen saturation is 96%. Patient denied any further concerns at this time. CTN advised patient of use of urgent care or physicians 24 hr access line if assistance is needed after hours. Also advised that they can always contact their home care provider to request a nurse visit even if it isn't their regularly scheduled day for their nurse to visit. Care Transitions Subsequent and Final Call    Subsequent and Final Calls  Do you have any ongoing symptoms?: Yes  Patient-reported symptoms: Other  Interventions for patient-reported symptoms: Notified PCP/Physician  Have your medications changed?: No  Do you have any questions related to your medications?: No  Do you currently have any active services?: No  Are you currently active with any services?: Home Health  Do you have any needs or concerns that I can assist you with?: No  Identified Barriers: None  Care Transitions Interventions   Home Care Waiver: Completed     Other Interventions:              Follow Up  Future Appointments   Date Time Provider Bossman Nevarez   2022  2:30 PM Octavio, [address] [facility] PFT MHAZ PFT Elisa Mendoza   2022  8:40 AM Kia Bee MD AND PULM [facility]   2022  2:20 PM JOSE Poole - CNP R BANK PAIN [facility]   2022  8:45 AM Feliberto Regalado MD [facility]   11/3/2022  4:00 PM DO MARTA Boyle, RN, CCM  Care Transition Nurse  [phone] mobile

## 2022-08-19 DIAGNOSIS — R09.81 NASAL CONGESTION: ICD-10-CM

## 2022-08-22 ENCOUNTER — HOSPITAL ENCOUNTER (OUTPATIENT)
Dept: PULMONOLOGY | Age: 82
Discharge: HOME | End: 2022-08-22
Payer: MEDICAID

## 2022-08-22 ENCOUNTER — HOSPITAL ENCOUNTER (OUTPATIENT)
Dept: GENERAL RADIOLOGY | Age: 82
Discharge: HOME | End: 2022-08-22
Payer: MEDICAID

## 2022-08-22 ENCOUNTER — HOSPITAL ENCOUNTER (OUTPATIENT)
Age: 82
Discharge: HOME | End: 2022-08-22
Payer: MEDICAID

## 2022-08-22 DIAGNOSIS — G89.29 BILATERAL CHRONIC KNEE PAIN: ICD-10-CM

## 2022-08-22 DIAGNOSIS — M25.562 BILATERAL CHRONIC KNEE PAIN: ICD-10-CM

## 2022-08-22 DIAGNOSIS — M25.561 BILATERAL CHRONIC KNEE PAIN: ICD-10-CM

## 2022-08-22 DIAGNOSIS — M25.512 BILATERAL SHOULDER PAIN, UNSPECIFIED CHRONICITY: ICD-10-CM

## 2022-08-22 DIAGNOSIS — G89.4 CHRONIC PAIN SYNDROME: ICD-10-CM

## 2022-08-22 DIAGNOSIS — M25.511 BILATERAL SHOULDER PAIN, UNSPECIFIED CHRONICITY: ICD-10-CM

## 2022-08-22 DIAGNOSIS — J96.11 CHRONIC RESPIRATORY FAILURE WITH HYPOXIA (HCC): ICD-10-CM

## 2022-08-22 DIAGNOSIS — E03.9 ACQUIRED HYPOTHYROIDISM: ICD-10-CM

## 2022-08-22 LAB
T4 FREE: 2.1 NG/DL (ref 0.9–1.8)
TSH SERPL DL<=0.05 MIU/L-ACNC: 0.01 UIU/ML (ref 0.27–4.2)

## 2022-08-22 PROCEDURE — 73030 X-RAY EXAM OF SHOULDER: CPT

## 2022-08-22 PROCEDURE — 36415 COLL VENOUS BLD VENIPUNCTURE: CPT

## 2022-08-22 PROCEDURE — 84439 ASSAY OF FREE THYROXINE: CPT

## 2022-08-22 PROCEDURE — 73560 X-RAY EXAM OF KNEE 1 OR 2: CPT

## 2022-08-22 PROCEDURE — 84443 ASSAY THYROID STIM HORMONE: CPT

## 2022-08-22 PROCEDURE — 94618 PULMONARY STRESS TESTING: CPT

## 2022-08-22 RX ORDER — FLUTICASONE PROPIONATE 50 MCG
SPRAY, SUSPENSION (ML) NASAL
Qty: 1 EACH | Refills: 5 | Status: SHIPPED | OUTPATIENT
Start: 2022-08-22

## 2022-08-22 NOTE — PROCEDURES
[address] [facility] Pulmonary Function Lab - Six Minute Walk      Test Performed on:   Room Air______   Oxygen at _____ lpm via N/C- continuous Oxygen at _____ lpm via N/C- OCD  Assist Device Used During Test:  None______ Cane________ Walker_________    Modified Josh's Scale  0 Nothing at all 5 Strong    0.5 Just noticeable 6 Stronger (Hard)    1 Very Light 7 Very Strong   2 Light 8 Very Very Strong   3 Moderate 9 Extremely Strong   4 Somewhat Strong 10 Maximum All out      Time SpO2 Heart Rate Respiratory Rate Dyspnea-  Modified Joshs Scale Fatigue- Modified Joshs Scale Other Symptoms   Baseline                     93% RA 87 16 0.5       1 minute                     82% RA 26 24 4  Chest/back pain. Rested pt on 2l N/C for 2 minutes. Spo2 improved to 97% on 2l at rest.   2 minutes                     86% 2l N/C 94 24 3    Pt rested for 2 minutes on 3l N/C. Spo2 improved to 96% on 3L. 3 minutes                     91% on 3l N/C 94 22 3  Spo2 maintained at 91% on 3l N/C during ambulation. 4 minutes                     %        5 minutes                     %        6 minutes                     %        Recovery x 1 minute                     94% on 3l N/C 91 18 0.5     Recovery x 2 Minute                     96% on 3l N/C 80 16 0.5      Number of Laps_________ X 120 feet + _________ additional feet = Total Distance ____120___feet   Stopped or paused before 6 minutes? No_______ Yes _X_______  Total expected 6 MW distance is _______feet. Patient achieved ______% of expected distance. Pre Blood Pressure: 113/67  Post Blood Pressure: 132/72  Other symptoms at the end of exercise: Pt only walked about 20 ft/minute during  exercise due to mobility limitations using walker. Total distance achieved after 6 minutes was 120/ft with rest periods after each 2 minute(40ft) lap.

## 2022-08-26 ENCOUNTER — OFFICE VISIT (OUTPATIENT)
Dept: PULMONOLOGY | Age: 82
End: 2022-08-26
Payer: MEDICAID

## 2022-08-26 VITALS
SYSTOLIC BLOOD PRESSURE: 125 MMHG | HEIGHT: 67 IN | BODY MASS INDEX: 27.25 KG/M2 | TEMPERATURE: 97 F | RESPIRATION RATE: 16 BRPM | OXYGEN SATURATION: 94 % | HEART RATE: 76 BPM | DIASTOLIC BLOOD PRESSURE: 69 MMHG

## 2022-08-26 DIAGNOSIS — G47.33 OSA (OBSTRUCTIVE SLEEP APNEA): ICD-10-CM

## 2022-08-26 DIAGNOSIS — J43.2 CENTRILOBULAR EMPHYSEMA (HCC): ICD-10-CM

## 2022-08-26 DIAGNOSIS — J96.11 CHRONIC RESPIRATORY FAILURE WITH HYPOXIA (HCC): Primary | ICD-10-CM

## 2022-08-26 DIAGNOSIS — I27.20 PULMONARY HTN (HCC): ICD-10-CM

## 2022-08-26 PROCEDURE — 1123F ACP DISCUSS/DSCN MKR DOCD: CPT | Performed by: INTERNAL MEDICINE

## 2022-08-26 PROCEDURE — 99213 OFFICE O/P EST LOW 20 MIN: CPT | Performed by: INTERNAL MEDICINE

## 2022-08-26 ASSESSMENT — SLEEP AND FATIGUE QUESTIONNAIRES
HOW LIKELY ARE YOU TO NOD OFF OR FALL ASLEEP WHILE SITTING INACTIVE IN A PUBLIC PLACE: 0
HOW LIKELY ARE YOU TO NOD OFF OR FALL ASLEEP WHILE SITTING AND TALKING TO SOMEONE: 0
HOW LIKELY ARE YOU TO NOD OFF OR FALL ASLEEP WHILE SITTING QUIETLY AFTER LUNCH WITHOUT ALCOHOL: 1
HOW LIKELY ARE YOU TO NOD OFF OR FALL ASLEEP IN A CAR, WHILE STOPPED FOR A FEW MINUTES IN TRAFFIC: 0
HOW LIKELY ARE YOU TO NOD OFF OR FALL ASLEEP WHILE WATCHING TV: 1
HOW LIKELY ARE YOU TO NOD OFF OR FALL ASLEEP WHILE SITTING AND READING: 0
ESS TOTAL SCORE: 3
HOW LIKELY ARE YOU TO NOD OFF OR FALL ASLEEP WHILE LYING DOWN TO REST IN THE AFTERNOON WHEN CIRCUMSTANCES PERMIT: 1
HOW LIKELY ARE YOU TO NOD OFF OR FALL ASLEEP WHEN YOU ARE A PASSENGER IN A CAR FOR AN HOUR WITHOUT A BREAK: 0

## 2022-08-26 NOTE — PATIENT INSTRUCTIONS
Remember to bring a list of pulmonary medications and any CPAP or BiPAP machines to your next appointment with the office. Please keep all of your future appointments scheduled by [facility] office. Out of respect for other patients and providers, you may be asked to reschedule your appointment if you arrive later than your scheduled appointment time. Appointments cancelled less than 24hrs in advance will be considered a no show. Patients with three missed appointments within 1 year or four missed appointments within 2 years can be dismissed from the practice. Please be aware that our physicians are required to work in the Intensive Care Unit at [facility].  Your appointment may need to be rescheduled if they are designated to work during your appointment time. You may receive a survey regarding the care you received during your visit. Your input is valuable to us. We encourage you to complete and return your survey. We hope you will choose us in the future for your healthcare needs. Pt instructed of all future appointment dates & times, including radiology, labs, procedures & referrals. If procedures were scheduled preparation instructions provided. Instructions on future appointments with [facility] were given.

## 2022-08-26 NOTE — PROGRESS NOTES
Chief Complaint/Referring Provider:  Pulmonary follow up for lung issues and for recertification of her oxygen    Patient states that she has been having some coughing but not significant, patient has once in a while mucoid expectoration, patient does not have any more than usual shortness of breath, patient does feel some shortness of breath when she bends forward, patient has little bit of swelling of her legs, patient does not have any significant fever chills or any pleuritic chest pain, patient's CPAP machine has not been fixed so far by [facility] [id number] [facility] and patient states that they have been telling her that they will be coming to do so but has not been done so, patient does not have any significant fever or chills, patient has history of recent hospitalization for acute DVT and PE for which reason is patient is on anticoagulation, patient does not have any other pertinent review of system of concern    Patient is a 61-year-old female who has come to the office for a pulmonary follow-up, patient has been having more shortness of breath, patient states that she has been having some increased dyspnea on exertion, patient also feels intermittent sensation of gasping for air, patient states that she has been having some increased cough and expectoration especially at nighttime for 2 weeks time, patient's cough is wet and congested as per patient's family, patient also has some rhinorrhea, patient cannot lie flat, patient also has been having some ear pain off-and-on, patient does not have chest pain, patient does have some abdominal discomfort especially in the abdomen, patient has been given lansoprazole sublingual in the past with improvement but patient is due for a new course of the medication which the family has indicated from the pharmacy, patient has been having sensation of gasping for breath and air hunger when she bends forward, patient does have reflux symptoms as stated above, patient also has pedal sternum and she felt as if something was closing up, patient also thinks that her diaphragm and the stomach sinks intermittently which causes her to have some shortness of breath, patient has had difficulty in swallowing the past and patient has had esophagogram along with modified barium esophagram in the past, patient also has had history of upper GI endoscopies and patient had 2 procedures for esophageal stretching, patient continues to have some cough with mucoid expectoration, patient also does not have any altered bowel habits, no epistaxis, hemoptysis or any hematochezia, patient's water pill was changed and patient is taking Lasix twice a day and patient states that she has to go to the bathroom quite often now, patient also wanted know if she can get a portable concentrator, patient did not have any change of ambient environment, patient has received  COVID-19 vaccine , no other pertinent review of system of concern      Patient was updated to the in lab sleep studies and patient has come to follow-up on that, patient continues to have some dry cough with scanty mucoid expectoration once in a while, patient does have some occasional sinus congestion, patient does not have any significant sore throat or difficulty in swallowing, no coughing or choking while eating, no odynophagia or dysphagia, patient does not have any pleuritic chest pain, patient does have a history of recent right shoulder pain for which patient was given muscle relaxants and also an injection was given in the right shoulder as up with the patient, patient also states that she has a lower central chest cystlike structure which swells at times, patient does not have any significant increasing abdominal discomfort nausea vomiting or any reflux symptoms, patient does have some swelling of the lower extremities, patient has been on water pill along with her blood pressure medication as per the patient, patient's requirement for nebulizer is palpitations, patient does not have any otalgia no ear discharge, patient states that yesterday she felt that she had an episode of difficulty in swallowing on the left side which she attributed to possible thyroid medication has not happened on a regular basis, patient does not have any pleuritic chest pain, no fever no chills, patient does not have any significant abdominal issues of concern, patient states that she does not have any altered bowel habits, no dysuria no hematuria, patient does not have any increasing leg edema, patient is requirement for risk inhaler is limited, patient states that she has been using her CPAP machine on a regular basis but she feels that her CPAP machine pressure is quite high and wants something to be done about that, patient does not see Dr. Santy Rodriguez now for sleep apnea, does not have any change in the abnormal range sick contacts, no change of medications, patient was alert and communicative when seen, patient does not have any other pertinent review of system of concern     : Patient has come to the office for pulmonary evaluation as referred by his PCP/Dr. Santy Rodriguez  Patient was seen as an inpatient in 2019    Patient states that she has increasing shortness of breath, patient states that she has coughing along with that patient has thick but clear respiratory secretions, patient also has been having increased nasal congestion and nasal stuffiness, patient states that she has not been able to use the humidification along with the oxygen concentrator as patient states that it comes to her nose at times, patient states that she has occasional pleuritic chest pain, patient also has occasional abdominal pain, patient used to have a PEG tube which has been removed, patient is taking food by mouth but it is modified as per the speech therapy  recommendations, patient has a history of rheumatoid arthritis and many biologicals have been tried but patient has had various side effects with that and patient is now taking Actemra twice a month as given by the rheumatologist, patient continues to have intermittent pain and patient states that last night she was having significant shoulder pain which was causing her to have asthma attack, patient takes albuterol inhaler on a as needed basis along with that patient states that she takes nebulizer treatment along with Spiriva on regular basis, patient states that she has CPAP which she is using at nighttime; patient does not have any pets, patient has central air but no humidification, patient is try to take less salt in the diet, patient denies any change in the environment or any sick contacts, patient does not have any confusion lethargy, patient does not have any other pertinent review of system of concern     Past Medical History:   Diagnosis Date    Acid reflux     Acquired hypothyroidism 7/6/2017    Anemia     Asthma     [facility]F (congestive heart failure) ([facility]     COPD (chronic obstructive pulmonary disease) ([facility]     HLD (hyperlipidemia)     Hypertension     Influenza A 01/22/2018    MI (myocardial infarction) ([facility]     X 2    Migraine     past hx    Rheumatoid arthritis     Wears glasses        Past Surgical History:   Procedure Laterality Date    BRONCHOSCOPY N/A 3/27/2019    BRONCHOSCOPY ALVEOLAR LAVAGE performed by Mainor Puentes MD at [facility]  3/27/2019    BRONCHOSCOPY THERAPUTIC ASPIRATION INITIAL performed by Mainor Puentes MD at [address] Bilateral 03/23/2011    COLONOSCOPY N/A 6/12/2019    COLONOSCOPY WITH ANESTHESIA -SLEEP APNEA- performed by Pura Bright MD at [address] Left 12/4/2018    LEFT LUMBAR FOUR, LUMBAR FIVE TRANSFORAMINAL EPIDURAL STEROID INJECTION SITE CONFIRMED BY FLUOROSCOPY performed by Beth Hale MD at [address] N/A 7/23/2019    ESOPHAGEAL MOTILITY/MANOMETRY STUDY performed by Vilma Khan MD at [address] little toe    GASTROSTOMY TUBE PLACEMENT N/A 2019    EGD PEG TUBE PLACEMENT performed by Gaviota Hope MD at [address] [facility] (CERVIX STATUS UNKNOWN)      total, fibroids    KNEE SURGERY      right    AZ NJX DX/THER SBST INTRLMNR CRV/THRC W/IMG GDN Left 2018    LEFT LUMBAR FOUR/ LUMBAR FIVE CYST ASPIRATION SITE CONFIRMED BY FLUOROSCOPY performed by Tawnya Christie MD at [address] [facility] ENDOSCOPY N/A 3/27/2019    EGD DIAGNOSTIC ONLY performed by Breezy Perdomo MD at [place] [address] N/A 2020    EGD DIAGNOSTIC ONLY performed by Nichelle John DO at [address] [facility]       Allergies   Allergen Reactions    Alendronate Sodium      Jaw pain      Humira [Adalimumab]      Rash      Losartan Swelling    Penicillins      rash    Simvastatin Other (See Comments)     Made legs ache    Sulfa Antibiotics Swelling     Tongue swelled    Tape Hebert Iain Tape]        Medication list was reviewed and updated as needed in Epic    Social History     Socioeconomic History    Marital status: Single     Spouse name: Not on file    Number of children: 3    Years of education: Not on file    Highest education level: Not on file   Occupational History    Occupation: disability   Tobacco Use    Smoking status: Former     Packs/day: 3.00     Years: 50.00     Pack years: 150.00     Types: Cigarettes     Start date: 3/4/1963     Quit date: 2009     Years since quittin.6    Smokeless tobacco: Never   Vaping Use    Vaping Use: Never used   Substance and Sexual Activity    Alcohol use: No    Drug use: No    Sexual activity: Not on file   Other Topics Concern    Not on file   Social History Narrative    Not on file     Social Determinants of Health     Financial Resource Strain: Low Risk     Difficulty of Paying Living Expenses: Not hard at all Food Insecurity: No Food Insecurity    Worried About Running Out of Food in the Last Year: Never true    Ran Out of Food in the Last Year: Never true   Transportation Needs: No Transportation Needs    Lack of Transportation (Medical): No    Lack of Transportation (Non-Medical): No   Physical Activity: Not on file   Stress: Not on file   Social Connections: Not on file   Intimate Partner Violence: Not on file   Housing Stability: Low Risk     Unable to Pay for Housing in the Last Year: No    Number of Places Lived in the Last Year: 1    Unstable Housing in the Last Year: No       Family History   Problem Relation Age of Onset    Cancer Mother     Cancer Father     Heart Disease Maternal Aunt     Cancer Sister             Review of Systems same as above    Physical Exam:  Blood pressure 125/69, pulse 76, temperature 97 °F (36.1 °C), temperature source Temporal, resp. rate 16, height 5' 6.5\" (1.689 m), SpO2 94 %, not currently breastfeeding.'  Constitutional:  No acute distress. While sitting in the wheelchair on nasal cannula oxygen  HENT:  Oropharynx is clear and moist. No thyromegaly. Eyes:  Conjunctivae arenormal. Pupils equal, round, and reactive to light. No scleral icterus. Neck: . No tracheal deviation present. No obvious thyroid mass. Cardiovascular:Normal rate, regular rhythm, normal heart sounds. No right ventricular heave. Minimal lower extremity edema. Pulmonary/Chest: No wheezes. No rales . Chest wall is not dull to percussion. Noaccessory muscle usage or stridor. decreased breath sound density  Abdominal: Soft. Bowel sounds present. No distension or hernia. Mild epigastric tenderness. Musculoskeletal: No cyanosis. No clubbing. No obvious joint deformity. Lymphadenopathy: No cervical or supraclavicular adenopathy. Skin: Skin is warm and dry. No rash or nodules on the exposed extremities. Psychiatric: Normal mood and affect. Behavior is normal.  No anxiety.   Neurologic: Alert, awake and oriented. PERRL. Speech fluent        Data:     Imaging:  I have reviewed radiology images personally. No orders to display     Xr Lumbar Spine (2-3 Views)    Result Date: 1/15/2021  EXAMINATION: 3 XRAY VIEWS OF THE LUMBAR SPINE 1/15/2021 12:06 pm COMPARISON: Lumbar spine radiographs April 13, 2018. HISTORY: ORDERING SYSTEM PROVIDED HISTORY: Lumbar pain TECHNOLOGIST PROVIDED HISTORY: Reason for Exam: low back pain Acuity: Acute Type of Exam: Initial Mechanism of Injury: fell to floor from wheelchair Relevant Medical/Surgical History: pt noted having a recent fall down steps FINDINGS: There is 6 mm degenerative anterolisthesis of L3 on L4 and L4 on L5. Lumbar vertebra otherwise demonstrate normal height and alignment. No fracture or suspicious osseous lesion identified. Listhesis is similar in appearance dating back to 2018. There is moderate disc height loss at L3-L4 and L4-L5. Mild disc height loss is seen at the remainder of lumbar levels. There is multilevel facet arthrosis. Evaluation the soft tissues demonstrates atherosclerotic calcification of the abdominal aorta. Multilevel degenerative changes with grade 1 degenerative listhesis at L3-L4 and L4-L5, similar in appearance dating back to 2018. No acute osseous abnormality. Nuclear stress test-  Conclusions        Summary    There is normal isotope uptake at stress and rest. There is no evidence of    myocardial ischemia or scar. LV function is normal with uniform wall motion    and ejection fraction of 67 %. Lower risk study     CT chest in 2019 from [facility]    1. No acute findings. 2.  No evidence of mass or lymphadenopathy. 3.  Severe emphysema. 4.  Pulmonary arterial dilatation suspicious for pulmonary hypertension. ECHO in 2019-Study Conclusions    - Left ventricle:  The cavity size was normal. Wall thickness was    normal. Systolic function was normal. The calculated ejection    fraction was in the range of 64% to 54%. Normal diastolic    function.  - Aortic valve: Peak gradient (S): 6mm Hg.  - Tricuspid valve: Regurgitant peak velocity: 314cm/sec. Peak RV-RA    gradient (S): 39mm Hg. - Inferior vena cava: The vessel was normal in size; the    respirophasic diameter changes were in the normal range (>= 50%);    findings are consistent with normal central venous pressure. - Pulmonary arteries: PA peak pressure: 42mm Hg (S). PFT IN 2017-PFT TEST    Spirometry  Spirometry shows moderate obstructive defect. .    Bronchodilator  There is no response to bronchodilator demonstrated. Flow-Volume Loop  The flow-volume loop is compatible with obstructive lung defect. Lung Volumes  Lung volumes are normal except for elevated RV. Lung Volume Comments  There is air trapping present. Diffusing Capacity  Single breath diffusing capacity is severely decreased. A reduced DLCO can be seen in diseases causing destruction of   alveolar-capillary interface (ILD, CHF, COPD, Pneumonia, etc.)   obliteration of pulmonary vascular bed (PE, PAH), tobacco usage,   carbon monoxide poisoning. Airway Resistance  Airway resistance is moderately elevated. Comparison  In comparison to the test of 16 there is mild deterioration   in spirometry and DLCO    PFT is compatible with moderate Obstructive Lung Disease  -   Possible etiologies include: asthma, COPD, bronchiectasis,   bronchitis. Correlate clinically. 6mwt IN 2017-6 Minute Walk Test Interpretation    Patient Name: Jennifer Falcon  : 1940  Date: 8/15/2017    A 6 Minute Walk Test was performed in accordance with the ATS   Guidelines ([address] [place] [address] ; [id number]) at [facility]. The test was performed on room air. The baseline SpO2 while breathing room air was 91%. The patient did complete the 6 minute walk. During the study, the patient walked a total distance of 1050   feet.     The minimal recorded SpO2 was 88%.    Oximetry with 6 Minute Walk Test shows desaturation to 88% on   room air, 1L, 2L and 3L nasal cannula oxygen with maximal   exercise. Sats improved to 94% on 4L NC oxygen with activity. Comments: Based on testing patient qualifies for 4L NC oxygen to   use with activity. ECHO-Summary  Left ventricular systolic function is hyperdynamic with an estimated ejection fraction of ? 65%. The left ventricle is normal in size with normal wall thickness. Normal left ventricular diastolic function. The right atrium is mildly enlarged. Mild tricuspid regurgitation. Systolic pulmonary artery pressure (SPAP) is elevated and estimated at 39 mmHg (right atrial pressure 3 mmHg) consistent with  borderline pulmonary hypertension. Barium 9/17/20  Moderate esophageal dysmotility. No evidence of functional obstruction or  gross reflux. PAP Compliance 10/3/2019   PAP Setting = 7   Percentage days used = 6   Average hours used = 2.39   Is patient compliant with PAP? no   AHI = 3.2   Leak = 9.4   [place] = -     PSG 9/5/18:  TOTAL AHI = 11.2/hour  REM AHI= 16.6/hr   Central AHI= 1.2/hour  Lowest O2 desaturation = 89 %  Minutes with O2 <90% = 2.6    TITRATION STUDY 9/20/18:  TOTAL AHI = 1.9/hour on PAP = 7 cm of H2O  Sleep emergent CSA events during the initial part of the study   Central AHI= 3.9/hour  Lowest O2 desaturation = 90 % overall  Minutes with O2% <90% = NA    Patient's sleep read shows patient to have severe HEBERT with AHI of 49.1/h along with that patient has central sleep apnea with no  with some nocturnal hypoxemia with saturation dropping to 82% when patient saturation below 89% was 104 minutes    Summary   Left ventricular systolic function is hyperdynamic with an estimated   ejection fraction of >= 65%. The left ventricle is normal in size with normal wall thickness. Normal left ventricular diastolic function. The right atrium is mildly enlarged. Mild tricuspid regurgitation. Systolic pulmonary artery pressure (SPAP) is elevated and estimated at 39   mmHg (right atrial pressure 3 mmHg) consistent with borderline pulmonary   hypertension. Patient's sleep study shows patient to have mild obstructive airway disease with significant small airway disease with some postprandial improvement in the small airways on the study, patient does not have any restrictive lung disease patient does have some air trapping and hyperinflation along with that patient was not able to objectively do the diffusion test patient's flow volume loop was a stable obstructive pattern    Patient 6-minute walk test shows patient to have hypoxemia after 1 minute of walking and patient required 2 L of oxygen to sustain the oxygen saturation    Patient 6-minute walk test shows patient to have significant hypoxemia and patient needs at least 3 L of nasal cannula oxygen and patient is not homebound and face-to-face done for the patient for home oxygen      Assessment:    1. Centrilobular emphysema ([address]      2. HEBERT on CPAP      3. Rheumatoid arthritis involving multiple sites with positive rheumatoid factor ([address]      4. Pulmonary HTN ([address]      5. ASD (atrial septal defect)      6. Former smoker      7. Chronic respiratory failure with hypoxia (HCC)    8.   Nasal congestion          Plan:   Patient's ROS discussed   Patient was told about the clinical findings including auscultation and implications  Patient sleep study results were reviewed and patient has severe HEBERT along with CSA and nocturnal hypoxemia  Patient has been ordered auto CPAP but patient is stating that she has not heard anything back from the [employer] company-to be followed up  Patient and family were told about the pathophysiology of the disease process and its modifying factors and how the medications act  Part of the patient's symptomatology is secondary to patient not taking the Trelegy Ellipta  Albuterol to be given 2 puffs every 6 hours on whenever necessary basis  Patient was recently also found to have acute DVT with PE and patient is on anticoagulation  Oxygen supplementation to keep saturation between 90 to 94% only  Concept of hypoxemia and hypercarbia discussed  Patient was admitted and echocardiogram shows patient's EF to be normal but patient does have some pulmonary hypertension  Patient has chronic respiratory failure with hypoxemia  Patient's modified barium swallow and esophagram has been reviewed and patient has esophageal motility dysfunction  Patient was told to at least sit or stand 45 minutes to 1 hour after she eats and patient's supper has to be at least 2 hours prior to sleeping time and patient's head should be about 30 degrees higher than the abdominal and she is lying down  Patient's family to get lansoprazole for the patient for her symptomatology and if patient continues to have increased abdominal symptoms and patient may require GI evaluation for possible upper GI endoscopy  Patient's family will also recheck if patient is still taking the Lasix or not  as part of the symptoms may be secondary to that  Patient was told to use some saline nasal spray-  Flonase nasal spray 2 puff each nostril once a day  A humidifier in the bedroom will help  Salt and fluid restrictions were discussed  Patient was told about the pathophysiology of the disease process and its modifying factors  Patient was told about the pathophysiology of the COPD and various medications and how they act  Patient to continue with Kesha Lisa as before  Patient was told to make sure that she rinses her mouth with water without fail to avoid any hoarseness of voice or oral thrush  No need for any antibiotic and steroids  Patient will take flu shot in end of September  Daily weights will help  Patient has been started on loop diuretics by the PCP  Salt and fluid restrictions discussed  Patient's treatment of rheumatoid arthritis as per rheumatology  Oral diet as per speech therapy recommendations  Monitor for any aspiration event  Patient to take DuoNeb or albuterol inhaler only on whenever necessary basis and not on regular basis  Patient's further treatment depending on patient's clinical status and response to the new inhaler  A humidifier in the bedroom will help  Patient to follow-up after 3 months or earlier if required  Patient's shortness of breath is multifactorial in nature and each component has to be addressed in order to help the patient otherwise patient will be symptomatic patient was explained to patient and the family

## 2022-08-26 NOTE — PROGRESS NOTES
MA Communication:   The following orders are received by verbal communication from Walker Acosta MD    Orders include:    6 month follow up: 03/03/2023 9:00 AM

## 2022-08-29 ENCOUNTER — TELEPHONE (OUTPATIENT)
Dept: PULMONOLOGY | Age: 82
End: 2022-08-29

## 2022-08-29 NOTE — TELEPHONE ENCOUNTER
Farideh from [facility] is needing office note and results for the 6 minute walk fax to her at [phone].  Thanks

## 2022-09-01 RX ORDER — SPIRONOLACTONE 25 MG/1
TABLET ORAL
Qty: 180 TABLET | Refills: 1 | Status: SHIPPED | OUTPATIENT
Start: 2022-09-01

## 2022-09-01 NOTE — TELEPHONE ENCOUNTER
Refill Request     CONFIRM preferrred pharmacy with the patient. If Mail Order Rx - Pend for 90 day refill.       Last Seen: Last Seen Department: 7/27/2022  Last Seen by PCP: 7/27/2022    Last Written: 03/09/2022 180 tablet 1 refills     Next Appointment:   Future Appointments   Date Time Provider Bossman Alfredi   9/12/2022  2:20 PM JOSE Trammell CNP [facility]   9/22/2022  8:45 AM MD Radha Shepherd [facility]   11/3/2022  4:00 PM Lexi Maya DO [facility] [place] [facility]   3/3/2023  9:00 AM Keyonna Mendenhall [place] [facility] [place]       Future appointment scheduled      Requested Prescriptions     Pending Prescriptions Disp Refills    spironolactone (ALDACTONE) 25 MG tablet [Pharmacy Med Name: SPIRONOLACTONE 25 MG TABLET] 180 tablet 1     Sig: TAKE 1 TABLET BY MOUTH TWICE A DAY

## 2022-09-07 ENCOUNTER — TELEPHONE (OUTPATIENT)
Dept: PULMONOLOGY | Age: 82
End: 2022-09-07

## 2022-09-07 NOTE — TELEPHONE ENCOUNTER
Libia is calling to see if PA was done for her grandmother for trelegy. She has been out for over a week now. Please advise.  Thanks

## 2022-09-07 NOTE — TELEPHONE ENCOUNTER
Farideh needs certificate for medical necessity signed and sent back to her [phone](fax). If did not please call Farideh at [phone].

## 2022-09-11 DIAGNOSIS — E03.9 ACQUIRED HYPOTHYROIDISM: ICD-10-CM

## 2022-09-12 ENCOUNTER — OFFICE VISIT (OUTPATIENT)
Dept: PAIN MANAGEMENT | Age: 82
End: 2022-09-12
Payer: MEDICAID

## 2022-09-12 ENCOUNTER — TELEPHONE (OUTPATIENT)
Dept: PULMONOLOGY | Age: 82
End: 2022-09-12

## 2022-09-12 VITALS
SYSTOLIC BLOOD PRESSURE: 124 MMHG | DIASTOLIC BLOOD PRESSURE: 61 MMHG | HEART RATE: 88 BPM | HEIGHT: 67 IN | BODY MASS INDEX: 27.25 KG/M2 | TEMPERATURE: 97 F | OXYGEN SATURATION: 94 %

## 2022-09-12 DIAGNOSIS — M71.38 SYNOVIAL CYST OF LUMBAR SPINE: ICD-10-CM

## 2022-09-12 DIAGNOSIS — M48.061 SPINAL STENOSIS OF LUMBAR REGION WITHOUT NEUROGENIC CLAUDICATION: ICD-10-CM

## 2022-09-12 DIAGNOSIS — M25.511 BILATERAL SHOULDER PAIN, UNSPECIFIED CHRONICITY: ICD-10-CM

## 2022-09-12 DIAGNOSIS — M54.32 SCIATICA OF LEFT SIDE: ICD-10-CM

## 2022-09-12 DIAGNOSIS — M05.79 RHEUMATOID ARTHRITIS INVOLVING MULTIPLE SITES WITH POSITIVE RHEUMATOID FACTOR (HCC): ICD-10-CM

## 2022-09-12 DIAGNOSIS — M50.121 CERVICAL DISC DISORDER AT C4-C5 LEVEL WITH RADICULOPATHY: ICD-10-CM

## 2022-09-12 DIAGNOSIS — M51.36 DDD (DEGENERATIVE DISC DISEASE), LUMBAR: ICD-10-CM

## 2022-09-12 DIAGNOSIS — G89.29 BILATERAL CHRONIC KNEE PAIN: ICD-10-CM

## 2022-09-12 DIAGNOSIS — J43.2 CENTRILOBULAR EMPHYSEMA (HCC): ICD-10-CM

## 2022-09-12 DIAGNOSIS — G47.33 OSA (OBSTRUCTIVE SLEEP APNEA): Primary | ICD-10-CM

## 2022-09-12 DIAGNOSIS — G89.4 CHRONIC PAIN SYNDROME: ICD-10-CM

## 2022-09-12 DIAGNOSIS — M25.512 BILATERAL SHOULDER PAIN, UNSPECIFIED CHRONICITY: ICD-10-CM

## 2022-09-12 DIAGNOSIS — M25.561 BILATERAL CHRONIC KNEE PAIN: ICD-10-CM

## 2022-09-12 DIAGNOSIS — M25.562 BILATERAL CHRONIC KNEE PAIN: ICD-10-CM

## 2022-09-12 DIAGNOSIS — F32.9 REACTIVE DEPRESSION: ICD-10-CM

## 2022-09-12 PROCEDURE — 99214 OFFICE O/P EST MOD 30 MIN: CPT | Performed by: NURSE PRACTITIONER

## 2022-09-12 PROCEDURE — 1123F ACP DISCUSS/DSCN MKR DOCD: CPT | Performed by: NURSE PRACTITIONER

## 2022-09-12 RX ORDER — LEVOTHYROXINE SODIUM 0.1 MG/1
TABLET ORAL
Qty: 90 TABLET | Refills: 0 | Status: SHIPPED | OUTPATIENT
Start: 2022-09-12 | End: 2022-11-04 | Stop reason: DRUGHIGH

## 2022-09-12 RX ORDER — LIDOCAINE 50 MG/G
1 PATCH TOPICAL DAILY
Qty: 30 PATCH | Refills: 0 | Status: SHIPPED | OUTPATIENT
Start: 2022-09-12 | End: 2022-10-31 | Stop reason: SDUPTHER

## 2022-09-12 NOTE — PROGRESS NOTES
Tiffani Monday  1940  [id number]      HISTORY OF PRESENT ILLNESS: Ms. Baltazar Degroot is a [de-identified] y.o. female returns for a follow up visit for pain management  She has a diagnosis of   1. Chronic pain syndrome    2. DDD (degenerative disc disease), lumbar    3. Spinal stenosis of lumbar region without neurogenic claudication    4. Synovial cyst of lumbar spine    5. Sciatica of left side    6. Cervical disc disorder at C4-C5 level with radiculopathy    7. Bilateral shoulder pain, unspecified chronicity    8. Rheumatoid arthritis involving multiple sites with positive rheumatoid factor ([facility] [address]    9. Bilateral chronic knee pain    10. Reactive depression    11. Centrilobular emphysema ([facility] [address]      As per Information Obtained from the PADT (Patient Assessment and Documentation Tool)    She complains of pain in the both ankle(s): entire area, both arm(s): entire area, chest, both foot/feet: entire area, both hand(s): entire area, head, both knee(s), both leg(s): entire area, bilateral lower back, bilateral mid-back, neck, both shoulder(s): entire area, and bilateral upper back She rates the pain 9/10 and describes it as aching. Current treatment regimen has helped relieve about 0% of the pain. She denies any side effects from the current pain regimen. Patient reports that since the last follow up visit the physical functioning is worse, family/social relationships are unchanged, mood is worse sleep patterns are worse, and that the overall functioning is worse. Patient denies misusing/abusing her narcotic pain medications or using any illegal drugs. Upon obtaining medical history from Ms. Baltazar Degroot states that pain is manageable on current pain therapy. Takes the pain medications as needed. Reports taking the pain medications mainly when needed to reduce dependency issues. Pain mainly in the lower back. Stable on 3 L O2. Mood/anxiety is stable. Sleep is fair with an average of 5-6 hours.  Denies to having issues of constipation. Tolerating activities/house chores with moderate tenderness to the lower back. ALLERGIES: Patients list of allergies were reviewed     MEDICATIONS: Ms. Agusto Delcid list of medications were reviewed. Her current medications are   Outpatient Medications Prior to Visit   Medication Sig Dispense Refill    levothyroxine (SYNTHROID) 100 MCG tablet TAKE 1 TABLET BY MOUTH EVERY DAY IN THE MORNING 90 tablet 0    spironolactone (ALDACTONE) 25 MG tablet TAKE 1 TABLET BY MOUTH TWICE A  tablet 1    fluticasone (FLONASE) 50 MCG/ACT nasal spray SPRAY 2 SPRAYS INTO EACH NOSTRIL EVERY DAY 1 each 5    omeprazole (PRILOSEC) 10 MG delayed release capsule Take by mouth daily      oxyCODONE-acetaminophen (PERCOCET) 5-325 MG per tablet Take 1 tablet by mouth in the morning and 1 tablet before bedtime. Do all this for 15 days. Intended supply: 3 days. Take lowest dose possible to manage pain. 30 tablet 0    acetaminophen (TYLENOL) 500 MG tablet Take 1 tablet by mouth every 12 hours as needed for Pain (take for breakthrough pain) 120 tablet 1    furosemide (LASIX) 20 MG tablet TAKE 1 TABLET BY MOUTH TWICE A  tablet 1    ezetimibe (ZETIA) 10 MG tablet TAKE 1 TABLET BY MOUTH EVERY DAY 90 tablet 3    atorvastatin (LIPITOR) 10 MG tablet TAKE 1 TABLET BY MOUTH EVERY DAY 90 tablet 1    labetalol (NORMODYNE) 100 MG tablet TAKE 1 TABLET BY MOUTH TWICE A  tablet 1    Lift Chair MISC by Does not apply route 1 each 0    Misc. Devices (COMMODE BEDSIDE) MISC Use daily. 1 each 0    Incontinence Supply Disposable MISC 1 each by Does not apply route 4 times daily as needed (incontinence) 120 each 3    pregabalin (LYRICA) 50 MG capsule TAKE 1 CAPSULE BY MOUTH EVERY DAY      apixaban (ELIQUIS) 5 MG TABS tablet Take 1 tablet by mouth in the morning and 1 tablet before bedtime.  180 tablet 1    fluticasone-umeclidin-vilant (TRELEGY ELLIPTA) 100-62.5-25 MCG/INH AEPB Inhale 1 puff into the lungs daily 1 each 5    DULoxetine (CYMBALTA) 30 MG extended release capsule Take 30 mg by mouth daily      Handicailya Alfred MISC by Does not apply route 02/07/22 1 each 0    albuterol sulfate  (90 Base) MCG/ACT inhaler INHALE TWO (2) PUFFS BY MOUTH EVERY 6 HOURS AS NEEDED 18 g 10    ACTEMRA 162 MG/0.9ML SOSY injection       predniSONE (DELTASONE) 10 MG tablet Take 5 mg by mouth daily       Misc. Devices (WHEELCHAIR) MISC SELF PROPELLED, LIGHT WEIGHT,  STANDARD SIZE 1 each 0    ergocalciferol (ERGOCALCIFEROL) 1.25 MG (42903 UT) capsule Take 50,000 Units by mouth every 7 days      OXYGEN Inhale 3 L/min into the lungs daily as needed       Blood Pressure Monitoring (BLOOD PRESSURE MONITOR/M CUFF) MISC 1 Units by Does not apply route daily 1 each 0    ipratropium-albuterol (DUONEB) 0.5-2.5 (3) MG/3ML SOLN nebulizer solution Inhale 3 mLs into the lungs every 4 hours as needed. 360 mL 3    nitroGLYCERIN (NITROSTAT) 0.4 MG SL tablet Place 0.4 mg under the tongue every 5 minutes as needed. lidocaine (LIDODERM) 5 % Place 1 patch onto the skin in the morning. 12 hours on, 12 hours off. . 30 patch 0    oxyCODONE-acetaminophen (PERCOCET) 5-325 MG per tablet Take 1 tablet by mouth 2 times daily as needed for Pain for up to 30 days. 60 tablet 0     No facility-administered medications prior to visit. SOCIAL/FAMILY/PAST MEDICAL HISTORY: Ms. Frey Last, family and past medical history was reviewed. REVIEW OF SYSTEMS:    Respiratory: Negative for apnea, chest tightness and shortness of breath or change in baseline breathing. Gastrointestinal: Negative for nausea, vomiting, abdominal pain, diarrhea, constipation, blood in stool and abdominal distention. PHYSICAL EXAM:   Nursing note and vitals reviewed. /61   Pulse 88   Temp 97 °F (36.1 °C)   Ht 5' 6.5\" (1.689 m)   SpO2 94%   BMI 27.25 kg/m²   Constitutional: She appears well-developed and well-nourished. No acute distress. Skin: Skin is warm and dry, good turgor.  No rash noted. She is not diaphoretic. Cardiovascular: Normal rate, regular rhythm, normal heart sounds, and does not have murmur. Pulmonary/Chest: Effort normal. No respiratory distress. She does not have wheezes in the lung fields. Slightly dimished lung bases, 3L O2 She has no rales. Neurological/Psychiatric:She is alert and oriented to person, place, and time. Coordination is  normal. Slow steady gait with the aid of a walker  Her mood isAppropriate and affect is Neutral/Euthymic(normal) . Xray of the Shoulders, Knees 8/22/22:  No acute osseous abnormality left shoulder. There is moderate AC joint and   glenohumeral osteoarthritis. There is cervical spine degenerative change. Hazy opacity is seen at the left lung base, likely atelectasis. No acute osseous abnormality left knee. 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 No acute osseous abnormality right knee. There is tricompartmental   osteoarthritis, greatest laterally       No acute osseous abnormality right shoulder joint. There is moderate AC   joint and glenohumeral osteoarthritis. There is cervical spine degenerative   change     Prescription pain medication monitoring:                  MEDD current = 15              ORT Score = 1 low risk              Other Risk factors - (mood) Depression              Date of Last Medication Agreement: 8/16/22              Date Naloxone prescribed: 9/12/22              UDT:                          Date of last UDT: 8/15/22                          Adverse report: No;               OARRS:                          Checked today: Yes                          Adverse report: No    IMPRESSION:   1. Chronic pain syndrome    2. DDD (degenerative disc disease), lumbar    3. Spinal stenosis of lumbar region without neurogenic claudication    4. Synovial cyst of lumbar spine    5. Sciatica of left side    6. Cervical disc disorder at C4-C5 level with radiculopathy    7. Bilateral shoulder pain, unspecified chronicity    8. Rheumatoid arthritis involving multiple sites with positive rheumatoid factor ([facility] [address]    9. Bilateral chronic knee pain    10. Reactive depression    11. Centrilobular emphysema ([facility] [address]        PLAN:  Informed verbal consent was obtained:  -Patient's opioid therapy will be maintained at current dose  -Waiting for patients grand daughter to call with the amount of the opioids in her pill bottle. Reports having pain pills at home  -Home exercises/Maxine exercises recommended  -Reviewed Xrays of the Shoulders/Knees, Maintains f/u with rheumatology for RA  -Patient was ordered a high profile brace to reduce pain by limiting flexion, extension, and lateral rotation. Was advised to wear it only during periods of increased physical activity and to limit the use to few hours at a time. ROM, physical activity was encouraged. -CBT techniques- relaxation therapies such as biofeedback, mindfulness based stress reduction, imagery, cognitive restructuring, problem solving discussed with patient   -She was advised weight reduction, diet changes- 800-1200 kasi diet, diet diary, exercising, nutritional  consult increased physical activity as tolerated   -Last UDS 8/15/22 consistent  -Return in about 4 weeks (around 10/10/2022). Analgesic Plan:              Continue present regimen: percocet 5-325 mg tabs bid prn              Adjust dose of present analgesic: No              Switch analgesics: No              Add/Adjust concomitant therapy: Lidoderm, Tylenol, Narcan, Lyrica 50 mg tabs through rheumatology    Current Outpatient Medications   Medication Sig Dispense Refill    levothyroxine (SYNTHROID) 100 MCG tablet TAKE 1 TABLET BY MOUTH EVERY DAY IN THE MORNING 90 tablet 0    lidocaine (LIDODERM) 5 % Place 1 patch onto the skin daily 12 hours on, 12 hours off.  30 patch 0    spironolactone (ALDACTONE) 25 MG tablet TAKE 1 TABLET BY MOUTH TWICE A  tablet 1    fluticasone (FLONASE) 50 MCG/ACT nasal spray SPRAY 2 SPRAYS INTO EACH NOSTRIL EVERY DAY 1 each 5    omeprazole (PRILOSEC) 10 MG delayed release capsule Take by mouth daily      oxyCODONE-acetaminophen (PERCOCET) 5-325 MG per tablet Take 1 tablet by mouth in the morning and 1 tablet before bedtime. Do all this for 15 days. Intended supply: 3 days. Take lowest dose possible to manage pain. 30 tablet 0    acetaminophen (TYLENOL) 500 MG tablet Take 1 tablet by mouth every 12 hours as needed for Pain (take for breakthrough pain) 120 tablet 1    furosemide (LASIX) 20 MG tablet TAKE 1 TABLET BY MOUTH TWICE A  tablet 1    ezetimibe (ZETIA) 10 MG tablet TAKE 1 TABLET BY MOUTH EVERY DAY 90 tablet 3    atorvastatin (LIPITOR) 10 MG tablet TAKE 1 TABLET BY MOUTH EVERY DAY 90 tablet 1    labetalol (NORMODYNE) 100 MG tablet TAKE 1 TABLET BY MOUTH TWICE A  tablet 1    Lift Chair MISC by Does not apply route 1 each 0    Misc. Devices (COMMODE BEDSIDE) MISC Use daily. 1 each 0    Incontinence Supply Disposable MISC 1 each by Does not apply route 4 times daily as needed (incontinence) 120 each 3    pregabalin (LYRICA) 50 MG capsule TAKE 1 CAPSULE BY MOUTH EVERY DAY      apixaban (ELIQUIS) 5 MG TABS tablet Take 1 tablet by mouth in the morning and 1 tablet before bedtime. 180 tablet 1    fluticasone-umeclidin-vilant (TRELEGY ELLIPTA) 100-62.5-25 MCG/INH AEPB Inhale 1 puff into the lungs daily 1 each 5    DULoxetine (CYMBALTA) 30 MG extended release capsule Take 30 mg by mouth daily      Handicap Placard MISC by Does not apply route 02/07/22 1 each 0    albuterol sulfate  (90 Base) MCG/ACT inhaler INHALE TWO (2) PUFFS BY MOUTH EVERY 6 HOURS AS NEEDED 18 g 10    ACTEMRA 162 MG/0.9ML SOSY injection       predniSONE (DELTASONE) 10 MG tablet Take 5 mg by mouth daily       Misc.  Devices (WHEELCHAIR) MISC SELF PROPELLED, LIGHT WEIGHT,  STANDARD SIZE 1 each 0    ergocalciferol (ERGOCALCIFEROL) 1.25 MG (84406 UT) capsule Take 50,000 do household chores indoor and/or outdoor work and social and leisure activities. Improve psychosocial and physical functioning. - she is showing progression towards this treatment goal with the current regimen. She was advised against drinking alcohol with the narcotic pain medicines, advised against driving or handling machinery while adjusting the dose of medicines or if having cognitive  issues related to the current medications. Risk of overdose and death, if medicines not taken as prescribed, were also discussed. If the patient develops new symptoms or if the symptoms worsen, the patient should call the office. While transcribing every attempt was made to maintain the accuracy of the note in terms of it's contents,there may have been some errors made inadvertently. Thank you for allowing me to participate in the care of this patient. Jesus Manuel Stone CNP.     Cc: Peter Maya DO

## 2022-09-12 NOTE — TELEPHONE ENCOUNTER
Spoke with the rep at [facility]. They are requesting orders to transfer the patient's CPAP from [facility]. Orders reprinted and faxed. Please call in 1 week to make sure that orders were received and processed.

## 2022-09-12 NOTE — TELEPHONE ENCOUNTER
Refill Request     CONFIRM preferrred pharmacy with the patient. If Mail Order Rx - Pend for 90 day refill. Last Seen: Last Seen Department: 7/27/2022  Last Seen by PCP: 7/27/2022    Last Written: 8/18/22 with 1 refill #30    If no future appointment scheduled, route STAFF MESSAGE with patient name to the [facility] for scheduling. Next Appointment:   Future Appointments   Date Time Provider Bossman Nevarez   9/12/2022  2:20 PM JOSE Leigh - CNP R BANK PAIN [facility]   9/22/2022  8:45 AM Vania Connelly MD Inell Hals [facility]   11/3/2022  4:00 PM DO MARTA Sullivan  [place] - [facility]   3/3/2023  9:00 AM Evie Velasco MD AND PULM [facility]       Message sent to [address] [place] [address] to schedule appt with patient?   NO      Requested Prescriptions     Pending Prescriptions Disp Refills    levothyroxine (SYNTHROID) 100 MCG tablet [Pharmacy Med Name: LEVOTHYROXINE 100 MCG TABLET] 90 tablet 1     Sig: TAKE 1 TABLET BY MOUTH EVERY DAY IN THE MORNING

## 2022-09-14 NOTE — PROGRESS NOTES
CARDIOLOGY FOLLOW UP         Patient Name: Maty Palencia  Primary Care physician: Vicky Rangel DO    Reason for Referral/Chief Complaint: Maty Palencia is a [de-identified] y.o. patient who is referred to cardiology clinic today for evaluation and treatment of CAD/PAF/HFpEF. History of Present Illness:   Maty Palencia [de-identified] y.o. female has a past medical history of coronary artery disease by CT, hypertension, Pulmonary hypertension, recurrent DVT/PE, paroxysmal atrial fibrillation, atrial septal defect, Congestive heart failure, rheumatoid arthritis, COPD, Hyperlipidemia  and hypothyroidism. In the interm presented to the ED 07/15/2022 -07/19/2022 with shortness of breath. Dx with acute pulmonary embolism. LE doppler confirmed RLE DVT. Echo on 07/16/2022 EF 77%. Mild TR. Discharged with eliquis. Weight 171 in July. Today, presents in wheelchair with daughter and is on oxygen @ 3 lpm continuous. She was off of eliquis in July when recurrent DVT/embolism diagnosed. She has history of atrial fibrillation and not sure who stopped/why stopped her AC. Says her CPAP is not working properly and they are trying to get it fixed - working with pulmonology. Says right ankle and knee is swelling. She does have a cough, mostly at night. Denies paroxysmal nocturnal dyspnea, orthopnea, increasing left lower extremity edema or weight gain. Wt 175 by our scale today. The patient denies chest pain, shortness of breath at rest and dyspnea with exertion. Denies palpitations, dizziness, near-syncope or joel syncope. She denies any evidence of hematemesis, hemoptysis, melena, hematochezia or hematuria with blood thinner. The patient is compliant with medications. Cost of medications is affordable. No endorsed side effects.     Past Medical History:   has a past medical history of Acid reflux, Acquired hypothyroidism, Anemia, Asthma, CHF (congestive heart failure) ([address] COPD (chronic obstructive 50 MCG/ACT nasal spray SPRAY 2 SPRAYS INTO EACH NOSTRIL EVERY DAY 8/22/22  Yes Arrie Sever, MD   omeprazole (PRILOSEC) 10 MG delayed release capsule Take by mouth daily   Yes Historical Provider, MD   acetaminophen (TYLENOL) 500 MG tablet Take 1 tablet by mouth every 12 hours as needed for Pain (take for breakthrough pain) 8/15/22 10/14/22 Yes JOSE Pena CNP   furosemide (LASIX) 20 MG tablet TAKE 1 TABLET BY MOUTH TWICE A DAY 8/2/22  Yes Edilberto Maya DO   ezetimibe (ZETIA) 10 MG tablet TAKE 1 TABLET BY MOUTH EVERY DAY 8/2/22  Yes Roula Boyer MD   atorvastatin (LIPITOR) 10 MG tablet TAKE 1 TABLET BY MOUTH EVERY DAY 8/1/22  Yes JOSE Oliveira CNP   labetalol (NORMODYNE) 100 MG tablet TAKE 1 TABLET BY MOUTH TWICE A DAY 8/1/22  Yes JOSE Oliveira CNP   Lift Chair MISC by Does not apply route 7/27/22  Yes Edilberto Maya DO   Misc. Devices (COMMODE BEDSIDE) MISC Use daily. 7/27/22  Yes Edilberto Maya DO   Incontinence Supply Disposable MISC 1 each by Does not apply route 4 times daily as needed (incontinence) 7/27/22  Yes Edilberto Maya DO   pregabalin (LYRICA) 50 MG capsule TAKE 1 CAPSULE BY MOUTH EVERY DAY 7/15/22  Yes Historical Provider, MD   apixaban (ELIQUIS) 5 MG TABS tablet Take 1 tablet by mouth in the morning and 1 tablet before bedtime.  7/27/22  Yes Edilberto Maya DO   fluticasone-umeclidin-vilant (TRELEGY ELLIPTA) 100-62.5-25 MCG/INH AEPB Inhale 1 puff into the lungs daily 5/26/22  Yes Arrie Sever, MD   DULoxetine (CYMBALTA) 30 MG extended release capsule Take 30 mg by mouth daily   Yes Historical Provider, MD   Handicap Placard [facility] by Does not apply route 02/07/22 2/7/22  Yes Edilberto  Schklar, DO   albuterol sulfate  (90 Base) MCG/ACT inhaler INHALE TWO (2) PUFFS BY MOUTH EVERY 6 HOURS AS NEEDED 6/24/21  Yes Kelsey Phoenix, MD   ACTEMRA 162 MG/0.9ML SOSY injection  5/14/21  Yes Historical Provider, MD   predniSONE (DELTASONE) 10 MG tablet Take 5 mg by mouth daily    Yes Historical Provider, MD   Misc. Devices [facility]) MISC SELF PROPELLED, LIGHT WEIGHT,  STANDARD SIZE 1/25/21  Yes Daniel Aponte MD   ergocalciferol (ERGOCALCIFEROL) 1.25 MG (77151 UT) capsule Take 50,000 Units by mouth every 7 days 9/1/20  Yes Historical Provider, MD   OXYGEN Inhale 3 L/min into the lungs daily as needed    Yes Historical Provider, MD   Blood Pressure Monitoring (BLOOD PRESSURE MONITOR/M CUFF) MISC 1 Units by Does not apply route daily 8/6/18  Yes Daniel Aponte MD   ipratropium-albuterol (DUONEB) 0.5-2.5 (3) MG/3ML SOLN nebulizer solution Inhale 3 mLs into the lungs every 4 hours as needed. 2/10/15  Yes Eugenia Ann MD   nitroGLYCERIN (NITROSTAT) 0.4 MG SL tablet Place 0.4 mg under the tongue every 5 minutes as needed. Yes Historical Provider, MD        CURRENT Medications:  No current facility-administered medications for this visit. Allergies:  Alendronate sodium, Humira [adalimumab], Losartan, Penicillins, Simvastatin, Sulfa antibiotics, and Tape [adhesive tape]     Review of Systems:   A 14 point review of symptoms completed. Pertinent positives identified in the HPI, all other review of symptoms negative as below. Objective:     Vitals:    09/22/22 0902   BP: 130/62   Pulse: 84   SpO2: 95%      Weight: 175 lb 2 oz (79.4 kg)       PHYSICAL EXAM:    General:  Elderly AAF sitting in wheelchair, NAD,Alert and oriented   Head:  Normocephalic, atraumatic   Eyes:  Conjunctiva/corneas clear, anicteric sclerae    Nose: Nares normal, no drainage or sinus tenderness   Throat: No abnormalities of the lips, oral mucosa or tongue. Neck: Trachea midline. Neck supple with no lymphadenopathy, thyroid not enlarged, symmetric, no tenderness/mass/nodules, no Jugular venous pressure elevation    Lungs:   Diminished at the bases, no wheezes, no rales, no respiratory distress.  Maintaining sats on 3L O2   Chest Wall:  No deformity or tenderness to palpation   Heart: Regular rate and rhythm, normal S1, normal S2, no murmur, no rub, no S3/S4, PMI non-palp. Abdomen:   Soft, non-tender, with normoactive bowel sounds. No masses, no hepatosplenomegaly   Extremities: No cyanosis, clubbing or pitting edema. Mild non-pitting edema R knee/leg. Vascular: 2+ radial, dec dorsalis pedis and posterior tibial pulses bilaterally. Brisk carotid upstrokes without carotid bruit. Skin: Skin color, texture, turgor are normal with no rashes or ulceration. Pysch: Euthymic mood, appropriate affect   Neurologic: Oriented to person, place and time. No slurred speech or facial asymmetry. No motor or sensory deficits on gross examination.          Labs:   CBC:   Lab Results   Component Value Date/Time    WBC 6.7 2022 07:12 AM    RBC 4.31 2022 07:12 AM    HGB 11.8 2022 07:12 AM    HCT 36.1 2022 07:12 AM    MCV 83.8 2022 07:12 AM    RDW 14.5 2022 07:12 AM     2022 07:12 AM     CMP:  Lab Results   Component Value Date/Time     2022 06:49 AM    K 3.9 2022 06:49 AM    K 4.4 2022 07:12 AM     2022 06:49 AM    CO2 26 2022 06:49 AM    BUN 35 2022 06:49 AM    CREATININE 1.2 2022 06:49 AM    GFRAA 52 2022 06:49 AM    GFRAA >60 2012 04:40 PM    AGRATIO 2.2 2022 06:49 AM    LABGLOM 43 2022 06:49 AM    GLUCOSE 99 2022 06:49 AM    PROT 6.0 2022 06:49 AM    PROT 7.6 2012 10:07 AM    CALCIUM 9.4 2022 06:49 AM    BILITOT 1.0 2022 06:49 AM    ALKPHOS 35 2022 06:49 AM    AST 19 2022 06:49 AM    ALT 14 2022 06:49 AM     PT/INR:  No results found for: PTINR  HgBA1c:  Lab Results   Component Value Date    LABA1C 6.2 2022     Lab Results   Component Value Date    TROPONINI 0.03 (H) 2022         Cardiac Data:     EK/15/2022  Normal sinus rhythm Normal ECGWhen compared with ECG of 26-DEC-2021 11:23,No significant change was found    VL Extremity bilateral: 07/18/2022  Summary  There is acute partially occluding deep venous thrombosis involving the right popliteal vein. There is acute totally occluding deep venous thrombosis involving the right gastroc veins. There is acute totally occluding deep venous thrombosis involving the right peroneal veins. There is no evidence of deep or superficial venous thrombosis involving the left lower extremity. Echo: 07/16/2022  Summary  Limited echo for LV/RV function. Left ventricular systolic function is hyperdynamic with a 3D calculated EF of 77%. The left ventricle is normal in size with normal wall  thickness. The right ventricle is normal in size and function. Mild tricuspid regurgitation. Systolic pulmonary artery pressure (SPAP) is estimated at 37 mmHg (right atrial pressure 3 mmHg). Echo 7/19/21:  Summary   Left ventricular systolic function is hyperdynamic with an estimated   ejection fraction of >= 65%. The left ventricle is normal in size with normal wall thickness. Normal left ventricular diastolic function. The right atrium is mildly enlarged. Mild tricuspid regurgitation. Systolic pulmonary artery pressure (SPAP) is elevated and estimated at 39   mmHg (right atrial pressure 3 mmHg) consistent with borderline pulmonary   hypertension. Echo [facility])  5/29/19:  Study Conclusions     - Left ventricle: The cavity size was normal. Wall thickness was     normal. Systolic function was normal. The calculated ejection     fraction was in the range of 64% to 68%. Normal diastolic     function.   - Aortic valve: Peak gradient (S): 6mm Hg.   - Tricuspid valve: Regurgitant peak velocity: 314cm/sec. Peak RV-RA     gradient (S): 39mm Hg. - Inferior vena cava: The vessel was normal in size; the     respirophasic diameter changes were in the normal range (>= 50%);     findings are consistent with normal central venous pressure.    - Pulmonary arteries: PA peak pressure: 42mm Hg (S).     Stress Test 1/15/19:   Summary  There is normal isotope uptake at stress and rest. There is no evidence of  myocardial ischemia or scar. LV function is normal with uniform wall motion  and ejection fraction of 67 %. Lower risk study. Stress Protocols   Resting ECG  Normal sinus rhythm. Resting HR:82 bpm  Resting BP:162/113 mmHg    Stress Protocol:Pharmacologic - Lexiscan's   Peak HR:101 bpm                                HR/BP product:13905  Peak BP:164/84 mmHg  Predicted HR: 144 bpm  % of predicted HR: 70  Test duration: 11 min and 30 sec  Reason for termination:Completed   ECG Findings  <0.5mm ST segment depression. Arrhythmias  Occasional PVC's. Symptoms  Patient developed shortness of breath and Lexiscan likely related to  [address] [place] [address]. Symptoms resolved with aminophylline. Right heart catheterization with Nitric ([address]) 5/15/18:  Impression:  1. Normal filling pressures as evidenced by a right atrial   pressure of 5 and a mean pulmonary capillary wedge pressure of   10.   2. Mild pulmonary hypertension with a PA pressure of 31/15   (mean:20) and a PVR of 2.7 (thermodilution)-3.2 (chris) [place]   Units. 3. Borderline normal cardiac output and index: Chris: 3 and 1.8. Thermodilution: 3.6 and 2.3.   4. No oxygenation step up between the right atrium and the   pulmonary artery: RA: 54.4% and PA: 54.6%       Additional studies:    CT chest pulmonary 12/27/21:  No central pulmonary embolus identified. Distal pulmonary branches are not well evaluated secondary to motion. There is severe coronary artery calcification  Bandlike opacities in the lung bases, similar to prior. Bandlike morphology favors atelectasis or scarring rather than pneumonia. There is severe emphysema. No pulmonary edema     Bilateral Venous Extremity 12/27/21:  Summary  Acute deep vein thrombosis involving the right gastrocnemius veins. Acute deep vein thrombosis involving the left soleal vein.       Impression and Plan:      1. Paroxysmal Atrial fibrillation - maintaining NSR   2. Longterm use oral anti-coagulant  3. HFpEF - euvolemic by exam with stable chronic dyspnea, multifactorial  4. CAD by CT, without angina   5. DVT/PE, recurrent - on lifelong AC  6. Obstructive sleep apnea on CPAP  7. COPD with chronic hypoxic resp failure, on 3L NC baseline  8. Hypertension  9. Pulmonary hypertension, mild  10. RA    [id number] Score for Atrial Fibrillation Stroke Risk   Risk   Factors  Component Value   C CHF No 0   H HTN Yes 1   A2 Age >= 76 Yes,  [de-identified] y.o.) 2   D DM No 0   S2 Prior Stroke/TIA No 0   V Vascular Disease No 0   A Age 74-69 No,  [de-identified] y.o.) 0   Sc Sex female 1    [id number]  Score  4   Score last updated 9/25/22 47:60 PM EDT    Click here for a link to the UpToDate guideline \"Atrial Fibrillation: Anticoagulation therapy to prevent embolization    Disclaimer: Risk Score calculation is dependent on accuracy of patient problem list and past encounter diagnosis. Patient Active Problem List   Diagnosis    Centrilobular emphysema (HCC)    Pure hypercholesterolemia    Rheumatoid arthritis involving multiple sites with positive rheumatoid factor ([address]    Essential hypertension    Age-related osteoporosis without current pathological fracture    Acquired hypothyroidism    Cervical disc disorder at C4-C5 level with radiculopathy    Sciatica of left side    Pulmonary HTN (HCC)    Anemia    HEBERT (obstructive sleep apnea)    ASD (atrial septal defect)    Esophageal motility disorder    Former smoker    Chronic respiratory failure with hypoxia (HCC)    Reactive depression    Nasal congestion    HTN (hypertension), benign    Acute deep vein thrombosis (DVT) of calf muscle vein of both lower extremities (HCC)    Acute pulmonary embolism without acute cor pulmonale, unspecified pulmonary embolism type ([address]    Myoclonus       PLAN  1. Continue current medications. 2. Continue Eliquis life-long.    3. Obtain fasting cholesterol Follow up with me in 6 months     This note is scribed in the presence of Clover Drake by Nilda Canavan RN      The scribes documentation has been prepared under my direction and personally reviewed by me in its entirety. I confirm that the note above accurately reflects all work, treatment, procedures, and medical decision making performed by me. Ashwini Moore MD, personally performed the services described in this documentation as scribed by Nilda Canavan RN in my presence, and it is both accurate and complete to the best of our ability. I will address the patient's cardiac risk factors and adjusted pharmacologic treatment as needed. In addition, I have reinforced the need for patient directed risk factor modification. All questions and concerns were addressed to the patient/family. Alternatives to my treatment were discussed. Thank you for allowing us to participate in the care of Bettie Strauss. Please call me with any questions [phone].     Clover Drake MD, [facility]  Cardiovascular Disease  [place] [address]  [phone] [facility]  [phone] [address] [place] [address]  9/22/2022 9:10 AM

## 2022-09-19 ENCOUNTER — TELEPHONE (OUTPATIENT)
Dept: PAIN MANAGEMENT | Age: 82
End: 2022-09-19

## 2022-09-19 DIAGNOSIS — M48.061 SPINAL STENOSIS OF LUMBAR REGION WITHOUT NEUROGENIC CLAUDICATION: ICD-10-CM

## 2022-09-19 DIAGNOSIS — M25.561 BILATERAL CHRONIC KNEE PAIN: ICD-10-CM

## 2022-09-19 DIAGNOSIS — M25.562 BILATERAL CHRONIC KNEE PAIN: ICD-10-CM

## 2022-09-19 DIAGNOSIS — G89.29 BILATERAL CHRONIC KNEE PAIN: ICD-10-CM

## 2022-09-19 DIAGNOSIS — M51.36 DDD (DEGENERATIVE DISC DISEASE), LUMBAR: ICD-10-CM

## 2022-09-19 DIAGNOSIS — M71.38 SYNOVIAL CYST OF LUMBAR SPINE: ICD-10-CM

## 2022-09-19 DIAGNOSIS — M50.121 CERVICAL DISC DISORDER AT C4-C5 LEVEL WITH RADICULOPATHY: ICD-10-CM

## 2022-09-19 DIAGNOSIS — M25.512 BILATERAL SHOULDER PAIN, UNSPECIFIED CHRONICITY: ICD-10-CM

## 2022-09-19 DIAGNOSIS — M05.79 RHEUMATOID ARTHRITIS INVOLVING MULTIPLE SITES WITH POSITIVE RHEUMATOID FACTOR (HCC): ICD-10-CM

## 2022-09-19 DIAGNOSIS — M25.511 BILATERAL SHOULDER PAIN, UNSPECIFIED CHRONICITY: ICD-10-CM

## 2022-09-19 DIAGNOSIS — M54.32 SCIATICA OF LEFT SIDE: ICD-10-CM

## 2022-09-19 DIAGNOSIS — G89.4 CHRONIC PAIN SYNDROME: ICD-10-CM

## 2022-09-19 RX ORDER — OXYCODONE HYDROCHLORIDE AND ACETAMINOPHEN 5; 325 MG/1; MG/1
1 TABLET ORAL EVERY 12 HOURS
Qty: 44 TABLET | Refills: 0 | Status: SHIPPED | OUTPATIENT
Start: 2022-09-19 | End: 2022-10-31 | Stop reason: SDUPTHER

## 2022-09-19 NOTE — TELEPHONE ENCOUNTER
Spoke with pt letting her know that [facility] sent the remainder amount to the pharmacy

## 2022-09-22 ENCOUNTER — OFFICE VISIT (OUTPATIENT)
Dept: CARDIOLOGY CLINIC | Age: 82
End: 2022-09-22
Payer: MEDICAID

## 2022-09-22 VITALS
SYSTOLIC BLOOD PRESSURE: 130 MMHG | OXYGEN SATURATION: 95 % | WEIGHT: 175.13 LBS | BODY MASS INDEX: 28.15 KG/M2 | HEIGHT: 66 IN | DIASTOLIC BLOOD PRESSURE: 62 MMHG | HEART RATE: 84 BPM

## 2022-09-22 DIAGNOSIS — E78.5 HYPERLIPIDEMIA, UNSPECIFIED HYPERLIPIDEMIA TYPE: Primary | ICD-10-CM

## 2022-09-22 PROCEDURE — 1123F ACP DISCUSS/DSCN MKR DOCD: CPT | Performed by: INTERNAL MEDICINE

## 2022-09-22 PROCEDURE — 99214 OFFICE O/P EST MOD 30 MIN: CPT | Performed by: INTERNAL MEDICINE

## 2022-09-22 NOTE — PATIENT INSTRUCTIONS
RUSLAN  1. Continue current medications. 2. Continue Eliquis for life. 3. Get fasting cholesterol checked for lab work.      Follow up with me in 6 months

## 2022-10-19 DIAGNOSIS — G47.33 OSA (OBSTRUCTIVE SLEEP APNEA): Primary | ICD-10-CM

## 2022-10-19 NOTE — PROGRESS NOTES
Patient's DME call stated per patient Insurance they need a new base sleep study. Patient was contacted and relate DME message, pt agree on sleep study. Order send to sleep center.

## 2022-10-27 DIAGNOSIS — E03.9 ACQUIRED HYPOTHYROIDISM: ICD-10-CM

## 2022-10-27 NOTE — TELEPHONE ENCOUNTER
.Refill Request     CONFIRM preferrred pharmacy with the patient. If Mail Order Rx - Pend for 90 day refill. Last Seen: Last Seen Department: 7/27/2022  Last Seen by PCP: Visit date not found    Last Written: 9-12-22 90 with 0     If no future appointment scheduled, route STAFF MESSAGE with patient name to the [facility] for scheduling. Next Appointment:   Future Appointments   Date Time Provider Bossman Nevarez   10/31/2022  1:40 PM JOSE Elam CNP R [facility] PAIN [facility]   11/3/2022  4:00 PM DO MARTA Augustin [place] - DYD   11/16/2022  8:30 PM SCHEDULE, [facility] SLEEP ROOM [address] [place] [address]   3/3/2023  9:00 AM Yissel Bass MD AND PULSHERI MORENO   3/24/2023  9:30 AM La Gore MD [facility]       Message sent to [facility] to schedule appt with patient?   N/A      Requested Prescriptions     Pending Prescriptions Disp Refills    levothyroxine (SYNTHROID) 100 MCG tablet [Pharmacy Med Name: LEVOTHYROXINE 100 MCG TABLET] 90 tablet 1     Sig: TAKE 1 TABLET BY MOUTH EVERY DAY IN THE MORNING

## 2022-10-28 DIAGNOSIS — E03.9 ACQUIRED HYPOTHYROIDISM: Primary | ICD-10-CM

## 2022-10-28 RX ORDER — LEVOTHYROXINE SODIUM 0.1 MG/1
TABLET ORAL
Qty: 90 TABLET | Refills: 1 | OUTPATIENT
Start: 2022-10-28

## 2022-10-28 NOTE — TELEPHONE ENCOUNTER
Grandchild Libia has been informed, and she counted the medication on the phone she stated that she had enough until next week. She will take Patient to a [facility] to have labs drawn next week.

## 2022-10-31 ENCOUNTER — OFFICE VISIT (OUTPATIENT)
Dept: PAIN MANAGEMENT | Age: 82
End: 2022-10-31
Payer: MEDICAID

## 2022-10-31 VITALS
SYSTOLIC BLOOD PRESSURE: 116 MMHG | OXYGEN SATURATION: 97 % | DIASTOLIC BLOOD PRESSURE: 68 MMHG | HEIGHT: 66 IN | HEART RATE: 92 BPM | WEIGHT: 171 LBS | BODY MASS INDEX: 27.48 KG/M2

## 2022-10-31 DIAGNOSIS — M54.32 SCIATICA OF LEFT SIDE: ICD-10-CM

## 2022-10-31 DIAGNOSIS — M51.36 DDD (DEGENERATIVE DISC DISEASE), LUMBAR: ICD-10-CM

## 2022-10-31 DIAGNOSIS — M48.061 SPINAL STENOSIS OF LUMBAR REGION WITHOUT NEUROGENIC CLAUDICATION: ICD-10-CM

## 2022-10-31 DIAGNOSIS — M71.38 SYNOVIAL CYST OF LUMBAR SPINE: ICD-10-CM

## 2022-10-31 DIAGNOSIS — M25.562 BILATERAL CHRONIC KNEE PAIN: ICD-10-CM

## 2022-10-31 DIAGNOSIS — M50.121 CERVICAL DISC DISORDER AT C4-C5 LEVEL WITH RADICULOPATHY: ICD-10-CM

## 2022-10-31 DIAGNOSIS — G89.29 CHRONIC RIGHT SHOULDER PAIN: ICD-10-CM

## 2022-10-31 DIAGNOSIS — M81.0 AGE-RELATED OSTEOPOROSIS WITHOUT CURRENT PATHOLOGICAL FRACTURE: ICD-10-CM

## 2022-10-31 DIAGNOSIS — M25.561 BILATERAL CHRONIC KNEE PAIN: ICD-10-CM

## 2022-10-31 DIAGNOSIS — G89.4 CHRONIC PAIN SYNDROME: ICD-10-CM

## 2022-10-31 DIAGNOSIS — F32.9 REACTIVE DEPRESSION: ICD-10-CM

## 2022-10-31 DIAGNOSIS — J43.2 CENTRILOBULAR EMPHYSEMA (HCC): ICD-10-CM

## 2022-10-31 DIAGNOSIS — M05.79 RHEUMATOID ARTHRITIS INVOLVING MULTIPLE SITES WITH POSITIVE RHEUMATOID FACTOR (HCC): ICD-10-CM

## 2022-10-31 DIAGNOSIS — M25.511 CHRONIC RIGHT SHOULDER PAIN: ICD-10-CM

## 2022-10-31 DIAGNOSIS — G89.29 BILATERAL CHRONIC KNEE PAIN: ICD-10-CM

## 2022-10-31 DIAGNOSIS — M19.019 ARTHRITIS PAIN OF SHOULDER: ICD-10-CM

## 2022-10-31 PROCEDURE — 1123F ACP DISCUSS/DSCN MKR DOCD: CPT | Performed by: NURSE PRACTITIONER

## 2022-10-31 PROCEDURE — 3074F SYST BP LT 130 MM HG: CPT | Performed by: NURSE PRACTITIONER

## 2022-10-31 PROCEDURE — 3078F DIAST BP <80 MM HG: CPT | Performed by: NURSE PRACTITIONER

## 2022-10-31 PROCEDURE — 99214 OFFICE O/P EST MOD 30 MIN: CPT | Performed by: NURSE PRACTITIONER

## 2022-10-31 RX ORDER — ACETAMINOPHEN 500 MG
500 TABLET ORAL EVERY 12 HOURS PRN
Qty: 120 TABLET | Refills: 1 | Status: SHIPPED | OUTPATIENT
Start: 2022-10-31 | End: 2022-12-30

## 2022-10-31 RX ORDER — LIDOCAINE 50 MG/G
1 PATCH TOPICAL DAILY
Qty: 30 PATCH | Refills: 0 | Status: SHIPPED | OUTPATIENT
Start: 2022-10-31 | End: 2022-11-30

## 2022-10-31 RX ORDER — OXYCODONE HYDROCHLORIDE AND ACETAMINOPHEN 5; 325 MG/1; MG/1
1 TABLET ORAL EVERY 8 HOURS PRN
Qty: 84 TABLET | Refills: 0 | Status: SHIPPED | OUTPATIENT
Start: 2022-10-31 | End: 2022-11-28

## 2022-10-31 NOTE — PROGRESS NOTES
Alfredo Frausto  1940  [id number]      HISTORY OF PRESENT ILLNESS: Ms. Elayne Oconnor is a [de-identified] y.o. female returns for a follow up visit for pain management  She has a diagnosis of   1. Chronic pain syndrome    2. Cervical disc disorder at C4-C5 level with radiculopathy    3. Chronic right shoulder pain    4. Arthritis pain of shoulder    5. DDD (degenerative disc disease), lumbar    6. Sciatica of left side    7. Synovial cyst of lumbar spine    8. Spinal stenosis of lumbar region without neurogenic claudication    9. Bilateral chronic knee pain    10. Rheumatoid arthritis involving multiple sites with positive rheumatoid factor ([facility] [address]    11. Age-related osteoporosis without current pathological fracture    12. Reactive depression    13. Centrilobular emphysema ([facility] [address]      As per Information Obtained from the PADT (Patient Assessment and Documentation Tool)    She complains of pain in the left ankle(s): anterior, left arm(s): upper, right hand(s): upper, right knee(s), and left lower back She rates the pain 7/10 and describes it as sharp, pins and needles. Current treatment regimen has helped relieve about 0% of the pain. She denies any side effects from the current pain regimen. Patient reports that since the last follow up visit the physical functioning is worse, family/social relationships are worse, mood is worse sleep patterns are worse, and that the overall functioning is worse. Patient denies misusing/abusing her narcotic pain medications or using any illegal drugs. Upon obtaining medical history from Ms. Elayne Oconnor states that pain is Somewhat manageable on current pain therapy. Takes the pain medications as prescribed. Pain mainly in the right neck/shoulder, shoulder more than the neck which limits her ability to tolerate ROM. Stable on 3L of oxygen, continues to be monitored by Pulmonology. Believes pain medications are not lasting long enough to manage pain. Mood/anxiety is stable.  Sleep is fair with an average of 5-6 hours. Denies to having issues of constipation. Tolerating activities/house chores with moderate tenderness to the lower back. ALLERGIES: Patients list of allergies were reviewed     MEDICATIONS: Ms. Zandra Pierce list of medications were reviewed. Her current medications are   Outpatient Medications Prior to Visit   Medication Sig Dispense Refill    levothyroxine (SYNTHROID) 100 MCG tablet TAKE 1 TABLET BY MOUTH EVERY DAY IN THE MORNING 90 tablet 0    spironolactone (ALDACTONE) 25 MG tablet TAKE 1 TABLET BY MOUTH TWICE A  tablet 1    fluticasone (FLONASE) 50 MCG/ACT nasal spray SPRAY 2 SPRAYS INTO EACH NOSTRIL EVERY DAY 1 each 5    omeprazole (PRILOSEC) 10 MG delayed release capsule Take by mouth daily      furosemide (LASIX) 20 MG tablet TAKE 1 TABLET BY MOUTH TWICE A  tablet 1    ezetimibe (ZETIA) 10 MG tablet TAKE 1 TABLET BY MOUTH EVERY DAY 90 tablet 3    atorvastatin (LIPITOR) 10 MG tablet TAKE 1 TABLET BY MOUTH EVERY DAY 90 tablet 1    labetalol (NORMODYNE) 100 MG tablet TAKE 1 TABLET BY MOUTH TWICE A  tablet 1    Lift Chair MISC by Does not apply route 1 each 0    Misc. Devices (COMMODE BEDSIDE) MISC Use daily. 1 each 0    Incontinence Supply Disposable MISC 1 each by Does not apply route 4 times daily as needed (incontinence) 120 each 3    pregabalin (LYRICA) 50 MG capsule TAKE 1 CAPSULE BY MOUTH EVERY DAY      apixaban (ELIQUIS) 5 MG TABS tablet Take 1 tablet by mouth in the morning and 1 tablet before bedtime.  180 tablet 1    fluticasone-umeclidin-vilant (TRELEGY ELLIPTA) 100-62.5-25 MCG/INH AEPB Inhale 1 puff into the lungs daily 1 each 5    DULoxetine (CYMBALTA) 30 MG extended release capsule Take 30 mg by mouth daily      Handicap Placard MISC by Does not apply route 02/07/22 1 each 0    albuterol sulfate  (90 Base) MCG/ACT inhaler INHALE TWO (2) PUFFS BY MOUTH EVERY 6 HOURS AS NEEDED 18 g 10    ACTEMRA 162 MG/0.9ML SOSY injection       predniSONE (DELTASONE) 10 MG tablet Take 5 mg by mouth daily       Misc. Devices (WHEELCHAIR) MISC SELF PROPELLED, LIGHT WEIGHT,  STANDARD SIZE 1 each 0    ergocalciferol (ERGOCALCIFEROL) 1.25 MG (54174 UT) capsule Take 50,000 Units by mouth every 7 days      OXYGEN Inhale 3 L/min into the lungs daily as needed       Blood Pressure Monitoring (BLOOD PRESSURE MONITOR/M CUFF) MISC 1 Units by Does not apply route daily 1 each 0    ipratropium-albuterol (DUONEB) 0.5-2.5 (3) MG/3ML SOLN nebulizer solution Inhale 3 mLs into the lungs every 4 hours as needed. 360 mL 3    nitroGLYCERIN (NITROSTAT) 0.4 MG SL tablet Place 0.4 mg under the tongue every 5 minutes as needed. oxyCODONE-acetaminophen (PERCOCET) 5-325 MG per tablet Take 1 tablet by mouth every 12 hours for 22 days. Intended supply: 3 days. Take lowest dose possible to manage pain 44 tablet 0    acetaminophen (TYLENOL) 500 MG tablet Take 1 tablet by mouth every 12 hours as needed for Pain (take for breakthrough pain) 120 tablet 1     No facility-administered medications prior to visit. SOCIAL/FAMILY/PAST MEDICAL HISTORY: Ms. Davalos Ket, family and past medical history was reviewed. REVIEW OF SYSTEMS:    Respiratory: Negative for apnea, chest tightness and shortness of breath or change in baseline breathing. Gastrointestinal: Negative for nausea, vomiting, abdominal pain, diarrhea, constipation, blood in stool and abdominal distention. PHYSICAL EXAM:   Nursing note and vitals reviewed. /68   Pulse 92   Ht 5' 6\" (1.676 m)   Wt 171 lb (77.6 kg)   SpO2 97%   BMI 27.60 kg/m²   Constitutional: She appears well-developed and well-nourished. No acute distress. Skin: Skin is warm and dry, good turgor. No rash noted. She is not diaphoretic. Cardiovascular: Normal rate, regular rhythm, normal heart sounds, and does not have murmur. Pulmonary/Chest: Effort normal. No respiratory distress. She does not have wheezes in the lung fields.  Diminished lung bases She has no rales. Neurological/Psychiatric:She is alert and oriented to person, place, and time. Coordination is  normal.  Her mood isAppropriate and affect is Neutral/Euthymic(normal) . Prescription pain medication monitoring:                  MEDD current = 15              ORT Score = 1 low risk              Other Risk factors - (mood) Depression              Date of Last Medication Agreement: 8/16/22              Date Naloxone prescribed: 9/12/22              UDT:                          Date of last UDT: 8/15/22                          Adverse report: No              OARRS:                          Checked today: Yes                          Adverse report: No      IMPRESSION:   1. Chronic pain syndrome    2. Cervical disc disorder at C4-C5 level with radiculopathy    3. Chronic right shoulder pain    4. Arthritis pain of shoulder    5. DDD (degenerative disc disease), lumbar    6. Sciatica of left side    7. Synovial cyst of lumbar spine    8. Spinal stenosis of lumbar region without neurogenic claudication    9. Bilateral chronic knee pain    10. Rheumatoid arthritis involving multiple sites with positive rheumatoid factor ([facility] [address]    11. Age-related osteoporosis without current pathological fracture    12. Reactive depression    13. Centrilobular emphysema ([facility] [address]        PLAN:  Informed verbal consent was obtained:  -Patient's opioid therapy will be adjusted today  -Home exercises/Maxine exercises recommended  -CBT techniques- relaxation therapies such as biofeedback, mindfulness based stress reduction, imagery, cognitive restructuring, problem solving discussed with patient   -She was advised weight reduction, diet changes- 800-1200 kasi diet, diet diary, exercising, nutritional  consult increased physical activity as tolerated   -Last UDS 8/15/22 consistent  -Return in about 4 weeks (around 11/28/2022).    -OARRS record was obtained and reviewed  for the last one year and no indicators of drug misuse were found. Any other controlled substance prescriptions  seen on the record have been accounted for, I am aware of the patient receiving these medications. Larisa Hassan OARRS record will be rechecked as part of office protocol. Analgesic Plan:              Continue present regimen: Percocet 5-325 mg tabs tid prn              Adjust dose of present analgesic: No              Switch analgesics: No              Add/Adjust concomitant therapy: Lidoderm, Tylenol, Narcan, Lyrica through rheumatology    I will continue her current medication regimen  which is part of the above treatment schedule. It has been helping with Ms. Shahbaz Hameed chronic  medical problems which for this visit include:   Diagnoses of Chronic pain syndrome, Cervical disc disorder at C4-C5 level with radiculopathy, Chronic right shoulder pain, Arthritis pain of shoulder, DDD (degenerative disc disease), lumbar, Sciatica of left side, Synovial cyst of lumbar spine, Spinal stenosis of lumbar region without neurogenic claudication, Bilateral chronic knee pain, Rheumatoid arthritis involving multiple sites with positive rheumatoid factor ([address] Age-related osteoporosis without current pathological fracture, Reactive depression, and Centrilobular emphysema ([address] were pertinent to this visit. Risks and benefits of the medications and other alternative treatments  including no treatment were discussed with the patient. The common side effects of these medications were also explained to the patient. Informed verbal consent was obtained. Goals of current treatment regimen include improvement in pain, restoration of functioning- with focus on improvement in physical performance, general activity, work or disability,emotional distress, health care utilization and  decreased medication consumption. Will continue to monitor progress towards achieving/maintaining therapeutic goals with special emphasis on  1. Improvement in perceived interfernce  of pain with ADL's. Ability to do home exercises independently. Ability to do household chores indoor and/or outdoor work and social and leisure activities. Improve psychosocial and physical functioning. - she is not showing any significant progress/or showing regression  towards this goal and reassessment and adjustment of goals/treatment have been made. She was advised against drinking alcohol with the narcotic pain medicines, advised against driving or handling machinery while adjusting the dose of medicines or if having cognitive  issues related to the current medications. Risk of overdose and death, if medicines not taken as prescribed, were also discussed. If the patient develops new symptoms or if the symptoms worsen, the patient should call the office. While transcribing every attempt was made to maintain the accuracy of the note in terms of it's contents,there may have been some errors made inadvertently. Thank you for allowing me to participate in the care of this patient. Christi Newton CNP.     Cc: Ugo Maya DO

## 2022-11-03 ENCOUNTER — OFFICE VISIT (OUTPATIENT)
Dept: FAMILY MEDICINE CLINIC | Age: 82
End: 2022-11-03
Payer: MEDICAID

## 2022-11-03 VITALS
DIASTOLIC BLOOD PRESSURE: 60 MMHG | OXYGEN SATURATION: 92 % | WEIGHT: 181 LBS | SYSTOLIC BLOOD PRESSURE: 132 MMHG | BODY MASS INDEX: 29.21 KG/M2 | HEART RATE: 96 BPM | TEMPERATURE: 98.1 F

## 2022-11-03 DIAGNOSIS — E03.9 ACQUIRED HYPOTHYROIDISM: Primary | ICD-10-CM

## 2022-11-03 DIAGNOSIS — Z23 NEED FOR INFLUENZA VACCINATION: ICD-10-CM

## 2022-11-03 DIAGNOSIS — J43.2 CENTRILOBULAR EMPHYSEMA (HCC): ICD-10-CM

## 2022-11-03 DIAGNOSIS — M05.79 RHEUMATOID ARTHRITIS INVOLVING MULTIPLE SITES WITH POSITIVE RHEUMATOID FACTOR (HCC): ICD-10-CM

## 2022-11-03 PROCEDURE — 36415 COLL VENOUS BLD VENIPUNCTURE: CPT | Performed by: STUDENT IN AN ORGANIZED HEALTH CARE EDUCATION/TRAINING PROGRAM

## 2022-11-03 PROCEDURE — 3074F SYST BP LT 130 MM HG: CPT | Performed by: STUDENT IN AN ORGANIZED HEALTH CARE EDUCATION/TRAINING PROGRAM

## 2022-11-03 PROCEDURE — 1123F ACP DISCUSS/DSCN MKR DOCD: CPT | Performed by: STUDENT IN AN ORGANIZED HEALTH CARE EDUCATION/TRAINING PROGRAM

## 2022-11-03 PROCEDURE — 90471 IMMUNIZATION ADMIN: CPT | Performed by: STUDENT IN AN ORGANIZED HEALTH CARE EDUCATION/TRAINING PROGRAM

## 2022-11-03 PROCEDURE — 99214 OFFICE O/P EST MOD 30 MIN: CPT | Performed by: STUDENT IN AN ORGANIZED HEALTH CARE EDUCATION/TRAINING PROGRAM

## 2022-11-03 PROCEDURE — 3078F DIAST BP <80 MM HG: CPT | Performed by: STUDENT IN AN ORGANIZED HEALTH CARE EDUCATION/TRAINING PROGRAM

## 2022-11-03 PROCEDURE — 90694 VACC AIIV4 NO PRSRV 0.5ML IM: CPT | Performed by: STUDENT IN AN ORGANIZED HEALTH CARE EDUCATION/TRAINING PROGRAM

## 2022-11-03 RX ORDER — ALBUTEROL SULFATE 90 UG/1
AEROSOL, METERED RESPIRATORY (INHALATION)
Qty: 18 G | Refills: 10 | Status: SHIPPED | OUTPATIENT
Start: 2022-11-03

## 2022-11-03 ASSESSMENT — PATIENT HEALTH QUESTIONNAIRE - PHQ9
SUM OF ALL RESPONSES TO PHQ QUESTIONS 1-9: 8
SUM OF ALL RESPONSES TO PHQ QUESTIONS 1-9: 8
1. LITTLE INTEREST OR PLEASURE IN DOING THINGS: 2
6. FEELING BAD ABOUT YOURSELF - OR THAT YOU ARE A FAILURE OR HAVE LET YOURSELF OR YOUR FAMILY DOWN: 0
SUM OF ALL RESPONSES TO PHQ9 QUESTIONS 1 & 2: 2
10. IF YOU CHECKED OFF ANY PROBLEMS, HOW DIFFICULT HAVE THESE PROBLEMS MADE IT FOR YOU TO DO YOUR WORK, TAKE CARE OF THINGS AT HOME, OR GET ALONG WITH OTHER PEOPLE: 1
7. TROUBLE CONCENTRATING ON THINGS, SUCH AS READING THE NEWSPAPER OR WATCHING TELEVISION: 2
SUM OF ALL RESPONSES TO PHQ QUESTIONS 1-9: 8
5. POOR APPETITE OR OVEREATING: 0
4. FEELING TIRED OR HAVING LITTLE ENERGY: 1
9. THOUGHTS THAT YOU WOULD BE BETTER OFF DEAD, OR OF HURTING YOURSELF: 0
3. TROUBLE FALLING OR STAYING ASLEEP: 2
SUM OF ALL RESPONSES TO PHQ QUESTIONS 1-9: 8
8. MOVING OR SPEAKING SO SLOWLY THAT OTHER PEOPLE COULD HAVE NOTICED. OR THE OPPOSITE, BEING SO FIGETY OR RESTLESS THAT YOU HAVE BEEN MOVING AROUND A LOT MORE THAN USUAL: 1
2. FEELING DOWN, DEPRESSED OR HOPELESS: 0

## 2022-11-03 NOTE — PROGRESS NOTES
Patient: Mei Kirkland is a [de-identified] y.o. female who presents today with the following Chief Complaint(s):  Chief Complaint   Patient presents with    Hypertension         Hypertension      Hypothyroid  Has adjusted thyroid medication   Taking medication in the morning by itself    Following with Dr. Lue Sandifer for pain management  Planning for imaging of shoulders, neck, and knees  Utilizing lidocaine patches which is helpful    Right shoulder pain  Had cortisone injection which did not help improve pain    Current Outpatient Medications   Medication Sig Dispense Refill    albuterol sulfate HFA (PROVENTIL;VENTOLIN;PROAIR) 108 (90 Base) MCG/ACT inhaler INHALE TWO (2) PUFFS BY MOUTH EVERY 6 HOURS AS NEEDED 18 g 10    oxyCODONE-acetaminophen (PERCOCET) 5-325 MG per tablet Take 1 tablet by mouth every 8 hours as needed for Pain (take for severe pain) for up to 28 days. 84 tablet 0    acetaminophen (TYLENOL) 500 MG tablet Take 1 tablet by mouth every 12 hours as needed for Pain (take for breakthrough pain) 120 tablet 1    lidocaine (LIDODERM) 5 % Place 1 patch onto the skin daily 12 hours on, 12 hours off. 30 patch 0    spironolactone (ALDACTONE) 25 MG tablet TAKE 1 TABLET BY MOUTH TWICE A  tablet 1    fluticasone (FLONASE) 50 MCG/ACT nasal spray SPRAY 2 SPRAYS INTO EACH NOSTRIL EVERY DAY 1 each 5    omeprazole (PRILOSEC) 10 MG delayed release capsule Take by mouth daily      furosemide (LASIX) 20 MG tablet TAKE 1 TABLET BY MOUTH TWICE A  tablet 1    ezetimibe (ZETIA) 10 MG tablet TAKE 1 TABLET BY MOUTH EVERY DAY 90 tablet 3    atorvastatin (LIPITOR) 10 MG tablet TAKE 1 TABLET BY MOUTH EVERY DAY 90 tablet 1    labetalol (NORMODYNE) 100 MG tablet TAKE 1 TABLET BY MOUTH TWICE A  tablet 1    Lift Chair MISC by Does not apply route 1 each 0    Misc. Devices (COMMODE BEDSIDE) MISC Use daily.  1 each 0    Incontinence Supply Disposable MISC 1 each by Does not apply route 4 times daily as needed (incontinence) 120 each 3    pregabalin (LYRICA) 50 MG capsule TAKE 1 CAPSULE BY MOUTH EVERY DAY      apixaban (ELIQUIS) 5 MG TABS tablet Take 1 tablet by mouth in the morning and 1 tablet before bedtime. 180 tablet 1    fluticasone-umeclidin-vilant (TRELEGY ELLIPTA) 100-62.5-25 MCG/INH AEPB Inhale 1 puff into the lungs daily 1 each 5    DULoxetine (CYMBALTA) 30 MG extended release capsule Take 30 mg by mouth daily      Handicap Placard MISC by Does not apply route 02/07/22 1 each 0    ACTEMRA 162 MG/0.9ML SOSY injection       predniSONE (DELTASONE) 10 MG tablet Take 5 mg by mouth daily       Misc. Devices (WHEELCHAIR) MISC SELF PROPELLED, LIGHT WEIGHT,  STANDARD SIZE 1 each 0    ergocalciferol (ERGOCALCIFEROL) 1.25 MG (89084 UT) capsule Take 50,000 Units by mouth every 7 days      OXYGEN Inhale 3 L/min into the lungs daily as needed       Blood Pressure Monitoring (BLOOD PRESSURE MONITOR/M CUFF) MISC 1 Units by Does not apply route daily 1 each 0    ipratropium-albuterol (DUONEB) 0.5-2.5 (3) MG/3ML SOLN nebulizer solution Inhale 3 mLs into the lungs every 4 hours as needed. 360 mL 3    nitroGLYCERIN (NITROSTAT) 0.4 MG SL tablet Place 0.4 mg under the tongue every 5 minutes as needed. levothyroxine (SYNTHROID) 88 MCG tablet Take 1 tablet by mouth daily 90 tablet 1     No current facility-administered medications for this visit. Patient's past medical history, surgical history, family history, medications,  andallergies  were all reviewed and updated as appropriate today. Review of Systems  All other systems reviewed and negative    Physical Exam  Vitals reviewed. Constitutional:       Appearance: Normal appearance. HENT:      Head: Normocephalic and atraumatic. Cardiovascular:      Rate and Rhythm: Normal rate and regular rhythm. Pulmonary:      Effort: Pulmonary effort is normal.      Breath sounds: Normal breath sounds. Neurological:      General: No focal deficit present.       Mental Status: She is alert and oriented to person, place, and time. Psychiatric:         Behavior: Behavior normal.         Thought Content: Thought content normal.     Vitals:    11/03/22 1556   BP: 132/60   Pulse: 96   Temp: 98.1 °F (36.7 °C)   SpO2: 92%       Assessment:  Encounter Diagnoses   Name Primary? Acquired hypothyroidism Yes    Need for influenza vaccination     Centrilobular emphysema ([facility] [address]     Rheumatoid arthritis involving multiple sites with positive rheumatoid factor ([facility]        Plan:  1. Acquired hypothyroidism  Will recheck and adjust levothyroxine  - TSH  - T4    2. Need for influenza vaccination  - Influenza, FLUAD, (age 72 y+), IM, Preservative Free, 0.5 mL    3. Centrilobular emphysema ([facility] [address]  Following with ulmomnology. O2 dependent. Out of albuterol.   - albuterol sulfate HFA (PROVENTIL;VENTOLIN;PROAIR) 108 (90 Base) MCG/ACT inhaler; INHALE TWO (2) PUFFS BY MOUTH EVERY 6 HOURS AS NEEDED  Dispense: 18 g; Refill: 10    4. Rheumatoid arthritis involving multiple sites with positive rheumatoid factor [facility])  Following with rheumatology and pain management. No follow-ups on file.

## 2022-11-04 DIAGNOSIS — E03.9 ACQUIRED HYPOTHYROIDISM: Primary | ICD-10-CM

## 2022-11-04 LAB
T4 TOTAL: 8.6 UG/DL (ref 4.5–10.9)
TSH SERPL DL<=0.05 MIU/L-ACNC: 0.01 UIU/ML (ref 0.27–4.2)

## 2022-11-04 RX ORDER — LEVOTHYROXINE SODIUM 88 UG/1
88 TABLET ORAL DAILY
Qty: 90 TABLET | Refills: 1 | Status: SHIPPED | OUTPATIENT
Start: 2022-11-04

## 2022-11-08 ENCOUNTER — TELEPHONE (OUTPATIENT)
Dept: CARDIOLOGY CLINIC | Age: 82
End: 2022-11-08

## 2022-11-08 DIAGNOSIS — Z79.899 MEDICATION MANAGEMENT: Primary | ICD-10-CM

## 2022-11-08 NOTE — TELEPHONE ENCOUNTER
Called and spoke with pt, her weight was 180 this morning. She denied all symptoms. She stated they recently decreased her synthroid, and increased oxycodone.

## 2022-11-08 NOTE — TELEPHONE ENCOUNTER
Pt granddaughter called into office and stated she is concerned about her grandmothers feet/legs. Stated for the past week, pt feet/legs have become extremely swollen to the point where it seems her skin has been stretched out and sagging? Pt granddaughter wanting to know if pt needs to be reevaluated by RJSHERI and if her lasix needs increased. Please advise.

## 2022-11-08 NOTE — TELEPHONE ENCOUNTER
Pts granddaughter stated Libia stated that pt is already taking Lasix 20mg bid. Libia would like to know if pt should stay with that or increase? Please advise.

## 2022-11-08 NOTE — TELEPHONE ENCOUNTER
Appears wt up 5 lbs from late sept. If truly inc swelling, no other symptoms, reasonable to take extra PM dose lasix (20 mg TWICE daily at roughly 6 hrs apart) for 2 days in a row to get wt near 175 lbs. Seemed to do well at this weight. I would rec this then back to 20 mg once daily. Low salt diet, < 2 g daily as well as < 64oz fluid intake daily are continued goals. Monitor wt daily.      TRAVIS

## 2022-11-08 NOTE — TELEPHONE ENCOUNTER
Per rjm, pt is to take 40mg tonight, 40mg tomorrow morning, and 20mg tomorrow night. Pt v/u and will get bnp in one week.  Pt will continue to monitor weights and salt and fluid intake

## 2022-11-14 NOTE — TELEPHONE ENCOUNTER
11/14 pts granddaughter called and stated pts both leg and feet are still very swollen, and they seen her rheumatoid arthritis MD Thursday and they ran some tests and stated she has gout, wondering if this could be related?  Please advise

## 2022-11-16 ENCOUNTER — TELEPHONE (OUTPATIENT)
Dept: PULMONOLOGY | Age: 82
End: 2022-11-16

## 2022-11-16 NOTE — TELEPHONE ENCOUNTER
Gout can certainly cause swelling and pain of the extremities. Unfortunately Lasix and spironolactone can contribute to uric acid buildup and gout. I will tie-in PCP to these concerns and we can see where to go from here.     TRAVIS

## 2022-11-16 NOTE — TELEPHONE ENCOUNTER
Evelina Main from sleep center at [facility], calling for advise,pt granddaughter doesn't want her grandmother start that sleep study without oxygen,but per the sleep order is how need to proceed. Sleep center want to know if you want to change order, for her to start sleep test with oxygen. Pt been re schedule. Try was explain that changing the orders,Ins may not going to pay,and they are going to be paying for the sleep study.  They got very upset, and want to talk to you,and stated if you not answer by 9am, they will be calling every day ,(grandmother on the back ground was taking out loud,will come to the office with the police)    Please   call [phone]

## 2022-11-17 NOTE — TELEPHONE ENCOUNTER
Sorry Dr Yolanda Charles ,I try to relate your advise,pt just don't want to listen to any reason, and still insisting that they want to talk to you.

## 2022-11-18 ENCOUNTER — TELEPHONE (OUTPATIENT)
Dept: FAMILY MEDICINE CLINIC | Age: 82
End: 2022-11-18

## 2022-11-18 DIAGNOSIS — I50.32 CHRONIC DIASTOLIC HEART FAILURE (HCC): Primary | ICD-10-CM

## 2022-11-18 NOTE — TELEPHONE ENCOUNTER
Pt called into office very frantic and worried, stated she wanted to talk to [facility]. Stated her foot has gotten even more swollen and \"she feels as if its just going to burst open\". Please advise and contact pt.

## 2022-11-18 NOTE — TELEPHONE ENCOUNTER
Patients granddaughter states that leg swelling has been ongoing for the last week. Tried doubling lasix for two days which increased amount of urination at night but did not improve swelling in feet. Swelling is contained in foot, ankle, up to shin. Right leg much more significant than left but left foot is more significant. Reports that the outside of her ankle has swelling the size of a tennis ball. Has been wearing compression stockings for the past week but without much improved. Reports that last Friday she saw her rheumatologist and diagnosed with gout. Was given cholchicine 0.6mg Every other day. Allopurinol started at the same time 100mg daily. NO significant change with medication. Tuesday and Wednesday whent aking lasix 20 mg BID. NO orthopnea or respiratory symptoms. Reports neurovascularly intact. Will increase lasix to 40mg BID for next two days with ACE wraps for bilateral lower extremity. IF improving then would decrease to 40mg once daily until back to baseline. Will come in for BMP and BNP on Monday. Understands risks of DANY on CKD with home diuresis but wishes to avoid hospital evaluation. Understands red flag symptoms to prompt urgent evaluation including increased oxygen demand, orthopnea, decreased sensation in feet or toes.

## 2022-11-18 NOTE — TELEPHONE ENCOUNTER
Spoke with granddaughter, Libia, about concerns for sleep study. She would like the sleep center to call her about self pay for study completed with oxygen therapy. She would also like an appointment with Dr. Poli BAUTISTA. Please relay to sleep center and call Libia for appointment.

## 2022-11-21 NOTE — TELEPHONE ENCOUNTER
Call sleep center and relate message about pt would like a call from them. Patient schedule move to 1/5/23.  Pt agree

## 2022-11-23 ENCOUNTER — HOSPITAL ENCOUNTER (OUTPATIENT)
Dept: MRI IMAGING | Age: 82
Discharge: HOME | End: 2022-11-23
Payer: MEDICAID

## 2022-11-23 ENCOUNTER — HOSPITAL ENCOUNTER (OUTPATIENT)
Age: 82
Discharge: HOME | End: 2022-11-23
Payer: MEDICAID

## 2022-11-23 ENCOUNTER — APPOINTMENT (OUTPATIENT)
Dept: GENERAL RADIOLOGY | Age: 82
End: 2022-11-23
Payer: MEDICAID

## 2022-11-23 ENCOUNTER — APPOINTMENT (OUTPATIENT)
Dept: CT IMAGING | Age: 82
End: 2022-11-23
Payer: MEDICAID

## 2022-11-23 ENCOUNTER — HOSPITAL ENCOUNTER (EMERGENCY)
Age: 82
Discharge: HOME | End: 2022-11-23
Attending: EMERGENCY MEDICINE
Payer: MEDICAID

## 2022-11-23 VITALS
HEIGHT: 67 IN | HEART RATE: 82 BPM | WEIGHT: 179 LBS | DIASTOLIC BLOOD PRESSURE: 64 MMHG | BODY MASS INDEX: 28.09 KG/M2 | SYSTOLIC BLOOD PRESSURE: 124 MMHG | OXYGEN SATURATION: 97 % | TEMPERATURE: 98.4 F | RESPIRATION RATE: 19 BRPM

## 2022-11-23 DIAGNOSIS — J18.9 PNEUMONIA OF LEFT LOWER LOBE DUE TO INFECTIOUS ORGANISM: Primary | ICD-10-CM

## 2022-11-23 DIAGNOSIS — G89.29 CHRONIC RIGHT SHOULDER PAIN: ICD-10-CM

## 2022-11-23 DIAGNOSIS — M25.511 CHRONIC RIGHT SHOULDER PAIN: ICD-10-CM

## 2022-11-23 DIAGNOSIS — Z79.899 MEDICATION MANAGEMENT: ICD-10-CM

## 2022-11-23 DIAGNOSIS — G89.4 CHRONIC PAIN SYNDROME: ICD-10-CM

## 2022-11-23 DIAGNOSIS — M50.121 CERVICAL DISC DISORDER AT C4-C5 LEVEL WITH RADICULOPATHY: ICD-10-CM

## 2022-11-23 DIAGNOSIS — I50.32 CHRONIC DIASTOLIC HEART FAILURE (HCC): ICD-10-CM

## 2022-11-23 DIAGNOSIS — T17.908A ASPIRATION INTO AIRWAY, INITIAL ENCOUNTER: ICD-10-CM

## 2022-11-23 DIAGNOSIS — M19.019 ARTHRITIS PAIN OF SHOULDER: ICD-10-CM

## 2022-11-23 DIAGNOSIS — E78.5 HYPERLIPIDEMIA, UNSPECIFIED HYPERLIPIDEMIA TYPE: ICD-10-CM

## 2022-11-23 LAB
A/G RATIO: 1.6 (ref 1.1–2.2)
ALBUMIN SERPL-MCNC: 4.2 G/DL (ref 3.4–5)
ALP BLD-CCNC: 35 U/L (ref 40–129)
ALT SERPL-CCNC: 24 U/L (ref 10–40)
ANION GAP SERPL CALCULATED.3IONS-SCNC: 12 MMOL/L (ref 3–16)
ANION GAP SERPL CALCULATED.3IONS-SCNC: 13 MMOL/L (ref 3–16)
AST SERPL-CCNC: 25 U/L (ref 15–37)
BASOPHILS ABSOLUTE: 0 K/UL (ref 0–0.2)
BASOPHILS RELATIVE PERCENT: 0.5 %
BILIRUB SERPL-MCNC: 0.4 MG/DL (ref 0–1)
BUN BLDV-MCNC: 35 MG/DL (ref 7–20)
BUN BLDV-MCNC: 36 MG/DL (ref 7–20)
CALCIUM SERPL-MCNC: 9.2 MG/DL (ref 8.3–10.6)
CALCIUM SERPL-MCNC: 9.4 MG/DL (ref 8.3–10.6)
CHLORIDE BLD-SCNC: 100 MMOL/L (ref 99–110)
CHLORIDE BLD-SCNC: 99 MMOL/L (ref 99–110)
CHOLESTEROL, TOTAL: 176 MG/DL (ref 0–199)
CO2: 28 MMOL/L (ref 21–32)
CO2: 29 MMOL/L (ref 21–32)
CREAT SERPL-MCNC: 1.4 MG/DL (ref 0.6–1.2)
CREAT SERPL-MCNC: 1.6 MG/DL (ref 0.6–1.2)
EOSINOPHILS ABSOLUTE: 0 K/UL (ref 0–0.6)
EOSINOPHILS RELATIVE PERCENT: 0.1 %
GFR SERPL CREATININE-BSD FRML MDRD: 32 ML/MIN/{1.73_M2}
GFR SERPL CREATININE-BSD FRML MDRD: 38 ML/MIN/{1.73_M2}
GLUCOSE BLD-MCNC: 105 MG/DL (ref 70–99)
GLUCOSE BLD-MCNC: 94 MG/DL (ref 70–99)
HCT VFR BLD CALC: 31.7 % (ref 36–48)
HDLC SERPL-MCNC: 87 MG/DL (ref 40–60)
HEMOGLOBIN: 10.4 G/DL (ref 12–16)
LDL CHOLESTEROL CALCULATED: 71 MG/DL
LYMPHOCYTES ABSOLUTE: 1.3 K/UL (ref 1–5.1)
LYMPHOCYTES RELATIVE PERCENT: 14.4 %
MCH RBC QN AUTO: 27.2 PG (ref 26–34)
MCHC RBC AUTO-ENTMCNC: 32.8 G/DL (ref 31–36)
MCV RBC AUTO: 83.1 FL (ref 80–100)
MONOCYTES ABSOLUTE: 0.4 K/UL (ref 0–1.3)
MONOCYTES RELATIVE PERCENT: 4.7 %
NEUTROPHILS ABSOLUTE: 7.1 K/UL (ref 1.7–7.7)
NEUTROPHILS RELATIVE PERCENT: 80.3 %
PDW BLD-RTO: 16.1 % (ref 12.4–15.4)
PLATELET # BLD: 262 K/UL (ref 135–450)
PMV BLD AUTO: 7.6 FL (ref 5–10.5)
POTASSIUM REFLEX MAGNESIUM: 4.7 MMOL/L (ref 3.5–5.1)
POTASSIUM SERPL-SCNC: 4.5 MMOL/L (ref 3.5–5.1)
PRO-BNP: 162 PG/ML (ref 0–449)
RBC # BLD: 3.82 M/UL (ref 4–5.2)
SODIUM BLD-SCNC: 140 MMOL/L (ref 136–145)
SODIUM BLD-SCNC: 141 MMOL/L (ref 136–145)
TOTAL PROTEIN: 6.9 G/DL (ref 6.4–8.2)
TRIGL SERPL-MCNC: 91 MG/DL (ref 0–150)
VLDLC SERPL CALC-MCNC: 18 MG/DL
WBC # BLD: 8.9 K/UL (ref 4–11)

## 2022-11-23 PROCEDURE — 85025 COMPLETE CBC W/AUTO DIFF WBC: CPT

## 2022-11-23 PROCEDURE — 2700000000 HC OXYGEN THERAPY PER DAY

## 2022-11-23 PROCEDURE — 99285 EMERGENCY DEPT VISIT HI MDM: CPT

## 2022-11-23 PROCEDURE — 83880 ASSAY OF NATRIURETIC PEPTIDE: CPT

## 2022-11-23 PROCEDURE — 70491 CT SOFT TISSUE NECK W/DYE: CPT

## 2022-11-23 PROCEDURE — 36415 COLL VENOUS BLD VENIPUNCTURE: CPT

## 2022-11-23 PROCEDURE — 2580000003 HC RX 258: Performed by: EMERGENCY MEDICINE

## 2022-11-23 PROCEDURE — 72141 MRI NECK SPINE W/O DYE: CPT

## 2022-11-23 PROCEDURE — 6360000004 HC RX CONTRAST MEDICATION: Performed by: EMERGENCY MEDICINE

## 2022-11-23 PROCEDURE — 80053 COMPREHEN METABOLIC PANEL: CPT

## 2022-11-23 PROCEDURE — 71045 X-RAY EXAM CHEST 1 VIEW: CPT

## 2022-11-23 PROCEDURE — 94761 N-INVAS EAR/PLS OXIMETRY MLT: CPT

## 2022-11-23 PROCEDURE — 94640 AIRWAY INHALATION TREATMENT: CPT

## 2022-11-23 PROCEDURE — 80061 LIPID PANEL: CPT

## 2022-11-23 PROCEDURE — 71260 CT THORAX DX C+: CPT | Performed by: EMERGENCY MEDICINE

## 2022-11-23 PROCEDURE — 73221 MRI JOINT UPR EXTREM W/O DYE: CPT

## 2022-11-23 PROCEDURE — 6370000000 HC RX 637 (ALT 250 FOR IP): Performed by: EMERGENCY MEDICINE

## 2022-11-23 PROCEDURE — 70360 X-RAY EXAM OF NECK: CPT

## 2022-11-23 RX ORDER — SODIUM CHLORIDE FOR INHALATION 0.9 %
3 VIAL, NEBULIZER (ML) INHALATION ONCE
Status: COMPLETED | OUTPATIENT
Start: 2022-11-23 | End: 2022-11-23

## 2022-11-23 RX ORDER — SODIUM CHLORIDE FOR INHALATION 0.9 %
3 VIAL, NEBULIZER (ML) INHALATION EVERY 4 HOURS PRN
Status: DISCONTINUED | OUTPATIENT
Start: 2022-11-23 | End: 2022-11-23

## 2022-11-23 RX ORDER — DOXYCYCLINE HYCLATE 100 MG
100 TABLET ORAL 2 TIMES DAILY
Qty: 14 TABLET | Refills: 0 | Status: SHIPPED | OUTPATIENT
Start: 2022-11-23 | End: 2022-11-30

## 2022-11-23 RX ORDER — 0.9 % SODIUM CHLORIDE 0.9 %
1000 INTRAVENOUS SOLUTION INTRAVENOUS ONCE
Status: COMPLETED | OUTPATIENT
Start: 2022-11-23 | End: 2022-11-23

## 2022-11-23 RX ADMIN — SODIUM CHLORIDE 1000 ML: 9 INJECTION, SOLUTION INTRAVENOUS at 21:46

## 2022-11-23 RX ADMIN — RACEPINEPHRINE HYDROCHLORIDE 11.25 MG: 11.25 SOLUTION RESPIRATORY (INHALATION) at 18:18

## 2022-11-23 RX ADMIN — IOPAMIDOL 140 ML: 755 INJECTION, SOLUTION INTRAVENOUS at 20:11

## 2022-11-23 RX ADMIN — ISODIUM CHLORIDE 3 ML: 0.03 SOLUTION RESPIRATORY (INHALATION) at 18:22

## 2022-11-23 ASSESSMENT — LIFESTYLE VARIABLES
HOW MANY STANDARD DRINKS CONTAINING ALCOHOL DO YOU HAVE ON A TYPICAL DAY: PATIENT DOES NOT DRINK
HOW OFTEN DO YOU HAVE A DRINK CONTAINING ALCOHOL: NEVER

## 2022-11-23 ASSESSMENT — PAIN - FUNCTIONAL ASSESSMENT: PAIN_FUNCTIONAL_ASSESSMENT: NONE - DENIES PAIN

## 2022-11-23 NOTE — ED NOTES
Patient identified as a positive fall risk on the ED triage fall screening. Patient placed in fall precautions which includes:  yellow fall risk bracelet on wrist and yellow socks on feet. Patient instructed on importance of not getting out of bed or ambulating without assistance for safety. Pt verbalized understanding.        Casie Montes De Oca RN  11/23/22 [id number]

## 2022-11-24 NOTE — DISCHARGE INSTRUCTIONS
You have no sign of aspiration on your imaging or evaluation today. You likely had a spasm of your larynx and this resolved after inhaled epinephrine here. There is questionable evidence of pneumonia on your chest CT in your left lower lung so you are being started on antibiotics. Please follow-up with your primary doctor in the next several days. If you feel you are worsening at any point, develop fevers or recurrent shortness of breath you should return to the ER immediately.

## 2022-11-24 NOTE — ED PROVIDER NOTES
[facility] Emergency Department      CHIEF COMPLAINT  Aspiration (Patient was eating chicken when she choked on it per pt. [facility] via emergency alert necklace. Pt coughing during triage. Pt wears 3L oxygen at baseline.)      HISTORY OF PRESENT ILLNESS  Rea Herrera is a [de-identified] y.o. female with a history of acid reflux, CHF and COPD also with a history of coronary artery disease presents after an episode of shortness of breath. She states she was eating chicken and started to gag on her spit. She spit the chicken out and is certain that she did not swallow it or choke on the chicken. She states she started having a spasm in her throat though whenever she took a deep breath she would feel like her air was cutting off at her throat. She states this is happened in the past and usually breathing treatments help her. She denies chest pain. No fever. .   No other complaints, modifying factors or associated symptoms. I have reviewed the following from the nursing documentation.     Past Medical History:   Diagnosis Date    Acid reflux     Acquired hypothyroidism 7/6/2017    Anemia     Asthma     CHF (congestive heart failure) (HCC)     COPD (chronic obstructive pulmonary disease) ([address]     HLD (hyperlipidemia)     Hypertension     Influenza A 01/22/2018    MI (myocardial infarction) ([facility]     X 2    Migraine     past hx    Rheumatoid arthritis     Wears glasses      Past Surgical History:   Procedure Laterality Date    BRONCHOSCOPY N/A 3/27/2019    BRONCHOSCOPY ALVEOLAR LAVAGE performed by Anjali Rachel MD at [address] [facility]  3/27/2019    BRONCHOSCOPY THERAPUTIC ASPIRATION INITIAL performed by Anjali Rachel MD at [address] Bilateral 03/23/2011    COLONOSCOPY N/A 6/12/2019    COLONOSCOPY WITH ANESTHESIA -SLEEP APNEA- performed by William Medina MD at [address] [facility] [address] Left 12/4/2018    LEFT LUMBAR FOUR, LUMBAR FIVE TRANSFORAMINAL EPIDURAL STEROID INJECTION SITE CONFIRMED BY FLUOROSCOPY performed by Amelia Pickering MD at [address] N/A 2019    ESOPHAGEAL MOTILITY/MANOMETRY STUDY performed by Elena Hernandez MD at [address] little toe    GASTROSTOMY TUBE PLACEMENT N/A 2019    EGD PEG TUBE PLACEMENT performed by Damon Baez MD at [address] [facility] (CERVIX STATUS UNKNOWN)      total, fibroids    KNEE SURGERY      right    [place] NJX DX/THER SBST INTRLMNR CRV/THRC W/IMG GDN Left 2018    LEFT LUMBAR FOUR/ LUMBAR FIVE CYST ASPIRATION SITE CONFIRMED BY FLUOROSCOPY performed by Amelia Pickering MD at [place] [address] N/A 3/27/2019    EGD DIAGNOSTIC ONLY performed by Freddy Nunez MD at [facility] N/A 2020    EGD DIAGNOSTIC ONLY performed by Savi Ulloa DO at [address] [place] [address]     Family History   Problem Relation Age of Onset    Cancer Mother     Cancer Father     Heart Disease Maternal Aunt     Cancer Sister      Social History     Socioeconomic History    Marital status: Single     Spouse name: Not on file    Number of children: 3    Years of education: Not on file    Highest education level: Not on file   Occupational History    Occupation: disability   Tobacco Use    Smoking status: Former     Packs/day: 3.00     Years: 50.00     Pack years: 150.00     Types: Cigarettes     Start date: 3/4/1963     Quit date: 2009     Years since quittin.8    Smokeless tobacco: Never   Vaping Use    Vaping Use: Never used   Substance and Sexual Activity    Alcohol use: No    Drug use: No    Sexual activity: Not on file   Other Topics Concern    Not on file   Social History Narrative    Not on file     Social Determinants of Health     Financial Resource Strain: Low Risk     Difficulty of Paying Living Expenses: Not hard at all   Food Insecurity: No Food Insecurity    Worried About [facility] in the Last Year: Never true    Ran Out of Food in the Last Year: Never true   Transportation Needs: No Transportation Needs    Lack of Transportation (Medical): No    Lack of Transportation (Non-Medical): No   Physical Activity: Not on file   Stress: Not on file   Social Connections: Not on file   Intimate Partner Violence: Not on file   Housing Stability: Low Risk     Unable to Pay for Housing in the Last Year: No    Number of Places Lived in the Last Year: 1    Unstable Housing in the Last Year: No     Current Facility-Administered Medications   Medication Dose Route Frequency Provider Last Rate Last Admin    0.9 % sodium chloride bolus  1,000 mL IntraVENous Once Giuliano Beach MD 1,000 mL/hr at 11/23/22 2146 1,000 mL at 11/23/22 2146     Current Outpatient Medications   Medication Sig Dispense Refill    doxycycline hyclate (VIBRA-TABS) 100 MG tablet Take 1 tablet by mouth 2 times daily for 7 days 14 tablet 0    levothyroxine (SYNTHROID) 88 MCG tablet Take 1 tablet by mouth daily 90 tablet 1    albuterol sulfate HFA (PROVENTIL;VENTOLIN;PROAIR) 108 (90 Base) MCG/ACT inhaler INHALE TWO (2) PUFFS BY MOUTH EVERY 6 HOURS AS NEEDED 18 g 10    oxyCODONE-acetaminophen (PERCOCET) 5-325 MG per tablet Take 1 tablet by mouth every 8 hours as needed for Pain (take for severe pain) for up to 28 days. 84 tablet 0    acetaminophen (TYLENOL) 500 MG tablet Take 1 tablet by mouth every 12 hours as needed for Pain (take for breakthrough pain) 120 tablet 1    lidocaine (LIDODERM) 5 % Place 1 patch onto the skin daily 12 hours on, 12 hours off.  30 patch 0    spironolactone (ALDACTONE) 25 MG tablet TAKE 1 TABLET BY MOUTH TWICE A  tablet 1    fluticasone (FLONASE) 50 MCG/ACT nasal spray SPRAY 2 SPRAYS INTO EACH NOSTRIL EVERY DAY 1 each 5    omeprazole (PRILOSEC) 10 MG delayed release capsule Take by mouth daily      furosemide (LASIX) 20 MG tablet TAKE 1 TABLET BY MOUTH TWICE A  tablet 1    ezetimibe (ZETIA) 10 MG tablet TAKE 1 TABLET BY MOUTH EVERY DAY 90 tablet 3    atorvastatin (LIPITOR) 10 MG tablet TAKE 1 TABLET BY MOUTH EVERY DAY 90 tablet 1    labetalol (NORMODYNE) 100 MG tablet TAKE 1 TABLET BY MOUTH TWICE A  tablet 1    Lift Chair MISC by Does not apply route 1 each 0    Misc. Devices (COMMODE BEDSIDE) MISC Use daily. 1 each 0    Incontinence Supply Disposable MISC 1 each by Does not apply route 4 times daily as needed (incontinence) 120 each 3    pregabalin (LYRICA) 50 MG capsule TAKE 1 CAPSULE BY MOUTH EVERY DAY      apixaban (ELIQUIS) 5 MG TABS tablet Take 1 tablet by mouth in the morning and 1 tablet before bedtime. 180 tablet 1    fluticasone-umeclidin-vilant (TRELEGY ELLIPTA) 100-62.5-25 MCG/INH AEPB Inhale 1 puff into the lungs daily 1 each 5    DULoxetine (CYMBALTA) 30 MG extended release capsule Take 30 mg by mouth daily      Handicap Placard MISC by Does not apply route 02/07/22 1 each 0    ACTEMRA 162 MG/0.9ML SOSY injection       predniSONE (DELTASONE) 10 MG tablet Take 5 mg by mouth daily       Misc. Devices (WHEELCHAIR) MISC SELF PROPELLED, LIGHT WEIGHT,  STANDARD SIZE 1 each 0    ergocalciferol (ERGOCALCIFEROL) 1.25 MG (64530 UT) capsule Take 50,000 Units by mouth every 7 days      OXYGEN Inhale 3 L/min into the lungs daily as needed       Blood Pressure Monitoring (BLOOD PRESSURE MONITOR/M CUFF) MISC 1 Units by Does not apply route daily 1 each 0    ipratropium-albuterol (DUONEB) 0.5-2.5 (3) MG/3ML SOLN nebulizer solution Inhale 3 mLs into the lungs every 4 hours as needed. 360 mL 3    nitroGLYCERIN (NITROSTAT) 0.4 MG SL tablet Place 0.4 mg under the tongue every 5 minutes as needed.          Allergies   Allergen Reactions    Alendronate Sodium      Jaw pain      Humira [Adalimumab]      Rash      Losartan Swelling    Penicillins      rash    Simvastatin Other (See Comments)     Made legs ache Sulfa Antibiotics Swelling     Tongue swelled    Tape [facility] Tape]        REVIEW OF SYSTEMS      General:  No fevers  Eyes:  No recent vison changes  ENT:  No sore throat, no nasal congestion  Cardiovascular:  no palpitations  Respiratory:   Shortness of breath  Gastrointestinal:  No abdominal pain, no vomiting, no diarrhea  Musculoskeletal:  No muscle pain, no joint pain  Skin:  No rash   Neurologic:  No speech problems, no headache, no extremity numbness, no extremity weakness  Genitourinary:  No dysuria  Extremities:  no edema, no pain      Unless otherwise stated in this report, this patient's positive and negative responses for review of systems (constitutional, eyes, ENT, cardiovascular, respiratory, gastrointestinal, neurological, genitourinary, musculoskeletal, integument systems and systems related to the presenting problem) are either stated in the preceding paragraph, were not pertinent or were negative for the symptoms and/or complaints related to the medical problem. PHYSICAL EXAM  /65   Pulse 79   Temp 98.3 °F (36.8 °C) (Oral)   Resp 20   Ht 5' 6.5\" (1.689 m)   Wt 179 lb (81.2 kg)   SpO2 96%   BMI 28.46 kg/m²   GENERAL APPEARANCE: Awake and alert. Cooperative. No acute distress. HEAD: Normocephalic. Atraumatic. EYES: PERRL. EOM's grossly intact. ENT: Mucous membranes are moist.   NECK: Supple, trachea midline. HEART: RRR. LUNGS: Respirations unlabored. CTAB. Good air exchange. No wheezes, rales, or rhonchi. Speaking comfortably in full sentences. No trismus, drooling or stridor at rest.  When she becomes anxious she has very minimal inspiratory stridor but when I have her breathe through her nose this resolves  ABDOMEN: Soft. Non-distended. Non-tender. No guarding or rebound. EXTREMITIES: No peripheral edema. MAEE. No acute deformities. SKIN: Warm, dry and intact. No acute rashes. NEUROLOGICAL: Alert and oriented X 3. CN II-XII grossly intact.    PSYCHIATRIC: Anxious appearing. LABS  I have reviewed all labs for this visit. Results for orders placed or performed during the hospital encounter of 11/23/22   CBC with Auto Differential   Result Value Ref Range    WBC 8.9 4.0 - 11.0 K/uL    RBC 3.82 (L) 4.00 - 5.20 M/uL    Hemoglobin 10.4 (L) 12.0 - 16.0 g/dL    Hematocrit 31.7 (L) 36.0 - 48.0 %    MCV 83.1 80.0 - 100.0 fL    MCH 27.2 26.0 - 34.0 pg    MCHC 32.8 31.0 - 36.0 g/dL    RDW 16.1 (H) 12.4 - 15.4 %    Platelets 381 102 - 609 K/uL    MPV 7.6 5.0 - 10.5 fL    Neutrophils % 80.3 %    Lymphocytes % 14.4 %    Monocytes % 4.7 %    Eosinophils % 0.1 %    Basophils % 0.5 %    Neutrophils Absolute 7.1 1.7 - 7.7 K/uL    Lymphocytes Absolute 1.3 1.0 - 5.1 K/uL    Monocytes Absolute 0.4 0.0 - 1.3 K/uL    Eosinophils Absolute 0.0 0.0 - 0.6 K/uL    Basophils Absolute 0.0 0.0 - 0.2 K/uL   Comprehensive Metabolic Panel w/ Reflex to MG   Result Value Ref Range    Sodium 140 136 - 145 mmol/L    Potassium reflex Magnesium 4.7 3.5 - 5.1 mmol/L    Chloride 100 99 - 110 mmol/L    CO2 28 21 - 32 mmol/L    Anion Gap 12 3 - 16    Glucose 105 (H) 70 - 99 mg/dL    BUN 36 (H) 7 - 20 mg/dL    Creatinine 1.6 (H) 0.6 - 1.2 mg/dL    Est, Glom Filt Rate 32 (A) >60    Calcium 9.2 8.3 - 10.6 mg/dL    Total Protein 6.9 6.4 - 8.2 g/dL    Albumin 4.2 3.4 - 5.0 g/dL    Albumin/Globulin Ratio 1.6 1.1 - 2.2    Total Bilirubin 0.4 0.0 - 1.0 mg/dL    Alkaline Phosphatase 35 (L) 40 - 129 U/L    ALT 24 10 - 40 U/L    AST 25 15 - 37 U/L               RADIOLOGY  X-RAYS: ALL IMAGES INCLUDING PLAIN FILMS, CT, ULTRASOUND AND MRI HAVE BEEN READ BY THE RADIOLOGIST. I have personally reviewed plain film images and have reviewed the radiology reports. CT SOFT TISSUE NECK W CONTRAST   Final Result   No acute abnormality of the soft tissue structures of the neck visualized. CT CHEST PULMONARY EMBOLISM W CONTRAST   Final Result   No evidence of pulmonary embolism or aortic dissection.       Left basilar consolidation, which may reflect pneumonia versus aspiration. Severe emphysema. XR CHEST PORTABLE   Final Result   Linear opacities of the lung bases favored to represent atelectasis or   scarring. Pneumonia is considered less likely. XR NECK SOFT TISSUE   Final Result   No radiopaque foreign body or other acute finding. Bilateral carotid arterial calcifications. Rechecks: Physical assessment performed. Patient was given a racemic epinephrine neb. On reassessment any noisy breathing and the patient's subjective symptoms are completely resolved. She has been updated on her lab work and imaging findings          Sepsis:  Is this patient to be included in the SEP-1 Core Measure due to severe sepsis or septic shock? No   Exclusion criteria - the patient is NOT to be included for SEP-1 Core Measure due to: Alternative explanation for abnormal labs/vitals that do not relate to sepsis, see MDM for further explanation           ED COURSE/MDM  Patient seen and evaluated. Here the patient is afebrile with normal vitals signs. Old records reviewed. Here when she is at rest she is asymptomatic. She starts to get a little anxious and has some inspiratory stridor. When I have her close her mouth and breathe through her nose however the stridor resolves. She is adamant that she spit the chicken out did not choke on any. Chest x-ray and x-ray soft tissue neck are normal.  Given her history of PE and her presentation I decided to get a CT soft tissue neck and a CT PE study. CTs are normal other than questionable finding of left basilar consolidation. In light of this I will go ahead and put her on antibiotics. She was given a racemic epinephrine treatment here and symptoms completely resolved. She was observed in the ER for almost 5 hours and remained asymptomatic. I do not think this was an aspiration or choking event necessarily.   I think she may have had some laryngospasm. This appears resolved and she remains asymptomatic. I think she is appropriate for discharge home. Close primary care follow-up recommended labs and imaging reviewed and results discussed with patient. Patient was reassessed as noted above . Plan of care discussed with patient. Patient in agreement with plan. Strict return precautions have been given. Patient was given scripts for the following medications. I counseled patient how to take these medications. New Prescriptions    DOXYCYCLINE HYCLATE (VIBRA-TABS) 100 MG TABLET    Take 1 tablet by mouth 2 times daily for 7 days           CLINICAL IMPRESSION  1. Pneumonia of left lower lobe due to infectious organism    2. Aspiration into airway, initial encounter        Blood pressure 118/65, pulse 79, temperature 98.3 °F (36.8 °C), temperature source Oral, resp. rate 20, height 5' 6.5\" (1.689 m), weight 179 lb (81.2 kg), SpO2 96 %, not currently breastfeeding. DISPOSITION  Casper Cho was discharged to home in stable condition. Mason Ac MD am the primary clinician of record.     (Please note this note was completed with a voice recognition program.  Efforts were made to edit the dictations but occasionally words are mis-transcribed.)       Jesus Cowden, MD  11/25/22 2331

## 2022-11-24 NOTE — TELEPHONE ENCOUNTER
----- Message from Katrina Encinas LPN sent at 30/92/3962  3:28 PM EDT -----  Left message to rtrn call
Pt advised of results see rx above
The patient called to get the results of her blood work from 10/20/17. I read over Dr. Sandra Hernandez notes with her she understood and had no questions at the time. She is requesting that the Omeprazole and Zantac be sent to the [facility] pharmacy for her.
24-Nov-2022 10:35

## 2022-11-28 ENCOUNTER — TELEPHONE (OUTPATIENT)
Dept: CARDIOLOGY CLINIC | Age: 82
End: 2022-11-28

## 2022-11-28 NOTE — TELEPHONE ENCOUNTER
----- Message from Yrn Funes MD sent at 11/28/2022  8:03 AM EST -----  Lipids look great. Fluid level in the body is normal. No changes.

## 2022-12-05 ENCOUNTER — OFFICE VISIT (OUTPATIENT)
Dept: FAMILY MEDICINE CLINIC | Age: 82
End: 2022-12-05
Payer: MEDICAID

## 2022-12-05 ENCOUNTER — OFFICE VISIT (OUTPATIENT)
Dept: PAIN MANAGEMENT | Age: 82
End: 2022-12-05

## 2022-12-05 VITALS
HEART RATE: 79 BPM | TEMPERATURE: 97.3 F | BODY MASS INDEX: 28.14 KG/M2 | SYSTOLIC BLOOD PRESSURE: 127 MMHG | HEIGHT: 67 IN | OXYGEN SATURATION: 96 % | DIASTOLIC BLOOD PRESSURE: 75 MMHG

## 2022-12-05 VITALS
OXYGEN SATURATION: 93 % | DIASTOLIC BLOOD PRESSURE: 54 MMHG | HEART RATE: 77 BPM | WEIGHT: 177 LBS | SYSTOLIC BLOOD PRESSURE: 110 MMHG | BODY MASS INDEX: 28.14 KG/M2

## 2022-12-05 DIAGNOSIS — J43.2 CENTRILOBULAR EMPHYSEMA (HCC): ICD-10-CM

## 2022-12-05 DIAGNOSIS — N17.9 AKI (ACUTE KIDNEY INJURY) (HCC): ICD-10-CM

## 2022-12-05 DIAGNOSIS — R53.81 PHYSICAL DECONDITIONING: ICD-10-CM

## 2022-12-05 DIAGNOSIS — J96.11 CHRONIC RESPIRATORY FAILURE WITH HYPOXIA (HCC): ICD-10-CM

## 2022-12-05 DIAGNOSIS — M05.79 RHEUMATOID ARTHRITIS INVOLVING MULTIPLE SITES WITH POSITIVE RHEUMATOID FACTOR (HCC): ICD-10-CM

## 2022-12-05 DIAGNOSIS — M25.561 BILATERAL CHRONIC KNEE PAIN: ICD-10-CM

## 2022-12-05 DIAGNOSIS — M51.36 DDD (DEGENERATIVE DISC DISEASE), LUMBAR: ICD-10-CM

## 2022-12-05 DIAGNOSIS — M50.121 CERVICAL DISC DISORDER AT C4-C5 LEVEL WITH RADICULOPATHY: ICD-10-CM

## 2022-12-05 DIAGNOSIS — M50.30 DDD (DEGENERATIVE DISC DISEASE), CERVICAL: ICD-10-CM

## 2022-12-05 DIAGNOSIS — I50.32 CHRONIC DIASTOLIC HEART FAILURE (HCC): Primary | ICD-10-CM

## 2022-12-05 DIAGNOSIS — M48.061 SPINAL STENOSIS OF LUMBAR REGION WITHOUT NEUROGENIC CLAUDICATION: ICD-10-CM

## 2022-12-05 DIAGNOSIS — G89.4 CHRONIC PAIN SYNDROME: ICD-10-CM

## 2022-12-05 DIAGNOSIS — M71.38 SYNOVIAL CYST OF LUMBAR SPINE: ICD-10-CM

## 2022-12-05 DIAGNOSIS — M25.562 BILATERAL CHRONIC KNEE PAIN: ICD-10-CM

## 2022-12-05 DIAGNOSIS — M19.019 ARTHRITIS PAIN OF SHOULDER: ICD-10-CM

## 2022-12-05 DIAGNOSIS — M54.32 SCIATICA OF LEFT SIDE: ICD-10-CM

## 2022-12-05 DIAGNOSIS — G89.29 BILATERAL CHRONIC KNEE PAIN: ICD-10-CM

## 2022-12-05 PROCEDURE — 99213 OFFICE O/P EST LOW 20 MIN: CPT | Performed by: STUDENT IN AN ORGANIZED HEALTH CARE EDUCATION/TRAINING PROGRAM

## 2022-12-05 PROCEDURE — 1123F ACP DISCUSS/DSCN MKR DOCD: CPT | Performed by: STUDENT IN AN ORGANIZED HEALTH CARE EDUCATION/TRAINING PROGRAM

## 2022-12-05 PROCEDURE — 3074F SYST BP LT 130 MM HG: CPT | Performed by: STUDENT IN AN ORGANIZED HEALTH CARE EDUCATION/TRAINING PROGRAM

## 2022-12-05 PROCEDURE — 3078F DIAST BP <80 MM HG: CPT | Performed by: STUDENT IN AN ORGANIZED HEALTH CARE EDUCATION/TRAINING PROGRAM

## 2022-12-05 RX ORDER — OXYCODONE HYDROCHLORIDE AND ACETAMINOPHEN 5; 325 MG/1; MG/1
1 TABLET ORAL EVERY 8 HOURS PRN
Qty: 90 TABLET | Refills: 0 | Status: SHIPPED | OUTPATIENT
Start: 2022-12-05 | End: 2023-01-04

## 2022-12-05 RX ORDER — ACETAMINOPHEN 500 MG
500 TABLET ORAL EVERY 12 HOURS PRN
Qty: 120 TABLET | Refills: 1 | Status: SHIPPED | OUTPATIENT
Start: 2022-12-05 | End: 2023-02-03

## 2022-12-05 NOTE — PROGRESS NOTES
Inga Bazziinderjit  1940  [id number]      HISTORY OF PRESENT ILLNESS: Ms. Glo Loya is a [de-identified] y.o. female returns for a follow up visit for pain management  She has a diagnosis of   1. Chronic pain syndrome    2. Arthritis pain of shoulder right severe    3. DDD (degenerative disc disease), cervical    4. Cervical disc disorder at C4-C5 level with radiculopathy    5. DDD (degenerative disc disease), lumbar    6. Synovial cyst of lumbar spine    7. Spinal stenosis of lumbar region without neurogenic claudication    8. Sciatica of left side    9. Rheumatoid arthritis involving multiple sites with positive rheumatoid factor (HCC)    10. Bilateral chronic knee pain    11. Centrilobular emphysema ([address]      As per Information Obtained from the PADT (Patient Assessment and Documentation Tool)    She complains of pain in the both foot/feet: entire area, both hand(s): entire area, both leg(s): entire area, bilateral lower back, and both shoulder(s): entire area She rates the pain 9/10 and describes it as numbness, stabbing. Current treatment regimen has helped relieve about 10% of the pain. She denies any side effects from the current pain regimen. Patient reports that since the last follow up visit the physical functioning is worse, family/social relationships are worse, mood is worse sleep patterns are worse, and that the overall functioning is worse. Patient denies misusing/abusing her narcotic pain medications or using any illegal drugs. Upon obtaining medical history from Ms. Glo Loya states that pain is somewhat manageable on current pain therapy. Takes the pain medications as prescribed. Reports pain is bothersome to the neck, right shoulder. Pain medications provide moderate pain relief. Injections to the shoulder through orthopedic with Dr. Jasson Hunter in the past provide minimal pain relief, she was advised to discuss surgical options with pulmonology given history of emphysema, this was not completed.  Stable on 3L O2, maintains care with Dr. León Auguste with pulmonology. Mood/anxiety is stable. Sleep is fair with an average of 5-6 hours. Denies to having issues of constipation. Tolerating activities/house chores with moderate tenderness to the lower back. ALLERGIES: Patients list of allergies were reviewed     MEDICATIONS: Ms. Arslan Alaniz list of medications were reviewed. Her current medications are   Outpatient Medications Prior to Visit   Medication Sig Dispense Refill    levothyroxine (SYNTHROID) 88 MCG tablet Take 1 tablet by mouth daily 90 tablet 1    albuterol sulfate HFA (PROVENTIL;VENTOLIN;PROAIR) 108 (90 Base) MCG/ACT inhaler INHALE TWO (2) PUFFS BY MOUTH EVERY 6 HOURS AS NEEDED 18 g 10    spironolactone (ALDACTONE) 25 MG tablet TAKE 1 TABLET BY MOUTH TWICE A  tablet 1    fluticasone (FLONASE) 50 MCG/ACT nasal spray SPRAY 2 SPRAYS INTO EACH NOSTRIL EVERY DAY 1 each 5    omeprazole (PRILOSEC) 10 MG delayed release capsule Take by mouth daily      furosemide (LASIX) 20 MG tablet TAKE 1 TABLET BY MOUTH TWICE A  tablet 1    ezetimibe (ZETIA) 10 MG tablet TAKE 1 TABLET BY MOUTH EVERY DAY 90 tablet 3    atorvastatin (LIPITOR) 10 MG tablet TAKE 1 TABLET BY MOUTH EVERY DAY 90 tablet 1    labetalol (NORMODYNE) 100 MG tablet TAKE 1 TABLET BY MOUTH TWICE A  tablet 1    Lift Chair MISC by Does not apply route 1 each 0    Misc. Devices (COMMODE BEDSIDE) MISC Use daily. 1 each 0    Incontinence Supply Disposable MISC 1 each by Does not apply route 4 times daily as needed (incontinence) 120 each 3    pregabalin (LYRICA) 50 MG capsule TAKE 1 CAPSULE BY MOUTH EVERY DAY      apixaban (ELIQUIS) 5 MG TABS tablet Take 1 tablet by mouth in the morning and 1 tablet before bedtime.  180 tablet 1    fluticasone-umeclidin-vilant (TRELEGY ELLIPTA) 100-62.5-25 MCG/INH AEPB Inhale 1 puff into the lungs daily 1 each 5    DULoxetine (CYMBALTA) 30 MG extended release capsule Take 30 mg by mouth daily      Handicap Placard MISC by Does not apply route 02/07/22 1 each 0    ACTEMRA 162 MG/0.9ML SOSY injection       predniSONE (DELTASONE) 10 MG tablet Take 5 mg by mouth daily       Misc. Devices (WHEELCHAIR) MISC SELF PROPELLED, LIGHT WEIGHT,  STANDARD SIZE 1 each 0    ergocalciferol (ERGOCALCIFEROL) 1.25 MG (23208 UT) capsule Take 50,000 Units by mouth every 7 days      OXYGEN Inhale 3 L/min into the lungs daily as needed       Blood Pressure Monitoring (BLOOD PRESSURE MONITOR/M CUFF) MISC 1 Units by Does not apply route daily 1 each 0    ipratropium-albuterol (DUONEB) 0.5-2.5 (3) MG/3ML SOLN nebulizer solution Inhale 3 mLs into the lungs every 4 hours as needed. 360 mL 3    nitroGLYCERIN (NITROSTAT) 0.4 MG SL tablet Place 0.4 mg under the tongue every 5 minutes as needed. oxyCODONE-acetaminophen (PERCOCET) 5-325 MG per tablet Take 1 tablet by mouth every 8 hours as needed for Pain (take for severe pain) for up to 28 days. 84 tablet 0    acetaminophen (TYLENOL) 500 MG tablet Take 1 tablet by mouth every 12 hours as needed for Pain (take for breakthrough pain) 120 tablet 1     No facility-administered medications prior to visit. SOCIAL/FAMILY/PAST MEDICAL HISTORY: Ms. Aubrie Mendez, family and past medical history was reviewed. REVIEW OF SYSTEMS:    Respiratory: Negative for apnea, chest tightness and shortness of breath or change in baseline breathing. Gastrointestinal: Negative for nausea, vomiting, abdominal pain, diarrhea, constipation, blood in stool and abdominal distention. PHYSICAL EXAM:   Nursing note and vitals reviewed. /75   Pulse 79   Temp 97.3 °F (36.3 °C)   Ht 5' 6.5\" (1.689 m)   SpO2 96%   BMI 28.14 kg/m²   Constitutional: She appears well-developed and well-nourished. No acute distress. Skin: Skin is warm and dry, good turgor. No rash noted. She is not diaphoretic. Cardiovascular: Normal rate, regular rhythm, normal heart sounds, and does not have murmur.      Pulmonary/Chest: Effort normal. No respiratory distress. She does not have wheezes in the lung fields. She has no rales. Neurological/Psychiatric:She is alert and oriented to person, place, and time. Coordination is  normal. +right shoulder, Cervical spine pain. Her mood isAppropriate and affect is Neutral/Euthymic(normal) . MRI of the Right Shoulder 11/23/22:  1. Focal high-grade partial-thickness articular surface and interstitial tear   of posterior supraspinatus between musculotendinous junction and critical   zone. Low-grade multifocal partial-thickness articular-surface and   interstitial tearing of supraspinatus and infraspinatus between   musculotendinous junction and footplate. Severe atrophy and fatty   degeneration of infraspinatus. 2. Mild atrophy and fatty degeneration of supraspinatus and subscapularis. 3. Thinning and severe tendinosis of the intra-articular long head of the   biceps tendon. 4. Degenerative tearing and volume loss of the entirety of the labrum. 5. Severe glenohumeral chondromalacia. Moderate to severe glenohumeral   osteoarthrosis. Small debris containing glenohumeral joint effusion. 6. Mild degenerative change of the right AC joint. MRI of the Cervical spine 11/23/22:  1. Mild spinal canal stenosis and severe bilateral neural foraminal narrowing   at C4-5 secondary to a disc bulge and uncovertebral overgrowth. 2. Mild spinal canal stenosis, severe right and mild left neural foraminal   narrowing at C5-6, as described above. 3. Central disc extrusion at C7-T1 mildly narrowing the spinal canal.   4. Mild spinal canal stenosis and bilateral neural foraminal narrowing at   C3-4 secondary to a disc bulge. 5. Additional multilevel degenerative changes, as described above.      Prescription pain medication monitoring:                  MEDD current = 15              ORT Score = 1 low risk              Other Risk factors - (mood) Depression              Date of Last Medication Agreement: 8/16/22              Date Naloxone prescribed: 9/12/22              UDT:                          Date of last UDT: 8/15/22                          Adverse report: No              OARRS:                          Checked today: Yes                          Adverse report: No      IMPRESSION:   1. Chronic pain syndrome    2. Arthritis pain of shoulder right severe    3. DDD (degenerative disc disease), cervical    4. Cervical disc disorder at C4-C5 level with radiculopathy    5. DDD (degenerative disc disease), lumbar    6. Synovial cyst of lumbar spine    7. Spinal stenosis of lumbar region without neurogenic claudication    8. Sciatica of left side    9. Rheumatoid arthritis involving multiple sites with positive rheumatoid factor (HCC)    10. Bilateral chronic knee pain    11. Centrilobular emphysema ([address]        PLAN:  Informed verbal consent was obtained:  -Patient's opioid therapy will be maintained at current dose  -Home exercises/Maxine exercises recommended  -Reviewed MRI of the Cervical spine/Right shoulder  -Referral was sent to Dr. Janusz Ocampo for a second opinion (patients request) for right shoulder arthritis  -CBT techniques- relaxation therapies such as biofeedback, mindfulness based stress reduction, imagery, cognitive restructuring, problem solving discussed with patient   -She was advised weight reduction, diet changes- 800-1200 kasi diet, diet diary, exercising, nutritional  consult increased physical activity as tolerated   -Last UDS 8/15/22 consistent  -Return in about 4 weeks (around 1/2/2023). Analgesic Plan:              Continue present regimen: Percocet 5-325 mg tabs q8h prn              Adjust dose of present analgesic: No              Switch analgesics: No              Add/Adjust concomitant therapy: Lidoderm, Tylenol, Narcan, Lyrica through rheumatology    I will continue her current medication regimen  which is part of the above treatment schedule.  It has been helping with Ms. Supriya Blakemikaela chronic  medical problems which for this visit include:   Diagnoses of Chronic pain syndrome, Arthritis pain of shoulder right severe, DDD (degenerative disc disease), cervical, Cervical disc disorder at C4-C5 level with radiculopathy, DDD (degenerative disc disease), lumbar, Synovial cyst of lumbar spine, Spinal stenosis of lumbar region without neurogenic claudication, Sciatica of left side, Rheumatoid arthritis involving multiple sites with positive rheumatoid factor ([address] Bilateral chronic knee pain, and Centrilobular emphysema ([address] were pertinent to this visit. Risks and benefits of the medications and other alternative treatments  including no treatment were discussed with the patient. The common side effects of these medications were also explained to the patient. Informed verbal consent was obtained. Goals of current treatment regimen include improvement in pain, restoration of functioning- with focus on improvement in physical performance, general activity, work or disability,emotional distress, health care utilization and  decreased medication consumption. Will continue to monitor progress towards achieving/maintaining therapeutic goals with special emphasis on  1. Improvement in perceived interfernce  of pain with ADL's. Ability to do home exercises independently. Ability to do household chores indoor and/or outdoor work and social and leisure activities. Improve psychosocial and physical functioning. - she is not showing any significant progress/or showing regression  towards this goal and reassessment and adjustment of goals/treatment have been made. She was advised against drinking alcohol with the narcotic pain medicines, advised against driving or handling machinery while adjusting the dose of medicines or if having cognitive  issues related to the current medications. Risk of overdose and death, if medicines not taken as prescribed, were also discussed.  If the patient develops new symptoms or if the symptoms worsen, the patient should call the office. While transcribing every attempt was made to maintain the accuracy of the note in terms of it's contents,there may have been some errors made inadvertently. Thank you for allowing me to participate in the care of this patient. Garett Garcia CNP.     Cc: Ela Maya, DO

## 2022-12-05 NOTE — PROGRESS NOTES
ELLIPTA) 100-62.5-25 MCG/INH AEPB Inhale 1 puff into the lungs daily 1 each 5    DULoxetine (CYMBALTA) 30 MG extended release capsule Take 30 mg by mouth daily      Handicap Placard MISC by Does not apply route 02/07/22 1 each 0    ACTEMRA 162 MG/0.9ML SOSY injection       predniSONE (DELTASONE) 10 MG tablet Take 5 mg by mouth daily       Misc. Devices (WHEELCHAIR) MISC SELF PROPELLED, LIGHT WEIGHT,  STANDARD SIZE 1 each 0    ergocalciferol (ERGOCALCIFEROL) 1.25 MG (38774 UT) capsule Take 50,000 Units by mouth every 7 days      OXYGEN Inhale 3 L/min into the lungs daily as needed       Blood Pressure Monitoring (BLOOD PRESSURE MONITOR/M CUFF) MISC 1 Units by Does not apply route daily 1 each 0    ipratropium-albuterol (DUONEB) 0.5-2.5 (3) MG/3ML SOLN nebulizer solution Inhale 3 mLs into the lungs every 4 hours as needed. 360 mL 3    nitroGLYCERIN (NITROSTAT) 0.4 MG SL tablet Place 0.4 mg under the tongue every 5 minutes as needed. oxyCODONE-acetaminophen (PERCOCET) 5-325 MG per tablet Take 1 tablet by mouth every 8 hours as needed for Pain (take for severe pain) for up to 30 days. 90 tablet 0    acetaminophen (TYLENOL) 500 MG tablet Take 1 tablet by mouth every 12 hours as needed for Pain (take for breakthrough pain) 120 tablet 1     No current facility-administered medications for this visit. Patient's past medical history, surgical history, family history, medications,  andallergies  were all reviewed and updated as appropriate today. Review of Systems  All other systems reviewed and negative    Physical Exam  Vitals reviewed. Constitutional:       Appearance: Normal appearance. HENT:      Head: Normocephalic and atraumatic. Cardiovascular:      Rate and Rhythm: Normal rate and regular rhythm. Pulmonary:      Effort: Pulmonary effort is normal.      Breath sounds: Normal breath sounds. Musculoskeletal:      Right lower leg: Edema (1+ to knee.) present.       Left lower leg: Edema (2+ to knee, most significant in foot.) present. Neurological:      General: No focal deficit present. Mental Status: She is alert and oriented to person, place, and time. Vitals:    12/05/22 1157   BP: (!) 110/54   Pulse: 77   SpO2: 93%       Assessment:  Encounter Diagnoses   Name Primary? Chronic diastolic heart failure (HCC) Yes    DANY (acute kidney injury) ([facility]     Rheumatoid arthritis involving multiple sites with positive rheumatoid factor (HCC)     Centrilobular emphysema (HCC)     Chronic respiratory failure with hypoxia (HCC)     Physical deconditioning        Plan:  1. Chronic diastolic heart failure ([facility]  2. DANY (acute kidney injury) [facility])  Patient with chronic bilateral lower extremity edema secondary to both chronic diastolic heart failure, decreased mobility, wheelchair status. Unable to tolerate much higher dose of Lasix secondary to recurrent DNAY's. Not currently utilizing prescriptive compression stockings and reports that these are difficult to put on and sometimes painful. Recommended utilizing Ace bandages which she has been used in the past and wrapping up to the level of thigh. Reviewed how to appropriately wrap lower extremities today. We will also recheck BMP due to DANY noted on recent lab work. - Basic Metabolic Panel; Future    3. Rheumatoid arthritis involving multiple sites with positive rheumatoid factor (HCC)  6. Physical deconditioning  Patient with chronic pain from rheumatoid arthritis. Reports some improvement with water aerobics previously. Will refer for evaluation.  - [facility] [place] [facility]    4. Centrilobular emphysema ([facility]  5. Chronic respiratory failure with hypoxia (HCC)  On chronic oxygen. Following with pulmonology  - [facility] [place] [facility]            No follow-ups on file.

## 2022-12-06 LAB
ANION GAP SERPL CALCULATED.3IONS-SCNC: 14 MMOL/L (ref 3–16)
BUN BLDV-MCNC: 36 MG/DL (ref 7–20)
CALCIUM SERPL-MCNC: 10.1 MG/DL (ref 8.3–10.6)
CHLORIDE BLD-SCNC: 100 MMOL/L (ref 99–110)
CO2: 29 MMOL/L (ref 21–32)
CREAT SERPL-MCNC: 1.5 MG/DL (ref 0.6–1.2)
GFR SERPL CREATININE-BSD FRML MDRD: 35 ML/MIN/{1.73_M2}
GLUCOSE BLD-MCNC: 101 MG/DL (ref 70–99)
POTASSIUM SERPL-SCNC: 4.8 MMOL/L (ref 3.5–5.1)
SODIUM BLD-SCNC: 143 MMOL/L (ref 136–145)

## 2022-12-07 ENCOUNTER — TELEPHONE (OUTPATIENT)
Dept: PAIN MANAGEMENT | Age: 82
End: 2022-12-07

## 2022-12-08 ENCOUNTER — OFFICE VISIT (OUTPATIENT)
Dept: ORTHOPEDIC SURGERY | Age: 82
End: 2022-12-08
Payer: MEDICAID

## 2022-12-08 ENCOUNTER — TELEPHONE (OUTPATIENT)
Dept: PULMONOLOGY | Age: 82
End: 2022-12-08

## 2022-12-08 VITALS — BODY MASS INDEX: 27.78 KG/M2 | HEIGHT: 67 IN | WEIGHT: 177 LBS

## 2022-12-08 DIAGNOSIS — J96.11 CHRONIC RESPIRATORY FAILURE WITH HYPOXIA (HCC): Primary | ICD-10-CM

## 2022-12-08 DIAGNOSIS — M75.101 ROTATOR CUFF TEAR ARTHROPATHY, RIGHT: Primary | ICD-10-CM

## 2022-12-08 DIAGNOSIS — M12.811 ROTATOR CUFF TEAR ARTHROPATHY, RIGHT: Primary | ICD-10-CM

## 2022-12-08 DIAGNOSIS — Z99.81 OXYGEN DEPENDENT: ICD-10-CM

## 2022-12-08 DIAGNOSIS — G89.4 CHRONIC PAIN SYNDROME: ICD-10-CM

## 2022-12-08 DIAGNOSIS — J43.2 CENTRILOBULAR EMPHYSEMA (HCC): ICD-10-CM

## 2022-12-08 PROCEDURE — 99244 OFF/OP CNSLTJ NEW/EST MOD 40: CPT | Performed by: ORTHOPAEDIC SURGERY

## 2022-12-08 SDOH — HEALTH STABILITY: PHYSICAL HEALTH: ON AVERAGE, HOW MANY DAYS PER WEEK DO YOU ENGAGE IN MODERATE TO STRENUOUS EXERCISE (LIKE A BRISK WALK)?: 0 DAYS

## 2022-12-08 SDOH — HEALTH STABILITY: PHYSICAL HEALTH: ON AVERAGE, HOW MANY MINUTES DO YOU ENGAGE IN EXERCISE AT THIS LEVEL?: 10 MIN

## 2022-12-08 NOTE — TELEPHONE ENCOUNTER
Patient's granddaughter, Libia, is calling because patient no longer wants to use the portable oxygen concentrator due to it not lasting long enough for her. On Monday, 12.5.22, she had two Doctors appointments and it did not last.  [facility] told her they would need to get a new order for the oxygen tank. Please send order to Janis Raphael at X#[phone]. Please call patient and advise when this has been done.

## 2022-12-08 NOTE — TELEPHONE ENCOUNTER
The medication is APPROVED. The patient's grand daughter was notified by phone. ( SHE WAS VERIFIED ON THE HIPPA FORM)

## 2022-12-08 NOTE — LETTER
[facility]  [id number] [address]  Phone: [phone]  Fax: [phone]    Vernice Dakins, MD    December 8, 2022     DO Noel Clark    Patient: Jg Greenfield   MR Number: [id number]   YOB: 1940   Date of Visit: 12/8/2022       Dear Yuliana Maya: Thank you for referring Keny Dukes to me for evaluation/treatment. Below are the relevant portions of my assessment and plan of care. If you have questions, please do not hesitate to call me. I look forward to following Bettie along with you.     Sincerely,      Vernice Dakins, MD

## 2022-12-08 NOTE — PROGRESS NOTES
ORTHOPAEDIC CONSULTATION NOTE    Chief Complaint   Patient presents with    Shoulder Pain     Right shoulder pain       HPI  24  [de-identified] y.o. female RHD seen in consultation at the request of Jim Caban NP for evaluation of right shoulder pain:  This is a second opinion  Patient reports she is previously seen Dr. Abhilash Leyva at [facility] who gave her multiple injections for the right shoulder arthritis  She rates her pain 10+ out of 10  She is in pain management  She is on chronic oxycodone  She does not take any anti-inflammatories  She has rheumatoid arthritis, her rheumatologist is at [facility]  She is on Actemra  She also has a pulmonologist for respiratory issues  She is on 3 L of oxygen at baseline  Dr. Kamaljit Mcclain is her pulmonologist at [facility]  The right shoulder pain is described as diffuse and it does go into the hands at times  She denies right hand numbness and tingling  She has difficulty with all planes of shoulder range of motion activity with the right arm, lifting  He symptoms have been ongoing for the past year and a half or so      I have reviewed and discussed the below pain assessment findings with the patient.   Pain Assessment  Location of Pain: Shoulder  Location Modifiers: Right  Severity of Pain: 10  Quality of Pain: Aching, Sharp, Throbbing, Dull (stretching and pulling pain , pressure pain)  Duration of Pain: Persistent  Frequency of Pain: Intermittent  Date Pain First Started: 24  Aggravating Factors: Bending, Stretching, Straightening  Limiting Behavior: Yes  Relieving Factors: Rest (oxycodone)  Result of Injury: No  Work-Related Injury: No  Are there other pain locations you wish to document?: No    Review of Systems  I have read over the ROS from the Patient History Form dated on 22  Pertinent positives include weight gain, hearing impairment, sinus trouble, thyroid disease, asthma history, shortness of breath, COPD, on 3 L of oxygen, hypertension, history of VTE, peripheral edema, irregular heartbeat, back and neck pain, chronic pain, chronic pain management, knee pain, gout hx  Rest of 13 point ROS otherwise negative except per HPI, and scanned into the patient's chart under the Media tab. Allergies   Allergen Reactions    Alendronate Sodium      Jaw pain      Humira [Adalimumab]      Rash      Losartan Swelling    Penicillins      rash    Simvastatin Other (See Comments)     Made legs ache    Sulfa Antibiotics Swelling     Tongue swelled    Tape Ha Alpers Tape]         Current Outpatient Medications   Medication Sig Dispense Refill    oxyCODONE-acetaminophen (PERCOCET) 5-325 MG per tablet Take 1 tablet by mouth every 8 hours as needed for Pain (take for severe pain) for up to 30 days. 90 tablet 0    acetaminophen (TYLENOL) 500 MG tablet Take 1 tablet by mouth every 12 hours as needed for Pain (take for breakthrough pain) 120 tablet 1    levothyroxine (SYNTHROID) 88 MCG tablet Take 1 tablet by mouth daily 90 tablet 1    albuterol sulfate HFA (PROVENTIL;VENTOLIN;PROAIR) 108 (90 Base) MCG/ACT inhaler INHALE TWO (2) PUFFS BY MOUTH EVERY 6 HOURS AS NEEDED 18 g 10    spironolactone (ALDACTONE) 25 MG tablet TAKE 1 TABLET BY MOUTH TWICE A  tablet 1    fluticasone (FLONASE) 50 MCG/ACT nasal spray SPRAY 2 SPRAYS INTO EACH NOSTRIL EVERY DAY 1 each 5    omeprazole (PRILOSEC) 10 MG delayed release capsule Take by mouth daily      furosemide (LASIX) 20 MG tablet TAKE 1 TABLET BY MOUTH TWICE A  tablet 1    ezetimibe (ZETIA) 10 MG tablet TAKE 1 TABLET BY MOUTH EVERY DAY 90 tablet 3    atorvastatin (LIPITOR) 10 MG tablet TAKE 1 TABLET BY MOUTH EVERY DAY 90 tablet 1    labetalol (NORMODYNE) 100 MG tablet TAKE 1 TABLET BY MOUTH TWICE A  tablet 1    Lift Chair MISC by Does not apply route 1 each 0    Misc. Devices (COMMODE BEDSIDE) MISC Use daily.  1 each 0    Incontinence Supply Disposable MISC 1 each by Does not apply route 4 times daily as needed (incontinence) 120 each 3    pregabalin (LYRICA) 50 MG capsule TAKE 1 CAPSULE BY MOUTH EVERY DAY      apixaban (ELIQUIS) 5 MG TABS tablet Take 1 tablet by mouth in the morning and 1 tablet before bedtime. 180 tablet 1    fluticasone-umeclidin-vilant (TRELEGY ELLIPTA) 100-62.5-25 MCG/INH AEPB Inhale 1 puff into the lungs daily 1 each 5    DULoxetine (CYMBALTA) 30 MG extended release capsule Take 30 mg by mouth daily      Handicap Placard MISC by Does not apply route 02/07/22 1 each 0    ACTEMRA 162 MG/0.9ML SOSY injection       predniSONE (DELTASONE) 10 MG tablet Take 5 mg by mouth daily       Misc. Devices (WHEELCHAIR) MISC SELF PROPELLED, LIGHT WEIGHT,  STANDARD SIZE 1 each 0    ergocalciferol (ERGOCALCIFEROL) 1.25 MG (50315 UT) capsule Take 50,000 Units by mouth every 7 days      OXYGEN Inhale 3 L/min into the lungs daily as needed       Blood Pressure Monitoring (BLOOD PRESSURE MONITOR/M CUFF) MISC 1 Units by Does not apply route daily 1 each 0    ipratropium-albuterol (DUONEB) 0.5-2.5 (3) MG/3ML SOLN nebulizer solution Inhale 3 mLs into the lungs every 4 hours as needed. 360 mL 3    nitroGLYCERIN (NITROSTAT) 0.4 MG SL tablet Place 0.4 mg under the tongue every 5 minutes as needed. No current facility-administered medications for this visit.        Past Medical History:   Diagnosis Date    Acid reflux     Acquired hypothyroidism 7/6/2017    Anemia     Asthma     CHF (congestive heart failure) (HCC)     COPD (chronic obstructive pulmonary disease) ([facility] [address]     HLD (hyperlipidemia)     Hypertension     Influenza A 01/22/2018    MI (myocardial infarction) ([facility] [address]     X 2    Migraine     past hx    Rheumatoid arthritis     Wears glasses         Past Surgical History:   Procedure Laterality Date    BRONCHOSCOPY N/A 3/27/2019    BRONCHOSCOPY ALVEOLAR LAVAGE performed by Clair Syed MD at [facility]  3/27/2019    BRONCHOSCOPY THERAPUTIC ASPIRATION INITIAL [facility]    CATARACT REMOVAL WITH IMPLANT Bilateral 2011    COLONOSCOPY N/A 2019    COLONOSCOPY WITH ANESTHESIA -SLEEP APNEA- performed by Adam Alexander MD at [address] Left 2018    LEFT LUMBAR FOUR, LUMBAR FIVE TRANSFORAMINAL EPIDURAL STEROID INJECTION SITE CONFIRMED BY FLUOROSCOPY performed by Nasir Szymanski MD at [address] N/A 2019    ESOPHAGEAL MOTILITY/MANOMETRY STUDY performed by Hollis Jeong MD at [address] little toe    GASTROSTOMY TUBE PLACEMENT N/A 2019    EGD PEG TUBE PLACEMENT performed by Adam Alexander MD at [address] [facility] (CERVIX STATUS UNKNOWN)      total, fibroids    KNEE SURGERY      right    ND NJX DX/THER SBST INTRLMNR CRV/THRC W/IMG GDN Left 2018    LEFT LUMBAR FOUR/ LUMBAR FIVE CYST ASPIRATION SITE CONFIRMED BY FLUOROSCOPY performed by Nasir Szymanski MD at [place] [address] N/A 3/27/2019    EGD DIAGNOSTIC ONLY performed by Evie Tran MD at [facility] N/A 2020    EGD DIAGNOSTIC ONLY performed by Aimee Lopez DO at [address] [place] [address]       Family History   Problem Relation Age of Onset    Cancer Mother     Cancer Father     Heart Disease Maternal Aunt     Cancer Sister        Social History     Socioeconomic History    Marital status: Single     Spouse name: Not on file    Number of children: 3    Years of education: Not on file    Highest education level: Not on file   Occupational History    Occupation: disability   Tobacco Use    Smoking status: Former     Packs/day: 3.00     Years: 50.00     Pack years: 150.00     Types: Cigarettes     Start date: 3/4/1963     Quit date: 2009     Years since quittin.8    Smokeless tobacco: Never   Vaping Use    Vaping Use: Never used   Substance and Sexual Activity    Alcohol use: No    Drug use: No    Sexual activity: Not on file   Other Topics Concern    Not on file   Social History Narrative    Not on file     Social Determinants of Health     Financial Resource Strain: Low Risk     Difficulty of Paying Living Expenses: Not hard at all   Food Insecurity: No Food Insecurity    Worried About Running Out of Food in the Last Year: Never true    [address] in the Last Year: Never true   Transportation Needs: No Transportation Needs    Lack of Transportation (Medical): No    Lack of Transportation (Non-Medical): No   Physical Activity: Inactive    Days of Exercise per Week: 0 days    Minutes of Exercise per Session: 10 min   Stress: Not on file   Social Connections: Not on file   Intimate Partner Violence: Not At Risk    Fear of Current or Ex-Partner: No    Emotionally Abused: No    Physically Abused: No    Sexually Abused: No   Housing Stability: Low Risk     Unable to Pay for Housing in the Last Year: No    Number of Places Lived in the Last Year: 1    Unstable Housing in the Last Year: No        Vitals:    12/08/22 1032   Weight: 177 lb (80.3 kg)   Height: 5' 6.5\" (1.689 m)       Physical Exam  Constitutional - well-groomed, well-nourished, Body mass index is 28.14 kg/m².   Wheelchair  Here with her granddaughter  Psychiatric - pleasant, normal mood & affect  Cardiovascular - RRR, negative UE peripheral edema, radial pulse 2+  Skin - no rashes, wounds, or lesions seen on exposed skin  Neck - no radicular pain with Spurling's test.  Preserved head rotation side to side, somewhat limited  Right shoulder:   No obvious deformity/swelling/ecchymosis   atrophy present    TTP over nonfocal    Range of Motion:  Pain with all planes of range of motion, limited active forward elevation to just below shoulder height only  No external rotation lag sign, internal rotation to back pocket  Negative Hornblower sign  She can reach the top of her head, more difficulty reaching behind her head      Imaging:  Images were personally reviewed by myself and discussed with the patient  Narrative   EXAMINATION:   MRI OF THE RIGHT SHOULDER WITHOUT CONTRAST   11/23/2022 2:56 pm       TECHNIQUE:   Multiplanar multisequence MRI of the right shoulder was performed without the   administration of intravenous contrast.       COMPARISON:   Right shoulder plain radiographs from 08/22/2022       HISTORY:   ORDERING SYSTEM PROVIDED HISTORY: Chronic pain syndrome       82-year-old female who complains of chronic right shoulder pain. FINDINGS:   ROTATOR CUFF: Small amount of fluid in the subacromial subdeltoid bursa. Focal high-grade partial-thickness articular surface and interstitial tear of   posterior supraspinatus between musculotendinous junction and critical zone   on image 13, series 5. Low-grade multifocal partial-thickness articular-surface and interstitial   tearing of supraspinatus and infraspinatus between musculotendinous junction   and footplate. Severe atrophy and fatty degeneration of the infraspinatus. Mild atrophy and fatty degeneration of supraspinatus, and subscapularis. Teres minor muscle/tendon appears intact. BICEPS TENDON: Thinning and severe tendinosis of the intra-articular long   head of the biceps tendon. LABRUM: Degenerative tearing and volume loss of the entirety of the labrum. GLENOHUMERAL JOINT: Severe glenohumeral chondromalacia with subcortical   cystic changes and marrow edema at the medial humeral head and glenoid. Moderate to severe osteophyte spurring of the glenohumeral joint. Small   debris containing glenohumeral joint effusion. Inferior glenohumeral   ligament appears intact. AC JOINT AND ACROMIOCLAVICULAR ARCH: Mild degenerative change of the right AC   joint. Type 2 acromion. BONE MARROW: No acute fracture or dislocation involving the osseous   components of the shoulder.   Marrow edema and subcortical cystic changes in   the glenoid, humeral head and about the [facility] joint. Bone marrow signal   intensity within the visualized osseous structures otherwise grossly   unremarkable. OUTLET SPACES: Fluid extending into the suprascapular notch. Quadrilateral space grossly unremarkable in appearance. No right axillary lymphadenopathy. Impression   1. Focal high-grade partial-thickness articular surface and interstitial tear   of posterior supraspinatus between musculotendinous junction and critical   zone. Low-grade multifocal partial-thickness articular-surface and   interstitial tearing of supraspinatus and infraspinatus between   musculotendinous junction and footplate. Severe atrophy and fatty   degeneration of infraspinatus. 2. Mild atrophy and fatty degeneration of supraspinatus and subscapularis. 3. Thinning and severe tendinosis of the intra-articular long head of the   biceps tendon. 4. Degenerative tearing and volume loss of the entirety of the labrum. 5. Severe glenohumeral chondromalacia. Moderate to severe glenohumeral   osteoarthrosis. Small debris containing glenohumeral joint effusion. 6. Mild degenerative change of the right AC joint. Narrative   EXAMINATION:   THREE XRAY VIEWS OF THE LEFT SHOULDER;   XRAY VIEWS OF THE RIGHT KNEE;   XRAY   VIEWS OF THE LEFT KNEE; THREE XRAY VIEWS OF THE RIGHT SHOULDER       8/22/2022 3:15 pm       COMPARISON:   Right knee radiographs 2017       HISTORY:   ORDERING SYSTEM PROVIDED HISTORY: Chronic pain syndrome   TECHNOLOGIST PROVIDED HISTORY:   Reason for Exam: chronic pain       FINDINGS:   Left shoulder:       Alignment at the [facility] joint and glenohumeral joint appears normal.  Moderate   spurring is seen at the [facility] joint. Moderate spurring is seen at the   glenohumeral joint. Cervical spine degenerative changes seen. No acute   fracture noted. Hazy opacity seen in the left lung base.        Left knee: Moderate joint space narrowing is seen laterally. Mild medial, mild lateral,   and mild patellofemoral joint space spur formation is seen. No acute   fracture noted. .  Trace joint effusion is seen       Right knee: Moderate joint space narrowing is seen laterally. Moderate lateral, mild   medial, and moderate patellofemoral joint space spur formation is seen. Small joint effusion is seen. No acute fracture noted. There is   atherosclerosis. Degenerative changes are increased compared to 2017       Right shoulder:       Alignment at the [facility] joint and glenohumeral joint appears normal.  Moderate   spurring is seen in the [facility] joint. Moderate spurring is seen at the   glenohumeral joint. No acute fracture noted. Cervical spine degenerative   changes seen. Vascular calcification is seen. Hazy opacity is seen in the   right lung base, likely atelectasis           Impression   No acute osseous abnormality left shoulder. There is moderate AC joint and   glenohumeral osteoarthritis. There is cervical spine degenerative change. Hazy opacity is seen at the left lung base, likely atelectasis. No acute osseous abnormality left knee. 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 No acute osseous abnormality right knee. There is tricompartmental   osteoarthritis, greatest laterally       No acute osseous abnormality right shoulder joint. There is moderate AC   joint and glenohumeral osteoarthritis.   There is cervical spine degenerative   change       OARRS:   11/29/2022 06/17/2022   1  Pregabalin 50 Mg Capsule 90.00  30  General Sax  [id number]   [place] ([id number])  1  1.01 [facility] Medicaid [place]     10/31/2022  10/31/2022   1  Oxycodone-Acetaminophen 5-325 84.00  28  Pa Ada  [id number]   [place] ([id number])  0  22.50 E  Private Pay  [place]     10/27/2022  06/17/2022   1  Pregabalin 50 Mg Capsule 90.00  30  General Sax  [id number]   [place] ([id number])  0  1.01 E  Medicaid [place]     09/19/2022 09/19/2022   1 Oxycodone-Acetaminophen 5-325 44.00  22  Pa Ada  [id number]   [place] ([id number])  0  15.00 MME  Private Pay  [place]     09/19/2022 08/17/2022   1  Pregabalin 50 Mg Capsule 30.00  30  De Kud  [id number]   [place] ([id number])  1  0.34 LME  Medicaid  [place]     08/17/2022 08/17/2022   1  Pregabalin 50 Mg Capsule 30.00  30  De Kud  [id number]   [place] ([id number])  0  0.34 LME  Medicaid  [place]     08/01/2022 07/29/2022   1  Oxycodone-Acetaminophen 5-325 60.00  30  Aa Allen  [id number]   [place] ([id number])  0  15.00 MME  Private Pay  [place]     07/15/2022  05/13/2022   1  Pregabalin 50 Mg Capsule 30.00  30  De Kud  [id number]   [place] ([id number])  2  0.34 LME  Medicaid [place]     06/13/2022 05/13/2022   1  Pregabalin 50 Mg Capsule 30.00  30  De Kud  [id number]   [place] ([id number])  1  0.34 LME  Medicaid  [place]     05/13/2022 05/13/2022   1  Pregabalin 50 Mg Capsule 30.00  30  De Kud  [id number]   [place] ([id number])  0  0.34 LME  Medicaid [place]     05/06/2022 04/25/2022   1  Oxycodone-Acetaminophen 5-325 60.00  30  Aa Allen  [id number]   [place] ([id number])  0  15.00 MME  Medicaid  [place]     03/25/2022 03/24/2022   1  Oxycodone-Acetaminophen 5-325 60.00  30  [facility]  [id number]   [place] ([id number])  0  15.00 MME  Medicaid  [place]     02/06/2022  01/10/2022   1  Tramadol Hcl 50 Mg Tablet 7.00  7  De Kud  [id number]   [place] ([id number])  3  5.00 MME  Medicaid [place]     01/28/2022  01/10/2022   1  Tramadol Hcl 50 Mg Tablet 7.00  7  De Kud  [id number]   [place] ([id number])  2  5.00 MME  Medicaid [place]     01/18/2022  01/10/2022   1  Tramadol Hcl 50 Mg Tablet                  Assessment & Plan:  [de-identified] y.o. female who presents with    Diagnosis Orders   1. Rotator cuff tear arthropathy, right        2. Oxygen dependent        3. Chronic pain syndrome            No orders of the defined types were placed in this encounter.       Pathoanatomy of current condition discussed  Full-thickness cuff tears, advanced arthrosis  Significant atrophy of the infraspinatus muscle belly, but no significant external rotation lag sign  She has had multiple previous steroid injections at [facility]  They are here for second opinion to see if shoulder arthroplasty is a reasonable option  The main issue is her baseline medical issues, COPD, oxygen dependent, chronic pain  Will need her pulmonologist to weigh in to see whether she is safe for surgery  I informed her I will message her pulmonologist at [facility]  If her pulmonologist agrees that the benefits of surgery outweighs the risks, then we can get this discussed and set up  However I did recommend if surgery is considered reasonable by her pulmonologist and PCP, we should avoid doing this in the wintertime and do it during the summer months, especially with the multiple respiratory viruses going on and high hospitalizations - she understood and agrees. I do not recommend repeat shoulder injection today.     Ashli Bhakta MD

## 2022-12-09 NOTE — TELEPHONE ENCOUNTER
Patients grand-daughter called again stating that she got a call from confused staff and she said all she needs is a new order sent over to get tanks instead of portable oxygen due to it not lasting long enough.

## 2022-12-13 ENCOUNTER — TELEPHONE (OUTPATIENT)
Dept: FAMILY MEDICINE CLINIC | Age: 82
End: 2022-12-13

## 2022-12-13 NOTE — TELEPHONE ENCOUNTER
Received a fax from patients pharmacy and states that they spoke with the patient about asthma care and noticed that he patient had multiple rescue inhalers on file without filling a controller medication at [facility] in the last 180 days. They are reaching out to us on behalf of the patient to determine if it is appropriate to states a daily asthma controller therapy.  If so please send in a new script

## 2022-12-15 ENCOUNTER — OFFICE VISIT (OUTPATIENT)
Dept: FAMILY MEDICINE CLINIC | Age: 82
End: 2022-12-15
Payer: MEDICAID

## 2022-12-15 VITALS — HEART RATE: 105 BPM | SYSTOLIC BLOOD PRESSURE: 134 MMHG | OXYGEN SATURATION: 91 % | DIASTOLIC BLOOD PRESSURE: 60 MMHG

## 2022-12-15 DIAGNOSIS — R60.0 LOWER EXTREMITY EDEMA: ICD-10-CM

## 2022-12-15 DIAGNOSIS — M17.11 PRIMARY OSTEOARTHRITIS OF RIGHT KNEE: Primary | ICD-10-CM

## 2022-12-15 PROCEDURE — 3078F DIAST BP <80 MM HG: CPT | Performed by: STUDENT IN AN ORGANIZED HEALTH CARE EDUCATION/TRAINING PROGRAM

## 2022-12-15 PROCEDURE — 99213 OFFICE O/P EST LOW 20 MIN: CPT | Performed by: STUDENT IN AN ORGANIZED HEALTH CARE EDUCATION/TRAINING PROGRAM

## 2022-12-15 PROCEDURE — 1123F ACP DISCUSS/DSCN MKR DOCD: CPT | Performed by: STUDENT IN AN ORGANIZED HEALTH CARE EDUCATION/TRAINING PROGRAM

## 2022-12-15 PROCEDURE — 3074F SYST BP LT 130 MM HG: CPT | Performed by: STUDENT IN AN ORGANIZED HEALTH CARE EDUCATION/TRAINING PROGRAM

## 2022-12-15 NOTE — LETTER
[address] [address]  Phone: [phone]  Fax: [phone]    Wilner Hawkins,         December 15, 2022    [address]      To whom it may concern,    Masoud Rea requires the use of a wheelchair for mobility outside of home and should have a ramp accessible apartment or ramp built to access current apartment. If you have any questions or concerns, please don't hesitate to call.     Sincerely,      Wilner Hawkins, DO

## 2022-12-15 NOTE — PROGRESS NOTES
Patient: Roosevelt Alba is a [de-identified] y.o. female who presents today with the following Chief Complaint(s):  Chief Complaint   Patient presents with    Follow-up    Foot Swelling    Knee Pain         HPI    Right knee osteoarthitis  Patient reports that she continues to have the most significant of her pain in her right knee and associated swelling. Has previously had cortisone injections that were helpful but not in many years. Lower extremity swelling has improved except around bilateral ankles. Reports that she has been using ACE wraps to wrap both legs but that the way the Ace wrap lays causes swelling to be left in her ankle. Current Outpatient Medications   Medication Sig Dispense Refill    oxyCODONE-acetaminophen (PERCOCET) 5-325 MG per tablet Take 1 tablet by mouth every 8 hours as needed for Pain (take for severe pain) for up to 30 days. 90 tablet 0    acetaminophen (TYLENOL) 500 MG tablet Take 1 tablet by mouth every 12 hours as needed for Pain (take for breakthrough pain) 120 tablet 1    levothyroxine (SYNTHROID) 88 MCG tablet Take 1 tablet by mouth daily 90 tablet 1    albuterol sulfate HFA (PROVENTIL;VENTOLIN;PROAIR) 108 (90 Base) MCG/ACT inhaler INHALE TWO (2) PUFFS BY MOUTH EVERY 6 HOURS AS NEEDED 18 g 10    spironolactone (ALDACTONE) 25 MG tablet TAKE 1 TABLET BY MOUTH TWICE A  tablet 1    fluticasone (FLONASE) 50 MCG/ACT nasal spray SPRAY 2 SPRAYS INTO EACH NOSTRIL EVERY DAY 1 each 5    omeprazole (PRILOSEC) 10 MG delayed release capsule Take by mouth daily      furosemide (LASIX) 20 MG tablet TAKE 1 TABLET BY MOUTH TWICE A  tablet 1    ezetimibe (ZETIA) 10 MG tablet TAKE 1 TABLET BY MOUTH EVERY DAY 90 tablet 3    atorvastatin (LIPITOR) 10 MG tablet TAKE 1 TABLET BY MOUTH EVERY DAY 90 tablet 1    labetalol (NORMODYNE) 100 MG tablet TAKE 1 TABLET BY MOUTH TWICE A  tablet 1    Lift Chair MISC by Does not apply route 1 each 0    Misc. Devices (COMMODE BEDSIDE) MISC Use daily.  1 present. Left lower leg: Edema (1+) present. Comments: Tenderness to palpation on both medial and lateral joint line of right knee. Significant edema noted. No erythema or heat in joint. ROM partially limited in flexion and extension secondary to swelling. Neurological:      General: No focal deficit present. Mental Status: She is alert and oriented to person, place, and time. Psychiatric:         Behavior: Behavior normal.         Thought Content: Thought content normal.     Vitals:    12/15/22 1550   BP: 134/60   Pulse: (!) 105   SpO2: 91%       Assessment:  Encounter Diagnoses   Name Primary? Primary osteoarthritis of right knee Yes    Lower extremity edema        Plan:  1. Primary osteoarthritis of right knee  Discussed that current pain and swelling likely secondary to osteoarthritis and does not seem consistent with continued got flair that was previously treated. Recommended contacting orthopedic physician for possible joint injection. Recommended continuing to use tylenol as needed and avoiding NSAIDS due to kidney function. 2. Lower extremity edema  Improved with use of ace wraps. Kidney function worsened with increasing dose of lasix. Edema more significant at gaps between bandages at ankles and discussed use of compression stocking in this area or changing how they are wrapping legs. Will also ask for home health nurse to come 3 x a week rather than 2 in order to further help with wrapping. No follow-ups on file.

## 2022-12-19 ENCOUNTER — TELEPHONE (OUTPATIENT)
Dept: FAMILY MEDICINE CLINIC | Age: 82
End: 2022-12-19

## 2022-12-19 NOTE — TELEPHONE ENCOUNTER
----- Message from Balta Pelletier DO sent at 12/18/2022  2:55 PM EST -----  Please call [facility] ([phone]) and given verbal authorization to increase to 3x a week visits to help with lower extremity edema

## 2022-12-21 ENCOUNTER — TELEPHONE (OUTPATIENT)
Dept: FAMILY MEDICINE CLINIC | Age: 82
End: 2022-12-21

## 2022-12-21 NOTE — TELEPHONE ENCOUNTER
Patient called in asking if it is okay for her to go to [place] for 4 days. Travelling by car. Patient is on oxygen and wants to make sure it is safe to make the trip at this time. Patient Is going with family and will be leaving tomorrow afternoon.  Please advise

## 2022-12-29 ENCOUNTER — TELEPHONE (OUTPATIENT)
Dept: PULMONOLOGY | Age: 82
End: 2022-12-29

## 2022-12-29 NOTE — TELEPHONE ENCOUNTER
Patient is needing to speak to someone ASAP. Please return call, they are having a few issues and wants to speak to Dr. Kelia Moralez.

## 2022-12-29 NOTE — TELEPHONE ENCOUNTER
Patient was contacted,s/w granddaughter,Libia ,she state her Grandmother need to be seen  soon because, she is having a replacement of shoulder blade, a surgery that may going to be around April or may(kal[place] is warm weather)pt already  have a appt w/Cheryle Broussard NP, ON 1/5/2023 for pre op clearance for her shoulder. She was advise due to time frame for her grandmother surgery, this appt maybe to soon. she said will talk to her grandma about it, but will keep appt for now. pt alert awake playful no active vomiting pt tolerated water 200 cc and crackers x2 at this time pending lab result

## 2022-12-30 NOTE — TELEPHONE ENCOUNTER
Pulmonary clearance should be completed within 30 days of surgery. I can still see next week however this would not be for clearance. Suggest waiting for appointment with Dr. Nupur Thompson or myself until after 1/15/22 sleep study or closer to surgery date.

## 2023-01-04 ENCOUNTER — OFFICE VISIT (OUTPATIENT)
Dept: FAMILY MEDICINE CLINIC | Age: 83
End: 2023-01-04
Payer: MEDICAID

## 2023-01-04 VITALS
HEART RATE: 79 BPM | OXYGEN SATURATION: 97 % | DIASTOLIC BLOOD PRESSURE: 64 MMHG | SYSTOLIC BLOOD PRESSURE: 124 MMHG | BODY MASS INDEX: 28.68 KG/M2 | WEIGHT: 180.4 LBS

## 2023-01-04 DIAGNOSIS — M05.79 RHEUMATOID ARTHRITIS INVOLVING MULTIPLE SITES WITH POSITIVE RHEUMATOID FACTOR (HCC): ICD-10-CM

## 2023-01-04 DIAGNOSIS — J43.2 CENTRILOBULAR EMPHYSEMA (HCC): ICD-10-CM

## 2023-01-04 DIAGNOSIS — I50.32 CHRONIC DIASTOLIC HEART FAILURE (HCC): ICD-10-CM

## 2023-01-04 PROCEDURE — 1123F ACP DISCUSS/DSCN MKR DOCD: CPT | Performed by: STUDENT IN AN ORGANIZED HEALTH CARE EDUCATION/TRAINING PROGRAM

## 2023-01-04 PROCEDURE — 3078F DIAST BP <80 MM HG: CPT | Performed by: STUDENT IN AN ORGANIZED HEALTH CARE EDUCATION/TRAINING PROGRAM

## 2023-01-04 PROCEDURE — 3074F SYST BP LT 130 MM HG: CPT | Performed by: STUDENT IN AN ORGANIZED HEALTH CARE EDUCATION/TRAINING PROGRAM

## 2023-01-04 PROCEDURE — 99212 OFFICE O/P EST SF 10 MIN: CPT | Performed by: STUDENT IN AN ORGANIZED HEALTH CARE EDUCATION/TRAINING PROGRAM

## 2023-01-04 RX ORDER — ALLOPURINOL 100 MG/1
TABLET ORAL
COMMUNITY
Start: 2022-12-09

## 2023-01-04 ASSESSMENT — ANXIETY QUESTIONNAIRES
1. FEELING NERVOUS, ANXIOUS, OR ON EDGE: 0
IF YOU CHECKED OFF ANY PROBLEMS ON THIS QUESTIONNAIRE, HOW DIFFICULT HAVE THESE PROBLEMS MADE IT FOR YOU TO DO YOUR WORK, TAKE CARE OF THINGS AT HOME, OR GET ALONG WITH OTHER PEOPLE: NOT DIFFICULT AT ALL
2. NOT BEING ABLE TO STOP OR CONTROL WORRYING: 0
3. WORRYING TOO MUCH ABOUT DIFFERENT THINGS: 1
4. TROUBLE RELAXING: 1
GAD7 TOTAL SCORE: 2
7. FEELING AFRAID AS IF SOMETHING AWFUL MIGHT HAPPEN: 0
6. BECOMING EASILY ANNOYED OR IRRITABLE: 0
5. BEING SO RESTLESS THAT IT IS HARD TO SIT STILL: 0

## 2023-01-04 ASSESSMENT — PATIENT HEALTH QUESTIONNAIRE - PHQ9
SUM OF ALL RESPONSES TO PHQ QUESTIONS 1-9: 7
2. FEELING DOWN, DEPRESSED OR HOPELESS: 0
SUM OF ALL RESPONSES TO PHQ QUESTIONS 1-9: 7
5. POOR APPETITE OR OVEREATING: 0
SUM OF ALL RESPONSES TO PHQ QUESTIONS 1-9: 7
7. TROUBLE CONCENTRATING ON THINGS, SUCH AS READING THE NEWSPAPER OR WATCHING TELEVISION: 3
10. IF YOU CHECKED OFF ANY PROBLEMS, HOW DIFFICULT HAVE THESE PROBLEMS MADE IT FOR YOU TO DO YOUR WORK, TAKE CARE OF THINGS AT HOME, OR GET ALONG WITH OTHER PEOPLE: 1
SUM OF ALL RESPONSES TO PHQ9 QUESTIONS 1 & 2: 1
6. FEELING BAD ABOUT YOURSELF - OR THAT YOU ARE A FAILURE OR HAVE LET YOURSELF OR YOUR FAMILY DOWN: 0
SUM OF ALL RESPONSES TO PHQ QUESTIONS 1-9: 7
3. TROUBLE FALLING OR STAYING ASLEEP: 2
9. THOUGHTS THAT YOU WOULD BE BETTER OFF DEAD, OR OF HURTING YOURSELF: 0
1. LITTLE INTEREST OR PLEASURE IN DOING THINGS: 1
4. FEELING TIRED OR HAVING LITTLE ENERGY: 1
8. MOVING OR SPEAKING SO SLOWLY THAT OTHER PEOPLE COULD HAVE NOTICED. OR THE OPPOSITE, BEING SO FIGETY OR RESTLESS THAT YOU HAVE BEEN MOVING AROUND A LOT MORE THAN USUAL: 0

## 2023-01-04 NOTE — PROGRESS NOTES
Patient: Bridgett Hooks is a [de-identified] y.o. female who presents today with the following Chief Complaint(s):  Chief Complaint   Patient presents with    Swelling     Ankle, feet and knee          HPI    Feels that swelling is not improving. Continues to have increased swelling bilaterally L>R. Feels that this has been worse over last 2 months with change to thyroid medication   NO increased shortness of breath or oxygen requirement  Has home health aid that is helping with LE wrapping at home. Compliant with [employer] and has DANY with increasing doses. Current Outpatient Medications   Medication Sig Dispense Refill    allopurinol (ZYLOPRIM) 100 MG tablet TAKE 1 TABLET BY MOUTH EVERY DAY      albuterol sulfate HFA (PROVENTIL;VENTOLIN;PROAIR) 108 (90 Base) MCG/ACT inhaler INHALE TWO (2) PUFFS BY MOUTH EVERY 6 HOURS AS NEEDED 18 g 10    spironolactone (ALDACTONE) 25 MG tablet TAKE 1 TABLET BY MOUTH TWICE A  tablet 1    fluticasone (FLONASE) 50 MCG/ACT nasal spray SPRAY 2 SPRAYS INTO EACH NOSTRIL EVERY DAY 1 each 5    omeprazole (PRILOSEC) 10 MG delayed release capsule Take by mouth daily      furosemide (LASIX) 20 MG tablet TAKE 1 TABLET BY MOUTH TWICE A  tablet 1    ezetimibe (ZETIA) 10 MG tablet TAKE 1 TABLET BY MOUTH EVERY DAY 90 tablet 3    atorvastatin (LIPITOR) 10 MG tablet TAKE 1 TABLET BY MOUTH EVERY DAY 90 tablet 1    labetalol (NORMODYNE) 100 MG tablet TAKE 1 TABLET BY MOUTH TWICE A  tablet 1    Lift Chair MISC by Does not apply route 1 each 0    Misc. Devices (COMMODE BEDSIDE) MISC Use daily. 1 each 0    Incontinence Supply Disposable MISC 1 each by Does not apply route 4 times daily as needed (incontinence) 120 each 3    pregabalin (LYRICA) 50 MG capsule TAKE 1 CAPSULE BY MOUTH EVERY DAY      apixaban (ELIQUIS) 5 MG TABS tablet Take 1 tablet by mouth in the morning and 1 tablet before bedtime.  180 tablet 1    fluticasone-umeclidin-vilant (Rukhsana Teresita) 100-62.5-25 MCG/INH AEPB Inhale 1 puff into the lungs daily 1 each 5    DULoxetine (CYMBALTA) 30 MG extended release capsule Take 30 mg by mouth daily      Handicap Placard MISC by Does not apply route 02/07/22 1 each 0    ACTEMRA 162 MG/0.9ML SOSY injection       predniSONE (DELTASONE) 10 MG tablet Take 5 mg by mouth daily       Misc. Devices (WHEELCHAIR) MISC SELF PROPELLED, LIGHT WEIGHT,  STANDARD SIZE 1 each 0    ergocalciferol (ERGOCALCIFEROL) 1.25 MG (18071 UT) capsule Take 50,000 Units by mouth every 7 days      OXYGEN Inhale 3 L/min into the lungs daily as needed       Blood Pressure Monitoring (BLOOD PRESSURE MONITOR/M CUFF) MISC 1 Units by Does not apply route daily 1 each 0    ipratropium-albuterol (DUONEB) 0.5-2.5 (3) MG/3ML SOLN nebulizer solution Inhale 3 mLs into the lungs every 4 hours as needed. 360 mL 3    nitroGLYCERIN (NITROSTAT) 0.4 MG SL tablet Place 0.4 mg under the tongue every 5 minutes as needed. levothyroxine (SYNTHROID) 75 MCG tablet Take 1 tablet by mouth daily 90 tablet 0    oxyCODONE-acetaminophen (PERCOCET) 5-325 MG per tablet Take 1 tablet by mouth every 12 hours as needed for Pain (take for severe pain) for up to 30 days. Max Daily Amount: 2 tablets 60 tablet 0    acetaminophen (TYLENOL) 500 MG tablet Take 1 tablet by mouth every 12 hours as needed for Pain (take for breakthrough pain) 120 tablet 1     No current facility-administered medications for this visit. Patient's past medical history, surgical history, family history, medications,  andallergies  were all reviewed and updated as appropriate today. Review of Systems  All other systems reviewed and negative    Physical Exam  Vitals reviewed. Constitutional:       Appearance: Normal appearance. HENT:      Head: Normocephalic and atraumatic. Cardiovascular:      Rate and Rhythm: Normal rate and regular rhythm. Pulmonary:      Effort: Pulmonary effort is normal.      Breath sounds: Normal breath sounds.    Musculoskeletal: Right lower leg: Edema present. Left lower leg: Edema (2+) present. Neurological:      General: No focal deficit present. Mental Status: She is alert and oriented to person, place, and time. Psychiatric:         Behavior: Behavior normal.         Thought Content: Thought content normal.     Vitals:    01/04/23 1136   BP: 124/64   Pulse: 79   SpO2: 97%       Assessment:  Encounter Diagnoses   Name Primary? Chronic diastolic heart failure (HCC)     Rheumatoid arthritis involving multiple sites with positive rheumatoid factor (HCC)     Centrilobular emphysema (HCC)     Atrial septal defect        Plan:  1. Chronic diastolic heart failure (HCC)  Has maximized diuretic therapy given DANY with increasing doses. No orthopnea or increased oxygen requirement. Will refer to lymphedema clinic for further treatment. - [facility] [place]    2. Rheumatoid arthritis involving multiple sites with positive rheumatoid factor ([address]  Complicates treatment due to pain and limited mobility. 3. Centrilobular emphysema ([address]  On chronic oxygen therapy            No follow-ups on file.

## 2023-01-05 ENCOUNTER — OFFICE VISIT (OUTPATIENT)
Dept: PAIN MANAGEMENT | Age: 83
End: 2023-01-05
Payer: MEDICAID

## 2023-01-05 VITALS
HEIGHT: 67 IN | TEMPERATURE: 97.2 F | HEART RATE: 75 BPM | OXYGEN SATURATION: 97 % | DIASTOLIC BLOOD PRESSURE: 70 MMHG | BODY MASS INDEX: 28.68 KG/M2 | SYSTOLIC BLOOD PRESSURE: 133 MMHG

## 2023-01-05 DIAGNOSIS — G89.29 BILATERAL CHRONIC KNEE PAIN: ICD-10-CM

## 2023-01-05 DIAGNOSIS — M50.30 DDD (DEGENERATIVE DISC DISEASE), CERVICAL: ICD-10-CM

## 2023-01-05 DIAGNOSIS — M05.79 RHEUMATOID ARTHRITIS INVOLVING MULTIPLE SITES WITH POSITIVE RHEUMATOID FACTOR (HCC): ICD-10-CM

## 2023-01-05 DIAGNOSIS — M50.121 CERVICAL DISC DISORDER AT C4-C5 LEVEL WITH RADICULOPATHY: ICD-10-CM

## 2023-01-05 DIAGNOSIS — M51.36 DDD (DEGENERATIVE DISC DISEASE), LUMBAR: ICD-10-CM

## 2023-01-05 DIAGNOSIS — M25.561 BILATERAL CHRONIC KNEE PAIN: ICD-10-CM

## 2023-01-05 DIAGNOSIS — G89.4 CHRONIC PAIN SYNDROME: ICD-10-CM

## 2023-01-05 DIAGNOSIS — M71.38 SYNOVIAL CYST OF LUMBAR SPINE: ICD-10-CM

## 2023-01-05 DIAGNOSIS — M48.061 SPINAL STENOSIS OF LUMBAR REGION WITHOUT NEUROGENIC CLAUDICATION: ICD-10-CM

## 2023-01-05 DIAGNOSIS — M25.562 BILATERAL CHRONIC KNEE PAIN: ICD-10-CM

## 2023-01-05 DIAGNOSIS — M19.019 ARTHRITIS PAIN OF SHOULDER: ICD-10-CM

## 2023-01-05 DIAGNOSIS — M54.32 SCIATICA OF LEFT SIDE: ICD-10-CM

## 2023-01-05 DIAGNOSIS — J43.2 CENTRILOBULAR EMPHYSEMA (HCC): ICD-10-CM

## 2023-01-05 PROCEDURE — 3075F SYST BP GE 130 - 139MM HG: CPT | Performed by: NURSE PRACTITIONER

## 2023-01-05 PROCEDURE — 1123F ACP DISCUSS/DSCN MKR DOCD: CPT | Performed by: NURSE PRACTITIONER

## 2023-01-05 PROCEDURE — 99213 OFFICE O/P EST LOW 20 MIN: CPT | Performed by: NURSE PRACTITIONER

## 2023-01-05 PROCEDURE — 3078F DIAST BP <80 MM HG: CPT | Performed by: NURSE PRACTITIONER

## 2023-01-05 RX ORDER — OXYCODONE HYDROCHLORIDE AND ACETAMINOPHEN 5; 325 MG/1; MG/1
1 TABLET ORAL EVERY 12 HOURS PRN
Qty: 60 TABLET | Refills: 0 | Status: SHIPPED | OUTPATIENT
Start: 2023-01-05 | End: 2023-02-04

## 2023-01-05 RX ORDER — ACETAMINOPHEN 500 MG
500 TABLET ORAL EVERY 12 HOURS PRN
Qty: 120 TABLET | Refills: 1 | Status: SHIPPED | OUTPATIENT
Start: 2023-01-05 | End: 2023-03-06

## 2023-01-05 NOTE — PROGRESS NOTES
Juni Cristina  1940  [id number]      HISTORY OF PRESENT ILLNESS: Ms. Stevo Hahn is a [de-identified] y.o. female returns for a follow up visit for pain management  She has a diagnosis of   1. Chronic pain syndrome    2. DDD (degenerative disc disease), lumbar    3. Spinal stenosis of lumbar region without neurogenic claudication    4. Synovial cyst of lumbar spine    5. Sciatica of left side    6. DDD (degenerative disc disease), cervical    7. Cervical disc disorder at C4-C5 level with radiculopathy    8. Arthritis pain of shoulder right severe    9. Bilateral chronic knee pain    10. Rheumatoid arthritis involving multiple sites with positive rheumatoid factor ([address]    11. Centrilobular emphysema ([address]      As per Information Obtained from the PADT (Patient Assessment and Documentation Tool)    She complains of pain in the both leg(s): entire area, bilateral lower back, bilateral mid-back, and right shoulder(s): entire area , both knees radiating down both legs, both hands She rates the pain 8/10 and describes it as aching, burning, stabbing. Current treatment regimen has helped relieve about 10% of the pain. She denies any side effects from the current pain regimen. Patient reports that since the last follow up visit the physical functioning is worse, family/social relationships are unchanged, mood is unchanged sleep patterns are unchanged, and that the overall functioning is worse. Patient denies misusing/abusing her narcotic pain medications or using any illegal drugs. Upon obtaining medical history from Ms. Stevo Hahn states that pain is somewhat manageable on current pain therapy. Takes the pain medications as needed atleast twice a day. Was evaluated by Dr. Lizzy Valle who recommended shoulder surgery in the summer to avoid flu season/ exposure to respiratory illnesses's. Stable on 2-3L O2, maintains care through pulmonology. Mood/anxiety is stable. Sleep is fair with an average of 5-6 hours.  Denies to having issues of constipation. Tolerating activities/house chores with moderate tenderness to the lower back, neck. ALLERGIES: Patients list of allergies were reviewed     MEDICATIONS: Ms. Stevo Hahn list of medications were reviewed. Her current medications are   Outpatient Medications Prior to Visit   Medication Sig Dispense Refill    allopurinol (ZYLOPRIM) 100 MG tablet TAKE 1 TABLET BY MOUTH EVERY DAY      levothyroxine (SYNTHROID) 88 MCG tablet Take 1 tablet by mouth daily 90 tablet 1    albuterol sulfate HFA (PROVENTIL;VENTOLIN;PROAIR) 108 (90 Base) MCG/ACT inhaler INHALE TWO (2) PUFFS BY MOUTH EVERY 6 HOURS AS NEEDED 18 g 10    spironolactone (ALDACTONE) 25 MG tablet TAKE 1 TABLET BY MOUTH TWICE A  tablet 1    fluticasone (FLONASE) 50 MCG/ACT nasal spray SPRAY 2 SPRAYS INTO EACH NOSTRIL EVERY DAY 1 each 5    omeprazole (PRILOSEC) 10 MG delayed release capsule Take by mouth daily      furosemide (LASIX) 20 MG tablet TAKE 1 TABLET BY MOUTH TWICE A  tablet 1    ezetimibe (ZETIA) 10 MG tablet TAKE 1 TABLET BY MOUTH EVERY DAY 90 tablet 3    atorvastatin (LIPITOR) 10 MG tablet TAKE 1 TABLET BY MOUTH EVERY DAY 90 tablet 1    labetalol (NORMODYNE) 100 MG tablet TAKE 1 TABLET BY MOUTH TWICE A  tablet 1    Lift Chair MISC by Does not apply route 1 each 0    Misc. Devices (COMMODE BEDSIDE) MISC Use daily. 1 each 0    Incontinence Supply Disposable MISC 1 each by Does not apply route 4 times daily as needed (incontinence) 120 each 3    pregabalin (LYRICA) 50 MG capsule TAKE 1 CAPSULE BY MOUTH EVERY DAY      apixaban (ELIQUIS) 5 MG TABS tablet Take 1 tablet by mouth in the morning and 1 tablet before bedtime.  180 tablet 1    fluticasone-umeclidin-vilant (TRELEGY ELLIPTA) 100-62.5-25 MCG/INH AEPB Inhale 1 puff into the lungs daily 1 each 5    DULoxetine (CYMBALTA) 30 MG extended release capsule Take 30 mg by mouth daily      Handicap Placard MISC by Does not apply route 02/07/22 1 each 0    ACTEMRA 162 MG/0.9ML SOSY injection       predniSONE (DELTASONE) 10 MG tablet Take 5 mg by mouth daily       Misc. Devices (WHEELCHAIR) MISC SELF PROPELLED, LIGHT WEIGHT,  STANDARD SIZE 1 each 0    ergocalciferol (ERGOCALCIFEROL) 1.25 MG (58784 UT) capsule Take 50,000 Units by mouth every 7 days      OXYGEN Inhale 3 L/min into the lungs daily as needed       Blood Pressure Monitoring (BLOOD PRESSURE MONITOR/M CUFF) MISC 1 Units by Does not apply route daily 1 each 0    ipratropium-albuterol (DUONEB) 0.5-2.5 (3) MG/3ML SOLN nebulizer solution Inhale 3 mLs into the lungs every 4 hours as needed. 360 mL 3    nitroGLYCERIN (NITROSTAT) 0.4 MG SL tablet Place 0.4 mg under the tongue every 5 minutes as needed. oxyCODONE-acetaminophen (PERCOCET) 5-325 MG per tablet Take 1 tablet by mouth every 8 hours as needed for Pain (take for severe pain) for up to 30 days. 90 tablet 0    acetaminophen (TYLENOL) 500 MG tablet Take 1 tablet by mouth every 12 hours as needed for Pain (take for breakthrough pain) 120 tablet 1     No facility-administered medications prior to visit. SOCIAL/FAMILY/PAST MEDICAL HISTORY: Ms. Calle Slice, family and past medical history was reviewed. REVIEW OF SYSTEMS:    Respiratory: Negative for apnea, chest tightness and shortness of breath or change in baseline breathing. Gastrointestinal: Negative for nausea, vomiting, abdominal pain, diarrhea, constipation, blood in stool and abdominal distention. PHYSICAL EXAM:   Nursing note and vitals reviewed. /70   Pulse 75   Temp 97.2 °F (36.2 °C)   Ht 5' 6.5\" (1.689 m)   SpO2 97%   BMI 28.68 kg/m²   Constitutional: She appears well-developed and well-nourished. No acute distress. Skin: Skin is warm and dry, good turgor. No rash noted. She is not diaphoretic. Cardiovascular: Normal rate, regular rhythm, normal heart sounds, and does not have murmur. Pulmonary/Chest: Effort normal. No respiratory distress.  She does not have wheezes in the lung fields. Mildly diminished lung bases She has no rales. Neurological/Psychiatric:She is alert and oriented to person, place, and time. Coordination is  normal.  Her mood isAppropriate and affect is Neutral/Euthymic(normal) . Prescription pain medication monitoring:                  MEDD current = 15              ORT Score = 1 low risk              Other Risk factors - (mood) Depression              Date of Last Medication Agreement: 8/16/22              Date Naloxone prescribed: 9/12/22              UDT:                          Date of last UDT: 8/15/22                          Adverse report: No              OARRS:                          Checked today: Yes                          Adverse report: No    IMPRESSION:   1. Chronic pain syndrome    2. DDD (degenerative disc disease), lumbar    3. Spinal stenosis of lumbar region without neurogenic claudication    4. Synovial cyst of lumbar spine    5. Sciatica of left side    6. DDD (degenerative disc disease), cervical    7. Cervical disc disorder at C4-C5 level with radiculopathy    8. Arthritis pain of shoulder right severe    9. Bilateral chronic knee pain    10. Rheumatoid arthritis involving multiple sites with positive rheumatoid factor ([facility]    11. Centrilobular emphysema ([facility]        PLAN:  Informed verbal consent was obtained:  -Patient's opioid therapy will be maintained adjusted today  -Home exercises/Maxine exercises recommended  -CBT techniques- relaxation therapies such as biofeedback, mindfulness based stress reduction, imagery, cognitive restructuring, problem solving discussed with patient   -She was advised weight reduction, diet changes- 800-1200 kasi diet, diet diary, exercising, nutritional  consult increased physical activity as tolerated   -Last UDS 8/15/22 consistent  -Return in about 4 weeks (around 2/2/2023).      Analgesic Plan:              Continue present regimen: Percocet 5-325 mg tabs q12h prn              Adjust dose of present analgesic: Yes              Switch analgesics: No              Add/Adjust concomitant therapy: Lidocaine, Tylenol, Narcan, Lyrica through rheumatology    I will continue her current medication regimen  which is part of the above treatment schedule. It has been helping with Ms. Summer Hood chronic  medical problems which for this visit include:   Diagnoses of Chronic pain syndrome, DDD (degenerative disc disease), lumbar, Spinal stenosis of lumbar region without neurogenic claudication, Synovial cyst of lumbar spine, Sciatica of left side, DDD (degenerative disc disease), cervical, Cervical disc disorder at C4-C5 level with radiculopathy, Arthritis pain of shoulder right severe, Bilateral chronic knee pain, Rheumatoid arthritis involving multiple sites with positive rheumatoid factor ([address] and Centrilobular emphysema ([address] were pertinent to this visit. Risks and benefits of the medications and other alternative treatments  including no treatment were discussed with the patient. The common side effects of these medications were also explained to the patient. Informed verbal consent was obtained. Goals of current treatment regimen include improvement in pain, restoration of functioning- with focus on improvement in physical performance, general activity, work or disability,emotional distress, health care utilization and  decreased medication consumption. Will continue to monitor progress towards achieving/maintaining therapeutic goals with special emphasis on  1. Improvement in perceived interfernce  of pain with ADL's. Ability to do home exercises independently. Ability to do household chores indoor and/or outdoor work and social and leisure activities. Improve psychosocial and physical functioning. - she is showing progression towards this treatment goal with the current regimen.      She was advised against drinking alcohol with the narcotic pain medicines, advised against driving or handling machinery while adjusting the dose of medicines or if having cognitive  issues related to the current medications. Risk of overdose and death, if medicines not taken as prescribed, were also discussed. If the patient develops new symptoms or if the symptoms worsen, the patient should call the office. While transcribing every attempt was made to maintain the accuracy of the note in terms of it's contents,there may have been some errors made inadvertently. Thank you for allowing me to participate in the care of this patient. Bibiana Alas CNP.     Cc: Latesha Maya DO

## 2023-01-06 DIAGNOSIS — E03.9 ACQUIRED HYPOTHYROIDISM: ICD-10-CM

## 2023-01-06 RX ORDER — LEVOTHYROXINE SODIUM 0.07 MG/1
75 TABLET ORAL DAILY
Qty: 90 TABLET | Refills: 0 | Status: SHIPPED | OUTPATIENT
Start: 2023-01-06

## 2023-01-11 DIAGNOSIS — I82.401 RECURRENT ACUTE DEEP VEIN THROMBOSIS (DVT) OF RIGHT LOWER EXTREMITY (HCC): ICD-10-CM

## 2023-01-11 DIAGNOSIS — I26.99 ACUTE PULMONARY EMBOLISM, UNSPECIFIED PULMONARY EMBOLISM TYPE, UNSPECIFIED WHETHER ACUTE COR PULMONALE PRESENT (HCC): ICD-10-CM

## 2023-01-12 NOTE — TELEPHONE ENCOUNTER
.Refill Request     CONFIRM preferrred pharmacy with the patient. If Mail Order Rx - Pend for 90 day refill. Last Seen: Last Seen Department: 1/4/2023  Last Seen by PCP: 1/4/2023    Last Written: 7/27/22 180 with 1     If no future appointment scheduled, route STAFF MESSAGE with patient name to the [facility] for scheduling. Next Appointment:   Future Appointments   Date Time Provider Bossman Nevarez   1/15/2023  8:30 PM SOREN Cunningham SLEEP ROOM [address] [place] [address] SLEEP [facility]   1/27/2023 10:00 AM JOSE Marrero CNP AND PULM [facility]   2/2/2023  4:20 PM JOSE Lane CNP [facility]IN [facility]   3/24/2023  9:30 AM MD Nicolas Trotter [facility]       Message sent to [address] [place] [address] to schedule appt with patient? YES      Requested Prescriptions     Pending Prescriptions Disp Refills    ELIQUIS 5 MG TABS tablet [Pharmacy Med Name: ELIQUIS 5 MG TABLET] 180 tablet 1     Sig: TAKE 1 TABLET BY MOUTH IN THE MORNING AND 1 TABLET BEFORE BEDTIME.

## 2023-01-13 RX ORDER — APIXABAN 5 MG/1
TABLET, FILM COATED ORAL
Qty: 180 TABLET | Refills: 1 | Status: SHIPPED | OUTPATIENT
Start: 2023-01-13

## 2023-01-24 DIAGNOSIS — I50.32 CHRONIC DIASTOLIC HEART FAILURE (HCC): ICD-10-CM

## 2023-01-24 DIAGNOSIS — E78.00 PURE HYPERCHOLESTEROLEMIA: ICD-10-CM

## 2023-01-24 DIAGNOSIS — I10 ESSENTIAL HYPERTENSION: ICD-10-CM

## 2023-01-24 NOTE — TELEPHONE ENCOUNTER
Refill Request     CONFIRM preferrred pharmacy with the patient. If Mail Order Rx - Pend for 90 day refill. Last Seen: Last Seen Department: 1/4/2023  Last Seen by PCP: 1/4/2023    Last Written: 8/2/22 180 tab 1 refill    If no future appointment scheduled, route STAFF MESSAGE with patient name to the [facility] for scheduling. Next Appointment:   Future Appointments   Date Time Provider Bossman Nevarez   1/27/2023 10:00 AM JOSE Baker CNP AND PULM [facility]   2/2/2023  4:20 PM JOSE Chowdary CNP [facility]   3/24/2023  9:30 AM MD Lu Fields [facility]       Message sent to [address] [place] [address] to schedule appt with patient?   NO      Requested Prescriptions     Pending Prescriptions Disp Refills    furosemide (LASIX) 20 MG tablet [Pharmacy Med Name: FUROSEMIDE 20 MG TABLET] 180 tablet 1     Sig: TAKE 1 TABLET BY MOUTH TWICE A DAY

## 2023-01-24 NOTE — TELEPHONE ENCOUNTER
Refill Request     CONFIRM preferrred pharmacy with the patient. If Mail Order Rx - Pend for 90 day refill. Last Seen: Last Seen Department: 1/4/2023  Last Seen by PCP: Visit date not found    Last Written:   Atorvastatin-08/01/2022 90 tablet 1 refills   Lebetolol-08/01/2022 180 tablet 1 refills     If no future appointment scheduled, route STAFF MESSAGE with patient name to the [facility] for scheduling. Next Appointment:   Future Appointments   Date Time Provider Bossman Nevarez   1/27/2023 10:00 AM JOSE Marcial CNP AND PULM [facility]   2/2/2023  4:20 PM JOSE Ayala CNP [facility]   3/24/2023  9:30 AM MD Brendon Villagran [facility]       Message sent to [address] [place] [address] to schedule appt with patient?   NO      Requested Prescriptions     Pending Prescriptions Disp Refills    labetalol (NORMODYNE) 100 MG tablet [Pharmacy Med Name: LABETALOL  MG TABLET] 180 tablet 1     Sig: TAKE 1 TABLET BY MOUTH TWICE A DAY    atorvastatin (LIPITOR) 10 MG tablet [Pharmacy Med Name: ATORVASTATIN 10 MG TABLET] 90 tablet 1     Sig: TAKE 1 TABLET BY MOUTH EVERY DAY

## 2023-01-25 RX ORDER — FUROSEMIDE 20 MG/1
TABLET ORAL
Qty: 180 TABLET | Refills: 1 | Status: SHIPPED | OUTPATIENT
Start: 2023-01-25

## 2023-01-25 RX ORDER — LABETALOL 100 MG/1
TABLET, FILM COATED ORAL
Qty: 180 TABLET | Refills: 1 | Status: SHIPPED | OUTPATIENT
Start: 2023-01-25

## 2023-01-25 RX ORDER — ATORVASTATIN CALCIUM 10 MG/1
TABLET, FILM COATED ORAL
Qty: 90 TABLET | Refills: 1 | Status: SHIPPED | OUTPATIENT
Start: 2023-01-25

## 2023-01-27 ENCOUNTER — OFFICE VISIT (OUTPATIENT)
Dept: PULMONOLOGY | Age: 83
End: 2023-01-27
Payer: MEDICAID

## 2023-01-27 VITALS
HEART RATE: 88 BPM | SYSTOLIC BLOOD PRESSURE: 133 MMHG | TEMPERATURE: 96.9 F | DIASTOLIC BLOOD PRESSURE: 66 MMHG | WEIGHT: 185 LBS | RESPIRATION RATE: 16 BRPM | BODY MASS INDEX: 29.03 KG/M2 | OXYGEN SATURATION: 93 % | HEIGHT: 67 IN

## 2023-01-27 DIAGNOSIS — Z87.891 FORMER SMOKER: ICD-10-CM

## 2023-01-27 DIAGNOSIS — J43.2 CENTRILOBULAR EMPHYSEMA (HCC): ICD-10-CM

## 2023-01-27 DIAGNOSIS — Q21.10 ATRIAL SEPTAL DEFECT: ICD-10-CM

## 2023-01-27 DIAGNOSIS — J96.11 CHRONIC RESPIRATORY FAILURE WITH HYPOXIA (HCC): ICD-10-CM

## 2023-01-27 DIAGNOSIS — I27.20 PULMONARY HTN (HCC): ICD-10-CM

## 2023-01-27 DIAGNOSIS — G47.33 OSA (OBSTRUCTIVE SLEEP APNEA): Primary | ICD-10-CM

## 2023-01-27 PROCEDURE — 3078F DIAST BP <80 MM HG: CPT | Performed by: INTERNAL MEDICINE

## 2023-01-27 PROCEDURE — 1123F ACP DISCUSS/DSCN MKR DOCD: CPT | Performed by: INTERNAL MEDICINE

## 2023-01-27 PROCEDURE — 99213 OFFICE O/P EST LOW 20 MIN: CPT | Performed by: INTERNAL MEDICINE

## 2023-01-27 PROCEDURE — 3075F SYST BP GE 130 - 139MM HG: CPT | Performed by: INTERNAL MEDICINE

## 2023-01-27 NOTE — LETTER
[address] [facility]  [facility] [address] [facility]  Phone: [phone]  Fax: [phone]    Jeni Lund MD    January 27, 2023     Elsworth Scheuermann, DO [place]    Patient: Abel Caro   MR Number: [id number]   YOB: 1940   Date of Visit: 1/27/2023       Dear Tomer Maya: Thank you for referring Pramod Kim to me for evaluation/treatment. Below are the relevant portions of my assessment and plan of care. If you have questions, please do not hesitate to call me. I look forward to following Bettie along with you.     Sincerely,      Jeni Lund MD

## 2023-01-27 NOTE — LETTER
[address] [facility] Critical Care and Sleep  [address] [place] [address] [address] [facility]  Phone: [phone]  Fax: [phone]    Marly Odonnell MD    January 27, 2023     DO Noel Perez    Patient: Fantasma Albert   MR Number: [id number]   YOB: 1940   Date of Visit: 1/27/2023       Dear eBn Maya: Thank you for referring Sweta Bee to me for evaluation/treatment. Below are the relevant portions of my assessment and plan of care. If you have questions, please do not hesitate to call me. I look forward to following Bettie along with you.     Sincerely,      Marly Odonnell MD

## 2023-01-27 NOTE — PROGRESS NOTES
Chief Complaint/Referring Provider:  Pulmonary follow to discuss the clinical status and options     Patient has come to the office for a pulmonary follow-up, patient continues to have shortness of breath especially when she goes up and down the stairs, patient does have occasional wheezing along with that, patient's exercise tolerance is limited and going from bedroom to breath room because he has shown has shortness of breath, patient does have some sinus congestion for which she is taking Flonase, patient was ordered a sleep study but the patient's family was reluctant about doing it without the oxygen as she was having some shortness of breath as being told by the patient's family, patient's saturation never dropped below 90% on room air when she was having shortness of breath as being told by the patient's family, patient does not have any abdominal issues of concern, no fever no chills, no abdominal pain or any hematochezia or melena, patient does feel more tired and having less energy in the daytime, no other pertinent review of system of concern    Previous HPIPatient states that she has been having some coughing but not significant, patient has once in a while mucoid expectoration, patient does not have any more than usual shortness of breath, patient does feel some shortness of breath when she bends forward, patient has little bit of swelling of her legs, patient does not have any significant fever chills or any pleuritic chest pain, patient's CPAP machine has not been fixed so far by [employer] healthcare and patient states that they have been telling her that they will be coming to do so but has not been done so, patient does not have any significant fever or chills, patient has history of recent hospitalization for acute DVT and PE for which reason is patient is on anticoagulation, patient does not have any other pertinent review of system of concern    Patient is a 47-year-old female who has come to the office for a pulmonary follow-up, patient has been having more shortness of breath, patient states that she has been having some increased dyspnea on exertion, patient also feels intermittent sensation of gasping for air, patient states that she has been having some increased cough and expectoration especially at nighttime for 2 weeks time, patient's cough is wet and congested as per patient's family, patient also has some rhinorrhea, patient cannot lie flat, patient also has been having some ear pain off-and-on, patient does not have chest pain, patient does have some abdominal discomfort especially in the abdomen, patient has been given lansoprazole sublingual in the past with improvement but patient is due for a new course of the medication which the family has indicated from the pharmacy, patient has been having sensation of gasping for breath and air hunger when she bends forward, patient does have reflux symptoms as stated above, patient also has pedal edema, patient given information that she has stopped taking a pill which she was supposed to take for 3 months and patient is not sure whether it is Eliquis or patient has also stopped taking Lasix and patient's family is going to check on that and do the needful, patient was taking Trelegy Ellipta and patient was doing very well but patient insurance is not willing to pay for it now, patient and family states that they have called the office for alternative but have not heard anything back, patient is also supposed to be on CPAP and patient has had multiple communication from this office to patient's [employer] but patient has not received any CPAP so far, patient has cut down on the salt intake, no other pertinent review of system of current    Patient states that from pulmonary standpoint of view she is clinically stable, patient does not have any increasing nasal congestion sinus congestion postnasal drainage, patient does not have any significant increasing cough expectoration shortness of breath of concern, no palpitation diaphoresis, patient does not have any significant fever or chills, no nausea or vomiting, patient does have some nonspecific left upper abdominal discomfort without any hematochezia or melena, patient does not have any history of any chest wall or abdominal trauma, no abnormal movement per se, patient does not have any significant dysuria hematuria or any increasing leg edema, patient does not have any confusion lethargy, no other pertinent review of system of concern    Patient is a 26-year-old female who has come back to the office for a pulmonary evaluation, patient was subjected to a sleep study for ASV titration but patient could not tolerate that for 15 minutes and the study was aborted, patient states that she could not catch her breath and also patient states that she was gasping for air along with that patient had some discomfort in the lower part of the center of the sternum and she felt as if something was closing up, patient also thinks that her diaphragm and the stomach sinks intermittently which causes her to have some shortness of breath, patient has had difficulty in swallowing the past and patient has had esophagogram along with modified barium esophagram in the past, patient also has had history of upper GI endoscopies and patient had 2 procedures for esophageal stretching, patient continues to have some cough with mucoid expectoration, patient also does not have any altered bowel habits, no epistaxis, hemoptysis or any hematochezia, patient's water pill was changed and patient is taking Lasix twice a day and patient states that she has to go to the bathroom quite often now, patient also wanted know if she can get a portable concentrator, patient did not have any change of ambient environment, patient has received  COVID-19 vaccine , no other pertinent review of system of concern      Patient was updated to the in lab sleep studies and patient has come to follow-up on that, patient continues to have some dry cough with scanty mucoid expectoration once in a while, patient does have some occasional sinus congestion, patient does not have any significant sore throat or difficulty in swallowing, no coughing or choking while eating, no odynophagia or dysphagia, patient does not have any pleuritic chest pain, patient does have a history of recent right shoulder pain for which patient was given muscle relaxants and also an injection was given in the right shoulder as up with the patient, patient also states that she has a lower central chest cystlike structure which swells at times, patient does not have any significant increasing abdominal discomfort nausea vomiting or any reflux symptoms, patient does have some swelling of the lower extremities, patient has been on water pill along with her blood pressure medication as per the patient, patient's requirement for nebulizer is limited, patient continues to have sleep fragmentation, and continues to be on home oxygen, patient does not have any other pertinent review of system of concern      Patient is a 82-year-old female who has come to the office for a pulmonary evaluation, patient states that she has not been using her CPAP machine because she feels the pressure is high, patient is having difficulty in getting it evaluated and checked by [address] as per patient and family, also patient and family are stating that they have to go personally to get the oxygen and is not delivered by [address] and they wanted to see if patient can get a new DME company, patient has been having increasing nasal congestion sinus congestion postnasal drainage, patient does not have any significant otalgia no ear discharge, patient states that the Alison Johnathan is helping her and patient's shortness of breath is less with that, patient does not have any significant fever or chills, patient does not have any palpitations, patient does have reflux symptoms for which she takes medication as given by the GI with improvement, patient does not have any altered bowel habits or any hematochezia or melena, patient has some leg edema but patient takes HCTZ on regular basis, patient does not have any change in the ambient environment any sick contacts, patient has received her 2 doses of COVID-19 vaccine, patient does not have any confusion lethargy, no other pertinent review of system of concern      A virtual pulmonary visit was done for the patient to evaluate her clinical status and options, patient states that with the new inhaler which is Trelegy Ellipta he feels somewhat better, patient does not have that measured shortness of breath, patient does not have any significant wheezing, patient does have some cough along with that patient feels that she at times have congestion and the mucus is more so in the throat area, patient does not have any chest pain or palpitations, patient does not have any otalgia no ear discharge, patient states that yesterday she felt that she had an episode of difficulty in swallowing on the left side which she attributed to possible thyroid medication has not happened on a regular basis, patient does not have any pleuritic chest pain, no fever no chills, patient does not have any significant abdominal issues of concern, patient states that she does not have any altered bowel habits, no dysuria no hematuria, patient does not have any increasing leg edema, patient is requirement for risk inhaler is limited, patient states that she has been using her CPAP machine on a regular basis but she feels that her CPAP machine pressure is quite high and wants something to be done about that, patient does not see Dr. Rober Reyes now for sleep apnea, does not have any change in the abnormal range sick contacts, no change of medications, patient was alert and communicative when seen, patient does not have any other pertinent review of system of concern     : Patient has come to the office for pulmonary evaluation as referred by his PCP/Dr. Moisés Marquez  Patient was seen as an inpatient in 2019    Patient states that she has increasing shortness of breath, patient states that she has coughing along with that patient has thick but clear respiratory secretions, patient also has been having increased nasal congestion and nasal stuffiness, patient states that she has not been able to use the humidification along with the oxygen concentrator as patient states that it comes to her nose at times, patient states that she has occasional pleuritic chest pain, patient also has occasional abdominal pain, patient used to have a PEG tube which has been removed, patient is taking food by mouth but it is modified as per the speech therapy  recommendations, patient has a history of rheumatoid arthritis and many biologicals have been tried but patient has had various side effects with that and patient is now taking Actemra twice a month as given by the rheumatologist, patient continues to have intermittent pain and patient states that last night she was having significant shoulder pain which was causing her to have asthma attack, patient takes albuterol inhaler on a as needed basis along with that patient states that she takes nebulizer treatment along with Spiriva on regular basis, patient states that she has CPAP which she is using at nighttime; patient does not have any pets, patient has central air but no humidification, patient is try to take less salt in the diet, patient denies any change in the environment or any sick contacts, patient does not have any confusion lethargy, patient does not have any other pertinent review of system of concern     Past Medical History:   Diagnosis Date    Acid reflux     Acquired hypothyroidism 7/6/2017    Anemia     Asthma     CHF (congestive heart failure) ([facility]     COP[facility] obstructive [facility] disease[facility]     HLD (hyperlipidemia)     Hypertension     Influenza A 01/22/2018    MI (myocardial infarction) ([facility] [facility]     X 2    Migraine     past hx    Rheumatoid arthritis     Wears glasses        Past Surgical History:   Procedure Laterality Date    BRONCHOSCOPY N/A 3/27/2019    BRONCHOSCOPY ALVEOLAR LAVAGE performed by Jocelyne Lawson MD at [address]  3/27/2019    BRONCHOSCOPY THERAPUTIC ASPIRATION INITIAL performed by Jocelyne Lawson MD at [address] Bilateral 03/23/2011    COLONOSCOPY N/A 6/12/2019    COLONOSCOPY WITH ANESTHESIA -SLEEP APNEA- performed by Elver Platt MD at [address] Left 12/4/2018    LEFT LUMBAR FOUR, LUMBAR FIVE TRANSFORAMINAL EPIDURAL STEROID INJECTION SITE CONFIRMED BY FLUOROSCOPY performed by Korina Hui MD at [address] N/A 7/23/2019    ESOPHAGEAL MOTILITY/MANOMETRY STUDY performed by Virgilio Isaacs MD at [address] little toe    GASTROSTOMY TUBE PLACEMENT N/A 4/1/2019    EGD PEG TUBE PLACEMENT performed by Elver Platt MD at [address] [facility] (CERVIX STATUS UNKNOWN)      total, fibroids    KNEE SURGERY      right    VT NJX DX/THER SBST INTRLMNR CRV/THRC W/IMG GDN Left 8/21/2018    LEFT LUMBAR FOUR/ LUMBAR FIVE CYST ASPIRATION SITE CONFIRMED BY FLUOROSCOPY performed by Korina Hui MD at [address] [facility] 3/27/2019    EGD DIAGNOSTIC ONLY performed by Fidel Castanon MD at [address] [facility] 9/6/2020    EGD DIAGNOSTIC ONLY performed by Farzana Eng DO at [address] [facility]       Allergies   Allergen Reactions    Alendronate Sodium      Jaw pain      Humira [Adalimumab]      Rash      Losartan Swelling    Penicillins      rash    Simvastatin Other (See Comments)     Made legs ache Sulfa Antibiotics Swelling     Tongue swelled    Tape Clemetine Donley Tape]        Medication list was reviewed and updated as needed in Epic    Social History     Socioeconomic History    Marital status: Single     Spouse name: Not on file    Number of children: 3    Years of education: Not on file    Highest education level: Not on file   Occupational History    Occupation: disability   Tobacco Use    Smoking status: Former     Packs/day: 3.00     Years: 50.00     Pack years: 150.00     Types: Cigarettes     Start date: 3/4/1963     Quit date: 2009     Years since quittin.0    Smokeless tobacco: Never   Vaping Use    Vaping Use: Never used   Substance and Sexual Activity    Alcohol use: No    Drug use: No    Sexual activity: Not on file   Other Topics Concern    Not on file   Social History Narrative    Not on file     Social Determinants of Health     Financial Resource Strain: Low Risk     Difficulty of Paying Living Expenses: Not hard at all   Food Insecurity: No Food Insecurity    Worried About [address] in the Last Year: Never true    [address] in the Last Year: Never true   Transportation Needs: No Transportation Needs    Lack of Transportation (Medical): No    Lack of Transportation (Non-Medical):  No   Physical Activity: Inactive    Days of Exercise per Week: 0 days    Minutes of Exercise per Session: 10 min   Stress: Not on file   Social Connections: Not on file   Intimate Partner Violence: Not At Risk    Fear of Current or Ex-Partner: No    Emotionally Abused: No    Physically Abused: No    Sexually Abused: No   Housing Stability: Low Risk     Unable to Pay for Housing in the Last Year: No    Number of Places Lived in the Last Year: 1    Unstable Housing in the Last Year: No       Family History   Problem Relation Age of Onset    Cancer Mother     Cancer Father     Heart Disease Maternal Aunt     Cancer Sister             Review of Systems same as above    Physical Exam:  Blood pressure 133/66, pulse 88, temperature 96.9 °F (36.1 °C), temperature source Temporal, resp. rate 16, height 5' 6.5\" (1.689 m), weight 185 lb (83.9 kg), SpO2 93 %, not currently breastfeeding.'  Constitutional:  No acute distress. While sitting in the wheelchair on nasal cannula oxygen  HENT:  Oropharynx is clear and moist. No thyromegaly. Eyes:  Conjunctivae arenormal. Pupils equal, round, and reactive to light. No scleral icterus. Neck: . No tracheal deviation present. No obvious thyroid mass. Cardiovascular:Normal rate, regular rhythm, normal heart sounds. No right ventricular heave. Minimal lower extremity edema. Pulmonary/Chest: No wheezes. No rales . Chest wall is not dull to percussion. Noaccessory muscle usage or stridor. decreased breath sound intensity  Abdominal: Soft. Bowel sounds present. No distension or hernia. No tenderness. Musculoskeletal: No cyanosis. No clubbing. No obvious joint deformity. Lymphadenopathy: No cervical or supraclavicular adenopathy. Skin: Skin is warm and dry. No rash or nodules on the exposed extremities. Psychiatric: Normal mood and affect. Behavior is normal.  No anxiety. Neurologic: Alert, awake and oriented. PERRL. Speech fluent        Data:     Imaging:  I have reviewed radiology images personally. No orders to display     Xr Lumbar Spine (2-3 Views)    Result Date: 1/15/2021  EXAMINATION: 3 XRAY VIEWS OF THE LUMBAR SPINE 1/15/2021 12:06 pm COMPARISON: Lumbar spine radiographs April 13, 2018. HISTORY: ORDERING SYSTEM PROVIDED HISTORY: Lumbar pain TECHNOLOGIST PROVIDED HISTORY: Reason for Exam: low back pain Acuity: Acute Type of Exam: Initial Mechanism of Injury: fell to floor from wheelchair Relevant Medical/Surgical History: pt noted having a recent fall down steps FINDINGS: There is 6 mm degenerative anterolisthesis of L3 on L4 and L4 on L5. Lumbar vertebra otherwise demonstrate normal height and alignment.   No fracture or suspicious osseous lesion identified. Listhesis is similar in appearance dating back to 2018. There is moderate disc height loss at L3-L4 and L4-L5. Mild disc height loss is seen at the remainder of lumbar levels. There is multilevel facet arthrosis. Evaluation the soft tissues demonstrates atherosclerotic calcification of the abdominal aorta. Multilevel degenerative changes with grade 1 degenerative listhesis at L3-L4 and L4-L5, similar in appearance dating back to 2018. No acute osseous abnormality. Nuclear stress test-  Conclusions        Summary    There is normal isotope uptake at stress and rest. There is no evidence of    myocardial ischemia or scar. LV function is normal with uniform wall motion    and ejection fraction of 67 %. Lower risk study     CT chest in 2019 [facility]    1. No acute findings. 2.  No evidence of mass or lymphadenopathy. 3.  Severe emphysema. 4.  Pulmonary arterial dilatation suspicious for pulmonary hypertension. ECHO in 2019-Study Conclusions    - Left ventricle: The cavity size was normal. Wall thickness was    normal. Systolic function was normal. The calculated ejection    fraction was in the range of 64% to 68%. Normal diastolic    function.  - Aortic valve: Peak gradient (S): 6mm Hg.  - Tricuspid valve: Regurgitant peak velocity: 314cm/sec. Peak RV-RA    gradient (S): 39mm Hg. - Inferior vena cava: The vessel was normal in size; the    respirophasic diameter changes were in the normal range (>= 50%);    findings are consistent with normal central venous pressure. - Pulmonary arteries: PA peak pressure: 42mm Hg (S). PFT IN 2017-PFT TEST    Spirometry  Spirometry shows moderate obstructive defect. .    Bronchodilator  There is no response to bronchodilator demonstrated. Flow-Volume Loop  The flow-volume loop is compatible with obstructive lung defect. Lung Volumes  Lung volumes are normal except for elevated RV.     Lung Volume Comments  There is air trapping present. Diffusing Capacity  Single breath diffusing capacity is severely decreased. A reduced DLCO can be seen in diseases causing destruction of   alveolar-capillary interface (ILD, CHF, COPD, Pneumonia, etc.)   obliteration of pulmonary vascular bed (PE, PAH), tobacco usage,   carbon monoxide poisoning. Airway Resistance  Airway resistance is moderately elevated. Comparison  In comparison to the test of 16 there is mild deterioration   in spirometry and DLCO    PFT is compatible with moderate Obstructive Lung Disease  -   Possible etiologies include: asthma, COPD, bronchiectasis,   bronchitis. Correlate clinically. 6mwt IN 2017-6 Minute Walk Test Interpretation    Patient Name: Jia Bullock  : 1940  Date: 8/15/2017    A 6 Minute Walk Test was performed in accordance with the ATS   Guidelines ([address] [place] [address] ; [phone]) at [facility]. The test was performed on room air. The baseline SpO2 while breathing room air was 91%. The patient did complete the 6 minute walk. During the study, the patient walked a total distance of 1050   feet. The minimal recorded SpO2 was 88%. Oximetry with 6 Minute Walk Test shows desaturation to 88% on   room air, 1L, 2L and 3L nasal cannula oxygen with maximal   exercise. Sats improved to 94% on 4L NC oxygen with activity. Comments: Based on testing patient qualifies for 4L NC oxygen to   use with activity. ECHO-Summary  Left ventricular systolic function is hyperdynamic with an estimated ejection fraction of ? 65%. The left ventricle is normal in size with normal wall thickness. Normal left ventricular diastolic function. The right atrium is mildly enlarged. Mild tricuspid regurgitation. Systolic pulmonary artery pressure (SPAP) is elevated and estimated at 39 mmHg (right atrial pressure 3 mmHg) consistent with  borderline pulmonary hypertension.         Barium 9/17/20  Moderate esophageal dysmotility. No evidence of functional obstruction or  gross reflux. PAP Compliance 10/3/2019   PAP Setting = 7   Percentage days used = 6   Average hours used = 2.39   Is patient compliant with PAP? no   AHI = 3.2   Leak = 9.4   [place] = -     PSG 9/5/18:  TOTAL AHI = 11.2/hour  REM AHI= 16.6/hr   Central AHI= 1.2/hour  Lowest O2 desaturation = 89 %  Minutes with O2 <90% = 2.6    TITRATION STUDY 9/20/18:  TOTAL AHI = 1.9/hour on PAP = 7 cm of H2O  Sleep emergent CSA events during the initial part of the study   Central AHI= 3.9/hour  Lowest O2 desaturation = 90 % overall  Minutes with O2% <90% = NA    Patient's sleep read shows patient to have severe HEBERT with AHI of 49.1/h along with that patient has central sleep apnea with no  with some nocturnal hypoxemia with saturation dropping to 82% when patient saturation below 89% was 104 minutes    Summary   Left ventricular systolic function is hyperdynamic with an estimated   ejection fraction of >= 65%. The left ventricle is normal in size with normal wall thickness. Normal left ventricular diastolic function. The right atrium is mildly enlarged. Mild tricuspid regurgitation. Systolic pulmonary artery pressure (SPAP) is elevated and estimated at 39   mmHg (right atrial pressure 3 mmHg) consistent with borderline pulmonary   hypertension.     Patient's sleep study shows patient to have mild obstructive airway disease with significant small airway disease with some postprandial improvement in the small airways on the study, patient does not have any restrictive lung disease patient does have some air trapping and hyperinflation along with that patient was not able to objectively do the diffusion test patient's flow volume loop was a stable obstructive pattern    Patient 6-minute walk test shows patient to have hypoxemia after 1 minute of walking and patient required 2 L of oxygen to sustain the oxygen saturation    Patient 6-minute walk test shows patient to have significant hypoxemia and patient needs at least 3 L of nasal cannula oxygen and patient is not homebound and face-to-face done for the patient for home oxygen      Assessment:    1. Centrilobular emphysema ([address]      2. HEBERT on CPAP      3. Rheumatoid arthritis involving multiple sites with positive rheumatoid factor ([address]      4. Pulmonary HTN ([address]      5. ASD (atrial septal defect)      6. Former smoker      7. Chronic respiratory failure with hypoxia (HCC)    8.   Nasal congestion          Plan:   Patient's ROS discussed   Patient was told about the clinical findings including auscultation and implications  Patient sleep study results were reviewed and patient has severe HEBERT along with CSA and nocturnal hypoxemia  Patient needs a new sleep study and pathology and physiology discussed with patient's family  Correlation between shortness of breath and hypoxemia discussed  Patient and family were told about the pathophysiology of the disease process and its modifying factors and how the medications act  Part of the patient's symptomatology is secondary to patient not taking the Trelegy Ellipta  Albuterol to be given 2 puffs every 6 hours on whenever necessary basis  Patient was recently also found to have acute DVT with PE and patient is on anticoagulation  Oxygen supplementation to keep saturation between 90 to 94% only  Concept of hypoxemia and hypercarbia discussed  Patient was admitted and echocardiogram shows patient's EF to be normal but patient does have some pulmonary hypertension  Patient has chronic respiratory failure with hypoxemia  Patient's modified barium swallow and esophagram has been reviewed and patient has esophageal motility dysfunction  Patient was told to at least sit or stand 45 minutes to 1 hour after she eats and patient's supper has to be at least 2 hours prior to sleeping time and patient's head should be about 30 degrees higher than the abdominal and she is lying down  Patient's family will also recheck if patient is still taking the Lasix or not  as part of the symptoms may be secondary to that  Patient was told to use some saline nasal spray-  Flonase nasal spray 2 puff each nostril once a day  A humidifier in the bedroom will help  Salt and fluid restrictions were discussed  Patient was told about the pathophysiology of the disease process and its modifying factors  Patient was told about the pathophysiology of the COPD and various medications and how they act  Patient to continue with Leena Shaw as before  Patient was told to make sure that she rinses her mouth with water without fail to avoid any hoarseness of voice or oral thrush  No need for any antibiotic and steroids  Patient will take flu shot in end of September  Daily weights will help  Patient has been started on loop diuretics by the PCP  Salt and fluid restrictions discussed  Patient's treatment of rheumatoid arthritis as per rheumatology  Oral diet as per speech therapy recommendations  Monitor for any aspiration event  Patient to take DuoNeb or albuterol inhaler only on whenever necessary basis and not on regular basis  Patient's further treatment depending on patient's clinical status and response to the new inhaler  A humidifier in the bedroom will help  Patient to follow-up after new sleep study  Patient's shortness of breath is multifactorial in nature and each component has to be addressed in order to help the patient otherwise patient will be symptomatic patient was explained to patient and the family

## 2023-01-27 NOTE — LETTER
[facility]  [address] [address]  Phone: [phone]  Fax: [phone]           Nahum Tirado MD      January 27, 2023     Patient: Vin Beckman   MR Number: [id number]   YOB: 1940   Date of Visit: 1/27/2023       Dear Dr. Bob Viveros: Thank you for referring Thai Thomas to me for evaluation/treatment. Below are the relevant portions of my assessment and plan of care. If you have questions, please do not hesitate to call me. I look forward to following Bettie along with you.     Sincerely,        Nahum Tirado MD    CC providers:  DO Noel Garcia  [facility] [place]

## 2023-01-27 NOTE — PATIENT INSTRUCTIONS
Remember to bring a list of pulmonary medications and any CPAP or BiPAP machines to your next appointment with the office. Please keep all of your future appointments scheduled by [facility] office. Out of respect for other patients and providers, you may be asked to reschedule your appointment if you arrive later than your scheduled appointment time. Appointments cancelled less than 24hrs in advance will be considered a no show. Patients with three missed appointments within 1 year or four missed appointments within 2 years can be dismissed from the practice. Please be aware that our physicians are required to work in the Intensive Care Unit at [facility].  Your appointment may need to be rescheduled if they are designated to work during your appointment time. You may receive a survey regarding the care you received during your visit. Your input is valuable to us. We encourage you to complete and return your survey. We hope you will choose us in the future for your healthcare needs. Pt instructed of all future appointment dates & times, including radiology, labs, procedures & referrals. If procedures were scheduled preparation instructions provided. Instructions on future appointments with [facility] were given. Sleep center will call to schedule you sleep study.  If you have any question their phone  Number is [phone]

## 2023-01-27 NOTE — PROGRESS NOTES
MA Communication:   The following orders are received by verbal communication from Mainor Puentes MD    Orders include:    Sleep study w/pap titration

## 2023-01-30 ENCOUNTER — HOSPITAL ENCOUNTER (OUTPATIENT)
Dept: SLEEP CENTER | Age: 83
Discharge: HOME | End: 2023-02-01
Payer: MEDICAID

## 2023-01-30 DIAGNOSIS — G47.33 OSA (OBSTRUCTIVE SLEEP APNEA): ICD-10-CM

## 2023-01-30 PROCEDURE — 95811 POLYSOM 6/>YRS CPAP 4/> PARM: CPT

## 2023-02-02 DIAGNOSIS — G47.33 OSA (OBSTRUCTIVE SLEEP APNEA): Primary | ICD-10-CM

## 2023-02-13 ENCOUNTER — TELEPHONE (OUTPATIENT)
Dept: PAIN MANAGEMENT | Age: 83
End: 2023-02-13

## 2023-02-13 NOTE — TELEPHONE ENCOUNTER
Submitted PA for OXYCODONE  [facility] Key: [id number] STATUS:NOT SENT. The current RX on file is ; I need a up to date RX to complete PA. If this requires a response please respond to the pool ( P [id number] [facility]). Thank you please advise patient.

## 2023-02-16 ENCOUNTER — OFFICE VISIT (OUTPATIENT)
Dept: PAIN MANAGEMENT | Age: 83
End: 2023-02-16

## 2023-02-16 VITALS
HEIGHT: 67 IN | OXYGEN SATURATION: 91 % | HEART RATE: 79 BPM | SYSTOLIC BLOOD PRESSURE: 105 MMHG | TEMPERATURE: 96.8 F | BODY MASS INDEX: 29.41 KG/M2 | DIASTOLIC BLOOD PRESSURE: 64 MMHG

## 2023-02-16 DIAGNOSIS — M48.061 SPINAL STENOSIS OF LUMBAR REGION WITHOUT NEUROGENIC CLAUDICATION: ICD-10-CM

## 2023-02-16 DIAGNOSIS — M71.38 SYNOVIAL CYST OF LUMBAR SPINE: ICD-10-CM

## 2023-02-16 DIAGNOSIS — M51.36 DDD (DEGENERATIVE DISC DISEASE), LUMBAR: ICD-10-CM

## 2023-02-16 DIAGNOSIS — G89.4 CHRONIC PAIN SYNDROME: ICD-10-CM

## 2023-02-16 DIAGNOSIS — M54.32 SCIATICA OF LEFT SIDE: ICD-10-CM

## 2023-02-16 DIAGNOSIS — M50.30 DDD (DEGENERATIVE DISC DISEASE), CERVICAL: ICD-10-CM

## 2023-02-16 DIAGNOSIS — M05.79 RHEUMATOID ARTHRITIS INVOLVING MULTIPLE SITES WITH POSITIVE RHEUMATOID FACTOR (HCC): ICD-10-CM

## 2023-02-16 DIAGNOSIS — G89.29 BILATERAL CHRONIC KNEE PAIN: ICD-10-CM

## 2023-02-16 DIAGNOSIS — M25.561 BILATERAL CHRONIC KNEE PAIN: ICD-10-CM

## 2023-02-16 DIAGNOSIS — M50.121 CERVICAL DISC DISORDER AT C4-C5 LEVEL WITH RADICULOPATHY: ICD-10-CM

## 2023-02-16 DIAGNOSIS — J43.2 CENTRILOBULAR EMPHYSEMA (HCC): ICD-10-CM

## 2023-02-16 DIAGNOSIS — M25.562 BILATERAL CHRONIC KNEE PAIN: ICD-10-CM

## 2023-02-16 DIAGNOSIS — M19.019 ARTHRITIS PAIN OF SHOULDER: ICD-10-CM

## 2023-02-16 RX ORDER — OXYCODONE HYDROCHLORIDE AND ACETAMINOPHEN 5; 325 MG/1; MG/1
1 TABLET ORAL EVERY 12 HOURS PRN
Qty: 60 TABLET | Refills: 0 | Status: SHIPPED | OUTPATIENT
Start: 2023-02-16 | End: 2023-03-18

## 2023-02-16 RX ORDER — ACETAMINOPHEN 500 MG
500 TABLET ORAL EVERY 12 HOURS PRN
Qty: 120 TABLET | Refills: 1 | Status: SHIPPED | OUTPATIENT
Start: 2023-02-16 | End: 2023-04-17

## 2023-02-16 NOTE — PROGRESS NOTES
Rashad Garcia  1940  [id number]      HISTORY OF PRESENT ILLNESS: Ms. Jaren Matthew is a [de-identified] y.o. female returns for a follow up visit for pain management  She has a diagnosis of   1. Chronic pain syndrome    2. Arthritis pain of shoulder right severe    3. DDD (degenerative disc disease), cervical    4. Cervical disc disorder at C4-C5 level with radiculopathy    5. DDD (degenerative disc disease), lumbar    6. Synovial cyst of lumbar spine    7. Spinal stenosis of lumbar region without neurogenic claudication    8. Sciatica of left side    9. Rheumatoid arthritis involving multiple sites with positive rheumatoid factor (HCC)    10. Bilateral chronic knee pain    11. Centrilobular emphysema ([address]      As per Information Obtained from the PADT (Patient Assessment and Documentation Tool)    She complains of pain in the  right shoulder, lower back, both wrists, right knee  She rates the pain 8/10 and describes it as aching. Current treatment regimen has helped relieve about 0% of the pain. She denies any side effects from the current pain regimen. Patient reports that since the last follow up visit the physical functioning is worse, family/social relationships are unchanged, mood is worse sleep patterns are worse, and that the overall functioning is worse. Patient denies misusing/abusing her narcotic pain medications or using any illegal drugs. Upon obtaining medical history from Ms. Jaren Matthew states that pain is manageable on current pain therapy. Takes the pain medications as prescribed. Pain to the right shoulder, fore arm. Maintains f/u with rheumatology for RA. Maintains 3L O2, maintains cure under pulmonology. Mood/anxiety is stable. Sleep is fair with an average of 5-6 hours. Denies to having issues of constipation. Tolerating activities/house chores with moderate tenderness to the lower back.      ALLERGIES: Patients list of allergies were reviewed     MEDICATIONS: Ms. Jaren Matthew list of medications were reviewed. Her current medications are   Outpatient Medications Prior to Visit   Medication Sig Dispense Refill    labetalol (NORMODYNE) 100 MG tablet TAKE 1 TABLET BY MOUTH TWICE A  tablet 1    atorvastatin (LIPITOR) 10 MG tablet TAKE 1 TABLET BY MOUTH EVERY DAY 90 tablet 1    furosemide (LASIX) 20 MG tablet TAKE 1 TABLET BY MOUTH TWICE A  tablet 1    ELIQUIS 5 MG TABS tablet TAKE 1 TABLET BY MOUTH IN THE MORNING AND 1 TABLET BEFORE BEDTIME. 180 tablet 1    levothyroxine (SYNTHROID) 75 MCG tablet Take 1 tablet by mouth daily 90 tablet 0    allopurinol (ZYLOPRIM) 100 MG tablet TAKE 1 TABLET BY MOUTH EVERY DAY      albuterol sulfate HFA (PROVENTIL;VENTOLIN;PROAIR) 108 (90 Base) MCG/ACT inhaler INHALE TWO (2) PUFFS BY MOUTH EVERY 6 HOURS AS NEEDED 18 g 10    spironolactone (ALDACTONE) 25 MG tablet TAKE 1 TABLET BY MOUTH TWICE A  tablet 1    fluticasone (FLONASE) 50 MCG/ACT nasal spray SPRAY 2 SPRAYS INTO EACH NOSTRIL EVERY DAY 1 each 5    omeprazole (PRILOSEC) 10 MG delayed release capsule Take by mouth daily      ezetimibe (ZETIA) 10 MG tablet TAKE 1 TABLET BY MOUTH EVERY DAY 90 tablet 3    Lift Chair MISC by Does not apply route 1 each 0    Misc. Devices (COMMODE BEDSIDE) MISC Use daily. 1 each 0    Incontinence Supply Disposable MISC 1 each by Does not apply route 4 times daily as needed (incontinence) 120 each 3    pregabalin (LYRICA) 50 MG capsule TAKE 1 CAPSULE BY MOUTH EVERY DAY      fluticasone-umeclidin-vilant (TRELEGY ELLIPTA) 100-62.5-25 MCG/INH AEPB Inhale 1 puff into the lungs daily 1 each 5    DULoxetine (CYMBALTA) 30 MG extended release capsule Take 30 mg by mouth daily      Handicap Placard MISC by Does not apply route 02/07/22 1 each 0    ACTEMRA 162 MG/0.9ML SOSY injection       predniSONE (DELTASONE) 10 MG tablet Take 5 mg by mouth daily       Misc.  Devices (WHEELCHAIR) MISC SELF PROPELLED, LIGHT WEIGHT,  STANDARD SIZE 1 each 0    ergocalciferol (ERGOCALCIFEROL) 1.25 MG (03974 UT) capsule Take 50,000 Units by mouth every 7 days      OXYGEN Inhale 3 L/min into the lungs daily as needed       Blood Pressure Monitoring (BLOOD PRESSURE MONITOR/M CUFF) MISC 1 Units by Does not apply route daily 1 each 0    ipratropium-albuterol (DUONEB) 0.5-2.5 (3) MG/3ML SOLN nebulizer solution Inhale 3 mLs into the lungs every 4 hours as needed. 360 mL 3    nitroGLYCERIN (NITROSTAT) 0.4 MG SL tablet Place 0.4 mg under the tongue every 5 minutes as needed. oxyCODONE-acetaminophen (PERCOCET) 5-325 MG per tablet Take 1 tablet by mouth every 12 hours as needed for Pain (take for severe pain) for up to 30 days. Max Daily Amount: 2 tablets 60 tablet 0    acetaminophen (TYLENOL) 500 MG tablet Take 1 tablet by mouth every 12 hours as needed for Pain (take for breakthrough pain) 120 tablet 1     No facility-administered medications prior to visit. SOCIAL/FAMILY/PAST MEDICAL HISTORY: Ms. Alva Pena, family and past medical history was reviewed. REVIEW OF SYSTEMS:    Respiratory: Negative for apnea, chest tightness and shortness of breath or change in baseline breathing. Gastrointestinal: Negative for nausea, vomiting, abdominal pain, diarrhea, constipation, blood in stool and abdominal distention. PHYSICAL EXAM:   Nursing note and vitals reviewed. /64   Pulse 79   Temp 96.8 °F (36 °C)   Ht 5' 6.5\" (1.689 m)   SpO2 91%   BMI 29.41 kg/m²   Constitutional: She appears well-developed and well-nourished. No acute distress. Skin: Skin is warm and dry, good turgor. No rash noted. She is not diaphoretic. Cardiovascular: Normal rate, regular rhythm, normal heart sounds, and does not have murmur. Pulmonary/Chest: Effort normal. No respiratory distress. She does not have wheezes in the lung fields. Slightly diminished lung bases She has no rales. Neurological/Psychiatric:She is alert and oriented to person, place, and time.  Coordination is  normal. Right shoulder pain Her mood isAppropriate and affect is Neutral/Euthymic(normal) . Prescription pain medication monitoring:                  MEDD current = 15              ORT Score = 1 low risk              Other Risk factors - (mood) Depression              Date of Last Medication Agreement: 1/6/23              Date Naloxone prescribed: 9/12/22              UDT:                          Date of last UDT: 8/15/22                          Adverse report: No              OARRS:                          Checked today: Yes                          Adverse report: No    IMPRESSION:   1. Chronic pain syndrome    2. Arthritis pain of shoulder right severe    3. DDD (degenerative disc disease), cervical    4. Cervical disc disorder at C4-C5 level with radiculopathy    5. DDD (degenerative disc disease), lumbar    6. Synovial cyst of lumbar spine    7. Spinal stenosis of lumbar region without neurogenic claudication    8. Sciatica of left side    9. Rheumatoid arthritis involving multiple sites with positive rheumatoid factor (HCC)    10. Bilateral chronic knee pain    11. Centrilobular emphysema ([facility] [address]      PLAN:  Informed verbal consent was obtained:  -Patient's opioid therapy will be maintained at current dose  -Home exercises/Maxine exercises recommended  -CBT techniques- relaxation therapies such as biofeedback, mindfulness based stress reduction, imagery, cognitive restructuring, problem solving discussed with patient   -She was advised weight reduction, diet changes- 800-1200 kasi diet, diet diary, exercising, nutritional  consult increased physical activity as tolerated   -Last UDS 8/15/22 consistent  -Return in about 4 weeks (around 3/16/2023).      Analgesic Plan:              Continue present regimen: Percocet 5-325 mg tabs q12h prn              Adjust dose of present analgesic: No              Switch analgesics: No              Add/Adjust concomitant therapy: Lidocaine, Tylenol, Narcan, Lyrica through rheumatology    I will continue her current medication regimen  which is part of the above treatment schedule. It has been helping with Ms. Aamir Phillips chronic  medical problems which for this visit include:   Diagnoses of Chronic pain syndrome, Arthritis pain of shoulder right severe, DDD (degenerative disc disease), cervical, Cervical disc disorder at C4-C5 level with radiculopathy, DDD (degenerative disc disease), lumbar, Synovial cyst of lumbar spine, Spinal stenosis of lumbar region without neurogenic claudication, Sciatica of left side, Rheumatoid arthritis involving multiple sites with positive rheumatoid factor ([address] Bilateral chronic knee pain, and Centrilobular emphysema ([address] were pertinent to this visit. Risks and benefits of the medications and other alternative treatments  including no treatment were discussed with the patient. The common side effects of these medications were also explained to the patient. Informed verbal consent was obtained. Goals of current treatment regimen include improvement in pain, restoration of functioning- with focus on improvement in physical performance, general activity, work or disability,emotional distress, health care utilization and  decreased medication consumption. Will continue to monitor progress towards achieving/maintaining therapeutic goals with special emphasis on  1. Improvement in perceived interfernce  of pain with ADL's. Ability to do home exercises independently. Ability to do household chores indoor and/or outdoor work and social and leisure activities. Improve psychosocial and physical functioning. - she is showing progression towards this treatment goal with the current regimen. She was advised against drinking alcohol with the narcotic pain medicines, advised against driving or handling machinery while adjusting the dose of medicines or if having cognitive  issues related to the current medications. Risk of overdose and death, if medicines not taken as prescribed, were also discussed. If the patient develops new symptoms or if the symptoms worsen, the patient should call the office. While transcribing every attempt was made to maintain the accuracy of the note in terms of it's contents,there may have been some errors made inadvertently. Thank you for allowing me to participate in the care of this patient. Claudio Hui CNP.     Cc: Erika Maya, DO

## 2023-02-21 ENCOUNTER — TELEPHONE (OUTPATIENT)
Dept: PULMONOLOGY | Age: 83
End: 2023-02-21

## 2023-02-22 NOTE — TELEPHONE ENCOUNTER
Spoke to pt. Daughter and she stated a lady gave them the results and CPAP has already been ordered. In looking in to this it was taken care of on 2/2/23. Not sure why pt. Called back for results.

## 2023-03-23 ENCOUNTER — OFFICE VISIT (OUTPATIENT)
Dept: PAIN MANAGEMENT | Age: 83
End: 2023-03-23

## 2023-03-23 VITALS
HEART RATE: 83 BPM | DIASTOLIC BLOOD PRESSURE: 66 MMHG | SYSTOLIC BLOOD PRESSURE: 109 MMHG | TEMPERATURE: 97.3 F | OXYGEN SATURATION: 95 %

## 2023-03-23 DIAGNOSIS — M48.061 SPINAL STENOSIS OF LUMBAR REGION WITHOUT NEUROGENIC CLAUDICATION: ICD-10-CM

## 2023-03-23 DIAGNOSIS — M25.562 BILATERAL CHRONIC KNEE PAIN: ICD-10-CM

## 2023-03-23 DIAGNOSIS — M50.30 DDD (DEGENERATIVE DISC DISEASE), CERVICAL: ICD-10-CM

## 2023-03-23 DIAGNOSIS — G89.29 BILATERAL CHRONIC KNEE PAIN: ICD-10-CM

## 2023-03-23 DIAGNOSIS — M71.38 SYNOVIAL CYST OF LUMBAR SPINE: ICD-10-CM

## 2023-03-23 DIAGNOSIS — M50.121 CERVICAL DISC DISORDER AT C4-C5 LEVEL WITH RADICULOPATHY: ICD-10-CM

## 2023-03-23 DIAGNOSIS — M25.561 BILATERAL CHRONIC KNEE PAIN: ICD-10-CM

## 2023-03-23 DIAGNOSIS — M19.019 ARTHRITIS PAIN OF SHOULDER: ICD-10-CM

## 2023-03-23 DIAGNOSIS — J96.11 CHRONIC RESPIRATORY FAILURE WITH HYPOXIA (HCC): ICD-10-CM

## 2023-03-23 DIAGNOSIS — G89.4 CHRONIC PAIN SYNDROME: ICD-10-CM

## 2023-03-23 DIAGNOSIS — M05.79 RHEUMATOID ARTHRITIS INVOLVING MULTIPLE SITES WITH POSITIVE RHEUMATOID FACTOR (HCC): ICD-10-CM

## 2023-03-23 DIAGNOSIS — M51.36 DDD (DEGENERATIVE DISC DISEASE), LUMBAR: ICD-10-CM

## 2023-03-23 DIAGNOSIS — M54.32 SCIATICA OF LEFT SIDE: ICD-10-CM

## 2023-03-23 RX ORDER — ACETAMINOPHEN 500 MG
500 TABLET ORAL EVERY 12 HOURS PRN
Qty: 120 TABLET | Refills: 1 | Status: SHIPPED | OUTPATIENT
Start: 2023-03-23 | End: 2023-05-22

## 2023-03-23 RX ORDER — OXYCODONE HYDROCHLORIDE AND ACETAMINOPHEN 5; 325 MG/1; MG/1
1 TABLET ORAL EVERY 12 HOURS PRN
Qty: 60 TABLET | Refills: 0 | Status: SHIPPED | OUTPATIENT
Start: 2023-03-23 | End: 2023-04-22

## 2023-03-23 NOTE — PROGRESS NOTES
predniSONE (DELTASONE) 10 MG tablet Take 5 mg by mouth daily       Misc. Devices (WHEELCHAIR) MISC SELF PROPELLED, LIGHT WEIGHT,  STANDARD SIZE 1 each 0    ergocalciferol (ERGOCALCIFEROL) 1.25 MG (83079 UT) capsule Take 50,000 Units by mouth every 7 days      OXYGEN Inhale 3 L/min into the lungs daily as needed       Blood Pressure Monitoring (BLOOD PRESSURE MONITOR/M CUFF) MISC 1 Units by Does not apply route daily 1 each 0    ipratropium-albuterol (DUONEB) 0.5-2.5 (3) MG/3ML SOLN nebulizer solution Inhale 3 mLs into the lungs every 4 hours as needed. 360 mL 3    nitroGLYCERIN (NITROSTAT) 0.4 MG SL tablet Place 0.4 mg under the tongue every 5 minutes as needed. oxyCODONE-acetaminophen (PERCOCET) 5-325 MG per tablet Take 1 tablet by mouth every 12 hours as needed for Pain (take for severe pain) for up to 30 days. Max Daily Amount: 2 tablets 60 tablet 0    acetaminophen (TYLENOL) 500 MG tablet Take 1 tablet by mouth every 12 hours as needed for Pain (take for breakthrough pain) 120 tablet 1     No facility-administered medications prior to visit. SOCIAL/FAMILY/PAST MEDICAL HISTORY: Ms. Patricia Jacob, family and past medical history was reviewed. REVIEW OF SYSTEMS:    Respiratory: Negative for apnea, chest tightness and shortness of breath or change in baseline breathing. Gastrointestinal: Negative for nausea, vomiting, abdominal pain, diarrhea, constipation, blood in stool and abdominal distention. PHYSICAL EXAM:   Nursing note and vitals reviewed. /66   Pulse 83   Temp 97.3 °F (36.3 °C)   SpO2 95%   Constitutional: She appears well-developed and well-nourished. No acute distress. Skin: Skin is warm and dry, good turgor. No rash noted. She is not diaphoretic. Cardiovascular: Normal rate, regular rhythm, normal heart sounds, and does not have murmur. Pulmonary/Chest: Effort normal. No respiratory distress. She does not have wheezes in the lung fields. She has no rales.

## 2023-03-23 NOTE — PROGRESS NOTES
pressure (SPAP) is elevated and estimated at 39   mmHg (right atrial pressure 3 mmHg) consistent with borderline pulmonary   hypertension. Echo [facility])  5/29/19:  Study Conclusions     - Left ventricle: The cavity size was normal. Wall thickness was     normal. Systolic function was normal. The calculated ejection     fraction was in the range of 64% to 68%. Normal diastolic     function.   - Aortic valve: Peak gradient (S): 6mm Hg.   - Tricuspid valve: Regurgitant peak velocity: 314cm/sec. Peak RV-RA     gradient (S): 39mm Hg. - Inferior vena cava: The vessel was normal in size; the     respirophasic diameter changes were in the normal range (>= 50%);     findings are consistent with normal central venous pressure. - Pulmonary arteries: PA peak pressure: 42mm Hg (S). Stress Test 1/15/19:   Summary  There is normal isotope uptake at stress and rest. There is no evidence of  myocardial ischemia or scar. LV function is normal with uniform wall motion  and ejection fraction of 67 %. Lower risk study. Stress Protocols   Resting ECG  Normal sinus rhythm. Resting HR:82 bpm  Resting BP:162/113 mmHg    Stress Protocol:Pharmacologic - Lexiscan's   Peak HR:101 bpm                                HR/BP product:[id number]  Peak BP:164/84 mmHg  Predicted HR: 144 bpm  % of predicted HR: 70  Test duration: 11 min and 30 sec  Reason for termination:Completed   ECG Findings  <0.5mm ST segment depression. Arrhythmias  Occasional PVC's. Symptoms  Patient developed shortness of breath and Lexiscan likely related to  [address] [place] [address]. Symptoms resolved with aminophylline. Right heart catheterization with Nitric ([address]) 5/15/18:  Impression:  1. Normal filling pressures as evidenced by a right atrial   pressure of 5 and a mean pulmonary capillary wedge pressure of   10.   2. Mild pulmonary hypertension with a PA pressure of 31/15   (mean:20) and a PVR of 2.7 (thermodilution)-3.2 (chris) [place]   Units.    3. Borderline normal

## 2023-03-24 ENCOUNTER — OFFICE VISIT (OUTPATIENT)
Dept: CARDIOLOGY CLINIC | Age: 83
End: 2023-03-24
Payer: MEDICAID

## 2023-03-24 VITALS
DIASTOLIC BLOOD PRESSURE: 64 MMHG | HEART RATE: 79 BPM | HEIGHT: 67 IN | SYSTOLIC BLOOD PRESSURE: 112 MMHG | OXYGEN SATURATION: 95 % | BODY MASS INDEX: 28.09 KG/M2 | WEIGHT: 179 LBS

## 2023-03-24 DIAGNOSIS — I48.0 PAROXYSMAL ATRIAL FIBRILLATION (HCC): ICD-10-CM

## 2023-03-24 DIAGNOSIS — I25.10 CORONARY ARTERY DISEASE INVOLVING NATIVE CORONARY ARTERY OF NATIVE HEART WITHOUT ANGINA PECTORIS: ICD-10-CM

## 2023-03-24 DIAGNOSIS — I50.32 CHRONIC HEART FAILURE WITH PRESERVED EJECTION FRACTION (HCC): Primary | ICD-10-CM

## 2023-03-24 PROCEDURE — 3074F SYST BP LT 130 MM HG: CPT | Performed by: INTERNAL MEDICINE

## 2023-03-24 PROCEDURE — 1123F ACP DISCUSS/DSCN MKR DOCD: CPT | Performed by: INTERNAL MEDICINE

## 2023-03-24 PROCEDURE — 99214 OFFICE O/P EST MOD 30 MIN: CPT | Performed by: INTERNAL MEDICINE

## 2023-03-24 PROCEDURE — 3078F DIAST BP <80 MM HG: CPT | Performed by: INTERNAL MEDICINE

## 2023-03-30 ENCOUNTER — OFFICE VISIT (OUTPATIENT)
Dept: PULMONOLOGY | Age: 83
End: 2023-03-30
Payer: MEDICAID

## 2023-03-30 VITALS
OXYGEN SATURATION: 94 % | WEIGHT: 181 LBS | TEMPERATURE: 97.6 F | RESPIRATION RATE: 16 BRPM | HEART RATE: 78 BPM | DIASTOLIC BLOOD PRESSURE: 49 MMHG | BODY MASS INDEX: 28.41 KG/M2 | SYSTOLIC BLOOD PRESSURE: 111 MMHG | HEIGHT: 67 IN

## 2023-03-30 DIAGNOSIS — G47.33 OSA (OBSTRUCTIVE SLEEP APNEA): ICD-10-CM

## 2023-03-30 DIAGNOSIS — J43.2 CENTRILOBULAR EMPHYSEMA (HCC): Primary | ICD-10-CM

## 2023-03-30 DIAGNOSIS — J96.11 CHRONIC RESPIRATORY FAILURE WITH HYPOXIA (HCC): ICD-10-CM

## 2023-03-30 DIAGNOSIS — Z01.811 ENCOUNTER FOR PRE-OPERATIVE RESPIRATORY CLEARANCE: ICD-10-CM

## 2023-03-30 DIAGNOSIS — I27.20 PULMONARY HTN (HCC): ICD-10-CM

## 2023-03-30 PROCEDURE — 1123F ACP DISCUSS/DSCN MKR DOCD: CPT | Performed by: INTERNAL MEDICINE

## 2023-03-30 PROCEDURE — 3074F SYST BP LT 130 MM HG: CPT | Performed by: INTERNAL MEDICINE

## 2023-03-30 PROCEDURE — 99214 OFFICE O/P EST MOD 30 MIN: CPT | Performed by: INTERNAL MEDICINE

## 2023-03-30 PROCEDURE — 3078F DIAST BP <80 MM HG: CPT | Performed by: INTERNAL MEDICINE

## 2023-03-30 NOTE — PROGRESS NOTES
MA Communication:   The following orders are received by verbal communication from Linh Hickman MD    Orders include:    3 month Lungs f/u  Patient stated she's waiting on DME for her CPAP (waiting on PA from Ins)
normal. Wall thickness was    normal. Systolic function was normal. The calculated ejection    fraction was in the range of 64% to 68%. Normal diastolic    function.  - Aortic valve: Peak gradient (S): 6mm Hg.  - Tricuspid valve: Regurgitant peak velocity: 314cm/sec. Peak RV-RA    gradient (S): 39mm Hg. - Inferior vena cava: The vessel was normal in size; the    respirophasic diameter changes were in the normal range (>= 50%);    findings are consistent with normal central venous pressure. - Pulmonary arteries: PA peak pressure: 42mm Hg (S). PFT IN 2017-PFT TEST    Spirometry  Spirometry shows moderate obstructive defect. .    Bronchodilator  There is no response to bronchodilator demonstrated. Flow-Volume Loop  The flow-volume loop is compatible with obstructive lung defect. Lung Volumes  Lung volumes are normal except for elevated RV. Lung Volume Comments  There is air trapping present. Diffusing Capacity  Single breath diffusing capacity is severely decreased. A reduced DLCO can be seen in diseases causing destruction of   alveolar-capillary interface (ILD, CHF, COPD, Pneumonia, etc.)   obliteration of pulmonary vascular bed (PE, PAH), tobacco usage,   carbon monoxide poisoning. Airway Resistance  Airway resistance is moderately elevated. Comparison  In comparison to the test of 16 there is mild deterioration   in spirometry and DLCO    PFT is compatible with moderate Obstructive Lung Disease  -   Possible etiologies include: asthma, COPD, bronchiectasis,   bronchitis. Correlate clinically. 6mwt IN 2017-6 Minute Walk Test Interpretation    Patient Name: Abel Caro  : 1940  Date: 8/15/2017    A 6 Minute Walk Test was performed in accordance with the ATS   Guidelines ([address] [place] [address] ; [phone]) at [facility]. The test was performed on room air. The baseline SpO2 while breathing room air was 91%.     The patient did

## 2023-03-30 NOTE — LETTER
March 30, 2023      No referring provider defined for this encounter. Patient: Andrew Nguyen   MR Number: [id number]   YOB: 1940   Date of Visit: 3/30/2023       Dear Dr. Gary Mckeon ref. provider found: Thank you for referring Cj Hernandez to me for evaluation/treatment. Below are the relevant portions of my assessment and plan of care. If you have questions, please do not hesitate to call me. I look forward to following Bettie along with you.     Sincerely,        Tiffani Psot MD    CC providers:  DO Noel Covarrubias  [facility] [place]

## 2023-03-30 NOTE — LETTER
March 30, 2023      Grady Betancourt DO  [facility]      Patient: Waylon Christie   MR Number: [id number]   YOB: 1940   Date of Visit: 3/30/2023       Dear Meilssa Maya: Thank you for referring Yaminivivian Carver to me for evaluation/treatment. Below are the relevant portions of my assessment and plan of care. If you have questions, please do not hesitate to call me. I look forward to following Bettie along with you.     Sincerely,        Serene Garces MD

## 2023-04-02 DIAGNOSIS — I26.99 ACUTE PULMONARY EMBOLISM, UNSPECIFIED PULMONARY EMBOLISM TYPE, UNSPECIFIED WHETHER ACUTE COR PULMONALE PRESENT (HCC): ICD-10-CM

## 2023-04-02 DIAGNOSIS — I10 ESSENTIAL HYPERTENSION: ICD-10-CM

## 2023-04-02 DIAGNOSIS — I50.32 CHRONIC DIASTOLIC HEART FAILURE (HCC): ICD-10-CM

## 2023-04-02 DIAGNOSIS — E78.00 PURE HYPERCHOLESTEROLEMIA: ICD-10-CM

## 2023-04-02 DIAGNOSIS — J43.2 CENTRILOBULAR EMPHYSEMA (HCC): ICD-10-CM

## 2023-04-02 DIAGNOSIS — E03.9 ACQUIRED HYPOTHYROIDISM: ICD-10-CM

## 2023-04-02 DIAGNOSIS — I82.401 RECURRENT ACUTE DEEP VEIN THROMBOSIS (DVT) OF RIGHT LOWER EXTREMITY (HCC): ICD-10-CM

## 2023-04-03 ENCOUNTER — TELEPHONE (OUTPATIENT)
Dept: PAIN MANAGEMENT | Age: 83
End: 2023-04-03

## 2023-04-03 RX ORDER — APIXABAN 5 MG/1
TABLET, FILM COATED ORAL
Qty: 180 TABLET | Refills: 1 | Status: SHIPPED | OUTPATIENT
Start: 2023-04-03

## 2023-04-03 RX ORDER — ATORVASTATIN CALCIUM 10 MG/1
TABLET, FILM COATED ORAL
Qty: 90 TABLET | Refills: 1 | Status: SHIPPED | OUTPATIENT
Start: 2023-04-03

## 2023-04-03 RX ORDER — LEVOTHYROXINE SODIUM 88 UG/1
TABLET ORAL
Qty: 90 TABLET | Refills: 1 | OUTPATIENT
Start: 2023-04-03

## 2023-04-03 RX ORDER — FUROSEMIDE 20 MG/1
TABLET ORAL
Qty: 180 TABLET | Refills: 1 | Status: SHIPPED | OUTPATIENT
Start: 2023-04-03

## 2023-04-03 RX ORDER — SPIRONOLACTONE 25 MG/1
TABLET ORAL
Qty: 180 TABLET | Refills: 1 | Status: SHIPPED | OUTPATIENT
Start: 2023-04-03

## 2023-04-03 RX ORDER — ALBUTEROL SULFATE 90 UG/1
AEROSOL, METERED RESPIRATORY (INHALATION)
Qty: 18 EACH | Refills: 10 | Status: SHIPPED | OUTPATIENT
Start: 2023-04-03

## 2023-04-03 RX ORDER — LABETALOL 100 MG/1
TABLET, FILM COATED ORAL
Qty: 180 TABLET | Refills: 1 | Status: SHIPPED | OUTPATIENT
Start: 2023-04-03

## 2023-04-03 NOTE — TELEPHONE ENCOUNTER
Patient Is requesting a refill on lidocaine patches      [facility]/PHARMACY #[id number] [place] [address] Lissy Ribeiro [facility] [phone]    Please advise

## 2023-04-04 DIAGNOSIS — G89.4 CHRONIC PAIN SYNDROME: ICD-10-CM

## 2023-04-04 DIAGNOSIS — M54.32 SCIATICA OF LEFT SIDE: ICD-10-CM

## 2023-04-04 DIAGNOSIS — M50.121 CERVICAL DISC DISORDER AT C4-C5 LEVEL WITH RADICULOPATHY: ICD-10-CM

## 2023-04-04 DIAGNOSIS — M25.511 CHRONIC RIGHT SHOULDER PAIN: ICD-10-CM

## 2023-04-04 DIAGNOSIS — G89.29 CHRONIC RIGHT SHOULDER PAIN: ICD-10-CM

## 2023-04-04 DIAGNOSIS — M48.061 SPINAL STENOSIS OF LUMBAR REGION WITHOUT NEUROGENIC CLAUDICATION: ICD-10-CM

## 2023-04-04 DIAGNOSIS — M19.019 ARTHRITIS PAIN OF SHOULDER: ICD-10-CM

## 2023-04-04 DIAGNOSIS — M71.38 SYNOVIAL CYST OF LUMBAR SPINE: ICD-10-CM

## 2023-04-04 DIAGNOSIS — M51.36 DDD (DEGENERATIVE DISC DISEASE), LUMBAR: ICD-10-CM

## 2023-04-04 RX ORDER — LIDOCAINE 50 MG/G
1 PATCH TOPICAL DAILY
Qty: 30 PATCH | Refills: 0 | Status: SHIPPED | OUTPATIENT
Start: 2023-04-04 | End: 2023-05-04

## 2023-04-27 ENCOUNTER — OFFICE VISIT (OUTPATIENT)
Dept: ORTHOPEDIC SURGERY | Age: 83
End: 2023-04-27

## 2023-04-27 ENCOUNTER — OFFICE VISIT (OUTPATIENT)
Dept: PAIN MANAGEMENT | Age: 83
End: 2023-04-27
Payer: MEDICAID

## 2023-04-27 VITALS
HEART RATE: 85 BPM | DIASTOLIC BLOOD PRESSURE: 55 MMHG | TEMPERATURE: 97.2 F | SYSTOLIC BLOOD PRESSURE: 105 MMHG | OXYGEN SATURATION: 93 %

## 2023-04-27 VITALS — BODY MASS INDEX: 28.41 KG/M2 | HEIGHT: 67 IN | WEIGHT: 181 LBS

## 2023-04-27 DIAGNOSIS — M25.562 BILATERAL CHRONIC KNEE PAIN: ICD-10-CM

## 2023-04-27 DIAGNOSIS — M75.101 ROTATOR CUFF TEAR ARTHROPATHY, RIGHT: Primary | ICD-10-CM

## 2023-04-27 DIAGNOSIS — M50.121 CERVICAL DISC DISORDER AT C4-C5 LEVEL WITH RADICULOPATHY: ICD-10-CM

## 2023-04-27 DIAGNOSIS — M51.36 DDD (DEGENERATIVE DISC DISEASE), LUMBAR: ICD-10-CM

## 2023-04-27 DIAGNOSIS — G89.29 BILATERAL CHRONIC KNEE PAIN: ICD-10-CM

## 2023-04-27 DIAGNOSIS — G89.4 CHRONIC PAIN SYNDROME: ICD-10-CM

## 2023-04-27 DIAGNOSIS — M12.811 ROTATOR CUFF TEAR ARTHROPATHY, RIGHT: Primary | ICD-10-CM

## 2023-04-27 DIAGNOSIS — M54.32 SCIATICA OF LEFT SIDE: ICD-10-CM

## 2023-04-27 DIAGNOSIS — Z86.718 HISTORY OF VENOUS THROMBOEMBOLISM: ICD-10-CM

## 2023-04-27 DIAGNOSIS — M19.019 ARTHRITIS PAIN OF SHOULDER: ICD-10-CM

## 2023-04-27 DIAGNOSIS — M48.061 SPINAL STENOSIS OF LUMBAR REGION WITHOUT NEUROGENIC CLAUDICATION: ICD-10-CM

## 2023-04-27 DIAGNOSIS — F11.90 CHRONIC, CONTINUOUS USE OF OPIOIDS: ICD-10-CM

## 2023-04-27 DIAGNOSIS — M50.30 DDD (DEGENERATIVE DISC DISEASE), CERVICAL: ICD-10-CM

## 2023-04-27 DIAGNOSIS — Z79.01 CHRONIC ANTICOAGULATION: ICD-10-CM

## 2023-04-27 DIAGNOSIS — Z01.818 PRE-OP TESTING: ICD-10-CM

## 2023-04-27 DIAGNOSIS — M05.79 RHEUMATOID ARTHRITIS INVOLVING MULTIPLE SITES WITH POSITIVE RHEUMATOID FACTOR (HCC): ICD-10-CM

## 2023-04-27 DIAGNOSIS — J96.11 CHRONIC RESPIRATORY FAILURE WITH HYPOXIA (HCC): ICD-10-CM

## 2023-04-27 DIAGNOSIS — M06.9 RHEUMATOID ARTHRITIS INVOLVING MULTIPLE SITES, UNSPECIFIED WHETHER RHEUMATOID FACTOR PRESENT (HCC): ICD-10-CM

## 2023-04-27 DIAGNOSIS — Z99.81 OXYGEN DEPENDENT: ICD-10-CM

## 2023-04-27 DIAGNOSIS — M71.38 SYNOVIAL CYST OF LUMBAR SPINE: ICD-10-CM

## 2023-04-27 DIAGNOSIS — M25.561 BILATERAL CHRONIC KNEE PAIN: ICD-10-CM

## 2023-04-27 PROCEDURE — 99213 OFFICE O/P EST LOW 20 MIN: CPT | Performed by: NURSE PRACTITIONER

## 2023-04-27 PROCEDURE — 3078F DIAST BP <80 MM HG: CPT | Performed by: NURSE PRACTITIONER

## 2023-04-27 PROCEDURE — 1123F ACP DISCUSS/DSCN MKR DOCD: CPT | Performed by: NURSE PRACTITIONER

## 2023-04-27 PROCEDURE — 3074F SYST BP LT 130 MM HG: CPT | Performed by: NURSE PRACTITIONER

## 2023-04-27 RX ORDER — OXYCODONE HYDROCHLORIDE AND ACETAMINOPHEN 5; 325 MG/1; MG/1
1 TABLET ORAL EVERY 12 HOURS PRN
Qty: 60 TABLET | Refills: 0 | Status: SHIPPED | OUTPATIENT
Start: 2023-04-27 | End: 2023-05-27

## 2023-04-27 RX ORDER — LIDOCAINE 50 MG/G
1 PATCH TOPICAL DAILY
Qty: 30 PATCH | Refills: 0 | Status: SHIPPED | OUTPATIENT
Start: 2023-04-27 | End: 2023-05-27

## 2023-04-27 RX ORDER — ACETAMINOPHEN 500 MG
500 TABLET ORAL EVERY 12 HOURS PRN
Qty: 120 TABLET | Refills: 1 | Status: SHIPPED | OUTPATIENT
Start: 2023-04-27 | End: 2023-06-26

## 2023-04-27 NOTE — PROGRESS NOTES
reasonable option  The main issue is her baseline medical issues, COPD, oxygen dependent, chronic pain  Will need her pulmonologist to weigh in to see whether she is safe for surgery  I informed her I will message her pulmonologist at [facility]  If her pulmonologist agrees that the benefits of surgery outweighs the risks, then we can get this discussed and set up  However I did recommend if surgery is considered reasonable by her pulmonologist and PCP, we should avoid doing this in the wintertime and do it during the summer months, especially with the multiple respiratory viruses going on and high hospitalizations - she understood and agrees. I do not recommend repeat shoulder injection today. 4/27/23   BMI - Body mass index is 28.78 kg/m². DM - no  Lab Results   Component Value Date    LABA1C 6.2 07/16/2022   Tobacco - quit over 10 years ago  On blood thinners - yes - Eliquis  Personal Hx of DVT/PE - yes  Afib - no  CAD - yes  + CHF  Hx of stroke/TIA - no  Kidney Disease - yes  EtOH consumption - no  Narcotics pre-op - yes, alea Nava  Depression - no  Anxiety - no  HIV - no  Hep C - no  DMARDs and/or biologics - actemra  Dentition - uses dentures  Living arrangements - lives with granddaughter   More or less in wheelchair, 90% of time  Pets - no    Details of right reverse shoulder arthroplasty were discussed with the patient and her granddaughter, including indications for this procedure. Reviewed postoperative restrictions, sling use, physical therapy, and recovery time. Informed the patient main goal of surgery is pain relief. Above shoulder level function is to be expected, but not full elevation or abduction. Additionally, internal and external rotation are much harder to predict.     Factors associated with worse outcomes include preop narcotic usage, tobacco use, unmanaged depression/anxiety/mental health illness, noncompliance, pain out of proportion to radiographic findings,

## 2023-04-29 PROBLEM — Z01.811 ENCOUNTER FOR PRE-OPERATIVE RESPIRATORY CLEARANCE: Status: RESOLVED | Noted: 2023-03-30 | Resolved: 2023-04-29

## 2023-05-01 NOTE — PROGRESS NOTES
Name_______________________________________Printed:____________________  Date and time of surgery__6/6 0830______________________Arrival Time:__0630______________   1. The instructions given regarding when and if a patient needs to stop oral intake prior to surgery varies. Follow the specific instructions you were given                  __x_Nothing to eat or to drink after Midnight the night before.                   ____Carbo loading or instructions will be given to select patients-if you have been given those instructions -please do the following                           The evening before your surgery after dinner before midnight drink 40 ounces of gatorade. If you are diabetic use sugar free. The morning of surgery drink 40 ounces of water. This needs to be finished 3 hours prior to your surgery start time. 2. Take the following pills with a small sip of water on the morning of surgery__inhalers__thyroid granddaughter states unable to take any other of meds without food_______________________________________________                  Do not take blood pressure medications ending in pril or sartan the usha prior to surgery or the morning of surgery. Dr Shimon Mccray patient are not to take any medications the AM of surgery. 3. Aspirin, Ibuprofen, Advil, Naproxen, Vitamin E and other Anti-inflammatory products and supplements should be stopped for 5 -7days before surgery or as directed by your physician. 4. Check with your Doctor regarding stopping Plavix, Coumadin,Eliquis, Lovenox,Effient,Pradaxa,Xarelto, Fragmin or other blood thinners and follow their instructions. 5. Do not smoke, and do not drink any alcoholic beverages 24 hours prior to surgery. This includes NA Beer. Refrain from the usage of any recreational drugs. 6. You may brush your teeth and gargle the morning of surgery. DO NOT SWALLOW WATER   7.  You MUST make arrangements for a responsible adult to stay on site while you are here and take

## 2023-05-02 ENCOUNTER — TELEPHONE (OUTPATIENT)
Dept: ORTHOPEDIC SURGERY | Age: 83
End: 2023-05-02

## 2023-05-02 NOTE — TELEPHONE ENCOUNTER
Auth: NPR  Date: 6/6/2023  Reference # none  Spoke with: none  Type of SX: inpatient  Location: [facility]  CPT: [id number]   DX: [phone], [phone]  SX area: [facility]  Insurance:  [place]

## 2023-05-02 NOTE — TELEPHONE ENCOUNTER
Since the patient has [place] Medicaid could you reach out to the patient to see if their insurance will be the same next month, since that is when the surgery is. Please advise.

## 2023-05-09 ENCOUNTER — TELEPHONE (OUTPATIENT)
Dept: ORTHOPEDIC SURGERY | Age: 83
End: 2023-05-09

## 2023-05-09 NOTE — TELEPHONE ENCOUNTER
Returned call to patient's daughter -- informed her sx is not cancelled it is her post op appt -- appt rescheduled to 6/22/2023

## 2023-05-09 NOTE — TELEPHONE ENCOUNTER
Surgery and/or Procedure Scheduling     Contact Name: Cruz Piedra \"Shanelle\"/Bree Strauss   Surgical/Procedure Request: sx on 06/06/23  Patient Contact Number: [phone]    Patient call and would need a call back regarding her surgery she stated she got and call to get her surgery date rescheduled because Dr. Camilo Candelario was going to be out of the office on 06/06/23 she just need a call back regarding this matter. Please Advise.

## 2023-05-12 ENCOUNTER — HOSPITAL ENCOUNTER (OUTPATIENT)
Dept: CT IMAGING | Age: 83
Discharge: HOME | End: 2023-05-12
Payer: MEDICAID

## 2023-05-12 DIAGNOSIS — M75.101 ROTATOR CUFF TEAR ARTHROPATHY, RIGHT: ICD-10-CM

## 2023-05-12 DIAGNOSIS — M12.811 ROTATOR CUFF TEAR ARTHROPATHY, RIGHT: ICD-10-CM

## 2023-05-12 PROCEDURE — 73200 CT UPPER EXTREMITY W/O DYE: CPT

## 2023-05-19 ENCOUNTER — HOSPITAL ENCOUNTER (OUTPATIENT)
Age: 83
Discharge: HOME | End: 2023-05-19
Payer: MEDICAID

## 2023-05-19 DIAGNOSIS — M12.811 ROTATOR CUFF TEAR ARTHROPATHY, RIGHT: ICD-10-CM

## 2023-05-19 DIAGNOSIS — M75.101 ROTATOR CUFF TEAR ARTHROPATHY, RIGHT: ICD-10-CM

## 2023-05-19 DIAGNOSIS — Z01.818 PRE-OP TESTING: ICD-10-CM

## 2023-05-19 LAB
ABO + RH BLD: NORMAL
ALBUMIN SERPL-MCNC: 4 G/DL (ref 3.4–5)
ALBUMIN/GLOB SERPL: 1.4 {RATIO} (ref 1.1–2.2)
ALP SERPL-CCNC: 47 U/L (ref 40–129)
ALT SERPL-CCNC: 15 U/L (ref 10–40)
ANION GAP SERPL CALCULATED.3IONS-SCNC: 15 MMOL/L (ref 3–16)
APTT BLD: 31.6 SEC (ref 22.7–35.9)
AST SERPL-CCNC: 22 U/L (ref 15–37)
BACTERIA URNS QL MICRO: ABNORMAL /HPF
BASOPHILS # BLD: 0.1 K/UL (ref 0–0.2)
BASOPHILS NFR BLD: 0.8 %
BILIRUB SERPL-MCNC: 0.5 MG/DL (ref 0–1)
BILIRUB UR QL STRIP.AUTO: NEGATIVE
BLD GP AB SCN SERPL QL: NORMAL
BUN SERPL-MCNC: 29 MG/DL (ref 7–20)
CALCIUM SERPL-MCNC: 9.8 MG/DL (ref 8.3–10.6)
CHLORIDE SERPL-SCNC: 101 MMOL/L (ref 99–110)
CLARITY UR: CLEAR
CO2 SERPL-SCNC: 26 MMOL/L (ref 21–32)
COLOR UR: YELLOW
CREAT SERPL-MCNC: 1.2 MG/DL (ref 0.6–1.2)
DEPRECATED RDW RBC AUTO: 20 % (ref 12.4–15.4)
EOSINOPHIL # BLD: 0.1 K/UL (ref 0–0.6)
EOSINOPHIL NFR BLD: 1 %
EPI CELLS #/AREA URNS HPF: ABNORMAL /HPF (ref 0–5)
GFR SERPLBLD CREATININE-BSD FMLA CKD-EPI: 45 ML/MIN/{1.73_M2}
GLUCOSE SERPL-MCNC: 88 MG/DL (ref 70–99)
GLUCOSE UR STRIP.AUTO-MCNC: NEGATIVE MG/DL
HCT VFR BLD AUTO: 29.2 % (ref 36–48)
HGB BLD-MCNC: 9.5 G/DL (ref 12–16)
HGB UR QL STRIP.AUTO: NEGATIVE
INR PPP: 1.27 (ref 0.84–1.16)
KETONES UR STRIP.AUTO-MCNC: NEGATIVE MG/DL
LEUKOCYTE ESTERASE UR QL STRIP.AUTO: ABNORMAL
LYMPHOCYTES # BLD: 1.5 K/UL (ref 1–5.1)
LYMPHOCYTES NFR BLD: 23.5 %
MCH RBC QN AUTO: 25.9 PG (ref 26–34)
MCHC RBC AUTO-ENTMCNC: 32.5 G/DL (ref 31–36)
MCV RBC AUTO: 79.6 FL (ref 80–100)
MONOCYTES # BLD: 0.7 K/UL (ref 0–1.3)
MONOCYTES NFR BLD: 10.4 %
NEUTROPHILS # BLD: 4.1 K/UL (ref 1.7–7.7)
NEUTROPHILS NFR BLD: 64.3 %
NITRITE UR QL STRIP.AUTO: NEGATIVE
PH UR STRIP.AUTO: 5.5 [PH] (ref 5–8)
PLATELET # BLD AUTO: 281 K/UL (ref 135–450)
PMV BLD AUTO: 8.1 FL (ref 5–10.5)
POTASSIUM SERPL-SCNC: 4.3 MMOL/L (ref 3.5–5.1)
PROT SERPL-MCNC: 6.9 G/DL (ref 6.4–8.2)
PROT UR STRIP.AUTO-MCNC: NEGATIVE MG/DL
PROTHROMBIN TIME: 15.9 SEC (ref 11.5–14.8)
RBC # BLD AUTO: 3.67 M/UL (ref 4–5.2)
RBC #/AREA URNS HPF: ABNORMAL /HPF (ref 0–4)
SODIUM SERPL-SCNC: 142 MMOL/L (ref 136–145)
SP GR UR STRIP.AUTO: 1.02 (ref 1–1.03)
UA COMPLETE W REFLEX CULTURE PNL UR: YES
UA DIPSTICK W REFLEX MICRO PNL UR: YES
URN SPEC COLLECT METH UR: ABNORMAL
UROBILINOGEN UR STRIP-ACNC: 0.2 E.U./DL
WBC # BLD AUTO: 6.4 K/UL (ref 4–11)
WBC #/AREA URNS HPF: ABNORMAL /HPF (ref 0–5)

## 2023-05-19 PROCEDURE — 83036 HEMOGLOBIN GLYCOSYLATED A1C: CPT

## 2023-05-19 PROCEDURE — 85610 PROTHROMBIN TIME: CPT

## 2023-05-19 PROCEDURE — 80053 COMPREHEN METABOLIC PANEL: CPT

## 2023-05-19 PROCEDURE — 85730 THROMBOPLASTIN TIME PARTIAL: CPT

## 2023-05-19 PROCEDURE — 36415 COLL VENOUS BLD VENIPUNCTURE: CPT

## 2023-05-19 PROCEDURE — 81001 URINALYSIS AUTO W/SCOPE: CPT

## 2023-05-19 PROCEDURE — 86850 RBC ANTIBODY SCREEN: CPT

## 2023-05-19 PROCEDURE — 87086 URINE CULTURE/COLONY COUNT: CPT

## 2023-05-19 PROCEDURE — 85025 COMPLETE CBC W/AUTO DIFF WBC: CPT

## 2023-05-19 PROCEDURE — 87641 MR-STAPH DNA AMP PROBE: CPT

## 2023-05-19 PROCEDURE — 86901 BLOOD TYPING SEROLOGIC RH(D): CPT

## 2023-05-19 PROCEDURE — 86900 BLOOD TYPING SEROLOGIC ABO: CPT

## 2023-05-20 LAB
BACTERIA UR CULT: NORMAL
EST. AVERAGE GLUCOSE BLD GHB EST-MCNC: 131.2 MG/DL
HBA1C MFR BLD: 6.2 %

## 2023-05-21 LAB — MRSA DNA SPEC QL NAA+PROBE: NORMAL

## 2023-05-25 ENCOUNTER — TELEPHONE (OUTPATIENT)
Dept: ORTHOPEDIC SURGERY | Age: 83
End: 2023-05-25

## 2023-05-25 ENCOUNTER — ANESTHESIA EVENT (OUTPATIENT)
Dept: OPERATING ROOM | Age: 83
End: 2023-05-25
Payer: MEDICAID

## 2023-05-25 DIAGNOSIS — M50.121 CERVICAL DISC DISORDER AT C4-C5 LEVEL WITH RADICULOPATHY: ICD-10-CM

## 2023-05-25 DIAGNOSIS — M71.38 SYNOVIAL CYST OF LUMBAR SPINE: ICD-10-CM

## 2023-05-25 DIAGNOSIS — M51.36 DDD (DEGENERATIVE DISC DISEASE), LUMBAR: ICD-10-CM

## 2023-05-25 DIAGNOSIS — M48.061 SPINAL STENOSIS OF LUMBAR REGION WITHOUT NEUROGENIC CLAUDICATION: ICD-10-CM

## 2023-05-25 DIAGNOSIS — G89.4 CHRONIC PAIN SYNDROME: ICD-10-CM

## 2023-05-25 DIAGNOSIS — M54.32 SCIATICA OF LEFT SIDE: ICD-10-CM

## 2023-05-25 RX ORDER — FERROUS SULFATE 325(65) MG
325 TABLET ORAL 2 TIMES DAILY
Qty: 60 TABLET | Refills: 0 | Status: SHIPPED | OUTPATIENT
Start: 2023-05-25

## 2023-05-25 RX ORDER — ASCORBIC ACID 500 MG
500 TABLET ORAL DAILY
Qty: 30 TABLET | Refills: 0 | Status: SHIPPED | OUTPATIENT
Start: 2023-05-25

## 2023-05-25 NOTE — PROGRESS NOTES
Reviewed pt's H/H (9.5/29.2)done 5/19/23 with Dr. Eldridge Fix - order received to do CBC and TXS day of surgery. Left message for Melissa @ Dr. Sasha Bee office concerning H/H. Notified Jacinda Diaz @ Dr. Radha Carmona office (PCP) concerning downward trend of H/H since 7/15/23.

## 2023-05-25 NOTE — TELEPHONE ENCOUNTER
Called and spoke with Sierra daughter she will  the medication after work   And have tammi start taking today

## 2023-05-25 NOTE — TELEPHONE ENCOUNTER
Pre-operative labs reviewed  INR 1.27 - OK  Microcytic anemia    We will send iron sulfate to the pharmacy  Also vitamin C daily  Sent to pharmacy on file [facility])  She should start these immediately in preparation for surgery in a couple weeks time

## 2023-06-02 ENCOUNTER — OFFICE VISIT (OUTPATIENT)
Dept: FAMILY MEDICINE CLINIC | Age: 83
End: 2023-06-02
Payer: MEDICAID

## 2023-06-02 VITALS
OXYGEN SATURATION: 93 % | WEIGHT: 177 LBS | HEART RATE: 85 BPM | BODY MASS INDEX: 28.57 KG/M2 | SYSTOLIC BLOOD PRESSURE: 104 MMHG | DIASTOLIC BLOOD PRESSURE: 60 MMHG

## 2023-06-02 DIAGNOSIS — I26.99 ACUTE PULMONARY EMBOLISM WITHOUT ACUTE COR PULMONALE, UNSPECIFIED PULMONARY EMBOLISM TYPE (HCC): ICD-10-CM

## 2023-06-02 DIAGNOSIS — J43.2 CENTRILOBULAR EMPHYSEMA (HCC): ICD-10-CM

## 2023-06-02 DIAGNOSIS — M05.79 RHEUMATOID ARTHRITIS INVOLVING MULTIPLE SITES WITH POSITIVE RHEUMATOID FACTOR (HCC): ICD-10-CM

## 2023-06-02 DIAGNOSIS — Z01.818 PRE-OP EXAM: Primary | ICD-10-CM

## 2023-06-02 DIAGNOSIS — I27.20 PULMONARY HTN (HCC): ICD-10-CM

## 2023-06-02 DIAGNOSIS — Z01.818 PRE-OP EXAM: ICD-10-CM

## 2023-06-02 DIAGNOSIS — D64.9 ANEMIA, UNSPECIFIED TYPE: ICD-10-CM

## 2023-06-02 DIAGNOSIS — I10 HTN (HYPERTENSION), BENIGN: ICD-10-CM

## 2023-06-02 DIAGNOSIS — E78.00 PURE HYPERCHOLESTEROLEMIA: ICD-10-CM

## 2023-06-02 DIAGNOSIS — I82.463 ACUTE DEEP VEIN THROMBOSIS (DVT) OF CALF MUSCLE VEIN OF BOTH LOWER EXTREMITIES (HCC): ICD-10-CM

## 2023-06-02 LAB
ANION GAP SERPL CALCULATED.3IONS-SCNC: 13 MMOL/L (ref 3–16)
BASOPHILS # BLD: 0.1 K/UL (ref 0–0.2)
BASOPHILS NFR BLD: 0.9 %
BUN SERPL-MCNC: 30 MG/DL (ref 7–20)
CALCIUM SERPL-MCNC: 9.8 MG/DL (ref 8.3–10.6)
CHLORIDE SERPL-SCNC: 99 MMOL/L (ref 99–110)
CO2 SERPL-SCNC: 29 MMOL/L (ref 21–32)
CREAT SERPL-MCNC: 1.3 MG/DL (ref 0.6–1.2)
DEPRECATED RDW RBC AUTO: 20.6 % (ref 12.4–15.4)
EOSINOPHIL # BLD: 0.1 K/UL (ref 0–0.6)
EOSINOPHIL NFR BLD: 0.8 %
GFR SERPLBLD CREATININE-BSD FMLA CKD-EPI: 41 ML/MIN/{1.73_M2}
GLUCOSE SERPL-MCNC: 100 MG/DL (ref 70–99)
HCT VFR BLD AUTO: 30.3 % (ref 36–48)
HGB BLD-MCNC: 9.9 G/DL (ref 12–16)
LYMPHOCYTES # BLD: 0.9 K/UL (ref 1–5.1)
LYMPHOCYTES NFR BLD: 13.9 %
MCH RBC QN AUTO: 25.9 PG (ref 26–34)
MCHC RBC AUTO-ENTMCNC: 32.7 G/DL (ref 31–36)
MCV RBC AUTO: 79.3 FL (ref 80–100)
MONOCYTES # BLD: 0.5 K/UL (ref 0–1.3)
MONOCYTES NFR BLD: 7.2 %
NEUTROPHILS # BLD: 5.3 K/UL (ref 1.7–7.7)
NEUTROPHILS NFR BLD: 77.2 %
PLATELET # BLD AUTO: 301 K/UL (ref 135–450)
PMV BLD AUTO: 8.7 FL (ref 5–10.5)
POTASSIUM SERPL-SCNC: 4.2 MMOL/L (ref 3.5–5.1)
RBC # BLD AUTO: 3.82 M/UL (ref 4–5.2)
SODIUM SERPL-SCNC: 141 MMOL/L (ref 136–145)
WBC # BLD AUTO: 6.8 K/UL (ref 4–11)

## 2023-06-02 PROCEDURE — 3078F DIAST BP <80 MM HG: CPT | Performed by: STUDENT IN AN ORGANIZED HEALTH CARE EDUCATION/TRAINING PROGRAM

## 2023-06-02 PROCEDURE — 1123F ACP DISCUSS/DSCN MKR DOCD: CPT | Performed by: STUDENT IN AN ORGANIZED HEALTH CARE EDUCATION/TRAINING PROGRAM

## 2023-06-02 PROCEDURE — 99214 OFFICE O/P EST MOD 30 MIN: CPT | Performed by: STUDENT IN AN ORGANIZED HEALTH CARE EDUCATION/TRAINING PROGRAM

## 2023-06-02 PROCEDURE — 3074F SYST BP LT 130 MM HG: CPT | Performed by: STUDENT IN AN ORGANIZED HEALTH CARE EDUCATION/TRAINING PROGRAM

## 2023-06-02 ASSESSMENT — ENCOUNTER SYMPTOMS
ABDOMINAL DISTENTION: 0
CHEST TIGHTNESS: 0
BLOOD IN STOOL: 0
WHEEZING: 0
PHOTOPHOBIA: 0
SHORTNESS OF BREATH: 1

## 2023-06-02 NOTE — PROGRESS NOTES
Subjective:     Frederic Goyal is a 82 y.o.  female  who presents to the office today for a preoperative consultation at the request of surgeon Dr. Jie Dela Cruz who plans on performing right reverse shoulder replacement on June 6. Planned anesthesia is General.  The patient has the following known anesthesia issues:  none   Patient has a bleeding risk of : on eliquis. Has stopped taking already. Patient does not have objection to receiving blood products if needed.     Family hx of blood disorders or problems with anesthesia (malignant hyperthermia) : blood clots in the family    Mets: 4-10 - energy goes up and down - can do 1-2 hours of house work    Patient Active Problem List   Diagnosis    Centrilobular emphysema ([facility]    Pure hypercholesterolemia    Rheumatoid arthritis involving multiple sites with positive rheumatoid factor ([facility]    Essential hypertension    Age-related osteoporosis without current pathological fracture    Acquired hypothyroidism    Cervical disc disorder at C4-C5 level with radiculopathy    Sciatica of left side    Pulmonary HTN (HCC)    Anemia    HEBERT (obstructive sleep apnea)    Atrial septal defect    Esophageal motility disorder    Former smoker    Chronic respiratory failure with hypoxia (HCC)    Reactive depression    Nasal congestion    HTN (hypertension), benign    Acute deep vein thrombosis (DVT) of calf muscle vein of both lower extremities (HCC)    Acute pulmonary embolism without acute cor pulmonale, unspecified pulmonary embolism type (HCC)    Myoclonus       Allergies   Allergen Reactions    Alendronate Sodium      Jaw pain      Humira [Adalimumab]      Rash      Losartan Swelling    Penicillins      rash    Simvastatin Other (See Comments)     Made legs ache    Sulfa Antibiotics Swelling     Tongue swelled    Tape Perla Cohen Tape]        Family History   Problem Relation Age of Onset    Cancer Mother     Cancer Father     Heart Disease Maternal Aunt     Cancer

## 2023-06-05 ENCOUNTER — TELEPHONE (OUTPATIENT)
Dept: ORTHOPEDIC SURGERY | Age: 83
End: 2023-06-05

## 2023-06-05 NOTE — TELEPHONE ENCOUNTER
[facility] Patient Information     Facility Name: [facility]APER [facility], FOR THE PATIENT   Contact Name: Marin Ward"  Contact Number: [phone]    THE PATIENT'S GRANDDAUGHTER IS DROPPING OFF [facility] PAPERWORK THEY WANT COMPLETED IN CASE THE GRANDDAUGHTER RUNS INTO SO MUCH TIME OFF THAT SHE NEEDS THE COVERAGE. SHE IS DROPPING THE PAPERWORK OFF AT THE [facility] LOCATION TODAY. PLEASE COMPLETE.

## 2023-06-06 ENCOUNTER — ANESTHESIA (OUTPATIENT)
Dept: OPERATING ROOM | Age: 83
End: 2023-06-06
Payer: MEDICAID

## 2023-06-06 ENCOUNTER — HOSPITAL ENCOUNTER (OUTPATIENT)
Age: 83
Setting detail: SURGERY ADMIT
Discharge: HOME | End: 2023-06-06
Attending: ORTHOPAEDIC SURGERY | Admitting: ORTHOPAEDIC SURGERY
Payer: MEDICAID

## 2023-06-06 ENCOUNTER — TELEPHONE (OUTPATIENT)
Dept: ORTHOPEDIC SURGERY | Age: 83
End: 2023-06-06

## 2023-06-06 ENCOUNTER — APPOINTMENT (OUTPATIENT)
Dept: GENERAL RADIOLOGY | Age: 83
End: 2023-06-06
Attending: ORTHOPAEDIC SURGERY
Payer: MEDICAID

## 2023-06-06 VITALS
DIASTOLIC BLOOD PRESSURE: 61 MMHG | OXYGEN SATURATION: 99 % | RESPIRATION RATE: 18 BRPM | TEMPERATURE: 97.2 F | HEIGHT: 66 IN | BODY MASS INDEX: 28.66 KG/M2 | WEIGHT: 178.3 LBS | HEART RATE: 77 BPM | SYSTOLIC BLOOD PRESSURE: 116 MMHG

## 2023-06-06 DIAGNOSIS — M50.121 CERVICAL DISC DISORDER AT C4-C5 LEVEL WITH RADICULOPATHY: ICD-10-CM

## 2023-06-06 DIAGNOSIS — M51.36 DDD (DEGENERATIVE DISC DISEASE), LUMBAR: ICD-10-CM

## 2023-06-06 DIAGNOSIS — M25.561 BILATERAL CHRONIC KNEE PAIN: ICD-10-CM

## 2023-06-06 DIAGNOSIS — M05.79 RHEUMATOID ARTHRITIS INVOLVING MULTIPLE SITES WITH POSITIVE RHEUMATOID FACTOR (HCC): ICD-10-CM

## 2023-06-06 DIAGNOSIS — M50.30 DDD (DEGENERATIVE DISC DISEASE), CERVICAL: ICD-10-CM

## 2023-06-06 DIAGNOSIS — M25.562 BILATERAL CHRONIC KNEE PAIN: ICD-10-CM

## 2023-06-06 DIAGNOSIS — M48.061 SPINAL STENOSIS OF LUMBAR REGION WITHOUT NEUROGENIC CLAUDICATION: ICD-10-CM

## 2023-06-06 DIAGNOSIS — G89.29 BILATERAL CHRONIC KNEE PAIN: ICD-10-CM

## 2023-06-06 DIAGNOSIS — M54.32 SCIATICA OF LEFT SIDE: ICD-10-CM

## 2023-06-06 DIAGNOSIS — M71.38 SYNOVIAL CYST OF LUMBAR SPINE: ICD-10-CM

## 2023-06-06 DIAGNOSIS — M19.019 ARTHRITIS PAIN OF SHOULDER: ICD-10-CM

## 2023-06-06 DIAGNOSIS — G89.4 CHRONIC PAIN SYNDROME: ICD-10-CM

## 2023-06-06 PROBLEM — M12.811 RIGHT ROTATOR CUFF TEAR ARTHROPATHY: Status: ACTIVE | Noted: 2023-06-06

## 2023-06-06 PROBLEM — M75.101 RIGHT ROTATOR CUFF TEAR ARTHROPATHY: Status: ACTIVE | Noted: 2023-06-06

## 2023-06-06 LAB
ABO + RH BLD: NORMAL
BLD GP AB SCN SERPL QL: NORMAL
DEPRECATED RDW RBC AUTO: 22.6 % (ref 12.4–15.4)
GLUCOSE BLD-MCNC: 85 MG/DL (ref 70–99)
HCT VFR BLD AUTO: 31.9 % (ref 36–48)
HGB BLD-MCNC: 10 G/DL (ref 12–16)
MCH RBC QN AUTO: 25.6 PG (ref 26–34)
MCHC RBC AUTO-ENTMCNC: 31.4 G/DL (ref 31–36)
MCV RBC AUTO: 81.5 FL (ref 80–100)
PERFORMED ON: NORMAL
PLATELET # BLD AUTO: 289 K/UL (ref 135–450)
PMV BLD AUTO: 8 FL (ref 5–10.5)
RBC # BLD AUTO: 3.91 M/UL (ref 4–5.2)
WBC # BLD AUTO: 7.3 K/UL (ref 4–11)

## 2023-06-06 PROCEDURE — 36415 COLL VENOUS BLD VENIPUNCTURE: CPT

## 2023-06-06 PROCEDURE — 23472 RECONSTRUCT SHOULDER JOINT: CPT | Performed by: NURSE PRACTITIONER

## 2023-06-06 PROCEDURE — 73030 X-RAY EXAM OF SHOULDER: CPT

## 2023-06-06 PROCEDURE — 2500000003 HC RX 250 WO HCPCS: Performed by: ORTHOPAEDIC SURGERY

## 2023-06-06 PROCEDURE — 23472 RECONSTRUCT SHOULDER JOINT: CPT | Performed by: ORTHOPAEDIC SURGERY

## 2023-06-06 PROCEDURE — 86850 RBC ANTIBODY SCREEN: CPT

## 2023-06-06 PROCEDURE — 2500000003 HC RX 250 WO HCPCS: Performed by: NURSE ANESTHETIST, CERTIFIED REGISTERED

## 2023-06-06 PROCEDURE — 7100000011 HC PHASE II RECOVERY - ADDTL 15 MIN: Performed by: ORTHOPAEDIC SURGERY

## 2023-06-06 PROCEDURE — 7100000001 HC PACU RECOVERY - ADDTL 15 MIN: Performed by: ORTHOPAEDIC SURGERY

## 2023-06-06 PROCEDURE — C1776 JOINT DEVICE (IMPLANTABLE): HCPCS | Performed by: ORTHOPAEDIC SURGERY

## 2023-06-06 PROCEDURE — 7100000000 HC PACU RECOVERY - FIRST 15 MIN: Performed by: ORTHOPAEDIC SURGERY

## 2023-06-06 PROCEDURE — 2709999900 HC NON-CHARGEABLE SUPPLY: Performed by: ORTHOPAEDIC SURGERY

## 2023-06-06 PROCEDURE — 3600000004 HC SURGERY LEVEL 4 BASE: Performed by: ORTHOPAEDIC SURGERY

## 2023-06-06 PROCEDURE — 85027 COMPLETE CBC AUTOMATED: CPT

## 2023-06-06 PROCEDURE — 6360000002 HC RX W HCPCS: Performed by: ANESTHESIOLOGY

## 2023-06-06 PROCEDURE — 3700000000 HC ANESTHESIA ATTENDED CARE: Performed by: ORTHOPAEDIC SURGERY

## 2023-06-06 PROCEDURE — 6370000000 HC RX 637 (ALT 250 FOR IP): Performed by: NURSE ANESTHETIST, CERTIFIED REGISTERED

## 2023-06-06 PROCEDURE — 86901 BLOOD TYPING SEROLOGIC RH(D): CPT

## 2023-06-06 PROCEDURE — 3600000014 HC SURGERY LEVEL 4 ADDTL 15MIN: Performed by: ORTHOPAEDIC SURGERY

## 2023-06-06 PROCEDURE — 6360000002 HC RX W HCPCS: Performed by: ORTHOPAEDIC SURGERY

## 2023-06-06 PROCEDURE — 2580000003 HC RX 258: Performed by: ORTHOPAEDIC SURGERY

## 2023-06-06 PROCEDURE — P9047 ALBUMIN (HUMAN), 25%, 50ML: HCPCS | Performed by: NURSE ANESTHETIST, CERTIFIED REGISTERED

## 2023-06-06 PROCEDURE — 99999 PR OFFICE/OUTPT VISIT,PROCEDURE ONLY: CPT | Performed by: NURSE PRACTITIONER

## 2023-06-06 PROCEDURE — 3700000001 HC ADD 15 MINUTES (ANESTHESIA): Performed by: ORTHOPAEDIC SURGERY

## 2023-06-06 PROCEDURE — 7100000010 HC PHASE II RECOVERY - FIRST 15 MIN: Performed by: ORTHOPAEDIC SURGERY

## 2023-06-06 PROCEDURE — 6360000002 HC RX W HCPCS: Performed by: NURSE ANESTHETIST, CERTIFIED REGISTERED

## 2023-06-06 PROCEDURE — 86900 BLOOD TYPING SEROLOGIC ABO: CPT

## 2023-06-06 DEVICE — SCREW BONE L30MM DIA5MM TI ST FULL THRD PERIPH FOR GLEN: Type: IMPLANTABLE DEVICE | Site: SHOULDER | Status: FUNCTIONAL

## 2023-06-06 DEVICE — SCREW BONE L35MM DIA6.5MM TI ST FULL THRD CTRL FOR GLEN: Type: IMPLANTABLE DEVICE | Site: SHOULDER | Status: FUNCTIONAL

## 2023-06-06 DEVICE — IMPL INSERT REVERSED AEQUALIS ASCEND 36X6MM 7.5DEG: Type: IMPLANTABLE DEVICE | Site: SHOULDER | Status: FUNCTIONAL

## 2023-06-06 DEVICE — BASEPLATE GLEN DIA25MM STD REVERSED AEQUALIS PERFORM: Type: IMPLANTABLE DEVICE | Site: SHOULDER | Status: FUNCTIONAL

## 2023-06-06 DEVICE — SCREW BONE L22MM DIA5MM TI ST FULL THRD PERIPH FOR GLEN: Type: IMPLANTABLE DEVICE | Site: SHOULDER | Status: FUNCTIONAL

## 2023-06-06 DEVICE — SPHERE GLEN DIA36MM STD REVERSED AEQUALIS PERFORM: Type: IMPLANTABLE DEVICE | Site: SHOULDER | Status: FUNCTIONAL

## 2023-06-06 DEVICE — IMPLANTABLE DEVICE: Type: IMPLANTABLE DEVICE | Site: SHOULDER | Status: FUNCTIONAL

## 2023-06-06 DEVICE — TRAY HUM THK+0MM 15MM OFFSET SHLDR LO REVERSED AEQUALIS: Type: IMPLANTABLE DEVICE | Site: SHOULDER | Status: FUNCTIONAL

## 2023-06-06 RX ORDER — HYDROMORPHONE HYDROCHLORIDE 2 MG/ML
INJECTION, SOLUTION INTRAMUSCULAR; INTRAVENOUS; SUBCUTANEOUS
Status: DISCONTINUED
Start: 2023-06-06 | End: 2023-06-06 | Stop reason: HOSPADM

## 2023-06-06 RX ORDER — ONDANSETRON 2 MG/ML
INJECTION INTRAMUSCULAR; INTRAVENOUS PRN
Status: DISCONTINUED | OUTPATIENT
Start: 2023-06-06 | End: 2023-06-06 | Stop reason: SDUPTHER

## 2023-06-06 RX ORDER — CEPHALEXIN 500 MG/1
500 CAPSULE ORAL 2 TIMES DAILY
Qty: 2 CAPSULE | Refills: 0 | Status: SHIPPED | OUTPATIENT
Start: 2023-06-06 | End: 2023-06-07

## 2023-06-06 RX ORDER — HYDROMORPHONE HYDROCHLORIDE 2 MG/ML
0.5 INJECTION, SOLUTION INTRAMUSCULAR; INTRAVENOUS; SUBCUTANEOUS EVERY 5 MIN PRN
Status: DISCONTINUED | OUTPATIENT
Start: 2023-06-06 | End: 2023-06-06 | Stop reason: HOSPADM

## 2023-06-06 RX ORDER — FENTANYL CITRATE 50 UG/ML
INJECTION, SOLUTION INTRAMUSCULAR; INTRAVENOUS PRN
Status: DISCONTINUED | OUTPATIENT
Start: 2023-06-06 | End: 2023-06-06 | Stop reason: SDUPTHER

## 2023-06-06 RX ORDER — ALBUTEROL SULFATE 90 UG/1
AEROSOL, METERED RESPIRATORY (INHALATION) PRN
Status: DISCONTINUED | OUTPATIENT
Start: 2023-06-06 | End: 2023-06-06 | Stop reason: SDUPTHER

## 2023-06-06 RX ORDER — ALBUTEROL SULFATE 2.5 MG/3ML
2.5 SOLUTION RESPIRATORY (INHALATION) ONCE
Status: DISCONTINUED | OUTPATIENT
Start: 2023-06-06 | End: 2023-06-06 | Stop reason: HOSPADM

## 2023-06-06 RX ORDER — LABETALOL HYDROCHLORIDE 5 MG/ML
5 INJECTION, SOLUTION INTRAVENOUS
Status: DISCONTINUED | OUTPATIENT
Start: 2023-06-06 | End: 2023-06-06 | Stop reason: HOSPADM

## 2023-06-06 RX ORDER — TRANEXAMIC ACID 10 MG/ML
1000 INJECTION, SOLUTION INTRAVENOUS
Status: COMPLETED | OUTPATIENT
Start: 2023-06-06 | End: 2023-06-06

## 2023-06-06 RX ORDER — DEXMEDETOMIDINE HYDROCHLORIDE 100 UG/ML
INJECTION, SOLUTION INTRAVENOUS PRN
Status: DISCONTINUED | OUTPATIENT
Start: 2023-06-06 | End: 2023-06-06 | Stop reason: SDUPTHER

## 2023-06-06 RX ORDER — SODIUM CHLORIDE 9 MG/ML
INJECTION, SOLUTION INTRAVENOUS PRN
Status: DISCONTINUED | OUTPATIENT
Start: 2023-06-06 | End: 2023-06-06 | Stop reason: HOSPADM

## 2023-06-06 RX ORDER — PHENYLEPHRINE HCL IN 0.9% NACL 1 MG/10 ML
SYRINGE (ML) INTRAVENOUS PRN
Status: DISCONTINUED | OUTPATIENT
Start: 2023-06-06 | End: 2023-06-06 | Stop reason: SDUPTHER

## 2023-06-06 RX ORDER — KETAMINE HCL IN NACL, ISO-OSM 100MG/10ML
SYRINGE (ML) INJECTION PRN
Status: DISCONTINUED | OUTPATIENT
Start: 2023-06-06 | End: 2023-06-06 | Stop reason: SDUPTHER

## 2023-06-06 RX ORDER — SUCCINYLCHOLINE/SOD CL,ISO/PF 200MG/10ML
SYRINGE (ML) INTRAVENOUS PRN
Status: DISCONTINUED | OUTPATIENT
Start: 2023-06-06 | End: 2023-06-06 | Stop reason: SDUPTHER

## 2023-06-06 RX ORDER — ALBUTEROL SULFATE 2.5 MG/3ML
SOLUTION RESPIRATORY (INHALATION)
Status: DISCONTINUED
Start: 2023-06-06 | End: 2023-06-06 | Stop reason: HOSPADM

## 2023-06-06 RX ORDER — LIDOCAINE HYDROCHLORIDE 20 MG/ML
INJECTION, SOLUTION EPIDURAL; INFILTRATION; INTRACAUDAL; PERINEURAL PRN
Status: DISCONTINUED | OUTPATIENT
Start: 2023-06-06 | End: 2023-06-06 | Stop reason: SDUPTHER

## 2023-06-06 RX ORDER — ALBUMIN (HUMAN) 12.5 G/50ML
SOLUTION INTRAVENOUS PRN
Status: DISCONTINUED | OUTPATIENT
Start: 2023-06-06 | End: 2023-06-06 | Stop reason: SDUPTHER

## 2023-06-06 RX ORDER — ROCURONIUM BROMIDE 10 MG/ML
INJECTION, SOLUTION INTRAVENOUS PRN
Status: DISCONTINUED | OUTPATIENT
Start: 2023-06-06 | End: 2023-06-06 | Stop reason: SDUPTHER

## 2023-06-06 RX ORDER — SODIUM CHLORIDE, SODIUM LACTATE, POTASSIUM CHLORIDE, CALCIUM CHLORIDE 600; 310; 30; 20 MG/100ML; MG/100ML; MG/100ML; MG/100ML
INJECTION, SOLUTION INTRAVENOUS CONTINUOUS
Status: DISCONTINUED | OUTPATIENT
Start: 2023-06-06 | End: 2023-06-06 | Stop reason: HOSPADM

## 2023-06-06 RX ORDER — LIDOCAINE HYDROCHLORIDE 10 MG/ML
0.5 INJECTION, SOLUTION EPIDURAL; INFILTRATION; INTRACAUDAL; PERINEURAL ONCE
Status: DISCONTINUED | OUTPATIENT
Start: 2023-06-06 | End: 2023-06-06 | Stop reason: HOSPADM

## 2023-06-06 RX ORDER — VANCOMYCIN HYDROCHLORIDE 1 G/20ML
INJECTION, POWDER, LYOPHILIZED, FOR SOLUTION INTRAVENOUS
Status: COMPLETED | OUTPATIENT
Start: 2023-06-06 | End: 2023-06-06

## 2023-06-06 RX ORDER — ALBUTEROL SULFATE 2.5 MG/3ML
2.5 SOLUTION RESPIRATORY (INHALATION) ONCE
Status: COMPLETED | OUTPATIENT
Start: 2023-06-06 | End: 2023-06-06

## 2023-06-06 RX ORDER — SODIUM CHLORIDE 9 MG/ML
INJECTION, SOLUTION INTRAVENOUS CONTINUOUS
Status: DISCONTINUED | OUTPATIENT
Start: 2023-06-06 | End: 2023-06-06 | Stop reason: HOSPADM

## 2023-06-06 RX ORDER — OXYCODONE HYDROCHLORIDE AND ACETAMINOPHEN 5; 325 MG/1; MG/1
1 TABLET ORAL EVERY 6 HOURS PRN
Qty: 28 TABLET | Refills: 0 | Status: SHIPPED | OUTPATIENT
Start: 2023-06-06 | End: 2023-06-13

## 2023-06-06 RX ORDER — ONDANSETRON 2 MG/ML
4 INJECTION INTRAMUSCULAR; INTRAVENOUS
Status: DISCONTINUED | OUTPATIENT
Start: 2023-06-06 | End: 2023-06-06 | Stop reason: HOSPADM

## 2023-06-06 RX ORDER — SODIUM CHLORIDE 0.9 % (FLUSH) 0.9 %
5-40 SYRINGE (ML) INJECTION PRN
Status: DISCONTINUED | OUTPATIENT
Start: 2023-06-06 | End: 2023-06-06 | Stop reason: HOSPADM

## 2023-06-06 RX ORDER — HYDROMORPHONE HYDROCHLORIDE 2 MG/ML
0.25 INJECTION, SOLUTION INTRAMUSCULAR; INTRAVENOUS; SUBCUTANEOUS EVERY 5 MIN PRN
Status: DISCONTINUED | OUTPATIENT
Start: 2023-06-06 | End: 2023-06-06 | Stop reason: HOSPADM

## 2023-06-06 RX ORDER — MAGNESIUM HYDROXIDE 1200 MG/15ML
LIQUID ORAL CONTINUOUS PRN
Status: COMPLETED | OUTPATIENT
Start: 2023-06-06 | End: 2023-06-06

## 2023-06-06 RX ORDER — PROPOFOL 10 MG/ML
INJECTION, EMULSION INTRAVENOUS PRN
Status: DISCONTINUED | OUTPATIENT
Start: 2023-06-06 | End: 2023-06-06 | Stop reason: SDUPTHER

## 2023-06-06 RX ORDER — SODIUM CHLORIDE 0.9 % (FLUSH) 0.9 %
5-40 SYRINGE (ML) INJECTION EVERY 12 HOURS SCHEDULED
Status: DISCONTINUED | OUTPATIENT
Start: 2023-06-06 | End: 2023-06-06 | Stop reason: HOSPADM

## 2023-06-06 RX ADMIN — TRANEXAMIC ACID 1000 MG: 10 INJECTION, SOLUTION INTRAVENOUS at 09:14

## 2023-06-06 RX ADMIN — ROCURONIUM BROMIDE 20 MG: 10 INJECTION, SOLUTION INTRAVENOUS at 09:39

## 2023-06-06 RX ADMIN — SODIUM CHLORIDE, POTASSIUM CHLORIDE, SODIUM LACTATE AND CALCIUM CHLORIDE: 600; 310; 30; 20 INJECTION, SOLUTION INTRAVENOUS at 07:22

## 2023-06-06 RX ADMIN — ALBUTEROL SULFATE 2.5 MG: 2.5 SOLUTION RESPIRATORY (INHALATION) at 07:57

## 2023-06-06 RX ADMIN — HYDROCORTISONE SODIUM SUCCINATE 75 MG: 100 INJECTION, POWDER, FOR SOLUTION INTRAMUSCULAR; INTRAVENOUS at 09:14

## 2023-06-06 RX ADMIN — Medication 10 MG: at 10:15

## 2023-06-06 RX ADMIN — CEFAZOLIN 2000 MG: 2 INJECTION, POWDER, FOR SOLUTION INTRAMUSCULAR; INTRAVENOUS at 08:49

## 2023-06-06 RX ADMIN — FENTANYL CITRATE 25 MCG: 50 INJECTION, SOLUTION INTRAMUSCULAR; INTRAVENOUS at 09:56

## 2023-06-06 RX ADMIN — Medication 10 MG: at 09:31

## 2023-06-06 RX ADMIN — ROCURONIUM BROMIDE 10 MG: 10 INJECTION, SOLUTION INTRAVENOUS at 08:57

## 2023-06-06 RX ADMIN — DEXMEDETOMIDINE HYDROCHLORIDE 4 MCG: 100 INJECTION, SOLUTION INTRAVENOUS at 09:33

## 2023-06-06 RX ADMIN — DEXMEDETOMIDINE HYDROCHLORIDE 4 MCG: 100 INJECTION, SOLUTION INTRAVENOUS at 09:06

## 2023-06-06 RX ADMIN — DEXMEDETOMIDINE HYDROCHLORIDE 4 MCG: 100 INJECTION, SOLUTION INTRAVENOUS at 10:09

## 2023-06-06 RX ADMIN — FENTANYL CITRATE 25 MCG: 50 INJECTION, SOLUTION INTRAMUSCULAR; INTRAVENOUS at 09:51

## 2023-06-06 RX ADMIN — HYDROMORPHONE HYDROCHLORIDE 0.5 MG: 2 INJECTION, SOLUTION INTRAMUSCULAR; INTRAVENOUS; SUBCUTANEOUS at 11:54

## 2023-06-06 RX ADMIN — FENTANYL CITRATE 50 MCG: 50 INJECTION, SOLUTION INTRAMUSCULAR; INTRAVENOUS at 09:31

## 2023-06-06 RX ADMIN — Medication 100 MCG: at 09:17

## 2023-06-06 RX ADMIN — SUGAMMADEX 200 MG: 100 INJECTION, SOLUTION INTRAVENOUS at 11:26

## 2023-06-06 RX ADMIN — ALBUTEROL SULFATE 4 PUFF: 90 AEROSOL, METERED RESPIRATORY (INHALATION) at 11:20

## 2023-06-06 RX ADMIN — Medication 100 MG: at 08:59

## 2023-06-06 RX ADMIN — TRANEXAMIC ACID 1000 MG: 10 INJECTION, SOLUTION INTRAVENOUS at 11:12

## 2023-06-06 RX ADMIN — Medication 10 MG: at 11:02

## 2023-06-06 RX ADMIN — ALBUMIN (HUMAN) 50 ML: 5 SOLUTION INTRAVENOUS at 10:33

## 2023-06-06 RX ADMIN — ROCURONIUM BROMIDE 40 MG: 10 INJECTION, SOLUTION INTRAVENOUS at 09:08

## 2023-06-06 RX ADMIN — PROPOFOL 100 MG: 10 INJECTION, EMULSION INTRAVENOUS at 08:57

## 2023-06-06 RX ADMIN — ONDANSETRON 4 MG: 2 INJECTION INTRAMUSCULAR; INTRAVENOUS at 09:14

## 2023-06-06 RX ADMIN — DEXMEDETOMIDINE HYDROCHLORIDE 4 MCG: 100 INJECTION, SOLUTION INTRAVENOUS at 11:11

## 2023-06-06 RX ADMIN — LIDOCAINE HYDROCHLORIDE 60 MG: 20 INJECTION, SOLUTION EPIDURAL; INFILTRATION; INTRACAUDAL; PERINEURAL at 08:57

## 2023-06-06 RX ADMIN — Medication 20 MG: at 08:57

## 2023-06-06 ASSESSMENT — PAIN DESCRIPTION - DESCRIPTORS: DESCRIPTORS: DISCOMFORT

## 2023-06-06 ASSESSMENT — PAIN - FUNCTIONAL ASSESSMENT
PAIN_FUNCTIONAL_ASSESSMENT: PREVENTS OR INTERFERES SOME ACTIVE ACTIVITIES AND ADLS
PAIN_FUNCTIONAL_ASSESSMENT: 0-10

## 2023-06-06 ASSESSMENT — PAIN DESCRIPTION - LOCATION: LOCATION: ARM

## 2023-06-06 ASSESSMENT — PAIN DESCRIPTION - ORIENTATION: ORIENTATION: RIGHT

## 2023-06-06 ASSESSMENT — PAIN SCALES - GENERAL
PAINLEVEL_OUTOF10: 7
PAINLEVEL_OUTOF10: 4

## 2023-06-06 NOTE — PROGRESS NOTES
Discharge instructions reviewed and understanding verbalized per pt/family with copy given. All home medications/new prescriptions have been reviewed, questions answered and patient/family state understanding.  Medication information sheet provided for new prescriptions received when applicable    PT DISCHARGED HOME WITH FAMILY, PT IN STABLE CONDITION, TRANSPORTED TO CAR VIA WHEELCHAIR WITH [facility] pca

## 2023-06-06 NOTE — PROGRESS NOTES
Pt arrived to PACU from OR, VSS, pt arouses to voice. R shoulder incision is CDI, ice applied; sling in place. Pt verbalizes sensation in RUE and can wiggle fingers. Pt is anxious and states that she cannot breath. Will administer breathing treatment per anesthesia. Will continue to monitor.

## 2023-06-06 NOTE — ANESTHESIA PRE PROCEDURE
GERD:,           Endo/Other:    (+) hypothyroidism, blood dyscrasia: anticoagulation therapy and anemia, arthritis: rheumatoid. , .                  ROS comment: Chronic steroids for yrs for RA. 5 mg PO QD Abdominal:             Vascular:   + PE. Other Findings:           Anesthesia Plan      general     ASA 4     (May extubate to bipap. Stress dose steroids. Patient and grand daughter Eduard Alaniz verbalize understanding that patient may remain intubated post operatively. They verbalize understanding that she is at high risk for developing post op confusion. I have discussed with the patient and grand daughter the rationale for blood component transfusion; its benefits in treating or preventing fatigue, organ damage, or death; and its risk which includes mild transfusion reactions, rare risk of blood borne infection, or more serious but rare reactions. I have discussed the alternatives to transfusion, including the risk and consequences of not receiving transfusion. The patient and grand daughter have had an opportunity to ask questions and had agreed to proceed with transfusion of blood components.  )  Induction: intravenous. MIPS: Postoperative opioids intended and Prophylactic antiemetics administered. Anesthetic plan and risks discussed with patient (grand daughter Eduard Alaniz also helped with medical history). Use of blood products discussed with patient whom consented to blood products. Plan discussed with CRNA.                     Janet Ramirez MD   6/6/2023

## 2023-06-06 NOTE — PROGRESS NOTES
[facility] Orthopedic Surgery   Progress Note    CHIEF COMPLAINT/DIAGNOSIS: S/p right REVERSE Total Shoulder Arthroplasty    SUBJECTIVE: The patient is seen siting at the edge of the bed with granddaughters at bedside; describes mild shoulder pain without paresthesias. On baseline supplemental oxygen. OBJECTIVE  Physical    VITALS:  /61   Pulse 77   Temp 97.2 °F (36.2 °C) (Temporal)   Resp 18   Ht 5' 6\" (1.676 m)   Wt 178 lb 4.8 oz (80.9 kg)   SpO2 99%   BMI 28.78 kg/m²     GENERAL: Alert and oriented x3, in no acute distress. MUSCULOSKELETAL: Able to flex and extend the wrist on the operative side without issue. INCISION:  Covered with post-op dressing; clean, dry and intact. ROM: right shoulder deferred. Sensory:   Intact to light touch in axillary, radial, ulnar, median distributions. Vascular:   2+ radial pulses with brisk cap refill. Data    ALL MEDICATIONS HAVE BEEN REVIEWED    CBC:   Recent Labs     06/06/23  0707   WBC 7.3   HGB 10.0*   HCT 31.9*        BMP: No results for input(s): NA, K, CL, CO2, PHOS, BUN, CREATININE, CA in the last 72 hours. INR: No results for input(s): INR in the last 72 hours. ASSESSMENT:  S/p right Reverse Total Shoulder Arthroplasty (6/6/23), POD#0  O2 dependent Emphysema  Hx PE on Eliquis  RA  Hypothyroidism  HEBERT    PLAN:   - WB status:  NWB; continue sling - reviewed post op precautions. Exercises handout provided. Keep a pillow beneath the patient's elbow to keep the forearm in front of the body (do not allow the elbow to slide backwards onto the bed). Loosen the sling 2x/day and have patient perform gentle elbow ROM, forearm rotation, and wrist ROM. NO shoulder ROM at all.    - DVT prophylaxis: Resume Eliquis POD#2 SCDs/ankle pumps/ambulation  - Pain Control: Percocet Rx x 1 week. Due to orthopaedic surgical procedure/condition, patient may require pain medication for up to 6-8 weeks.   - ID:  Ancef x 1 then Keflex x 2 post-op   - Dispo: Home today    Follow-up with Dr. Claude Manns in as scheduled on 6/2.   Office# [phone]  Future Appointments   Date Time Provider [address] [place] [address]   6/22/2023 10:15 AM Giorgio Sharp MD W ORTHO [facility]   6/27/2023  3:40 PM Adina Trujillo MD AND PULM [facility]   9/29/2023 12:30 PM MD Venessa Duran APRN - CNP  6/6/2023  2:50 PM

## 2023-06-06 NOTE — DISCHARGE INSTR - COC
Continuity of Care Form    Patient Name: Juan A Villaseñor   :  1940  MRN:  [id number]    Admit date:  2023  Discharge date:  ***    Code Status Order: Prior   Advance Directives:   [address] Briana Documentation       Date/Time Healthcare Directive Type of Healthcare Directive Copy in [address] Agent's Name Healthcare Agent's Phone Number    [phone] No, patient does not have an advance directive for healthcare treatment -- -- -- -- --            Admitting Physician:  Tarun Steinberg MD  PCP: Dulce Shabazz DO    Discharging Nurse: [facility] Unit/Room#: OR/NONE  Discharging Unit Phone Number: ***    Emergency Contact:   Extended Emergency Contact Information  Primary Emergency Contact: Russell Gusman [facility] [address] Phone: [phone]  Mobile Phone: [phone]  Relation: Grandchild    Past Surgical History:  Past Surgical History:   Procedure Laterality Date    BRONCHOSCOPY N/A 3/27/2019    BRONCHOSCOPY ALVEOLAR LAVAGE performed by Maria Victoria Manzano MD at [address] [place] [address]  3/27/2019    BRONCHOSCOPY THERAPUTIC ASPIRATION INITIAL performed by Maria Victoria Manzano MD at [address] [facility] 2011    COLONOSCOPY N/A 2019    COLONOSCOPY WITH ANESTHESIA -SLEEP APNEA- performed by Glover Hodgkins, MD at [facility] 2018    LEFT LUMBAR FOUR, LUMBAR FIVE TRANSFORAMINAL EPIDURAL STEROID INJECTION SITE CONFIRMED BY FLUOROSCOPY performed by Andrew Lan MD at [address] [facility] N/A 2019    ESOPHAGEAL MOTILITY/MANOMETRY STUDY performed by Willy Beltran MD at [address] [place] [address]    GASTROSTOMY TUBE PLACEMENT N/A 2019    EGD PEG TUBE PLACEMENT performed by Glover Hodgkins, MD at [address] (CERVIX STATUS UNKNOWN)      total, JWPB:679312311}  Med Admin  {[facility] DME KPCE:934497447}  Med Delivery   { [place] MED Delivery:260790697}    Wound Care Documentation and Therapy:  Incision 23 Shoulder Right (Active)   Number of days: 0        Elimination:  Continence: Bowel: {YES / WC:27344}  Bladder: {YES / AO:42054}  Urinary Catheter: {Urinary Catheter:598964459}   Colostomy/Ileostomy/Ileal Conduit: {YES / CF:61263}       Date of Last BM: ***    Intake/Output Summary (Last 24 hours) at 2023 1107  Last data filed at 2023 1030  Gross per 24 hour   Intake --   Output 100 ml   Net -100 ml     No intake/output data recorded.     Safety Concerns:     [address] Safety Concerns:024227243}    Impairments/Disabilities:      [address] Impairments/Disabilities:398442439}    Nutrition Therapy:  Current Nutrition Therapy:   [address] Diet List:386136121}    Routes of Feeding: {[facility] DME Other Feedings:239759242}  Liquids: {Slp liquid thickness:42063}  Daily Fluid Restriction: {[facility] DME Yes amt example:002840565}  Last Modified Barium Swallow with Video (Video Swallowing Test): {Done Not Done MAHX:853234538}    Treatments at the Time of Hospital Discharge:   Respiratory Treatments: ***  Oxygen Therapy:  {Therapy; copd oxygen:10282}  Ventilator:    { CC Vent JHNO:548079545}    Rehab Therapies: {THERAPEUTIC INTERVENTION:2499499267}  Weight Bearing Status/Restrictions: [address] Weight Bearin}  Other Medical Equipment (for information only, NOT a DME order):  {EQUIPMENT:954461448}  Other Treatments: ***    Patient's personal belongings (please select all that are sent with patient):  {[facility] DME Belongings:645648482}    RN SIGNATURE:  {Esignature:989415897}    CASE MANAGEMENT/SOCIAL WORK SECTION    Inpatient Status Date: ***    Readmission Risk Assessment Score:  Readmission Risk              Risk of Unplanned Readmission:  0           Discharging to Facility/ Agency   Name:   Address:  Phone:  Fax:    Dialysis Facility (if applicable)

## 2023-06-06 NOTE — DISCHARGE INSTRUCTIONS
Dr Arnav Beltran shoulder arthroplasty discharge instructions:    Sling at all times for 4-6 weeks  You should rotate your forearm and move your wrist/hand/fingers freely  You should loosen the sling two to three times a day to fully extend and flex your elbow to prevent stiffness in the elbow  No shoulder exercises/range of motion for now   Handout of exercises given   Operative arm non weight bearing    Perform incentive spirometry every hour while awake, including at home    Leave the surgical dressing clean/dry/intact, do NOT get it wet  Replace the dressing as needed for soiling or saturation  After 7 days, you can remove the dressing and if the incision is dry without drainage, it can be left open to air. Do not peel the steristrips. Ice the surgical site frequently - 15 minutes on, 15 minutes off - to aid in pain and swelling. Make sure there is a barrier between the ice pack and your skin, such as a clean dry towel. Make sure your ice pack is leak proof as you do not want to get your dressing wet. Hold off on physical therapy until I see you back for your postoperative appointments. I will let you know at the appointments when you should start formal therapy. No driving for the foreseeable future. Although you have a prescription for a strong narcotic pain medication, many patients are able to handle the discomfort postoperatively with a milder medication such as Extra-Strength Tylenol. Take the narcotic medication as needed only, and wean off this medication as soon as possible. Try to avoid anti-inflammatories (NSAIDs include ibuprofen, naproxen, diclofenac, meloxicam, etc.) for 2 weeks after surgery. Resume your Eliquis on Thursday morning, post-op day #2  Perform quad sets, ankle pumps throughout the day. Perform incentive spirometry every hour while awake, including at home. Follow up with Dr Caden Collins 10-14 days after surgery.

## 2023-06-06 NOTE — H&P
PLANNED PROCEDURE:  left reverse shoulder arthroplasty    I have reviewed the preoperative clearance and History & Physical from Dr Linus Sanches MD performed on 6/2/23, reviewed my notes and patient's imaging, examined the patient, and no pertinent changes are required.      Past Medical History:   Diagnosis Date    Acid reflux     Acquired hypothyroidism 07/06/2017    Anemia     Asthma     CHF (congestive heart failure) (HCC)     COPD (chronic obstructive pulmonary disease) ([facility]     HLD (hyperlipidemia)     Hypertension     Influenza A 01/22/2018    MI (myocardial infarction) ([facility]     X 2    Migraine     past hx    HEBERT on CPAP     with oxygen at night    Rheumatoid arthritis     Wears glasses        Past Surgical History:   Procedure Laterality Date    BRONCHOSCOPY N/A 3/27/2019    BRONCHOSCOPY ALVEOLAR LAVAGE performed by Evie Morocho MD at [facility] [address]  3/27/2019    BRONCHOSCOPY THERAPUTIC ASPIRATION INITIAL performed by Evie Morocho MD at [address] Bilateral 03/23/2011    COLONOSCOPY N/A 6/12/2019    COLONOSCOPY WITH ANESTHESIA -SLEEP APNEA- performed by Alexander Herring MD at [address] [facility] [address] Left 12/4/2018    LEFT LUMBAR FOUR, LUMBAR FIVE TRANSFORAMINAL EPIDURAL STEROID INJECTION SITE CONFIRMED BY FLUOROSCOPY performed by Chet Tee MD at [address] N/A 7/23/2019    ESOPHAGEAL MOTILITY/MANOMETRY STUDY performed by Della Romano MD at [address] little toe    GASTROSTOMY TUBE PLACEMENT N/A 4/1/2019    EGD PEG TUBE PLACEMENT performed by Alexander Herring MD at [facility] ([facility])      total, fibroids    KNEE SURGERY      right    NM NJX DX/THER SBST INTRLMNR CRV/THRC W/IMG GDN Left 8/21/2018    LEFT LUMBAR FOUR/ LUMBAR FIVE CYST ASPIRATION SITE CONFIRMED BY FLUOROSCOPY performed by Chet Tee BEDTIME  spironolactone (ALDACTONE) 25 MG tablet, TAKE 1 TABLET BY MOUTH TWICE A DAY  levothyroxine (SYNTHROID) 75 MCG tablet, Take 1 tablet by mouth daily  allopurinol (ZYLOPRIM) 100 MG tablet, TAKE 1 TABLET BY MOUTH EVERY DAY  fluticasone (FLONASE) 50 MCG/ACT nasal spray, SPRAY 2 SPRAYS INTO EACH NOSTRIL EVERY DAY  omeprazole (PRILOSEC) 10 MG delayed release capsule, Take by mouth daily  ezetimibe (ZETIA) 10 MG tablet, TAKE 1 TABLET BY MOUTH EVERY DAY  Lift Chair MISC, by Does not apply route  Misc. Devices (COMMODE BEDSIDE) MISC, Use daily. Incontinence Supply Disposable MISC, 1 each by Does not apply route 4 times daily as needed (incontinence)  pregabalin (LYRICA) 50 MG capsule, TAKE 1 CAPSULE BY MOUTH EVERY DAY  fluticasone-umeclidin-vilant (TRELEGY ELLIPTA) 100-62.5-25 MCG/INH AEPB, Inhale 1 puff into the lungs daily  DULoxetine (CYMBALTA) 30 MG extended release capsule, Take 1 capsule by mouth daily  Handicap Placard MISC, by Does not apply route 02/07/22  ACTEMRA 162 MG/0.9ML SOSY injection,   predniSONE (DELTASONE) 10 MG tablet, Take 0.5 tablets by mouth daily  Misc. Devices (WHEELCHAIR) MISC, SELF PROPELLED, LIGHT WEIGHT,  STANDARD SIZE  ergocalciferol (ERGOCALCIFEROL) 1.25 MG (42647 UT) capsule, Take 1 capsule by mouth every 7 days  OXYGEN, Inhale 3 L/min into the lungs daily as needed   Blood Pressure Monitoring (BLOOD PRESSURE MONITOR/M CUFF) MISC, 1 Units by Does not apply route daily  ipratropium-albuterol (DUONEB) 0.5-2.5 (3) MG/3ML SOLN nebulizer solution, Inhale 3 mLs into the lungs every 4 hours as needed.   nitroGLYCERIN (NITROSTAT) 0.4 MG SL tablet, Place 1 tablet under the tongue every 5 minutes as needed      Current Facility-Administered Medications:     lactated ringers IV soln infusion, , IntraVENous, Continuous, Gideon Guerra MD    lidocaine PF 1 % injection 0.5 mL, 0.5 mL, IntraDERmal, Once, Gideon Guerra MD    ceFAZolin (ANCEF) 2,000 mg in sodium chloride 0.9 % 50 mL IVPB (mini-bag),

## 2023-06-06 NOTE — FLOWSHEET NOTE
Phase 1 complete, pt seen by anesthesiologist. VSS, pt resting comfortably. RUE shoulder incision sites is CDI, ice applied; sling in place. Will transition to phase 2 for d/c.

## 2023-06-06 NOTE — ANESTHESIA POSTPROCEDURE EVALUATION
Department of Anesthesiology  Postprocedure Note    Patient: Chico Rosas  MRN: [id number]  YOB: 1940  Date of evaluation: 6/6/2023      Procedure Summary     Date: 06/06/23 Room / Location: [address]    Anesthesia Start: 0216 Anesthesia Stop: 2757    Procedure: RIGHT REVERSE SHOULDER ARTHROPLASTY (Right: Shoulder) Diagnosis:       Rotator cuff tear arthropathy of right shoulder      (Rotator cuff tear arthropathy of right shoulder [M75.101, M12.811])    Surgeons: Jose Talley MD Responsible Provider: Megan Tavarez MD    Anesthesia Type: general ASA Status: 4          Anesthesia Type: No value filed.     Minh Phase I: Minh Score: 8    Minh Phase II:        Anesthesia Post Evaluation    Patient location during evaluation: PACU  Patient participation: complete - patient participated  Level of consciousness: awake  Airway patency: patent  Nausea & Vomiting: no vomiting  Complications: no  Cardiovascular status: hemodynamically stable  Respiratory status: acceptable  Hydration status: euvolemic  Multimodal analgesia pain management approach

## 2023-06-06 NOTE — TELEPHONE ENCOUNTER
I just spoke with the patient daughter -- we are going to take the Daughter off work for 2 weeks from date of surgery -- and then return for intermittent leave for 2-4 hrs approx 3 times per week for the next 3 months.       Daughter also stated her job told her along with paperwork all medical notes would be need to sent to get her FMLA aprroved -- I did tell daughter as long as we have something signed to send medical records then we can send them -- if not she may have to sign something and get it sent to our office

## 2023-06-06 NOTE — TELEPHONE ENCOUNTER
Working on fmla.   Waiting for reply from clinic Lexy Fountain) regarding approval of time for caregiver

## 2023-06-06 NOTE — OP NOTE
[address]    PATIENT NAME:  Jennifer Ruffin    PATIENT :  1940    DATE OF PROCEDURE:  23    PREOPERATIVE DIAGNOSIS:  Right shoulder cuff tear arthropathy     POSTOPERATIVE DIAGNOSIS:  same     OPERATION PERFORMED:  Right reverse shoulder arthroplasty     ANESTHESIA:  General endotracheal.     SURGEON:  Pavan Uribe MD     FIRST ASSISTANT:  Alison Camejo NP     BLOOD LOSS:  ~100 mL. COMPLICATIONS:  None immediately apparent. IMPLANTS:  Tornier shoulder arthroplasty system. 1.  25 mm standard glenoid baseplate. 2.  6.5 mm x 35 mm central screw. 3.  5.0 mm peripheral screws, x 2.  4.  36 mm glenosphere, +0 mm.  5.  Size 3B long humeral stem. 6.  Reverse humeral tray, low eccentricity, +0 mm.  7.  36 mm reverse polyethylene insert, +6 mm, angle C.     DETAILS OF PROCEDURE:  The patient was brought back to the OR and transferred onto the operating room table with the Tenet shoulder positioner in place. General anesthesia was administered by way of endotracheal tube. The Tenet attachment was then raised for beach chair positioning. The torso was secured with the lateral supports and the head and neck were secured in a neutral position using the face mask. All pressure points were well padded, and tension was relieved from the sciatic nerves using the knee bolster. The operative extremity up to the base of the neck was subsequently prepped and draped in the usual sterile fashion. Mirna Fearing was used to occlude off the axillary region and the edges of the drape. A full time-out was performed with all parties in agreement. The patient did receive cefazolin for antibiotic prophylaxis. The patient was also dosed with IV tranexamic acid 1 gram.     Standard deltopectoral skin incision was made and carried down onto the deltoid muscle fibers. Skin flap was elevated medially until the fat stripe was visualized.   The cephalic vein was mobilized proximally and distally and retracted

## 2023-06-09 DIAGNOSIS — E03.9 ACQUIRED HYPOTHYROIDISM: ICD-10-CM

## 2023-06-11 NOTE — TELEPHONE ENCOUNTER
Refill Request     CONFIRM preferred pharmacy with the patient. If Mail Order Rx - Pend for 90 day refill. Last Seen: Last Seen Department: 6/2/2023  Last Seen by PCP: 1/4/2023    Last Written: 11/4/2022 Levothyroxine 88 MCG  1/6/2023 Levothyroxine 76 MCG    If no future appointment scheduled:  Review the last OV with PCP and review information for follow-up visit,  Route STAFF MESSAGE with patient name to the [facility] for scheduling with the following information:            -  Timing of next visit           -  Visit type ie Physical, OV, etc           -  Diagnoses/Reason ie. COPD, HTN - Do not use MEDICATION, Follow-up or CHECK UP - Give reason for visit      Next Appointment:   Future Appointments   Date Time Provider [address]   6/22/2023 10:15 AM Alka Mckenzie MD W ORTHO [facility]   6/27/2023  3:40 PM Papi Farmer MD AND PULM [facility]   9/29/2023 12:30 PM [facility]       Message sent to [address] [place] [address] to schedule appt with patient?   NO      Requested Prescriptions     Pending Prescriptions Disp Refills    levothyroxine (SYNTHROID) 88 MCG tablet [Pharmacy Med Name: LEVOTHYROXINE 88 MCG TABLET] 90 tablet 1     Sig: TAKE 1 TABLET BY MOUTH EVERY DAY    levothyroxine (SYNTHROID) 75 MCG tablet [Pharmacy Med Name: LEVOTHYROXINE 75 MCG TABLET] 90 tablet 0     Sig: TAKE 1 TABLET BY MOUTH EVERY DAY

## 2023-06-12 PROBLEM — G89.4 CHRONIC PAIN SYNDROME: Status: ACTIVE | Noted: 2023-06-12

## 2023-06-12 RX ORDER — LEVOTHYROXINE SODIUM 0.07 MG/1
TABLET ORAL
Qty: 30 TABLET | Refills: 0 | Status: SHIPPED | OUTPATIENT
Start: 2023-06-12 | End: 2023-07-05

## 2023-06-12 RX ORDER — LEVOTHYROXINE SODIUM 88 UG/1
TABLET ORAL
Qty: 90 TABLET | Refills: 1 | OUTPATIENT
Start: 2023-06-12

## 2023-06-22 ENCOUNTER — OFFICE VISIT (OUTPATIENT)
Dept: ORTHOPEDIC SURGERY | Age: 83
End: 2023-06-22

## 2023-06-22 VITALS — RESPIRATION RATE: 16 BRPM | WEIGHT: 178 LBS | HEIGHT: 66 IN | BODY MASS INDEX: 28.61 KG/M2

## 2023-06-22 DIAGNOSIS — Z96.611 STATUS POST REVERSE TOTAL ARTHROPLASTY OF RIGHT SHOULDER: Primary | ICD-10-CM

## 2023-06-22 NOTE — PROGRESS NOTES
Acid reflux     Acquired hypothyroidism 07/06/2017    Anemia     Asthma     CHF (congestive heart failure) (HCC)     COPD (chronic obstructive pulmonary disease) ([facility]     HLD (hyperlipidemia)     Hypertension     Influenza A 01/22/2018    MI [facility]my[facility] infarction) ([facility]     X 2    Migraine     past hx    HEBERT on CPAP     with oxygen at night    Rheumatoid arthritis     Wears glasses         Past Surgical History:   Procedure Laterality Date    BRONCHOSCOPY N/A 3/27/2019    BRONCHOSCOPY ALVEOLAR LAVAGE performed by Sudheer Duckworth MD at [facility]  3/27/2019    BRONCHOSCOPY THERAPUTIC ASPIRATION INITIAL performed by Sudheer Duckworth MD at [address] Bilateral 03/23/2011    COLONOSCOPY N/A 6/12/2019    COLONOSCOPY WITH ANESTHESIA -SLEEP APNEA- performed by Eliana Carrillo MD at [address] Left 12/4/2018    LEFT LUMBAR FOUR, LUMBAR FIVE TRANSFORAMINAL EPIDURAL STEROID INJECTION SITE CONFIRMED BY FLUOROSCOPY performed by Stacia Maldonado MD at [address] N/A 7/23/2019    ESOPHAGEAL MOTILITY/MANOMETRY STUDY performed by Nery Flynn MD at [address] little toe    GASTROSTOMY TUBE PLACEMENT N/A 4/1/2019    EGD PEG TUBE PLACEMENT performed by Eliana Carrillo MD at [facility] ([facility])      total, fibroids    KNEE SURGERY      right    VT NJX DX/THER SBST INTRLMNR CRV/THRC W/IMG GDN Left 8/21/2018    LEFT LUMBAR FOUR/ LUMBAR FIVE CYST ASPIRATION SITE CONFIRMED BY FLUOROSCOPY performed by Stacia Maldonado MD at [facility] Right 6/6/2023    RIGHT REVERSE SHOULDER ARTHROPLASTY performed by Heber Dias MD at [facility] 3/27/2019    EGD DIAGNOSTIC ONLY performed by Seamus Samuels MD at [address] [place] [address] N/A 9/6/2020

## 2023-06-23 ENCOUNTER — HOSPITAL ENCOUNTER (OUTPATIENT)
Dept: PHYSICAL THERAPY | Age: 83
Setting detail: THERAPIES SERIES
Discharge: HOME | End: 2023-06-23

## 2023-06-26 RX ORDER — LIDOCAINE 50 MG/G
PATCH TOPICAL
Qty: 30 PATCH | Refills: 0 | OUTPATIENT
Start: 2023-06-26

## 2023-06-27 ENCOUNTER — OFFICE VISIT (OUTPATIENT)
Dept: PULMONOLOGY | Age: 83
End: 2023-06-27
Payer: MEDICAID

## 2023-06-27 VITALS
OXYGEN SATURATION: 94 % | DIASTOLIC BLOOD PRESSURE: 66 MMHG | RESPIRATION RATE: 16 BRPM | HEIGHT: 66 IN | SYSTOLIC BLOOD PRESSURE: 119 MMHG | HEART RATE: 90 BPM | TEMPERATURE: 97.6 F | WEIGHT: 178 LBS | BODY MASS INDEX: 28.61 KG/M2

## 2023-06-27 DIAGNOSIS — J96.11 CHRONIC RESPIRATORY FAILURE WITH HYPOXIA (HCC): ICD-10-CM

## 2023-06-27 DIAGNOSIS — J43.2 CENTRILOBULAR EMPHYSEMA (HCC): ICD-10-CM

## 2023-06-27 DIAGNOSIS — G47.33 OSA (OBSTRUCTIVE SLEEP APNEA): ICD-10-CM

## 2023-06-27 DIAGNOSIS — I27.20 PULMONARY HTN (HCC): Primary | ICD-10-CM

## 2023-06-27 DIAGNOSIS — Z87.891 FORMER SMOKER: ICD-10-CM

## 2023-06-27 PROCEDURE — 3074F SYST BP LT 130 MM HG: CPT | Performed by: INTERNAL MEDICINE

## 2023-06-27 PROCEDURE — 3078F DIAST BP <80 MM HG: CPT | Performed by: INTERNAL MEDICINE

## 2023-06-27 PROCEDURE — 1123F ACP DISCUSS/DSCN MKR DOCD: CPT | Performed by: INTERNAL MEDICINE

## 2023-06-27 PROCEDURE — 99213 OFFICE O/P EST LOW 20 MIN: CPT | Performed by: INTERNAL MEDICINE

## 2023-06-27 RX ORDER — FLUTICASONE FUROATE, UMECLIDINIUM BROMIDE AND VILANTEROL TRIFENATATE 200; 62.5; 25 UG/1; UG/1; UG/1
1 POWDER RESPIRATORY (INHALATION) DAILY
Qty: 1 EACH | Refills: 5 | Status: SHIPPED | OUTPATIENT
Start: 2023-06-27

## 2023-06-30 ENCOUNTER — TELEPHONE (OUTPATIENT)
Dept: PULMONOLOGY | Age: 83
End: 2023-06-30

## 2023-07-04 DIAGNOSIS — E03.9 ACQUIRED HYPOTHYROIDISM: ICD-10-CM

## 2023-07-05 RX ORDER — LEVOTHYROXINE SODIUM 0.07 MG/1
TABLET ORAL
Qty: 90 TABLET | Refills: 1 | Status: SHIPPED | OUTPATIENT
Start: 2023-07-05

## 2023-07-05 NOTE — TELEPHONE ENCOUNTER
Refill Request     CONFIRM preferred pharmacy with the patient. If Mail Order Rx - Pend for 90 day refill. Last Seen: Last Seen Department: 6/2/2023  Last Seen by PCP: 1/4/2023    Last Written: 06/12/2023 30 tablet 0 refills     If no future appointment scheduled:  Review the last OV with PCP and review information for follow-up visit,  Route STAFF MESSAGE with patient name to the [facility] for scheduling with the following information:            -  Timing of next visit           -  Visit type ie Physical, OV, etc           -  Diagnoses/Reason ie. COPD, HTN - Do not use MEDICATION, Follow-up or CHECK UP - Give reason for visit      Next Appointment:   Future Appointments   Date Time Provider [address]   7/6/2023 10:00 AM Omid Goetz, PT [facility] AND PT Guille Willis   7/27/2023  8:30 AM MD Adin Esteban [facility]   9/29/2023 12:30 PM MD Leni Lockwood [facility]   1/9/2024  3:40 PM Juventino Gonzales MD AND [facility]       Message sent to [address] [place] [address] to schedule appt with patient?   N/A      Requested Prescriptions     Pending Prescriptions Disp Refills    levothyroxine (SYNTHROID) 75 MCG tablet [Pharmacy Med Name: LEVOTHYROXINE 75 MCG TABLET] 30 tablet 0     Sig: TAKE 1 TABLET BY MOUTH EVERY DAY

## 2023-07-06 ENCOUNTER — APPOINTMENT (OUTPATIENT)
Dept: PHYSICAL THERAPY | Age: 83
End: 2023-07-06
Payer: MEDICAID

## 2023-07-14 ENCOUNTER — TELEPHONE (OUTPATIENT)
Dept: PULMONOLOGY | Age: 83
End: 2023-07-14

## 2023-07-14 DIAGNOSIS — J43.2 CENTRILOBULAR EMPHYSEMA (HCC): Primary | ICD-10-CM

## 2023-07-14 NOTE — TELEPHONE ENCOUNTER
Patient will need 6 minute walk test to re- qualify for oxygen therapy. Order placed. Please call patient and schedule.

## 2023-07-17 ENCOUNTER — HOSPITAL ENCOUNTER (OUTPATIENT)
Dept: PHYSICAL THERAPY | Age: 83
Setting detail: THERAPIES SERIES
Discharge: HOME | End: 2023-07-17
Payer: MEDICAID

## 2023-07-17 DIAGNOSIS — M25.611 STIFFNESS OF RIGHT SHOULDER JOINT: ICD-10-CM

## 2023-07-17 DIAGNOSIS — M25.511 ACUTE PAIN OF RIGHT SHOULDER: Primary | ICD-10-CM

## 2023-07-17 DIAGNOSIS — R29.898 RUE WEAKNESS: ICD-10-CM

## 2023-07-17 PROCEDURE — 97110 THERAPEUTIC EXERCISES: CPT | Performed by: PHYSICAL THERAPIST

## 2023-07-17 PROCEDURE — 97161 PT EVAL LOW COMPLEX 20 MIN: CPT | Performed by: PHYSICAL THERAPIST

## 2023-07-17 NOTE — FLOWSHEET NOTE
6/6/23. Pt lives with her granddaughter and also has a visiting nurse and caretaker come to her home. Pt. presents with the functional impairments and activity limitations listed below and would benefit from Outpatient PT to address the below impairments as well as improve pain, and restore function. Treatment/Activity Tolerance:  [x] Patient tolerated treatment well [] Patient limited by fatique  [] Patient limited by pain  [] Patient limited by other medical complications  [] Other:     Overall Progression Towards Functional goals/ Treatment Progress Update:  [] Patient is progressing as expected towards functional goals listed. [] Progression is slowed due to complexities/Impairments listed. [] Progression has been slowed due to co-morbidities. [x] Plan just implemented, too soon to assess goals progression <30days   [] Goals require adjustment due to lack of progress  [] Patient is not progressing as expected and requires additional follow up with physician  [] Other    Prognosis for POC: [x] Good [] Fair  [] Poor    Patient requires continued skilled intervention: [x] Yes  [] No    MEDICAL NECESSITY DOCUMENTATION  I certify that this patient meets one of the below criteria necessary for medical necessity for care and/or justification of therapy services: The patient has a musculoskeletal condition(s) with a corresponding ICD-10 code that is of complexity and severity that require skilled therapeutic intervention. This has a direct and significant impact on the need for therapy and significantly impacts the rate of recovery. The patient has a complexity identified by an ICD-10 code that has a direct and significant impact on the need for therapy.   (Significantly impacts the rate of recovery and is associated with a primary condition.)   The patient has associated co-morbidities along with primary diagnosis which significantly impact the rate of recovery and contribute to complexities that

## 2023-07-20 ENCOUNTER — HOSPITAL ENCOUNTER (OUTPATIENT)
Dept: PHYSICAL THERAPY | Age: 83
Setting detail: THERAPIES SERIES
Discharge: HOME | End: 2023-07-20
Payer: MEDICAID

## 2023-07-20 NOTE — FLOWSHEET NOTE
[address]  [address] [place] [address], [address] [place] [address]        Physical Therapy  Cancellation/No-show Note  Patient Name:  Derrick Velez  :  1940   Date:  2023    Evaluation date: 23    Cancelled visits to date: 0    No-shows to date: 1  First FU 23    For today's appointment patient:  []  Cancelled  []  Rescheduled appointment  [x]  No-show     Reason given by patient:  []  Patient ill  []  Conflicting appointment  []  No transportation    []  Conflict with work  []  No reason given  [x]  Other:  no call no show   Comments:      Electronically signed by:  Jackie Wills PT

## 2023-07-24 ENCOUNTER — HOSPITAL ENCOUNTER (OUTPATIENT)
Dept: PHYSICAL THERAPY | Age: 83
Setting detail: THERAPIES SERIES
Discharge: HOME | End: 2023-07-24
Payer: MEDICAID

## 2023-07-24 PROCEDURE — 97110 THERAPEUTIC EXERCISES: CPT | Performed by: PHYSICAL THERAPIST

## 2023-07-24 NOTE — FLOWSHEET NOTE
[address] [place] [address], [address] [place] [address]  Phone: [phone]  Fax [phone]     Physical Therapy Treatment Note/ Progress Report:         Physical Therapy: TREATMENT/PROGRESS NOTE   Patient: Ninfa Thomas (41 y.o. female)   Examination Date: 2023   :  1940 MRN: [id number]   Visit #: 2    Insurance: Payor: [place] / Plan: Laurence Zepeda DEPT OF JOB / Product Type: *No Product type* /   Insurance ID: [id number] - (Medicaid)  Secondary Insurance (if applicable):    Treatment Diagnosis:    Acute pain of right shoulder  M25.511          2. Stiffness of right shoulder joint  M25.611         3. 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]        Referring Physician: Remy Valladares MD  PCP: Ana Bach DO                             Plan of care signed (Y/N): N    Date of Patient follow up with Physician:  23     Progress Report: NO    Progress report due (10 Rx/or 30 days whichever is less):      Recertification due (POC duration/ or 90 days whichever is less): 23     Visit # Insurance Allowable Auth Needed   2 MN []Yes    [x]No     Latex Allergy:  [x]NO      []YES  Preferred Language for Healthcare:   [x]English       []other:        SUBJECTIVE EXAMINATION     Pain level:  Patient Scale: Best: 6 /10;  Worst: 8/10 ; Current: 8/10    Location: R shoulder and bicep    SUBJECTIVE:  Pt reports her shoulder is ok, sore in her bicep and hands due to arthritis      OBJECTIVE EXAMINATION     OBJECTIVE:   Observation:   Test measurements:       Test used Initial score    Pain Summary VAS 6-8/10    Functional questionnaire Quick DASH (41) 68%    AROM SFLX 45     SER -20                Strength                            RESTRICTIONS/PRECAUTIONS: reverse TSA, LATEX ALLERGY, ADHESIVE ALLERGY, COPD    Exercises/Interventions:

## 2023-07-27 ENCOUNTER — HOSPITAL ENCOUNTER (OUTPATIENT)
Dept: PULMONOLOGY | Age: 83
Discharge: HOME | End: 2023-07-27
Payer: MEDICAID

## 2023-07-27 ENCOUNTER — HOSPITAL ENCOUNTER (OUTPATIENT)
Dept: PHYSICAL THERAPY | Age: 83
Setting detail: THERAPIES SERIES
Discharge: HOME | End: 2023-07-27
Payer: MEDICAID

## 2023-07-27 ENCOUNTER — OFFICE VISIT (OUTPATIENT)
Dept: ORTHOPEDIC SURGERY | Age: 83
End: 2023-07-27

## 2023-07-27 ENCOUNTER — TELEPHONE (OUTPATIENT)
Dept: PULMONOLOGY | Age: 83
End: 2023-07-27

## 2023-07-27 VITALS — WEIGHT: 178 LBS | HEIGHT: 66 IN | BODY MASS INDEX: 28.61 KG/M2

## 2023-07-27 DIAGNOSIS — J43.2 CENTRILOBULAR EMPHYSEMA (HCC): Primary | ICD-10-CM

## 2023-07-27 DIAGNOSIS — J43.2 CENTRILOBULAR EMPHYSEMA (HCC): ICD-10-CM

## 2023-07-27 DIAGNOSIS — Z96.611 STATUS POST REVERSE TOTAL ARTHROPLASTY OF RIGHT SHOULDER: Primary | ICD-10-CM

## 2023-07-27 PROCEDURE — 97110 THERAPEUTIC EXERCISES: CPT | Performed by: PHYSICAL THERAPIST

## 2023-07-27 PROCEDURE — 99024 POSTOP FOLLOW-UP VISIT: CPT | Performed by: PHYSICIAN ASSISTANT

## 2023-07-27 PROCEDURE — 94618 PULMONARY STRESS TESTING: CPT

## 2023-07-27 NOTE — FLOWSHEET NOTE
[address] [place] [address], [address] [place] [address]  Phone: [phone]  Fax [phone]     Physical Therapy Treatment Note/ Progress Report:         Physical Therapy: TREATMENT/PROGRESS NOTE   Patient: Analisa Caldwell (41 y.o. female)   Examination Date: 2023   :  1940 MRN: [id number]   Visit #: 3    Insurance: Payor: [place] / Plan: Zana Morrison DEPT OF JOB / Product Type: *No Product type* /   Insurance ID: [id number] - (Medicaid)  Secondary Insurance (if applicable):    Treatment Diagnosis:    Acute pain of right shoulder  M25.511          2. Stiffness of right shoulder joint  M25.611         3. 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]        Referring Physician: Riana Kurtz MD  PCP: Karley Parra DO                             Plan of care signed (Y/N): Y    Date of Patient follow up with Physician:  23     Progress Report: NO    Progress report due (10 Rx/or 30 days whichever is less):      Recertification due (POC duration/ or 90 days whichever is less): 23     Visit # Insurance Allowable Auth Needed   3 MN []Yes    [x]No     Latex Allergy:  [x]NO      []YES  Preferred Language for Healthcare:   [x]English       []other:        SUBJECTIVE EXAMINATION     Pain level:  Patient Scale: 5-6/10    Location: R bicep and elbow    SUBJECTIVE:  Patient reports that her shoulder is doing okay. Saw the PA today and she said everything looked okay and she can move her arm more.       OBJECTIVE EXAMINATION     OBJECTIVE:   Observation:   Test measurements:       Test used Initial score Current score   Pain Summary VAS 6-8/10    Functional questionnaire Quick DASH (41) 68%    AROM SFLX 45     SER -20 5 deg elbow at side               Strength                            RESTRICTIONS/PRECAUTIONS: reverse TSA, LATEX ALLERGY,

## 2023-07-27 NOTE — PROGRESS NOTES
ORTHOPAEDIC CONSULTATION NOTE    Chief Complaint   Patient presents with    Post-Op Check     Post op right shoulder       Shoulder Pain     7/27/23  2nd postop s/p R RSA  Doing well  Pain is minimal, only with   Confirms NWB in sling except for therapy exercises  Been working with outpt PT, but has only been to 2 appointments due to scheduling conflicts  No new complaints or concerns      6/22/23  1st postop s/p R RSA  Doing okay  Pain is moderate, but well controlled  Confirms NWB in sling at all times except to work on elbow/wrist ROM  No incisional issues  No N/T  No new complaints or concerns      6/6/23  OPERATION PERFORMED:  Right reverse shoulder arthroplasty      4/27/23  Jaya Stovall returns to clinic today for follow-up of her right shoulder  She reports persistent severe right shoulder pain  Pain is rated 10 out of 10  Feels like it is getting worse  She feels like her right shoulder is dislocated  No new injury or trauma  She is taking Percocet from her pain management, using lidocaine patches, CBD rubs, IcyHot  She is not doing much shoulder exercises  She is interested in proceeding with shoulder arthroplasty  She reports she saw her pulmonologist, who gave her the okay to proceed with shoulder surgery  She is on oxygen at baseline      12/8/22  82 y.o. female RHD seen in consultation at the request of Shameka Lvoett NP for evaluation of right shoulder pain:  This is a second opinion  Patient reports she is previously seen Dr. Sofi Orona at [facility] who gave her multiple injections for the right shoulder arthritis  She rates her pain 10+ out of 10  She is in pain management  She is on chronic oxycodone  She does not take any anti-inflammatories  She has rheumatoid arthritis, her rheumatologist is at [facility]  She is on Actemra  She also has a pulmonologist for respiratory issues  She is on 3 L of oxygen at baseline  Dr. Ciera Moon is her pulmonologist at [facility]  The right shoulder pain is

## 2023-07-27 NOTE — TELEPHONE ENCOUNTER
Reviewed walk test.  Dee Dee Perkins requires 3 L oxygen therapy with exertion. She is to continue oxygen use. Please send order and 6 minute walk test to DME.

## 2023-07-31 ENCOUNTER — HOSPITAL ENCOUNTER (OUTPATIENT)
Dept: PHYSICAL THERAPY | Age: 83
Setting detail: THERAPIES SERIES
Discharge: HOME | End: 2023-07-31
Payer: MEDICAID

## 2023-07-31 PROCEDURE — 97140 MANUAL THERAPY 1/> REGIONS: CPT | Performed by: PHYSICAL THERAPIST

## 2023-07-31 PROCEDURE — 97110 THERAPEUTIC EXERCISES: CPT | Performed by: PHYSICAL THERAPIST

## 2023-07-31 NOTE — FLOWSHEET NOTE
[address] [place] [address]  [address] [place] [address], [address] [place] [address]  Phone: [phone]  Fax [phone]     Physical Therapy Treatment Note/ Progress Report:         Physical Therapy: TREATMENT/PROGRESS NOTE   Patient: Tu Polanco (78 y.o. female)   Examination Date: 2023   :  1940 MRN: [id number]   Visit #: 4    Insurance: Payor: [place] / Plan: Rohan Edmond DEPT OF JOB / Product Type: *No Product type* /   Insurance ID: [id number] - (Medicaid)  Secondary Insurance (if applicable):    Treatment Diagnosis:    Acute pain of right shoulder  M25.511          2. Stiffness of right shoulder joint  M25.611         3. 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]        Referring Physician: Harsha Person MD  PCP: Imelda Cason DO                             Plan of care signed (Y/N): Y    Date of Patient follow up with Physician:  23     Progress Report: NO    Progress report due (10 Rx/or 30 days whichever is less):      Recertification due (POC duration/ or 90 days whichever is less): 23     Visit # Insurance Allowable Auth Needed   4 MN []Yes    [x]No     Latex Allergy:  [x]NO      []YES  Preferred Language for Healthcare:   [x]English       []other:        SUBJECTIVE EXAMINATION     Pain level:  Patient Scale: 5-6/10    Location: R bicep and elbow  Pt also reports pain 7/10 in her LB  SUBJECTIVE:  Pt states she is \"angry\" her arm won't go any higher yet. She also had an episode of lumbar spasm today.       OBJECTIVE EXAMINATION     OBJECTIVE:   Observation: decreased UT tpS  Test measurements:       Test used Initial score Current score   Pain Summary VAS 6-8/10    Functional questionnaire Quick DASH (41) 68%    AROM SFLX 45     SER -20                Strength                            RESTRICTIONS/PRECAUTIONS: reverse TSA, LATEX ALLERGY,

## 2023-08-03 ENCOUNTER — HOSPITAL ENCOUNTER (OUTPATIENT)
Dept: PHYSICAL THERAPY | Age: 83
Setting detail: THERAPIES SERIES
Discharge: HOME | End: 2023-08-03
Payer: MEDICAID

## 2023-08-03 PROCEDURE — 97110 THERAPEUTIC EXERCISES: CPT | Performed by: PHYSICAL THERAPIST

## 2023-08-03 PROCEDURE — 97140 MANUAL THERAPY 1/> REGIONS: CPT | Performed by: PHYSICAL THERAPIST

## 2023-08-03 NOTE — FLOWSHEET NOTE
[address] [facility], [address] [place] [address]  [facility] [facility], [address] [place] [address], [address] [place] [address]  Phone: [phone]  Fax [phone]     Physical Therapy Treatment Note/ Progress Report:         Physical Therapy: TREATMENT/PROGRESS NOTE   Patient: Carmne Pollack (36 y.o. female)   Examination Date: 2023   :  1940 MRN: [id number]   Visit #: 5    Insurance: Payor: [place] / Plan: [address] [place] [address] DEPT OF JOB / Product Type: *No Product type* /   Insurance ID: [id number] - (Medicaid)  Secondary Insurance (if applicable):    Treatment Diagnosis:    Acute pain of right shoulder  M25.511          2. Stiffness of right shoulder joint  M25.611         3. 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]        Referring Physician: Sinai Velázquez MD  PCP: Arnoldo Rose DO                             Plan of care signed (Y/N): Y    Date of Patient follow up with Physician:  23     Progress Report: NO    Progress report due (10 Rx/or 30 days whichever is less):      Recertification due (POC duration/ or 90 days whichever is less): 23     Visit # Insurance Allowable Auth Needed   5 MN []Yes    [x]No     Latex Allergy:  [x]NO      []YES  Preferred Language for Healthcare:   [x]English       []other:        SUBJECTIVE EXAMINATION     Pain level:  Patient Scale: 3/10  Location: R elbow    SUBJECTIVE:  Pt reports her shoulder does not hurt but now her knees dp.       OBJECTIVE EXAMINATION     OBJECTIVE: Pt 10' late for appt (check in then comes in from car)  Observation:   Test measurements:       Test used Initial score 8/3/23   Pain Summary VAS 6-8/10    Functional questionnaire Quick DASH (41) 68%    AROM SFLX 45 72 seated    SER -20                Strength                            RESTRICTIONS/PRECAUTIONS: reverse TSA, LATEX ALLERGY, ADHESIVE ALLERGY, COPD    Exercises/Interventions:

## 2023-08-07 ENCOUNTER — APPOINTMENT (OUTPATIENT)
Dept: PHYSICAL THERAPY | Age: 83
End: 2023-08-07
Payer: MEDICAID

## 2023-08-08 ENCOUNTER — HOSPITAL ENCOUNTER (OUTPATIENT)
Dept: PHYSICAL THERAPY | Age: 83
Setting detail: THERAPIES SERIES
Discharge: HOME | End: 2023-08-08
Payer: MEDICAID

## 2023-08-08 PROCEDURE — 97140 MANUAL THERAPY 1/> REGIONS: CPT | Performed by: PHYSICAL THERAPIST

## 2023-08-08 PROCEDURE — 97110 THERAPEUTIC EXERCISES: CPT | Performed by: PHYSICAL THERAPIST

## 2023-08-08 NOTE — FLOWSHEET NOTE
as indicated by patients functional deficits. Status: SET   Pt to improve strength by 2 muscle grades or better of scapular retractors, shoulder elevators, biceps, and triceps to work toward proper functional mobility and improving technique of ADLs. Status: SET   Patient will return to  Dressing without increased symptoms or restriction to work towards return to prior level of function. Status: SET   Patient will increase UE function to allow independence in all self-care activities. Status: SET       TREATMENT PLAN   Plan: Continue emphasis/focus on exercise progression, improving proper muscle recruitment and activation/motor control patterns, modulating pain, promoting relaxation, increasing ROM, reduce/eliminate soft tissue swelling/inflammation/restriction, improving soft tissue extensibility, kinesthetic sense and proprioception, and improving postural awareness. Next visit plan to add new exercises including LEs tolerated and to increase ROM. Add sit to stand reps. [] Plan of care initiated     [x] Continue per plan of care [] Alter current plan (see comments)       [] Hold pending MD visit           [] Discharge        Electronically Signed by Fadia Todd PT  Date: 08/08/2023       Note: If patient does not return for scheduled/recommended follow up visits, this note will serve as a discharge from care along with the most recent update on progress.

## 2023-08-10 ENCOUNTER — HOSPITAL ENCOUNTER (OUTPATIENT)
Dept: PHYSICAL THERAPY | Age: 83
Setting detail: THERAPIES SERIES
Discharge: HOME | End: 2023-08-10
Payer: MEDICAID

## 2023-08-10 PROCEDURE — 97110 THERAPEUTIC EXERCISES: CPT | Performed by: PHYSICAL THERAPIST

## 2023-08-10 PROCEDURE — 97140 MANUAL THERAPY 1/> REGIONS: CPT | Performed by: PHYSICAL THERAPIST

## 2023-08-10 NOTE — FLOWSHEET NOTE
by 2 muscle grades or better of scapular retractors, shoulder elevators, biceps, and triceps to work toward proper functional mobility and improving technique of ADLs. Status: SET   Patient will return to  Dressing without increased symptoms or restriction to work towards return to prior level of function. Status: SET   Patient will increase UE function to allow independence in all self-care activities. Status: SET       TREATMENT PLAN   Plan: Continue emphasis/focus on exercise progression, improving proper muscle recruitment and activation/motor control patterns, modulating pain, promoting relaxation, increasing ROM, reduce/eliminate soft tissue swelling/inflammation/restriction, improving soft tissue extensibility, kinesthetic sense and proprioception, and improving postural awareness. Next visit plan to add new exercises including LEs tolerated and to increase ROM. Add sit to stand reps. [] Plan of care initiated     [x] Continue per plan of care [] Alter current plan (see comments)       [] Hold pending MD visit           [] Discharge        Electronically Signed by Shanika Edmond PT, DPT [id number]   Date: 08/10/2023       Note: If patient does not return for scheduled/recommended follow up visits, this note will serve as a discharge from care along with the most recent update on progress.

## 2023-08-14 ENCOUNTER — HOSPITAL ENCOUNTER (OUTPATIENT)
Dept: PHYSICAL THERAPY | Age: 83
Setting detail: THERAPIES SERIES
Discharge: HOME | End: 2023-08-14
Payer: MEDICAID

## 2023-08-14 PROCEDURE — 97140 MANUAL THERAPY 1/> REGIONS: CPT | Performed by: PHYSICAL THERAPIST

## 2023-08-17 ENCOUNTER — HOSPITAL ENCOUNTER (OUTPATIENT)
Dept: PHYSICAL THERAPY | Age: 83
Setting detail: THERAPIES SERIES
Discharge: HOME | End: 2023-08-17
Payer: MEDICAID

## 2023-08-17 PROCEDURE — 97110 THERAPEUTIC EXERCISES: CPT | Performed by: PHYSICAL THERAPIST

## 2023-08-17 PROCEDURE — 97140 MANUAL THERAPY 1/> REGIONS: CPT | Performed by: PHYSICAL THERAPIST

## 2023-08-17 NOTE — FLOWSHEET NOTE
due to co-morbidities. [] Plan just implemented, too soon to assess goals progression <30days   [] Goals require adjustment due to lack of progress  [] Patient is not progressing as expected and requires additional follow up with physician  [] Other    Prognosis for POC: [x] Good [] Fair  [] Poor    Patient requires continued skilled intervention: [x] Yes  [] No    MEDICAL NECESSITY DOCUMENTATION  I certify that this patient meets one of the below criteria necessary for medical necessity for care and/or justification of therapy services: The patient has a musculoskeletal condition(s) with a corresponding ICD-10 code that is of complexity and severity that require skilled therapeutic intervention. This has a direct and significant impact on the need for therapy and significantly impacts the rate of recovery. The patient has a complexity identified by an ICD-10 code that has a direct and significant impact on the need for therapy. (Significantly impacts the rate of recovery and is associated with a primary condition.)   The patient has associated co-morbidities along with primary diagnosis which significantly impact the rate of recovery and contribute to complexities that require skilled therapeutic intervention        GOALS   Patient stated goal: to be able to use her shoulder better than she could prior to surgery  Status:SET      Therapist goals for Patient:   Short Term Goals: To be achieved in: 2 weeks  Independent in HEP and progression per patient tolerance, in order to progress toward full function and prevent re-injury. Status:PROGRESSING   Patient will have a decrease in pain to 4/10 to help  facilitate improvement in movement, function, and ADLs as indicated by functional deficits. Status: SET      Long Term Goals: To be achieved in: 8-10 weeks  Disability index score of 34% or less for the Quick DASH to assist with return top prior level of function.                  Status:

## 2023-08-18 DIAGNOSIS — I82.401 RECURRENT ACUTE DEEP VEIN THROMBOSIS (DVT) OF RIGHT LOWER EXTREMITY (HCC): ICD-10-CM

## 2023-08-18 DIAGNOSIS — I26.99 ACUTE PULMONARY EMBOLISM, UNSPECIFIED PULMONARY EMBOLISM TYPE, UNSPECIFIED WHETHER ACUTE COR PULMONALE PRESENT (HCC): ICD-10-CM

## 2023-08-18 RX ORDER — APIXABAN 5 MG/1
TABLET, FILM COATED ORAL
Qty: 180 TABLET | Refills: 1 | Status: SHIPPED | OUTPATIENT
Start: 2023-08-18

## 2023-08-18 NOTE — TELEPHONE ENCOUNTER
.Refill Request     CONFIRM preferred pharmacy with the patient. If Mail Order Rx - Pend for 90 day refill. Last Seen: Last Seen Department: 6/2/2023  Last Seen by PCP: 1/4/2023    Last Written: 4-3-23 180 with 1     If no future appointment scheduled:  Review the last OV with PCP and review information for follow-up visit,  Route STAFF MESSAGE with patient name to the [facility] for scheduling with the following information:            -  Timing of next visit           -  Visit type ie Physical, OV, etc           -  Diagnoses/Reason ie. COPD, HTN - Do not use MEDICATION, Follow-up or CHECK UP - Give reason for visit      Next Appointment:   Future Appointments   Date Time Provider [address]   8/22/2023  4:30 PM Dejah Barr, PT [facility] AND PT Franco Diehl   8/29/2023  4:30 PM Dejah Barr PT [facility] AND PT [place] Diehl   8/31/2023  5:30 PM Yasemin Onofre, PT [facility] AND PT Franco Shanita   9/29/2023 12:30 PM MD Cole Marquez [facility]   10/26/2023  8:30 AM MD Margarita Velasco [facility]   1/9/2024  3:40 PM James Kimble MD AND [facility]       Message sent to [address] [place] [address] to schedule appt with patient?   N/A      Requested Prescriptions     Pending Prescriptions Disp Refills    ELIQUIS 5 MG TABS tablet [Pharmacy Med Name: ELIQUIS 5 MG TABLET] 180 tablet 1     Sig: TAKE 1 TABLET BY MOUTH IN THE MORNING AND BEFORE BEDTIME

## 2023-08-22 ENCOUNTER — HOSPITAL ENCOUNTER (OUTPATIENT)
Dept: PHYSICAL THERAPY | Age: 83
Setting detail: THERAPIES SERIES
Discharge: HOME | End: 2023-08-22
Payer: MEDICAID

## 2023-08-22 NOTE — FLOWSHEET NOTE
[address] [place] [address]  [address] [place] [address]        Physical Therapy  Cancellation/No-show Note  Patient Name:  Queta Wilson  :  1940   Date:  2023    Evaluation date: 23    Cancelled visits to date: 1    No-shows to date: 1  First FU 23    For today's appointment patient:  [x]  Cancelled  []  Rescheduled appointment  []  No-show     Reason given by patient:  []  Patient ill  []  Conflicting appointment  []  No transportation    []  Conflict with work  []  No reason given  [x]  Other:     Comments:      Electronically signed by:  Titus Norris PT

## 2023-08-24 ENCOUNTER — APPOINTMENT (OUTPATIENT)
Dept: PHYSICAL THERAPY | Age: 83
End: 2023-08-24
Payer: MEDICAID

## 2023-08-29 ENCOUNTER — HOSPITAL ENCOUNTER (OUTPATIENT)
Dept: PHYSICAL THERAPY | Age: 83
Setting detail: THERAPIES SERIES
Discharge: HOME | End: 2023-08-29
Payer: MEDICAID

## 2023-08-29 PROCEDURE — 97140 MANUAL THERAPY 1/> REGIONS: CPT | Performed by: PHYSICAL THERAPIST

## 2023-08-29 PROCEDURE — 97110 THERAPEUTIC EXERCISES: CPT | Performed by: PHYSICAL THERAPIST

## 2023-08-29 NOTE — PROGRESS NOTES
slowed due to co-morbidities. [] Plan just implemented, too soon to assess goals progression <30days   [] Goals require adjustment due to lack of progress  [] Patient is not progressing as expected and requires additional follow up with physician  [] Other    Prognosis for POC: [x] Good [] Fair  [] Poor    Patient requires continued skilled intervention: [x] Yes  [] No    MEDICAL NECESSITY DOCUMENTATION  I certify that this patient meets one of the below criteria necessary for medical necessity for care and/or justification of therapy services: The patient has a musculoskeletal condition(s) with a corresponding ICD-10 code that is of complexity and severity that require skilled therapeutic intervention. This has a direct and significant impact on the need for therapy and significantly impacts the rate of recovery. The patient has a complexity identified by an ICD-10 code that has a direct and significant impact on the need for therapy. (Significantly impacts the rate of recovery and is associated with a primary condition.)   The patient has associated co-morbidities along with primary diagnosis which significantly impact the rate of recovery and contribute to complexities that require skilled therapeutic intervention        GOALS   Patient stated goal: to be able to use her shoulder better than she could prior to surgery  Status:SET      Therapist goals for Patient:   Short Term Goals: To be achieved in: 2 weeks  Independent in HEP and progression per patient tolerance, in order to progress toward full function and prevent re-injury. Status:PROGRESSING   Patient will have a decrease in pain to 4/10 to help  facilitate improvement in movement, function, and ADLs as indicated by functional deficits. Status: SET      Long Term Goals: To be achieved in: 8-10 weeks  Disability index score of 34% or less for the Quick DASH to assist with return top prior level of function.

## 2023-08-31 ENCOUNTER — HOSPITAL ENCOUNTER (OUTPATIENT)
Dept: PHYSICAL THERAPY | Age: 83
Setting detail: THERAPIES SERIES
Discharge: HOME | End: 2023-08-31
Payer: MEDICAID

## 2023-08-31 PROCEDURE — 97140 MANUAL THERAPY 1/> REGIONS: CPT | Performed by: PHYSICAL THERAPIST

## 2023-08-31 PROCEDURE — 97110 THERAPEUTIC EXERCISES: CPT | Performed by: PHYSICAL THERAPIST

## 2023-08-31 NOTE — FLOWSHEET NOTE
[address] [place] [address] and [facility], [address] [place] [address]  14[address] [facility], [address] [place] [address], [address] [place] [address]  Phone: [phone]  Fax [phone]     Physical Therapy Treatment Note/ Progress Report:         Physical Therapy: TREATMENT/PROGRESS NOTE   Patient: Derrick Velez (73 y.o. female)   Examination Date: 2023   :  1940 MRN: [id number]   Visit #: 11    Insurance: Payor: MEDICAID [place] / Plan: [address] [place] [address] DEPT OF JOB / Product Type: *No Product type* /   Insurance ID: [id number] - (Medicaid)  Secondary Insurance (if applicable):    Treatment Diagnosis:    Acute pain of right shoulder  M25.511          2. Stiffness of right shoulder joint  M25.611         3. 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]        Referring Physician: Jimmy Markham MD  PCP: Arnol Sauceda DO                             Plan of care signed (Y/N): YES    Date of Patient follow up with Physician:  10/26/23     Progress Report:  NO    Progress report due (10 Rx/or 30 days whichever is less):      Recertification due (POC duration/ or 90 days whichever is less): 23     Visit # Insurance Allowable Auth Needed   11 MN []Yes    [x]No     Latex Allergy:  [x]NO      []YES  Preferred Language for Healthcare:   [x]English       []other:        SUBJECTIVE EXAMINATION     Pain level:  Patient Scale: 3/10  Location: upper arm    SUBJECTIVE:  Pt reports that sometimes she feels like she can lift her arm higher  OBJECTIVE EXAMINATION     OBJECTIVE:   Observation: decrased R shoulder step off this visit.    Test measurements:       Test used Initial score 8/3/23 8/29/23   Pain Summary VAS 6-810 7/10 4/10   Functional questionnaire Quick DASH (41) 68%  NT   PROM SFLX 45 72 seated 105 deg supine    SER -20  20 deg    AROM    95 deg seated   ER    12 deg seated   Strength    DNT       DNT       DNT       DNT

## 2023-09-07 ENCOUNTER — HOSPITAL ENCOUNTER (OUTPATIENT)
Dept: PHYSICAL THERAPY | Age: 83
Setting detail: THERAPIES SERIES
Discharge: HOME | End: 2023-09-07

## 2023-09-07 NOTE — FLOWSHEET NOTE
[address] [place] [address]  [address] [place] [address], [address] [place] [address]  Phone: [phone]  Fax [phone]           Patient Name:  Juan A Villaseñor  :  1940   Date:  2023      Evaluation Date: 23      Cancelled visits to date:   23        No-shows to date: 0              For today's appointment patient:  [x]  Cancelled  []  Rescheduled appointment  []  No-show     Reason given by patient:  []  Patient ill  []  Conflicting appointment  []  No transportation    []  Conflict with work  []  No reason given  []  Other:     Comments:  pt's granddaughter showed up 15 mins late for appt this visit and chose to reschedule as opposed to stay for remainder of 30 minute visit.     Pt was called regarding this missed appt:    Electronically signed by:  Adarsh Vargas PT

## 2023-09-11 ENCOUNTER — HOSPITAL ENCOUNTER (OUTPATIENT)
Dept: PHYSICAL THERAPY | Age: 83
Setting detail: THERAPIES SERIES
Discharge: HOME | End: 2023-09-11
Payer: MEDICAID

## 2023-09-11 PROCEDURE — 97140 MANUAL THERAPY 1/> REGIONS: CPT | Performed by: PHYSICAL THERAPIST

## 2023-09-11 PROCEDURE — 97110 THERAPEUTIC EXERCISES: CPT | Performed by: PHYSICAL THERAPIST

## 2023-09-11 NOTE — FLOWSHEET NOTE
Resistance  - 2 x daily - 7 x weekly - 10 reps      (03229) THERAPEUTIC EXERCISE - Provided verbal/tactile cueing for activities related to strengthening, flexibility, endurance, ROM performed to prevent loss of range of motion, maintain or improve muscular strength or increase flexibility, following either an injury or surgery. (04393) MANUAL THERAPY-  Manual therapy techniques, 1 or more regions, each 15 minutes (Mobilization/manipulation, manual lymphatic drainage, manual traction) for the purpose of modulating pain, promoting relaxation,  increasing ROM, reducing/eliminating soft tissue swelling/inflammation/restriction, improving soft tissue extensibility and allowing for proper ROM for normal function with self care, mobility, lifting and ambulation    Charges:  Timed Code Treatment Minutes: 38   Total Treatment Minutes: 38      [] EVAL (LOW) 98166 (typically 20 minutes face-to-face)  [] EVAL (MOD) 75128 (typically 30 minutes face-to-face)  [] EVAL (HIGH) 13610 (typically 45 minutes face-to-face)  [] RE-EVAL     [x] STEPHANI(78769) x  2  [] DRY NEEDLE 1 OR 2 MUSCLES  [] NMR (47390) x     [] DRY NEEDLE 3+ MUSCLES  [x] Manual (48651) x   1    [] TA (63106) x     [] Mech Traction (07588)  [] ES(attended) (28392)     [] ES (un) (28084):   [] VASO (24091)  [] Other:CP x10'          ASSESSMENT     ASSESSMENT:   Pt demonstrated improved muscular endurance and ability to raise arm overhead this visit (in supine). She will have likely reached maxed benefit of in person skilled care at the time of her next MD appt. Currently Patient will benefit from continued skilled intervention to increase ROM, flexibility, strength and function.         Treatment/Activity Tolerance:  [] Patient tolerated treatment well [] Patient limited by fatique  [x] Patient limited by pain  [] Patient limited by other medical complications  [] Other:     Overall Progression Towards Functional goals/ Treatment Progress Update:  [] Patient is

## 2023-09-14 ENCOUNTER — HOSPITAL ENCOUNTER (OUTPATIENT)
Dept: PHYSICAL THERAPY | Age: 83
Setting detail: THERAPIES SERIES
Discharge: HOME | End: 2023-09-14
Payer: MEDICAID

## 2023-09-14 PROCEDURE — 97110 THERAPEUTIC EXERCISES: CPT | Performed by: PHYSICAL THERAPIST

## 2023-09-14 PROCEDURE — 97140 MANUAL THERAPY 1/> REGIONS: CPT | Performed by: PHYSICAL THERAPIST

## 2023-09-14 NOTE — FLOWSHEET NOTE
420 [facility] and [facility], [address] [place] [address]  14[address] [facility], [facility], [address] [place] [address]  Phone: [phone]  Fax [phone]     Physical Therapy Treatment Note/ Progress Report:         Physical Therapy: TREATMENT/PROGRESS NOTE   Patient: Graham Tenorio (59 y.o. female)   Examination Date: 2023   :  1940 MRN: [id number]   Visit #: 13    Insurance: Payor: [place] / Plan: [address] [place] [address] DEPT OF JOB / Product Type: *No Product type* /   Insurance ID: [id number] - (Medicaid)  Secondary Insurance (if applicable):    Treatment Diagnosis:    Acute pain of right shoulder  M25.511          2. Stiffness of right shoulder joint  M25.611         3. 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]        Referring Physician: Francine Sierra MD  PCP: Amrit Potter DO                             Plan of care signed (Y/N): YES    Date of Patient follow up with Physician:  10/26/23     Progress/POC Report:  NO     Progress report due (10 Rx/or 30 days whichever is less):  10/11/23      Visit # Insurance Allowable Auth Needed   13 MN []Yes    [x]No     Latex Allergy:  [x]NO      []YES  Preferred Language for Healthcare:   [x]English       []other:        SUBJECTIVE EXAMINATION     Pain level:  Patient Scale: 3/10  Location: upper arm    SUBJECTIVE:  Pt reports she has issues still with raising her arm up high. Her back and legs are very painful today. OBJECTIVE EXAMINATION     OBJECTIVE:   Observation: good shoulder positioning at rest this visit.    Test measurements:       Test used Initial score 8/3/23 8/29/23   Pain Summary VAS 6-8/10 7/10 4/10   Functional questionnaire Quick DASH (41) 68%  NT   PROM SFLX 45 72 seated 105 deg supine    SER -20  20 deg    AROM    95 deg seated   ER    12 deg seated   Strength    DNT       DNT       DNT       DNT       RESTRICTIONS/PRECAUTIONS: reverse TSA, LATEX

## 2023-09-18 ENCOUNTER — APPOINTMENT (OUTPATIENT)
Dept: PHYSICAL THERAPY | Age: 83
End: 2023-09-18
Payer: MEDICAID

## 2023-09-20 ENCOUNTER — HOSPITAL ENCOUNTER (OUTPATIENT)
Dept: PHYSICAL THERAPY | Age: 83
Setting detail: THERAPIES SERIES
Discharge: HOME | End: 2023-09-20
Payer: MEDICAID

## 2023-09-20 NOTE — FLOWSHEET NOTE
[address]  [address] [place] [address]        Physical Therapy  Cancellation/No-show Note  Patient Name:  Clayton Davies  :  1940   Date:  2023  Cancelled visits to date: 1  No-shows to date: 0    For today's appointment patient:  [x]  Cancelled  []  Rescheduled appointment  []  No-show     Reason given by patient:  [x]  Patient ill  []  Conflicting appointment  []  No transportation    []  Conflict with work  []  No reason given  []  Other:     Comments:      Electronically signed by:  Vonnie Narayan, PT

## 2023-09-25 RX ORDER — SPIRONOLACTONE 25 MG/1
TABLET ORAL
Qty: 180 TABLET | Refills: 1 | Status: SHIPPED | OUTPATIENT
Start: 2023-09-25

## 2023-09-25 NOTE — TELEPHONE ENCOUNTER
Refill Request     CONFIRM preferred pharmacy with the patient. If Mail Order Rx - Pend for 90 day refill. Last Seen: Last Seen Department: 6/2/2023  Last Seen by PCP: 1/4/2023    Last Written: 4/3/23 180 with 1 refill     If no future appointment scheduled:  Review the last OV with PCP and review information for follow-up visit,  Route STAFF MESSAGE with patient name to the [facility] for scheduling with the following information:            -  Timing of next visit           -  Visit type ie Physical, OV, etc           -  Diagnoses/Reason ie. COPD, HTN - Do not use MEDICATION, Follow-up or CHECK UP - Give reason for visit      Next Appointment:   Future Appointments   Date Time Provider [address]   9/26/2023  4:00 PM Jacqueline Barr, PT [facility] AND PT Denisse Calderon   9/28/2023  4:30 PM Jacqueline Barr, PT [facility] AND PT Denisse Calderon   9/29/2023 12:30 PM MD Mae Sanchez [facility]   10/4/2023  4:00 PM Isabella Gomez, PT [facility] AND PT [facility]   10/6/2023  4:00 PM Isabella Gomez, PT [facility] AND PT Denisse Calderon   10/10/2023  4:00 PM Jacqueline Barr, PT [facility] AND PT Dneissedesire Calderon   10/26/2023  8:30 AM MD Derrick Farris [facility]   1/9/2024  3:40 PM Lucila Hernandez MD AND [facility]       Message sent to [address] [place] [address] to schedule appt with patient?   NO      Requested Prescriptions     Pending Prescriptions Disp Refills    spironolactone (ALDACTONE) 25 MG tablet [Pharmacy Med Name: SPIRONOLACTONE 25 MG TABLET] 180 tablet 1     Sig: TAKE 1 TABLET BY MOUTH TWICE A DAY

## 2023-09-26 ENCOUNTER — HOSPITAL ENCOUNTER (OUTPATIENT)
Dept: PHYSICAL THERAPY | Age: 83
Setting detail: THERAPIES SERIES
Discharge: HOME | End: 2023-09-26
Payer: MEDICAID

## 2023-09-26 NOTE — FLOWSHEET NOTE
420 [facility] and [facility], [address] [place] [address]  [address] [place] [address], [address] [place] [address], [address] [place] [address]        Physical Therapy  Cancellation/No-show Note  Patient Name:  Steph Gilman  :  1940   Date:  2023  Cancelled visits to date: 1  No-shows to date: 1    For today's appointment patient:  []  Cancelled  []  Rescheduled appointment  [x]  No-show     Reason given by patient:  []  Patient ill  []  Conflicting appointment  []  No transportation    []  Conflict with work  [x]  No reason given  []  Other:     Comments:      Electronically signed by:  Allegra Mcrae, [address] [place] [address], T [id number]

## 2023-09-27 NOTE — PROGRESS NOTES
normal cardiac output and index: Tolu: 3 and 1.8. Thermodilution: 3.6 and 2.3.   4. No oxygenation step up between the right atrium and the   pulmonary artery: RA: 54.4% and PA: 54.6%     CT chest pulmonary 12/27/21:  No central pulmonary embolus identified. Distal pulmonary branches are not well evaluated secondary to motion. There is severe coronary artery calcification  Bandlike opacities in the lung bases, similar to prior. Bandlike morphology favors atelectasis or scarring rather than pneumonia. There is severe emphysema. No pulmonary edema     Bilateral Venous Extremity 12/27/21:  Summary  Acute deep vein thrombosis involving the right gastrocnemius veins. Acute deep vein thrombosis involving the left soleal vein. 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 COPD with chronic hypoxic resp failure, on 3L NC baseline  Pulmonary hypertension, mild  Rheumatoid arthritis    [id number] Score for Atrial Fibrillation Stroke Risk   Risk   Factors  Component Value   C CHF No 0   H HTN Yes 1   A2 Age >= 76 Yes,  (82 y.o.) 2   D DM No 0   S2 Prior Stroke/TIA No 0   V Vascular Disease No 0   A Age 77-78 No,  (82 y.o.) 0   Sc Sex female 1    [id number]  Score  4   Score last updated 9/25/22 64:43 PM EDT    Click here for a link to the UpToDate guideline \"Atrial Fibrillation: Anticoagulation therapy to prevent embolization    Disclaimer: Risk Score calculation is dependent on accuracy of patient problem list and past encounter diagnosis.     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

## 2023-09-28 ENCOUNTER — HOSPITAL ENCOUNTER (OUTPATIENT)
Dept: PHYSICAL THERAPY | Age: 83
Setting detail: THERAPIES SERIES
Discharge: HOME | End: 2023-09-28
Payer: MEDICAID

## 2023-09-28 NOTE — FLOWSHEET NOTE
420 [facility] and [facility], [address] [place] [address]  [address] [place] [address], [address] [place] [address], [address] [place] [address]        Physical Therapy  Cancellation/No-show Note  Patient Name:  Yao Sanchez  :  1940   Date:  2023  Cancelled visits to date: 1  No-shows to date: 2    For today's appointment patient:  []  Cancelled  []  Rescheduled appointment  [x]  No-show     Reason given by patient:  []  Patient ill  []  Conflicting appointment  []  No transportation    []  Conflict with work  [x]  No reason given  [x]  Other:     Comments:  called and spoke with patient's graddaughter who stated patient has been sick this week. Stated that she tried to call the office and no one answered and left a message. Granddaughter said if she feels better next week she will attend PT. Asked daughter to call in advance and let us know if she is not going to make it next week.     Electronically signed by:  Emmanuel Nascimento, DPT [id number]

## 2023-09-29 ENCOUNTER — OFFICE VISIT (OUTPATIENT)
Dept: CARDIOLOGY CLINIC | Age: 83
End: 2023-09-29
Payer: MEDICAID

## 2023-09-29 VITALS
OXYGEN SATURATION: 90 % | HEIGHT: 66 IN | SYSTOLIC BLOOD PRESSURE: 110 MMHG | WEIGHT: 190.5 LBS | BODY MASS INDEX: 30.62 KG/M2 | HEART RATE: 83 BPM | DIASTOLIC BLOOD PRESSURE: 62 MMHG

## 2023-09-29 DIAGNOSIS — R06.02 SOB (SHORTNESS OF BREATH): ICD-10-CM

## 2023-09-29 DIAGNOSIS — I50.32 CHRONIC HEART FAILURE WITH PRESERVED EJECTION FRACTION (HCC): Primary | ICD-10-CM

## 2023-09-29 DIAGNOSIS — I48.0 PAROXYSMAL ATRIAL FIBRILLATION (HCC): ICD-10-CM

## 2023-09-29 PROCEDURE — 3074F SYST BP LT 130 MM HG: CPT | Performed by: INTERNAL MEDICINE

## 2023-09-29 PROCEDURE — 1123F ACP DISCUSS/DSCN MKR DOCD: CPT | Performed by: INTERNAL MEDICINE

## 2023-09-29 PROCEDURE — 3078F DIAST BP <80 MM HG: CPT | Performed by: INTERNAL MEDICINE

## 2023-09-29 PROCEDURE — 99214 OFFICE O/P EST MOD 30 MIN: CPT | Performed by: INTERNAL MEDICINE

## 2023-09-29 RX ORDER — COLCHICINE 0.6 MG/1
TABLET ORAL
COMMUNITY
Start: 2023-09-25

## 2023-09-29 NOTE — PATIENT INSTRUCTIONS
PLAN  1. Continue Eliquis for stroke prophylaxis in the setting of atrial fibrillation. 2.  Continue atorvastatin for management of coronary artery disease. She is without angina at this time. 3.  Continues Lasix 20 mg twice daily, spironolactone for management of HFpEF. 4.  No cardiac testing warranted at this time  5. Continue to elevate your legs and wear the compression socks  6. Continue to follow low sodium diet   7. Make sure to call regarding the C-pap for mask replacement   8. Labs  - bmp and bnp  9. Weigh daily. Keep a log. Call with 1 week of weights.

## 2023-10-04 ENCOUNTER — HOSPITAL ENCOUNTER (OUTPATIENT)
Dept: PHYSICAL THERAPY | Age: 83
Setting detail: THERAPIES SERIES
Discharge: HOME | End: 2023-10-04
Payer: MEDICAID

## 2023-10-04 NOTE — FLOWSHEET NOTE
[address] [place] [address], [address] [place] [address]  14[address] [facility], [address] [place] [address], [address] [place] [address]        Physical Therapy  Cancellation/No-show Note  Patient Name:  Smith Renteria  :  1940   Date:  10/4/2023  Cancelled visits to date: 2  No-shows to date: 2    For today's appointment patient:  [x]  Cancelled  []  Rescheduled appointment  []  No-show     Reason given by patient:  [x]  Patient ill  []  Conflicting appointment  []  No transportation    []  Conflict with work  []  No reason given  []  Other:     Comments:  Patient called and cancelled appt due to feeling sick.    Electronically signed by:  Lj Taylor, [address] [place] [address], [id number]

## 2023-10-06 ENCOUNTER — HOSPITAL ENCOUNTER (OUTPATIENT)
Dept: PHYSICAL THERAPY | Age: 83
Setting detail: THERAPIES SERIES
Discharge: HOME | End: 2023-10-06
Payer: MEDICAID

## 2023-10-06 PROCEDURE — 97140 MANUAL THERAPY 1/> REGIONS: CPT | Performed by: PHYSICAL THERAPIST

## 2023-10-06 PROCEDURE — 97110 THERAPEUTIC EXERCISES: CPT | Performed by: PHYSICAL THERAPIST

## 2023-10-06 NOTE — FLOWSHEET NOTE
420 [facility] and [facility], [address] [place] [address]  1404 [facility], 100 [facility], [address] [place] [address]  Phone: [phone]  Fax [phone]     Physical Therapy Treatment Note/ Progress Report:         Physical Therapy: TREATMENT/PROGRESS NOTE   Patient: Masoud Lucero (53 y.o. female)   Examination Date: 10/06/2023   :  1940 MRN: [id number]   Visit #: 14    Insurance: Payor: [place] / Plan: [address] [place] [address] DEPT OF JOB / Product Type: *No Product type* /   Insurance ID: [id number] - (Medicaid)  Secondary Insurance (if applicable):    Treatment Diagnosis:    Acute pain of right shoulder  M25.511          2. Stiffness of right shoulder joint  M25.611         3. 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]     23   Referring Physician: Gideon Guerra MD  PCP: Carline Desai DO                             Plan of care signed (Y/N): YES    Date of Patient follow up with Physician:  10/26/23     Progress/POC Report:  NO     Progress report due (10 Rx/or 30 days whichever is less):  10/11/23      Visit # Insurance Allowable Auth Needed   14 MN []Yes    [x]No     Latex Allergy:  [x]NO      []YES  Preferred Language for Healthcare:   [x]English       []other:        SUBJECTIVE EXAMINATION     Pain level:  Patient Scale: 3/10  Location: upper arm    SUBJECTIVE:  Pt reports she has missed PT due to gout flare-ups. Continues to report issues with pain upon arrival B LE and back. R shoulder doing well. No reports of pain. Reports making progress with raising arm. Using the pulleys at home    OBJECTIVE EXAMINATION     OBJECTIVE:   Observation: good shoulder positioning at rest this visit.    Test measurements:  on 10/6/23: PROM shld flex: 130,  ER: 30, AROM shld flex: 100,abd: 75 with comp hiking,     Test used Initial score 8/3/23 8/29/23   Pain Summary VAS 6-810 710 4/10   Functional questionnaire Quick

## 2023-10-10 ENCOUNTER — HOSPITAL ENCOUNTER (OUTPATIENT)
Dept: PHYSICAL THERAPY | Age: 83
Setting detail: THERAPIES SERIES
Discharge: HOME | End: 2023-10-10
Payer: MEDICAID

## 2023-10-10 PROCEDURE — 97110 THERAPEUTIC EXERCISES: CPT | Performed by: PHYSICAL THERAPIST

## 2023-10-10 PROCEDURE — 97140 MANUAL THERAPY 1/> REGIONS: CPT | Performed by: PHYSICAL THERAPIST

## 2023-10-16 ENCOUNTER — OFFICE VISIT (OUTPATIENT)
Dept: FAMILY MEDICINE CLINIC | Age: 83
End: 2023-10-16
Payer: MEDICAID

## 2023-10-16 VITALS
OXYGEN SATURATION: 94 % | TEMPERATURE: 97.7 F | SYSTOLIC BLOOD PRESSURE: 130 MMHG | BODY MASS INDEX: 30.75 KG/M2 | HEIGHT: 66 IN | DIASTOLIC BLOOD PRESSURE: 60 MMHG | HEART RATE: 84 BPM

## 2023-10-16 DIAGNOSIS — R10.9 ABDOMINAL CRAMPING: ICD-10-CM

## 2023-10-16 DIAGNOSIS — R10.84 GENERALIZED ABDOMINAL PAIN: Primary | ICD-10-CM

## 2023-10-16 PROCEDURE — 3075F SYST BP GE 130 - 139MM HG: CPT | Performed by: NURSE PRACTITIONER

## 2023-10-16 PROCEDURE — 1123F ACP DISCUSS/DSCN MKR DOCD: CPT | Performed by: NURSE PRACTITIONER

## 2023-10-16 PROCEDURE — 3078F DIAST BP <80 MM HG: CPT | Performed by: NURSE PRACTITIONER

## 2023-10-16 PROCEDURE — 99213 OFFICE O/P EST LOW 20 MIN: CPT | Performed by: NURSE PRACTITIONER

## 2023-10-16 SDOH — ECONOMIC STABILITY: FOOD INSECURITY: WITHIN THE PAST 12 MONTHS, YOU WORRIED THAT YOUR FOOD WOULD RUN OUT BEFORE YOU GOT MONEY TO BUY MORE.: NEVER TRUE

## 2023-10-16 SDOH — ECONOMIC STABILITY: FOOD INSECURITY: WITHIN THE PAST 12 MONTHS, THE FOOD YOU BOUGHT JUST DIDN'T LAST AND YOU DIDN'T HAVE MONEY TO GET MORE.: NEVER TRUE

## 2023-10-16 SDOH — ECONOMIC STABILITY: INCOME INSECURITY: HOW HARD IS IT FOR YOU TO PAY FOR THE VERY BASICS LIKE FOOD, HOUSING, MEDICAL CARE, AND HEATING?: NOT HARD AT ALL

## 2023-10-16 ASSESSMENT — ENCOUNTER SYMPTOMS
ABDOMINAL DISTENTION: 0
VOMITING: 0
ANAL BLEEDING: 0
BLOOD IN STOOL: 0
CONSTIPATION: 0
ABDOMINAL PAIN: 1
DIARRHEA: 0
NAUSEA: 0

## 2023-10-16 NOTE — PROGRESS NOTES
Smith Renteria (:  1940) is a 82 y.o. female,Established patient, here for evaluation of the following chief complaint(s):  Abdominal Pain (Sxs 2017 on and off)         ASSESSMENT/PLAN:  1. Generalized abdominal pain  -     CT ABDOMEN PELVIS W WO CONTRAST Additional Contrast? Oral; Future  -     RILEY - Jelena Patterson MD, Gastroenterology, [facility]  2. Abdominal cramping  -     CT ABDOMEN PELVIS W WO CONTRAST Additional Contrast? Oral; Future  -     RILEY - Jelena Patterson MD, Gastroenterology, [facility]    -Patient had labs drawn recently through [facility] including liver profile and BMP. These were reviewed. Liver profile WNL. BMP with slightly elevated BUN and Creatinine but similar to previous draws before   -Discussed plan of Ct scan with patient and new referral to GI. She verbalized understanding and agreement and would like to see Dr. Ángel Sherman again   -Discussed alarm symptoms     Return if symptoms worsen or fail to improve. Subjective   SUBJECTIVE/OBJECTIVE:  Abdominal pain \"on and off\" since 2017  Sees Dr. Ángel Sherman in 2017 for this   Had a g-tube in 2017 and had her \"throat stretched x 3\" and saw him briefly then   Reports the pain is described as \" a knot, cramping\" and that it moves all over her abdomen, at times it has made her scream with the pain   Denies any nausea or vomiting, reports she is able to eat and drink. Describes bowel movements as daily, \" sometimes soft and small and once in awhile round nuggets and hard and packed together\"   Reports her appendix was removed during a total hysterectomy \"years ago\" is unsure if she still has a gall bladder       Abdominal Pain  Pertinent negatives include no constipation, diarrhea, dysuria, fever, hematuria, nausea or vomiting. Review of Systems   Constitutional:  Negative for chills and fever. Gastrointestinal:  Positive for abdominal pain.  Negative for abdominal distention, anal bleeding, blood in stool, constipation,

## 2023-10-23 ENCOUNTER — OFFICE VISIT (OUTPATIENT)
Dept: PAIN MANAGEMENT | Age: 83
End: 2023-10-23
Payer: MEDICAID

## 2023-10-23 ENCOUNTER — APPOINTMENT (OUTPATIENT)
Dept: PHYSICAL THERAPY | Age: 83
End: 2023-10-23
Payer: MEDICAID

## 2023-10-23 VITALS
SYSTOLIC BLOOD PRESSURE: 125 MMHG | OXYGEN SATURATION: 92 % | DIASTOLIC BLOOD PRESSURE: 63 MMHG | TEMPERATURE: 97.2 F | HEART RATE: 90 BPM

## 2023-10-23 DIAGNOSIS — M71.38 SYNOVIAL CYST OF LUMBAR SPINE: ICD-10-CM

## 2023-10-23 DIAGNOSIS — M19.019 ARTHRITIS PAIN OF SHOULDER: ICD-10-CM

## 2023-10-23 DIAGNOSIS — M51.36 DDD (DEGENERATIVE DISC DISEASE), LUMBAR: ICD-10-CM

## 2023-10-23 DIAGNOSIS — J44.9 CHRONIC OBSTRUCTIVE PULMONARY DISEASE, UNSPECIFIED COPD TYPE (HCC): ICD-10-CM

## 2023-10-23 DIAGNOSIS — M50.121 CERVICAL DISC DISORDER AT C4-C5 LEVEL WITH RADICULOPATHY: ICD-10-CM

## 2023-10-23 DIAGNOSIS — M25.561 BILATERAL CHRONIC KNEE PAIN: ICD-10-CM

## 2023-10-23 DIAGNOSIS — M25.562 BILATERAL CHRONIC KNEE PAIN: ICD-10-CM

## 2023-10-23 DIAGNOSIS — M05.79 RHEUMATOID ARTHRITIS INVOLVING MULTIPLE SITES WITH POSITIVE RHEUMATOID FACTOR (HCC): ICD-10-CM

## 2023-10-23 DIAGNOSIS — M50.30 DDD (DEGENERATIVE DISC DISEASE), CERVICAL: ICD-10-CM

## 2023-10-23 DIAGNOSIS — M54.32 SCIATICA OF LEFT SIDE: ICD-10-CM

## 2023-10-23 DIAGNOSIS — Z51.81 ENCOUNTER FOR THERAPEUTIC DRUG MONITORING: ICD-10-CM

## 2023-10-23 DIAGNOSIS — M48.061 SPINAL STENOSIS OF LUMBAR REGION WITHOUT NEUROGENIC CLAUDICATION: ICD-10-CM

## 2023-10-23 DIAGNOSIS — G89.29 BILATERAL CHRONIC KNEE PAIN: ICD-10-CM

## 2023-10-23 DIAGNOSIS — G89.4 CHRONIC PAIN SYNDROME: ICD-10-CM

## 2023-10-23 PROCEDURE — 3078F DIAST BP <80 MM HG: CPT | Performed by: NURSE PRACTITIONER

## 2023-10-23 PROCEDURE — 3074F SYST BP LT 130 MM HG: CPT | Performed by: NURSE PRACTITIONER

## 2023-10-23 PROCEDURE — 1123F ACP DISCUSS/DSCN MKR DOCD: CPT | Performed by: NURSE PRACTITIONER

## 2023-10-23 PROCEDURE — 99214 OFFICE O/P EST MOD 30 MIN: CPT | Performed by: NURSE PRACTITIONER

## 2023-10-23 RX ORDER — ACETAMINOPHEN 500 MG
500 TABLET ORAL EVERY 12 HOURS PRN
Qty: 120 TABLET | Refills: 1 | Status: SHIPPED | OUTPATIENT
Start: 2023-10-23 | End: 2023-12-22

## 2023-10-23 RX ORDER — OXYCODONE HYDROCHLORIDE AND ACETAMINOPHEN 5; 325 MG/1; MG/1
1 TABLET ORAL EVERY 12 HOURS PRN
Qty: 60 TABLET | Refills: 0 | Status: SHIPPED | OUTPATIENT
Start: 2023-10-23 | End: 2023-11-22

## 2023-10-23 RX ORDER — NALOXONE HYDROCHLORIDE 4 MG/.1ML
SPRAY NASAL
Qty: 1 EACH | Refills: 0 | Status: SHIPPED | OUTPATIENT
Start: 2023-10-23

## 2023-10-23 RX ORDER — LIDOCAINE 50 MG/G
1 PATCH TOPICAL DAILY
Qty: 30 PATCH | Refills: 0 | Status: SHIPPED | OUTPATIENT
Start: 2023-10-23 | End: 2023-11-22

## 2023-10-23 NOTE — PROGRESS NOTES
[facility]  1940  [id number]      HISTORY OF PRESENT ILLNESS: Ms. Cale Mukherjee is a 82 y.o. female returns for a follow up visit for pain management  She has a diagnosis of   1. Chronic pain syndrome    2. DDD (degenerative disc disease), cervical    3. Cervical disc disorder at C4-C5 level with radiculopathy    4. DDD (degenerative disc disease), lumbar    5. Spinal stenosis of lumbar region without neurogenic claudication    6. Synovial cyst of lumbar spine    7. Sciatica of left side    8. Bilateral chronic knee pain    9. Arthritis pain of shoulder right severe    10. Rheumatoid arthritis involving multiple sites with positive rheumatoid factor ([address])    11. Chronic obstructive pulmonary disease, unspecified COPD type ([address])    12. Encounter for therapeutic drug monitoring      As per Information Obtained from the PADT (Patient Assessment and Documentation Tool)    She complains of pain in the  lower back, both legs  She rates the pain 8/10 and describes it as aching, burning. Current treatment regimen has helped relieve about 0% of the pain. She denies any side effects from the current pain regimen. Patient reports that since the last follow up visit the physical functioning is unchanged, family/social relationships are unchanged, mood is worse sleep patterns are worse, and that the overall functioning is worse. Patient denies misusing/abusing her narcotic pain medications or using any illegal drugs. Upon obtaining medical history from Ms. Cale Mukherjee states that pain is manageable on current pain therapy. Patient has not been seen since April, 2023. She had right reverse shoulder arthroplasty with Dr. Penny Red on 6/6/23 Stable, she was prescribed Oxycodone-acetaminophen 5-325 mg tabs x7 days on 4/27/23. She has not been on pain medication since June, 2023. Pain worse in the lower back, pain int he right shoulder has since improved. Maintains Lyrica through rheumatology. She is stable on 3 L O2.

## 2023-10-24 DIAGNOSIS — R10.84 GENERALIZED ABDOMINAL PAIN: Primary | ICD-10-CM

## 2023-10-24 DIAGNOSIS — R10.9 ABDOMINAL CRAMPING: ICD-10-CM

## 2023-10-26 ENCOUNTER — OFFICE VISIT (OUTPATIENT)
Dept: ORTHOPEDIC SURGERY | Age: 83
End: 2023-10-26

## 2023-10-26 VITALS — BODY MASS INDEX: 30.53 KG/M2 | WEIGHT: 190 LBS | HEIGHT: 66 IN

## 2023-10-26 DIAGNOSIS — Z96.611 STATUS POST REVERSE TOTAL ARTHROPLASTY OF RIGHT SHOULDER: Primary | ICD-10-CM

## 2023-10-26 DIAGNOSIS — M17.0 ARTHRITIS OF BOTH KNEES: ICD-10-CM

## 2023-10-26 NOTE — PROGRESS NOTES
Procedures    Shoulder Pulley $20
acetaminophen to reduce pain. 3)  Oral narcotics, including tramadol, result in a significant increase of adverse events and are not effective at improving pain or function for treatment of osteoarthritis of the knee. 4)  Recommend maintaining weight in a healthy range to reduce stresses on the knee, particularly the patellofemoral compartment which sees up to 6x body weight. 5)  Home exercise program, focusing on strengthening, low impact aerobic exercises, and neuromuscular education. 6)  Intra-articular corticosteroid injections are an option. Corticosteroid injections can be performed every 4 months as needed. 7)  Evidence for intra-articular hyaluronic acid injections (viscosupplementation) is not as strong, but may benefit a subset of patients who have failed other options. Viscosupplementation can be performed every 6 months as needed. 8)  Bracing and cane use can improve pain and function. Although not specifically listed in the CPGs, ice therapy and topical patches such as Salonpas are good options as well.       Gideon Guerra MD  10/26/23  9:00 AM

## 2023-11-01 ENCOUNTER — HOSPITAL ENCOUNTER (OUTPATIENT)
Dept: CT IMAGING | Age: 83
Discharge: HOME | End: 2023-11-01
Payer: MEDICAID

## 2023-11-01 DIAGNOSIS — R10.9 ABDOMINAL CRAMPING: ICD-10-CM

## 2023-11-01 DIAGNOSIS — R10.84 GENERALIZED ABDOMINAL PAIN: ICD-10-CM

## 2023-11-01 LAB
PERFORMED ON: ABNORMAL
POC CREATININE: 1.2 MG/DL (ref 0.6–1.2)
POC SAMPLE TYPE: ABNORMAL

## 2023-11-01 PROCEDURE — 82565 ASSAY OF CREATININE: CPT

## 2023-11-01 PROCEDURE — 74177 CT ABD & PELVIS W/CONTRAST: CPT

## 2023-11-01 PROCEDURE — 6360000004 HC RX CONTRAST MEDICATION: Performed by: NURSE PRACTITIONER

## 2023-11-01 RX ADMIN — DIATRIZOATE MEGLUMINE AND DIATRIZOATE SODIUM 12 ML: 660; 100 LIQUID ORAL; RECTAL at 17:44

## 2023-11-01 RX ADMIN — IOPAMIDOL 75 ML: 755 INJECTION, SOLUTION INTRAVENOUS at 17:51

## 2023-11-13 ENCOUNTER — TELEPHONE (OUTPATIENT)
Dept: FAMILY MEDICINE CLINIC | Age: 83
End: 2023-11-13

## 2023-11-13 NOTE — TELEPHONE ENCOUNTER
----- Message from Cristino Son sent at 11/13/2023  4:12 PM EST -----  Subject: Appointment Request    Reason for Call: Established Patient Appointment needed: Routine Existing   Condition Follow Up    QUESTIONS    Reason for appointment request? Available appointments did not meet   patient need     Additional Information for Provider? pt had to cancel her appt for 11/14,   and needs to r/s for any day after 3:30p, or anytime on Fridays  ---------------------------------------------------------------------------  --------------  Ed Marine Rell  [phone]; OK to leave message on voicemail  ---------------------------------------------------------------------------  --------------  SCRIPT ANSWERS

## 2023-11-14 ENCOUNTER — TELEPHONE (OUTPATIENT)
Dept: PRIMARY CARE CLINIC | Age: 83
End: 2023-11-14

## 2023-11-14 NOTE — TELEPHONE ENCOUNTER
Left message for patient to call the office back to reschedule appt.  Can schedule patient on 11/27 at 330

## 2023-11-14 NOTE — TELEPHONE ENCOUNTER
Hello, attempted to connect with patient for virtual visit. Called x2 and sent multiple pus notifications and had not response. I did see in the notes that she had to cancel 11/14 appt. But I am not sure if that was for this appt. Please follow up with Patient.  Thanks, Carl Garcia

## 2023-11-15 NOTE — TELEPHONE ENCOUNTER
Please help patient schedule afternoon appointment with me. Ok to use provider fill.  Can double book 4:30 next week if urgent needs

## 2023-11-20 ENCOUNTER — OFFICE VISIT (OUTPATIENT)
Dept: FAMILY MEDICINE CLINIC | Age: 83
End: 2023-11-20
Payer: MEDICAID

## 2023-11-20 VITALS
OXYGEN SATURATION: 97 % | BODY MASS INDEX: 29.7 KG/M2 | DIASTOLIC BLOOD PRESSURE: 60 MMHG | HEART RATE: 82 BPM | SYSTOLIC BLOOD PRESSURE: 132 MMHG | WEIGHT: 184 LBS

## 2023-11-20 DIAGNOSIS — I10 HTN (HYPERTENSION), BENIGN: ICD-10-CM

## 2023-11-20 DIAGNOSIS — J43.2 CENTRILOBULAR EMPHYSEMA (HCC): ICD-10-CM

## 2023-11-20 DIAGNOSIS — R73.03 PREDIABETES: ICD-10-CM

## 2023-11-20 DIAGNOSIS — I27.20 PULMONARY HTN (HCC): ICD-10-CM

## 2023-11-20 DIAGNOSIS — I82.463 ACUTE DEEP VEIN THROMBOSIS (DVT) OF CALF MUSCLE VEIN OF BOTH LOWER EXTREMITIES (HCC): ICD-10-CM

## 2023-11-20 DIAGNOSIS — J44.9 CHRONIC OBSTRUCTIVE PULMONARY DISEASE, UNSPECIFIED COPD TYPE (HCC): Primary | ICD-10-CM

## 2023-11-20 DIAGNOSIS — Z23 NEED FOR INFLUENZA VACCINATION: ICD-10-CM

## 2023-11-20 DIAGNOSIS — M05.79 RHEUMATOID ARTHRITIS INVOLVING MULTIPLE SITES WITH POSITIVE RHEUMATOID FACTOR (HCC): ICD-10-CM

## 2023-11-20 DIAGNOSIS — J96.11 CHRONIC RESPIRATORY FAILURE WITH HYPOXIA (HCC): ICD-10-CM

## 2023-11-20 DIAGNOSIS — G47.33 OSA (OBSTRUCTIVE SLEEP APNEA): ICD-10-CM

## 2023-11-20 DIAGNOSIS — M81.0 AGE-RELATED OSTEOPOROSIS WITHOUT CURRENT PATHOLOGICAL FRACTURE: ICD-10-CM

## 2023-11-20 DIAGNOSIS — E03.9 ACQUIRED HYPOTHYROIDISM: ICD-10-CM

## 2023-11-20 LAB — HBA1C MFR BLD: 6.1 %

## 2023-11-20 PROCEDURE — 3074F SYST BP LT 130 MM HG: CPT | Performed by: STUDENT IN AN ORGANIZED HEALTH CARE EDUCATION/TRAINING PROGRAM

## 2023-11-20 PROCEDURE — 90694 VACC AIIV4 NO PRSRV 0.5ML IM: CPT | Performed by: STUDENT IN AN ORGANIZED HEALTH CARE EDUCATION/TRAINING PROGRAM

## 2023-11-20 PROCEDURE — 83036 HEMOGLOBIN GLYCOSYLATED A1C: CPT | Performed by: STUDENT IN AN ORGANIZED HEALTH CARE EDUCATION/TRAINING PROGRAM

## 2023-11-20 PROCEDURE — 99214 OFFICE O/P EST MOD 30 MIN: CPT | Performed by: STUDENT IN AN ORGANIZED HEALTH CARE EDUCATION/TRAINING PROGRAM

## 2023-11-20 PROCEDURE — 3078F DIAST BP <80 MM HG: CPT | Performed by: STUDENT IN AN ORGANIZED HEALTH CARE EDUCATION/TRAINING PROGRAM

## 2023-11-20 PROCEDURE — 90471 IMMUNIZATION ADMIN: CPT | Performed by: STUDENT IN AN ORGANIZED HEALTH CARE EDUCATION/TRAINING PROGRAM

## 2023-11-20 PROCEDURE — 1123F ACP DISCUSS/DSCN MKR DOCD: CPT | Performed by: STUDENT IN AN ORGANIZED HEALTH CARE EDUCATION/TRAINING PROGRAM

## 2023-11-20 NOTE — PROGRESS NOTES
Assessment:  Encounter Diagnoses   Name Primary? Chronic obstructive pulmonary disease, unspecified COPD type ([address]) Yes    Chronic respiratory failure with hypoxia (HCC)     HEBERT (obstructive sleep apnea)     Centrilobular emphysema (HCC)     Rheumatoid arthritis involving multiple sites with positive rheumatoid factor (HCC)     Acquired hypothyroidism     Prediabetes     Pulmonary HTN (HCC)     HTN (hypertension), benign     Need for influenza vaccination     Acute deep vein thrombosis (DVT) of calf muscle vein of both lower extremities (HCC)     Age-related osteoporosis without current pathological fracture        Plan:  1. Chronic obstructive pulmonary disease, unspecified COPD type ([address])  2. Chronic respiratory failure with hypoxia [facility])  Assessment & Plan:   Following with pulmonology. Requires 3 L at baseline. No increased oxygen requirement currently. 3. HEBERT (obstructive sleep apnea)  Assessment & Plan:   Compliant with CPAP  4. Centrilobular emphysema ([address])  Assessment & Plan:  Following with pulmonology. Currently on Trelegy. No acute worsening of respiratory status  5. Rheumatoid arthritis involving multiple sites with positive rheumatoid factor ([address])  Assessment & Plan:  Following with rheumatology. Currently on 5 mg prednisone daily and Actemra every other week. 6. Acquired hypothyroidism  Assessment & Plan:  Compliant with Synthroid 125 mcg daily. No adverse effects of medication. Orders:  -     TSH with Reflex; Future  7. Prediabetes  Assessment & Plan:  Hemoglobin A1C   Date Value Ref Range Status   11/20/2023 6.1 % Final       Orders:  -     POCT glycosylated hemoglobin (Hb A1C)  -     Lipid Panel; Future  8. Pulmonary HTN ([address])  Assessment & Plan:  Following with pulmonology. No recent change to medications or treatment plan. On chronic oxygen.   9. HTN (hypertension), benign  Assessment & Plan:   Currently at goal on spironolactone 25 mg twice daily as well as labetalol 100 mg twice

## 2023-11-22 ENCOUNTER — OFFICE VISIT (OUTPATIENT)
Dept: PAIN MANAGEMENT | Age: 83
End: 2023-11-22
Payer: MEDICAID

## 2023-11-22 VITALS
DIASTOLIC BLOOD PRESSURE: 70 MMHG | HEART RATE: 73 BPM | BODY MASS INDEX: 29.7 KG/M2 | SYSTOLIC BLOOD PRESSURE: 123 MMHG | OXYGEN SATURATION: 97 % | WEIGHT: 184 LBS

## 2023-11-22 DIAGNOSIS — G89.29 BILATERAL CHRONIC KNEE PAIN: ICD-10-CM

## 2023-11-22 DIAGNOSIS — M54.32 SCIATICA OF LEFT SIDE: ICD-10-CM

## 2023-11-22 DIAGNOSIS — M50.30 DDD (DEGENERATIVE DISC DISEASE), CERVICAL: ICD-10-CM

## 2023-11-22 DIAGNOSIS — M48.061 SPINAL STENOSIS OF LUMBAR REGION WITHOUT NEUROGENIC CLAUDICATION: ICD-10-CM

## 2023-11-22 DIAGNOSIS — M05.79 RHEUMATOID ARTHRITIS INVOLVING MULTIPLE SITES WITH POSITIVE RHEUMATOID FACTOR (HCC): ICD-10-CM

## 2023-11-22 DIAGNOSIS — M25.562 BILATERAL CHRONIC KNEE PAIN: ICD-10-CM

## 2023-11-22 DIAGNOSIS — M19.019 ARTHRITIS PAIN OF SHOULDER: ICD-10-CM

## 2023-11-22 DIAGNOSIS — M71.38 SYNOVIAL CYST OF LUMBAR SPINE: ICD-10-CM

## 2023-11-22 DIAGNOSIS — M25.561 BILATERAL CHRONIC KNEE PAIN: ICD-10-CM

## 2023-11-22 DIAGNOSIS — M51.36 DDD (DEGENERATIVE DISC DISEASE), LUMBAR: ICD-10-CM

## 2023-11-22 DIAGNOSIS — M50.121 CERVICAL DISC DISORDER AT C4-C5 LEVEL WITH RADICULOPATHY: ICD-10-CM

## 2023-11-22 DIAGNOSIS — J44.9 CHRONIC OBSTRUCTIVE PULMONARY DISEASE, UNSPECIFIED COPD TYPE (HCC): ICD-10-CM

## 2023-11-22 DIAGNOSIS — F32.9 REACTIVE DEPRESSION: ICD-10-CM

## 2023-11-22 DIAGNOSIS — G89.4 CHRONIC PAIN SYNDROME: ICD-10-CM

## 2023-11-22 PROCEDURE — 3074F SYST BP LT 130 MM HG: CPT | Performed by: NURSE PRACTITIONER

## 2023-11-22 PROCEDURE — 99214 OFFICE O/P EST MOD 30 MIN: CPT | Performed by: NURSE PRACTITIONER

## 2023-11-22 PROCEDURE — 3078F DIAST BP <80 MM HG: CPT | Performed by: NURSE PRACTITIONER

## 2023-11-22 PROCEDURE — 1123F ACP DISCUSS/DSCN MKR DOCD: CPT | Performed by: NURSE PRACTITIONER

## 2023-11-22 RX ORDER — ACETAMINOPHEN 500 MG
500 TABLET ORAL EVERY 12 HOURS PRN
Qty: 120 TABLET | Refills: 0 | Status: SHIPPED | OUTPATIENT
Start: 2023-11-22 | End: 2024-01-21

## 2023-11-22 RX ORDER — OXYCODONE HYDROCHLORIDE AND ACETAMINOPHEN 5; 325 MG/1; MG/1
1 TABLET ORAL EVERY 12 HOURS PRN
Qty: 60 TABLET | Refills: 0 | Status: SHIPPED | OUTPATIENT
Start: 2023-11-22 | End: 2023-12-22

## 2023-11-22 RX ORDER — LIDOCAINE 50 MG/G
1 PATCH TOPICAL DAILY
Qty: 30 PATCH | Refills: 0 | Status: SHIPPED | OUTPATIENT
Start: 2023-11-22 | End: 2023-12-22

## 2023-11-22 NOTE — PROGRESS NOTES
Jade Herrontana  1940  [id number]    HISTORY OF PRESENT ILLNESS: Ms. Liza Welch is a 82 y.o. female returns for a follow up visit for pain management  She has a diagnosis of   1. Chronic pain syndrome    2. DDD (degenerative disc disease), cervical    3. Cervical disc disorder at C4-C5 level with radiculopathy    4. DDD (degenerative disc disease), lumbar    5. Spinal stenosis of lumbar region without neurogenic claudication    6. Synovial cyst of lumbar spine    7. Sciatica of left side    8. Bilateral chronic knee pain    9. Arthritis pain of shoulder right severe    10. Rheumatoid arthritis involving multiple sites with positive rheumatoid factor ([address])    11. Reactive depression    12. Chronic obstructive pulmonary disease, unspecified COPD type ([address])      As per Information Obtained from the PADT (Patient Assessment and Documentation Tool)    She complains of pain in the lower back, abdomen, right knee, right lower leg, left ankle. She rates the pain 5/10 and describes it as sharp, aching, burning, numbness, pins and needles. Current treatment regimen has helped relieve about 75% of the pain. She denies any side effects from the current pain regimen. Patient reports that since the last follow up visit the physical functioning is better, family/social relationships are unchanged, mood is unchanged sleep patterns are unchanged, and that the overall functioning is unchanged. Patient denies misusing/abusing her narcotic pain medications or using any illegal drugs. Upon obtaining medical history from Ms. Liza Welch states that pain is manageable on current pain therapy. Takes the pain medications as prescribed. Stable on 3L O2. She is scheduled to f/u with Dr. Lillian Weinstein for evaluation of recently found lung nodule/density. Mood/anxiety is stable. Sleep is fair with an average of 5-6 hours. Denies to having issues of constipation. Tolerating activities/house chores with moderate tenderness to the lower back.

## 2023-11-23 PROBLEM — J96.11 CHRONIC RESPIRATORY FAILURE WITH HYPOXIA (HCC): Status: ACTIVE | Noted: 2018-09-13

## 2023-11-23 PROBLEM — F32.9 REACTIVE DEPRESSION: Status: RESOLVED | Noted: 2021-01-22 | Resolved: 2023-11-23

## 2023-11-23 PROBLEM — R09.81 NASAL CONGESTION: Status: RESOLVED | Noted: 2021-06-08 | Resolved: 2023-11-23

## 2023-11-23 PROBLEM — R73.03 PREDIABETES: Status: ACTIVE | Noted: 2023-11-23

## 2023-11-23 PROBLEM — D64.9 ANEMIA: Status: RESOLVED | Noted: 2019-03-27 | Resolved: 2023-11-23

## 2023-11-23 NOTE — ASSESSMENT & PLAN NOTE
On chronic Eliquis therapy due to limited mobility and history of DVT. No bleeding concerns currently.   Understands risk/benefits of medication

## 2023-11-23 NOTE — ASSESSMENT & PLAN NOTE
Currently at goal on spironolactone 25 mg twice daily as well as labetalol 100 mg twice daily. No adverse effects of medication.

## 2023-11-30 ENCOUNTER — TELEMEDICINE (OUTPATIENT)
Dept: PULMONOLOGY | Age: 83
End: 2023-11-30
Payer: MEDICAID

## 2023-11-30 DIAGNOSIS — J43.2 CENTRILOBULAR EMPHYSEMA (HCC): ICD-10-CM

## 2023-11-30 DIAGNOSIS — G47.33 OSA (OBSTRUCTIVE SLEEP APNEA): ICD-10-CM

## 2023-11-30 DIAGNOSIS — R91.8 PULMONARY INFILTRATES: ICD-10-CM

## 2023-11-30 DIAGNOSIS — J96.11 CHRONIC RESPIRATORY FAILURE WITH HYPOXIA (HCC): Primary | ICD-10-CM

## 2023-11-30 DIAGNOSIS — R91.1 LUNG NODULE: ICD-10-CM

## 2023-11-30 PROCEDURE — 1123F ACP DISCUSS/DSCN MKR DOCD: CPT | Performed by: INTERNAL MEDICINE

## 2023-11-30 PROCEDURE — 99213 OFFICE O/P EST LOW 20 MIN: CPT | Performed by: INTERNAL MEDICINE

## 2023-11-30 NOTE — PROGRESS NOTES
-6 Minute Walk Test Interpretation    Patient Name: Amita Tyler  : 1940  Date: 8/15/2017    A 6 Minute Walk Test was performed in accordance with the ATS   Guidelines ([address] 2002; [id number]) at [facility]. The test was performed on room air. The baseline SpO2 while breathing room air was 91%. The patient did complete the 6 minute walk. During the study, the patient walked a total distance of 1050   feet. The minimal recorded SpO2 was 88%. Oximetry with 6 Minute Walk Test shows desaturation to 88% on   room air, 1L, 2L and 3L nasal cannula oxygen with maximal   exercise. Sats improved to 94% on 4L NC oxygen with activity. Comments: Based on testing patient qualifies for 4L NC oxygen to   use with activity. ECHO-Summary  Left ventricular systolic function is hyperdynamic with an estimated ejection fraction of ? 65%. The left ventricle is normal in size with normal wall thickness. Normal left ventricular diastolic function. The right atrium is mildly enlarged. Mild tricuspid regurgitation. Systolic pulmonary artery pressure (SPAP) is elevated and estimated at 39 mmHg (right atrial pressure 3 mmHg) consistent with  borderline pulmonary hypertension. Barium 20  Moderate esophageal dysmotility. No evidence of functional obstruction or  gross reflux.     PAP Compliance 10/3/2019   PAP Setting = 7   Percentage days used = 6   Average hours used = 2.39   Is patient compliant with PAP? no   AHI = 3.2   Leak = 9.4   [place] = -     PSG 18:  TOTAL AHI = 11.2/hour  REM AHI= 16.6/hr   Central AHI= 1.2/hour  Lowest O2 desaturation = 89 %  Minutes with O2 <90% = 2.6    TITRATION STUDY 18:  TOTAL AHI = 1.9/hour on PAP = 7 cm of H2O  Sleep emergent CSA events during the initial part of the study   Central AHI= 3.9/hour  Lowest O2 desaturation = 90 % overall  Minutes with O2% <90% = NA    Patient's sleep read shows

## 2023-12-02 DIAGNOSIS — E78.00 PURE HYPERCHOLESTEROLEMIA: ICD-10-CM

## 2023-12-02 DIAGNOSIS — I50.32 CHRONIC DIASTOLIC HEART FAILURE (HCC): ICD-10-CM

## 2023-12-02 NOTE — TELEPHONE ENCOUNTER
Refill Request     CONFIRM preferred pharmacy with the patient. If Mail Order Rx - Pend for 90 day refill. Last Seen: Last Seen Department: 11/20/2023  Last Seen by PCP: 11/20/2023    Last Written: 4/3/23 90 tabs 1 refill     4/3/23 180 tabs 1 refill    If no future appointment scheduled:  Review the last OV with PCP and review information for follow-up visit,  Route STAFF MESSAGE with patient name to the [facility] for scheduling with the following information:            -  Timing of next visit           -  Visit type ie Physical, OV, etc           -  Diagnoses/Reason ie. COPD, HTN - Do not use MEDICATION, Follow-up or CHECK UP - Give reason for visit      Next Appointment:   Future Appointments   Date Time Provider [address]   12/20/2023  4:00 PM JOSE Horne - CNP [facility]   1/9/2024  3:40 PM Miley Ferreira MD AND PULM [facility]   5/21/2024  4:00 PM Edna Maya DO [facility]  [place] - DYERMELINDA       Message sent to [address] [place] [address] to schedule appt with patient?   NO      Requested Prescriptions     Pending Prescriptions Disp Refills    furosemide (LASIX) 20 MG tablet [Pharmacy Med Name: FUROSEMIDE 20 MG TABLET] 180 tablet 1     Sig: TAKE 1 TABLET BY MOUTH TWICE A DAY    atorvastatin (LIPITOR) 10 MG tablet [Pharmacy Med Name: ATORVASTATIN 10 MG TABLET] 90 tablet 1     Sig: TAKE 1 TABLET BY MOUTH EVERY DAY

## 2023-12-03 RX ORDER — ATORVASTATIN CALCIUM 10 MG/1
TABLET, FILM COATED ORAL
Qty: 90 TABLET | Refills: 1 | Status: SHIPPED | OUTPATIENT
Start: 2023-12-03

## 2023-12-03 RX ORDER — FUROSEMIDE 20 MG/1
TABLET ORAL
Qty: 180 TABLET | Refills: 1 | Status: SHIPPED | OUTPATIENT
Start: 2023-12-03

## 2023-12-04 DIAGNOSIS — I10 ESSENTIAL HYPERTENSION: ICD-10-CM

## 2023-12-04 DIAGNOSIS — I50.32 CHRONIC DIASTOLIC HEART FAILURE (HCC): ICD-10-CM

## 2023-12-05 RX ORDER — LABETALOL 100 MG/1
TABLET, FILM COATED ORAL
Qty: 180 TABLET | Refills: 1 | Status: SHIPPED | OUTPATIENT
Start: 2023-12-05

## 2023-12-05 NOTE — TELEPHONE ENCOUNTER
Refill Request     CONFIRM preferred pharmacy with the patient. If Mail Order Rx - Pend for 90 day refill. Last Seen: Last Seen Department: 11/20/2023  Last Seen by PCP: 11/20/2023    Last Written: 4/3/2023    If no future appointment scheduled:  Review the last OV with PCP and review information for follow-up visit,  Route STAFF MESSAGE with patient name to the [facility] for scheduling with the following information:            -  Timing of next visit           -  Visit type ie Physical, OV, etc           -  Diagnoses/Reason ie. COPD, HTN - Do not use MEDICATION, Follow-up or CHECK UP - Give reason for visit      Next Appointment:   Future Appointments   Date Time Provider [address]   12/20/2023  4:00 PM JOSE Landrum - CNP [facility]   1/9/2024  3:40 PM Ja Jaime MD AND PULM [facility]   5/21/2024  4:00 PM Jan Maya DO [facility]  [place] - JOYCELYN       Message sent to [address] [place] [address] to schedule appt with patient?   NO      Requested Prescriptions     Pending Prescriptions Disp Refills    labetalol (NORMODYNE) 100 MG tablet [Pharmacy Med Name: LABETALOL  MG TABLET] 180 tablet 1     Sig: TAKE 1 TABLET BY MOUTH TWICE A DAY

## 2023-12-08 ENCOUNTER — HOSPITAL ENCOUNTER (OUTPATIENT)
Dept: PET IMAGING | Age: 83
Discharge: HOME | End: 2023-12-08
Payer: MEDICAID

## 2023-12-08 DIAGNOSIS — J43.2 CENTRILOBULAR EMPHYSEMA (HCC): ICD-10-CM

## 2023-12-08 DIAGNOSIS — R91.8 PULMONARY INFILTRATES: ICD-10-CM

## 2023-12-08 DIAGNOSIS — R91.1 LUNG NODULE: ICD-10-CM

## 2023-12-08 DIAGNOSIS — J96.11 CHRONIC RESPIRATORY FAILURE WITH HYPOXIA (HCC): ICD-10-CM

## 2023-12-08 PROCEDURE — A9552 F18 FDG: HCPCS | Performed by: INTERNAL MEDICINE

## 2023-12-08 PROCEDURE — 3430000000 HC RX DIAGNOSTIC RADIOPHARMACEUTICAL: Performed by: INTERNAL MEDICINE

## 2023-12-08 PROCEDURE — 78815 PET IMAGE W/CT SKULL-THIGH: CPT

## 2023-12-08 RX ORDER — FLUDEOXYGLUCOSE F 18 200 MCI/ML
15.1 INJECTION, SOLUTION INTRAVENOUS
Status: COMPLETED | OUTPATIENT
Start: 2023-12-08 | End: 2023-12-08

## 2023-12-08 RX ADMIN — FLUDEOXYGLUCOSE F 18 15.1 MILLICURIE: 200 INJECTION, SOLUTION INTRAVENOUS at 07:52

## 2023-12-19 ENCOUNTER — TELEPHONE (OUTPATIENT)
Dept: PAIN MANAGEMENT | Age: 83
End: 2023-12-19

## 2023-12-19 DIAGNOSIS — M71.38 SYNOVIAL CYST OF LUMBAR SPINE: ICD-10-CM

## 2023-12-19 DIAGNOSIS — G89.4 CHRONIC PAIN SYNDROME: ICD-10-CM

## 2023-12-19 DIAGNOSIS — M54.32 SCIATICA OF LEFT SIDE: ICD-10-CM

## 2023-12-19 DIAGNOSIS — M50.30 DDD (DEGENERATIVE DISC DISEASE), CERVICAL: ICD-10-CM

## 2023-12-19 DIAGNOSIS — M19.019 ARTHRITIS PAIN OF SHOULDER: ICD-10-CM

## 2023-12-19 DIAGNOSIS — M48.061 SPINAL STENOSIS OF LUMBAR REGION WITHOUT NEUROGENIC CLAUDICATION: ICD-10-CM

## 2023-12-19 DIAGNOSIS — M25.562 BILATERAL CHRONIC KNEE PAIN: ICD-10-CM

## 2023-12-19 DIAGNOSIS — M51.36 DDD (DEGENERATIVE DISC DISEASE), LUMBAR: ICD-10-CM

## 2023-12-19 DIAGNOSIS — M25.561 BILATERAL CHRONIC KNEE PAIN: ICD-10-CM

## 2023-12-19 DIAGNOSIS — G89.29 BILATERAL CHRONIC KNEE PAIN: ICD-10-CM

## 2023-12-19 DIAGNOSIS — M05.79 RHEUMATOID ARTHRITIS INVOLVING MULTIPLE SITES WITH POSITIVE RHEUMATOID FACTOR (HCC): ICD-10-CM

## 2023-12-19 DIAGNOSIS — M50.121 CERVICAL DISC DISORDER AT C4-C5 LEVEL WITH RADICULOPATHY: ICD-10-CM

## 2023-12-19 NOTE — TELEPHONE ENCOUNTER
Pt called stating her granddaughter is sick and she is her transportation so she needed to change her appt. I asked if her she would be able to make her medication last ans she stated yes.

## 2023-12-27 RX ORDER — PSEUDOEPHED/ACETAMINOPH/DIPHEN 30MG-500MG
TABLET ORAL
Qty: 120 TABLET | Refills: 0 | OUTPATIENT
Start: 2023-12-27

## 2023-12-27 RX ORDER — LIDOCAINE 50 MG/G
PATCH TOPICAL
Qty: 30 PATCH | Refills: 0 | OUTPATIENT
Start: 2023-12-27

## 2023-12-30 DIAGNOSIS — J43.2 CENTRILOBULAR EMPHYSEMA (HCC): ICD-10-CM

## 2023-12-30 NOTE — TELEPHONE ENCOUNTER
Refill Request     CONFIRM preferred pharmacy with the patient. If Mail Order Rx - Pend for 90 day refill. Last Seen: Last Seen Department: 11/20/2023  Last Seen by PCP: 11/20/2023    Last Written: 4/3/23 18 each 10 refills    If no future appointment scheduled:  Review the last OV with PCP and review information for follow-up visit,  Route STAFF MESSAGE with patient name to the [facility] for scheduling with the following information:            -  Timing of next visit           -  Visit type ie Physical, OV, etc           -  Diagnoses/Reason ie. COPD, HTN - Do not use MEDICATION, Follow-up or CHECK UP - Give reason for visit      Next Appointment:   Future Appointments   Date Time Provider [address]   1/3/2024  4:40 PM JOSE Ozuna - CNP [facility]   1/9/2024  3:40 PM Jose Cruz Perez MD [facility]   5/21/2024  4:00 PM Yasemin Maya DO [facility]  [place] - DYERMELINDA       Message sent to [address] [place] [address] to schedule appt with patient?   NO      Requested Prescriptions     Pending Prescriptions Disp Refills    albuterol sulfate HFA (VENTOLIN HFA) 108 (90 Base) MCG/ACT inhaler [Pharmacy Med Name: VENTOLIN HFA 90 MCG INHALER] 18 each 10     Sig: INHALE TWO (2) PUFFS BY MOUTH EVERY 6 HOURS AS NEEDED

## 2023-12-31 RX ORDER — ALBUTEROL SULFATE 90 UG/1
AEROSOL, METERED RESPIRATORY (INHALATION)
Qty: 18 EACH | Refills: 10 | Status: SHIPPED | OUTPATIENT
Start: 2023-12-31

## 2024-01-09 ENCOUNTER — OFFICE VISIT (OUTPATIENT)
Dept: PULMONOLOGY | Age: 84
End: 2024-01-09
Payer: MEDICAID

## 2024-01-09 VITALS
HEIGHT: 66 IN | RESPIRATION RATE: 16 BRPM | OXYGEN SATURATION: 96 % | BODY MASS INDEX: 30.29 KG/M2 | SYSTOLIC BLOOD PRESSURE: 111 MMHG | HEART RATE: 76 BPM | DIASTOLIC BLOOD PRESSURE: 62 MMHG | WEIGHT: 188.5 LBS

## 2024-01-09 DIAGNOSIS — I27.20 PULMONARY HTN (HCC): ICD-10-CM

## 2024-01-09 DIAGNOSIS — M05.79 RHEUMATOID ARTHRITIS INVOLVING MULTIPLE SITES WITH POSITIVE RHEUMATOID FACTOR (HCC): ICD-10-CM

## 2024-01-09 DIAGNOSIS — R91.1 LUNG NODULE: ICD-10-CM

## 2024-01-09 DIAGNOSIS — Z87.891 FORMER SMOKER: ICD-10-CM

## 2024-01-09 DIAGNOSIS — G47.33 OSA (OBSTRUCTIVE SLEEP APNEA): ICD-10-CM

## 2024-01-09 DIAGNOSIS — J43.2 CENTRILOBULAR EMPHYSEMA (HCC): Primary | ICD-10-CM

## 2024-01-09 DIAGNOSIS — J96.11 CHRONIC RESPIRATORY FAILURE WITH HYPOXIA (HCC): ICD-10-CM

## 2024-01-09 PROCEDURE — 99214 OFFICE O/P EST MOD 30 MIN: CPT | Performed by: INTERNAL MEDICINE

## 2024-01-09 PROCEDURE — 3078F DIAST BP <80 MM HG: CPT | Performed by: INTERNAL MEDICINE

## 2024-01-09 PROCEDURE — 3074F SYST BP LT 130 MM HG: CPT | Performed by: INTERNAL MEDICINE

## 2024-01-09 PROCEDURE — 1123F ACP DISCUSS/DSCN MKR DOCD: CPT | Performed by: INTERNAL MEDICINE

## 2024-01-09 RX ORDER — TIOTROPIUM BROMIDE 18 UG/1
18 CAPSULE ORAL; RESPIRATORY (INHALATION) DAILY
COMMUNITY

## 2024-01-09 NOTE — PROGRESS NOTES
Chief Complaint/Referring Provider:  Pulmonary follow to discuss the clinical status and test results    Patient has come back to the office for a pulmonary follow-up, patient has had some cough, patient few weeks back had an episode of profound shortness of breath when she went back and forth from the bed to the bathroom and patient was gasping for breath and it took about 90 minutes for the patient to settle down within the does not nebulizer as per patient's family, patient has had some wheezing, patient does not have any significant sore throat or difficulty in swallowing, patient does not have any significant nausea or vomiting but patient has abdominal pain, as per patient family patient appears to have some increased gaseous pain along with that patient has constipation, patient does have some dyspnea on exertion, patient does have leg edema and patient takes water pill on a regular basis for that, patient has not been using her BiPAP because of facemask does not fit and the [employer] which is [employer] has not come out to home to change the mask for the BiPAP as per patient's family, patient does not have any confusion lethargy, no other pertinent review of system of concern      Previous [facility] virtual visit was done for the patient to review the clinical status and the CT findings, patient states that she was having some abdominal pains and cramps for which reason patient underwent CT of the abdomen and lower cuts shows patient to have lung nodule/mass like picture in the left lower lobe and for that reason a virtual visit was done, patient does not have any more than usual cough expectorant shortness of breath or wheezing, patient does have some increased coughing at nighttime, patient does not have any fever no chills, no pleuritic chest pain, no nocturnal sweats, patient's appetite is maintained, patient is gaining weight, patient does not have any epistaxis hemoptysis hematochezia or melena, no  Detail Level: Detailed Hide Additional Notes?: No Detail Level: Zone

## 2024-01-09 NOTE — PATIENT INSTRUCTIONS
Remember to bring a list of pulmonary medications and any CPAP or BiPAP machines to your next appointment with the office.     Please keep all of your future appointments scheduled by [facility] office. Out of respect for other patients and providers, you may be asked to reschedule your appointment if you arrive later than your scheduled appointment time. Appointments cancelled less than 24hrs in advance will be considered a no show. Patients with three missed appointments within 1 year or four missed appointments within 2 years can be dismissed from the practice.     Please be aware that our physicians are required to work in the Intensive Care Unit at [facility].  Your appointment may need to be rescheduled if they are designated to work during your appointment time.      You may receive a survey regarding the care you received during your visit.  Your input is valuable to us.  We encourage you to complete and return your survey.  We hope you will choose us in the future for your healthcare needs.     Pt instructed of all future appointment dates & times, including radiology, labs, procedures & referrals. If procedures were scheduled preparation instructions provided. Instructions on future appointments with [facility] were given.

## 2024-01-09 NOTE — PROGRESS NOTES
MA Communication:  The following orders are received by verbal communication from Gregor Chan MD    Orders include:    6 month

## 2024-01-17 DIAGNOSIS — E78.5 HYPERLIPIDEMIA, UNSPECIFIED HYPERLIPIDEMIA TYPE: ICD-10-CM

## 2024-01-18 RX ORDER — EZETIMIBE 10 MG/1
TABLET ORAL
Qty: 90 TABLET | Refills: 1 | Status: SHIPPED | OUTPATIENT
Start: 2024-01-18

## 2024-01-26 ENCOUNTER — HOSPITAL ENCOUNTER (OUTPATIENT)
Age: 84
Discharge: HOME | End: 2024-01-26
Payer: MEDICAID

## 2024-01-26 DIAGNOSIS — E03.9 ACQUIRED HYPOTHYROIDISM: ICD-10-CM

## 2024-01-26 DIAGNOSIS — R73.03 PREDIABETES: ICD-10-CM

## 2024-01-26 LAB
CHOLEST SERPL-MCNC: 152 MG/DL (ref 0–199)
HDLC SERPL-MCNC: 76 MG/DL (ref 40–60)
LDLC SERPL CALC-MCNC: 62 MG/DL
T3 SERPL-MCNC: 0.67 NG/ML (ref 0.8–2)
T4 FREE SERPL-MCNC: 1.4 NG/DL (ref 0.9–1.8)
TRIGL SERPL-MCNC: 68 MG/DL (ref 0–150)
TSH SERPL DL<=0.005 MIU/L-ACNC: 0.26 UIU/ML (ref 0.27–4.2)
VLDLC SERPL CALC-MCNC: 14 MG/DL

## 2024-01-26 PROCEDURE — 84480 ASSAY TRIIODOTHYRONINE (T3): CPT

## 2024-01-26 PROCEDURE — 36415 COLL VENOUS BLD VENIPUNCTURE: CPT

## 2024-01-26 PROCEDURE — 84439 ASSAY OF FREE THYROXINE: CPT

## 2024-01-26 PROCEDURE — 80061 LIPID PANEL: CPT

## 2024-01-26 PROCEDURE — 84443 ASSAY THYROID STIM HORMONE: CPT

## 2024-01-29 DIAGNOSIS — E03.9 ACQUIRED HYPOTHYROIDISM: Primary | ICD-10-CM

## 2024-01-29 RX ORDER — LEVOTHYROXINE SODIUM 0.05 MG/1
50 TABLET ORAL DAILY
Qty: 30 TABLET | Refills: 1 | Status: SHIPPED | OUTPATIENT
Start: 2024-01-29

## 2024-02-19 NOTE — OP NOTE
Percutaneous Endoscopic Gastrostomy Note    Patient:   Ramesh King   YOB: 1940   Facility:   Health system [Inpatient]   Referring/PCP: Lesvia Ching MD  Procedure:   Esophagogastroduodenoscopy with placement of a percutaneous endoscopic gastrostomy tube  Date:     4/1/2019   Endoscopist:  Victoria Walsh     Preoperative Diagnosis:  Feeding Difficulties    Postoperative Diagnosis:  Feeding Difficulties    Preprocedure medications: Clindamycin 900mg IV immediately prior to the procedure. Anesthesia:  Versed 7 mg IV, fentanyl 150 mcg IV    Estimated blood loss: Minimal    Complications: None    Description of Procedure:  Informed consent was obtained from the patient after explanation of the procedure including indications, description of the procedure,  benefits and possible risks and complications of the procedure, and alternatives. Questions were answered. The patient's history was reviewed and a directed physical examination was performed prior to the procedure. Patient recieved prophylactic antibiotics immediately prior to the start of the procedure and was monitored throughout the procedure with pulse oximetry and periodic assessment of vital signs. Patient was sedated as noted above. The Nursing staff and I performed a time out. With the patient in supine position with the head of the bed slightly elevated, Olympus  flexible endoscope was introduced and passed without difficulty. Visualization of the esophagus, stomach, and duodenum was performed and retroflexed view of the proximal stomach was obtained. The scope was passed to the 3rd portion of the duodenum. Esophagus: irregular z line (biopsies were obtained of the mid and proximal esophagus to screen for EE). Stomach: normal  Duodenum: normal    No contraindication to placement of the gastrostomy tube was appreciated.      The site for placement of the gastrostomy tube was identified with palpation and   transillumination of the abdomen. The abdomen, having been prepared, was draped in a sterile fashion. Local anesthesia was provided with  1.5 mL of 1% Xylocaine. A nick was made in the skin with a #11 scalpel blade. Angiocath was introduced through the anterior abdominal wall. The needle was withdrawn and an insertion wire introduced. This was grasped with a snare and withdrawn through the patient's mouth with withdrawal of the endoscope. A 20-Albanian Ponsky-type gastrostomy tube was affixed to the wire and traction applied to the later. The tube was delivered through the anterior abdominal wall and a bolster placed at 3.5 cm. The endoscope was not reintroduced to confirm appropriate placement of the PEG. Dry sterile dressing was applied and an abdominal binder was placed. Findings:  -Successful PEG tube placement. Recommendations:   - orders written  - OK to use PEG for fluids and medications today  - I will examine the PEG tomorrow am before having nutrition started.       Tonya Aguilera MD No

## 2024-02-26 ENCOUNTER — APPOINTMENT (OUTPATIENT)
Dept: GENERAL RADIOLOGY | Age: 84
End: 2024-02-26
Payer: MEDICAID

## 2024-02-26 ENCOUNTER — HOSPITAL ENCOUNTER (EMERGENCY)
Age: 84
Discharge: HOME | End: 2024-02-26
Attending: EMERGENCY MEDICINE
Payer: MEDICAID

## 2024-02-26 ENCOUNTER — APPOINTMENT (OUTPATIENT)
Dept: CT IMAGING | Age: 84
End: 2024-02-26
Payer: MEDICAID

## 2024-02-26 VITALS
RESPIRATION RATE: 16 BRPM | DIASTOLIC BLOOD PRESSURE: 85 MMHG | TEMPERATURE: 98.3 F | SYSTOLIC BLOOD PRESSURE: 136 MMHG | HEART RATE: 71 BPM | OXYGEN SATURATION: 98 %

## 2024-02-26 DIAGNOSIS — S09.90XA CLOSED HEAD INJURY, INITIAL ENCOUNTER: Primary | ICD-10-CM
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
## 2024-02-26 DIAGNOSIS — T07.XXXA CONTUSION OF MULTIPLE SITES: ICD-10-CM

## 2024-02-26 PROCEDURE — 72125 CT NECK SPINE W/O DYE: CPT

## 2024-02-26 PROCEDURE — 70450 CT HEAD/BRAIN W/O DYE: CPT

## 2024-02-26 PROCEDURE — 73060 X-RAY EXAM OF HUMERUS: CPT

## 2024-02-26 PROCEDURE — 6370000000 HC RX 637 (ALT 250 FOR IP): Performed by: EMERGENCY MEDICINE

## 2024-02-26 PROCEDURE — 99284 EMERGENCY DEPT VISIT MOD MDM: CPT

## 2024-02-26 PROCEDURE — 73630 X-RAY EXAM OF FOOT: CPT

## 2024-02-26 RX ORDER — ACETAMINOPHEN 500 MG
1000 TABLET ORAL ONCE
Status: COMPLETED | OUTPATIENT
Start: 2024-02-26 | End: 2024-02-26

## 2024-02-26 RX ADMIN — ACETAMINOPHEN 1000 MG: 500 TABLET ORAL at 04:32

## 2024-02-26 ASSESSMENT — PAIN DESCRIPTION - DESCRIPTORS
DESCRIPTORS: ACHING
DESCRIPTORS: DISCOMFORT

## 2024-02-26 ASSESSMENT — PAIN DESCRIPTION - ORIENTATION
ORIENTATION: POSTERIOR
ORIENTATION: LEFT;RIGHT;POSTERIOR

## 2024-02-26 ASSESSMENT — PAIN SCALES - GENERAL
PAINLEVEL_OUTOF10: 5
PAINLEVEL_OUTOF10: 5
PAINLEVEL_OUTOF10: 3

## 2024-02-26 ASSESSMENT — PAIN DESCRIPTION - PAIN TYPE: TYPE: ACUTE PAIN

## 2024-02-26 ASSESSMENT — PAIN DESCRIPTION - LOCATION
LOCATION: HEAD
LOCATION: HEAD;EYE
LOCATION: HEAD;ARM

## 2024-02-26 ASSESSMENT — PAIN - FUNCTIONAL ASSESSMENT
PAIN_FUNCTIONAL_ASSESSMENT: ACTIVITIES ARE NOT PREVENTED
PAIN_FUNCTIONAL_ASSESSMENT: 0-10

## 2024-02-26 NOTE — ED NOTES
Patient returned from CT scan. Grand-daughter and Grandson at bedside. Provided pillow. No further needs at this time.Call light in reach and rails up x2. Dimmed lights for comfort.

## 2024-02-26 NOTE — ED PROVIDER NOTES
EMERGENCY DEPARTMENT ENCOUNTER     [facility]  ED     Pt Name: Bettie Strauss   MRN: [id number]   Birthdate 1940   Date of evaluation: 2/26/2024   Provider: Citlalli Cordon MD   PCP: Carlos Eduardo Maya DO   Note Started: 4:19 AM EST 2/26/24     CHIEF COMPLAINT     Chief Complaint   Patient presents with    Fall     Patient fell walking to bathroom hit head and left arm. Patient takes blood thinners.         HISTORY OF PRESENT ILLNESS:          Bettie Strauss is a 83 y.o. female who presents with a chief complaint of left arm pain. She fell as she was getting out of the bathroom. Mild headache. No vision or speech changes. Left arm pain is also present.     Nursing Notes were all reviewed and agreed with or any disagreements were addressed in the HPI.     ROS: Positives and Pertinent negatives as per HPI.    PAST MEDICAL HISTORY     Past medical history:  has a past medical history of Acid reflux, Acquired hypothyroidism (07/06/2017), Anemia, Asthma, CHF (congestive heart failure) ([facility]), COPD (chronic obstructive pulmonary disease) ([facility]), HLD (hyperlipidemia), Hypertension, Influenza A (01/22/2018), MI (myocardial infarction) ([facility]), Migraine, HEBERT on CPAP, Rheumatoid arthritis, and Wears glasses.    Past surgical history:  has a past surgical history that includes Hysterectomy; knee surgery; Foot surgery; Wrist surgery; Cataract removal with implant (Bilateral, 03/23/2011); pr njx dx/ther sbst intrlmnr crv/thrc w/img gdn (Left, 8/21/2018); epidural steroid injection (Left, 12/4/2018); bronchoscopy (N/A, 3/27/2019); bronchoscopy (3/27/2019); Upper gastrointestinal endoscopy (N/A, 3/27/2019); Gastrostomy tube placement (N/A, 4/1/2019); Colonoscopy (N/A, 6/12/2019); esophageal motility study (N/A, 7/23/2019); Upper gastrointestinal endoscopy (N/A, 9/6/2020); and shoulder surgery (Right, 6/6/2023).      PHYSICAL EXAM:  ED Triage Vitals [02/26/24 0411]   BP Temp Temp Source Pulse

## 2024-02-27 ENCOUNTER — TELEPHONE (OUTPATIENT)
Dept: FAMILY MEDICINE CLINIC | Age: 84
End: 2024-02-27

## 2024-02-27 NOTE — TELEPHONE ENCOUNTER
ED Follow Up Call/ Schedule appt   ED: [facility]  Reason: Closed head injury  Date:2/26/24    Appt scheduled: 2/29/24 with PCP      Comments: pt is already scheduled for an ED F/U    Future Appointments   Date Time Provider Department Center   2/29/2024  4:15 PM Carlos Eduardo Maya DO [facility] [place] [facility]   5/21/2024  4:00 PM Carlos Eduardo Maya DO [facility] [place] [facility]   7/19/2024  8:20 AM Gregor Chan MD [facility]

## 2024-02-29 ENCOUNTER — OFFICE VISIT (OUTPATIENT)
Dept: FAMILY MEDICINE CLINIC | Age: 84
End: 2024-02-29
Payer: MEDICAID

## 2024-02-29 VITALS
BODY MASS INDEX: 30.34 KG/M2 | WEIGHT: 188 LBS | DIASTOLIC BLOOD PRESSURE: 78 MMHG | SYSTOLIC BLOOD PRESSURE: 120 MMHG | HEART RATE: 55 BPM | OXYGEN SATURATION: 95 %

## 2024-02-29 DIAGNOSIS — M79.602 LEFT ARM PAIN: ICD-10-CM

## 2024-02-29 DIAGNOSIS — R29.6 RECURRENT FALLS: Primary | ICD-10-CM

## 2024-02-29 PROCEDURE — 99213 OFFICE O/P EST LOW 20 MIN: CPT | Performed by: STUDENT IN AN ORGANIZED HEALTH CARE EDUCATION/TRAINING PROGRAM

## 2024-02-29 PROCEDURE — 1123F ACP DISCUSS/DSCN MKR DOCD: CPT | Performed by: STUDENT IN AN ORGANIZED HEALTH CARE EDUCATION/TRAINING PROGRAM

## 2024-02-29 PROCEDURE — 3074F SYST BP LT 130 MM HG: CPT | Performed by: STUDENT IN AN ORGANIZED HEALTH CARE EDUCATION/TRAINING PROGRAM

## 2024-02-29 PROCEDURE — 3078F DIAST BP <80 MM HG: CPT | Performed by: STUDENT IN AN ORGANIZED HEALTH CARE EDUCATION/TRAINING PROGRAM

## 2024-02-29 NOTE — PROGRESS NOTES
Units by Does not apply route daily 1 each 0    ipratropium-albuterol (DUONEB) 0.5-2.5 (3) MG/3ML SOLN nebulizer solution Inhale 3 mLs into the lungs every 4 hours as needed. 360 mL 3    nitroGLYCERIN (NITROSTAT) 0.4 MG SL tablet Place 1 tablet under the tongue every 5 minutes as needed      acetaminophen (TYLENOL) 500 MG tablet Take 1 tablet by mouth every 12 hours as needed for Pain (take for breakthrough pain) 120 tablet 0     No current facility-administered medications for this visit.       Patient's past medical history, surgical history, family history, medications,  andallergies  were all reviewed and updated as appropriate today.      Review of Systems  All other systems reviewed and negative    Physical Exam  Skin:            Comments: Area of significant bruuising with tenderness to palpation       Vitals:    02/29/24 1635   BP: 120/78   Pulse: 55   SpO2: 95%       Please note that portions of this note may have been completed with a voice recognition program. Efforts were made to edit the dictations but occasionally words are mis-transcribed.

## 2024-03-13 NOTE — TELEPHONE ENCOUNTER
.Refill Request     CONFIRM preferred pharmacy with the patient.    If Mail Order Rx - Pend for 90 day refill.      Last Seen: Last Seen Department: 2/29/2024  Last Seen by PCP: 2/29/2024    Last Written: 9-25-23 180 with 1     If no future appointment scheduled:  Review the last OV with PCP and review information for follow-up visit,  Route STAFF MESSAGE with patient name to the  Pool for scheduling with the following information:            -  Timing of next visit           -  Visit type ie Physical, OV, etc           -  Diagnoses/Reason ie. COPD, HTN - Do not use MEDICATION, Follow-up or CHECK UP - Give reason for visit      Next Appointment:   Future Appointments   Date Time Provider Department Center   4/19/2024  8:15 AM Wilder Birmingham MD [facility]   5/21/2024  4:00 PM Carlos Eduardo Maya DO [facility]  [place] [facility]   7/19/2024  8:20 AM Gregor Chan MD [facility]       Message sent to  to schedule appt with patient?  NO      Requested Prescriptions     Pending Prescriptions Disp Refills    spironolactone (ALDACTONE) 25 MG tablet [Pharmacy Med Name: SPIRONOLACTONE 25 MG TABLET] 180 tablet 1     Sig: TAKE 1 TABLET BY MOUTH TWICE A DAY

## 2024-03-14 ENCOUNTER — APPOINTMENT (OUTPATIENT)
Dept: GENERAL RADIOLOGY | Age: 84
End: 2024-03-14
Payer: MEDICAID

## 2024-03-14 ENCOUNTER — HOSPITAL ENCOUNTER (EMERGENCY)
Age: 84
Discharge: HOME | End: 2024-03-14
Attending: EMERGENCY MEDICINE
Payer: MEDICAID

## 2024-03-14 VITALS
TEMPERATURE: 97.5 F | SYSTOLIC BLOOD PRESSURE: 157 MMHG | BODY MASS INDEX: 29.05 KG/M2 | RESPIRATION RATE: 16 BRPM | HEART RATE: 76 BPM | DIASTOLIC BLOOD PRESSURE: 64 MMHG | WEIGHT: 180 LBS | OXYGEN SATURATION: 96 %

## 2024-03-14 DIAGNOSIS — U07.1 COVID-19: Primary | ICD-10-CM

## 2024-03-14 LAB
ALBUMIN SERPL-MCNC: 4.2 G/DL (ref 3.4–5)
ALBUMIN/GLOB SERPL: 1.7 {RATIO} (ref 1.1–2.2)
ALP SERPL-CCNC: 42 U/L (ref 40–129)
ALT SERPL-CCNC: 16 U/L (ref 10–40)
ANION GAP SERPL CALCULATED.3IONS-SCNC: 9 MMOL/L (ref 3–16)
AST SERPL-CCNC: 30 U/L (ref 15–37)
BASOPHILS # BLD: 0 K/UL (ref 0–0.2)
BASOPHILS NFR BLD: 0.4 %
BILIRUB SERPL-MCNC: 0.6 MG/DL (ref 0–1)
BUN SERPL-MCNC: 44 MG/DL (ref 7–20)
CALCIUM SERPL-MCNC: 10 MG/DL (ref 8.3–10.6)
CHLORIDE SERPL-SCNC: 98 MMOL/L (ref 99–110)
CO2 SERPL-SCNC: 31 MMOL/L (ref 21–32)
CREAT SERPL-MCNC: 1.6 MG/DL (ref 0.6–1.2)
DEPRECATED RDW RBC AUTO: 16.8 % (ref 12.4–15.4)
EOSINOPHIL # BLD: 0.1 K/UL (ref 0–0.6)
EOSINOPHIL NFR BLD: 0.9 %
FLUAV RNA RESP QL NAA+PROBE: NOT DETECTED
FLUBV RNA RESP QL NAA+PROBE: NOT DETECTED
GFR SERPLBLD CREATININE-BSD FMLA CKD-EPI: 32 ML/MIN/{1.73_M2}
GLUCOSE SERPL-MCNC: 100 MG/DL (ref 70–99)
HCT VFR BLD AUTO: 33.1 % (ref 36–48)
HGB BLD-MCNC: 10.8 G/DL (ref 12–16)
LACTATE BLDV-SCNC: 0.9 MMOL/L (ref 0.4–2)
LYMPHOCYTES # BLD: 1.1 K/UL (ref 1–5.1)
LYMPHOCYTES NFR BLD: 11.9 %
MCH RBC QN AUTO: 29.6 PG (ref 26–34)
MCHC RBC AUTO-ENTMCNC: 32.6 G/DL (ref 31–36)
MCV RBC AUTO: 90.8 FL (ref 80–100)
MONOCYTES # BLD: 0.9 K/UL (ref 0–1.3)
MONOCYTES NFR BLD: 10.1 %
NEUTROPHILS # BLD: 7.1 K/UL (ref 1.7–7.7)
NEUTROPHILS NFR BLD: 76.7 %
PLATELET # BLD AUTO: 201 K/UL (ref 135–450)
PMV BLD AUTO: 7.8 FL (ref 5–10.5)
POTASSIUM SERPL-SCNC: 4.2 MMOL/L (ref 3.5–5.1)
PROT SERPL-MCNC: 6.7 G/DL (ref 6.4–8.2)
RBC # BLD AUTO: 3.65 M/UL (ref 4–5.2)
SARS-COV-2 RNA RESP QL NAA+PROBE: DETECTED
SODIUM SERPL-SCNC: 138 MMOL/L (ref 136–145)
TROPONIN, HIGH SENSITIVITY: 84 NG/L (ref 0–14)
TROPONIN, HIGH SENSITIVITY: 85 NG/L (ref 0–14)
WBC # BLD AUTO: 9.2 K/UL (ref 4–11)

## 2024-03-14 PROCEDURE — 71045 X-RAY EXAM CHEST 1 VIEW: CPT

## 2024-03-14 PROCEDURE — 99285 EMERGENCY DEPT VISIT HI MDM: CPT

## 2024-03-14 PROCEDURE — 85025 COMPLETE CBC W/AUTO DIFF WBC: CPT

## 2024-03-14 PROCEDURE — 84484 ASSAY OF TROPONIN QUANT: CPT

## 2024-03-14 PROCEDURE — 93005 ELECTROCARDIOGRAM TRACING: CPT | Performed by: EMERGENCY MEDICINE

## 2024-03-14 PROCEDURE — 80053 COMPREHEN METABOLIC PANEL: CPT

## 2024-03-14 PROCEDURE — 83605 ASSAY OF LACTIC ACID: CPT

## 2024-03-14 PROCEDURE — 87636 SARSCOV2 & INF A&B AMP PRB: CPT

## 2024-03-14 RX ORDER — SPIRONOLACTONE 25 MG/1
TABLET ORAL
Qty: 180 TABLET | Refills: 1 | Status: SHIPPED | OUTPATIENT
Start: 2024-03-14

## 2024-03-14 RX ORDER — IPRATROPIUM BROMIDE AND ALBUTEROL SULFATE 2.5; .5 MG/3ML; MG/3ML
1 SOLUTION RESPIRATORY (INHALATION) EVERY 4 HOURS
Qty: 360 ML | Refills: 0 | Status: SHIPPED | OUTPATIENT
Start: 2024-03-14

## 2024-03-14 ASSESSMENT — ENCOUNTER SYMPTOMS
STRIDOR: 0
FACIAL SWELLING: 0
VOICE CHANGE: 0
COLOR CHANGE: 0
COUGH: 1
VOMITING: 0
NAUSEA: 0
SHORTNESS OF BREATH: 1
ABDOMINAL PAIN: 0
WHEEZING: 0
TROUBLE SWALLOWING: 0

## 2024-03-14 ASSESSMENT — PAIN SCALES - GENERAL: PAINLEVEL_OUTOF10: 0

## 2024-03-14 ASSESSMENT — PAIN - FUNCTIONAL ASSESSMENT: PAIN_FUNCTIONAL_ASSESSMENT: 0-10

## 2024-03-14 NOTE — DISCHARGE INSTRUCTIONS
Please remember to hold your atorvastatin (LIPITOR) for the 5 days that you take Paxlovid and also remember that this will make your blood slightly more thing by taking Eliquis with this medication however it is controversial whether you should hold the dose of Eliquis.  Please watch her oxygen saturation closely and if your oxygen saturation goes below 88% please come back to the emergency department.

## 2024-03-14 NOTE — ED NOTES
Ambulated pt 50yards. Stat stayed in high 89% o2, HR 93. Pt stated shewas progressively getting sob the longer she walked. Pt tolerated well.

## 2024-03-14 NOTE — ED PROVIDER NOTES
breath sounds. No wheezing.      Comments: No audible wheezing.  Mild prolonged respiratory phase bilaterally.  Patient speaking in full sentences with no joel respiratory distress.  Abdominal:      General: There is no distension.      Palpations: Abdomen is soft.      Tenderness: There is no abdominal tenderness. There is no guarding or rebound.   Musculoskeletal:         General: No tenderness or deformity. Normal range of motion.      Cervical back: Neck supple.      Comments: No lower extremity swelling, tenderness, or palpable cord. Negative Adriana test bilaterally.   Skin:     General: Skin is warm and dry.   Neurological:      Mental Status: She is alert.      Cranial Nerves: No cranial nerve deficit.         DIAGNOSTIC RESULTS     The Ekg interpreted by me shows  sinus arrhythmia with a rate of 77  Axis is   Normal  QTc is   436ms  Intervals and Durations are unremarkable.      ST Segments: no acute change  No significant change from prior EKG dated 7/15/22      RADIOLOGY:     Interpretation per the Radiologist below, if available at the time of this note:    XR CHEST PORTABLE   Final Result   Mild bibasilar atelectasis or airspace disease, left greater than right.               LABS:  Labs Reviewed   COVID-19 & INFLUENZA COMBO - Abnormal; Notable for the following components:       Result Value    SARS-CoV-2 RNA, RT PCR DETECTED (*)     All other components within normal limits   CBC WITH AUTO DIFFERENTIAL - Abnormal; Notable for the following components:    RBC 3.65 (*)     Hemoglobin 10.8 (*)     Hematocrit 33.1 (*)     RDW 16.8 (*)     All other components within normal limits   COMPREHENSIVE METABOLIC PANEL - Abnormal; Notable for the following components:    Chloride 98 (*)     Glucose 100 (*)     BUN 44 (*)     Creatinine 1.6 (*)     Est, Glom Filt Rate 32 (*)     All other components within normal limits   TROPONIN - Abnormal; Notable for the following components:    Troponin, High Sensitivity 85

## 2024-03-15 ENCOUNTER — TELEPHONE (OUTPATIENT)
Dept: FAMILY MEDICINE CLINIC | Age: 84
End: 2024-03-15

## 2024-03-15 LAB
EKG ATRIAL RATE: 77 BPM
EKG DIAGNOSIS: NORMAL
EKG P AXIS: 52 DEGREES
EKG P-R INTERVAL: 146 MS
EKG Q-T INTERVAL: 386 MS
EKG QRS DURATION: 84 MS
EKG QTC CALCULATION (BAZETT): 436 MS
EKG R AXIS: 31 DEGREES
EKG T AXIS: 43 DEGREES
EKG VENTRICULAR RATE: 77 BPM

## 2024-03-15 PROCEDURE — 93010 ELECTROCARDIOGRAM REPORT: CPT | Performed by: INTERNAL MEDICINE

## 2024-03-15 NOTE — TELEPHONE ENCOUNTER
Received NT call from gregory Giron.    Granddaughter reports patient was in ED yesterday and dx with covid.  Granddaughter reports every time patient coughs she is screaming out in pain due to hip and back pain from recent fall, stating patient states it feels like her hip is dislocated.  Granddaughter reports O2 Sats at rest are 89 but when patient coughs drops to 70.    Spoke with Benita Baldwin CNP.  Patient needs to go back to ED due to low O2 Sats and to possibly have additional imaging of back and hip.    Granddaughter notified.  Granddaughter stated understanding.

## 2024-03-21 ENCOUNTER — APPOINTMENT (OUTPATIENT)
Dept: VASCULAR LAB | Age: 84
End: 2024-03-21
Payer: MEDICAID

## 2024-03-21 ENCOUNTER — TELEPHONE (OUTPATIENT)
Dept: CARDIOLOGY CLINIC | Age: 84
End: 2024-03-21

## 2024-03-21 ENCOUNTER — APPOINTMENT (OUTPATIENT)
Dept: CT IMAGING | Age: 84
End: 2024-03-21
Payer: MEDICAID

## 2024-03-21 ENCOUNTER — APPOINTMENT (OUTPATIENT)
Dept: GENERAL RADIOLOGY | Age: 84
End: 2024-03-21
Payer: MEDICAID

## 2024-03-21 ENCOUNTER — HOSPITAL ENCOUNTER (EMERGENCY)
Age: 84
Discharge: HOME | End: 2024-03-21
Attending: EMERGENCY MEDICINE
Payer: MEDICAID

## 2024-03-21 ENCOUNTER — TELEPHONE (OUTPATIENT)
Dept: FAMILY MEDICINE CLINIC | Age: 84
End: 2024-03-21

## 2024-03-21 VITALS
BODY MASS INDEX: 30.99 KG/M2 | DIASTOLIC BLOOD PRESSURE: 70 MMHG | TEMPERATURE: 98.2 F | RESPIRATION RATE: 14 BRPM | WEIGHT: 192 LBS | HEART RATE: 74 BPM | SYSTOLIC BLOOD PRESSURE: 133 MMHG | OXYGEN SATURATION: 99 %

## 2024-03-21 DIAGNOSIS — E03.9 ACQUIRED HYPOTHYROIDISM: ICD-10-CM

## 2024-03-21 DIAGNOSIS — R91.1 PULMONARY NODULE: ICD-10-CM

## 2024-03-21 DIAGNOSIS — R07.89 OTHER CHEST PAIN: ICD-10-CM

## 2024-03-21 DIAGNOSIS — N18.9 CHRONIC KIDNEY DISEASE, UNSPECIFIED CKD STAGE: ICD-10-CM

## 2024-03-21 DIAGNOSIS — R79.89 ELEVATED TROPONIN: ICD-10-CM

## 2024-03-21 DIAGNOSIS — R79.89 ELEVATED TROPONIN: Primary | ICD-10-CM

## 2024-03-21 DIAGNOSIS — R60.0 BILATERAL LEG EDEMA: Primary | ICD-10-CM

## 2024-03-21 DIAGNOSIS — J96.11 CHRONIC RESPIRATORY FAILURE WITH HYPOXIA (HCC): ICD-10-CM

## 2024-03-21 LAB
ALBUMIN SERPL-MCNC: 4.1 G/DL (ref 3.4–5)
ALBUMIN/GLOB SERPL: 1.6 {RATIO} (ref 1.1–2.2)
ALP SERPL-CCNC: 47 U/L (ref 40–129)
ALT SERPL-CCNC: 24 U/L (ref 10–40)
ANION GAP SERPL CALCULATED.3IONS-SCNC: 10 MMOL/L (ref 3–16)
AST SERPL-CCNC: 25 U/L (ref 15–37)
BASOPHILS # BLD: 0 K/UL (ref 0–0.2)
BASOPHILS NFR BLD: 0.2 %
BILIRUB SERPL-MCNC: 0.8 MG/DL (ref 0–1)
BUN SERPL-MCNC: 29 MG/DL (ref 7–20)
CALCIUM SERPL-MCNC: 9.7 MG/DL (ref 8.3–10.6)
CHLORIDE SERPL-SCNC: 99 MMOL/L (ref 99–110)
CO2 SERPL-SCNC: 31 MMOL/L (ref 21–32)
CREAT SERPL-MCNC: 1.3 MG/DL (ref 0.6–1.2)
DEPRECATED RDW RBC AUTO: 16.9 % (ref 12.4–15.4)
EKG ATRIAL RATE: 76 BPM
EKG DIAGNOSIS: NORMAL
EKG P AXIS: 59 DEGREES
EKG P-R INTERVAL: 142 MS
EKG Q-T INTERVAL: 384 MS
EKG QRS DURATION: 84 MS
EKG QTC CALCULATION (BAZETT): 432 MS
EKG R AXIS: 33 DEGREES
EKG T AXIS: 48 DEGREES
EKG VENTRICULAR RATE: 76 BPM
EOSINOPHIL # BLD: 0.1 K/UL (ref 0–0.6)
EOSINOPHIL NFR BLD: 0.7 %
GFR SERPLBLD CREATININE-BSD FMLA CKD-EPI: 41 ML/MIN/{1.73_M2}
GLUCOSE SERPL-MCNC: 89 MG/DL (ref 70–99)
HCT VFR BLD AUTO: 31.8 % (ref 36–48)
HGB BLD-MCNC: 10.4 G/DL (ref 12–16)
LYMPHOCYTES # BLD: 1.4 K/UL (ref 1–5.1)
LYMPHOCYTES NFR BLD: 13.3 %
MAGNESIUM SERPL-MCNC: 1.8 MG/DL (ref 1.8–2.4)
MCH RBC QN AUTO: 29.5 PG (ref 26–34)
MCHC RBC AUTO-ENTMCNC: 32.7 G/DL (ref 31–36)
MCV RBC AUTO: 90.3 FL (ref 80–100)
MONOCYTES # BLD: 1.1 K/UL (ref 0–1.3)
MONOCYTES NFR BLD: 10.1 %
NEUTROPHILS # BLD: 8.1 K/UL (ref 1.7–7.7)
NEUTROPHILS NFR BLD: 75.7 %
NT-PROBNP SERPL-MCNC: 139 PG/ML (ref 0–449)
PLATELET # BLD AUTO: 213 K/UL (ref 135–450)
PMV BLD AUTO: 7.7 FL (ref 5–10.5)
POTASSIUM SERPL-SCNC: 4.1 MMOL/L (ref 3.5–5.1)
PROT SERPL-MCNC: 6.6 G/DL (ref 6.4–8.2)
RBC # BLD AUTO: 3.53 M/UL (ref 4–5.2)
SODIUM SERPL-SCNC: 140 MMOL/L (ref 136–145)
TROPONIN, HIGH SENSITIVITY: 138 NG/L (ref 0–14)
TROPONIN, HIGH SENSITIVITY: 138 NG/L (ref 0–14)
WBC # BLD AUTO: 10.6 K/UL (ref 4–11)

## 2024-03-21 PROCEDURE — 6370000000 HC RX 637 (ALT 250 FOR IP): Performed by: PHYSICIAN ASSISTANT

## 2024-03-21 PROCEDURE — 73502 X-RAY EXAM HIP UNI 2-3 VIEWS: CPT

## 2024-03-21 PROCEDURE — 99285 EMERGENCY DEPT VISIT HI MDM: CPT

## 2024-03-21 PROCEDURE — 84484 ASSAY OF TROPONIN QUANT: CPT

## 2024-03-21 PROCEDURE — 85025 COMPLETE CBC W/AUTO DIFF WBC: CPT

## 2024-03-21 PROCEDURE — 83880 ASSAY OF NATRIURETIC PEPTIDE: CPT

## 2024-03-21 PROCEDURE — 83735 ASSAY OF MAGNESIUM: CPT

## 2024-03-21 PROCEDURE — 96374 THER/PROPH/DIAG INJ IV PUSH: CPT

## 2024-03-21 PROCEDURE — 6360000002 HC RX W HCPCS: Performed by: EMERGENCY MEDICINE

## 2024-03-21 PROCEDURE — 93005 ELECTROCARDIOGRAM TRACING: CPT | Performed by: PHYSICIAN ASSISTANT

## 2024-03-21 PROCEDURE — 74176 CT ABD & PELVIS W/O CONTRAST: CPT

## 2024-03-21 PROCEDURE — 93010 ELECTROCARDIOGRAM REPORT: CPT | Performed by: INTERNAL MEDICINE

## 2024-03-21 PROCEDURE — 80053 COMPREHEN METABOLIC PANEL: CPT

## 2024-03-21 PROCEDURE — 71045 X-RAY EXAM CHEST 1 VIEW: CPT

## 2024-03-21 PROCEDURE — 93970 EXTREMITY STUDY: CPT

## 2024-03-21 RX ORDER — ACETAMINOPHEN 325 MG/1
650 TABLET ORAL ONCE
Status: COMPLETED | OUTPATIENT
Start: 2024-03-21 | End: 2024-03-21

## 2024-03-21 RX ORDER — FUROSEMIDE 10 MG/ML
20 INJECTION INTRAMUSCULAR; INTRAVENOUS ONCE
Status: COMPLETED | OUTPATIENT
Start: 2024-03-21 | End: 2024-03-21

## 2024-03-21 RX ADMIN — ACETAMINOPHEN 650 MG: 325 TABLET ORAL at 11:09

## 2024-03-21 RX ADMIN — FUROSEMIDE 20 MG: 10 INJECTION, SOLUTION INTRAMUSCULAR; INTRAVENOUS at 15:40

## 2024-03-21 ASSESSMENT — PAIN - FUNCTIONAL ASSESSMENT: PAIN_FUNCTIONAL_ASSESSMENT: 0-10

## 2024-03-21 ASSESSMENT — PAIN DESCRIPTION - LOCATION: LOCATION: LEG

## 2024-03-21 ASSESSMENT — PAIN DESCRIPTION - ORIENTATION: ORIENTATION: LEFT

## 2024-03-21 ASSESSMENT — PAIN SCALES - GENERAL
PAINLEVEL_OUTOF10: 8
PAINLEVEL_OUTOF10: 8

## 2024-03-21 NOTE — ED PROVIDER NOTES
THIS IS MY ERMA SUPERVISORY AND SHARED VISIT NOTE:    I personally saw the patient and made/approved the management plan and take responsibility for the patient management.      I independently performed a history and physical on Bettie Strauss.   All diagnostic, treatment, and disposition decisions were made by myself in conjunction with the advanced practice provider. I personally saw the patient and performed a substantive portion of the visit including all aspects of the medical decision making.      For further details of Bettie Strauss's emergency department encounter, please see CHRISSY Gray's documentation.    History: Patient is an 84-year-old female presenting today due to concern for bilateral leg swelling with left leg being worse than right to the associated with some mild discomfort in the left leg.  She is chronically short of breath but nothing out of the ordinary.  She denies any chest pain.  No fever.  She does complain of some left hip pain that radiates to the left lower back along with some left lower chest/upper abdominal pain.  She did fall a few weeks ago and has had worsening issues with her left hip ever since.  She denies any headache or neck pain.  She does have a history of blood clots but does take Eliquis as prescribed.  She normally wears 3 L nasal cannula at baseline and is currently wearing this.      Physical exam:   Gen: No acute distress.  Alert and answering questions appropriately.  Psych: Normal mood and affect  HEENT: NCAT  Neck: supple  Cardiac: RRR, pulses 2+ in lower extremities with dorsalis pedis pulses 2+ bilaterally, 2+ pitting edema to left lower extremity 1+ pitting edema to right lower extremity  Lungs: No respiratory distress, scattered wheezing and rhonchi noted throughout lung fields bilaterally  Abdomen: soft and nontender with no R/D/G  Neuro: Normal ankle dorsiflexion/plantarflexion bilaterally, GCS equals 15      The Ekg interpreted by me

## 2024-03-21 NOTE — TELEPHONE ENCOUNTER
Patient: Bettie Strauss Patient : 1940      Patient call back number- [phone]     Spoke with: Libia (on HIPAA)    Symptom(s) patient is experiencing- bilateral leg swelling, left leg from hip to feet and right left hip to knee    Symptom onset has been acute for a time period of 1  week(s). Severity is described as moderate to severe. Course of her symptoms over time is worsening.      Does anything make the symptom(s) better or worse?- no    Have you experienced this before?-no    Any pertinent medical history? Fell back on 24, COVID + 3/15/24      Have you taken anything (prescriptions or OTC) to help with symptom(s)?- no      Is patient having pain?  Yes   Location of the pain-  BLE's  Describe the pain (sharp, stabbing, aching, burning, dull, etc)-- gnawing  Constant or intermittent- waxing and waning  Rate pain on a scale of 1 to 10: 7  Does is it radiate to other areas or just in one spot?- nonradiating      Call Outcome/Determination of next steps for patient- Patient Instructed to go to ED as advised by Dr. Maya    Advised to call back directly if there are further questions, or if these symptoms fail to improve as anticipated or worsen.      Plan of Care reviewed and discussed with PCP: Yes       Electronically signed by Gini Grossman MA on 3/21/2024 at 8:43 AM

## 2024-03-21 NOTE — ED PROVIDER NOTES
[facility]  ED  EMERGENCY DEPARTMENT ENCOUNTER        Pt Name: Bettie Strauss  MRN: [id number]  Birthdate 1940  Date of evaluation: 3/21/2024  Provider: MONIKA TAI PA-C  PCP: Carlos Eduardo Maya DO  ED Attending: Jamie Harding MD       I have seen and evaluated this patient with my supervising physician Jamie Harding MD.    CHIEF COMPLAINT:     Chief Complaint   Patient presents with    Leg Swelling     Fell a few weeks ago, left leg feels worse, left hip and lower back hurting also, home oxygen at 3L       HISTORY OF PRESENT ILLNESS:      History provided by the patient. No limitations.    Bettie Strauss is a 84 y.o. female who arrives to the ED by private vehicle.  Patient states she was sent in by PCP.  Patient has been complaining of lower extremity swelling that has waxed and waned in severity.  Sometimes her right leg is more swollen than the left and sometimes the left leg is more swollen.  She wears compression stockings to her knees.  Today, her left leg is more swollen than the right.  Additionally, she reports ongoing left hip pain from a fall that occurred in late February 2024.  Patient was evaluated in the ED at the time of that fall but did not have imaging of her hip done at that time.  The patient denies any chest pain at this point.  She has chronic shortness of breath from COPD that has her dependent on 3 L nasal cannula oxygen.  She denies any worsening shortness of breath.  She denies any coughing and she has not feeling ill otherwise.  Again, her chief complaints are intermittent lower extremity swelling and left hip pain from a fall 3-1/2 weeks ago.    Nursing Notes were reviewed     REVIEW OF SYSTEMS:     Review of Systems  Positives and pertinent negatives as per HPI.      PAST MEDICAL HISTORY:     Past Medical History:   Diagnosis Date    Acid reflux     Acquired hypothyroidism 07/06/2017    Anemia     Asthma     CHF (congestive heart failure)

## 2024-03-21 NOTE — TELEPHONE ENCOUNTER
Refill Request     CONFIRM preferred pharmacy with the patient.    If Mail Order Rx - Pend for 90 day refill.      Last Seen: Last Seen Department: 2/29/2024  Last Seen by PCP: 10/16/2023    Last Written: 1/29/24 30 with 1 refill     If no future appointment scheduled:  Review the last OV with PCP and review information for follow-up visit,  Route STAFF MESSAGE with patient name to the  Pool for scheduling with the following information:            -  Timing of next visit           -  Visit type ie Physical, OV, etc           -  Diagnoses/Reason ie. COPD, HTN - Do not use MEDICATION, Follow-up or CHECK UP - Give reason for visit      Next Appointment:   Future Appointments   Date Time Provider Department Center   4/19/2024  8:15 AM Wilder Birmingham MD [facility]   5/21/2024  4:00 PM Carlos Eduardo Maya DO [facility]  [place] [facility]   7/19/2024  8:20 AM Gregor Chan MD [facility]       Message sent to  to schedule appt with patient?  NO      Requested Prescriptions     Pending Prescriptions Disp Refills    levothyroxine (SYNTHROID) 50 MCG tablet [Pharmacy Med Name: LEVOTHYROXINE 50 MCG TABLET] 30 tablet 1     Sig: TAKE 1 TABLET BY MOUTH EVERY DAY

## 2024-03-21 NOTE — TELEPHONE ENCOUNTER
Pts granddaughter Libia Carlisle stated that pt is currently in the ER, they would like to admit pt but pt does not want to be admitted. Pt originally went to er on 2/26/24 after she fell. Pt went to the er on 3/21 due to leg swelling and hip pain. While at the ER they did an EKG and lab work. They advised pt that her Troponin is elevated. Pt would like [facility] to review her EKG and labs. Pt is not having any chest pain and no increase in sob. Please advise.

## 2024-03-22 ENCOUNTER — CARE COORDINATION (OUTPATIENT)
Dept: CARE COORDINATION | Age: 84
End: 2024-03-22

## 2024-03-22 ENCOUNTER — TELEPHONE (OUTPATIENT)
Dept: FAMILY MEDICINE CLINIC | Age: 84
End: 2024-03-22

## 2024-03-22 NOTE — TELEPHONE ENCOUNTER
Called and spoke with pt asia,  pt left the hosp and did not decide to be admitted. Pt would like [facility] to call upon return

## 2024-03-22 NOTE — TELEPHONE ENCOUNTER
ED Follow Up Call/ Schedule appt   ED: Hen[facility] Avelar  Reason: BLE, Elevated Troponin  Date: 3/21/24    Appt scheduled: 3/26/24      Comments: Appt scheduled with Dr. Maya. Patient will monitor weight daily and bring that with her the day of the appt.     Future Appointments   Date Time Provider Department Center   3/26/2024  2:30 PM Carlos Eduardo Maya DO [facility]  [place] [facility]   4/19/2024  8:15 AM Wilder Birmingham MD [facility]   5/21/2024  4:00 PM Carlos Eduardo Maya DO [facility]  [place] [facility]   7/19/2024  8:20 AM Gregor Chan MD [facility]

## 2024-03-22 NOTE — CARE COORDINATION
ACM completed call and granddaughter Libia said she had no further needs. ACM will not make any further calls.

## 2024-03-25 RX ORDER — LEVOTHYROXINE SODIUM 0.05 MG/1
50 TABLET ORAL DAILY
Qty: 30 TABLET | Refills: 1 | Status: SHIPPED | OUTPATIENT
Start: 2024-03-25

## 2024-03-25 NOTE — TELEPHONE ENCOUNTER
Attempted to call but could not reach. VM left. Course reviewed - EKG with subtle septal lead changes, troponin elevated >100 with flat trend. Troponin elevation occurring in setting of viral illness (COVID + 3/14) and decreased renal clearance/chronic kidney disease. In the least, repeat risk assessment with lexiscan is reasonable if no ongoing concerning symptoms. CT indication was left sided abd/rib pain however, so I'll try to reach her again tomorrow to ensure she is doing well prior to solidifying plan. Hopefully can obtain Lexiscan prior to next 4/19 OV.     TRAVIS

## 2024-03-26 ENCOUNTER — OFFICE VISIT (OUTPATIENT)
Dept: FAMILY MEDICINE CLINIC | Age: 84
End: 2024-03-26
Payer: MEDICAID

## 2024-03-26 VITALS
BODY MASS INDEX: 30.99 KG/M2 | HEART RATE: 84 BPM | SYSTOLIC BLOOD PRESSURE: 130 MMHG | WEIGHT: 192 LBS | DIASTOLIC BLOOD PRESSURE: 58 MMHG | OXYGEN SATURATION: 93 %

## 2024-03-26 DIAGNOSIS — I89.0 LYMPHEDEMA: ICD-10-CM

## 2024-03-26 DIAGNOSIS — E03.9 ACQUIRED HYPOTHYROIDISM: Primary | ICD-10-CM

## 2024-03-26 DIAGNOSIS — R60.9 PERIPHERAL EDEMA: ICD-10-CM

## 2024-03-26 DIAGNOSIS — R26.81 UNSTEADY GAIT: ICD-10-CM

## 2024-03-26 LAB — TSH SERPL DL<=0.005 MIU/L-ACNC: 1.71 UIU/ML (ref 0.27–4.2)

## 2024-03-26 PROCEDURE — 1123F ACP DISCUSS/DSCN MKR DOCD: CPT | Performed by: STUDENT IN AN ORGANIZED HEALTH CARE EDUCATION/TRAINING PROGRAM

## 2024-03-26 PROCEDURE — 3078F DIAST BP <80 MM HG: CPT | Performed by: STUDENT IN AN ORGANIZED HEALTH CARE EDUCATION/TRAINING PROGRAM

## 2024-03-26 PROCEDURE — 3075F SYST BP GE 130 - 139MM HG: CPT | Performed by: STUDENT IN AN ORGANIZED HEALTH CARE EDUCATION/TRAINING PROGRAM

## 2024-03-26 PROCEDURE — 99213 OFFICE O/P EST LOW 20 MIN: CPT | Performed by: STUDENT IN AN ORGANIZED HEALTH CARE EDUCATION/TRAINING PROGRAM

## 2024-03-26 ASSESSMENT — PATIENT HEALTH QUESTIONNAIRE - PHQ9
5. POOR APPETITE OR OVEREATING: NOT AT ALL
6. FEELING BAD ABOUT YOURSELF - OR THAT YOU ARE A FAILURE OR HAVE LET YOURSELF OR YOUR FAMILY DOWN: NOT AT ALL
3. TROUBLE FALLING OR STAYING ASLEEP: NOT AT ALL
10. IF YOU CHECKED OFF ANY PROBLEMS, HOW DIFFICULT HAVE THESE PROBLEMS MADE IT FOR YOU TO DO YOUR WORK, TAKE CARE OF THINGS AT HOME, OR GET ALONG WITH OTHER PEOPLE: NOT DIFFICULT AT ALL
8. MOVING OR SPEAKING SO SLOWLY THAT OTHER PEOPLE COULD HAVE NOTICED. OR THE OPPOSITE, BEING SO FIGETY OR RESTLESS THAT YOU HAVE BEEN MOVING AROUND A LOT MORE THAN USUAL: NOT AT ALL
SUM OF ALL RESPONSES TO PHQ9 QUESTIONS 1 & 2: 0
7. TROUBLE CONCENTRATING ON THINGS, SUCH AS READING THE NEWSPAPER OR WATCHING TELEVISION: NOT AT ALL
2. FEELING DOWN, DEPRESSED OR HOPELESS: NOT AT ALL
SUM OF ALL RESPONSES TO PHQ QUESTIONS 1-9: 0
4. FEELING TIRED OR HAVING LITTLE ENERGY: NOT AT ALL
SUM OF ALL RESPONSES TO PHQ QUESTIONS 1-9: 0
1. LITTLE INTEREST OR PLEASURE IN DOING THINGS: NOT AT ALL
SUM OF ALL RESPONSES TO PHQ QUESTIONS 1-9: 0
SUM OF ALL RESPONSES TO PHQ QUESTIONS 1-9: 0
9. THOUGHTS THAT YOU WOULD BE BETTER OFF DEAD, OR OF HURTING YOURSELF: NOT AT ALL

## 2024-03-26 ASSESSMENT — ANXIETY QUESTIONNAIRES
GAD7 TOTAL SCORE: 0
3. WORRYING TOO MUCH ABOUT DIFFERENT THINGS: NOT AT ALL
4. TROUBLE RELAXING: NOT AT ALL
6. BECOMING EASILY ANNOYED OR IRRITABLE: NOT AT ALL
2. NOT BEING ABLE TO STOP OR CONTROL WORRYING: NOT AT ALL
5. BEING SO RESTLESS THAT IT IS HARD TO SIT STILL: NOT AT ALL
1. FEELING NERVOUS, ANXIOUS, OR ON EDGE: NOT AT ALL
7. FEELING AFRAID AS IF SOMETHING AWFUL MIGHT HAPPEN: NOT AT ALL
IF YOU CHECKED OFF ANY PROBLEMS ON THIS QUESTIONNAIRE, HOW DIFFICULT HAVE THESE PROBLEMS MADE IT FOR YOU TO DO YOUR WORK, TAKE CARE OF THINGS AT HOME, OR GET ALONG WITH OTHER PEOPLE: NOT DIFFICULT AT ALL

## 2024-03-26 NOTE — PROGRESS NOTES
Assessment:  Encounter Diagnoses   Name Primary?    Acquired hypothyroidism Yes    Peripheral edema     Lymphedema     Unsteady gait        Plan:  1. Acquired hypothyroidism  -     TSH with Reflex; Future  2. Peripheral edema  3. Lymphedema  4. Unsteady gait  Will refer to physical therapy for both lymphedema treatment as well as balance therapy.  Patient does not wish for any stronger pain medication that she is currently receiving and recommended adding scheduled Tylenol for improved control.  Recheck thyroid.      No follow-ups on file.      Patient: Bettie Strauss is a 84 y.o. female who presents today with the following Chief Complaint(s):  Chief Complaint   Patient presents with    Follow-Up from Hospital     Swelling in legs have worsen, just left leg swelling, lower back pain and bilateral hip pain, coughing up yellow         HPI    ED evaluation 3/21 for worsening hip pain following fall 1 month ago and worsening LE edema.     Patient had x-ray of left hip without acute sign of fracture  Also had CT imaging of the abdomen pelvis due to left-sided abdominal pain and left hip pain.  No signs of acute intra-abdominal abnormality or left hip fracture.  Did note abnormal troponin elevation that was flat at 138.  Discussed inpatient admission however patient wished to leave and follow-up with Dr. Birmingham as an outpatient.  Abnormal EKG and elevated troponin reviewed by Dr. Birmingham.   Recommend lexiscan    LE edema  Venous doppler normal    CKD stable on lab work during ED evaluation  Creatinine 1.3, GFR 41  Chronic anemia stable  Current Outpatient Medications   Medication Sig Dispense Refill    levothyroxine (SYNTHROID) 50 MCG tablet TAKE 1 TABLET BY MOUTH EVERY DAY 30 tablet 1    spironolactone (ALDACTONE) 25 MG tablet TAKE 1 TABLET BY MOUTH TWICE A  tablet 1    ipratropium 0.5 mg-albuterol 2.5 mg (DUONEB) 0.5-2.5 (3) MG/3ML SOLN nebulizer solution Inhale 3 mLs into the lungs every 4 hours 360 mL 0

## 2024-03-26 NOTE — TELEPHONE ENCOUNTER
Pt grand daughter nelsy (on HIPAA) returned call, requested to speak w/ RJM. Nelsy states she is available to speak at anytime tomorrow w/ RJM.

## 2024-03-27 ENCOUNTER — TELEPHONE (OUTPATIENT)
Dept: FAMILY MEDICINE CLINIC | Age: 84
End: 2024-03-27

## 2024-03-27 DIAGNOSIS — R26.81 UNSTEADY GAIT: Primary | ICD-10-CM

## 2024-03-27 DIAGNOSIS — I89.0 LYMPHEDEMA: ICD-10-CM

## 2024-03-27 NOTE — TELEPHONE ENCOUNTER
Needing therapy referral changed to occupational therapy due to PT not having availability until may. Please advise

## 2024-03-28 NOTE — TELEPHONE ENCOUNTER
Pt granddaughter swetha contacted office, requesting order for LEXISCAN be put in so the can schedule the test.

## 2024-03-28 NOTE — TELEPHONE ENCOUNTER
Spoke with patient. No chest pain presently. Recovering well with COVID. I will order stress test to be done ahead of 4/19 appt if able.   Order placed. Given # to call to schedule.

## 2024-04-02 ENCOUNTER — HOSPITAL ENCOUNTER (OUTPATIENT)
Dept: CARDIOLOGY | Age: 84
Discharge: HOME | End: 2024-04-02
Payer: MEDICAID

## 2024-04-02 PROCEDURE — 6360000002 HC RX W HCPCS: Performed by: STUDENT IN AN ORGANIZED HEALTH CARE EDUCATION/TRAINING PROGRAM

## 2024-04-02 PROCEDURE — 78452 HT MUSCLE IMAGE SPECT MULT: CPT

## 2024-04-02 PROCEDURE — A9502 TC99M TETROFOSMIN: HCPCS | Performed by: STUDENT IN AN ORGANIZED HEALTH CARE EDUCATION/TRAINING PROGRAM

## 2024-04-02 PROCEDURE — 93017 CV STRESS TEST TRACING ONLY: CPT

## 2024-04-02 PROCEDURE — 3430000000 HC RX DIAGNOSTIC RADIOPHARMACEUTICAL: Performed by: STUDENT IN AN ORGANIZED HEALTH CARE EDUCATION/TRAINING PROGRAM

## 2024-04-02 RX ORDER — REGADENOSON 0.08 MG/ML
0.4 INJECTION, SOLUTION INTRAVENOUS
Status: COMPLETED | OUTPATIENT
Start: 2024-04-02 | End: 2024-04-02

## 2024-04-02 RX ADMIN — REGADENOSON 0.4 MG: 0.08 INJECTION, SOLUTION INTRAVENOUS at 14:30

## 2024-04-02 RX ADMIN — TETROFOSMIN 30.7 MILLICURIE: 1.38 INJECTION, POWDER, LYOPHILIZED, FOR SOLUTION INTRAVENOUS at 14:30

## 2024-04-02 RX ADMIN — TETROFOSMIN 11 MILLICURIE: 1.38 INJECTION, POWDER, LYOPHILIZED, FOR SOLUTION INTRAVENOUS at 13:25

## 2024-04-12 ENCOUNTER — TELEPHONE (OUTPATIENT)
Dept: CARDIOLOGY CLINIC | Age: 84
End: 2024-04-12

## 2024-04-12 NOTE — TELEPHONE ENCOUNTER
Staff can we call pt and let her know I reviewed Stress test which shows normal perfusion, low risk reassuring study.  Thank you for forwarding.  Follow-up as planned next week 4/19.  Thank you    TRAVIS

## 2024-04-12 NOTE — TELEPHONE ENCOUNTER
Pt granddaughter cee contacted office informing us pt had completed stress test DR. Maya had placed on 04/02/24. Pt granddaughter requested RJ order be canceled.

## 2024-04-12 NOTE — TELEPHONE ENCOUNTER
FYI patient completed stress     Summary   There is normal isotope uptake at stress and rest. There is no evidence of   myocardial ischemia or scar.   Left ventricular ejection fraction of 72 %.   TID ratio: 1.13.   Overall findings represent a low risk scan.

## 2024-04-16 DIAGNOSIS — E03.9 ACQUIRED HYPOTHYROIDISM: ICD-10-CM

## 2024-04-16 RX ORDER — LEVOTHYROXINE SODIUM 0.05 MG/1
50 TABLET ORAL DAILY
Qty: 90 TABLET | Refills: 1 | Status: SHIPPED | OUTPATIENT
Start: 2024-04-16

## 2024-04-16 NOTE — TELEPHONE ENCOUNTER
Refill Request     CONFIRM preferred pharmacy with the patient.    If Mail Order Rx - Pend for 90 day refill.      Last Seen: Last Seen Department: 3/26/2024  Last Seen by PCP: 3/26/2024    Last Written: 3/25/2024    If no future appointment scheduled:  Review the last OV with PCP and review information for follow-up visit,  Route STAFF MESSAGE with patient name to the  Pool for scheduling with the following information:            -  Timing of next visit           -  Visit type ie Physical, OV, etc           -  Diagnoses/Reason ie. COPD, HTN - Do not use MEDICATION, Follow-up or CHECK UP - Give reason for visit      Next Appointment:   Future Appointments   Date Time Provider Department Center   4/19/2024  8:15 AM Wilder Birmingham MD [facility]   5/21/2024  4:00 PM Carlos Eduardo Maya DO [facility]  [place] [facility]   7/19/2024  8:20 AM Gregor Chan MD [facility]       Message sent to  to schedule appt with patient?  NO      Requested Prescriptions     Pending Prescriptions Disp Refills    levothyroxine (SYNTHROID) 50 MCG tablet [Pharmacy Med Name: LEVOTHYROXINE 50 MCG TABLET] 90 tablet 1     Sig: TAKE 1 TABLET BY MOUTH EVERY DAY

## 2024-04-17 NOTE — PROGRESS NOTES
Labs:   CBC:   Lab Results   Component Value Date/Time    WBC 10.6 03/21/2024 11:16 AM    RBC 3.53 03/21/2024 11:16 AM    HGB 10.4 03/21/2024 11:16 AM    HCT 31.8 03/21/2024 11:16 AM    MCV 90.3 03/21/2024 11:16 AM    RDW 16.9 03/21/2024 11:16 AM     03/21/2024 11:16 AM     CMP:  Lab Results   Component Value Date/Time     03/21/2024 11:16 AM    K 4.1 03/21/2024 11:16 AM    CL 99 03/21/2024 11:16 AM    CO2 31 03/21/2024 11:16 AM    BUN 29 03/21/2024 11:16 AM    CREATININE 1.3 03/21/2024 11:16 AM    GFRAA 52 07/18/2022 06:49 AM    GFRAA >60 04/05/2012 04:40 PM    AGRATIO 1.6 03/21/2024 11:16 AM    LABGLOM 41 03/21/2024 11:16 AM    GLUCOSE 89 03/21/2024 11:16 AM    PROT 6.6 03/21/2024 11:16 AM    PROT 7.6 03/14/2012 10:07 AM    CALCIUM 9.7 03/21/2024 11:16 AM    BILITOT 0.8 03/21/2024 11:16 AM    ALKPHOS 47 03/21/2024 11:16 AM    AST 25 03/21/2024 11:16 AM    ALT 24 03/21/2024 11:16 AM     PT/INR:  No results found for: \"PTINR\"  HgBA1c:  Lab Results   Component Value Date    LABA1C 6.1 11/20/2023     Lab Results   Component Value Date    TROPONINI 0.03 (H) 07/16/2022      Latest Reference Range & Units 01/26/24 13:41   Cholesterol, Total 0 - 199 mg/dL 152   HDL Cholesterol 40 - 60 mg/dL 76 (H)   LDL Calculated <100 mg/dL 62   Triglycerides 0 - 150 mg/dL 68         Cardiac Data:   Stress Test 4/2/24   Summary   There is normal isotope uptake at stress and rest. There is no evidence of   myocardial ischemia or scar.   Left ventricular ejection fraction of 72 %.   TID ratio: 1.13.   Overall findings represent a low risk scan.    VL Extremity bilateral: 07/18/2022  Summary  There is acute partially occluding deep venous thrombosis involving the right popliteal vein.  There is acute totally occluding deep venous thrombosis involving the right gastroc veins.  There is acute totally occluding deep venous thrombosis involving the right peroneal veins.  There is no evidence of deep or superficial venous

## 2024-04-19 ENCOUNTER — OFFICE VISIT (OUTPATIENT)
Dept: CARDIOLOGY CLINIC | Age: 84
End: 2024-04-19
Payer: MEDICAID

## 2024-04-19 VITALS
HEART RATE: 80 BPM | WEIGHT: 193 LBS | SYSTOLIC BLOOD PRESSURE: 118 MMHG | OXYGEN SATURATION: 93 % | HEIGHT: 66 IN | DIASTOLIC BLOOD PRESSURE: 68 MMHG | BODY MASS INDEX: 31.02 KG/M2

## 2024-04-19 DIAGNOSIS — I25.10 CORONARY ARTERY CALCIFICATION SEEN ON CAT SCAN: ICD-10-CM

## 2024-04-19 DIAGNOSIS — R60.0 LOCALIZED EDEMA: ICD-10-CM

## 2024-04-19 DIAGNOSIS — I50.33 ACUTE ON CHRONIC HEART FAILURE WITH PRESERVED EJECTION FRACTION (HFPEF) (HCC): Primary | ICD-10-CM

## 2024-04-19 DIAGNOSIS — I48.0 PAROXYSMAL ATRIAL FIBRILLATION (HCC): ICD-10-CM

## 2024-04-19 PROCEDURE — 1123F ACP DISCUSS/DSCN MKR DOCD: CPT | Performed by: INTERNAL MEDICINE

## 2024-04-19 PROCEDURE — 99214 OFFICE O/P EST MOD 30 MIN: CPT | Performed by: INTERNAL MEDICINE

## 2024-04-19 PROCEDURE — 3074F SYST BP LT 130 MM HG: CPT | Performed by: INTERNAL MEDICINE

## 2024-04-19 PROCEDURE — 3078F DIAST BP <80 MM HG: CPT | Performed by: INTERNAL MEDICINE

## 2024-04-19 NOTE — PATIENT INSTRUCTIONS
1.    We will increase the Lasix today.  40 mg in the AM and 20 mg early afternoon.   2.    Repeat labs after OV with NP    3.   Continue all other medications as prescribed.

## 2024-04-22 RX ORDER — FLUTICASONE FUROATE, UMECLIDINIUM BROMIDE AND VILANTEROL TRIFENATATE 200; 62.5; 25 UG/1; UG/1; UG/1
1 POWDER RESPIRATORY (INHALATION) DAILY
Qty: 60 EACH | Refills: 5 | Status: SHIPPED | OUTPATIENT
Start: 2024-04-22

## 2024-04-22 NOTE — TELEPHONE ENCOUNTER
Last office visit 1/9/2024     Last written 5/26/23    Next office visit scheduled 7/19/2024    Requested Prescriptions     Pending Prescriptions Disp Refills    TRELEGY ELLIPTA 200-62.5-25 MCG/ACT AEPB inhaler [Pharmacy Med Name: TRELEGY ELLIPTA 200-62.5-25] 60 each 5     Sig: INHALE 1 PUFF INTO THE LUNGS DAILY RINSE MOUTH WITH WATER AFTER USE           Med pended

## 2024-05-15 NOTE — PROGRESS NOTES
[facility]  Office Visit    Bettie Strauss  1940    May 16, 2024    CC:   Chief Complaint   Patient presents with    Follow-up    Hypertension    Swelling    Shortness of Breath     HPI:  The patient is 84 y.o. 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 She was last seen in the office by Dr. Birmingham on 4/19/24 and noted to be volume overloaded and her lasix was increased. Her goal weight is between 188-190#. She was advised to follow up in 14 days with NP and here for follow up today.     Overall continues to feel poorly. She has continued increased edema and her weight has been up and down. Today 195#; lowest since seeing Dr. Birmingham is 189#.  Has had increased production of clear mucus she is coughing up. Has chronic joint pain in her knees.  Edema is persistent and without much change despite increased diuretic. Remains on O2.  Limited ambulation and mostly in wheelchair. Walks to bathroom, kitchen at home. + chronic SOB which she feels is worse than before. She fills up quickly with eating; food feels stuck in her upper stomach and emptying delayed. Using Prilosec OTC. She feels like her throat needs to be stretched again. Denies chest pain/discomfort,  orthopnea/PND, palpitations, dizziness, syncope or claudication.  + reflux, increased burping/belching      Review of Systems:  Constitutional: Denies  fatigue, weakness, night sweats or fever.   HEENT: Denies new visual changes, ringing in ears, nosebleeds,nasal congestion  Respiratory: Denies new or change in SOB, PND, orthopnea or cough.   Cardiovascular: see HPI  GI: Denies N/V, diarrhea,

## 2024-05-16 ENCOUNTER — OFFICE VISIT (OUTPATIENT)
Dept: CARDIOLOGY CLINIC | Age: 84
End: 2024-05-16
Payer: MEDICAID

## 2024-05-16 VITALS
DIASTOLIC BLOOD PRESSURE: 72 MMHG | HEART RATE: 78 BPM | WEIGHT: 195 LBS | BODY MASS INDEX: 31.34 KG/M2 | SYSTOLIC BLOOD PRESSURE: 138 MMHG | HEIGHT: 66 IN | OXYGEN SATURATION: 94 %

## 2024-05-16 DIAGNOSIS — G47.33 OSA ON CPAP: ICD-10-CM

## 2024-05-16 DIAGNOSIS — R06.02 SHORTNESS OF BREATH: ICD-10-CM

## 2024-05-16 DIAGNOSIS — I48.0 PAROXYSMAL ATRIAL FIBRILLATION (HCC): ICD-10-CM

## 2024-05-16 DIAGNOSIS — I10 ESSENTIAL HYPERTENSION: ICD-10-CM

## 2024-05-16 DIAGNOSIS — R06.02 SOB (SHORTNESS OF BREATH): ICD-10-CM

## 2024-05-16 DIAGNOSIS — I27.20 PULMONARY HTN (HCC): ICD-10-CM

## 2024-05-16 DIAGNOSIS — I50.33 ACUTE ON CHRONIC HEART FAILURE WITH PRESERVED EJECTION FRACTION (HFPEF) (HCC): Primary | ICD-10-CM

## 2024-05-16 DIAGNOSIS — Z86.718 HISTORY OF VENOUS THROMBOEMBOLISM: ICD-10-CM

## 2024-05-16 DIAGNOSIS — E78.00 PURE HYPERCHOLESTEROLEMIA: ICD-10-CM

## 2024-05-16 DIAGNOSIS — R60.0 BILATERAL LEG EDEMA: ICD-10-CM

## 2024-05-16 DIAGNOSIS — I25.10 CORONARY ARTERY CALCIFICATION SEEN ON CAT SCAN: ICD-10-CM

## 2024-05-16 DIAGNOSIS — K21.00 GASTROESOPHAGEAL REFLUX DISEASE WITH ESOPHAGITIS, UNSPECIFIED WHETHER HEMORRHAGE: ICD-10-CM

## 2024-05-16 PROCEDURE — 99214 OFFICE O/P EST MOD 30 MIN: CPT | Performed by: NURSE PRACTITIONER

## 2024-05-16 PROCEDURE — 3075F SYST BP GE 130 - 139MM HG: CPT | Performed by: NURSE PRACTITIONER

## 2024-05-16 PROCEDURE — 3078F DIAST BP <80 MM HG: CPT | Performed by: NURSE PRACTITIONER

## 2024-05-16 PROCEDURE — 1123F ACP DISCUSS/DSCN MKR DOCD: CPT | Performed by: NURSE PRACTITIONER

## 2024-05-16 RX ORDER — PANTOPRAZOLE SODIUM 40 MG/1
40 TABLET, DELAYED RELEASE ORAL
Qty: 30 TABLET | Refills: 0 | Status: SHIPPED | OUTPATIENT
Start: 2024-05-16

## 2024-05-16 RX ORDER — NITROGLYCERIN 0.4 MG/1
0.4 TABLET SUBLINGUAL EVERY 5 MIN PRN
Qty: 25 TABLET | Refills: 1 | Status: SHIPPED | OUTPATIENT
Start: 2024-05-16

## 2024-05-16 RX ORDER — TORSEMIDE 20 MG/1
20 TABLET ORAL 2 TIMES DAILY
Qty: 60 TABLET | Refills: 3 | Status: SHIPPED | OUTPATIENT
Start: 2024-05-16

## 2024-05-16 RX ORDER — ATORVASTATIN CALCIUM 10 MG/1
TABLET, FILM COATED ORAL
Qty: 90 TABLET | Refills: 1 | Status: SHIPPED | OUTPATIENT
Start: 2024-05-16

## 2024-05-16 NOTE — PATIENT INSTRUCTIONS
Stop furosemide and change to torsemide 20 mg tablet twice a day    Follow up lab work in 7-10 days: CMP (Nonfasting lab)    Add Protonix 40 mg daily x 30 days only (further refills per GI or PCP)    Continue statin, Eliquis, zetia, labetalol, spironolactone    Call [phone] ([phone]) to schedule  -call and schedule for ultrasound of your heart to look at heart pump, valves and function    Referral sent to Dr. Stevenson    Send update in 10-14 days via My Chart with her weight, symptoms and how she is feeling

## 2024-05-16 NOTE — TELEPHONE ENCOUNTER
Refill Request     CONFIRM preferred pharmacy with the patient.    If Mail Order Rx - Pend for 90 day refill.      Last Seen: Last Seen Department: 3/26/2024  Last Seen by PCP: 3/26/2024    Last Written: 12/03/2023 90 tab 1 refills     If no future appointment scheduled:  Review the last OV with PCP and review information for follow-up visit,  Route STAFF MESSAGE with patient name to the  Pool for scheduling with the following information:            -  Timing of next visit           -  Visit type ie Physical, OV, etc           -  Diagnoses/Reason ie. COPD, HTN - Do not use MEDICATION, Follow-up or CHECK UP - Give reason for visit      Next Appointment:   Future Appointments   Date Time Provider Department Center   5/21/2024  4:00 PM Carlos Eduardo Maya, DO [facility]  [place] - [facility]   6/13/2024  8:30 AM Enzweiler, Diane M, JOSE - CNP [facility]   7/19/2024  8:20 AM Gregor Chan MD [facility]       Message sent to  to schedule appt with patient?  NO      Requested Prescriptions     Pending Prescriptions Disp Refills    atorvastatin (LIPITOR) 10 MG tablet [Pharmacy Med Name: ATORVASTATIN 10 MG TABLET] 90 tablet 1     Sig: TAKE 1 TABLET BY MOUTH EVERY DAY

## 2024-05-23 NOTE — PROGRESS NOTES
Pt states she tolerates the nutrition better when administered slow, same with 12 0mls of H2O as a flush. When administered faster, pt is feeling very gassy and bloated. n/a

## 2024-05-31 DIAGNOSIS — I50.32 CHRONIC DIASTOLIC HEART FAILURE (HCC): ICD-10-CM

## 2024-05-31 DIAGNOSIS — I10 ESSENTIAL HYPERTENSION: ICD-10-CM

## 2024-05-31 RX ORDER — LABETALOL 100 MG/1
TABLET, FILM COATED ORAL
Qty: 180 TABLET | Refills: 1 | Status: SHIPPED | OUTPATIENT
Start: 2024-05-31

## 2024-05-31 NOTE — TELEPHONE ENCOUNTER
Refill Request     CONFIRM preferred pharmacy with the patient.    If Mail Order Rx - Pend for 90 day refill.      Last Seen: Last Seen Department: 3/26/2024  Last Seen by PCP: 10/16/2023    Last Written: 12/5/23 180 tab 1 refills    If no future appointment scheduled:  Review the last OV with PCP and review information for follow-up visit,  Route STAFF MESSAGE with patient name to the  Pool for scheduling with the following information:            -  Timing of next visit           -  Visit type ie Physical, OV, etc           -  Diagnoses/Reason ie. COPD, HTN - Do not use MEDICATION, Follow-up or CHECK UP - Give reason for visit      Next Appointment:   Future Appointments   Date Time Provider Department Center   6/12/2024  9:30 AM Carlos Eduardo Maya, DO [facility]  [place] - DYD   6/13/2024  8:30 AM Enzweiler, Diane M, APRN - CNP [facility]   7/19/2024  8:20 AM Gregor Chan MD [facility]       Message sent to  to schedule appt with patient?  NO      Requested Prescriptions     Pending Prescriptions Disp Refills    labetalol (NORMODYNE) 100 MG tablet [Pharmacy Med Name: LABETALOL  MG TABLET] 180 tablet 1     Sig: TAKE 1 TABLET BY MOUTH TWICE A DAY

## 2024-06-07 DIAGNOSIS — K21.00 GASTROESOPHAGEAL REFLUX DISEASE WITH ESOPHAGITIS, UNSPECIFIED WHETHER HEMORRHAGE: ICD-10-CM

## 2024-06-07 RX ORDER — PANTOPRAZOLE SODIUM 40 MG/1
40 TABLET, DELAYED RELEASE ORAL
Qty: 90 TABLET | Refills: 1 | OUTPATIENT
Start: 2024-06-07

## 2024-06-07 NOTE — TELEPHONE ENCOUNTER
Per my last office note:  Add Protonix 40 mg daily x 30 days only (further refills per GI or PCP)

## 2024-06-11 NOTE — PROGRESS NOTES
or sooner if needed    Return in about 3 months (around 9/13/2024) for 3-4 months with Dr. Birmingham and SHELLIE.     Thanks for allowing me to participate in the care of this patient.      Diane Enzweiler, APRN-CNP  [facility], [address]  [place] [address]  Office: [phone]  Fax: [phone]      Electronically signed by DIANE M ENZWEILER, APRN - CNP on 6/13/2024 at 8:58 AM

## 2024-06-13 ENCOUNTER — OFFICE VISIT (OUTPATIENT)
Dept: CARDIOLOGY CLINIC | Age: 84
End: 2024-06-13
Payer: MEDICAID

## 2024-06-13 ENCOUNTER — OFFICE VISIT (OUTPATIENT)
Dept: ORTHOPEDIC SURGERY | Age: 84
End: 2024-06-13
Payer: MEDICAID

## 2024-06-13 VITALS
SYSTOLIC BLOOD PRESSURE: 130 MMHG | HEART RATE: 83 BPM | WEIGHT: 194 LBS | OXYGEN SATURATION: 93 % | BODY MASS INDEX: 31.18 KG/M2 | DIASTOLIC BLOOD PRESSURE: 68 MMHG | HEIGHT: 66 IN

## 2024-06-13 DIAGNOSIS — Z96.611 STATUS POST REVERSE TOTAL ARTHROPLASTY OF RIGHT SHOULDER: Primary | ICD-10-CM

## 2024-06-13 DIAGNOSIS — I48.0 PAROXYSMAL ATRIAL FIBRILLATION (HCC): ICD-10-CM

## 2024-06-13 DIAGNOSIS — R60.0 BILATERAL LEG EDEMA: ICD-10-CM

## 2024-06-13 DIAGNOSIS — I27.20 PULMONARY HTN (HCC): ICD-10-CM

## 2024-06-13 DIAGNOSIS — I10 ESSENTIAL HYPERTENSION: ICD-10-CM

## 2024-06-13 DIAGNOSIS — Z86.718 HISTORY OF VENOUS THROMBOEMBOLISM: ICD-10-CM

## 2024-06-13 DIAGNOSIS — I50.32 CHRONIC HEART FAILURE WITH PRESERVED EJECTION FRACTION (HFPEF) (HCC): Primary | ICD-10-CM

## 2024-06-13 DIAGNOSIS — G47.33 OSA ON CPAP: ICD-10-CM

## 2024-06-13 DIAGNOSIS — I25.10 CORONARY ARTERY CALCIFICATION SEEN ON CAT SCAN: ICD-10-CM

## 2024-06-13 PROCEDURE — 99213 OFFICE O/P EST LOW 20 MIN: CPT | Performed by: ORTHOPAEDIC SURGERY

## 2024-06-13 PROCEDURE — 1123F ACP DISCUSS/DSCN MKR DOCD: CPT | Performed by: NURSE PRACTITIONER

## 2024-06-13 PROCEDURE — 99214 OFFICE O/P EST MOD 30 MIN: CPT | Performed by: NURSE PRACTITIONER

## 2024-06-13 PROCEDURE — 3078F DIAST BP <80 MM HG: CPT | Performed by: NURSE PRACTITIONER

## 2024-06-13 PROCEDURE — 3075F SYST BP GE 130 - 139MM HG: CPT | Performed by: NURSE PRACTITIONER

## 2024-06-13 PROCEDURE — 1123F ACP DISCUSS/DSCN MKR DOCD: CPT | Performed by: ORTHOPAEDIC SURGERY

## 2024-06-13 NOTE — PROGRESS NOTES
joint space spur formation is seen.  Small joint effusion is seen.  No acute fracture noted.  There is  atherosclerosis.  Degenerative changes are increased compared to 2017     Right shoulder:     Alignment at the AC joint and glenohumeral joint appears normal.  Moderate  spurring is seen in the AC joint.  Moderate spurring is seen at the  glenohumeral joint.  No acute fracture noted.  Cervical spine degenerative  changes seen.  Vascular calcification is seen.  Hazy opacity is seen in the  right lung base, likely atelectasis     IMPRESSION:  No acute osseous abnormality left shoulder.  There is moderate AC joint and  glenohumeral osteoarthritis.  There is cervical spine degenerative change.  Hazy opacity is seen at the left lung base, likely atelectasis.     No acute osseous abnormality left knee.  There is tricompartmental  osteoarthritis, greatest laterally.     No acute osseous abnormality right knee.  There is tricompartmental  osteoarthritis, greatest laterally     No acute osseous abnormality right shoulder joint.  There is moderate AC  joint and glenohumeral osteoarthritis.  There is cervical spine degenerative  Change        Assessment & Plan:  84 y.o. female who presents with    Diagnosis Orders   1. Status post reverse total arthroplasty of right shoulder  XR SHOULDER RIGHT (MIN 2 VIEWS)          No orders of the defined types were placed in this encounter.      1 year postop from reverse shoulder arthroplasty  Had some pain that started couple weeks ago, localized over the acromial process  No definite fractures seen on plain films  I recommend conservative treatment  Period of rest, no lifting/pushing/pulling  Gentle ROM few times a day OK to prevent stiffness  She is wheelchair bound, no need for sling  FU in 3-4 weeks for repeat assessment, and repeat XRs (4 views)    Lazaro Power MD  06/13/24  4:36 PM

## 2024-06-25 DIAGNOSIS — I82.401 RECURRENT ACUTE DEEP VEIN THROMBOSIS (DVT) OF RIGHT LOWER EXTREMITY (HCC): ICD-10-CM

## 2024-06-25 DIAGNOSIS — I26.99 ACUTE PULMONARY EMBOLISM, UNSPECIFIED PULMONARY EMBOLISM TYPE, UNSPECIFIED WHETHER ACUTE COR PULMONALE PRESENT (HCC): ICD-10-CM

## 2024-06-26 ENCOUNTER — TELEPHONE (OUTPATIENT)
Dept: PULMONOLOGY | Age: 84
End: 2024-06-26

## 2024-06-26 RX ORDER — APIXABAN 5 MG/1
TABLET, FILM COATED ORAL
Qty: 180 TABLET | Refills: 1 | Status: SHIPPED | OUTPATIENT
Start: 2024-06-26

## 2024-06-26 NOTE — TELEPHONE ENCOUNTER
Refill Request     CONFIRM preferred pharmacy with the patient.    If Mail Order Rx - Pend for 90 day refill.      Last Seen: Last Seen Department: 3/26/2024  Last Seen by PCP: 3/26/2024    Last Written: 8/18/23 180 with 1 refill     If no future appointment scheduled:  Review the last OV with PCP and review information for follow-up visit,  Route STAFF MESSAGE with patient name to the  Pool for scheduling with the following information:            -  Timing of next visit           -  Visit type ie Physical, OV, etc           -  Diagnoses/Reason ie. COPD, HTN - Do not use MEDICATION, Follow-up or CHECK UP - Give reason for visit      Next Appointment:   Future Appointments   Date Time Provider Department Center   6/28/2024  8:15 AM Carlos Eduardo Maya DO [facility]  [place] - DYD   7/11/2024  8:15 AM Lazaro Power MD [facility]   7/19/2024  8:20 AM Gregor Chan MD [facility]   9/27/2024  8:00 AM Wilder Birmingham MD [facility]       Message sent to  to schedule appt with patient?  NO      Requested Prescriptions     Pending Prescriptions Disp Refills    ELIQUIS 5 MG TABS tablet [Pharmacy Med Name: ELIQUIS 5 MG TABLET] 180 tablet 1     Sig: TAKE 1 TABLET BY MOUTH IN THE MORNING AND BEFORE BEDTIME       
Spouse

## 2024-06-28 ENCOUNTER — OFFICE VISIT (OUTPATIENT)
Dept: FAMILY MEDICINE CLINIC | Age: 84
End: 2024-06-28
Payer: MEDICAID

## 2024-06-28 VITALS
OXYGEN SATURATION: 96 % | WEIGHT: 193 LBS | HEART RATE: 75 BPM | BODY MASS INDEX: 31.02 KG/M2 | SYSTOLIC BLOOD PRESSURE: 122 MMHG | DIASTOLIC BLOOD PRESSURE: 60 MMHG | HEIGHT: 66 IN

## 2024-06-28 DIAGNOSIS — N18.32 STAGE 3B CHRONIC KIDNEY DISEASE (HCC): ICD-10-CM

## 2024-06-28 DIAGNOSIS — J43.2 CENTRILOBULAR EMPHYSEMA (HCC): ICD-10-CM

## 2024-06-28 DIAGNOSIS — I50.32 CHRONIC DIASTOLIC CONGESTIVE HEART FAILURE (HCC): ICD-10-CM

## 2024-06-28 DIAGNOSIS — J96.11 CHRONIC RESPIRATORY FAILURE WITH HYPOXIA (HCC): ICD-10-CM

## 2024-06-28 DIAGNOSIS — R73.03 PREDIABETES: Primary | ICD-10-CM

## 2024-06-28 DIAGNOSIS — I27.20 PULMONARY HTN (HCC): ICD-10-CM

## 2024-06-28 DIAGNOSIS — D50.9 IRON DEFICIENCY ANEMIA, UNSPECIFIED IRON DEFICIENCY ANEMIA TYPE: ICD-10-CM

## 2024-06-28 DIAGNOSIS — R09.82 PND (POST-NASAL DRIP): ICD-10-CM

## 2024-06-28 LAB
25(OH)D3 SERPL-MCNC: 94.1 NG/ML
ALBUMIN SERPL-MCNC: 4.3 G/DL (ref 3.4–5)
ANION GAP SERPL CALCULATED.3IONS-SCNC: 11 MMOL/L (ref 3–16)
BUN SERPL-MCNC: 54 MG/DL (ref 7–20)
CALCIUM SERPL-MCNC: 9.8 MG/DL (ref 8.3–10.6)
CHLORIDE SERPL-SCNC: 97 MMOL/L (ref 99–110)
CO2 SERPL-SCNC: 30 MMOL/L (ref 21–32)
CREAT SERPL-MCNC: 1.8 MG/DL (ref 0.6–1.2)
CREAT UR-MCNC: 67.2 MG/DL (ref 28–259)
GFR SERPLBLD CREATININE-BSD FMLA CKD-EPI: 28 ML/MIN/{1.73_M2}
GLUCOSE SERPL-MCNC: 88 MG/DL (ref 70–99)
HBA1C MFR BLD: 6.5 %
MICROALBUMIN UR DL<=1MG/L-MCNC: <1.2 MG/DL
MICROALBUMIN/CREAT UR: NORMAL MG/G (ref 0–30)
PHOSPHATE SERPL-MCNC: 3.5 MG/DL (ref 2.5–4.9)
POTASSIUM SERPL-SCNC: 4.7 MMOL/L (ref 3.5–5.1)
SODIUM SERPL-SCNC: 138 MMOL/L (ref 136–145)

## 2024-06-28 PROCEDURE — 99214 OFFICE O/P EST MOD 30 MIN: CPT | Performed by: STUDENT IN AN ORGANIZED HEALTH CARE EDUCATION/TRAINING PROGRAM

## 2024-06-28 PROCEDURE — 1123F ACP DISCUSS/DSCN MKR DOCD: CPT | Performed by: STUDENT IN AN ORGANIZED HEALTH CARE EDUCATION/TRAINING PROGRAM

## 2024-06-28 PROCEDURE — 3078F DIAST BP <80 MM HG: CPT | Performed by: STUDENT IN AN ORGANIZED HEALTH CARE EDUCATION/TRAINING PROGRAM

## 2024-06-28 PROCEDURE — 83036 HEMOGLOBIN GLYCOSYLATED A1C: CPT | Performed by: STUDENT IN AN ORGANIZED HEALTH CARE EDUCATION/TRAINING PROGRAM

## 2024-06-28 PROCEDURE — 3074F SYST BP LT 130 MM HG: CPT | Performed by: STUDENT IN AN ORGANIZED HEALTH CARE EDUCATION/TRAINING PROGRAM

## 2024-06-28 RX ORDER — PNV NO.95/FERROUS FUM/FOLIC AC 28MG-0.8MG
1 TABLET ORAL DAILY
Qty: 60 TABLET | Refills: 0 | Status: SHIPPED | OUTPATIENT
Start: 2024-06-28

## 2024-06-28 RX ORDER — AZELASTINE 1 MG/ML
2 SPRAY, METERED NASAL 2 TIMES DAILY
Qty: 60 ML | Refills: 5 | Status: SHIPPED | OUTPATIENT
Start: 2024-06-28

## 2024-06-28 NOTE — PROGRESS NOTES
Assessment:  Encounter Diagnoses   Name Primary?    Prediabetes Yes    Pulmonary HTN (HCC)     Chronic respiratory failure with hypoxia (HCC)     Centrilobular emphysema (HCC)     Stage 3b chronic kidney disease (HCC)     PND (post-nasal drip)     Chronic diastolic congestive heart failure (HCC)     Iron deficiency anemia, unspecified iron deficiency anemia type        Plan:  1. Prediabetes  -     POCT glycosylated hemoglobin (Hb A1C)  -     empagliflozin (JARDIANCE) 10 MG tablet; Take 1 tablet by mouth daily, Disp-90 tablet, R-1Normal  2. Pulmonary HTN (HCC)  3. Chronic respiratory failure with hypoxia (HCC)  4. Centrilobular emphysema (HCC)  5. Stage 3b chronic kidney disease (HCC)  -     AFL (Epic) - Eduar Garcia MD, Nephrology, [facility]  -     PTH, Intact; Future  -     Vitamin D 25 Hydroxy; Future  -     Microalbumin / Creatinine Urine Ratio; Future  -     Renal Function Panel; Future  6. PND (post-nasal drip)  -     azelastine (ASTELIN) 0.1 % nasal spray; 2 sprays by Nasal route 2 times daily Use in each nostril as directed, Disp-60 mL, R-5Normal  7. Chronic diastolic congestive heart failure (HCC)  -     empagliflozin (JARDIANCE) 10 MG tablet; Take 1 tablet by mouth daily, Disp-90 tablet, R-1Normal  8. Iron deficiency anemia, unspecified iron deficiency anemia type  -     Ferrous Sulfate (IRON) 325 (65 Fe) MG TABS; Take 1 tablet by mouth daily, Disp-60 tablet, R-0Normal  A1c 6.5 today however with worsening CKD and known CHF, will start Jardiance.  Discussed need for close renal function monitoring when starting medication and will also decrease torsemide to half dose and monitor for change in fluid status.  Check lab work as above for worsening CKD as well as referral to nephrology.      Return in about 3 months (around 9/28/2024) for diabetes.      Patient: Bettie Strauss is a 84 y.o. female who presents today with the following Chief Complaint(s):  Chief Complaint   Patient presents with    6

## 2024-06-28 NOTE — PATIENT INSTRUCTIONS
Decrease torsemide to 1 pill in the AM and 1/2 pill in the afternoon  Start empagliflozin which will help your heart, kidneys, and blood sugar

## 2024-06-29 LAB — PTH-INTACT SERPL-MCNC: 96.8 PG/ML (ref 14–72)

## 2024-07-01 DIAGNOSIS — N18.9 ACUTE KIDNEY INJURY SUPERIMPOSED ON CKD (HCC): Primary | ICD-10-CM

## 2024-07-01 DIAGNOSIS — N17.9 ACUTE KIDNEY INJURY SUPERIMPOSED ON CKD (HCC): Primary | ICD-10-CM

## 2024-07-05 DIAGNOSIS — N17.9 ACUTE KIDNEY INJURY SUPERIMPOSED ON CKD (HCC): Primary | ICD-10-CM

## 2024-07-05 DIAGNOSIS — N18.9 ACUTE KIDNEY INJURY SUPERIMPOSED ON CKD (HCC): Primary | ICD-10-CM

## 2024-07-18 DIAGNOSIS — E78.5 HYPERLIPIDEMIA, UNSPECIFIED HYPERLIPIDEMIA TYPE: ICD-10-CM

## 2024-07-18 RX ORDER — EZETIMIBE 10 MG/1
TABLET ORAL
Qty: 90 TABLET | Refills: 3 | Status: SHIPPED | OUTPATIENT
Start: 2024-07-18

## 2024-07-20 DIAGNOSIS — D50.9 IRON DEFICIENCY ANEMIA, UNSPECIFIED IRON DEFICIENCY ANEMIA TYPE: ICD-10-CM

## 2024-07-21 NOTE — TELEPHONE ENCOUNTER
Refill Request     CONFIRM preferred pharmacy with the patient.    If Mail Order Rx - Pend for 90 day refill.      Last Seen: Last Seen Department: 6/28/2024  Last Seen by PCP: 6/28/2024    Last Written: Ferrous 6/28/24 60 tabs 0 refills     If no future appointment scheduled:  Review the last OV with PCP and review information for follow-up visit,  Route STAFF MESSAGE with patient name to the  Pool for scheduling with the following information:            -  Timing of next visit           -  Visit type ie Physical, OV, etc           -  Diagnoses/Reason ie. COPD, HTN - Do not use MEDICATION, Follow-up or CHECK UP - Give reason for visit      Next Appointment:   Future Appointments   Date Time Provider Department Center   7/26/2024  2:20 PM Gregor Chan MD AND [facility]   8/15/2024  8:15 AM Lazaro Power MD [facility]   8/22/2024 12:15 PM Eduar Garcia MD [facility]   9/27/2024  8:00 AM Wilder Birmingham MD [facility]   10/4/2024  8:00 AM Carlos Eduardo Maya DO [facility] [place] - [facility]       Message sent to  to schedule appt with patient?  NO      Requested Prescriptions     Pending Prescriptions Disp Refills    Ferrous Sulfate (IRON) 325 (65 Fe) MG TABS [Pharmacy Med Name: IRON 65 MG TABLET] 90 tablet 1     Sig: TAKE 1 TABLET BY MOUTH EVERY DAY

## 2024-07-22 RX ORDER — PNV NO.95/FERROUS FUM/FOLIC AC 28MG-0.8MG
1 TABLET ORAL DAILY
Qty: 90 TABLET | Refills: 1 | Status: SHIPPED | OUTPATIENT
Start: 2024-07-22

## 2024-07-27 ENCOUNTER — HOSPITAL ENCOUNTER (OUTPATIENT)
Age: 84
Discharge: HOME | End: 2024-07-27
Payer: MEDICAID

## 2024-07-27 DIAGNOSIS — R60.0 BILATERAL LEG EDEMA: ICD-10-CM

## 2024-07-27 DIAGNOSIS — N18.30 STAGE 3 CHRONIC KIDNEY DISEASE, UNSPECIFIED WHETHER STAGE 3A OR 3B CKD (HCC): ICD-10-CM

## 2024-07-27 DIAGNOSIS — R06.02 SOB (SHORTNESS OF BREATH): ICD-10-CM

## 2024-07-27 LAB
ALBUMIN SERPL-MCNC: 3.9 G/DL (ref 3.4–5)
ANION GAP SERPL CALCULATED.3IONS-SCNC: 12 MMOL/L (ref 3–16)
BUN SERPL-MCNC: 30 MG/DL (ref 7–20)
CALCIUM SERPL-MCNC: 9.8 MG/DL (ref 8.3–10.6)
CHLORIDE SERPL-SCNC: 97 MMOL/L (ref 99–110)
CO2 SERPL-SCNC: 27 MMOL/L (ref 21–32)
CREAT SERPL-MCNC: 1.5 MG/DL (ref 0.6–1.2)
GFR SERPLBLD CREATININE-BSD FMLA CKD-EPI: 34 ML/MIN/{1.73_M2}
GLUCOSE SERPL-MCNC: 84 MG/DL (ref 70–99)
NT-PROBNP SERPL-MCNC: 216 PG/ML (ref 0–449)
PHOSPHATE SERPL-MCNC: 3.6 MG/DL (ref 2.5–4.9)
POTASSIUM SERPL-SCNC: 4.2 MMOL/L (ref 3.5–5.1)
SODIUM SERPL-SCNC: 136 MMOL/L (ref 136–145)

## 2024-07-27 PROCEDURE — 83880 ASSAY OF NATRIURETIC PEPTIDE: CPT

## 2024-07-27 PROCEDURE — 36415 COLL VENOUS BLD VENIPUNCTURE: CPT

## 2024-07-27 PROCEDURE — 80069 RENAL FUNCTION PANEL: CPT

## 2024-07-29 ENCOUNTER — HOSPITAL ENCOUNTER (OUTPATIENT)
Age: 84
Setting detail: SPECIMEN
Discharge: HOME | End: 2024-07-29
Payer: MEDICAID

## 2024-07-29 LAB
CREAT UR-MCNC: 80.1 MG/DL (ref 28–259)
MICROALBUMIN UR DL<=1MG/L-MCNC: <1.2 MG/DL
MICROALBUMIN/CREAT UR: NORMAL MG/G (ref 0–30)

## 2024-07-29 PROCEDURE — 82043 UR ALBUMIN QUANTITATIVE: CPT

## 2024-07-29 PROCEDURE — 82570 ASSAY OF URINE CREATININE: CPT

## 2024-08-06 NOTE — PROGRESS NOTES
Bettie Beniteznes    Age 84 y.o.    female    1940    [facility] [id number]    8/14/2024  Arrival Time_____________  OR Time____________30 Min     Procedure(s):  ESOPHAGOGASTRODUODENOSCOPY                      Monitor Anesthesia Care    Surgeon(s):  Dwain Stevenson, MD       Phone [phone] (home) [phone] (work)    In\[facility] [place] [facility]\""  Date  Info Source  Home  Cell         Work  _____________________________________________________________________  _____________________________________________________________________  _____________________________________________________________________  _____________________________________________________________________  _____________________________________________________________________    PCP _____________________________ Phone_________________     H&P  ________________  Bringing      Chart              Epic      DOS      Called________  EKG ________________   Bringing      Chart              Epic      DOS      Called________  LABS________________   Bringing     Chart              Epic      DOS      Called________  Cardiac Clearance ______ Bringing      Chart              Epic      DOS      Called________  Pulmonary Clearance____ Bringing      Chart              Epic      DOS      Called________    Cardiologist________________________ Phone___________________________  Pulmonologist_______________________Phone___________________________    ? Advance Directives   ? Christian concerns / Waiver on Chart            PAT Communications________________  ? Pre-op Instructions Given /Understood          _________________________________  ? Directions to Surgery Center                          _________________________________  ? Transportation Home_______________      __________________________________  ? Crutches/Walker__________________        __________________________________    Orders: Hard copy/ EPIC                 Transcribed/ EPIC              _______Wt.    ________Pharmacy

## 2024-08-14 ENCOUNTER — APPOINTMENT (OUTPATIENT)
Dept: GENERAL RADIOLOGY | Age: 84
End: 2024-08-14
Payer: MEDICAID

## 2024-08-14 ENCOUNTER — ANESTHESIA EVENT (OUTPATIENT)
Dept: ENDOSCOPY | Age: 84
End: 2024-08-14
Payer: MEDICAID

## 2024-08-14 ENCOUNTER — HOSPITAL ENCOUNTER (EMERGENCY)
Age: 84
Discharge: HOME | End: 2024-08-14
Attending: EMERGENCY MEDICINE
Payer: MEDICAID

## 2024-08-14 ENCOUNTER — HOSPITAL ENCOUNTER (OUTPATIENT)
Age: 84
Setting detail: OUTPATIENT SURGERY
Discharge: ANOTHER HOSPITAL | End: 2024-08-14
Attending: INTERNAL MEDICINE | Admitting: INTERNAL MEDICINE
Payer: MEDICAID

## 2024-08-14 ENCOUNTER — ANESTHESIA (OUTPATIENT)
Dept: ENDOSCOPY | Age: 84
End: 2024-08-14
Payer: MEDICAID

## 2024-08-14 VITALS
DIASTOLIC BLOOD PRESSURE: 64 MMHG | SYSTOLIC BLOOD PRESSURE: 133 MMHG | RESPIRATION RATE: 13 BRPM | OXYGEN SATURATION: 95 % | WEIGHT: 190 LBS | BODY MASS INDEX: 30.53 KG/M2 | HEIGHT: 66 IN | TEMPERATURE: 98.2 F | HEART RATE: 77 BPM

## 2024-08-14 VITALS
HEART RATE: 90 BPM | OXYGEN SATURATION: 100 % | BODY MASS INDEX: 30.53 KG/M2 | TEMPERATURE: 97.7 F | SYSTOLIC BLOOD PRESSURE: 118 MMHG | HEIGHT: 66 IN | RESPIRATION RATE: 16 BRPM | WEIGHT: 190 LBS | DIASTOLIC BLOOD PRESSURE: 51 MMHG

## 2024-08-14 DIAGNOSIS — J69.0 ASPIRATION PNEUMONITIS (HCC): Primary | ICD-10-CM

## 2024-08-14 DIAGNOSIS — D50.9 IRON DEFICIENCY ANEMIA, UNSPECIFIED IRON DEFICIENCY ANEMIA TYPE: ICD-10-CM

## 2024-08-14 LAB
ANION GAP SERPL CALCULATED.3IONS-SCNC: 11 MMOL/L (ref 3–16)
BASE EXCESS BLDV CALC-SCNC: 1.6 MMOL/L (ref -3–3)
BASOPHILS # BLD: 0.1 K/UL (ref 0–0.2)
BASOPHILS NFR BLD: 0.5 %
BUN SERPL-MCNC: 24 MG/DL (ref 7–20)
CALCIUM SERPL-MCNC: 9.8 MG/DL (ref 8.3–10.6)
CHLORIDE SERPL-SCNC: 101 MMOL/L (ref 99–110)
CO2 BLDV-SCNC: 28 MMOL/L
CO2 SERPL-SCNC: 27 MMOL/L (ref 21–32)
COHGB MFR BLDV: 1.5 % (ref 0–1.5)
CREAT SERPL-MCNC: 1.3 MG/DL (ref 0.6–1.2)
DEPRECATED RDW RBC AUTO: 22.8 % (ref 12.4–15.4)
EOSINOPHIL # BLD: 0 K/UL (ref 0–0.6)
EOSINOPHIL NFR BLD: 0.1 %
GFR SERPLBLD CREATININE-BSD FMLA CKD-EPI: 41 ML/MIN/{1.73_M2}
GLUCOSE BLD-MCNC: 89 MG/DL (ref 70–99)
GLUCOSE SERPL-MCNC: 103 MG/DL (ref 70–99)
HCO3 BLDV-SCNC: 26.6 MMOL/L (ref 23–29)
HCT VFR BLD AUTO: 31.7 % (ref 36–48)
HGB BLD-MCNC: 9.8 G/DL (ref 12–16)
LYMPHOCYTES # BLD: 1.1 K/UL (ref 1–5.1)
LYMPHOCYTES NFR BLD: 9.1 %
MCH RBC QN AUTO: 27.1 PG (ref 26–34)
MCHC RBC AUTO-ENTMCNC: 31 G/DL (ref 31–36)
MCV RBC AUTO: 87.4 FL (ref 80–100)
METHGB MFR BLDV: 0.1 %
MONOCYTES # BLD: 0.5 K/UL (ref 0–1.3)
MONOCYTES NFR BLD: 4.7 %
NEUTROPHILS # BLD: 10.1 K/UL (ref 1.7–7.7)
NEUTROPHILS NFR BLD: 85.6 %
O2 THERAPY: ABNORMAL
PCO2 BLDV: 43.3 MMHG (ref 40–50)
PERFORMED ON: NORMAL
PH BLDV: 7.41 [PH] (ref 7.35–7.45)
PLATELET # BLD AUTO: 254 K/UL (ref 135–450)
PMV BLD AUTO: 7.9 FL (ref 5–10.5)
PO2 BLDV: 96.8 MMHG (ref 25–40)
POTASSIUM SERPL-SCNC: 4.3 MMOL/L (ref 3.5–5.1)
RBC # BLD AUTO: 3.62 M/UL (ref 4–5.2)
SAO2 % BLDV: 97 %
SODIUM SERPL-SCNC: 139 MMOL/L (ref 136–145)
TROPONIN, HIGH SENSITIVITY: 86 NG/L (ref 0–14)
TROPONIN, HIGH SENSITIVITY: 95 NG/L (ref 0–14)
WBC # BLD AUTO: 11.8 K/UL (ref 4–11)

## 2024-08-14 PROCEDURE — 2709999900 HC NON-CHARGEABLE SUPPLY: Performed by: INTERNAL MEDICINE

## 2024-08-14 PROCEDURE — 7100000011 HC PHASE II RECOVERY - ADDTL 15 MIN: Performed by: INTERNAL MEDICINE

## 2024-08-14 PROCEDURE — 85025 COMPLETE CBC W/AUTO DIFF WBC: CPT

## 2024-08-14 PROCEDURE — 6370000000 HC RX 637 (ALT 250 FOR IP): Performed by: EMERGENCY MEDICINE

## 2024-08-14 PROCEDURE — 6370000000 HC RX 637 (ALT 250 FOR IP)

## 2024-08-14 PROCEDURE — 7100000010 HC PHASE II RECOVERY - FIRST 15 MIN: Performed by: INTERNAL MEDICINE

## 2024-08-14 PROCEDURE — 6360000002 HC RX W HCPCS: Performed by: NURSE ANESTHETIST, CERTIFIED REGISTERED

## 2024-08-14 PROCEDURE — 93005 ELECTROCARDIOGRAM TRACING: CPT | Performed by: EMERGENCY MEDICINE

## 2024-08-14 PROCEDURE — 2580000003 HC RX 258: Performed by: ANESTHESIOLOGY

## 2024-08-14 PROCEDURE — 2500000003 HC RX 250 WO HCPCS: Performed by: NURSE ANESTHETIST, CERTIFIED REGISTERED

## 2024-08-14 PROCEDURE — 2580000003 HC RX 258: Performed by: NURSE ANESTHETIST, CERTIFIED REGISTERED

## 2024-08-14 PROCEDURE — 82803 BLOOD GASES ANY COMBINATION: CPT

## 2024-08-14 PROCEDURE — 88305 TISSUE EXAM BY PATHOLOGIST: CPT

## 2024-08-14 PROCEDURE — 80048 BASIC METABOLIC PNL TOTAL CA: CPT

## 2024-08-14 PROCEDURE — 3700000001 HC ADD 15 MINUTES (ANESTHESIA): Performed by: INTERNAL MEDICINE

## 2024-08-14 PROCEDURE — 71045 X-RAY EXAM CHEST 1 VIEW: CPT

## 2024-08-14 PROCEDURE — 84484 ASSAY OF TROPONIN QUANT: CPT

## 2024-08-14 PROCEDURE — 99285 EMERGENCY DEPT VISIT HI MDM: CPT

## 2024-08-14 PROCEDURE — 3609012400 HC EGD TRANSORAL BIOPSY SINGLE/MULTIPLE: Performed by: INTERNAL MEDICINE

## 2024-08-14 PROCEDURE — 3700000000 HC ANESTHESIA ATTENDED CARE: Performed by: INTERNAL MEDICINE

## 2024-08-14 PROCEDURE — 94640 AIRWAY INHALATION TREATMENT: CPT

## 2024-08-14 RX ORDER — LIDOCAINE HYDROCHLORIDE 20 MG/ML
INJECTION, SOLUTION INFILTRATION; PERINEURAL PRN
Status: DISCONTINUED | OUTPATIENT
Start: 2024-08-14 | End: 2024-08-14 | Stop reason: SDUPTHER

## 2024-08-14 RX ORDER — MIDAZOLAM HYDROCHLORIDE 1 MG/ML
2 INJECTION INTRAMUSCULAR; INTRAVENOUS
Status: DISCONTINUED | OUTPATIENT
Start: 2024-08-14 | End: 2024-08-14 | Stop reason: HOSPADM

## 2024-08-14 RX ORDER — METRONIDAZOLE 250 MG/1
500 TABLET ORAL ONCE
Status: COMPLETED | OUTPATIENT
Start: 2024-08-14 | End: 2024-08-14

## 2024-08-14 RX ORDER — ONDANSETRON 2 MG/ML
4 INJECTION INTRAMUSCULAR; INTRAVENOUS
Status: CANCELLED | OUTPATIENT
Start: 2024-08-14 | End: 2024-08-15

## 2024-08-14 RX ORDER — METRONIDAZOLE 500 MG/1
500 TABLET ORAL 2 TIMES DAILY
Qty: 14 TABLET | Refills: 0 | Status: SHIPPED | OUTPATIENT
Start: 2024-08-14 | End: 2024-08-21

## 2024-08-14 RX ORDER — PROPOFOL 10 MG/ML
INJECTION, EMULSION INTRAVENOUS PRN
Status: DISCONTINUED | OUTPATIENT
Start: 2024-08-14 | End: 2024-08-14 | Stop reason: SDUPTHER

## 2024-08-14 RX ORDER — LABETALOL HYDROCHLORIDE 5 MG/ML
10 INJECTION, SOLUTION INTRAVENOUS
Status: CANCELLED | OUTPATIENT
Start: 2024-08-14

## 2024-08-14 RX ORDER — OXYCODONE HYDROCHLORIDE 5 MG/1
5 TABLET ORAL
Status: CANCELLED | OUTPATIENT
Start: 2024-08-14 | End: 2024-08-15

## 2024-08-14 RX ORDER — IPRATROPIUM BROMIDE AND ALBUTEROL SULFATE 2.5; .5 MG/3ML; MG/3ML
SOLUTION RESPIRATORY (INHALATION)
Status: COMPLETED
Start: 2024-08-14 | End: 2024-08-14

## 2024-08-14 RX ORDER — SODIUM CHLORIDE 0.9 % (FLUSH) 0.9 %
5-40 SYRINGE (ML) INJECTION PRN
Status: CANCELLED | OUTPATIENT
Start: 2024-08-14

## 2024-08-14 RX ORDER — DIPHENHYDRAMINE HYDROCHLORIDE 50 MG/ML
12.5 INJECTION INTRAMUSCULAR; INTRAVENOUS
Status: CANCELLED | OUTPATIENT
Start: 2024-08-14 | End: 2024-08-15

## 2024-08-14 RX ORDER — CEFDINIR 250 MG/5ML
300 POWDER, FOR SUSPENSION ORAL ONCE
Status: COMPLETED | OUTPATIENT
Start: 2024-08-14 | End: 2024-08-14

## 2024-08-14 RX ORDER — NALOXONE HYDROCHLORIDE 0.4 MG/ML
INJECTION, SOLUTION INTRAMUSCULAR; INTRAVENOUS; SUBCUTANEOUS PRN
Status: CANCELLED | OUTPATIENT
Start: 2024-08-14

## 2024-08-14 RX ORDER — SODIUM CHLORIDE 9 MG/ML
INJECTION, SOLUTION INTRAVENOUS PRN
Status: CANCELLED | OUTPATIENT
Start: 2024-08-14

## 2024-08-14 RX ORDER — MEPERIDINE HYDROCHLORIDE 50 MG/ML
12.5 INJECTION INTRAMUSCULAR; INTRAVENOUS; SUBCUTANEOUS EVERY 5 MIN PRN
Status: CANCELLED | OUTPATIENT
Start: 2024-08-14

## 2024-08-14 RX ORDER — SODIUM CHLORIDE 0.9 % (FLUSH) 0.9 %
5-40 SYRINGE (ML) INJECTION EVERY 12 HOURS SCHEDULED
Status: DISCONTINUED | OUTPATIENT
Start: 2024-08-14 | End: 2024-08-14 | Stop reason: HOSPADM

## 2024-08-14 RX ORDER — SODIUM CHLORIDE 9 MG/ML
INJECTION, SOLUTION INTRAVENOUS PRN
Status: DISCONTINUED | OUTPATIENT
Start: 2024-08-14 | End: 2024-08-14 | Stop reason: HOSPADM

## 2024-08-14 RX ORDER — SODIUM CHLORIDE 0.9 % (FLUSH) 0.9 %
5-40 SYRINGE (ML) INJECTION PRN
Status: DISCONTINUED | OUTPATIENT
Start: 2024-08-14 | End: 2024-08-14 | Stop reason: HOSPADM

## 2024-08-14 RX ORDER — LORAZEPAM 2 MG/ML
0.5 INJECTION INTRAMUSCULAR
Status: CANCELLED | OUTPATIENT
Start: 2024-08-14 | End: 2024-08-15

## 2024-08-14 RX ORDER — IPRATROPIUM BROMIDE AND ALBUTEROL SULFATE 2.5; .5 MG/3ML; MG/3ML
1 SOLUTION RESPIRATORY (INHALATION) ONCE
Status: COMPLETED | OUTPATIENT
Start: 2024-08-14 | End: 2024-08-14

## 2024-08-14 RX ORDER — SODIUM CHLORIDE, SODIUM LACTATE, POTASSIUM CHLORIDE, CALCIUM CHLORIDE 600; 310; 30; 20 MG/100ML; MG/100ML; MG/100ML; MG/100ML
INJECTION, SOLUTION INTRAVENOUS CONTINUOUS
Status: DISCONTINUED | OUTPATIENT
Start: 2024-08-14 | End: 2024-08-14 | Stop reason: HOSPADM

## 2024-08-14 RX ORDER — SODIUM CHLORIDE 0.9 % (FLUSH) 0.9 %
5-40 SYRINGE (ML) INJECTION EVERY 12 HOURS SCHEDULED
Status: CANCELLED | OUTPATIENT
Start: 2024-08-14

## 2024-08-14 RX ORDER — SODIUM CHLORIDE, SODIUM LACTATE, POTASSIUM CHLORIDE, CALCIUM CHLORIDE 600; 310; 30; 20 MG/100ML; MG/100ML; MG/100ML; MG/100ML
INJECTION, SOLUTION INTRAVENOUS CONTINUOUS PRN
Status: DISCONTINUED | OUTPATIENT
Start: 2024-08-14 | End: 2024-08-14 | Stop reason: SDUPTHER

## 2024-08-14 RX ORDER — PROCHLORPERAZINE EDISYLATE 5 MG/ML
5 INJECTION INTRAMUSCULAR; INTRAVENOUS
Status: CANCELLED | OUTPATIENT
Start: 2024-08-14 | End: 2024-08-15

## 2024-08-14 RX ORDER — CEFDINIR 250 MG/5ML
300 POWDER, FOR SUSPENSION ORAL 2 TIMES DAILY
Qty: 120 ML | Refills: 0 | Status: SHIPPED | OUTPATIENT
Start: 2024-08-14 | End: 2024-08-24

## 2024-08-14 RX ADMIN — SODIUM CHLORIDE, SODIUM LACTATE, POTASSIUM CHLORIDE, AND CALCIUM CHLORIDE: .6; .31; .03; .02 INJECTION, SOLUTION INTRAVENOUS at 13:44

## 2024-08-14 RX ADMIN — IPRATROPIUM BROMIDE AND ALBUTEROL SULFATE 1 DOSE: .5; 3 SOLUTION RESPIRATORY (INHALATION) at 14:32

## 2024-08-14 RX ADMIN — PROPOFOL 20 MG: 10 INJECTION, EMULSION INTRAVENOUS at 13:54

## 2024-08-14 RX ADMIN — PROPOFOL 20 MG: 10 INJECTION, EMULSION INTRAVENOUS at 13:51

## 2024-08-14 RX ADMIN — PROPOFOL 20 MG: 10 INJECTION, EMULSION INTRAVENOUS at 13:47

## 2024-08-14 RX ADMIN — SODIUM CHLORIDE, POTASSIUM CHLORIDE, SODIUM LACTATE AND CALCIUM CHLORIDE: 600; 310; 30; 20 INJECTION, SOLUTION INTRAVENOUS at 13:10

## 2024-08-14 RX ADMIN — IPRATROPIUM BROMIDE AND ALBUTEROL SULFATE 1 DOSE: 2.5; .5 SOLUTION RESPIRATORY (INHALATION) at 17:20

## 2024-08-14 RX ADMIN — PROPOFOL 80 MG: 10 INJECTION, EMULSION INTRAVENOUS at 13:46

## 2024-08-14 RX ADMIN — IPRATROPIUM BROMIDE AND ALBUTEROL SULFATE 1 DOSE: 2.5; .5 SOLUTION RESPIRATORY (INHALATION) at 14:32

## 2024-08-14 RX ADMIN — PROPOFOL 20 MG: 10 INJECTION, EMULSION INTRAVENOUS at 13:57

## 2024-08-14 RX ADMIN — CEFDINIR 300 MG: 250 POWDER, FOR SUSPENSION ORAL at 18:52

## 2024-08-14 RX ADMIN — PROPOFOL 20 MG: 10 INJECTION, EMULSION INTRAVENOUS at 13:59

## 2024-08-14 RX ADMIN — LIDOCAINE HYDROCHLORIDE 60 MG: 20 INJECTION, SOLUTION INFILTRATION; PERINEURAL at 13:46

## 2024-08-14 RX ADMIN — METRONIDAZOLE 500 MG: 250 TABLET ORAL at 18:51

## 2024-08-14 ASSESSMENT — PAIN SCALES - GENERAL: PAINLEVEL_OUTOF10: 0

## 2024-08-14 ASSESSMENT — COPD QUESTIONNAIRES: CAT_SEVERITY: SEVERE

## 2024-08-14 ASSESSMENT — PAIN - FUNCTIONAL ASSESSMENT
PAIN_FUNCTIONAL_ASSESSMENT: NONE - DENIES PAIN
PAIN_FUNCTIONAL_ASSESSMENT: 0-10

## 2024-08-14 NOTE — ED PROVIDER NOTES
[facility]  ED  EMERGENCY DEPARTMENT ENCOUNTER        Patient Name: Bettie Strauss  MRN: [id number]  Birthdate 1940  Date of evaluation: 8/14/2024  Provider: Jcarlos Cruz MD  PCP: Carlos Eduardo Maya DO  Note Started: 4:36 PM EDT 8/14/24    CHIEF COMPLAINT       Post-op Problem (Patient from [facility]GD, during scope noticed large amount of mucus, Dr. Stevenson concerned for possible aspiration. Pt on NRB 99%, resps labored and appearing anxious.)      HISTORY OF PRESENT ILLNESS: 1 or more Elements     History from : Patient    Limitations to history : None    Bettie Strauss is a 84 y.o. female who presents for evaluation of postoperative problem.  Patient was transferred from EGD due to concern for aspiration.  During the scope, she potentially aspirated mucus.  She was initially placed on nonrebreather, when she was transferred to emerge apartment, she had labored respirations and appeared anxious.  She is on 3 L of oxygen at baseline related to COPD, pulmonary hypertension.    Nursing Notes were all reviewed and agreed with or any disagreements were addressed in the HPI.    REVIEW OF SYSTEMS :      Review of Systems    Positives and Pertinent negatives as per HPI.     SURGICAL HISTORY     Past Surgical History:   Procedure Laterality Date    BRONCHOSCOPY N/A 3/27/2019    BRONCHOSCOPY ALVEOLAR LAVAGE performed by Gregor Chan MD at [facility] ENDOSCOPY    BRONCHOSCOPY  3/27/2019    BRONCHOSCOPY THERAPUTIC ASPIRATION INITIAL performed by Gregor Chan MD at [facility] ENDOSCOPY    CATARACT REMOVAL WITH IMPLANT Bilateral 03/23/2011    COLONOSCOPY N/A 6/12/2019    COLONOSCOPY WITH ANESTHESIA -SLEEP APNEA- performed by Dwain Stevenson MD at [facility] ENDOSCOPY    EPIDURAL STEROID INJECTION Left 12/4/2018    LEFT LUMBAR FOUR, LUMBAR FIVE TRANSFORAMINAL EPIDURAL STEROID INJECTION SITE CONFIRMED BY FLUOROSCOPY performed by Janina Gold MD at [facility] OR    ESOPHAGEAL MOTILITY STUDY

## 2024-08-14 NOTE — DISCHARGE INSTRUCTIONS
ENDOSCOPY:  Inspection of any cavity of the body by means of an endoscopy. An endoscope is a flexible tube that allows direct visualization of the cavity.    You have had a Esophagogastroduodenoscopy    Discharge Instructions    1)  You may experience some lightheadedness for the next several hours. We suggest you plan on quiet relation for the rest of the day.    2)  Because of the sedative drugs in your blood steam, be advised that you should not drive, operate any machinery, or sign contracts for the remainder of the day.    3)  You should not take any alcoholic beverages tonight, and only take sleeping medication that has been specifically prescribed for you by your physician.    4)  Unless instructed differently, resume your regular diet. Eat smaller portions for your first meal and progress to normal amounts over the rest of the day.    5)  If you have any fever, chills, excessive bleeding, uncontrolled pain, increased abdominal bloating, or any other problems, notify your doctor or return to the hospital.    6)  If you have a sore throat, you may use lozenges, or salt water gargles.    7) Call for biopsy results in one week:  [phone]    9)  Special Discharge Instructions:

## 2024-08-14 NOTE — H&P
Pre-sedation Assessment    History and Physical / Pre-Sedation Assessment  Patient:  Bettie Strauss   :   1940     Intended Procedure: EGD      HPI: 85yo F presents for evaluation of AYANNA and LUQ-epigastric discomfort    Past Medical History:   Diagnosis Date    Acid reflux     Acquired hypothyroidism 2017    Anemia     Asthma     CHF (congestive heart failure) ([facility])     COPD (chronic obstructive pulmonary disease) ([facility])     HLD (hyperlipidemia)     Hypertension     Influenza A 2018    MI (myocardial infarction) ([facility])     X 2    Migraine     past hx    HEBERT on CPAP     with oxygen at night    Oxygen dependent     Rheumatoid arthritis     Wears glasses      No current facility-administered medications on file prior to encounter.     Current Outpatient Medications on File Prior to Encounter   Medication Sig Dispense Refill    azelastine (ASTELIN) 0.1 % nasal spray 2 sprays by Nasal route 2 times daily Use in each nostril as directed 60 mL 5    labetalol (NORMODYNE) 100 MG tablet TAKE 1 TABLET BY MOUTH TWICE A  tablet 1    pantoprazole (PROTONIX) 40 MG tablet Take 1 tablet by mouth every morning (before breakfast) 30 tablet 0    atorvastatin (LIPITOR) 10 MG tablet TAKE 1 TABLET BY MOUTH EVERY DAY 90 tablet 1    TRELEGY ELLIPTA 200-62.5-25 MCG/ACT AEPB inhaler INHALE 1 PUFF INTO THE LUNGS DAILY RINSE MOUTH WITH WATER AFTER USE 60 each 5    levothyroxine (SYNTHROID) 50 MCG tablet TAKE 1 TABLET BY MOUTH EVERY DAY 90 tablet 1    spironolactone (ALDACTONE) 25 MG tablet TAKE 1 TABLET BY MOUTH TWICE A  tablet 1    ipratropium 0.5 mg-albuterol 2.5 mg (DUONEB) 0.5-2.5 (3) MG/3ML SOLN nebulizer solution Inhale 3 mLs into the lungs every 4 hours 360 mL 0    albuterol sulfate HFA (VENTOLIN HFA) 108 (90 Base) MCG/ACT inhaler INHALE TWO (2) PUFFS BY MOUTH EVERY 6 HOURS AS NEEDED 18 each 10    colchicine (COLCRYS) 0.6 MG tablet TAKE 1 TABLET (0.6 MG) BY MOUTH EVERY OTHER DAY.      allopurinol  (ZYLOPRIM) 100 MG tablet 150 mg daily      fluticasone (FLONASE) 50 MCG/ACT nasal spray SPRAY 2 SPRAYS INTO EACH NOSTRIL EVERY DAY 1 each 5    pregabalin (LYRICA) 50 MG capsule TAKE 1 CAPSULE BY MOUTH EVERY DAY      fluticasone-umeclidin-vilant (TRELEGY ELLIPTA) 100-62.5-25 MCG/INH AEPB Inhale 1 puff into the lungs daily 1 each 5    DULoxetine (CYMBALTA) 30 MG extended release capsule Take 1 capsule by mouth daily      predniSONE (DELTASONE) 10 MG tablet Take 0.5 tablets by mouth daily      OXYGEN Inhale 3 L/min into the lungs daily as needed       ipratropium-albuterol (DUONEB) 0.5-2.5 (3) MG/3ML SOLN nebulizer solution Inhale 3 mLs into the lungs every 4 hours as needed. 360 mL 3    empagliflozin (JARDIANCE) 10 MG tablet Take 1 tablet by mouth daily 90 tablet 1    ELIQUIS 5 MG TABS tablet TAKE 1 TABLET BY MOUTH IN THE MORNING AND BEFORE BEDTIME 180 tablet 1    nitroGLYCERIN (NITROSTAT) 0.4 MG SL tablet Place 1 tablet under the tongue every 5 minutes as needed for Chest pain 25 tablet 1    naloxone (NARCAN) 4 MG/0.1ML LIQD nasal spray Use as directed 1 each 0    Lift Chair MISC by Does not apply route 1 each 0    Misc. Devices (COMMODE BEDSIDE) MISC Use daily. 1 each 0    Incontinence Supply Disposable MISC 1 each by Does not apply route 4 times daily as needed (incontinence) 120 each 3    Handicap Placard MISC by Does not apply route 02/07/22 1 each 0    ACTEMRA 162 MG/0.9ML SOSY injection Inject into the skin every 14 days      Misc. Devices (WHEELCHAIR) MISC SELF PROPELLED, LIGHT WEIGHT,  STANDARD SIZE 1 each 0    Blood Pressure Monitoring (BLOOD PRESSURE MONITOR/M CUFF) MISC 1 Units by Does not apply route daily 1 each 0       Nurses notes reviewed and agreed.  Medications reviewed  Allergies:   Allergies   Allergen Reactions    Alendronate Sodium      Jaw pain      Humira [Adalimumab]      Rash      Losartan Swelling    Penicillins      rash    Simvastatin Other (See Comments)     Made legs ache    Sulfa

## 2024-08-14 NOTE — ANESTHESIA POSTPROCEDURE EVALUATION
Department of Anesthesiology  Postprocedure Note    Patient: Bettie Strauss  MRN: [id number]  YOB: 1940  Date of evaluation: 8/14/2024    Procedure Summary       Date: 08/14/24 Room / Location: [place] [address] / [facility]    Anesthesia Start: 1344 Anesthesia Stop: 1414    Procedure: ESOPHAGOGASTRODUODENOSCOPY BIOPSY Diagnosis:       Iron deficiency anemia, unspecified iron deficiency anemia type      (Iron deficiency anemia, unspecified iron deficiency anemia type [D50.9])    Surgeons: Dwain Stevenson MD Responsible Provider: Juanpablo Lindsay MD    Anesthesia Type: MAC ASA Status: 4            Anesthesia Type: No value filed.    Mihn Phase I: Minh Score: 7    Minh Phase II: Minh Score: 7    Anesthesia Post Evaluation    Patient location during evaluation: PACU  Patient participation: complete - patient participated  Level of consciousness: awake and alert  Airway patency: patent  Nausea & Vomiting: no vomiting and no nausea  Cardiovascular status: hemodynamically stable and blood pressure returned to baseline  Respiratory status: airway suctioned, face mask and unstable (baseline 3L; now on face mask @ 8L and intolerant of it.  Sending to ER per Dr. Stevenson)  Hydration status: euvolemic  Comments: --------------------            08/14/24               1500     --------------------   BP:     (!) 118/51   Pulse:       75        Resp:      16        Temp:        98.1F       SpO2:      100%     --------------------    Pain management: adequate    No notable events documented.

## 2024-08-14 NOTE — ANESTHESIA PRE PROCEDURE
Department of Anesthesiology  Preprocedure Note       Name:  Bettie Strauss   Age:  84 y.o.  :  1940                                          MRN:  [id number]         Date:  2024      Surgeon: Surgeon(s):  Dwain Stevenson MD    Procedure: Procedure(s):  ESOPHAGOGASTRODUODENOSCOPY    Medications prior to admission:   Prior to Admission medications    Medication Sig Start Date End Date Taking? Authorizing Provider   Ferrous Sulfate (IRON) 325 (65 Fe) MG TABS TAKE 1 TABLET BY MOUTH EVERY DAY 24   Carlos Eduardo Maya DO   ezetimibe (ZETIA) 10 MG tablet TAKE 1 TABLET BY MOUTH EVERY DAY 24   Enzweiler, Diane M, APRN - CNP   ergocalciferol (ERGOCALCIFEROL) 1.25 MG (75798 UT) capsule Take 1 capsule by mouth every 30 days 7/15/24   Eduar Garcia MD   torsemide (DEMADEX) 20 MG tablet Take 1 tablet by mouth every other day 7/15/24   Eduar Garcia MD   azelastine (ASTELIN) 0.1 % nasal spray 2 sprays by Nasal route 2 times daily Use in each nostril as directed 24   Carlos Eduardo Maya DO   empagliflozin (JARDIANCE) 10 MG tablet Take 1 tablet by mouth daily 24   Carlos Eduardo Maya DO   ELIQUIS 5 MG TABS tablet TAKE 1 TABLET BY MOUTH IN THE MORNING AND BEFORE BEDTIME 24   Carlos Eduardo Maya DO   labetalol (NORMODYNE) 100 MG tablet TAKE 1 TABLET BY MOUTH TWICE A DAY 24   Carlos Eduardo Maya DO   nitroGLYCERIN (NITROSTAT) 0.4 MG SL tablet Place 1 tablet under the tongue every 5 minutes as needed for Chest pain 24   Enzweiler, Diane M, APRN - CNP   pantoprazole (PROTONIX) 40 MG tablet Take 1 tablet by mouth every morning (before breakfast) 24   Enzweiler, Diane M, APRN - CNP   atorvastatin (LIPITOR) 10 MG tablet TAKE 1 TABLET BY MOUTH EVERY DAY 24   Carlos Eduardo Maya DO   TRELEGY ELLIPTA 200-62.5-25 MCG/ACT AEPB inhaler INHALE 1 PUFF INTO THE LUNGS DAILY RINSE MOUTH WITH WATER AFTER USE 24   Gregor Chan MD   levothyroxine (SYNTHROID) 50 MCG tablet TAKE 1 TABLET

## 2024-08-14 NOTE — DISCHARGE INSTRUCTIONS
As discussed, the chest x-ray showed possible aspiration.  He had been started on antibiotics to help prevent infection.  Please return for any worsening fever, difficulty breathing or any other concerns.

## 2024-08-14 NOTE — PROGRESS NOTES
Dr. Lindsay at bedside - requested pt use NRB mask and re-assess - to determine if pt needs to stay overnight

## 2024-08-15 ENCOUNTER — TELEPHONE (OUTPATIENT)
Dept: FAMILY MEDICINE CLINIC | Age: 84
End: 2024-08-15

## 2024-08-15 ENCOUNTER — OFFICE VISIT (OUTPATIENT)
Dept: FAMILY MEDICINE CLINIC | Age: 84
End: 2024-08-15
Payer: MEDICAID

## 2024-08-15 VITALS
OXYGEN SATURATION: 92 % | SYSTOLIC BLOOD PRESSURE: 112 MMHG | DIASTOLIC BLOOD PRESSURE: 56 MMHG | HEART RATE: 68 BPM | BODY MASS INDEX: 30.53 KG/M2 | HEIGHT: 66 IN | WEIGHT: 190 LBS

## 2024-08-15 DIAGNOSIS — J69.0 ASPIRATION PNEUMONITIS (HCC): Primary | ICD-10-CM

## 2024-08-15 LAB
EKG ATRIAL RATE: 79 BPM
EKG DIAGNOSIS: NORMAL
EKG P AXIS: 57 DEGREES
EKG P-R INTERVAL: 122 MS
EKG Q-T INTERVAL: 436 MS
EKG QRS DURATION: 130 MS
EKG QTC CALCULATION (BAZETT): 499 MS
EKG R AXIS: 27 DEGREES
EKG T AXIS: 54 DEGREES
EKG VENTRICULAR RATE: 79 BPM

## 2024-08-15 PROCEDURE — 1123F ACP DISCUSS/DSCN MKR DOCD: CPT | Performed by: STUDENT IN AN ORGANIZED HEALTH CARE EDUCATION/TRAINING PROGRAM

## 2024-08-15 PROCEDURE — 3078F DIAST BP <80 MM HG: CPT | Performed by: STUDENT IN AN ORGANIZED HEALTH CARE EDUCATION/TRAINING PROGRAM

## 2024-08-15 PROCEDURE — 93010 ELECTROCARDIOGRAM REPORT: CPT | Performed by: INTERNAL MEDICINE

## 2024-08-15 PROCEDURE — 3074F SYST BP LT 130 MM HG: CPT | Performed by: STUDENT IN AN ORGANIZED HEALTH CARE EDUCATION/TRAINING PROGRAM

## 2024-08-15 PROCEDURE — 99213 OFFICE O/P EST LOW 20 MIN: CPT | Performed by: STUDENT IN AN ORGANIZED HEALTH CARE EDUCATION/TRAINING PROGRAM

## 2024-08-15 NOTE — PROGRESS NOTES
Assessment:  Encounter Diagnosis   Name Primary?    Aspiration pneumonitis (HCC) Yes       Plan:  1. Aspiration pneumonitis (HCC)  Patient currently on baseline oxygen requirement.  No acute respiratory distress.  Breath sounds equal bilaterally.  Recommended continuing Omnicef given low likelihood of allergic reaction given intolerance of first dose and not anaphylactic reaction to penicillin.  Also encouraged to take metronidazole which patient has held as well.  Follow-up if worsening symptoms.      No follow-ups on file.      Patient: Bettie Strauss is a 84 y.o. female who presents today with the following Chief Complaint(s):  Chief Complaint   Patient presents with    Follow-up     Possible aspirations.  Pt have not been taking medication prescribed to her due to the possibility of allergic reaction          HPI    Patient underwent EGD yesterday with concern for possible aspiration during procedure.  Was then evaluated in the emergency room and given a dose of cefdinir and discharged with cefdinir as well as metronidazole for further treatment.  Presents today with granddaughter due to concerns that patient has a history of penicillin allergy and concerned about taking cefdinir.  Does not report any adverse effects with first dosage.  Does not report anaphylaxis from previous penicillin exposure.  Current Outpatient Medications   Medication Sig Dispense Refill    cefdinir (OMNICEF) 250 MG/5ML suspension Take 6 mLs by mouth 2 times daily for 10 days 120 mL 0    metroNIDAZOLE (FLAGYL) 500 MG tablet Take 1 tablet by mouth 2 times daily for 7 days 14 tablet 0    Ferrous Sulfate (IRON) 325 (65 Fe) MG TABS TAKE 1 TABLET BY MOUTH EVERY DAY 90 tablet 1    ezetimibe (ZETIA) 10 MG tablet TAKE 1 TABLET BY MOUTH EVERY DAY 90 tablet 3    ergocalciferol (ERGOCALCIFEROL) 1.25 MG (40915 UT) capsule Take 1 capsule by mouth every 30 days 4 capsule 5    torsemide (DEMADEX) 20 MG tablet Take 1 tablet by mouth every other day

## 2024-08-15 NOTE — TELEPHONE ENCOUNTER
Patient's granddaughter called into the office stating that her grandmother had an EGD done the previous day with complications.  It is believed that the patient aspirated on mucus, and is being treated for aspirate pneumonia. Patient was sent to the ER and held for observation, patient received 1st dose of two different antibiotics.  Patient's granddaughter stated that she told the doctors in the ER that she was allergic to penicillin type antibiotics.   Patient's granddaughter stated that they said they would look at her chart.  Patient received the antibiotics 1st dose before she left the ER.  It was not until they tried to fill the scripts that they realized that there was a problem with the medication because of her allergy.  Patient has not received any more doses of the medication.  Granddaughter states that her grandmother is having left sided chest pain, lower abdomin pain, and back pain.  Patient denies shortness of breath, headache, nausea, dizziness.  Patient temperature 97.6, Pulse 75, Respirations 18, /62, oxygen saturation is at 96%.  Patient denies swelling.  Patient scheduled to come in today at 1:30 pm.  Patient stated that they be running a few minutes late.  Patient's granddaughter advised to call the office back with any changes, questions, or concerns.  Patient's granddaughter verbalized understanding.

## 2024-08-19 DIAGNOSIS — E03.9 ACQUIRED HYPOTHYROIDISM: ICD-10-CM

## 2024-08-19 RX ORDER — LEVOTHYROXINE SODIUM 0.05 MG/1
50 TABLET ORAL DAILY
Qty: 90 TABLET | Refills: 1 | Status: SHIPPED | OUTPATIENT
Start: 2024-08-19

## 2024-08-19 NOTE — TELEPHONE ENCOUNTER
Refill Request     CONFIRM preferred pharmacy with the patient.    If Mail Order Rx - Pend for 90 day refill.      Last Seen: Last Seen Department: 8/15/2024  Last Seen by PCP: 10/16/2023    Last Written: 4/16/24 90 with 1 refill     If no future appointment scheduled:  Review the last OV with PCP and review information for follow-up visit,  Route STAFF MESSAGE with patient name to the  Pool for scheduling with the following information:            -  Timing of next visit           -  Visit type ie Physical, OV, etc           -  Diagnoses/Reason ie. COPD, HTN - Do not use MEDICATION, Follow-up or CHECK UP - Give reason for visit      Next Appointment:   Future Appointments   Date Time Provider Department Center   8/22/2024 12:15 PM Eduar Garcia MD [facility]   9/27/2024  8:00 AM Wilder Birmingham MD [facility]   10/4/2024  8:00 AM Carlos Eduardo Maya DO [facility]       Message sent to  to schedule appt with patient?  NO      Requested Prescriptions     Pending Prescriptions Disp Refills    levothyroxine (SYNTHROID) 50 MCG tablet [Pharmacy Med Name: LEVOTHYROXINE 50 MCG TABLET] 90 tablet 1     Sig: TAKE 1 TABLET BY MOUTH EVERY DAY

## 2024-08-28 ENCOUNTER — TELEPHONE (OUTPATIENT)
Dept: PULMONOLOGY | Age: 84
End: 2024-08-28

## 2024-08-28 ENCOUNTER — OFFICE VISIT (OUTPATIENT)
Dept: PAIN MANAGEMENT | Age: 84
End: 2024-08-28
Payer: MEDICAID

## 2024-08-28 VITALS
BODY MASS INDEX: 30.67 KG/M2 | OXYGEN SATURATION: 97 % | TEMPERATURE: 98.5 F | DIASTOLIC BLOOD PRESSURE: 64 MMHG | WEIGHT: 190 LBS | SYSTOLIC BLOOD PRESSURE: 124 MMHG | HEART RATE: 78 BPM

## 2024-08-28 DIAGNOSIS — M48.061 SPINAL STENOSIS OF LUMBAR REGION WITHOUT NEUROGENIC CLAUDICATION: ICD-10-CM

## 2024-08-28 DIAGNOSIS — M50.121 CERVICAL DISC DISORDER AT C4-C5 LEVEL WITH RADICULOPATHY: ICD-10-CM

## 2024-08-28 DIAGNOSIS — G89.29 BILATERAL CHRONIC KNEE PAIN: ICD-10-CM

## 2024-08-28 DIAGNOSIS — M50.30 DDD (DEGENERATIVE DISC DISEASE), CERVICAL: ICD-10-CM

## 2024-08-28 DIAGNOSIS — M19.019 ARTHRITIS PAIN OF SHOULDER: ICD-10-CM

## 2024-08-28 DIAGNOSIS — M05.79 RHEUMATOID ARTHRITIS INVOLVING MULTIPLE SITES WITH POSITIVE RHEUMATOID FACTOR (HCC): ICD-10-CM

## 2024-08-28 DIAGNOSIS — M25.562 BILATERAL CHRONIC KNEE PAIN: ICD-10-CM

## 2024-08-28 DIAGNOSIS — F32.9 REACTIVE DEPRESSION: ICD-10-CM

## 2024-08-28 DIAGNOSIS — J44.9 CHRONIC OBSTRUCTIVE PULMONARY DISEASE, UNSPECIFIED COPD TYPE (HCC): ICD-10-CM

## 2024-08-28 DIAGNOSIS — M71.38 SYNOVIAL CYST OF LUMBAR SPINE: ICD-10-CM

## 2024-08-28 DIAGNOSIS — G89.4 CHRONIC PAIN SYNDROME: ICD-10-CM

## 2024-08-28 DIAGNOSIS — M25.561 BILATERAL CHRONIC KNEE PAIN: ICD-10-CM

## 2024-08-28 DIAGNOSIS — M54.32 SCIATICA OF LEFT SIDE: ICD-10-CM

## 2024-08-28 DIAGNOSIS — M51.36 DDD (DEGENERATIVE DISC DISEASE), LUMBAR: ICD-10-CM

## 2024-08-28 PROCEDURE — 99214 OFFICE O/P EST MOD 30 MIN: CPT | Performed by: NURSE PRACTITIONER

## 2024-08-28 PROCEDURE — 3074F SYST BP LT 130 MM HG: CPT | Performed by: NURSE PRACTITIONER

## 2024-08-28 PROCEDURE — 1123F ACP DISCUSS/DSCN MKR DOCD: CPT | Performed by: NURSE PRACTITIONER

## 2024-08-28 PROCEDURE — 3078F DIAST BP <80 MM HG: CPT | Performed by: NURSE PRACTITIONER

## 2024-08-28 RX ORDER — LIDOCAINE 50 MG/G
1 PATCH TOPICAL DAILY
Qty: 30 PATCH | Refills: 0 | Status: SHIPPED | OUTPATIENT
Start: 2024-08-28 | End: 2024-09-27

## 2024-08-28 RX ORDER — OXYCODONE AND ACETAMINOPHEN 5; 325 MG/1; MG/1
1 TABLET ORAL EVERY 12 HOURS PRN
Qty: 60 TABLET | Refills: 0 | Status: SHIPPED | OUTPATIENT
Start: 2024-08-28 | End: 2024-09-27

## 2024-08-28 NOTE — PROGRESS NOTES
Bettie DUDLEY Strauss  1940  [id number]    HISTORY OF PRESENT ILLNESS: Ms. Strauss is a 84 y.o. female returns for a follow up visit for pain management  She has a diagnosis of   1. Chronic pain syndrome    2. DDD (degenerative disc disease), cervical    3. Cervical disc disorder at C4-C5 level with radiculopathy    4. DDD (degenerative disc disease), lumbar    5. Spinal stenosis of lumbar region without neurogenic claudication    6. Synovial cyst of lumbar spine    7. Sciatica of left side    8. Arthritis pain of shoulder right severe    9. Bilateral chronic knee pain    10. Rheumatoid arthritis involving multiple sites with positive rheumatoid factor (HCC)    11. Reactive depression    12. Chronic obstructive pulmonary disease, unspecified COPD type (HCC)      As per Information Obtained from the PADT (Patient Assessment and Documentation Tool)    She complains of pain in the left shoulder,back,legs,knees. She rates the pain 9/10 and describes it as pins and needles. Current treatment regimen has helped relieve about 60% of the pain.  She denies any side effects from the current pain regimen. Patient reports that since the last follow up visit the physical functioning is worse, family/social relationships are unchanged, mood is worse sleep patterns are worse, and that the overall functioning is worse.  Patient denies misusing/abusing her narcotic pain medications or using any illegal drugs.      Upon obtaining medical history from Ms. Strauss states that pain is not manageable on current pain therapy. Patient has not been seen since last November. Care path record show that she has been treated in the ER on 5 occasions. Reports having fallen 6 months ago, was also treated for PNA. She has been taking Lyrica through her rheumatology. She has otherwise had tremendous pain to the knees, arms. Stable on 3L O2. Mood/anxiety is stable. Sleep is fair with an average of 5-6 hours. Denies to having issues of constipation.    Gastrointestinal: Negative for nausea, vomiting, abdominal pain, diarrhea, constipation, blood in stool and abdominal distention.     PHYSICAL EXAM:   Nursing note and vitals reviewed. /64   Pulse 78   Temp 98.5 °F (36.9 °C)   Wt 86.2 kg (190 lb)   SpO2 97%   BMI 30.67 kg/m²   Constitutional: She appears well-developed and well-nourished. No acute distress.   Skin: Skin is warm and dry, good turgor. No rash noted. She is not diaphoretic.  Cardiovascular: Normal rate, regular rhythm, normal heart sounds, and does not have murmur.     Pulmonary/Chest: Effort normal. No respiratory distress. She does not have wheezes in the lung fields. She has no rales.     Neurological/Psychiatric:She is alert and oriented to person, place, and time. Came in on w/c  Her mood isAppropriate and affect is Neutral/Euthymic(normal) .     Prescription pain medication monitoring:                  MEDD current = 15.24              ORT Score = 15 high risk              Other Risk factors - (mood) Depression              Date of Last Medication Agreement: 1/5/23              Date Naloxone prescribed: 0              UDT:                          Date of last UDT: 10/23/23                          Adverse report: No              OARRS:                          Checked today: Yes                          Adverse report: No    IMPRESSION:   1. Chronic pain syndrome    2. DDD (degenerative disc disease), cervical    3. Cervical disc disorder at C4-C5 level with radiculopathy    4. DDD (degenerative disc disease), lumbar    5. Spinal stenosis of lumbar region without neurogenic claudication    6. Synovial cyst of lumbar spine    7. Sciatica of left side    8. Arthritis pain of shoulder right severe    9. Bilateral chronic knee pain    10. Rheumatoid arthritis involving multiple sites with positive rheumatoid factor (HCC)    11. Reactive depression    12. Chronic obstructive pulmonary disease, unspecified COPD type ([facility])

## 2024-08-28 NOTE — TELEPHONE ENCOUNTER
Austin guzman documentation of a COPD assessment they send their patients. This patient scored high with concerns and she has not been seen in our office since January. I spoke with patient's granddaughter and scheduled a follow up for 9/6/24.

## 2024-09-06 ENCOUNTER — OFFICE VISIT (OUTPATIENT)
Dept: PULMONOLOGY | Age: 84
End: 2024-09-06
Payer: MEDICAID

## 2024-09-06 VITALS
OXYGEN SATURATION: 93 % | SYSTOLIC BLOOD PRESSURE: 121 MMHG | BODY MASS INDEX: 30.54 KG/M2 | RESPIRATION RATE: 16 BRPM | HEART RATE: 67 BPM | TEMPERATURE: 97.6 F | HEIGHT: 66 IN | DIASTOLIC BLOOD PRESSURE: 63 MMHG

## 2024-09-06 DIAGNOSIS — G47.33 OSA (OBSTRUCTIVE SLEEP APNEA): ICD-10-CM

## 2024-09-06 DIAGNOSIS — M05.79 RHEUMATOID ARTHRITIS INVOLVING MULTIPLE SITES WITH POSITIVE RHEUMATOID FACTOR (HCC): ICD-10-CM

## 2024-09-06 DIAGNOSIS — Z87.891 FORMER SMOKER: ICD-10-CM

## 2024-09-06 DIAGNOSIS — J96.11 CHRONIC RESPIRATORY FAILURE WITH HYPOXIA (HCC): ICD-10-CM

## 2024-09-06 DIAGNOSIS — I27.20 PULMONARY HTN (HCC): Primary | ICD-10-CM

## 2024-09-06 PROCEDURE — 1123F ACP DISCUSS/DSCN MKR DOCD: CPT | Performed by: INTERNAL MEDICINE

## 2024-09-06 PROCEDURE — 99213 OFFICE O/P EST LOW 20 MIN: CPT | Performed by: INTERNAL MEDICINE

## 2024-09-06 PROCEDURE — 3074F SYST BP LT 130 MM HG: CPT | Performed by: INTERNAL MEDICINE

## 2024-09-06 PROCEDURE — 3078F DIAST BP <80 MM HG: CPT | Performed by: INTERNAL MEDICINE

## 2024-09-06 RX ORDER — FLUTICASONE FUROATE, UMECLIDINIUM BROMIDE AND VILANTEROL TRIFENATATE 200; 62.5; 25 UG/1; UG/1; UG/1
1 POWDER RESPIRATORY (INHALATION) DAILY
Qty: 60 EACH | Refills: 5 | Status: SHIPPED | OUTPATIENT
Start: 2024-09-06

## 2024-09-06 NOTE — PATIENT INSTRUCTIONS
Remember to bring a list of pulmonary medications and any CPAP or BiPAP machines to your next appointment with the office.     Please keep all of your future appointments scheduled by [facility] office. Out of respect for other patients and providers, you may be asked to reschedule your appointment if you arrive later than your scheduled appointment time. Appointments cancelled less than 24hrs in advance will be considered a no show. Patients with three missed appointments within 1 year or four missed appointments within 2 years can be dismissed from the practice.     Please be aware that our physicians are required to work in the Intensive Care Unit at [facility].  Your appointment may need to be rescheduled if they are designated to work during your appointment time.      You may receive a survey regarding the care you received during your visit.  Your input is valuable to us.  We encourage you to complete and return your survey.  We hope you will choose us in the future for your healthcare needs.     Pt instructed of all future appointment dates & times, including radiology, labs, procedures & referrals. If procedures were scheduled preparation instructions provided. Instructions on future appointments with [facility] were given.

## 2024-09-06 NOTE — PROGRESS NOTES
for the pedersen  Humidifier in the bedroom and that heating is on will be helpful  Further management depending on patient clinical status

## 2024-09-23 ENCOUNTER — TELEPHONE (OUTPATIENT)
Dept: PULMONOLOGY | Age: 84
End: 2024-09-23

## 2024-09-23 DIAGNOSIS — J43.2 CENTRILOBULAR EMPHYSEMA (HCC): Primary | ICD-10-CM

## 2024-09-25 ENCOUNTER — HOSPITAL ENCOUNTER (OUTPATIENT)
Age: 84
Discharge: HOME | End: 2024-09-25
Payer: MEDICAID

## 2024-09-25 ENCOUNTER — HOSPITAL ENCOUNTER (OUTPATIENT)
Dept: PULMONOLOGY | Age: 84
Discharge: HOME | End: 2024-09-25
Payer: MEDICAID

## 2024-09-25 DIAGNOSIS — J43.2 CENTRILOBULAR EMPHYSEMA (HCC): ICD-10-CM

## 2024-09-25 LAB
BASOPHILS # BLD: 0 K/UL (ref 0–0.2)
BASOPHILS NFR BLD: 0.7 %
DEPRECATED RDW RBC AUTO: 21.9 % (ref 12.4–15.4)
EOSINOPHIL # BLD: 0 K/UL (ref 0–0.6)
EOSINOPHIL NFR BLD: 0.6 %
FERRITIN SERPL IA-MCNC: 55.1 NG/ML (ref 15–150)
HCT VFR BLD AUTO: 36 % (ref 36–48)
HGB BLD-MCNC: 11.6 G/DL (ref 12–16)
IRON SATN MFR SERPL: 17 % (ref 15–50)
IRON SERPL-MCNC: 45 UG/DL (ref 37–145)
LYMPHOCYTES # BLD: 1.2 K/UL (ref 1–5.1)
LYMPHOCYTES NFR BLD: 15.4 %
MCH RBC QN AUTO: 28.9 PG (ref 26–34)
MCHC RBC AUTO-ENTMCNC: 32.3 G/DL (ref 31–36)
MCV RBC AUTO: 89.5 FL (ref 80–100)
MONOCYTES # BLD: 0.7 K/UL (ref 0–1.3)
MONOCYTES NFR BLD: 9.2 %
NEUTROPHILS # BLD: 5.6 K/UL (ref 1.7–7.7)
NEUTROPHILS NFR BLD: 74.1 %
PLATELET # BLD AUTO: 214 K/UL (ref 135–450)
PMV BLD AUTO: 8.6 FL (ref 5–10.5)
RBC # BLD AUTO: 4.02 M/UL (ref 4–5.2)
TIBC SERPL-MCNC: 265 UG/DL (ref 260–445)
WBC # BLD AUTO: 7.5 K/UL (ref 4–11)

## 2024-09-25 PROCEDURE — 36415 COLL VENOUS BLD VENIPUNCTURE: CPT

## 2024-09-25 PROCEDURE — 83550 IRON BINDING TEST: CPT

## 2024-09-25 PROCEDURE — 85025 COMPLETE CBC W/AUTO DIFF WBC: CPT

## 2024-09-25 PROCEDURE — 94618 PULMONARY STRESS TESTING: CPT

## 2024-09-25 PROCEDURE — 83540 ASSAY OF IRON: CPT

## 2024-09-25 PROCEDURE — 82728 ASSAY OF FERRITIN: CPT

## 2024-09-26 DIAGNOSIS — J43.2 CENTRILOBULAR EMPHYSEMA (HCC): Primary | ICD-10-CM

## 2024-09-26 RX ORDER — IPRATROPIUM BROMIDE AND ALBUTEROL SULFATE 2.5; .5 MG/3ML; MG/3ML
3 SOLUTION RESPIRATORY (INHALATION) EVERY 4 HOURS PRN
Qty: 360 ML | Refills: 5 | Status: SHIPPED | OUTPATIENT
Start: 2024-09-26

## 2024-09-27 ENCOUNTER — TELEPHONE (OUTPATIENT)
Dept: PULMONOLOGY | Age: 84
End: 2024-09-27

## 2024-09-27 DIAGNOSIS — J43.2 CENTRILOBULAR EMPHYSEMA (HCC): Primary | ICD-10-CM

## 2024-09-27 DIAGNOSIS — J96.11 CHRONIC RESPIRATORY FAILURE WITH HYPOXIA: ICD-10-CM

## 2024-10-03 ENCOUNTER — TELEPHONE (OUTPATIENT)
Dept: PULMONOLOGY | Age: 84
End: 2024-10-03

## 2024-10-03 NOTE — TELEPHONE ENCOUNTER
Cornelio Jose is calling asking for results from oxygen testing from 9.25.24. Please fax to  F#[phone].

## 2024-10-18 ENCOUNTER — OFFICE VISIT (OUTPATIENT)
Dept: FAMILY MEDICINE CLINIC | Age: 84
End: 2024-10-18
Payer: MEDICAID

## 2024-10-18 VITALS
DIASTOLIC BLOOD PRESSURE: 66 MMHG | BODY MASS INDEX: 30.55 KG/M2 | HEIGHT: 66 IN | HEART RATE: 76 BPM | OXYGEN SATURATION: 98 % | SYSTOLIC BLOOD PRESSURE: 132 MMHG | TEMPERATURE: 96.9 F

## 2024-10-18 DIAGNOSIS — I50.32 CHRONIC DIASTOLIC CONGESTIVE HEART FAILURE (HCC): ICD-10-CM

## 2024-10-18 DIAGNOSIS — J96.11 CHRONIC RESPIRATORY FAILURE WITH HYPOXIA: ICD-10-CM

## 2024-10-18 DIAGNOSIS — N18.32 STAGE 3B CHRONIC KIDNEY DISEASE (HCC): ICD-10-CM

## 2024-10-18 DIAGNOSIS — Q21.10 ATRIAL SEPTAL DEFECT: ICD-10-CM

## 2024-10-18 DIAGNOSIS — J43.2 CENTRILOBULAR EMPHYSEMA (HCC): ICD-10-CM

## 2024-10-18 DIAGNOSIS — I27.20 PULMONARY HTN (HCC): ICD-10-CM

## 2024-10-18 DIAGNOSIS — M05.79 RHEUMATOID ARTHRITIS INVOLVING MULTIPLE SITES WITH POSITIVE RHEUMATOID FACTOR (HCC): ICD-10-CM

## 2024-10-18 DIAGNOSIS — I82.463 ACUTE DEEP VEIN THROMBOSIS (DVT) OF CALF MUSCLE VEIN OF BOTH LOWER EXTREMITIES (HCC): ICD-10-CM

## 2024-10-18 DIAGNOSIS — Z23 NEED FOR INFLUENZA VACCINATION: ICD-10-CM

## 2024-10-18 DIAGNOSIS — R73.03 PREDIABETES: Primary | ICD-10-CM

## 2024-10-18 LAB — HBA1C MFR BLD: 5.7 %

## 2024-10-18 PROCEDURE — 3075F SYST BP GE 130 - 139MM HG: CPT | Performed by: STUDENT IN AN ORGANIZED HEALTH CARE EDUCATION/TRAINING PROGRAM

## 2024-10-18 PROCEDURE — 83036 HEMOGLOBIN GLYCOSYLATED A1C: CPT | Performed by: STUDENT IN AN ORGANIZED HEALTH CARE EDUCATION/TRAINING PROGRAM

## 2024-10-18 PROCEDURE — 3078F DIAST BP <80 MM HG: CPT | Performed by: STUDENT IN AN ORGANIZED HEALTH CARE EDUCATION/TRAINING PROGRAM

## 2024-10-18 PROCEDURE — 99214 OFFICE O/P EST MOD 30 MIN: CPT | Performed by: STUDENT IN AN ORGANIZED HEALTH CARE EDUCATION/TRAINING PROGRAM

## 2024-10-18 PROCEDURE — 1123F ACP DISCUSS/DSCN MKR DOCD: CPT | Performed by: STUDENT IN AN ORGANIZED HEALTH CARE EDUCATION/TRAINING PROGRAM

## 2024-10-18 PROCEDURE — 90653 IIV ADJUVANT VACCINE IM: CPT | Performed by: STUDENT IN AN ORGANIZED HEALTH CARE EDUCATION/TRAINING PROGRAM

## 2024-10-18 PROCEDURE — 90471 IMMUNIZATION ADMIN: CPT | Performed by: STUDENT IN AN ORGANIZED HEALTH CARE EDUCATION/TRAINING PROGRAM

## 2024-10-18 SDOH — ECONOMIC STABILITY: FOOD INSECURITY: WITHIN THE PAST 12 MONTHS, YOU WORRIED THAT YOUR FOOD WOULD RUN OUT BEFORE YOU GOT MONEY TO BUY MORE.: NEVER TRUE

## 2024-10-18 SDOH — ECONOMIC STABILITY: FOOD INSECURITY: WITHIN THE PAST 12 MONTHS, THE FOOD YOU BOUGHT JUST DIDN'T LAST AND YOU DIDN'T HAVE MONEY TO GET MORE.: NEVER TRUE

## 2024-10-18 SDOH — ECONOMIC STABILITY: INCOME INSECURITY: HOW HARD IS IT FOR YOU TO PAY FOR THE VERY BASICS LIKE FOOD, HOUSING, MEDICAL CARE, AND HEATING?: NOT HARD AT ALL

## 2024-10-18 NOTE — PROGRESS NOTES
Assessment:  Encounter Diagnoses   Name Primary?    Prediabetes Yes    Atrial septal defect     Chronic respiratory failure with hypoxia     Centrilobular emphysema (HCC)     Rheumatoid arthritis involving multiple sites with positive rheumatoid factor (HCC)     Stage 3b chronic kidney disease (HCC)     Chronic diastolic congestive heart failure (HCC)     Pulmonary HTN (HCC)     Acute deep vein thrombosis (DVT) of calf muscle vein of both lower extremities (HCC)     Need for influenza vaccination        Plan:  1. Prediabetes  -     POCT glycosylated hemoglobin (Hb A1C)  2. Atrial septal defect  3. Chronic respiratory failure with hypoxia  4. Centrilobular emphysema (HCC)  5. Rheumatoid arthritis involving multiple sites with positive rheumatoid factor (HCC)  6. Stage 3b chronic kidney disease (HCC)  7. Chronic diastolic congestive heart failure (HCC)  8. Pulmonary HTN (HCC)  9. Acute deep vein thrombosis (DVT) of calf muscle vein of both lower extremities (HCC)  10. Need for influenza vaccination  -     Influenza, FLUAD Trivalent, (age 65 y+), IM, Preservative Free, 0.5mL  A1c improved from 6.5-5.7 with Jardiance and will continue.  Continues to follow with Dr. Garcia for renal concerns.  Following closely with rheumatology for rheumatoid arthritis and compliant with medications.  Patient does have increased lower extremity edema compared to baseline and recommended utilization of both compression socks and taking Lasix daily for the next 4 days rather than every other day.      No follow-ups on file.      Patient: Bettie Strauss is a 84 y.o. female who presents today with the following Chief Complaint(s):  Chief Complaint   Patient presents with    Diabetes     Leg swelling in right foot x2wks    Other     Wants to know if she's healthy enough to go to [place]// recommend pulmonary doc         HPI    HfpEF  Torsemide 20 every other day  Spironolactone, BB     P-Afib  -Labetalol, eliquis     CAD  Lipitor 10mg

## 2024-10-22 ENCOUNTER — TELEPHONE (OUTPATIENT)
Dept: FAMILY MEDICINE CLINIC | Age: 84
End: 2024-10-22

## 2024-10-22 NOTE — TELEPHONE ENCOUNTER
Patient granddaughter calling and states the bruise on the patients left leg is moving up her leg from where it was on Friday last week. Has moved to her up to her knee cap now , she is in pain, spot is burning , warm to the touch.         [phone] (home) [phone] (work)  [phone]

## 2024-10-24 ENCOUNTER — HOSPITAL ENCOUNTER (OUTPATIENT)
Dept: NUCLEAR MEDICINE | Age: 84
Discharge: HOME | End: 2024-10-24
Payer: MEDICAID

## 2024-10-24 DIAGNOSIS — R14.0 ABDOMINAL DISTENTION: ICD-10-CM

## 2024-10-24 PROCEDURE — A9541 TC99M SULFUR COLLOID: HCPCS | Performed by: INTERNAL MEDICINE

## 2024-10-24 PROCEDURE — 78264 GASTRIC EMPTYING IMG STUDY: CPT

## 2024-10-24 PROCEDURE — 3430000000 HC RX DIAGNOSTIC RADIOPHARMACEUTICAL: Performed by: INTERNAL MEDICINE

## 2024-10-24 RX ADMIN — Medication 1 MILLICURIE: at 09:26

## 2024-10-24 NOTE — TELEPHONE ENCOUNTER
Spoke with yoni patient granddaughter, she stated that the swelling is still there the lasix did not make much of a difference.     I have scheduled her with you for Friday at 915am please make sure this time is ok if not I can call to R/S thank you

## 2024-10-25 ENCOUNTER — OFFICE VISIT (OUTPATIENT)
Dept: FAMILY MEDICINE CLINIC | Age: 84
End: 2024-10-25
Payer: MEDICAID

## 2024-10-25 VITALS
HEIGHT: 66 IN | HEART RATE: 55 BPM | OXYGEN SATURATION: 95 % | DIASTOLIC BLOOD PRESSURE: 60 MMHG | TEMPERATURE: 96.9 F | BODY MASS INDEX: 30.37 KG/M2 | WEIGHT: 189 LBS | SYSTOLIC BLOOD PRESSURE: 126 MMHG

## 2024-10-25 DIAGNOSIS — R60.0 EDEMA OF LEFT LOWER EXTREMITY: ICD-10-CM

## 2024-10-25 DIAGNOSIS — R32 URINARY INCONTINENCE, UNSPECIFIED TYPE: Primary | ICD-10-CM

## 2024-10-25 LAB
BASOPHILS # BLD: 0 K/UL (ref 0–0.2)
BASOPHILS NFR BLD: 0.4 %
D-DIMER QUANTITATIVE: 1.18 UG/ML FEU (ref 0–0.6)
DEPRECATED RDW RBC AUTO: 18.5 % (ref 12.4–15.4)
EOSINOPHIL # BLD: 0.1 K/UL (ref 0–0.6)
EOSINOPHIL NFR BLD: 1 %
HCT VFR BLD AUTO: 33.6 % (ref 36–48)
HGB BLD-MCNC: 11.2 G/DL (ref 12–16)
LYMPHOCYTES # BLD: 1.3 K/UL (ref 1–5.1)
LYMPHOCYTES NFR BLD: 16.7 %
MCH RBC QN AUTO: 29.7 PG (ref 26–34)
MCHC RBC AUTO-ENTMCNC: 33.3 G/DL (ref 31–36)
MCV RBC AUTO: 89.3 FL (ref 80–100)
MONOCYTES # BLD: 0.7 K/UL (ref 0–1.3)
MONOCYTES NFR BLD: 8.6 %
NEUTROPHILS # BLD: 5.7 K/UL (ref 1.7–7.7)
NEUTROPHILS NFR BLD: 73.3 %
PLATELET # BLD AUTO: 267 K/UL (ref 135–450)
PMV BLD AUTO: 8.5 FL (ref 5–10.5)
RBC # BLD AUTO: 3.76 M/UL (ref 4–5.2)
WBC # BLD AUTO: 7.8 K/UL (ref 4–11)

## 2024-10-25 PROCEDURE — 1123F ACP DISCUSS/DSCN MKR DOCD: CPT | Performed by: STUDENT IN AN ORGANIZED HEALTH CARE EDUCATION/TRAINING PROGRAM

## 2024-10-25 PROCEDURE — 99213 OFFICE O/P EST LOW 20 MIN: CPT | Performed by: STUDENT IN AN ORGANIZED HEALTH CARE EDUCATION/TRAINING PROGRAM

## 2024-10-25 PROCEDURE — 3078F DIAST BP <80 MM HG: CPT | Performed by: STUDENT IN AN ORGANIZED HEALTH CARE EDUCATION/TRAINING PROGRAM

## 2024-10-25 PROCEDURE — 3074F SYST BP LT 130 MM HG: CPT | Performed by: STUDENT IN AN ORGANIZED HEALTH CARE EDUCATION/TRAINING PROGRAM

## 2024-10-25 NOTE — PROGRESS NOTES
Assessment:  Encounter Diagnoses   Name Primary?    Urinary incontinence, unspecified type Yes    Edema of left lower extremity        Plan:  1. Urinary incontinence, unspecified type  -     Incontinence Supplies MISC; 4 TIMES DAILY PRN Starting Fri 10/25/2024, Disp-360 each, R-5, Normal  2. Edema of left lower extremity  -     CBC with Auto Differential; Future  -     D-Dimer, Quantitative; Future  Patient is significantly sedentary with chronic but worsening lower extremity edema. Will check D-dimer and US if elevated. Also requires incontinence supplies.        No follow-ups on file.      Patient: Bettie Strauss is a 84 y.o. female who presents today with the following Chief Complaint(s):  Chief Complaint   Patient presents with    Swelling         HPI    Patient reports that swelling on left lower extremity has been worse than usual. Also notices bruising to back of left achilles/heel. Does not remember any injury or reason for bruising. No worsening of chronic shortness of breath and remains on 2L oxygen requirement.   Current Outpatient Medications   Medication Sig Dispense Refill    Incontinence Supplies MISC 1 each by Does not apply route 4 times daily as needed (Urinary incontinence) 360 each 5    denosumab (PROLIA) 60 MG/ML SOSY SC injection Inject 1 mL into the skin      empagliflozin (JARDIANCE) 10 MG tablet Take 1 tablet by mouth daily 90 tablet 1    ipratropium 0.5 mg-albuterol 2.5 mg (DUONEB) 0.5-2.5 (3) MG/3ML SOLN nebulizer solution Inhale 3 mLs into the lungs every 4 hours as needed for Shortness of Breath or Wheezing J44.9 360 mL 5    fluticasone-umeclidin-vilant (TRELEGY ELLIPTA) 200-62.5-25 MCG/ACT AEPB inhaler Inhale 1 puff into the lungs daily Rinse mouth with water after use 60 each 5    levothyroxine (SYNTHROID) 50 MCG tablet TAKE 1 TABLET BY MOUTH EVERY DAY 90 tablet 1    Ferrous Sulfate (IRON) 325 (65 Fe) MG TABS TAKE 1 TABLET BY MOUTH EVERY DAY 90 tablet 1    ezetimibe (ZETIA) 10 MG

## 2024-10-29 DIAGNOSIS — R79.89 ELEVATED D-DIMER: ICD-10-CM

## 2024-10-29 DIAGNOSIS — R60.0 LOWER EXTREMITY EDEMA: Primary | ICD-10-CM

## 2024-10-31 ENCOUNTER — HOSPITAL ENCOUNTER (OUTPATIENT)
Dept: VASCULAR LAB | Age: 84
Discharge: HOME | End: 2024-11-02
Attending: STUDENT IN AN ORGANIZED HEALTH CARE EDUCATION/TRAINING PROGRAM
Payer: MEDICAID

## 2024-10-31 DIAGNOSIS — R79.89 ELEVATED D-DIMER: ICD-10-CM

## 2024-10-31 DIAGNOSIS — R60.0 LOWER EXTREMITY EDEMA: ICD-10-CM

## 2024-10-31 PROCEDURE — 93971 EXTREMITY STUDY: CPT

## 2024-11-19 NOTE — TELEPHONE ENCOUNTER
Refill Request     CONFIRM preferred pharmacy with the patient.    If Mail Order Rx - Pend for 90 day refill.      Last Seen: Last Seen Department: 10/25/2024  Last Seen by PCP: 10/25/2024    Last Written: 3/14/24 #180 - 1 refill     If no future appointment scheduled:  Review the last OV with PCP and review information for follow-up visit,  Route STAFF MESSAGE with patient name to the  Pool for scheduling with the following information:            -  Timing of next visit           -  Visit type ie Physical, OV, etc           -  Diagnoses/Reason ie. COPD, HTN - Do not use MEDICATION, Follow-up or CHECK UP - Give reason for visit      Next Appointment:   Future Appointments   Date Time Provider Department Center   1/10/2025  1:15 PM Eduar Garcia MD [facility]   3/7/2025  8:30 AM Cheryle Eubanks, APRN - CNP AND PULM [facility]   4/18/2025 12:00 PM Carlos Eduardo Maya, DO [facility] DEP       Message sent to  to schedule appt with patient?  NO      Requested Prescriptions     Pending Prescriptions Disp Refills    spironolactone (ALDACTONE) 25 MG tablet [Pharmacy Med Name: SPIRONOLACTONE 25 MG TABLET] 180 tablet 1     Sig: TAKE 1 TABLET BY MOUTH TWICE A DAY

## 2024-11-20 RX ORDER — SPIRONOLACTONE 25 MG/1
TABLET ORAL
Qty: 180 TABLET | Refills: 3 | Status: SHIPPED | OUTPATIENT
Start: 2024-11-20

## 2024-11-27 DIAGNOSIS — J43.2 CENTRILOBULAR EMPHYSEMA (HCC): ICD-10-CM

## 2024-11-27 RX ORDER — ALBUTEROL SULFATE 90 UG/1
INHALANT RESPIRATORY (INHALATION)
Qty: 8.5 EACH | Refills: 10 | Status: SHIPPED | OUTPATIENT
Start: 2024-11-27

## 2024-12-04 ENCOUNTER — TELEPHONE (OUTPATIENT)
Dept: FAMILY MEDICINE CLINIC | Age: 84
End: 2024-12-04

## 2024-12-04 NOTE — TELEPHONE ENCOUNTER
Patient would like to know if she can get a prescription for disposable depends sent to the pharmacy. Please call and let the patient know if we can do this for her at [phone]. Thank you!

## 2024-12-07 ENCOUNTER — TELEPHONE (OUTPATIENT)
Dept: FAMILY MEDICINE CLINIC | Age: 84
End: 2024-12-07

## 2024-12-07 ENCOUNTER — PATIENT MESSAGE (OUTPATIENT)
Dept: FAMILY MEDICINE CLINIC | Age: 84
End: 2024-12-07

## 2024-12-09 DIAGNOSIS — I26.99 ACUTE PULMONARY EMBOLISM, UNSPECIFIED PULMONARY EMBOLISM TYPE, UNSPECIFIED WHETHER ACUTE COR PULMONALE PRESENT (HCC): ICD-10-CM

## 2024-12-09 DIAGNOSIS — I82.401 RECURRENT ACUTE DEEP VEIN THROMBOSIS (DVT) OF RIGHT LOWER EXTREMITY (HCC): ICD-10-CM

## 2024-12-09 NOTE — TELEPHONE ENCOUNTER
Received call from Bree gregory.  Granddaughter stated the eliquis that was sent in over the weekend was 2.5 mg BID and patient takes 5 mg BID.  Advised granddaughter to have patient take 2 2.5 mg tablets BID but I would have Dr. Maya send in the correct dose.  Advised she can continue that dose until that script is gone.    Granddaughter stated patient accidentally took 2 of her levothyroxine this morning.  Granddaughter would like to know if this is going to cause any issues?    Granddaughter also stated patients legs are still very swollen.  Weights:  12/9 196.8  12/8 195  12/7 192.3  12/6 195.6  12/5 194.2  12/4 196.3  12/3 193.9  Granddaughter has not contacted cardiologist or nephrologist regarding the swelling, she would like to know what Dr. Maya recommends.    Eliquis pended for correct dose.  Please advise regarding levothyroxine and leg swelling.

## 2024-12-10 NOTE — TELEPHONE ENCOUNTER
Bree notified of correct medication dose.    Asking about patient's leg swelling    Granddaughter also stated patients legs are still very swollen.  Weights:  12/9 196.8  12/8 195  12/7 192.3  12/6 195.6  12/5 194.2  12/4 196.3  12/3 193.9  Granddaughter has not contacted cardiologist or nephrologist regarding the swelling, she would like to know what Dr. Maya recommends     Please advise.  Thanks

## 2024-12-12 ENCOUNTER — HOSPITAL ENCOUNTER (EMERGENCY)
Age: 84
Discharge: HOME | End: 2024-12-13
Attending: STUDENT IN AN ORGANIZED HEALTH CARE EDUCATION/TRAINING PROGRAM
Payer: MEDICAID
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
## 2024-12-12 PROCEDURE — 99284 EMERGENCY DEPT VISIT MOD MDM: CPT

## 2024-12-12 ASSESSMENT — PAIN SCALES - GENERAL: PAINLEVEL_OUTOF10: 6

## 2024-12-12 ASSESSMENT — PAIN - FUNCTIONAL ASSESSMENT: PAIN_FUNCTIONAL_ASSESSMENT: 0-10

## 2024-12-12 NOTE — TELEPHONE ENCOUNTER
Grand daughter called the office back stating patient is taking Demadex 20 mg twice daily and has been for a while now.  - Nephrology is prescribing this medication. Grand daughter asking what dose would you like patient to be taking since she is already taking this medication twice daily.     Please advise.

## 2024-12-12 NOTE — TELEPHONE ENCOUNTER
Grand Daughter informed and if weight and swelling not improved in 3 days she will call to schedule an appt with PCP.

## 2024-12-13 ENCOUNTER — CARE COORDINATION (OUTPATIENT)
Dept: CARE COORDINATION | Age: 84
End: 2024-12-13

## 2024-12-13 ENCOUNTER — APPOINTMENT (OUTPATIENT)
Dept: CT IMAGING | Age: 84
End: 2024-12-13
Payer: MEDICAID

## 2024-12-13 ENCOUNTER — TELEPHONE (OUTPATIENT)
Dept: FAMILY MEDICINE CLINIC | Age: 84
End: 2024-12-13

## 2024-12-13 ENCOUNTER — APPOINTMENT (OUTPATIENT)
Dept: GENERAL RADIOLOGY | Age: 84
End: 2024-12-13
Payer: MEDICAID

## 2024-12-13 VITALS
TEMPERATURE: 97.7 F | OXYGEN SATURATION: 93 % | DIASTOLIC BLOOD PRESSURE: 72 MMHG | RESPIRATION RATE: 12 BRPM | SYSTOLIC BLOOD PRESSURE: 136 MMHG | HEART RATE: 71 BPM | BODY MASS INDEX: 30.52 KG/M2 | WEIGHT: 189 LBS

## 2024-12-13 PROCEDURE — 71045 X-RAY EXAM CHEST 1 VIEW: CPT

## 2024-12-13 PROCEDURE — 73560 X-RAY EXAM OF KNEE 1 OR 2: CPT

## 2024-12-13 PROCEDURE — 6370000000 HC RX 637 (ALT 250 FOR IP): Performed by: STUDENT IN AN ORGANIZED HEALTH CARE EDUCATION/TRAINING PROGRAM

## 2024-12-13 PROCEDURE — 72131 CT LUMBAR SPINE W/O DYE: CPT

## 2024-12-13 PROCEDURE — 72128 CT CHEST SPINE W/O DYE: CPT

## 2024-12-13 PROCEDURE — 70450 CT HEAD/BRAIN W/O DYE: CPT

## 2024-12-13 PROCEDURE — 72125 CT NECK SPINE W/O DYE: CPT

## 2024-12-13 PROCEDURE — 73502 X-RAY EXAM HIP UNI 2-3 VIEWS: CPT

## 2024-12-13 RX ORDER — ACETAMINOPHEN 500 MG
1000 TABLET ORAL ONCE
Status: COMPLETED | OUTPATIENT
Start: 2024-12-13 | End: 2024-12-13

## 2024-12-13 RX ADMIN — ACETAMINOPHEN 1000 MG: 500 TABLET ORAL at 00:09

## 2024-12-13 NOTE — ED PROVIDER NOTES
[facility]  ED     EMERGENCY DEPARTMENT ENCOUNTER         Pt Name: Bettie Strauss   MRN: [id number]   Birthdate 1940   Date of evaluation: 12/12/2024   Provider: Eron Regalado MD   PCP: Carlos Eduardo Myaa DO   Note Started: 7:31 AM EST 12/13/24       Chief Complaint     Fall (Pt had a fall at home while cooking. Was shuffling backwards and her legs were weak and gave out. Having pain in her lower left side of her back. Increased SOB, already wears 3L at home. Hit left side of head, pt takes blood thinners)      History of Present Illness     Bettie Strauss is a 84 y.o. female who presents for evaluation after mechanical fall as the patient is on anticoagulation.  The patient was at home cooking she lives with her daughter who is bedside and confirms the history she was stepping backwards and felt weak or tripped no other symptoms no chest pain shortness of breath no syncope fell to the ground striking her left back and head no loss of consciousness.  Patient has chronic hypoxic respiratory failure felt somewhat short of breath though oxygen adequately on her baseline supplementation.  Given the head injury in particular she presents for evaluation as she is on anticoagulation      Review of Systems     Positives and pertinent negatives as per HPI.    Past Medical, Surgical, Family, and Social History     She has a past medical history of Acid reflux, Acquired hypothyroidism, Anemia, Asthma, CHF (congestive heart failure) ([facility]), COPD (chronic obstructive pulmonary disease) ([facility]), HLD (hyperlipidemia), Hypertension, Influenza A, MI (myocardial infarction) ([facility]), Migraine, HEBERT on CPAP, Oxygen dependent, Rheumatoid arthritis, and Wears glasses.  She has a past surgical history that includes Hysterectomy; knee surgery; Foot surgery; Wrist surgery; Cataract removal with implant (Bilateral, 03/23/2011); pr njx dx/ther sbst intrlmnr crv/thrc w/img gdn (Left, 8/21/2018); epidural steroid

## 2024-12-13 NOTE — TELEPHONE ENCOUNTER
ED Follow Up Call/ Schedule appt   [facility]   Reason: fall   Date: 12/12/24    Appt scheduled: n/a      Comments: Patient declined an appt at this time.     Future Appointments   Date Time Provider Department Center   1/10/2025  1:15 PM Eduar Garcia MD [facility]   3/7/2025  8:30 AM Cheryle Eubanks, APRN - CNP AND PULM [facility]   4/25/2025  9:30 AM Carlos Eduardo Maya DO [facility] DEP          full range of motion in all extremities

## 2024-12-13 NOTE — CARE COORDINATION
[facility] ED Follow up Call    Reason for ED visit:  Fall  Status:     not changed    Did you call your PCP prior to going to the ED?  No      Did you receive a discharge instructions from the Emergency Room? Yes  Review of Instructions:     Understands what to report/when to return?:  Yes   Understands discharge instructions?:  Yes   Following discharge instructions?:  Yes   If not why?     Are there any new complaints of pain? No  New Pain Meds? N/A    Constipation prophylaxis needed?  N/A    If you have a wound is the dressing clean, dry, and intact? N/A  Understands wound care regimen? N/A    Are there any other complaints/concerns that you wish to tell your provider?    AC completed follow up call with Maylin patient granddaughter verified on HIPAA form. Maylin said that they are all still resting at this time. Maylin said she did not need additional follow up calls with AC at this time and would follow up with PCP if anything were needed.     FU appts/Provider:    Future Appointments   Date Time Provider Department Center   1/10/2025  1:15 PM Eduar Garcia MD [facility]   3/7/2025  8:30 AM Cheryle Eubanks, APRN - CNP AND PULM [facility]   4/25/2025  9:30 AM Carlos Eduardo Maya, DO [facility] DEP           New Medications?:   No          Any further needs in the home i.e. Equipment?  No    Link to services in community?:  No   Which services:  N/A

## 2024-12-13 NOTE — ED NOTES
Pt assisted ambulating/transferring to wheelchair from the stretcher. O2 saturation stable at 93% on 3L nasal cannula. Pt family member stated she could help assist pt transferring at home.

## 2024-12-13 NOTE — DISCHARGE INSTRUCTIONS
You were evaluated in the emergency department for fall. Assessments and testing completed during your visit were reassuring and at this time there is no indication for further testing, treatment or admission to the hospital. Given this it is appropriate to discharge you from the emergency department. At the time of discharge we discussed the following:    Please review this visit to the emerged department with your primary doctor and return with any new or worsening symptoms    Please note that sometimes it is difficult to diagnose a medical condition early in the disease process before the disease is fully manifest. Because of this, should you develop any new or worsening symptoms, you may return at any time to the emergency department for another evaluation. If available you are also recommended to review this visit with your primary care physician or other medical provider in the next 7 days. Thank you for allowing us to care for you today.

## 2024-12-17 DIAGNOSIS — E78.00 PURE HYPERCHOLESTEROLEMIA: ICD-10-CM

## 2024-12-17 DIAGNOSIS — I10 ESSENTIAL HYPERTENSION: ICD-10-CM

## 2024-12-17 DIAGNOSIS — I50.32 CHRONIC DIASTOLIC HEART FAILURE (HCC): ICD-10-CM

## 2024-12-17 RX ORDER — ATORVASTATIN CALCIUM 10 MG/1
TABLET, FILM COATED ORAL
Qty: 90 TABLET | Refills: 3 | Status: SHIPPED | OUTPATIENT
Start: 2024-12-17

## 2024-12-17 RX ORDER — LABETALOL 100 MG/1
TABLET, FILM COATED ORAL
Qty: 180 TABLET | Refills: 3 | Status: SHIPPED | OUTPATIENT
Start: 2024-12-17

## 2025-01-03 NOTE — TELEPHONE ENCOUNTER
Faxed completed fmla for caregiver to [facility] @ [phone]    Patient signed cover sheet for consent never

## 2025-01-13 DIAGNOSIS — D50.9 IRON DEFICIENCY ANEMIA, UNSPECIFIED IRON DEFICIENCY ANEMIA TYPE: ICD-10-CM

## 2025-01-13 RX ORDER — FERROUS SULFATE 325(65) MG
1 TABLET ORAL DAILY
Qty: 30 TABLET | Refills: 5 | Status: SHIPPED | OUTPATIENT
Start: 2025-01-13

## 2025-01-13 NOTE — TELEPHONE ENCOUNTER
Refill Request     CONFIRM preferred pharmacy with the patient.    If Mail Order Rx - Pend for 90 day refill.      Last Seen: Last Seen Department: 10/25/2024  Last Seen by PCP: 10/25/2024    Last Written: 7/22/24 90 with 1 refill     If no future appointment scheduled:  Review the last OV with PCP and review information for follow-up visit,  Route STAFF MESSAGE with patient name to the  Pool for scheduling with the following information:            -  Timing of next visit           -  Visit type ie Physical, OV, etc           -  Diagnoses/Reason ie. COPD, HTN - Do not use MEDICATION, Follow-up or CHECK UP - Give reason for visit      Next Appointment:   Future Appointments   Date Time Provider Department Center   1/30/2025  4:00 PM Eduar Garcia MD [facility]   3/7/2025  8:30 AM Cheryle Eubanks APRN - CNP AND PULM [facility]   4/25/2025  9:30 AM Carlos Eduardo Maya DO [facility]       Message sent to  to schedule appt with patient?  NO      Requested Prescriptions     Pending Prescriptions Disp Refills    ferrous sulfate (IRON 325) 325 (65 Fe) MG tablet [Pharmacy Med Name: FERROUS SULFATE 325 MG TABLET] 30 tablet 5     Sig: TAKE 1 TABLET BY MOUTH EVERY DAY

## 2025-01-16 NOTE — TELEPHONE ENCOUNTER
Refill Request     CONFIRM preferred pharmacy with the patient.    If Mail Order Rx - Pend for 90 day refill.      Last Seen: Last Seen Department: 10/25/2024  Last Seen by PCP: 10/25/2024    Last Written: 12/07/24 180 with 0 refills     If no future appointment scheduled:  Review the last OV with PCP and review information for follow-up visit,  Route STAFF MESSAGE with patient name to the  Pool for scheduling with the following information:            -  Timing of next visit           -  Visit type ie Physical, OV, etc           -  Diagnoses/Reason ie. COPD, HTN - Do not use MEDICATION, Follow-up or CHECK UP - Give reason for visit      Next Appointment:   Future Appointments   Date Time Provider Department Center   1/30/2025  4:00 PM Eduar Garcia MD [facility]   3/7/2025  8:30 AM Cheryle Eubanks, APRN - CNP AND PULM [facility]   4/25/2025  9:30 AM Carlos Eduardo Maya DO [facility] DEP       Message sent to  to schedule appt with patient?  NO      Requested Prescriptions     Pending Prescriptions Disp Refills    ELIQUIS 2.5 MG TABS tablet [Pharmacy Med Name: ELIQUIS 2.5 MG TABLET] 180 tablet 0     Sig: TAKE 1 TABLET BY MOUTH TWICE A DAY

## 2025-01-17 RX ORDER — APIXABAN 2.5 MG/1
2.5 TABLET, FILM COATED ORAL 2 TIMES DAILY
Qty: 180 TABLET | Refills: 3 | Status: SHIPPED | OUTPATIENT
Start: 2025-01-17

## 2025-01-28 ENCOUNTER — HOSPITAL ENCOUNTER (OUTPATIENT)
Age: 85
Setting detail: SPECIMEN
Discharge: HOME | End: 2025-01-28
Payer: MEDICAID

## 2025-01-28 DIAGNOSIS — N18.30 STAGE 3 CHRONIC KIDNEY DISEASE, UNSPECIFIED WHETHER STAGE 3A OR 3B CKD (HCC): ICD-10-CM

## 2025-01-28 PROCEDURE — 82043 UR ALBUMIN QUANTITATIVE: CPT

## 2025-01-28 PROCEDURE — 82570 ASSAY OF URINE CREATININE: CPT

## 2025-01-29 LAB
CREAT UR-MCNC: 118 MG/DL (ref 28–259)
MICROALBUMIN UR DL<=1MG/L-MCNC: <1.2 MG/DL
MICROALBUMIN/CREAT UR: NORMAL MG/G (ref 0–30)

## 2025-01-29 SDOH — HEALTH STABILITY: PHYSICAL HEALTH: ON AVERAGE, HOW MANY MINUTES DO YOU ENGAGE IN EXERCISE AT THIS LEVEL?: 0 MIN

## 2025-01-29 SDOH — HEALTH STABILITY: PHYSICAL HEALTH: ON AVERAGE, HOW MANY DAYS PER WEEK DO YOU ENGAGE IN MODERATE TO STRENUOUS EXERCISE (LIKE A BRISK WALK)?: 0 DAYS

## 2025-01-30 ENCOUNTER — OFFICE VISIT (OUTPATIENT)
Dept: ORTHOPEDIC SURGERY | Age: 85
End: 2025-01-30
Payer: MEDICAID

## 2025-01-30 VITALS — WEIGHT: 189 LBS | BODY MASS INDEX: 30.37 KG/M2 | RESPIRATION RATE: 16 BRPM | HEIGHT: 66 IN

## 2025-01-30 DIAGNOSIS — M17.11 PRIMARY OSTEOARTHRITIS OF RIGHT KNEE: Primary | ICD-10-CM

## 2025-01-30 DIAGNOSIS — Z79.01 CHRONIC ANTICOAGULATION: ICD-10-CM

## 2025-01-30 DIAGNOSIS — M05.79 RHEUMATOID ARTHRITIS INVOLVING MULTIPLE SITES WITH POSITIVE RHEUMATOID FACTOR (HCC): ICD-10-CM

## 2025-01-30 DIAGNOSIS — Z99.81 OXYGEN DEPENDENT: ICD-10-CM

## 2025-01-30 DIAGNOSIS — F11.90 CHRONIC, CONTINUOUS USE OF OPIOIDS: ICD-10-CM

## 2025-01-30 PROCEDURE — 1123F ACP DISCUSS/DSCN MKR DOCD: CPT | Performed by: PHYSICIAN ASSISTANT

## 2025-01-30 PROCEDURE — 99214 OFFICE O/P EST MOD 30 MIN: CPT | Performed by: PHYSICIAN ASSISTANT

## 2025-01-30 RX ORDER — ALLOPURINOL 300 MG/1
300 TABLET ORAL DAILY
COMMUNITY
Start: 2024-12-25

## 2025-01-30 RX ORDER — PREDNISONE 5 MG/1
TABLET ORAL
COMMUNITY
Start: 2025-01-10

## 2025-01-30 NOTE — PROGRESS NOTES
Chief Complaint   Patient presents with    Follow-up     6 mo emphysema     Sleep Apnea       Bettie Strauss comes in today for follow-up of sleep apnea and her breathing. She is a former patient of Dr. Chan's with significant medical history of HTN, DVT, CHF, RA and emphysema.  She is a former smoker, quit 1/22/09. She is to have upcoming Right knee surgery with Dr. Tono ARIZA at [facility] and clearance has been requested. She continues daily prednisone 5 mg, Trelegy, and using albuterol about 1-2 times a day. She continues oxygen therapy 3 L. She is here today with granddaughter. States her breathing is at her baseline.  Grand daughter states she has not had any respiratory issues through the night.  Denies recent respiratory illnesses.  She denies past reactions to anesthesia.  She was in the ED 12/13/24 sp fall.     Diagnosed with moderate obstructive sleep apnea on the split night study done 1/30/23. (AHI 15.9, desat to 81%, weight 185 lbs)   PAP titration study done  showed pressure of  7 cm best controlled apnea.  She does not tolerate PAP therapy currently do to mask being too loose.  She continues to wear oxygen at night however would like to get back on PAP therapy.             8/26/2022     9:23 AM 5/26/2022     1:59 PM 7/28/2021     9:30 AM 2/18/2021    10:56 AM 1/20/2021     2:41 PM   Sleep Medicine   Sitting and reading 0 1 0 0 0   Watching TV 1 1 1 0 3   Sitting, inactive in a public place (e.g. a theatre or a meeting) 0 0 0 0 0   As a passenger in a car for an hour without a break 0 0 0 0 0   Lying down to rest in the afternoon when circumstances permit 1 1 0 3 1   Sitting and talking to someone 0 0 0 0 0   Sitting quietly after a lunch without alcohol 1 0 0 2 2   In a car, while stopped for a few minutes in traffic 0 0 0 0 0   Benton Sleepiness Score 3 3 1 5 6   Neck (Inches)  14.25        0 = no chance of dozing  1 = slight chance of dozing  2 = moderate chance of dozing  3 =

## 2025-01-30 NOTE — PROGRESS NOTES
ORTHOPAEDIC OFFICE NOTE    Chief Complaint   Patient presents with    Knee Pain     Rt knee pain         Shoulder Pain     Knee Pain     1/30/25  Ms. Strauss presents today for new problem: Right knee pain  Onset many years  No acute injury  H/o rheumatoid arthritis and gout, sees [facility] rheumatology and is on prednisone, Actemra, and allopurinol  She has had multiple corticosteroid injections in the right knee from her rheumatologist, last injection 6 months ago with minimal relief  Pain is located around the entire knee and is described as very severe  Worse when attempting to weight-bear or just trying to bend the knee  The patient mostly is wheelchair-bound, but does use a rollator walker at times  She is on chronic Percocet by pain management, which does help her knee pain  She is also tried heat, CBD cream, and physical therapy in the past, all with minimal relief  She does endorse history of right knee arthroscopy with partial meniscectomies in 2003, uneventful recovery      6/13/24  1 year postop from right reverse shoulder arthroplasty  She reports overall the shoulder was doing well, however she had some increasing pain a couple weeks ago  There is no injury or trauma  She reports she noticed it when lifting a filled up Toni cup  Pain is mainly superior  Rated 6 out of 10  No swelling or ecchymosis  She is mainly wheelchair-bound  She has been taking it easy with the right shoulder since the onset of pain  Her granddaughter who is with her today reports prior to that, she was moving her right shoulder all over without problems      10/26/23  Shanelle returns to clinic today for follow-up of her right reverse shoulder arthroplasty  She is approximately 4.5 months postop  She reports her right shoulder is doing well  She does describe some intermittent pain in the distal anterior brachium region  She denies pain proximally in the shoulder area  She is in chronic pain management, she has rheumatoid

## 2025-01-30 NOTE — PROGRESS NOTES
this time. Recent stress test low risk.    Echocardiogram as ordered at last OV to reassess cardiac structure and function.  I would asked that this be completed prior to full clearance for OR.  Patient voiced understanding will have at our [place] location next week.  Will provide clearance once cardiac testing is completed and reviewed.     Your provider has ordered testing for further evaluation.  An order/prescription has been included in your paper work.   To schedule outpatient testing, contact Central Scheduling by calling [address] [place] [address] ([phone]).      Follow up with NP Diane Enzweiler in 6 months and 1 year with me.     Scribe's attestation: This note was scribed in the presence of Dr. Wilder Birmingham M.D. By María Gtz RN       The scribe’s documentation has been prepared under my direction and personally reviewed by me in its entirety.  I confirm that the note above accurately reflects all work, treatment, procedures, and medical decision making performed by me.  I, Wilder Birmingham MD, personally performed the services described in this documentation as scribed by María Gtz RN  in my presence, and it is both accurate and complete to the best of our ability.       I will address the patient's cardiac risk factors and adjusted pharmacologic treatment as needed. In addition, I have reinforced the need for patient directed risk factor modification.  All questions and concerns were addressed to the patient/family. Alternatives to my treatment were discussed.     Thank you for allowing us to participate in the care of Bettie Strauss. Please call me with any questions [phone].    Wilder Birmingham MD, [facility]  Cardiovascular Disease  [facility]  [phone] [place] Office  [phone] [place] Office  1/31/2025 11:13 AM

## 2025-01-31 ENCOUNTER — OFFICE VISIT (OUTPATIENT)
Dept: PULMONOLOGY | Age: 85
End: 2025-01-31
Payer: MEDICAID

## 2025-01-31 ENCOUNTER — OFFICE VISIT (OUTPATIENT)
Dept: CARDIOLOGY CLINIC | Age: 85
End: 2025-01-31
Payer: MEDICAID

## 2025-01-31 VITALS
RESPIRATION RATE: 18 BRPM | HEART RATE: 84 BPM | DIASTOLIC BLOOD PRESSURE: 67 MMHG | WEIGHT: 193.13 LBS | SYSTOLIC BLOOD PRESSURE: 144 MMHG | OXYGEN SATURATION: 91 % | HEIGHT: 67 IN | BODY MASS INDEX: 30.31 KG/M2 | TEMPERATURE: 97.2 F

## 2025-01-31 VITALS
OXYGEN SATURATION: 89 % | HEART RATE: 79 BPM | HEIGHT: 65 IN | DIASTOLIC BLOOD PRESSURE: 56 MMHG | WEIGHT: 193 LBS | BODY MASS INDEX: 32.15 KG/M2 | SYSTOLIC BLOOD PRESSURE: 116 MMHG

## 2025-01-31 DIAGNOSIS — Z87.891 FORMER SMOKER: ICD-10-CM

## 2025-01-31 DIAGNOSIS — Z01.811 PREOP PULMONARY/RESPIRATORY EXAM: Primary | ICD-10-CM

## 2025-01-31 DIAGNOSIS — G47.33 OSA (OBSTRUCTIVE SLEEP APNEA): ICD-10-CM

## 2025-01-31 DIAGNOSIS — I50.32 CHRONIC DIASTOLIC CONGESTIVE HEART FAILURE (HCC): Primary | ICD-10-CM

## 2025-01-31 DIAGNOSIS — I48.0 PAROXYSMAL ATRIAL FIBRILLATION (HCC): ICD-10-CM

## 2025-01-31 DIAGNOSIS — I25.10 CORONARY ARTERY CALCIFICATION SEEN ON CAT SCAN: ICD-10-CM

## 2025-01-31 DIAGNOSIS — M17.11 OSTEOARTHRITIS OF RIGHT KNEE, UNSPECIFIED OSTEOARTHRITIS TYPE: ICD-10-CM

## 2025-01-31 DIAGNOSIS — J43.2 CENTRILOBULAR EMPHYSEMA (HCC): ICD-10-CM

## 2025-01-31 DIAGNOSIS — J96.11 CHRONIC RESPIRATORY FAILURE WITH HYPOXIA: ICD-10-CM

## 2025-01-31 DIAGNOSIS — I10 HTN (HYPERTENSION), BENIGN: ICD-10-CM

## 2025-01-31 PROCEDURE — 1123F ACP DISCUSS/DSCN MKR DOCD: CPT | Performed by: NURSE PRACTITIONER

## 2025-01-31 PROCEDURE — 3077F SYST BP >= 140 MM HG: CPT | Performed by: NURSE PRACTITIONER

## 2025-01-31 PROCEDURE — 99214 OFFICE O/P EST MOD 30 MIN: CPT | Performed by: NURSE PRACTITIONER

## 2025-01-31 PROCEDURE — 99214 OFFICE O/P EST MOD 30 MIN: CPT | Performed by: INTERNAL MEDICINE

## 2025-01-31 PROCEDURE — 3074F SYST BP LT 130 MM HG: CPT | Performed by: INTERNAL MEDICINE

## 2025-01-31 PROCEDURE — 93000 ELECTROCARDIOGRAM COMPLETE: CPT | Performed by: INTERNAL MEDICINE

## 2025-01-31 PROCEDURE — 1123F ACP DISCUSS/DSCN MKR DOCD: CPT | Performed by: INTERNAL MEDICINE

## 2025-01-31 PROCEDURE — 3078F DIAST BP <80 MM HG: CPT | Performed by: INTERNAL MEDICINE

## 2025-01-31 PROCEDURE — 3078F DIAST BP <80 MM HG: CPT | Performed by: NURSE PRACTITIONER

## 2025-01-31 ASSESSMENT — ENCOUNTER SYMPTOMS
SHORTNESS OF BREATH: 1
ABDOMINAL PAIN: 0
VOMITING: 0
SORE THROAT: 0
RHINORRHEA: 0
EYE PAIN: 0
CHEST TIGHTNESS: 0
NAUSEA: 0
DIARRHEA: 0
COUGH: 1
WHEEZING: 0

## 2025-01-31 NOTE — PROGRESS NOTES
MA Communication:  The following orders are received by verbal communication from   Cheryle Eubanks CNP    Orders include:  FU 6 mo

## 2025-01-31 NOTE — PATIENT INSTRUCTIONS
.Remember to bring a list of pulmonary medications and any CPAP or BiPAP machines to your next appointment with the office.     Please keep all of your future appointments scheduled by [facility] [place] [facility] office. Out of respect for other patients and providers, you may be asked to reschedule your appointment if you arrive later than your scheduled appointment time. Appointments cancelled less than 24hrs in advance will be considered a no show. Patients with three missed appointments within 1 year or four missed appointments within 2 years can be dismissed from the practice.     Please be aware that our physicians are required to work in the Intensive Care Unit at [facility].  Your appointment may need to be rescheduled if they are designated to work during your appointment time.      You may receive a survey regarding the care you received during your visit.  Your input is valuable to us.  We encourage you to complete and return your survey.  We hope you will choose us in the future for your healthcare needs.     Pt instructed of all future appointment dates & times, including radiology, labs, procedures & referrals. If procedures were scheduled preparation instructions provided. Instructions on future appointments with [facility] were given.     In the next few weeks, you will be receiving a survey from [facility] regarding your visit today.  We would greatly appreciate it if you would take just a few minutes to fill that out.  It is very important to us that our patients receive top notch care and our surveys help keep us accountable. However, if your experience was not a good one, we want to hear about that as well. This is a key way we can keep track of problems and strive to correct any for future visits.    Again, we appreciate your time and thank you for choosing [facility]!    KUN Marc L.P.N., ORTIZ  Call with worsening symptoms such as increased shortness of

## 2025-01-31 NOTE — PATIENT INSTRUCTIONS
Plan:   Continue conservative measures for dependent edema: Elevate your legs to heart level while at rest, low salt diet, compression stockings as tolerated.   Weigh daily.  Keep a log.   Goal weight will be around 188-190 lbs likely.  Continue Eliquis for stroke prophylaxis in the setting of atrial fibrillation.    Continue atorvastatin/zetia for management of coronary artery disease.  She is without angina at this time. Recent stress test low risk.    EKG today for pre op clearance.    Echocardiogram as ordered to further assess congestive heart failure and shortness of breath prior to obtaining cardiac clearance.   Will provide clearance once cardiac testing is completed and reviewed.     Your provider has ordered testing for further evaluation.  An order/prescription has been included in your paper work.   To schedule outpatient testing, contact Central Scheduling by calling [employer] ([phone]).      Follow up with NP Diane Enzweiler in 6 months and 1 year with me.

## 2025-02-07 ENCOUNTER — HOSPITAL ENCOUNTER (OUTPATIENT)
Dept: PHYSICAL THERAPY | Age: 85
Setting detail: THERAPIES SERIES
Discharge: HOME | End: 2025-02-07
Attending: ORTHOPAEDIC SURGERY
Payer: MEDICAID

## 2025-02-07 ENCOUNTER — TELEPHONE (OUTPATIENT)
Dept: ORTHOPEDIC SURGERY | Age: 85
End: 2025-02-07

## 2025-02-07 DIAGNOSIS — M25.561 RIGHT ANTERIOR KNEE PAIN: Primary | ICD-10-CM

## 2025-02-07 DIAGNOSIS — M17.11 PRIMARY OSTEOARTHRITIS OF RIGHT KNEE: ICD-10-CM

## 2025-02-07 PROCEDURE — 97161 PT EVAL LOW COMPLEX 20 MIN: CPT | Performed by: PHYSICAL THERAPIST

## 2025-02-07 PROCEDURE — 97110 THERAPEUTIC EXERCISES: CPT | Performed by: PHYSICAL THERAPIST

## 2025-02-07 NOTE — TELEPHONE ENCOUNTER
Surgery and/or Procedure Scheduling     Contact Name: Doris Strauss   Surgical/Procedure Request: SCHEDULE SURGERY  Patient Contact Number: [phone]      PATIENT GRAND DAUGHTER CALLING TO GET SURGERY SCHEDULED.    PLEASE CALL DORIS BACK AT THE ABOVE NUMBER.

## 2025-02-18 ENCOUNTER — APPOINTMENT (OUTPATIENT)
Dept: PHYSICAL THERAPY | Age: 85
End: 2025-02-18
Attending: ORTHOPAEDIC SURGERY
Payer: MEDICAID

## 2025-02-19 ENCOUNTER — HOSPITAL ENCOUNTER (OUTPATIENT)
Dept: PHYSICAL THERAPY | Age: 85
Setting detail: THERAPIES SERIES
End: 2025-02-19
Attending: ORTHOPAEDIC SURGERY
Payer: MEDICAID

## 2025-02-24 DIAGNOSIS — E03.9 ACQUIRED HYPOTHYROIDISM: ICD-10-CM

## 2025-02-24 RX ORDER — LEVOTHYROXINE SODIUM 50 UG/1
50 TABLET ORAL DAILY
Qty: 90 TABLET | Refills: 1 | Status: SHIPPED | OUTPATIENT
Start: 2025-02-24

## 2025-02-24 NOTE — TELEPHONE ENCOUNTER
Refill Request     CONFIRM preferred pharmacy with the patient.    If Mail Order Rx - Pend for 90 day refill.      Last Seen: Last Seen Department: 10/25/2024  Last Seen by PCP: 10/25/2024    Last Written: 8/19/2024 90 tab 1 refills    If no future appointment scheduled:  Review the last OV with PCP and review information for follow-up visit,  Route STAFF MESSAGE with patient name to the  Pool for scheduling with the following information:            -  Timing of next visit           -  Visit type ie Physical, OV, etc           -  Diagnoses/Reason ie. COPD, HTN - Do not use MEDICATION, Follow-up or CHECK UP - Give reason for visit      Next Appointment:   Future Appointments   Date Time Provider Department Center   2/27/2025 10:00 AM Lazaro Power MD [facility]   4/25/2025  9:30 AM Carlos Eduardo Maya DO [facility] DEP   6/2/2025  4:15 PM Eduar Garcia MD [facility]   7/31/2025  8:30 AM Cheryle Eubanks APRN - CNP AND PULM [facility]   8/4/2025 10:30 AM Enzweiler, Mariela M, APRN - CNP [facility]       Message sent to  to schedule appt with patient?  NO      Requested Prescriptions     Pending Prescriptions Disp Refills    levothyroxine (SYNTHROID) 50 MCG tablet [Pharmacy Med Name: LEVOTHYROXINE 50 MCG TABLET] 90 tablet 1     Sig: TAKE 1 TABLET BY MOUTH EVERY DAY

## 2025-02-27 ENCOUNTER — OFFICE VISIT (OUTPATIENT)
Dept: ORTHOPEDIC SURGERY | Age: 85
End: 2025-02-27
Payer: MEDICAID

## 2025-02-27 VITALS — WEIGHT: 193 LBS | BODY MASS INDEX: 32.15 KG/M2 | RESPIRATION RATE: 16 BRPM | HEIGHT: 65 IN

## 2025-02-27 DIAGNOSIS — M17.11 PRIMARY OSTEOARTHRITIS OF RIGHT KNEE: Primary | ICD-10-CM

## 2025-02-27 DIAGNOSIS — M21.061 GENU VALGUM, RIGHT: ICD-10-CM

## 2025-02-27 DIAGNOSIS — M24.661 ARTHROFIBROSIS OF KNEE JOINT, RIGHT: ICD-10-CM

## 2025-02-27 PROCEDURE — 99213 OFFICE O/P EST LOW 20 MIN: CPT | Performed by: ORTHOPAEDIC SURGERY

## 2025-02-27 PROCEDURE — 1123F ACP DISCUSS/DSCN MKR DOCD: CPT | Performed by: ORTHOPAEDIC SURGERY

## 2025-02-27 NOTE — PROGRESS NOTES
ORTHOPAEDIC OFFICE NOTE    Chief Complaint   Patient presents with    Follow-up     R knee        2/27/25  Patient returns to clinic today for follow-up of her right knee pain, arthritis  Pain and stiffness unchanged  She was only able to go to one therapy session in the past month due to illness  She is mainly wheelchair-bound, granddaughter reports she will stand and ambulate short distances in the house only  She reports her prior shoulder arthroplasty is doing well      1/30/25  Ms. Strauss presents today for new problem: Right knee pain  Onset many years  No acute injury  H/o rheumatoid arthritis and gout, sees [facility] rheumatology and is on prednisone, Actemra, and allopurinol  She has had multiple corticosteroid injections in the right knee from her rheumatologist, last injection 6 months ago with minimal relief  Pain is located around the entire knee and is described as very severe  Worse when attempting to weight-bear or just trying to bend the knee  The patient mostly is wheelchair-bound, but does use a rollator walker at times  She is on chronic Percocet by pain management, which does help her knee pain  She is also tried heat, CBD cream, and physical therapy in the past, all with minimal relief  She does endorse history of right knee arthroscopy with partial meniscectomies in 2003, uneventful recovery      6/13/24  1 year postop from right reverse shoulder arthroplasty  She reports overall the shoulder was doing well, however she had some increasing pain a couple weeks ago  There is no injury or trauma  She reports she noticed it when lifting a filled up Toni cup  Pain is mainly superior  Rated 6 out of 10  No swelling or ecchymosis  She is mainly wheelchair-bound  She has been taking it easy with the right shoulder since the onset of pain  Her granddaughter who is with her today reports prior to that, she was moving her right shoulder all over without problems      10/26/23  Shanelle returns to clinic

## 2025-03-17 RX ORDER — EMPAGLIFLOZIN 10 MG/1
10 TABLET, FILM COATED ORAL DAILY
Qty: 90 TABLET | Refills: 3 | Status: SHIPPED | OUTPATIENT
Start: 2025-03-17

## 2025-03-17 NOTE — TELEPHONE ENCOUNTER
Refill Request     CONFIRM preferred pharmacy with the patient.    If Mail Order Rx - Pend for 90 day refill.      Last Seen: Last Seen Department: 10/25/2024  Last Seen by PCP: 10/25/2024    Last Written: 9/27/24 90 with 1 refill     If no future appointment scheduled:  Review the last OV with PCP and review information for follow-up visit,  Route STAFF MESSAGE with patient name to the  Pool for scheduling with the following information:            -  Timing of next visit           -  Visit type ie Physical, OV, etc           -  Diagnoses/Reason ie. COPD, HTN - Do not use MEDICATION, Follow-up or CHECK UP - Give reason for visit      Next Appointment:   Future Appointments   Date Time Provider Department Center   4/25/2025  9:30 AM Carlos Eduardo Maya DO [facility]   6/2/2025  4:15 PM Eduar Garcia MD [facility]   7/31/2025  8:30 AM Cheryle Eubanks APRN - CNP [facility] PULM [facility]   8/4/2025 10:30 AM Enzweiler, Diane M, APRN - C[facility]       Message sent to  to schedule appt with patient?  NO      Requested Prescriptions     Pending Prescriptions Disp Refills    JARDIANCE 10 MG tablet [Pharmacy Med Name: JARDIANCE 10 MG TABLET] 90 tablet 1     Sig: TAKE 1 TABLET BY MOUTH EVERY DAY

## 2025-03-24 ENCOUNTER — TELEPHONE (OUTPATIENT)
Dept: FAMILY MEDICINE CLINIC | Age: 85
End: 2025-03-24

## 2025-03-24 DIAGNOSIS — J96.11 CHRONIC RESPIRATORY FAILURE WITH HYPOXIA: Primary | ICD-10-CM

## 2025-03-24 DIAGNOSIS — R53.81 PHYSICAL DEBILITY: ICD-10-CM

## 2025-03-24 DIAGNOSIS — M05.79 RHEUMATOID ARTHRITIS INVOLVING MULTIPLE SITES WITH POSITIVE RHEUMATOID FACTOR (HCC): ICD-10-CM

## 2025-03-24 NOTE — TELEPHONE ENCOUNTER
Kaylie from [facility] calling asking if Dr. Maya can put in a RX for patient to get a wheelchair. Kaylie stated that we can fax the rx to [facility] and they will take care of it. Please Advise   Fax: [phone]

## 2025-03-26 DIAGNOSIS — J43.2 CENTRILOBULAR EMPHYSEMA (HCC): ICD-10-CM

## 2025-03-26 RX ORDER — IPRATROPIUM BROMIDE AND ALBUTEROL SULFATE 2.5; .5 MG/3ML; MG/3ML
3 SOLUTION RESPIRATORY (INHALATION) EVERY 4 HOURS PRN
Qty: 360 ML | Refills: 5 | Status: SHIPPED | OUTPATIENT
Start: 2025-03-26

## 2025-03-30 DIAGNOSIS — K21.00 GASTROESOPHAGEAL REFLUX DISEASE WITH ESOPHAGITIS, UNSPECIFIED WHETHER HEMORRHAGE: ICD-10-CM

## 2025-03-31 RX ORDER — PANTOPRAZOLE SODIUM 40 MG/1
40 TABLET, DELAYED RELEASE ORAL 2 TIMES DAILY
Qty: 60 TABLET | Refills: 2 | Status: SHIPPED | OUTPATIENT
Start: 2025-03-31

## 2025-03-31 NOTE — TELEPHONE ENCOUNTER
Refill Request     CONFIRM preferred pharmacy with the patient.    If Mail Order Rx - Pend for 90 day refill.      Last Seen: Last Seen Department: 10/25/2024  Last Seen by PCP: 10/25/2024    Last Written: 5/16/2024 30 tab 0 refills    If no future appointment scheduled:  Review the last OV with PCP and review information for follow-up visit,  Route STAFF MESSAGE with patient name to the  Pool for scheduling with the following information:            -  Timing of next visit           -  Visit type ie Physical, OV, etc           -  Diagnoses/Reason ie. COPD, HTN - Do not use MEDICATION, Follow-up or CHECK UP - Give reason for visit      Next Appointment:   Future Appointments   Date Time Provider Department Center   4/25/2025  9:30 AM Carlos Eduardo Maya DO [facility]   6/2/2025  4:15 PM Eduar Garcia MD [facility]   7/31/2025  8:30 AM Cheryle Eubanks APRN - CNP [facility] PULM [facility]   8/4/2025 10:30 AM Enzweiler, Mariela M, APRN - C[facility]       Message sent to  to schedule appt with patient?  NO      Requested Prescriptions     Pending Prescriptions Disp Refills    pantoprazole (PROTONIX) 40 MG tablet [Pharmacy Med Name: PANTOPRAZOLE SOD DR 40 MG TAB] 60 tablet      Sig: TAKE 1 TABLET BY MOUTH TWICE A DAY

## 2025-04-04 DIAGNOSIS — R60.0 BILATERAL LEG EDEMA: ICD-10-CM

## 2025-04-04 DIAGNOSIS — R06.02 SOB (SHORTNESS OF BREATH): ICD-10-CM

## 2025-04-04 NOTE — TELEPHONE ENCOUNTER
Refill Request     CONFIRM preferred pharmacy with the patient.    If Mail Order Rx - Pend for 90 day refill.      Last Seen: Last Seen Department: 10/25/2024  Last Seen by PCP: 10/25/2024    Last Written: 07/15/24 torsemide 20 mg, 15 tab, refills 5    If no future appointment scheduled:  Review the last OV with PCP and review information for follow-up visit,  Route STAFF MESSAGE with patient name to the  Pool for scheduling with the following information:            -  Timing of next visit           -  Visit type ie Physical, OV, etc           -  Diagnoses/Reason ie. COPD, HTN - Do not use MEDICATION, Follow-up or CHECK UP - Give reason for visit      Next Appointment:   Future Appointments   Date Time Provider Department Center   4/25/2025  9:30 AM Carlos Eduardo Maya DO [facility]   6/2/2025  4:15 PM Eduar Garcia MD [facility]   7/31/2025  8:30 AM Cheryle Eubanks APRN - CNP [facility]   8/4/2025 10:30 AM Enzweiler, Diane M, APRN - [facility]       Message sent to  to schedule appt with patient?  NO      Requested Prescriptions     Pending Prescriptions Disp Refills    torsemide (DEMADEX) 20 MG tablet 15 tablet 5     Sig: Take 1 tablet by mouth every other day

## 2025-04-06 RX ORDER — TORSEMIDE 20 MG/1
20 TABLET ORAL EVERY OTHER DAY
Qty: 15 TABLET | Refills: 5 | OUTPATIENT
Start: 2025-04-06

## 2025-04-24 SDOH — ECONOMIC STABILITY: FOOD INSECURITY: WITHIN THE PAST 12 MONTHS, YOU WORRIED THAT YOUR FOOD WOULD RUN OUT BEFORE YOU GOT MONEY TO BUY MORE.: NEVER TRUE

## 2025-04-24 SDOH — ECONOMIC STABILITY: FOOD INSECURITY: WITHIN THE PAST 12 MONTHS, THE FOOD YOU BOUGHT JUST DIDN'T LAST AND YOU DIDN'T HAVE MONEY TO GET MORE.: NEVER TRUE

## 2025-04-24 SDOH — ECONOMIC STABILITY: INCOME INSECURITY: IN THE LAST 12 MONTHS, WAS THERE A TIME WHEN YOU WERE NOT ABLE TO PAY THE MORTGAGE OR RENT ON TIME?: NO

## 2025-04-24 ASSESSMENT — PATIENT HEALTH QUESTIONNAIRE - PHQ9
SUM OF ALL RESPONSES TO PHQ QUESTIONS 1-9: 5
10. IF YOU CHECKED OFF ANY PROBLEMS, HOW DIFFICULT HAVE THESE PROBLEMS MADE IT FOR YOU TO DO YOUR WORK, TAKE CARE OF THINGS AT HOME, OR GET ALONG WITH OTHER PEOPLE: SOMEWHAT DIFFICULT
3. TROUBLE FALLING OR STAYING ASLEEP: MORE THAN HALF THE DAYS
6. FEELING BAD ABOUT YOURSELF - OR THAT YOU ARE A FAILURE OR HAVE LET YOURSELF OR YOUR FAMILY DOWN: NOT AT ALL
2. FEELING DOWN, DEPRESSED OR HOPELESS: NOT AT ALL
2. FEELING DOWN, DEPRESSED OR HOPELESS: NOT AT ALL
8. MOVING OR SPEAKING SO SLOWLY THAT OTHER PEOPLE COULD HAVE NOTICED. OR THE OPPOSITE - BEING SO FIDGETY OR RESTLESS THAT YOU HAVE BEEN MOVING AROUND A LOT MORE THAN USUAL: NOT AT ALL
9. THOUGHTS THAT YOU WOULD BE BETTER OFF DEAD, OR OF HURTING YOURSELF: NOT AT ALL
SUM OF ALL RESPONSES TO PHQ QUESTIONS 1-9: 5
8. MOVING OR SPEAKING SO SLOWLY THAT OTHER PEOPLE COULD HAVE NOTICED. OR THE OPPOSITE, BEING SO FIGETY OR RESTLESS THAT YOU HAVE BEEN MOVING AROUND A LOT MORE THAN USUAL: NOT AT ALL
3. TROUBLE FALLING OR STAYING ASLEEP: MORE THAN HALF THE DAYS
9. THOUGHTS THAT YOU WOULD BE BETTER OFF DEAD, OR OF HURTING YOURSELF: NOT AT ALL
5. POOR APPETITE OR OVEREATING: NOT AT ALL
5. POOR APPETITE OR OVEREATING: NOT AT ALL
1. LITTLE INTEREST OR PLEASURE IN DOING THINGS: SEVERAL DAYS
7. TROUBLE CONCENTRATING ON THINGS, SUCH AS READING THE NEWSPAPER OR WATCHING TELEVISION: NOT AT ALL
6. FEELING BAD ABOUT YOURSELF - OR THAT YOU ARE A FAILURE OR HAVE LET YOURSELF OR YOUR FAMILY DOWN: NOT AT ALL
SUM OF ALL RESPONSES TO PHQ QUESTIONS 1-9: 5
SUM OF ALL RESPONSES TO PHQ QUESTIONS 1-9: 5
7. TROUBLE CONCENTRATING ON THINGS, SUCH AS READING THE NEWSPAPER OR WATCHING TELEVISION: NOT AT ALL
1. LITTLE INTEREST OR PLEASURE IN DOING THINGS: SEVERAL DAYS
4. FEELING TIRED OR HAVING LITTLE ENERGY: MORE THAN HALF THE DAYS
SUM OF ALL RESPONSES TO PHQ QUESTIONS 1-9: 5
4. FEELING TIRED OR HAVING LITTLE ENERGY: MORE THAN HALF THE DAYS
10. IF YOU CHECKED OFF ANY PROBLEMS, HOW DIFFICULT HAVE THESE PROBLEMS MADE IT FOR YOU TO DO YOUR WORK, TAKE CARE OF THINGS AT HOME, OR GET ALONG WITH OTHER PEOPLE: SOMEWHAT DIFFICULT

## 2025-04-25 ENCOUNTER — OFFICE VISIT (OUTPATIENT)
Dept: FAMILY MEDICINE CLINIC | Age: 85
End: 2025-04-25
Payer: MEDICAID

## 2025-04-25 VITALS
OXYGEN SATURATION: 98 % | BODY MASS INDEX: 33.45 KG/M2 | WEIGHT: 201 LBS | HEART RATE: 61 BPM | DIASTOLIC BLOOD PRESSURE: 68 MMHG | SYSTOLIC BLOOD PRESSURE: 110 MMHG

## 2025-04-25 DIAGNOSIS — J43.2 CENTRILOBULAR EMPHYSEMA (HCC): ICD-10-CM

## 2025-04-25 DIAGNOSIS — I50.32 CHRONIC DIASTOLIC CONGESTIVE HEART FAILURE (HCC): ICD-10-CM

## 2025-04-25 DIAGNOSIS — M50.121 CERVICAL DISC DISORDER AT C4-C5 LEVEL WITH RADICULOPATHY: ICD-10-CM

## 2025-04-25 DIAGNOSIS — G47.33 OSA (OBSTRUCTIVE SLEEP APNEA): ICD-10-CM

## 2025-04-25 DIAGNOSIS — M81.0 AGE-RELATED OSTEOPOROSIS WITHOUT CURRENT PATHOLOGICAL FRACTURE: ICD-10-CM

## 2025-04-25 DIAGNOSIS — J96.11 CHRONIC RESPIRATORY FAILURE WITH HYPOXIA (HCC): ICD-10-CM

## 2025-04-25 DIAGNOSIS — N18.32 STAGE 3B CHRONIC KIDNEY DISEASE (HCC): Primary | ICD-10-CM

## 2025-04-25 DIAGNOSIS — E78.00 PURE HYPERCHOLESTEROLEMIA: ICD-10-CM

## 2025-04-25 DIAGNOSIS — M17.0 BILATERAL PRIMARY OSTEOARTHRITIS OF KNEE: ICD-10-CM

## 2025-04-25 DIAGNOSIS — E03.9 ACQUIRED HYPOTHYROIDISM: ICD-10-CM

## 2025-04-25 PROCEDURE — 1123F ACP DISCUSS/DSCN MKR DOCD: CPT | Performed by: STUDENT IN AN ORGANIZED HEALTH CARE EDUCATION/TRAINING PROGRAM

## 2025-04-25 PROCEDURE — 3078F DIAST BP <80 MM HG: CPT | Performed by: STUDENT IN AN ORGANIZED HEALTH CARE EDUCATION/TRAINING PROGRAM

## 2025-04-25 PROCEDURE — G2211 COMPLEX E/M VISIT ADD ON: HCPCS | Performed by: STUDENT IN AN ORGANIZED HEALTH CARE EDUCATION/TRAINING PROGRAM

## 2025-04-25 PROCEDURE — 3074F SYST BP LT 130 MM HG: CPT | Performed by: STUDENT IN AN ORGANIZED HEALTH CARE EDUCATION/TRAINING PROGRAM

## 2025-04-25 PROCEDURE — 99214 OFFICE O/P EST MOD 30 MIN: CPT | Performed by: STUDENT IN AN ORGANIZED HEALTH CARE EDUCATION/TRAINING PROGRAM

## 2025-04-25 NOTE — PATIENT INSTRUCTIONS
Take toursemide daily until weight back to 190  Weight yourself daily after this and if gaining more than 3 lbs in one day or 5 lbs martha  week then take toursemide until back to baseline weight.

## 2025-04-25 NOTE — PROGRESS NOTES
Assessment:  Encounter Diagnoses   Name Primary?    Stage 3b chronic kidney disease (HCC) Yes    Chronic diastolic congestive heart failure (HCC)     Centrilobular emphysema (HCC)     HEBERT (obstructive sleep apnea)     Chronic respiratory failure with hypoxia (HCC)     Age-related osteoporosis without current pathological fracture     Cervical disc disorder at C4-C5 level with radiculopathy     Acquired hypothyroidism     Pure hypercholesterolemia     Bilateral primary osteoarthritis of knee        Assessment & Plan      Plan:  1. Stage 3b chronic kidney disease (HCC)  2. Chronic diastolic congestive heart failure (HCC)  3. Centrilobular emphysema (HCC)  4. HEBERT (obstructive sleep apnea)  5. Chronic respiratory failure with hypoxia (HCC)  6. Age-related osteoporosis without current pathological fracture  7. Cervical disc disorder at C4-C5 level with radiculopathy  8. Acquired hypothyroidism  -     TSH reflex to FT4; Future  9. Pure hypercholesterolemia  -     Lipid, Fasting; Future  10. Bilateral primary osteoarthritis of knee  -     Non [facility] - External Referral To Home Health  Signs of fluid overload on exam. Increase toursemide to daily until weight back to 190. Discussed ongoing monitoring with 3/5 lb rule for increasing dosage frequency. Reports compliant with other medications and no reported AE. Recheck lab work as above.      Results      No follow-ups on file.      Patient: Bettie Strauss is a 85 y.o. female who presents today with the following Chief Complaint(s):  Chief Complaint   Patient presents with    Edema     Bilateral legs    urinary Incontinence         HPI  History of Present Illness      Concerns today include bilateral knee and back pain  Reports when she lays down she feels that she gets hot across her whole back but that front feels cold  Reports this sensation is new over the last several weeks.   Reports that until she removes contact she continues to have  burning/heat

## 2025-06-27 ENCOUNTER — TRANSCRIBE ORDERS (OUTPATIENT)
Dept: ADMINISTRATIVE | Age: 85
End: 2025-06-27

## 2025-06-27 DIAGNOSIS — R13.10 DYSPHAGIA, UNSPECIFIED TYPE: Primary | ICD-10-CM

## 2025-06-30 DIAGNOSIS — K21.00 GASTROESOPHAGEAL REFLUX DISEASE WITH ESOPHAGITIS, UNSPECIFIED WHETHER HEMORRHAGE: ICD-10-CM

## 2025-06-30 RX ORDER — PANTOPRAZOLE SODIUM 40 MG/1
40 TABLET, DELAYED RELEASE ORAL 2 TIMES DAILY
Qty: 180 TABLET | Refills: 1 | Status: SHIPPED | OUTPATIENT
Start: 2025-06-30

## 2025-06-30 NOTE — TELEPHONE ENCOUNTER
Refill Request     CONFIRM preferred pharmacy with the patient.    If Mail Order Rx - Pend for 90 day refill.      Last Seen: Last Seen Department: 4/25/2025  Last Seen by PCP: Visit date not found    Last Written: 3/31/25 60 with 2 refill     If no future appointment scheduled:  Review the last OV with PCP and review information for follow-up visit,  Route STAFF MESSAGE with patient name to the  Pool for scheduling with the following information:            -  Timing of next visit           -  Visit type ie Physical, OV, etc           -  Diagnoses/Reason ie. COPD, HTN - Do not use MEDICATION, Follow-up or CHECK UP - Give reason for visit      Next Appointment:   Future Appointments   Date Time Provider Department Center   7/3/2025  3:30 PM Eduar Garcia MD [facility]   7/15/2025 10:00 AM SCHEDULE, KASIE SP PRN K[facility]   7/15/2025 10:00 AM [facility] FLUORO RM 1 DIAG CTR [facility]   7/31/2025  8:30 AM Cheryle Eubanks APRN - CNP AND PULM [facility]   8/4/2025 10:30 AM Enzweiler, Diane M, APRN - CNP [facility]   10/31/2025  8:00 AM Carlos Eduardo Maya, DO [facility] ECC DEP       Message sent to  to schedule appt with patient?  NO      Requested Prescriptions     Pending Prescriptions Disp Refills    pantoprazole (PROTONIX) 40 MG tablet [Pharmacy Med Name: PANTOPRAZOLE SOD DR 40 MG TAB] 180 tablet      Sig: TAKE 1 TABLET BY MOUTH TWICE A DAY

## 2025-07-03 DIAGNOSIS — E78.00 PURE HYPERCHOLESTEROLEMIA: ICD-10-CM

## 2025-07-03 DIAGNOSIS — E03.9 ACQUIRED HYPOTHYROIDISM: ICD-10-CM

## 2025-07-04 LAB
CHOLEST SERPL-MCNC: 146 MG/DL (ref 0–199)
HDLC SERPL-MCNC: 74 MG/DL (ref 40–60)
LDL CHOLESTEROL: 57 MG/DL
TRIGL SERPL-MCNC: 74 MG/DL (ref 0–150)
TSH SERPL DL<=0.005 MIU/L-ACNC: 2.32 UIU/ML (ref 0.27–4.2)
VLDLC SERPL CALC-MCNC: 15 MG/DL

## 2025-07-07 ENCOUNTER — RESULTS FOLLOW-UP (OUTPATIENT)
Dept: FAMILY MEDICINE CLINIC | Age: 85
End: 2025-07-07

## 2025-07-08 ENCOUNTER — OFFICE VISIT (OUTPATIENT)
Dept: FAMILY MEDICINE CLINIC | Age: 85
End: 2025-07-08
Payer: MEDICAID

## 2025-07-08 VITALS
WEIGHT: 193.4 LBS | BODY MASS INDEX: 32.18 KG/M2 | OXYGEN SATURATION: 90 % | DIASTOLIC BLOOD PRESSURE: 60 MMHG | TEMPERATURE: 97.9 F | HEART RATE: 87 BPM | SYSTOLIC BLOOD PRESSURE: 118 MMHG

## 2025-07-08 DIAGNOSIS — M50.121 CERVICAL DISC DISORDER AT C4-C5 LEVEL WITH RADICULOPATHY: ICD-10-CM

## 2025-07-08 DIAGNOSIS — I50.32 CHRONIC DIASTOLIC CONGESTIVE HEART FAILURE (HCC): Primary | ICD-10-CM

## 2025-07-08 DIAGNOSIS — M54.32 SCIATICA OF LEFT SIDE: ICD-10-CM

## 2025-07-08 DIAGNOSIS — J96.11 CHRONIC RESPIRATORY FAILURE WITH HYPOXIA (HCC): ICD-10-CM

## 2025-07-08 PROCEDURE — 3078F DIAST BP <80 MM HG: CPT

## 2025-07-08 PROCEDURE — 99212 OFFICE O/P EST SF 10 MIN: CPT

## 2025-07-08 PROCEDURE — 1123F ACP DISCUSS/DSCN MKR DOCD: CPT

## 2025-07-08 PROCEDURE — 3074F SYST BP LT 130 MM HG: CPT

## 2025-07-08 RX ORDER — WHEELCHAIR
1 EACH MISCELLANEOUS DAILY
Qty: 1 EACH | Refills: 0 | Status: SHIPPED | OUTPATIENT
Start: 2025-07-08 | End: 2025-10-15

## 2025-07-08 SDOH — ECONOMIC STABILITY: FOOD INSECURITY: WITHIN THE PAST 12 MONTHS, THE FOOD YOU BOUGHT JUST DIDN'T LAST AND YOU DIDN'T HAVE MONEY TO GET MORE.: NEVER TRUE

## 2025-07-08 SDOH — ECONOMIC STABILITY: FOOD INSECURITY: WITHIN THE PAST 12 MONTHS, YOU WORRIED THAT YOUR FOOD WOULD RUN OUT BEFORE YOU GOT MONEY TO BUY MORE.: NEVER TRUE

## 2025-07-08 ASSESSMENT — ENCOUNTER SYMPTOMS
DIARRHEA: 0
CONSTIPATION: 0
NAUSEA: 0
COUGH: 0
SHORTNESS OF BREATH: 1
CHEST TIGHTNESS: 0
SINUS PRESSURE: 0
ABDOMINAL PAIN: 0
TROUBLE SWALLOWING: 0
BACK PAIN: 1

## 2025-07-08 ASSESSMENT — PATIENT HEALTH QUESTIONNAIRE - PHQ9
1. LITTLE INTEREST OR PLEASURE IN DOING THINGS: NOT AT ALL
SUM OF ALL RESPONSES TO PHQ QUESTIONS 1-9: 0
2. FEELING DOWN, DEPRESSED OR HOPELESS: NOT AT ALL

## 2025-07-08 NOTE — PROGRESS NOTES
Have you been to the ER, urgent care clinic since your last visit?  Hospitalized since your last visit?   YES - When: approximately 1 months ago.  Where and Why: [facility] for aspiration .    Have you seen or consulted any other health care providers outside our system since your last visit?   NO

## 2025-07-08 NOTE — PROGRESS NOTES
Bettie Strauss 85 y.o. female    Chief Complaint   Patient presents with    Other     Documentation for wheelchair paperwork       HPI    Shanelle presents today for desire for a wheelchair. Specifically a light weight manual wheelchair:  The patient meets these qualifications for the wheelchair for multiple reasons.  The patient has a mobility limitation that significantly impairs her ability to participate in 1 or more mobility related activities of daily living such as toileting feeding dressing grooming and bathing in customary locations in the home.  A few of these diagnoses include chronic diastolic congestive heart failure and chronic respiratory failure with hypoxia.  As well as sciatica rheumatoid arthritis and cervical disc disorder.  Patient's mobility limitation cannot be sufficiently resolved by the use of an appropriately fitted cane or walker.  The patient's home provides adequate access between rooms maneuvering space and surfaces for use of the manual wheelchair.  Use of the manual wheelchair will significantly improve the patient's ability to present dissipate in activities of daily living and the patient will use it on a regular basis in the home.  Patient has not expressed an unwillingness to use the manual wheelchair and desires to receive 1.  The patient cannot self propel in a standard wheelchair in the home.  The beneficiary can and does self propel in a lightweight wheelchair though.  Patient has sufficient upper extremity function and other physical and mental capabilities needed to safely self propel the manual wheelchair that is provided in the home during a typical day she also has a caregiver who is available willing and able to provide assistance with the wheelchair as needed.    She is requesting a larger sized- wider- wheelchair given her increase in weight.   Her granddaughter is her caretaker and she also has a nurse and health aide that comes multiple days a week- assists with the

## 2025-07-14 DIAGNOSIS — R09.82 PND (POST-NASAL DRIP): ICD-10-CM

## 2025-07-14 RX ORDER — AZELASTINE HYDROCHLORIDE 137 UG/1
SPRAY, METERED NASAL
Qty: 1 EACH | Refills: 5 | Status: SHIPPED | OUTPATIENT
Start: 2025-07-14

## 2025-07-14 NOTE — TELEPHONE ENCOUNTER
Refill Request     CONFIRM preferred pharmacy with the patient.    If Mail Order Rx - Pend for 90 day refill.      Last Seen: Last Seen Department: 7/8/2025  Last Seen by PCP: 4/25/2025    Last Written: 6/28/24 60 ml with no refills     If no future appointment scheduled:  Review the last OV with PCP and review information for follow-up visit,  Route STAFF MESSAGE with patient name to the  Pool for scheduling with the following information:            -  Timing of next visit           -  Visit type ie Physical, OV, etc           -  Diagnoses/Reason ie. COPD, HTN - Do not use MEDICATION, Follow-up or CHECK UP - Give reason for visit      Next Appointment:   Future Appointments   Date Time Provider Department Center   7/15/2025 10:00 AM SCHEDULE, KASIE SP PRN KASIE SPEECH [facility]   7/15/2025 10:00 AM [facility] FLUORO RM 1 DIAG CTR [place] [facility]   7/31/2025  8:30 AM Cheryle Eubanks APRN - CNP AND PULM [facility]   8/4/2025 10:30 AM Enzweiler, Diane M, APRN - CNP [facility] [place]   10/31/2025  8:00 AM Carlos Eduardo Maya DO [facility]   11/6/2025  4:30 PM Eduar Garcia MD [facility]       Message sent to  to schedule appt with patient?  NO      Requested Prescriptions     Pending Prescriptions Disp Refills    azelastine (ASTEPRO) 137 MCG/SPRAY nasal spray [Pharmacy Med Name: AZELASTINE 0.1% (137 MCG) SPRY]  5     Sig: USE 2 SPRAYS IN EACH NOSTRIL TWICE A DAY AS DIRECTED

## 2025-07-15 ENCOUNTER — HOSPITAL ENCOUNTER (OUTPATIENT)
Dept: SPEECH THERAPY | Age: 85
Setting detail: THERAPIES SERIES
Discharge: HOME | End: 2025-07-15

## 2025-07-15 ENCOUNTER — HOSPITAL ENCOUNTER (OUTPATIENT)
Dept: GENERAL RADIOLOGY | Age: 85
Discharge: HOME | End: 2025-07-15
Payer: MEDICAID

## 2025-07-15 DIAGNOSIS — R13.10 DYSPHAGIA, UNSPECIFIED TYPE: ICD-10-CM

## 2025-07-15 PROCEDURE — 74220 X-RAY XM ESOPHAGUS 1CNTRST: CPT

## 2025-07-15 PROCEDURE — 92526 ORAL FUNCTION THERAPY: CPT

## 2025-07-15 PROCEDURE — 92611 MOTION FLUOROSCOPY/SWALLOW: CPT

## 2025-07-15 NOTE — PROCEDURES
Speech Language Pathology  Modified Barium Swallow Study  Facility/Department: [facility] RADIOLOGY        Recommendations:  Diet recommendation: IDDSI 7 Regular Solids; IDDSI 0 Thin Liquids; Meds crushed in puree as able  Risk management: general GERD precautions and general aspiration precautions    *Pt's c/o globus sensation was not clinically correlated on this study with pt's oropharyngeal swallow grossly WFL. However, esophageal dysmotility was demonstrated on pt's esophogram following this study- please see separate report for details. Recommend ongoing GI services to address.     NAME:Bettie Strauss  : 1940 (85 y.o.)   MRN: [id number]  ROOM: Room/bed info not found  ADMISSION DATE: 7/15/2025  PATIENT DIAGNOSIS(ES): Dysphagia, unspecified type [R13.10]  No chief complaint on file.    Patient Active Problem List    Diagnosis Date Noted    Myoclonus 2022    Acute pulmonary embolism without acute cor pulmonale, unspecified pulmonary embolism type (HCC) 07/15/2022    Stage 3b chronic kidney disease (HCC) 2024    Chronic diastolic congestive heart failure (HCC) 2024    Lung nodule 2023    Prediabetes 2023    Chronic pain syndrome 2023    Right rotator cuff tear arthropathy 2023    HTN (hypertension), benign 2021    Acute deep vein thrombosis (DVT) of calf muscle vein of both lower extremities (HCC) 2021    Former smoker 2021    Esophageal motility disorder 2019    HEBERT (obstructive sleep apnea) 2019    Atrial septal defect     Chronic respiratory failure with hypoxia (HCC) 2018    Pulmonary HTN (HCC) 2018    Sciatica of left side 2017    Cervical disc disorder at C4-C5 level with radiculopathy 2017    Acquired hypothyroidism 2017    Age-related osteoporosis without current pathological fracture 2015    Essential hypertension 2012    Centrilobular emphysema (HCC) 10/18/2011

## 2025-07-16 ENCOUNTER — TELEPHONE (OUTPATIENT)
Dept: FAMILY MEDICINE CLINIC | Age: 85
End: 2025-07-16

## 2025-07-16 NOTE — TELEPHONE ENCOUNTER
[facility]  [facility] is calling for certificate of medical necessity for a wheel chair. [place] has tried to get the patient a wheel chair and has not been successful.  Lucila is the patients  and is calling to help.      Best contact number for Lucila is [phone]      Please advise.

## 2025-07-17 ENCOUNTER — TRANSCRIBE ORDERS (OUTPATIENT)
Dept: ADMINISTRATIVE | Age: 85
End: 2025-07-17

## 2025-07-17 DIAGNOSIS — R93.3 ABNORMAL ESOPHAGRAM: Primary | ICD-10-CM

## 2025-07-18 NOTE — TELEPHONE ENCOUNTER
Left message notifying Lucila that information has been sent and to call the office with questions.

## 2025-07-25 DIAGNOSIS — E78.5 HYPERLIPIDEMIA, UNSPECIFIED HYPERLIPIDEMIA TYPE: ICD-10-CM

## 2025-07-25 RX ORDER — EZETIMIBE 10 MG/1
10 TABLET ORAL DAILY
Qty: 90 TABLET | Refills: 3 | Status: SHIPPED | OUTPATIENT
Start: 2025-07-25

## 2025-07-25 NOTE — TELEPHONE ENCOUNTER
Last Office Visit: 6/13/2024 Provider: TRAVIS  **Is provider OOT? Yes    MG please advise     Next Office Visit: 8/4/2025 Provider: ROSELYN    **Has patient already had 30 day supply? No    LAST LABS:   Lipid:  Lab Results   Component Value Date    CHOL 152 01/26/2024    TRIG 68 01/26/2024    HDL 74 (H) 07/03/2025    LDL 57 07/03/2025    VLDL 15 07/03/2025     Lab orders needed? no

## 2025-07-28 ENCOUNTER — HOSPITAL ENCOUNTER (OUTPATIENT)
Dept: CT IMAGING | Age: 85
Discharge: HOME | End: 2025-07-28
Payer: MEDICAID

## 2025-07-28 DIAGNOSIS — R93.3 ABNORMAL ESOPHAGRAM: ICD-10-CM

## 2025-07-28 PROCEDURE — 71250 CT THORAX DX C-: CPT

## 2025-07-28 ASSESSMENT — SLEEP AND FATIGUE QUESTIONNAIRES
HOW LIKELY ARE YOU TO NOD OFF OR FALL ASLEEP WHILE SITTING QUIETLY AFTER LUNCH WITHOUT ALCOHOL: SLIGHT CHANCE OF DOZING
HOW LIKELY ARE YOU TO NOD OFF OR FALL ASLEEP WHILE WATCHING TV: MODERATE CHANCE OF DOZING
HOW LIKELY ARE YOU TO NOD OFF OR FALL ASLEEP WHILE LYING DOWN TO REST IN THE AFTERNOON WHEN CIRCUMSTANCES PERMIT: SLIGHT CHANCE OF DOZING
HOW LIKELY ARE YOU TO NOD OFF OR FALL ASLEEP WHILE SITTING AND TALKING TO SOMEONE: WOULD NEVER DOZE
HOW LIKELY ARE YOU TO NOD OFF OR FALL ASLEEP WHILE SITTING AND READING: SLIGHT CHANCE OF DOZING
ESS TOTAL SCORE: 5
HOW LIKELY ARE YOU TO NOD OFF OR FALL ASLEEP IN A CAR, WHILE STOPPED FOR A FEW MINUTES IN TRAFFIC: WOULD NEVER DOZE
HOW LIKELY ARE YOU TO NOD OFF OR FALL ASLEEP WHILE SITTING INACTIVE IN A PUBLIC PLACE: WOULD NEVER DOZE
HOW LIKELY ARE YOU TO NOD OFF OR FALL ASLEEP WHEN YOU ARE A PASSENGER IN A CAR FOR AN HOUR WITHOUT A BREAK: WOULD NEVER DOZE

## 2025-07-29 DIAGNOSIS — D50.9 IRON DEFICIENCY ANEMIA, UNSPECIFIED IRON DEFICIENCY ANEMIA TYPE: ICD-10-CM

## 2025-07-29 RX ORDER — FERROUS SULFATE 325(65) MG
1 TABLET ORAL DAILY
Qty: 30 TABLET | Refills: 5 | Status: SHIPPED | OUTPATIENT
Start: 2025-07-29

## 2025-07-29 NOTE — TELEPHONE ENCOUNTER
Refill Request     CONFIRM preferred pharmacy with the patient.    If Mail Order Rx - Pend for 90 day refill.      Last Seen: Last Seen Department: 7/8/2025  Last Seen by PCP: 4/25/2025    Last Written: 1/13/25 30 with 5 refills     If no future appointment scheduled:  Review the last OV with PCP and review information for follow-up visit,  Route STAFF MESSAGE with patient name to the  Pool for scheduling with the following information:            -  Timing of next visit           -  Visit type ie Physical, OV, etc           -  Diagnoses/Reason ie. COPD, HTN - Do not use MEDICATION, Follow-up or CHECK UP - Give reason for visit      Next Appointment:   Future Appointments   Date Time Provider Department [place]   7/31/2025  8:30 AM [facility]   8/4/2025 10:30 AM Enzweiler, Diane M, APRN - CNP [facility]   10/31/2025  8:00 AM Carlos Eduardo Maya DO [facility]   11/6/2025  4:30 PM Eduar Garcia MD [facility]       Message sent to  to schedule appt with patient?  NO      Requested Prescriptions     Pending Prescriptions Disp Refills    ferrous sulfate (IRON 325) 325 (65 Fe) MG tablet [Pharmacy Med Name: FERROUS SULFATE 325 MG TABLET] 30 tablet 5     Sig: TAKE 1 TABLET BY MOUTH EVERY DAY

## 2025-07-31 ASSESSMENT — SLEEP AND FATIGUE QUESTIONNAIRES
HOW LIKELY ARE YOU TO NOD OFF OR FALL ASLEEP WHILE SITTING AND TALKING TO SOMEONE: WOULD NEVER DOZE
HOW LIKELY ARE YOU TO NOD OFF OR FALL ASLEEP IN A CAR, WHILE STOPPED FOR A FEW MINUTES IN TRAFFIC: WOULD NEVER DOZE
HOW LIKELY ARE YOU TO NOD OFF OR FALL ASLEEP WHILE LYING DOWN TO REST IN THE AFTERNOON WHEN CIRCUMSTANCES PERMIT: SLIGHT CHANCE OF DOZING
HOW LIKELY ARE YOU TO NOD OFF OR FALL ASLEEP WHILE SITTING AND READING: SLIGHT CHANCE OF DOZING
HOW LIKELY ARE YOU TO NOD OFF OR FALL ASLEEP WHILE SITTING INACTIVE IN A PUBLIC PLACE: WOULD NEVER DOZE
HOW LIKELY ARE YOU TO NOD OFF OR FALL ASLEEP WHILE WATCHING TV: MODERATE CHANCE OF DOZING
HOW LIKELY ARE YOU TO NOD OFF OR FALL ASLEEP WHEN YOU ARE A PASSENGER IN A CAR FOR AN HOUR WITHOUT A BREAK: WOULD NEVER DOZE
HOW LIKELY ARE YOU TO NOD OFF OR FALL ASLEEP WHILE SITTING QUIETLY AFTER LUNCH WITHOUT ALCOHOL: SLIGHT CHANCE OF DOZING

## 2025-08-04 ENCOUNTER — HOSPITAL ENCOUNTER (EMERGENCY)
Age: 85
Discharge: HOME | End: 2025-08-05
Attending: STUDENT IN AN ORGANIZED HEALTH CARE EDUCATION/TRAINING PROGRAM
Payer: MEDICAID

## 2025-08-04 ENCOUNTER — OFFICE VISIT (OUTPATIENT)
Dept: CARDIOLOGY CLINIC | Age: 85
End: 2025-08-04

## 2025-08-04 ENCOUNTER — APPOINTMENT (OUTPATIENT)
Dept: GENERAL RADIOLOGY | Age: 85
End: 2025-08-04
Payer: MEDICAID

## 2025-08-04 VITALS
HEART RATE: 80 BPM | WEIGHT: 194 LBS | SYSTOLIC BLOOD PRESSURE: 136 MMHG | OXYGEN SATURATION: 91 % | BODY MASS INDEX: 32.32 KG/M2 | HEIGHT: 65 IN | DIASTOLIC BLOOD PRESSURE: 62 MMHG

## 2025-08-04 VITALS
SYSTOLIC BLOOD PRESSURE: 138 MMHG | TEMPERATURE: 98 F | OXYGEN SATURATION: 95 % | RESPIRATION RATE: 13 BRPM | HEART RATE: 72 BPM | DIASTOLIC BLOOD PRESSURE: 73 MMHG

## 2025-08-04 DIAGNOSIS — T17.908A ASPIRATION INTO AIRWAY, INITIAL ENCOUNTER: ICD-10-CM

## 2025-08-04 DIAGNOSIS — I25.10 CORONARY ARTERY CALCIFICATION SEEN ON CAT SCAN: ICD-10-CM

## 2025-08-04 DIAGNOSIS — R05.1 ACUTE COUGH: Primary | ICD-10-CM

## 2025-08-04 DIAGNOSIS — J44.1 COPD EXACERBATION (HCC): ICD-10-CM

## 2025-08-04 DIAGNOSIS — Z87.891 FORMER SMOKER: ICD-10-CM

## 2025-08-04 DIAGNOSIS — J96.11 CHRONIC RESPIRATORY FAILURE WITH HYPOXIA (HCC): ICD-10-CM

## 2025-08-04 DIAGNOSIS — N18.32 STAGE 3B CHRONIC KIDNEY DISEASE (HCC): ICD-10-CM

## 2025-08-04 DIAGNOSIS — R60.0 BILATERAL LEG EDEMA: ICD-10-CM

## 2025-08-04 DIAGNOSIS — I48.0 PAROXYSMAL ATRIAL FIBRILLATION (HCC): ICD-10-CM

## 2025-08-04 DIAGNOSIS — I50.32 CHRONIC HEART FAILURE WITH PRESERVED EJECTION FRACTION (HFPEF) (HCC): Primary | ICD-10-CM

## 2025-08-04 DIAGNOSIS — I10 ESSENTIAL HYPERTENSION: ICD-10-CM

## 2025-08-04 DIAGNOSIS — I27.20 PULMONARY HTN (HCC): ICD-10-CM

## 2025-08-04 DIAGNOSIS — G47.33 OSA ON CPAP: ICD-10-CM

## 2025-08-04 LAB
ALBUMIN SERPL-MCNC: 4.3 G/DL (ref 3.4–5)
ALBUMIN/GLOB SERPL: 1.7 {RATIO} (ref 1.1–2.2)
ALP SERPL-CCNC: 62 U/L (ref 40–129)
ALT SERPL-CCNC: 17 U/L (ref 10–40)
ANION GAP SERPL CALCULATED.3IONS-SCNC: 12 MMOL/L (ref 3–16)
AST SERPL-CCNC: 22 U/L (ref 15–37)
BASE EXCESS BLDV CALC-SCNC: -1.2 MMOL/L (ref -3–3)
BASOPHILS # BLD: 0.1 K/UL (ref 0–0.2)
BASOPHILS NFR BLD: 0.8 %
BILIRUB SERPL-MCNC: 0.5 MG/DL (ref 0–1)
BUN SERPL-MCNC: 26 MG/DL (ref 7–20)
CALCIUM SERPL-MCNC: 10 MG/DL (ref 8.3–10.6)
CHLORIDE SERPL-SCNC: 101 MMOL/L (ref 99–110)
CO2 BLDV-SCNC: 26 MMOL/L
CO2 SERPL-SCNC: 26 MMOL/L (ref 21–32)
COHGB MFR BLDV: 1.8 % (ref 0–1.5)
CREAT SERPL-MCNC: 1.4 MG/DL (ref 0.6–1.2)
DEPRECATED RDW RBC AUTO: 15.9 % (ref 12.4–15.4)
EOSINOPHIL # BLD: 0 K/UL (ref 0–0.6)
EOSINOPHIL NFR BLD: 0.2 %
FLUAV RNA RESP QL NAA+PROBE: NOT DETECTED
FLUBV RNA RESP QL NAA+PROBE: NOT DETECTED
GFR SERPLBLD CREATININE-BSD FMLA CKD-EPI: 37 ML/MIN/{1.73_M2}
GLUCOSE SERPL-MCNC: 101 MG/DL (ref 70–99)
HCO3 BLDV-SCNC: 24.2 MMOL/L (ref 23–29)
HCT VFR BLD AUTO: 36.8 % (ref 36–48)
HGB BLD-MCNC: 12.2 G/DL (ref 12–16)
LACTATE BLDV-SCNC: 1 MMOL/L (ref 0.4–2)
LYMPHOCYTES # BLD: 0.9 K/UL (ref 1–5.1)
LYMPHOCYTES NFR BLD: 11.5 %
MCH RBC QN AUTO: 29 PG (ref 26–34)
MCHC RBC AUTO-ENTMCNC: 33.1 G/DL (ref 31–36)
MCV RBC AUTO: 87.5 FL (ref 80–100)
METHGB MFR BLDV: 0.3 %
MONOCYTES # BLD: 0.5 K/UL (ref 0–1.3)
MONOCYTES NFR BLD: 5.7 %
NEUTROPHILS # BLD: 6.7 K/UL (ref 1.7–7.7)
NEUTROPHILS NFR BLD: 81.8 %
NT-PROBNP SERPL-MCNC: 255 PG/ML (ref 0–449)
O2 THERAPY: ABNORMAL
PCO2 BLDV: 43 MMHG (ref 40–50)
PH BLDV: 7.37 [PH] (ref 7.35–7.45)
PLATELET # BLD AUTO: 223 K/UL (ref 135–450)
PMV BLD AUTO: 8 FL (ref 5–10.5)
PO2 BLDV: 42.1 MMHG (ref 25–40)
POTASSIUM SERPL-SCNC: 4.6 MMOL/L (ref 3.5–5.1)
PROT SERPL-MCNC: 6.8 G/DL (ref 6.4–8.2)
RBC # BLD AUTO: 4.2 M/UL (ref 4–5.2)
SAO2 % BLDV: 76 %
SARS-COV-2 RNA RESP QL NAA+PROBE: NOT DETECTED
SODIUM SERPL-SCNC: 139 MMOL/L (ref 136–145)
TROPONIN, HIGH SENSITIVITY: 64 NG/L (ref 0–14)
TROPONIN, HIGH SENSITIVITY: 69 NG/L (ref 0–14)
WBC # BLD AUTO: 8.2 K/UL (ref 4–11)

## 2025-08-04 PROCEDURE — 94640 AIRWAY INHALATION TREATMENT: CPT

## 2025-08-04 PROCEDURE — 82803 BLOOD GASES ANY COMBINATION: CPT

## 2025-08-04 PROCEDURE — 85025 COMPLETE CBC W/AUTO DIFF WBC: CPT

## 2025-08-04 PROCEDURE — 71046 X-RAY EXAM CHEST 2 VIEWS: CPT

## 2025-08-04 PROCEDURE — 96374 THER/PROPH/DIAG INJ IV PUSH: CPT

## 2025-08-04 PROCEDURE — 6360000002 HC RX W HCPCS: Performed by: STUDENT IN AN ORGANIZED HEALTH CARE EDUCATION/TRAINING PROGRAM

## 2025-08-04 PROCEDURE — 99285 EMERGENCY DEPT VISIT HI MDM: CPT

## 2025-08-04 PROCEDURE — 84484 ASSAY OF TROPONIN QUANT: CPT

## 2025-08-04 PROCEDURE — 83880 ASSAY OF NATRIURETIC PEPTIDE: CPT

## 2025-08-04 PROCEDURE — 80053 COMPREHEN METABOLIC PANEL: CPT

## 2025-08-04 PROCEDURE — 93005 ELECTROCARDIOGRAM TRACING: CPT | Performed by: STUDENT IN AN ORGANIZED HEALTH CARE EDUCATION/TRAINING PROGRAM

## 2025-08-04 PROCEDURE — 6370000000 HC RX 637 (ALT 250 FOR IP): Performed by: STUDENT IN AN ORGANIZED HEALTH CARE EDUCATION/TRAINING PROGRAM

## 2025-08-04 PROCEDURE — 83605 ASSAY OF LACTIC ACID: CPT

## 2025-08-04 PROCEDURE — 2500000003 HC RX 250 WO HCPCS: Performed by: STUDENT IN AN ORGANIZED HEALTH CARE EDUCATION/TRAINING PROGRAM

## 2025-08-04 PROCEDURE — 87636 SARSCOV2 & INF A&B AMP PRB: CPT

## 2025-08-04 RX ORDER — IPRATROPIUM BROMIDE AND ALBUTEROL SULFATE 2.5; .5 MG/3ML; MG/3ML
1 SOLUTION RESPIRATORY (INHALATION) ONCE
Status: COMPLETED | OUTPATIENT
Start: 2025-08-04 | End: 2025-08-04

## 2025-08-04 RX ORDER — AZITHROMYCIN 500 MG/1
500 TABLET, FILM COATED ORAL DAILY
Qty: 3 TABLET | Refills: 0 | Status: SHIPPED | OUTPATIENT
Start: 2025-08-04 | End: 2025-08-07

## 2025-08-04 RX ORDER — AZITHROMYCIN 250 MG/1
500 TABLET, FILM COATED ORAL ONCE
Status: COMPLETED | OUTPATIENT
Start: 2025-08-05 | End: 2025-08-04

## 2025-08-04 RX ORDER — PREDNISONE 10 MG/1
TABLET ORAL
Qty: 20 TABLET | Refills: 0 | Status: SHIPPED | OUTPATIENT
Start: 2025-08-04 | End: 2025-08-14

## 2025-08-04 RX ADMIN — WATER 125 MG: 1 INJECTION INTRAMUSCULAR; INTRAVENOUS; SUBCUTANEOUS at 21:43

## 2025-08-04 RX ADMIN — AZITHROMYCIN 500 MG: 250 TABLET, FILM COATED ORAL at 23:53

## 2025-08-04 RX ADMIN — IPRATROPIUM BROMIDE AND ALBUTEROL SULFATE 1 DOSE: .5; 2.5 SOLUTION RESPIRATORY (INHALATION) at 21:47

## 2025-08-04 ASSESSMENT — PAIN - FUNCTIONAL ASSESSMENT: PAIN_FUNCTIONAL_ASSESSMENT: NONE - DENIES PAIN

## 2025-08-05 LAB
EKG ATRIAL RATE: 71 BPM
EKG DIAGNOSIS: NORMAL
EKG P AXIS: 54 DEGREES
EKG P-R INTERVAL: 138 MS
EKG Q-T INTERVAL: 422 MS
EKG QRS DURATION: 120 MS
EKG QTC CALCULATION (BAZETT): 458 MS
EKG R AXIS: 28 DEGREES
EKG T AXIS: 44 DEGREES
EKG VENTRICULAR RATE: 71 BPM

## 2025-08-05 PROCEDURE — 93010 ELECTROCARDIOGRAM REPORT: CPT | Performed by: INTERNAL MEDICINE

## 2025-08-06 ENCOUNTER — TELEPHONE (OUTPATIENT)
Dept: FAMILY MEDICINE CLINIC | Age: 85
End: 2025-08-06

## 2025-08-11 ENCOUNTER — OFFICE VISIT (OUTPATIENT)
Dept: PULMONOLOGY | Age: 85
End: 2025-08-11
Payer: MEDICAID

## 2025-08-11 VITALS
OXYGEN SATURATION: 96 % | RESPIRATION RATE: 20 BRPM | HEART RATE: 83 BPM | SYSTOLIC BLOOD PRESSURE: 145 MMHG | HEIGHT: 65 IN | DIASTOLIC BLOOD PRESSURE: 71 MMHG | BODY MASS INDEX: 32.28 KG/M2 | TEMPERATURE: 98.2 F

## 2025-08-11 DIAGNOSIS — G47.33 OSA (OBSTRUCTIVE SLEEP APNEA): ICD-10-CM

## 2025-08-11 DIAGNOSIS — J43.2 CENTRILOBULAR EMPHYSEMA (HCC): Primary | ICD-10-CM

## 2025-08-11 DIAGNOSIS — I10 HTN (HYPERTENSION), BENIGN: ICD-10-CM

## 2025-08-11 DIAGNOSIS — Z87.891 FORMER SMOKER: ICD-10-CM

## 2025-08-11 DIAGNOSIS — J96.11 CHRONIC RESPIRATORY FAILURE WITH HYPOXIA (HCC): ICD-10-CM

## 2025-08-11 PROCEDURE — 3078F DIAST BP <80 MM HG: CPT | Performed by: NURSE PRACTITIONER

## 2025-08-11 PROCEDURE — 1123F ACP DISCUSS/DSCN MKR DOCD: CPT | Performed by: NURSE PRACTITIONER

## 2025-08-11 PROCEDURE — 3077F SYST BP >= 140 MM HG: CPT | Performed by: NURSE PRACTITIONER

## 2025-08-11 PROCEDURE — 99214 OFFICE O/P EST MOD 30 MIN: CPT | Performed by: NURSE PRACTITIONER

## 2025-08-11 ASSESSMENT — ENCOUNTER SYMPTOMS
ABDOMINAL PAIN: 0
WHEEZING: 0
SHORTNESS OF BREATH: 0
RHINORRHEA: 0
COUGH: 1
CHEST TIGHTNESS: 0
TROUBLE SWALLOWING: 1
EYE PAIN: 0

## 2025-08-23 DIAGNOSIS — E03.9 ACQUIRED HYPOTHYROIDISM: ICD-10-CM

## 2025-08-24 DIAGNOSIS — E03.9 ACQUIRED HYPOTHYROIDISM: ICD-10-CM

## 2025-08-25 RX ORDER — LEVOTHYROXINE SODIUM 50 UG/1
50 TABLET ORAL DAILY
Qty: 90 TABLET | Refills: 1 | Status: SHIPPED | OUTPATIENT
Start: 2025-08-25

## 2025-08-25 RX ORDER — LEVOTHYROXINE SODIUM 50 UG/1
50 TABLET ORAL DAILY
Qty: 90 TABLET | Refills: 3 | Status: SHIPPED | OUTPATIENT
Start: 2025-08-25

## 2025-08-28 ENCOUNTER — TELEPHONE (OUTPATIENT)
Dept: ADMINISTRATIVE | Age: 85
End: 2025-08-28

## (undated) DEVICE — SUTURE VCRL + SZ 1 L18IN ABSRB UD L36MM CT-1 1/2 CIR VCP841D

## (undated) DEVICE — GAUZE,SPONGE,4"X4",8PLY,STRL,LF,10/TRAY: Brand: MEDLINE

## (undated) DEVICE — COVER,MAYO STAND,XL,STERILE: Brand: MEDLINE

## (undated) DEVICE — BIT DRL SCREW 3.2 MM PERIPH STRL

## (undated) DEVICE — 3M™ IOBAN™ 2 ANTIMICROBIAL INCISE DRAPE 6650EZ: Brand: IOBAN™ 2

## (undated) DEVICE — SINGLE USE BIOPSY VALVE MAJ-210: Brand: SINGLE USE BIOPSY VALVE (STERILE)

## (undated) DEVICE — MERCY FAIRFIELD TURNOVER KIT: Brand: MEDLINE INDUSTRIES, INC.

## (undated) DEVICE — DRAPE,SHOULDER,BEACH CHAIR,STERILE: Brand: MEDLINE

## (undated) DEVICE — ELECTRODE,RADIOTRANSLUCENT,FOAM,3PK: Brand: MEDLINE

## (undated) DEVICE — DRAPE C ARM LF 16X85

## (undated) DEVICE — FORCEPS BX L240CM JAW DIA2.8MM L CAP W/ NDL MIC MESH TOOTH

## (undated) DEVICE — 3M™ COBAN™ NL STERILE NON-LATEX SELF-ADHERENT WRAP, 2086S, 6 IN X 5 YD (15 CM X 4,5 M), 12 ROLLS/CASE: Brand: 3M™ COBAN™

## (undated) DEVICE — GAUZE,SPONGE,4"X4",16PLY,STRL,LF,10/TRAY: Brand: MEDLINE

## (undated) DEVICE — TRAP,MUCUS SPECIMEN, 80CC: Brand: MEDLINE

## (undated) DEVICE — SYRINGE,TOOMEY,IRRIGATION,70CC,STERILE: Brand: MEDLINE

## (undated) DEVICE — ELECTRODE ECG MONITR FOAM TEAR DROP ADLT RED

## (undated) DEVICE — SYRINGE MED 10ML POLYPR LUERSLIP TIP FLAT TOP W/O SFTY DISP

## (undated) DEVICE — POSITIONER HD W8XH4XL8.5IN RASPBERRY FOAM SLT

## (undated) DEVICE — COVER C ARM MINI

## (undated) DEVICE — SINGLE USE LIGATING DEVICE: Brand: SINGLE USE LIGATING DEVICE

## (undated) DEVICE — GUIDEPIN ORTH 2.5X220 MM SHLDR AEQUALIS PERFORM+
Type: IMPLANTABLE DEVICE | Site: SHOULDER | Status: NON-FUNCTIONAL
Removed: 2023-06-06

## (undated) DEVICE — ELECTRODE,ECG,STRESS,FOAM,3PK: Brand: MEDLINE

## (undated) DEVICE — UNIVERSAL BLOCK TRAY: Brand: MEDLINE INDUSTRIES, INC.

## (undated) DEVICE — SUTURE MCRYL + SZ 3-0 L27IN ABSRB UD PS1 L24MM 3/8 CIR REV MCP936H

## (undated) DEVICE — TUBING, SUCTION, 3/16" X 6', STRAIGHT: Brand: MEDLINE

## (undated) DEVICE — ELECTRODE PT RET AD L9FT HI MOIST COND ADH HYDRGEL CORDED

## (undated) DEVICE — SYRINGE MED 50ML LUERSLIP TIP

## (undated) DEVICE — T-MAX DISPOSABLE FACE MASK 8 PER BOX

## (undated) DEVICE — CHLORAPREP 26ML ORANGE

## (undated) DEVICE — STERILE POLYISOPRENE POWDER-FREE SURGICAL GLOVES: Brand: PROTEXIS

## (undated) DEVICE — Device

## (undated) DEVICE — PACK PROCEDURE SURG SHLDR MFFOP CUST

## (undated) DEVICE — CANNULA NSL AD TBNG L7FT PVC STR NONFLARED PRNG O2 DEL W STD

## (undated) DEVICE — CANNULA,OXY,ADULT,SUPERSOFT,W/7'TUB,SC: Brand: MEDLINE INDUSTRIES, INC.

## (undated) DEVICE — SOLUTION IRRIG 2000ML 0.9% SOD CHL USP UROMATIC PLAS CONT

## (undated) DEVICE — 2108 SERIES SAGITTAL BLADE (24.8 X 0.88 X 73.8MM)

## (undated) DEVICE — FRAME EYEWR PROTCT ASST CLR DISP SAFEVIEW

## (undated) DEVICE — SUTURE ETHBND EXCEL SZ 5 L30IN NONABSORBABLE GRN L48MM V-40 MB46G

## (undated) DEVICE — SHEET,DRAPE,53X77,STERILE: Brand: MEDLINE

## (undated) DEVICE — SHOULDER STABILIZATION KIT,                                    DISPOSABLE 12 PER BOX

## (undated) DEVICE — BITE BLOCK ENDOSCP AD 60 FR W/ ADJ STRP PLAS GRN BLOX

## (undated) DEVICE — 3M™ STERI-DRAPE™ U-DRAPE 1015: Brand: STERI-DRAPE™

## (undated) DEVICE — SINGLE USE SUCTION VALVE MAJ-209: Brand: SINGLE USE SUCTION VALVE (STERILE)

## (undated) DEVICE — PEG STANDARD SYSTEM: Brand: ENTAKE

## (undated) DEVICE — CONMED SCOPE SAVER BITE BLOCK, 20X27 MM: Brand: SCOPE SAVER

## (undated) DEVICE — APPLICATOR MEDICATED 26 CC SOLUTION HI LT ORNG CHLORAPREP

## (undated) DEVICE — KIT INF CTRL 2OZ LUB TBNG L12FT DBL END BRSH SYR OP4

## (undated) DEVICE — SUTURE VCRL + SZ 2-0 L18IN ABSRB UD CT1 L36MM 1/2 CIR VCP839D

## (undated) DEVICE — BOWL MED M 16OZ PLAS CAP GRAD

## (undated) DEVICE — ALCOHOL RUBBING 16OZ 70% ISO

## (undated) DEVICE — PROVE COVER: Brand: UNBRANDED

## (undated) DEVICE — TUBING, SUCTION, 3/16" X 12', STRAIGHT: Brand: MEDLINE

## (undated) DEVICE — Z DISCONTINUED USE 2276105 GOWN PROTCT UNIV CHST W28IN L49IN SL 24IN BLU SPUNBOND FLM

## (undated) DEVICE — SOLUTION IRRIG 1000ML 0.9% SOD CHL USP POUR PLAS BTL

## (undated) DEVICE — ENDOSCOPIC KIT 2 12 FT OP4 DE2 GWN SYR

## (undated) DEVICE — TOWEL,OR,DSP,ST,BLUE,STD,4/PK,20PK/CS: Brand: MEDLINE

## (undated) DEVICE — SYRINGE MED 10ML SLIP TIP BLNT FILL AND LUERLOCK DISP

## (undated) DEVICE — SOLUTION IRRIG 1000ML STRL H2O USP PLAS POUR BTL

## (undated) DEVICE — HANDPIECE SET WITH HIGH FLOW TIP AND SUCTION TUBE: Brand: INTERPULSE

## (undated) DEVICE — YANKAUER,BULB TIP,W/O VENT,RIGID,STERILE: Brand: MEDLINE

## (undated) DEVICE — ENDO CARRY-ON PROCEDURE KIT INCLUDES SUCTION TUBING, LUBRICANT, GAUZE, BIOHAZARD STICKER, TRANSPORT PAD AND INTERCEPT BEDSIDE KIT.: Brand: ENDO CARRY-ON PROCEDURE KIT

## (undated) DEVICE — BANDAGE COBAN 4 IN COMPR W4INXL5YD FOAM COHESIVE QUIK STK SELF ADH SFT

## (undated) DEVICE — MAJOR SET UP PK

## (undated) DEVICE — PENCIL SMK EVAC TELSCP 3 M TBNG

## (undated) DEVICE — LINER,SOFT,SUCTION CANISTER,1500CC: Brand: MEDLINE